# Patient Record
Sex: FEMALE | Race: WHITE | Employment: OTHER | URBAN - METROPOLITAN AREA
[De-identification: names, ages, dates, MRNs, and addresses within clinical notes are randomized per-mention and may not be internally consistent; named-entity substitution may affect disease eponyms.]

---

## 2017-05-31 ENCOUNTER — OFFICE VISIT (OUTPATIENT)
Dept: CARDIOLOGY CLINIC | Age: 82
End: 2017-05-31

## 2017-05-31 VITALS
SYSTOLIC BLOOD PRESSURE: 143 MMHG | HEIGHT: 63 IN | DIASTOLIC BLOOD PRESSURE: 48 MMHG | WEIGHT: 145 LBS | HEART RATE: 67 BPM | BODY MASS INDEX: 25.69 KG/M2

## 2017-05-31 DIAGNOSIS — E78.5 HYPERLIPIDEMIA, UNSPECIFIED HYPERLIPIDEMIA TYPE: ICD-10-CM

## 2017-05-31 DIAGNOSIS — J44.9 CHRONIC OBSTRUCTIVE PULMONARY DISEASE, UNSPECIFIED COPD TYPE (HCC): ICD-10-CM

## 2017-05-31 DIAGNOSIS — I10 ESSENTIAL HYPERTENSION WITH GOAL BLOOD PRESSURE LESS THAN 140/90: ICD-10-CM

## 2017-05-31 DIAGNOSIS — R06.09 DOE (DYSPNEA ON EXERTION): ICD-10-CM

## 2017-05-31 DIAGNOSIS — I27.20 PULMONARY HTN (HCC): ICD-10-CM

## 2017-05-31 DIAGNOSIS — I50.32 CHRONIC DIASTOLIC CONGESTIVE HEART FAILURE (HCC): Primary | ICD-10-CM

## 2017-05-31 NOTE — PROGRESS NOTES
HISTORY OF PRESENT ILLNESS  Blanche Gaffney is a 80 y.o. female. HPI Comments: Patient with chf,copd,htn,pulm htn  Here for follow up    Shortness of Breath   The history is provided by the patient. This is a recurrent problem. The problem occurs frequently. The problem has not changed since onset. Pertinent negatives include no fever, no headaches, no cough, no sputum production, no hemoptysis, no wheezing, no PND, no orthopnea, no chest pain, no vomiting, no abdominal pain, no rash, no leg swelling and no claudication. Precipitated by: activity. Review of Systems   Constitutional: Negative for chills and fever. HENT: Negative for nosebleeds. Eyes: Negative for blurred vision and double vision. Respiratory: Positive for shortness of breath. Negative for cough, hemoptysis, sputum production and wheezing. Cardiovascular: Negative for chest pain, palpitations, orthopnea, claudication, leg swelling and PND. Gastrointestinal: Negative for abdominal pain, heartburn, nausea and vomiting. Musculoskeletal: Negative for myalgias. Skin: Negative for rash. Neurological: Negative for dizziness, weakness and headaches. Endo/Heme/Allergies: Does not bruise/bleed easily.      Family History   Problem Relation Age of Onset    Heart Disease Mother     Hypertension Mother     Heart Attack Father     Hypertension Father        Past Medical History:   Diagnosis Date    Chronic lung disease     Chronic obstructive pulmonary disease ([address]     Heart failure ([address]     Hypertension        Past Surgical History:   Procedure Laterality Date    HX ORTHOPAEDIC      spinal sx 5/6    HX OTHER SURGICAL      Carotid artery    VASCULAR SURGERY PROCEDURE UNLIST      L CEA 10/2014       Social History   Substance Use Topics    Smoking status: Former Smoker     Packs/day: 1.50     Years: 30.00     Types: Cigarettes     Quit date: 9/12/1987    Smokeless tobacco: Never Used    Alcohol use No       No Known Allergies    Outpatient Prescriptions Marked as Taking for the 5/31/17 encounter (Office Visit) with Caroline Fuentes MD   Medication Sig Dispense Refill    Oxygen       valsartan (DIOVAN) 160 mg tablet Take 1 Tab by mouth daily. 90 Tab 3    spironolactone (ALDACTONE) 25 mg tablet Take  by mouth daily.  fluticasone-salmeterol (ADVAIR) 250-50 mcg/dose diskus inhaler Take 1 Puff by inhalation every twelve (12) hours.  albuterol (PROVENTIL HFA, VENTOLIN HFA, PROAIR HFA) 90 mcg/actuation inhaler Take  by inhalation.  albuterol (PROVENTIL VENTOLIN) 2.5 mg /3 mL (0.083 %) nebulizer solution 3 mL by Nebulization route every four (4) hours as needed for Wheezing or Shortness of Breath. 48 Each 0    furosemide (LASIX) 20 mg tablet 20 mg po daily (Patient taking differently: Take 20 mg by mouth daily.) 30 Tab 0    acetaminophen (TYLENOL EXTRA STRENGTH) 500 mg tablet Take 500 mg by mouth every six (6) hours as needed for Pain.  metoprolol (LOPRESSOR) 25 mg tablet Take 25 mg by mouth two (2) times a day.  NIFEdipine ER (ADALAT CC) 60 mg ER tablet Take 60 mg by mouth daily.  simvastatin (ZOCOR) 20 mg tablet Take  by mouth nightly.  alprazolam (XANAX) 0.5 mg tablet Take  by mouth.  tiotropium (SPIRIVA WITH HANDIHALER) 18 mcg inhalation capsule Take 1 capsule by inhalation daily.  aspirin delayed-release 81 mg tablet Take  by mouth daily. Visit Vitals    /48    Pulse 67    Ht 5' 3\" (1.6 m)    Wt 65.8 kg (145 lb)    BMI 25.69 kg/m2         Physical Exam   Constitutional: She is oriented to person, place, and time. She appears well-developed and well-nourished. HENT:   Head: Normocephalic and atraumatic. Eyes: Conjunctivae are normal.   Neck: Neck supple. No JVD present. No tracheal deviation present. No thyromegaly present. Cardiovascular: Normal rate and regular rhythm. PMI is not displaced. Exam reveals no gallop, no S3 and no decreased pulses.     Murmur heard.   Holosystolic murmur is present  at the lower left sternal border  Pulmonary/Chest: No respiratory distress. She has no wheezes. She has no rales. She exhibits no tenderness. Abdominal: Soft. There is no tenderness. Musculoskeletal: She exhibits no edema. Neurological: She is alert and oriented to person, place, and time. Skin: Skin is warm. Psychiatric: She has a normal mood and affect. Ms. Luis Sosa has a reminder for a \"due or due soon\" health maintenance. I have asked that she contact her primary care provider for follow-up on this health maintenance. I have personally reviewed patient's records available from hospital and other providers and incorporated findings in patient care. 6/2016-[facility]    SUMMARY:6/2016-echo  Procedure information: Image quality was adequate. Left ventricle: Systolic function was normal. Ejection fraction was  estimated to be 60 %. No obvious wall motion abnormalities identified in  the views obtained. Wall thickness was mildly increased. Features were  consistent with a pseudonormal left ventricular filling pattern, with  concomitant abnormal relaxation and increased filling pressure (grade 2  diastolic dysfunction). Right ventricle: Systolic pressure was markedly increased. Estimated peak  pressure was 74 mmHg. Left atrium: The atrium was mildly to moderately dilated. Inferior vena cava, hepatic veins: The inferior vena cava was mildly  dilated. Assessment         ICD-10-CM ICD-9-CM    1. Chronic diastolic congestive heart failure (HCC) I50.32 428.32      428.0     stable     2. Essential hypertension with goal blood pressure less than 140/90 I10 401.9     mildly elevated  has not taken meds   3. Hyperlipidemia, unspecified hyperlipidemia type E78.5 272.4     stable   4. Chronic obstructive pulmonary disease, unspecified COPD type ([facility] J44.9 496     stable  on home o2   5. BURGESS (dyspnea on exertion) R06.09 786.09     multifactorial   6.  Pulmonary HTN ([facility] I27.2 416.8     group 3         There are no discontinued medications. No orders of the defined types were placed in this encounter. Follow-up Disposition:  Return in about 6 months (around 11/30/2017).

## 2017-05-31 NOTE — MR AVS SNAPSHOT
Visit Information Date & Time Provider Department Dept. Phone Encounter #  
 5/31/2017 11:00 AM Qing Champagne MD [facility] [address] [place] [address] [id number] Follow-up Instructions Return in about 6 months (around 11/30/2017). Your Appointments 9/6/2017  1:45 PM  
PROCEDURE with [facility] IMAGING 1 [facility] ([address] [place] [address]) Appt Note: cv 1yr knaak; r/s to new office 2300 [facility] 955 200 [facility]  
[phone] 2300 [facility] [place] 200 [facility] 9/19/2017  1:15 PM  
Follow Up with [facility] ([address] [place] [address]) Appt Note: 1 year fu after study at [facility] on 9/6/17; rescheduled from [place] schedule 2300 [facility] 370 200 [facility]  
[phone] 2300 [facility] [place] 200 [facility] Upcoming Health Maintenance Date Due DTaP/Tdap/Td series (1 - Tdap) 10/16/1958 ZOSTER VACCINE AGE 60> 10/16/1997 GLAUCOMA SCREENING Q2Y 10/16/2002 OSTEOPOROSIS SCREENING (DEXA) 10/16/2002 MEDICARE YEARLY EXAM 10/16/2002 INFLUENZA AGE 9 TO ADULT 8/1/2017 Pneumococcal 65+ Low/Medium Risk (2 of 2 - PPSV23) 10/1/2020 Allergies as of 5/31/2017  Review Complete On: 5/31/2017 By: Qing Champagne MD  
 No Known Allergies Current Immunizations  Never Reviewed Name Date Influenza Vaccine 10/1/2015 Pneumococcal Vaccine (Unspecified Type) 10/1/2015 Not reviewed this visit You Were Diagnosed With   
  
 Codes Comments Chronic diastolic congestive heart failure (HCC)    -  Primary ICD-10-CM: I50.32 
ICD-9-CM: 428.32, 428.0 stable Essential hypertension with goal blood pressure less than 140/90     ICD-10-CM: I10 
ICD-9-CM: 401.9 mildly elevated 
has not taken meds Hyperlipidemia, unspecified hyperlipidemia type     ICD-10-CM: E78.5 ICD-9-CM: 272.4 stable Chronic obstructive pulmonary disease, unspecified COPD type ([facility]     ICD-10-CM: J44.9 ICD-9-CM: 496 stable 
on home o2 BURGESS (dyspnea on exertion)     ICD-10-CM: R06.09 
ICD-9-CM: 786.09 multifactorial  
 Pulmonary HTN ([facility]     ICD-10-CM: I27.2 ICD-9-CM: 416.8 group 3 Vitals BP Pulse Height(growth percentile) Weight(growth percentile) BMI OB Status 143/48 67 5' 3\" (1.6 m) 145 lb (65.8 kg) 25.69 kg/m2 Postmenopausal  
 Smoking Status Former Smoker Vitals History BMI and BSA Data Body Mass Index Body Surface Area  
 25.69 kg/m 2 1.71 m 2 Preferred Pharmacy Pharmacy Name Phone [address] [facility] [phone] Your Updated Medication List  
  
   
This list is accurate as of: 5/31/17 11:21 AM.  Always use your most recent med list.  
  
  
  
  
 * albuterol 90 mcg/actuation inhaler Commonly known as:  PROVENTIL HFA, VENTOLIN HFA, PROAIR HFA Take  by inhalation. * albuterol 2.5 mg /3 mL (0.083 %) nebulizer solution Commonly known as:  PROVENTIL VENTOLIN  
3 mL by Nebulization route every four (4) hours as needed for Wheezing or Shortness of Breath. ALDACTONE 25 mg tablet Generic drug:  spironolactone Take  by mouth daily. aspirin delayed-release 81 mg tablet Take  by mouth daily. fluticasone-salmeterol 250-50 mcg/dose diskus inhaler Commonly known as:  ADVAIR Take 1 Puff by inhalation every twelve (12) hours. furosemide 20 mg tablet Commonly known as:  LASIX  
20 mg po daily  
  
 metoprolol tartrate 25 mg tablet Commonly known as:  LOPRESSOR Take 25 mg by mouth two (2) times a day. NIFEdipine ER 60 mg ER tablet Commonly known as:  ADALAT CC Take 60 mg by mouth daily. Oxygen SPIRIVA WITH HANDIHALER 18 mcg inhalation capsule Generic drug:  tiotropium Take 1 capsule by inhalation daily. TYLENOL EXTRA STRENGTH 500 mg tablet Generic drug:  acetaminophen Take 500 mg by mouth every six (6) hours as needed for Pain.  
  
 valsartan 160 mg tablet Commonly known as:  DIOVAN Take 1 Tab by mouth daily. XANAX 0.5 mg tablet Generic drug:  ALPRAZolam  
Take  by mouth. ZOCOR 20 mg tablet Generic drug:  simvastatin Take  by mouth nightly. * Notice: This list has 2 medication(s) that are the same as other medications prescribed for you. Read the directions carefully, and ask your doctor or other care provider to review them with you. Follow-up Instructions Return in about 6 months (around 11/30/2017). Please provide this summary of care documentation to your next provider. Your primary care clinician is listed as [address] [place] [address]. If you have any questions after today's visit, please call [phone].

## 2017-05-31 NOTE — LETTER
[address] 1935 5/31/2017 Dear Ernesto Mcclendon MD 
 
I had the pleasure of evaluating  Ms. Brewer in office today. Below are the relevant portions of my assessment and plan of care. ICD-10-CM ICD-9-CM 1. Chronic diastolic congestive heart failure (HCC) I50.32 428.32   
  428.0   
 stable 2. Essential hypertension with goal blood pressure less than 140/90 I10 401.9   
 mildly elevated 
has not taken meds 3. Hyperlipidemia, unspecified hyperlipidemia type E78.5 272.4   
 stable 4. Chronic obstructive pulmonary disease, unspecified COPD type ([facility] J44.9 496   
 stable 
on home o2  
5. BURGESS (dyspnea on exertion) R06.09 786.09   
 multifactorial  
6. Pulmonary HTN (HCC) I27.2 416.8   
 group 3 Current Outpatient Prescriptions Medication Sig Dispense Refill  Oxygen  valsartan (DIOVAN) 160 mg tablet Take 1 Tab by mouth daily. 90 Tab 3  
 spironolactone (ALDACTONE) 25 mg tablet Take  by mouth daily.  fluticasone-salmeterol (ADVAIR) 250-50 mcg/dose diskus inhaler Take 1 Puff by inhalation every twelve (12) hours.  albuterol (PROVENTIL HFA, VENTOLIN HFA, PROAIR HFA) 90 mcg/actuation inhaler Take  by inhalation.  albuterol (PROVENTIL VENTOLIN) 2.5 mg /3 mL (0.083 %) nebulizer solution 3 mL by Nebulization route every four (4) hours as needed for Wheezing or Shortness of Breath. 48 Each 0  
 furosemide (LASIX) 20 mg tablet 20 mg po daily (Patient taking differently: Take 20 mg by mouth daily.) 30 Tab 0  
 acetaminophen (TYLENOL EXTRA STRENGTH) 500 mg tablet Take 500 mg by mouth every six (6) hours as needed for Pain.  metoprolol (LOPRESSOR) 25 mg tablet Take 25 mg by mouth two (2) times a day.  NIFEdipine ER (ADALAT CC) 60 mg ER tablet Take 60 mg by mouth daily.  simvastatin (ZOCOR) 20 mg tablet Take  by mouth nightly.  alprazolam (XANAX) 0.5 mg tablet Take  by mouth.  tiotropium (SPIRIVA WITH HANDIHALER) 18 mcg inhalation capsule Take 1 capsule by inhalation daily.  aspirin delayed-release 81 mg tablet Take  by mouth daily. No orders of the defined types were placed in this encounter. If you have questions, please do not hesitate to call me. I look forward to following Ms. Brewer along with you. Sincerely, Nini Juares MD

## 2017-06-14 ENCOUNTER — HOSPITAL ENCOUNTER (OUTPATIENT)
Dept: NON INVASIVE DIAGNOSTICS | Age: 82
Discharge: HOME | End: 2017-06-14
Attending: INTERNAL MEDICINE
Payer: MEDICARE

## 2017-06-14 DIAGNOSIS — I27.20 PULMONARY HYPERTENSION (HCC): ICD-10-CM

## 2017-06-14 PROCEDURE — 93306 TTE W/DOPPLER COMPLETE: CPT

## 2017-06-14 NOTE — PROGRESS NOTES
Completed echocardiogram. Report to follow. I removed the band off and placed in shred box.      Cristin Minor, [place], RDCS

## 2017-09-06 ENCOUNTER — OFFICE VISIT (OUTPATIENT)
Dept: VASCULAR SURGERY | Age: 82
End: 2017-09-06

## 2017-09-06 DIAGNOSIS — I65.22 STENOSIS OF LEFT CAROTID ARTERY: ICD-10-CM

## 2017-09-06 DIAGNOSIS — Z98.890 H/O CAROTID ENDARTERECTOMY: ICD-10-CM

## 2017-09-06 NOTE — PROCEDURES
[facility]   *** FINAL REPORT ***    Name: Nicholas Lira  MRN: [id number]       Outpatient  : 16 Oct 1935  HIS Order #: [id number]  [address] [facility] Visit #: [id number]  Date: 06 Sep 2017    TYPE OF TEST: Cerebrovascular Duplex    REASON FOR TEST  Carotid disease    Right Carotid:-             Proximal               Mid                 Distal  cm/s  Systolic  Diastolic  Systolic  Diastolic  Systolic  Diastolic  CCA:     53.0      14.0                            79.0      19.0  Bulb:  ECA:    229.0       4.0  ICA:    137.0      20.0      102.0      23.0       96.0      23.0  ICA/CCA:  1.7       1.1    ICA Stenosis: 50-69%    Right Vertebral:-  Finding: Antegrade  Sys:      110.0  Jennifer:       20.0    Right Subclavian:    Left Carotid:-            Proximal                Mid                 Distal  cm/s  Systolic  Diastolic  Systolic  Diastolic  Systolic  Diastolic  CCA:     93.2      17.0                            91.0      19.0  Bulb:    73.0       9.0  ECA:    151.0       3.0  ICA:     53.0      13.0       81.0      21.0       71.0      18.0  ICA/CCA:  0.9       1.2    ICA Stenosis: <50%    Left Vertebral:-  Finding: Antegrade  Sys:       54.0  Jennifer:       11.0    Left Subclavian:    INTERPRETATION/FINDINGS  Duplex images were obtained using 2-D gray scale, color flow and  spectral doppler analysis. 1. 50-69% stenosis of the right internal carotid artery. 2. Patent left carotid endarterectomy with <50% stenosis in the left  internal carotid artery. 3. No significant stenosis in the external carotid arteries  bilaterally. 4. Antegrade flow in both vertebral arteries. Plaque Morphology:  1. Heterogeneous plaque in the bulb and right ICA. 2. Homogeneous plaque in the bulb and left ICA. Compared to prior exam, there is progression of disease on the right  from mild to moderate range. ADDITIONAL COMMENTS    I have personally reviewed the data relevant to the interpretation of  this  study.     TECHNOLOGIST: [facility]. Harsha Sotelo RDMS  Signed: 09/06/2017 04:01 PM    PHYSICIAN: Pushpa Guo D.O.   Signed: 09/08/2017 09:24 AM

## 2017-09-19 ENCOUNTER — OFFICE VISIT (OUTPATIENT)
Dept: VASCULAR SURGERY | Age: 82
End: 2017-09-19

## 2017-09-19 VITALS
RESPIRATION RATE: 22 BRPM | HEIGHT: 63 IN | BODY MASS INDEX: 25.69 KG/M2 | DIASTOLIC BLOOD PRESSURE: 60 MMHG | WEIGHT: 145 LBS | SYSTOLIC BLOOD PRESSURE: 142 MMHG | HEART RATE: 80 BPM

## 2017-09-19 DIAGNOSIS — Z98.890 H/O CAROTID ENDARTERECTOMY: ICD-10-CM

## 2017-09-19 DIAGNOSIS — I65.23 BILATERAL CAROTID ARTERY STENOSIS: Primary | ICD-10-CM

## 2017-09-19 DIAGNOSIS — R60.0 BILATERAL LEG EDEMA: ICD-10-CM

## 2017-09-19 NOTE — MR AVS SNAPSHOT
Visit Information Date & Time Provider Department Dept. Phone Encounter #  
 9/19/2017  1:15 PM Perez Turcios, [address] [facility] [phone] [id number] Follow-up Instructions Return in about 1 year (around 9/19/2018). Your Appointments 12/1/2017 11:00 AM  
Office Visit with Lisa March MD  
[facility]) Appt Note: [address] [facility] [address] [place] [address]  
[address] [facility], [address] [place] [address] 9/19/2018 11:00 AM  
PROCEDURE with [facility] IMAGING 1 [facility] Appt Note: cv knaak 1 year [address]00 [facility] [address] [facility]  
[phone] [address]00 [facility] 315 [facility]  
  
    
 10/4/2018  1:00 PM  
Follow Up with [facility] ([facility]) Appt Note: 1 YEAR FOLLOW UP AFTER CAROTID  
 [address], [place] [address] 200 [facility]  
[phone] 2300 [facility] [address] [facility] Upcoming Health Maintenance Date Due DTaP/Tdap/Td series (1 - Tdap) 10/16/1958 ZOSTER VACCINE AGE 60> 8/16/1997 GLAUCOMA SCREENING Q2Y 10/16/2002 OSTEOPOROSIS SCREENING (DEXA) 10/16/2002 MEDICARE YEARLY EXAM 10/16/2002 INFLUENZA AGE 9 TO ADULT 8/1/2017 Pneumococcal 65+ Low/Medium Risk (2 of 2 - PPSV23) 10/1/2020 Allergies as of 9/19/2017  Review Complete On: 9/19/2017 By: Manju Sears LPN No Known Allergies Current Immunizations  Never Reviewed Name Date Influenza Vaccine 10/1/2015 Pneumococcal Vaccine (Unspecified Type) 10/1/2015 Not reviewed this visit You Were Diagnosed With   
  
 Codes Comments Bilateral carotid artery stenosis    -  Primary ICD-10-CM: C52.68 ICD-9-CM: 433.10, 433.30 H/O carotid endarterectomy     ICD-10-CM: Z98.890 ICD-9-CM: V45.89 Vitals BP Pulse Resp Height(growth percentile) Weight(growth percentile) BMI  
 142/60 (BP 1 Location: Left arm, BP Patient Position: Sitting) 80 22 5' 3\" (1.6 m) 145 lb (65.8 kg) 25.69 kg/m2 OB Status Smoking Status Postmenopausal Former Smoker Vitals History BMI and BSA Data Body Mass Index Body Surface Area  
 25.69 kg/m 2 1.71 m 2 Preferred Pharmacy Pharmacy Name Phone [address] [facility] [facility] [phone] Your Updated Medication List  
  
   
This list is accurate as of: 9/19/17  1:57 PM.  Always use your most recent med list.  
  
  
  
  
 * albuterol 90 mcg/actuation inhaler Commonly known as:  PROVENTIL HFA, VENTOLIN HFA, PROAIR HFA Take  by inhalation. * albuterol 2.5 mg /3 mL (0.083 %) nebulizer solution Commonly known as:  PROVENTIL VENTOLIN  
3 mL by Nebulization route every four (4) hours as needed for Wheezing or Shortness of Breath. ALDACTONE 25 mg tablet Generic drug:  spironolactone Take  by mouth daily. aspirin delayed-release 81 mg tablet Take  by mouth daily. fluticasone-salmeterol 250-50 mcg/dose diskus inhaler Commonly known as:  ADVAIR Take 1 Puff by inhalation every twelve (12) hours. furosemide 20 mg tablet Commonly known as:  LASIX  
20 mg po daily  
  
 metoprolol tartrate 25 mg tablet Commonly known as:  LOPRESSOR Take 25 mg by mouth two (2) times a day. NIFEdipine ER 60 mg ER tablet Commonly known as:  ADALAT CC Take 60 mg by mouth daily. Oxygen SPIRIVA WITH HANDIHALER 18 mcg inhalation capsule Generic drug:  tiotropium Take 1 capsule by inhalation daily. TYLENOL EXTRA STRENGTH 500 mg tablet Generic drug:  acetaminophen Take 500 mg by mouth every six (6) hours as needed for Pain.  
  
 valsartan 160 mg tablet Commonly known as:  DIOVAN  
 Take 1 Tab by mouth daily. XANAX 0.5 mg tablet Generic drug:  ALPRAZolam  
Take  by mouth. ZOCOR 20 mg tablet Generic drug:  simvastatin Take  by mouth nightly. * Notice: This list has 2 medication(s) that are the same as other medications prescribed for you. Read the directions carefully, and ask your doctor or other care provider to review them with you. Follow-up Instructions Return in about 1 year (around 9/19/2018). To-Do List   
 09/19/2018 Imaging:  DUPLEX CAROTID BILATERAL AMB [facility]! Dear Argenis Melgar: Thank you for requesting a [employer] account. Our records indicate that you have previously registered for a Pong[employer] account but its currently inactive. Please call our Pong[employer] support line at [phone]. Additional Information If you have questions, please visit the Frequently Asked Questions section of the Pong[employer] website at [email or web address] Remember, Pong[employer] is NOT to be used for urgent needs. For medical emergencies, dial 911. Now available from your iPhone and Android! Please provide this summary of care documentation to your next provider. Your primary care clinician is listed as Darnell Ruiz. If you have any questions after today's visit, please call [phone].

## 2017-09-19 NOTE — PROGRESS NOTES
[address]    Chief Complaint   Patient presents with    Carotid Artery Stenosis       History and Physical    Ms Maxwell Gonzalez is here approaching 3 years s/p L CEA and this is a surveillance visit  When she was here last year she had recently become dependent on O2 supplementation. She has done well with adjusting to full time oxygen. She still stays active and busy  She had been having issues with lower extremity edema and did get compression stockings, which she wears regularly, and she says this does help    Past Medical History:   Diagnosis Date    Chronic lung disease     [facility]     Heart failure ([facility]     Hypertension      Patient Active Problem List   Diagnosis Code    Occlusion and stenosis of carotid artery I65.29    Carotid stenosis I65.29    BURGESS (dyspnea on exertion) R06.09    Hyperlipidemia E78.5    Essential hypertension with goal blood pressure less than 140/90 I10    Chronic diastolic congestive heart failure (HCC) I50.32    Chronic obstructive pulmonary disease (HCC) J44.9    Pulmonary HTN (HCC) I27.2     Past Surgical History:   Procedure Laterality Date    HX ORTHOPAEDIC      spinal sx 5/6    HX OTHER SURGICAL      Carotid artery    VASCULAR SURGERY PROCEDURE UNLIST      L CEA 10/2014     Current Outpatient Prescriptions   Medication Sig Dispense Refill    Oxygen       valsartan (DIOVAN) 160 mg tablet Take 1 Tab by mouth daily. 90 Tab 3    spironolactone (ALDACTONE) 25 mg tablet Take  by mouth daily.  fluticasone-salmeterol (ADVAIR) 250-50 mcg/dose diskus inhaler Take 1 Puff by inhalation every twelve (12) hours.  albuterol (PROVENTIL HFA, VENTOLIN HFA, PROAIR HFA) 90 mcg/actuation inhaler Take  by inhalation.  albuterol (PROVENTIL VENTOLIN) 2.5 mg /3 mL (0.083 %) nebulizer solution 3 mL by Nebulization route every four (4) hours as needed for Wheezing or Shortness of Breath.  48 Each 0    furosemide (LASIX) 20 mg tablet 20 mg po daily (Patient taking differently: Take 20 mg by mouth daily.) 30 Tab 0    acetaminophen (TYLENOL EXTRA STRENGTH) 500 mg tablet Take 500 mg by mouth every six (6) hours as needed for Pain.  metoprolol (LOPRESSOR) 25 mg tablet Take 25 mg by mouth two (2) times a day.  NIFEdipine ER (ADALAT CC) 60 mg ER tablet Take 60 mg by mouth daily.  simvastatin (ZOCOR) 20 mg tablet Take  by mouth nightly.  alprazolam (XANAX) 0.5 mg tablet Take  by mouth.  tiotropium (SPIRIVA WITH HANDIHALER) 18 mcg inhalation capsule Take 1 capsule by inhalation daily.  aspirin delayed-release 81 mg tablet Take  by mouth daily. No Known Allergies    Review of Systems    Review of Systems - History obtained from the patient  General ROS: negative  Psychological ROS: negative  Ophthalmic ROS: positive for - uses glasses  Respiratory ROS: positive for - shortness of breath  Cardiovascular ROS: negative  Gastrointestinal ROS: negative  Musculoskeletal ROS: negative  Neurological ROS: no TIA or stroke symptoms  Dermatological ROS: negative  Vascular ROS: mild leg edema bilaterally    Physical   Visit Vitals    /60 (BP 1 Location: Left arm, BP Patient Position: Sitting)    Pulse 80    Resp 22    Ht 5' 3\" (1.6 m)    Wt 145 lb (65.8 kg)    BMI 25.69 kg/m2     General:  Alert, cooperative, no distress. On Oxygen via nasal cannula   Head:  Normocephalic, without obvious abnormality, atraumatic. Eyes:     Conjunctivae/corneas clear. Pupils equal, round, reactive to light. Extraocular movements intact. Neck:         L CEA scar   Lungs:   Clear to auscultation bilaterally.    Heart:  Regular rate and rhythm, S1, S2 normal   Extremities: Extremities normal, atraumatic, but about 1+ edema bilaterally   Pulses: Difficult to palpate due to edema but no signs of ischemia   Skin: Skin color, texture, turgor normal. No rashes or lesions         Vascular studies:  1. 50-69% stenosis of the right internal carotid artery. 2. Patent left carotid endarterectomy with <50% stenosis in the left  internal carotid artery. 3. No significant stenosis in the external carotid arteries  bilaterally. 4. Antegrade flow in both vertebral arteries. Plaque Morphology:  1. Heterogeneous plaque in the bulb and right ICA. 2. Homogeneous plaque in the bulb and left ICA. Compared to prior exam, there is progression of disease on the right  from mild to moderate range. Impression/Plan:     ICD-10-CM ICD-9-CM    1. Bilateral carotid artery stenosis I65.23 433.10 DUPLEX CAROTID BILATERAL AMB     433.30    2. H/O carotid endarterectomy Z98.890 V45.89 DUPLEX CAROTID BILATERAL AMB     Orders Placed This Encounter    DUPLEX CAROTID BILATERAL AMB     Reassured of her results and even though right side stated as an increase from mild to moderate, velocities indicate only a mild increase and she is in the low end of moderate range. So still ok to do yearly visit     Follow-up Disposition:  Return in about 1 year (around 9/19/2018). JOELLE Wellington    Portions of this note have been entered using voice recognition software.

## 2017-12-06 ENCOUNTER — OFFICE VISIT (OUTPATIENT)
Dept: CARDIOLOGY CLINIC | Age: 82
End: 2017-12-06

## 2017-12-06 VITALS
HEART RATE: 63 BPM | BODY MASS INDEX: 24.63 KG/M2 | WEIGHT: 139 LBS | HEIGHT: 63 IN | SYSTOLIC BLOOD PRESSURE: 121 MMHG | DIASTOLIC BLOOD PRESSURE: 49 MMHG

## 2017-12-06 DIAGNOSIS — I50.32 CHRONIC DIASTOLIC CONGESTIVE HEART FAILURE (HCC): ICD-10-CM

## 2017-12-06 DIAGNOSIS — I27.20 PULMONARY HTN (HCC): Primary | ICD-10-CM

## 2017-12-06 DIAGNOSIS — E78.5 HYPERLIPIDEMIA, UNSPECIFIED HYPERLIPIDEMIA TYPE: ICD-10-CM

## 2017-12-06 DIAGNOSIS — I10 ESSENTIAL HYPERTENSION WITH GOAL BLOOD PRESSURE LESS THAN 140/90: ICD-10-CM

## 2017-12-06 DIAGNOSIS — J44.9 CHRONIC OBSTRUCTIVE PULMONARY DISEASE, UNSPECIFIED COPD TYPE (HCC): ICD-10-CM

## 2017-12-06 NOTE — PROGRESS NOTES
Patient didn't bring medications, verbally reviewed    1. Have you been to the ER, urgent care clinic since your last visit? Hospitalized since your last visit? No    2. Have you seen or consulted any other health care providers outside of the [address] [place] [address] since your last visit? Include any pap smears or colon screening.  Yes Where: Pulmonary Routine PCP Routine

## 2017-12-06 NOTE — LETTER
[address] 1935 
 
12/6/2017 Dear Josie Ghosh MD 
 
I had the pleasure of evaluating  Ms. Brewer in office today. Below are the relevant portions of my assessment and plan of care. ICD-10-CM ICD-9-CM 1. Pulmonary HTN I27.20 416.8 CARDIAC CATHETERIZATION  
   CBC WITH AUTOMATED DIFF  
   METABOLIC PANEL, BASIC  
 moderate ?? group 3 
evaluate for group 1,4 
willing to proceed for rt heart cath 2. Chronic diastolic congestive heart failure (HCC) I50.32 428.32   
  428.0   
 stable 3. Chronic obstructive pulmonary disease, unspecified COPD type ([facility] J44.9 496   
 on home o2  
4. Essential hypertension with goal blood pressure less than 140/90 I10 401.9   
 controlled 5. Hyperlipidemia, unspecified hyperlipidemia type E78.5 272.4   
 on statin Current Outpatient Prescriptions Medication Sig Dispense Refill  Oxygen  valsartan (DIOVAN) 160 mg tablet Take 1 Tab by mouth daily. 90 Tab 3  
 spironolactone (ALDACTONE) 25 mg tablet Take  by mouth daily as needed.  fluticasone-salmeterol (ADVAIR) 250-50 mcg/dose diskus inhaler Take 1 Puff by inhalation every twelve (12) hours.  albuterol (PROVENTIL HFA, VENTOLIN HFA, PROAIR HFA) 90 mcg/actuation inhaler Take  by inhalation.  albuterol (PROVENTIL VENTOLIN) 2.5 mg /3 mL (0.083 %) nebulizer solution 3 mL by Nebulization route every four (4) hours as needed for Wheezing or Shortness of Breath. 48 Each 0  
 furosemide (LASIX) 20 mg tablet 20 mg po daily (Patient taking differently: Take 40 mg by mouth daily.) 30 Tab 0  
 acetaminophen (TYLENOL EXTRA STRENGTH) 500 mg tablet Take 500 mg by mouth every six (6) hours as needed for Pain.  metoprolol (LOPRESSOR) 25 mg tablet Take 25 mg by mouth two (2) times a day.  NIFEdipine ER (ADALAT CC) 60 mg ER tablet Take 60 mg by mouth daily.  simvastatin (ZOCOR) 20 mg tablet Take  by mouth nightly.  alprazolam (XANAX) 0.5 mg tablet Take  by mouth.  tiotropium (SPIRIVA WITH HANDIHALER) 18 mcg inhalation capsule Take 1 capsule by inhalation daily.  aspirin delayed-release 81 mg tablet Take  by mouth daily. Orders Placed This Encounter  CBC WITH AUTOMATED DIFF Standing Status:   Future Standing Expiration Date:   1/5/2018  METABOLIC PANEL, BASIC Standing Status:   Future Standing Expiration Date:   1/5/2018  CARDIAC CATHETERIZATION Standing Status:   Future Standing Expiration Date:   6/8/2018 Order Specific Question:   Reason for Exam: Answer:   see diagnosis If you have questions, please do not hesitate to call me. I look forward to following Ms. Brewer along with you. Sincerely, Gabriela Mclain MD

## 2017-12-06 NOTE — PROGRESS NOTES
HISTORY OF PRESENT ILLNESS  Blanche Garcia is a [de-identified] y.o. female. HPI Comments: Patient with chf,copd,htn,pulm htn  Here for follow up    CHF   The history is provided by the patient. This is a chronic problem. The problem occurs constantly. The problem has not changed since onset. Associated symptoms include shortness of breath. Pertinent negatives include no chest pain, no abdominal pain and no headaches. The symptoms are aggravated by exertion. Nothing relieves the symptoms. Cholesterol Problem   Associated symptoms include shortness of breath. Pertinent negatives include no chest pain, no abdominal pain and no headaches. Shortness of Breath   The history is provided by the patient. This is a recurrent problem. The problem occurs frequently. The problem has not changed since onset. Pertinent negatives include no fever, no headaches, no cough, no sputum production, no hemoptysis, no wheezing, no PND, no orthopnea, no chest pain, no vomiting, no abdominal pain, no rash, no leg swelling and no claudication. Precipitated by: activity. Review of Systems   Constitutional: Negative for chills and fever. HENT: Negative for nosebleeds. Eyes: Negative for blurred vision and double vision. Respiratory: Positive for shortness of breath. Negative for cough, hemoptysis, sputum production and wheezing. Cardiovascular: Negative for chest pain, palpitations, orthopnea, claudication, leg swelling and PND. Gastrointestinal: Negative for abdominal pain, heartburn, nausea and vomiting. Musculoskeletal: Negative for myalgias. Skin: Negative for rash. Neurological: Negative for dizziness, weakness and headaches. Endo/Heme/Allergies: Does not bruise/bleed easily.      Family History   Problem Relation Age of Onset    Heart Disease Mother     Hypertension Mother     Heart Attack Father     Hypertension Father        Past Medical History:   Diagnosis Date    Chronic lung disease     [facility] obstructive pulmonary disease ([facility]     Heart failure ([facility]     Hypertension        Past Surgical History:   Procedure Laterality Date    HX ORTHOPAEDIC      spinal sx 5/6    HX OTHER SURGICAL      Carotid artery    VASCULAR SURGERY PROCEDURE UNLIST      L CEA 10/2014       Social History   Substance Use Topics    Smoking status: Former Smoker     Packs/day: 1.50     Years: 30.00     Types: Cigarettes     Quit date: 9/12/1987    Smokeless tobacco: Never Used    Alcohol use No       No Known Allergies    Outpatient Prescriptions Marked as Taking for the 12/6/17 encounter (Office Visit) with Joan Silvestre MD   Medication Sig Dispense Refill    Oxygen       valsartan (DIOVAN) 160 mg tablet Take 1 Tab by mouth daily. 90 Tab 3    spironolactone (ALDACTONE) 25 mg tablet Take  by mouth daily as needed.  fluticasone-salmeterol (ADVAIR) 250-50 mcg/dose diskus inhaler Take 1 Puff by inhalation every twelve (12) hours.  albuterol (PROVENTIL HFA, VENTOLIN HFA, PROAIR HFA) 90 mcg/actuation inhaler Take  by inhalation.  albuterol (PROVENTIL VENTOLIN) 2.5 mg /3 mL (0.083 %) nebulizer solution 3 mL by Nebulization route every four (4) hours as needed for Wheezing or Shortness of Breath. 48 Each 0    furosemide (LASIX) 20 mg tablet 20 mg po daily (Patient taking differently: Take 40 mg by mouth daily.) 30 Tab 0    acetaminophen (TYLENOL EXTRA STRENGTH) 500 mg tablet Take 500 mg by mouth every six (6) hours as needed for Pain.  metoprolol (LOPRESSOR) 25 mg tablet Take 25 mg by mouth two (2) times a day.  NIFEdipine ER (ADALAT CC) 60 mg ER tablet Take 60 mg by mouth daily.  simvastatin (ZOCOR) 20 mg tablet Take  by mouth nightly.  alprazolam (XANAX) 0.5 mg tablet Take  by mouth.  tiotropium (SPIRIVA WITH HANDIHALER) 18 mcg inhalation capsule Take 1 capsule by inhalation daily.  aspirin delayed-release 81 mg tablet Take  by mouth daily.          Visit Vitals    /49    Pulse 63    Ht 5' 3\" (1.6 m)    Wt 63 kg (139 lb)    BMI 24.62 kg/m2         Physical Exam   Constitutional: She is oriented to person, place, and time. She appears well-developed and well-nourished. HENT:   Head: Normocephalic and atraumatic. Eyes: Conjunctivae are normal.   Neck: Neck supple. No JVD present. No tracheal deviation present. No thyromegaly present. Cardiovascular: Normal rate and regular rhythm. PMI is not displaced. Exam reveals no gallop, no S3 and no decreased pulses. Murmur heard. Holosystolic murmur is present  at the lower left sternal border  Pulmonary/Chest: No respiratory distress. She has no wheezes. She has no rales. She exhibits no tenderness. Abdominal: Soft. There is no tenderness. Musculoskeletal: She exhibits no edema. Neurological: She is alert and oriented to person, place, and time. Skin: Skin is warm. Psychiatric: She has a normal mood and affect. Ms. Sugey Storm has a reminder for a \"due or due soon\" health maintenance. I have asked that she contact her primary care provider for follow-up on this health maintenance. I have personally reviewed patient's records available from hospital and other providers and incorporated findings in patient care. 6/2016-[facility]    SUMMARY:6/2016-echo  Procedure information: Image quality was adequate. Left ventricle: Systolic function was normal. Ejection fraction was  estimated to be 60 %. No obvious wall motion abnormalities identified in  the views obtained. Wall thickness was mildly increased. Features were  consistent with a pseudonormal left ventricular filling pattern, with  concomitant abnormal relaxation and increased filling pressure (grade 2  diastolic dysfunction). Right ventricle: Systolic pressure was markedly increased. Estimated peak  pressure was 74 mmHg. Left atrium: The atrium was mildly to moderately dilated. Inferior vena cava, hepatic veins:  The inferior vena cava was mildly  Dilated. SUMMARY:6/2017-echo  Left ventricle: Systolic function was normal by visual assessment. Ejection fraction was estimated to be 60 %. There was possible hypokinesis  of the basal-mid anteroseptal, basal-mid inferoseptal, apical inferior,  and apical septal wall(s). Wall thickness was mildly increased. Doppler  parameters were consistent with abnormal left ventricular relaxation  (grade 1 diastolic dysfunction). Right ventricle: The size was at the upper limits of normal. Systolic  pressure was moderately increased. Estimated peak pressure was 60 mmHg. Right atrium: The atrium was mildly dilated. Assessment         ICD-10-CM ICD-9-CM    1. Pulmonary HTN I27.20 416.8 CARDIAC CATHETERIZATION      CBC WITH AUTOMATED DIFF      METABOLIC PANEL, BASIC    moderate  ?? group 3  evaluate for group 1,4  willing to proceed for rt heart cath   2. Chronic diastolic congestive heart failure (HCC) I50.32 428.32      428.0     stable   3. Chronic obstructive pulmonary disease, unspecified COPD type ([facility] J44.9 496     on home o2   4. Essential hypertension with goal blood pressure less than 140/90 I10 401.9     controlled   5. Hyperlipidemia, unspecified hyperlipidemia type E78.5 272.4     on statin     12/2017  Discussed option of right heart cath-risk benefit discussed-patient willing to proceed  Followed by Dr Chanelle Knight  There are no discontinued medications. Orders Placed This Encounter    CBC WITH AUTOMATED DIFF     Standing Status:   Future     Standing Expiration Date:   2/1/9144    METABOLIC PANEL, BASIC     Standing Status:   Future     Standing Expiration Date:   1/5/2018    CARDIAC CATHETERIZATION     Standing Status:   Future     Standing Expiration Date:   6/8/2018     Order Specific Question:   Reason for Exam:     Answer:   see diagnosis       Follow-up Disposition:  Return in about 6 weeks (around 1/17/2018).

## 2017-12-06 NOTE — MR AVS SNAPSHOT
Visit Information Date & Time Provider Department Dept. Phone Encounter #  
 12/6/2017 12:15 PM Dedrick Serrano MD [facility] [id number] Follow-up Instructions Return in about 6 weeks (around 1/17/2018). Your Appointments 1/25/2018 10:45 AM  
ESTABLISHED PATIENT with Dedrick Serrano MD  
[facility]) Appt Note: post cath [address] [facility], [address] [facility] [address]  
[address], [address] [place] [address] 9/19/2018 11:00 AM  
PROCEDURE with [facility] 1 [facility] ([facility] Appt Note: cv knaak 1 year [address] [facility] [address] [facility] [address]  
[phone] [address] [facility] [address] [facility]  
  
    
 10/4/2018  1:00 PM  
[facility]) Appt Note: 1 YEAR FOLLOW UP AFTER CAROTID  
 [address], [place] [address] [address] [facility] [address]  
[phone] 12[address] [facility] [address] [facility] [address] Upcoming Health Maintenance Date Due DTaP/Tdap/Td series (1 - Tdap) 10/16/1956 ZOSTER VACCINE AGE 60> 8/16/1997 GLAUCOMA SCREENING Q2Y 10/16/2002 OSTEOPOROSIS SCREENING (DEXA) 10/16/2002 MEDICARE YEARLY EXAM 10/16/2002 Influenza Age 5 to Adult 8/1/2017 Pneumococcal 65+ Low/Medium Risk (2 of 2 - PPSV23) 10/1/2020 Allergies as of 12/6/2017  Review Complete On: 12/6/2017 By: Dedrick Serrano MD  
 No Known Allergies Current Immunizations  Never Reviewed Name Date Influenza Vaccine 10/1/2015 Pneumococcal Vaccine (Unspecified Type) 10/1/2015 Not reviewed this visit You Were Diagnosed With   
  
 Codes Comments Pulmonary HTN    -  Primary ICD-10-CM: I27.20 ICD-9-CM: 416.8 moderate ?? group 3 
evaluate for group 1,4 
 willing to proceed for rt heart cath Chronic diastolic congestive heart failure (HCC)     ICD-10-CM: I50.32 
ICD-9-CM: 428.32, 428.0 stable Chronic obstructive pulmonary disease, unspecified COPD type ([facility]     ICD-10-CM: J44.9 ICD-9-CM: 496 on home o2 Essential hypertension with goal blood pressure less than 140/90     ICD-10-CM: I10 
ICD-9-CM: 401.9 controlled Hyperlipidemia, unspecified hyperlipidemia type     ICD-10-CM: E78.5 ICD-9-CM: 272.4 on statin Vitals BP Pulse Height(growth percentile) Weight(growth percentile) BMI OB Status 121/49 63 5' 3\" (1.6 m) 139 lb (63 kg) 24.62 kg/m2 Postmenopausal  
 Smoking Status Former Smoker Vitals History BMI and BSA Data Body Mass Index Body Surface Area  
 24.62 kg/m 2 1.67 m 2 Preferred Pharmacy Pharmacy Name Phone [address] [facility] [phone] Your Updated Medication List  
  
   
This list is accurate as of: 12/6/17 12:21 PM.  Always use your most recent med list.  
  
  
  
  
 * albuterol 90 mcg/actuation inhaler Commonly known as:  PROVENTIL HFA, VENTOLIN HFA, PROAIR HFA Take  by inhalation. * albuterol 2.5 mg /3 mL (0.083 %) nebulizer solution Commonly known as:  PROVENTIL VENTOLIN  
3 mL by Nebulization route every four (4) hours as needed for Wheezing or Shortness of Breath. ALDACTONE 25 mg tablet Generic drug:  spironolactone Take  by mouth daily as needed. aspirin delayed-release 81 mg tablet Take  by mouth daily. fluticasone-salmeterol 250-50 mcg/dose diskus inhaler Commonly known as:  ADVAIR Take 1 Puff by inhalation every twelve (12) hours. furosemide 20 mg tablet Commonly known as:  LASIX  
20 mg po daily  
  
 metoprolol tartrate 25 mg tablet Commonly known as:  LOPRESSOR Take 25 mg by mouth two (2) times a day. NIFEdipine ER 60 mg ER tablet Commonly known as:  ADALAT CC  
 Take 60 mg by mouth daily. Oxygen SPIRIVA WITH HANDIHALER 18 mcg inhalation capsule Generic drug:  tiotropium Take 1 capsule by inhalation daily. TYLENOL EXTRA STRENGTH 500 mg tablet Generic drug:  acetaminophen Take 500 mg by mouth every six (6) hours as needed for Pain.  
  
 valsartan 160 mg tablet Commonly known as:  DIOVAN Take 1 Tab by mouth daily. XANAX 0.5 mg tablet Generic drug:  ALPRAZolam  
Take  by mouth. ZOCOR 20 mg tablet Generic drug:  simvastatin Take  by mouth nightly. * Notice: This list has 2 medication(s) that are the same as other medications prescribed for you. Read the directions carefully, and ask your doctor or other care provider to review them with you. Follow-up Instructions Return in about 6 weeks (around 1/17/2018). To-Do List   
 12/13/2017 Lab:  CBC WITH AUTOMATED DIFF   
  
 12/13/2017 Lab:  METABOLIC PANEL, BASIC   
  
 12/14/2017 Cardiac Services:  CARDIAC CATHETERIZATION Introducing [facility] SERVICES! Dear Shy Navarro: Thank you for requesting a [employer] account. Our records indicate that you have previously registered for a [employer] account but its currently inactive. Please call our [employer] support line at [phone]. Additional Information If you have questions, please visit the Frequently Asked Questions section of the [employer] website at [email or web address] [email or web address]/. Remember, [employer] is NOT to be used for urgent needs. For medical emergencies, dial 911. Now available from your iPhone and Android! Please provide this summary of care documentation to your next provider. Your primary care clinician is listed as Rupesh Vivas. If you have any questions after today's visit, please call [phone].

## 2017-12-13 ENCOUNTER — OFFICE VISIT (OUTPATIENT)
Dept: ORTHOPEDIC SURGERY | Facility: CLINIC | Age: 82
End: 2017-12-13

## 2017-12-13 VITALS
WEIGHT: 141.8 LBS | OXYGEN SATURATION: 90 % | TEMPERATURE: 95.1 F | BODY MASS INDEX: 25.12 KG/M2 | DIASTOLIC BLOOD PRESSURE: 42 MMHG | SYSTOLIC BLOOD PRESSURE: 126 MMHG | HEART RATE: 63 BPM | RESPIRATION RATE: 18 BRPM | HEIGHT: 63 IN

## 2017-12-13 DIAGNOSIS — M25.552 BILATERAL HIP PAIN: ICD-10-CM

## 2017-12-13 DIAGNOSIS — M25.551 BILATERAL HIP PAIN: ICD-10-CM

## 2017-12-13 DIAGNOSIS — M70.61 TROCHANTERIC BURSITIS OF RIGHT HIP: Primary | ICD-10-CM

## 2017-12-13 RX ORDER — TRIAMCINOLONE ACETONIDE 40 MG/ML
40 INJECTION, SUSPENSION INTRA-ARTICULAR; INTRAMUSCULAR ONCE
Qty: 1 ML | Refills: 0
Start: 2017-12-13 | End: 2017-12-13

## 2017-12-13 NOTE — PATIENT INSTRUCTIONS
Learning About a Hip Bursa Injection  What is a hip bursa injection? A bursa is a small sac of fluid that cushions an area between tendons and bones. The bursa on the outer side of the hip bone is called the trochanteric (say \"AHIM-kjh-IGYV-ik\") bursa. Sometimes it can become swollen and painful. This condition is called bursitis. An injection into the bursa is a shot of medicine to reduce pain and swelling. The medicines may include pain relievers and steroid medicines. A steroid shot can sometimes help with short-term pain relief when other treatments haven't worked. How is a hip bursa injection done? First the area over the bursa will be cleaned. Your doctor may use a tiny needle to numb the skin over the area where you will get the injection. If a tiny needle is used to numb the area, your doctor will use another needle to inject the medicine. Your doctor may use a pain reliever, a steroid, or both. You may feel some pressure or discomfort. The procedure takes 10 to 30 minutes. But the shot itself usually takes only a few minutes. Your doctor may put ice on the area before you go home. You will probably go home shortly after your shot. What can you expect after the injection? You may have numbness over your hip for a few hours. If your shot included both a pain reliever and a steroid, the pain will probably go away right away. But it might come back after a few hours. This might happen if the pain reliever wears off and the steroid has not started to work yet. The steroid medicine generally takes a few days to work. If the pain comes back, you can put ice or a cold pack on your hip for 10 to 20 minutes at a time. Put a thin cloth between the ice and your skin. Follow your doctor's instructions carefully. Avoid strenuous activities on the day you get the shot, especially those that put stress on your hip. Steroids don't always work.  But when they do, the pain relief can last for several days to a few months or longer. Follow-up care is a key part of your treatment and safety. Be sure to make and go to all appointments, and call your doctor if you are having problems. It's also a good idea to know your test results and keep a list of the medicines you take. Where can you learn more? Go to http://eduardo-evelia.info/. Enter K593 in the search box to learn more about \"Learning About a Hip Bursa Injection. \"  Current as of: March 21, 2017  Content Version: 11.4  © 4251-8451 Healthwise, [employer]. Care instructions adapted under license by [employer] (which disclaims liability or warranty for this information). If you have questions about a medical condition or this instruction, always ask your healthcare professional. [address] any warranty or liability for your use of this information.

## 2017-12-13 NOTE — MR AVS SNAPSHOT
Visit Information Date & Time Provider Department Dept. Phone Encounter #  
 12/13/2017  1:20 PM Mary Barlow PA-C [facility] [phone] [id number] Your Appointments 1/25/2018 10:45 AM  
ESTABLISHED PATIENT with Pascual Armas MD  
[facility]) Appt Note: post cath [address] [facility], [address] [place] [id number]  
[address], [address] 9/19/2018 11:00 AM  
PROCEDURE with BSVVS IMAGING 1 [facility] ([address]) Appt Note: cv knaak 1 year 1212 [facility] [address] 200 [facility] [address]  
[phone] 1212 [facility] [address] 47 [facility]  
  
    
 10/4/2018  1:00 PM  
[facility] ([address] [facility]) Appt Note: 1 YEAR FOLLOW UP AFTER CAROTID  
 [address] [place], [place] [address] 200 [facility] [address]  
[phone] 1212 [facility] 200 [facility] [address] Upcoming Health Maintenance Date Due DTaP/Tdap/Td series (1 - Tdap) 10/16/1958 ZOSTER VACCINE AGE 60> 8/16/1997 GLAUCOMA SCREENING Q2Y 10/16/2002 OSTEOPOROSIS SCREENING (DEXA) 10/16/2002 MEDICARE YEARLY EXAM 10/16/2002 Influenza Age 5 to Adult 8/1/2017 Pneumococcal 65+ Low/Medium Risk (2 of 2 - PPSV23) 10/1/2020 Allergies as of 12/13/2017  Review Complete On: 12/13/2017 By: Dc San No Known Allergies Current Immunizations  Never Reviewed Name Date Influenza Vaccine 10/1/2015 Pneumococcal Vaccine (Unspecified Type) 10/1/2015 Not reviewed this visit You Were Diagnosed With   
  
 Codes Comments Trochanteric bursitis of right hip    -  Primary ICD-10-CM: M70.61 ICD-9-CM: 726.5 Bilateral hip pain     ICD-10-CM: M25.551, M25.552 ICD-9-CM: 719.45 Vitals BP Pulse Temp Resp Height(growth percentile) Weight(growth percentile) 126/42 63 95.1 °F (35.1 °C) (Oral) 18 5' 3\" (1.6 m) 141 lb 12.8 oz (64.3 kg) SpO2 BMI OB Status Smoking Status 90% 25.12 kg/m2 Postmenopausal Former Smoker BMI and BSA Data Body Mass Index Body Surface Area  
 25.12 kg/m 2 1.69 m 2 Preferred Pharmacy Pharmacy Name Phone 2813 [facility] [phone] Your Updated Medication List  
  
   
This list is accurate as of: 12/13/17  1:51 PM.  Always use your most recent med list.  
  
  
  
  
 * albuterol 90 mcg/actuation inhaler Commonly known as:  PROVENTIL HFA, VENTOLIN HFA, PROAIR HFA Take  by inhalation. * albuterol 2.5 mg /3 mL (0.083 %) nebulizer solution Commonly known as:  PROVENTIL VENTOLIN  
3 mL by Nebulization route every four (4) hours as needed for Wheezing or Shortness of Breath. ALDACTONE 25 mg tablet Generic drug:  spironolactone Take  by mouth daily as needed. aspirin delayed-release 81 mg tablet Take  by mouth daily. fluticasone-salmeterol 250-50 mcg/dose diskus inhaler Commonly known as:  ADVAIR Take 1 Puff by inhalation every twelve (12) hours. furosemide 20 mg tablet Commonly known as:  LASIX  
20 mg po daily  
  
 metoprolol tartrate 25 mg tablet Commonly known as:  LOPRESSOR Take 25 mg by mouth two (2) times a day. NIFEdipine ER 60 mg ER tablet Commonly known as:  ADALAT CC Take 60 mg by mouth daily. Oxygen SPIRIVA WITH HANDIHALER 18 mcg inhalation capsule Generic drug:  tiotropium Take 1 capsule by inhalation daily. triamcinolone acetonide 40 mg/mL injection Commonly known as:  KENALOG  
1 mL by IntraBURSal route once for 1 dose. TYLENOL EXTRA STRENGTH 500 mg tablet Generic drug:  acetaminophen Take 500 mg by mouth every six (6) hours as needed for Pain.  
  
 valsartan 160 mg tablet Commonly known as:  DIOVAN Take 1 Tab by mouth daily. XANAX 0.5 mg tablet Generic drug:  ALPRAZolam  
Take  by mouth. ZOCOR 20 mg tablet Generic drug:  simvastatin Take  by mouth nightly. * Notice: This list has 2 medication(s) that are the same as other medications prescribed for you. Read the directions carefully, and ask your doctor or other care provider to review them with you. We Performed the Following AMB POC XRAY, HIPS, BILAT, MIN 5 VIEWS [19672 CPT(R)] DRAIN/INJECT LARGE JOINT/BURSA [id number] CPT(R)] TRIAMCINOLONE ACETONIDE INJ [ [facility]] To-Do List   
 01/08/2018 9:15 AM  
  Appointment with [facility] CAD NO ANESTHESIA; [facility] 1 CATH LAB; [facility] CATH HOLDING at [address] [facility] ([phone]) Patient Instructions Learning About a Hip Bursa Injection What is a hip bursa injection? A bursa is a small sac of fluid that cushions an area between tendons and bones. The bursa on the outer side of the hip bone is called the trochanteric (say \"IKFW-mbv-NIBJ-ik\") bursa. Sometimes it can become swollen and painful. This condition is called bursitis. An injection into the bursa is a shot of medicine to reduce pain and swelling. The medicines may include pain relievers and steroid medicines. A steroid shot can sometimes help with short-term pain relief when other treatments haven't worked. How is a hip bursa injection done? First the area over the bursa will be cleaned. Your doctor may use a tiny needle to numb the skin over the area where you will get the injection. If a tiny needle is used to numb the area, your doctor will use another needle to inject the medicine. Your doctor may use a pain reliever, a steroid, or both. You may feel some pressure or discomfort. The procedure takes 10 to 30 minutes. But the shot itself usually takes only a few minutes. Your doctor may put ice on the area before you go home.  You will probably go home shortly after your shot. What can you expect after the injection? You may have numbness over your hip for a few hours. If your shot included both a pain reliever and a steroid, the pain will probably go away right away. But it might come back after a few hours. This might happen if the pain reliever wears off and the steroid has not started to work yet. The steroid medicine generally takes a few days to work. If the pain comes back, you can put ice or a cold pack on your hip for 10 to 20 minutes at a time. Put a thin cloth between the ice and your skin. Follow your doctor's instructions carefully. Avoid strenuous activities on the day you get the shot, especially those that put stress on your hip. Steroids don't always work. But when they do, the pain relief can last for several days to a few months or longer. Follow-up care is a key part of your treatment and safety. Be sure to make and go to all appointments, and call your doctor if you are having problems. It's also a good idea to know your test results and keep a list of the medicines you take. Where can you learn more? Go to http://eduardo-evelia.info/. Enter L465 in the search box to learn more about \"Learning About a Hip Bursa Injection. \" Current as of: March 21, 2017 Content Version: 11.4 © 6261-4002 Healthwise, Incorporated. Care instructions adapted under license by [employer] (which disclaims liability or warranty for this information). If you have questions about a medical condition or this instruction, always ask your healthcare professional. [place] [address] any warranty or liability for your use of this information. Introducing [facility] & [facility] SERVICES! Dear Tali Bhardwaj: Thank you for requesting a [employer] account. Our records indicate that you have previously registered for a [employer] account but its currently inactive. Please call our [employer] support line at [phone]. Additional Information If you have questions, please visit the Frequently Asked Questions section of the [employer] website at [email or web address] Remember, PressPad is NOT to be used for urgent needs. For medical emergencies, dial 911. Now available from your iPhone and Android! Please provide this summary of care documentation to your next provider. Your primary care clinician is listed as [address] [place] [address]. If you have any questions after today's visit, please call [phone].

## 2017-12-13 NOTE — PROGRESS NOTES
HISTORY OF PRESENT ILLNESS:  Iman Russ presents to the office. She is known to our practice back as far as 2015 when she was seen under the care of Dr. Delaney Uribe for bilateral hip pain. She has had trochanteric bursitis to the bilateral hips that was treated at that time. She did well with injections. Since then, she has really been symptom-free. She is a healthy, 70-year-old female. She is not a diabetic. Of recent, she has, with increased activity, had difficulty standing for an extended period and walking short distances. She does use a single-post cane for ambulation assistance. She is oxygen-dependent secondary to COPD and uses a generator for cannula-supplied at 2 liters per minute near 24/7. Regarding her hips, her right is more symptomatic than her left. She has pain associated with lying directly on her right side. She has an occasional numbness and tingling feeling down the right leg traveling down to the top of the right foot. She has had surgical fusion many years ago at [facility]. She is uncertain of the surgeons name who did the surgery but believes he got in trouble with narcotics and was fired from the staff [facility]. REVIEW OF SYSTEMS:  The patient denies any chest pain. She has exertional dyspnea, chronic in nature related to her COPD, which is oxygen-dependent as described previously. She has no rash, no itching, no nausea or vomiting. The pain to her right hip is really a 5-6/10 with rest, and with activity, including lying on the right hip, the pain increases to upwards of a 9-10/10, dull, achy with alternating sharp, stabbing feeling. She denies any bowel or bladder incontinence. The numbness and tingling is intermittent, and she notes it is primarily present at bedtime.       PHYSICAL EXAM:  She is an elderly-appearing, [de-identified]year-old,  female, atraumatic, normocephalic, alert and oriented times three, sitting on the table comfortably. She is on oxygen via nasal cannula. Examination to the right hip, with the patient in [facility] and Beto Mcduffie LPN, present, reveals pronounced tenderness at the greater trochanteric region with pain radiating distal from the point of maximal tenderness associated with the IT band 10 cm. With the patients knee at 90° on the right, her internal and external rotation is 12° and 15° respectively without pain. Hip flexors are weak today at 4+/5 on the right. Femoral nerve activity is full in the right lower extremity. Distal sensation is intact fully. Capillary refill is brisk and less than 2 seconds with no evidence of DVT or calf tenderness. RADIOGRAPHS:  X-rays, today, AP of the pelvis and right hip, reveals moderate osteoarthritic changes seen with no fracture deformity, lesions, or masses noted. IMPRESSION:      1. Chronic bursitis of the right hip with acute worsening. Of recent, patient requiring a cortisone injection for symptom mitigation. 2. Bilateral hip osteoarthritis. PROCEDURE:  Using sterile technique, after informed verbal and written consent were obtained and time out performed, 1 cc of Kenalog at 40 mg per ml mixed with 8 mL of Marcaine 0.25% was injected to the point of maximal tenderness. There were no complications. The patient tolerated the procedure well. PLAN:   I am currently recommending a low-dose cortisone injection to the right hip today. Regarding her left hip, she is painful over the trochanteric region, and I did offer her a shot within the week understanding that I did not want to give her too much cortisone at one time. She will follow with our office next week at her earliest opportunity. All her questions were answered to her satisfaction. A copy of her x-rays was reviewed and provided.

## 2017-12-20 ENCOUNTER — OFFICE VISIT (OUTPATIENT)
Dept: ORTHOPEDIC SURGERY | Facility: CLINIC | Age: 82
End: 2017-12-20

## 2017-12-20 VITALS
HEART RATE: 61 BPM | BODY MASS INDEX: 25.05 KG/M2 | HEIGHT: 63 IN | TEMPERATURE: 97.1 F | WEIGHT: 141.4 LBS | SYSTOLIC BLOOD PRESSURE: 149 MMHG | RESPIRATION RATE: 18 BRPM | OXYGEN SATURATION: 94 % | DIASTOLIC BLOOD PRESSURE: 65 MMHG

## 2017-12-20 DIAGNOSIS — M70.62 TROCHANTERIC BURSITIS OF LEFT HIP: Primary | ICD-10-CM

## 2017-12-20 DIAGNOSIS — M70.62 TROCHANTERIC BURSITIS OF BOTH HIPS: ICD-10-CM

## 2017-12-20 DIAGNOSIS — M70.61 TROCHANTERIC BURSITIS OF BOTH HIPS: ICD-10-CM

## 2017-12-20 RX ORDER — TRIAMCINOLONE ACETONIDE 40 MG/ML
40 INJECTION, SUSPENSION INTRA-ARTICULAR; INTRAMUSCULAR ONCE
Qty: 1 ML | Refills: 0
Start: 2017-12-20 | End: 2017-12-20

## 2017-12-20 RX ORDER — DICLOFENAC SODIUM 10 MG/G
GEL TOPICAL 4 TIMES DAILY
Qty: 100 G | Refills: 2 | Status: SHIPPED | OUTPATIENT
Start: 2017-12-20 | End: 2022-08-01

## 2017-12-20 NOTE — MR AVS SNAPSHOT
Visit Information Date & Time Provider Department Dept. Phone Encounter #  
 12/20/2017  1:35 PM Rodolfo Juares, [address] [facility] [place] [phone] Your Appointments 1/25/2018 10:45 AM  
ESTABLISHED PATIENT with Allyssa Manzanares MD  
[facility]) Appt Note: post cath [address], [address] [facility] [address]  
[address], 9352 [facility] 83 [place] 9/19/2018 11:00 AM  
PROCEDURE with BSVVS IMAGING 1 [facility] ([address]) Appt Note: cv knaak 1 year 2300 [facility] [address] 200 [facility] [address]  
[phone] 2300 [facility] [address] [place] [address]  
  
    
 10/4/2018  1:00 PM  
Follow Up with Nevin Lennox, PA [facility] ([address]) Appt Note: 1 YEAR FOLLOW UP AFTER CAROTID  
 [address], [place] [address] 200 [facility] [address]  
[phone] 2300 [facility] 200 [facility] [address] Upcoming Health Maintenance Date Due DTaP/Tdap/Td series (1 - Tdap) 10/16/1958 ZOSTER VACCINE AGE 60> 8/16/1997 GLAUCOMA SCREENING Q2Y 10/16/2002 OSTEOPOROSIS SCREENING (DEXA) 10/16/2002 MEDICARE YEARLY EXAM 10/16/2002 Influenza Age 5 to Adult 8/1/2017 Pneumococcal 65+ Low/Medium Risk (2 of 2 - PPSV23) 10/1/2020 Allergies as of 12/20/2017  Review Complete On: 12/20/2017 By: Rodolfo Juares PA-C No Known Allergies Current Immunizations  Never Reviewed Name Date Influenza Vaccine 10/1/2015 Pneumococcal Vaccine (Unspecified Type) 10/1/2015 Not reviewed this visit You Were Diagnosed With   
  
 Codes Comments Trochanteric bursitis of left hip    -  Primary ICD-10-CM: M70.62 ICD-9-CM: 726.5 Trochanteric bursitis of both hips     ICD-10-CM: M70.61, M70.62 ICD-9-CM: 726.5 Vitals BP Pulse Temp Resp Height(growth percentile) Weight(growth percentile) 149/65 61 97.1 °F (36.2 °C) (Oral) 18 5' 3\" (1.6 m) 141 lb 6.4 oz (64.1 kg) SpO2 BMI OB Status Smoking Status 94% 25.05 kg/m2 Postmenopausal Former Smoker BMI and BSA Data Body Mass Index Body Surface Area 25.05 kg/m 2 1.69 m 2 Preferred Pharmacy Pharmacy Name Phone 2813 [facility] [phone] Your Updated Medication List  
  
   
This list is accurate as of: 12/20/17  1:52 PM.  Always use your most recent med list.  
  
  
  
  
 * albuterol 90 mcg/actuation inhaler Commonly known as:  PROVENTIL HFA, VENTOLIN HFA, PROAIR HFA Take  by inhalation. * albuterol 2.5 mg /3 mL (0.083 %) nebulizer solution Commonly known as:  PROVENTIL VENTOLIN  
3 mL by Nebulization route every four (4) hours as needed for Wheezing or Shortness of Breath. ALDACTONE 25 mg tablet Generic drug:  spironolactone Take  by mouth daily as needed. aspirin delayed-release 81 mg tablet Take  by mouth daily. diclofenac 1 % Gel Commonly known as:  VOLTAREN Apply  to affected area four (4) times daily. fluticasone-salmeterol 250-50 mcg/dose diskus inhaler Commonly known as:  ADVAIR Take 1 Puff by inhalation every twelve (12) hours. furosemide 20 mg tablet Commonly known as:  LASIX  
20 mg po daily  
  
 metoprolol tartrate 25 mg tablet Commonly known as:  LOPRESSOR Take 25 mg by mouth two (2) times a day. NIFEdipine ER 60 mg ER tablet Commonly known as:  ADALAT CC Take 60 mg by mouth daily. Oxygen SPIRIVA WITH HANDIHALER 18 mcg inhalation capsule Generic drug:  tiotropium Take 1 capsule by inhalation daily. triamcinolone acetonide 40 mg/mL injection Commonly known as:  KENALOG  
1 mL by IntraMUSCular route once for 1 dose. TYLENOL EXTRA STRENGTH 500 mg tablet Generic drug:  acetaminophen Take 500 mg by mouth every six (6) hours as needed for Pain.  
  
 valsartan 160 mg tablet Commonly known as:  DIOVAN Take 1 Tab by mouth daily. XANAX 0.5 mg tablet Generic drug:  ALPRAZolam  
Take  by mouth. ZOCOR 20 mg tablet Generic drug:  simvastatin Take  by mouth nightly. * Notice: This list has 2 medication(s) that are the same as other medications prescribed for you. Read the directions carefully, and ask your doctor or other care provider to review them with you. Prescriptions Printed Refills  
 diclofenac (VOLTAREN) 1 % gel 2 Sig: Apply  to affected area four (4) times daily. Class: Print Route: Topical  
  
We Performed the Following DRAIN/INJECT LARGE JOINT/BURSA [id number] CPT(R)] REFERRAL TO PHYSICAL THERAPY [AIQ36 Custom] Comments: PT to evaluate treat bilateral hip 2-3  Sessions and then HEP. Iontophoresis, KIM stretching, ultrasound, and gentle strengthening. TRIAMCINOLONE ACETONIDE INJ [ [facility]] To-Do List   
 01/08/2018 9:15 AM  
  Appointment with [facility] CAD NO ANESTHESIA; [facility] 1 CATH LAB; [facility] CATH HOLDING at [address] [place] [address] ([phone]) Referral Information Referral ID Referred By Referred To  
  
 [id number] Rise Sox Not Available Visits Status Start Date End Date 1 New Request 12/20/17 12/20/18 If your referral has a status of pending review or denied, additional information will be sent to support the outcome of this decision. Introducing [facility] & [facility] SERVICES! Dear Camilo Beaulieu: Thank you for requesting a [employer]al[employer] account. Our records indicate that you have previously registered for a [employer] account but its currently inactive. Please call our [employer] support line at [phone]. Additional Information If you have questions, please visit the Frequently Asked Questions section of the [employer] website at [email or web address] Remember, MyChart is NOT to be used for urgent needs. For medical emergencies, dial 911. Now available from your iPhone and Android! Please provide this summary of care documentation to your next provider. Your primary care clinician is listed as [address] [place] [address]. If you have any questions after today's visit, please call [phone].

## 2017-12-20 NOTE — PROGRESS NOTES
HISTORY OF PRESENT ILLNESS:  Benjamin Del Real returns to the office today following for left hip pain. She was seen last week for right hip pain and received a cortisone injection for the treatment of suspected trochanteric bursitis. Today, she reports pain to the left hip similar to the right. The pain is prominent in the upper-outer portion of the hip and radiates from the point of maximal tenderness, by report, down her outer leg to just above her left knee. She denies any trauma. She has not participated in any organized physical therapies of recent. Cortisone provided for the right hip trochanteric bursitis significantly improved her discomfort. REVIEW OF SYSTEMS:  No chest pain. She does have exertional dyspnea, chronic in nature. She is an oxygen-dependent patient. She uses oxygen via a portable generator at two liters per minute. She has no fevers, chills, or night sweats. No rash and no itching. No nausea and no vomiting. The pain to the right hip at rest is 3-4/10 and with activity, to include walking and extended standing, the pain increases to upwards of an 8-9/10. She cannot lie on her left hip secondary to her discomfort. PHYSICAL EXAM:  She is an elderly-appearing, [de-identified]year-old,  female, atraumatic, normocephalic, alert and oriented times three, sitting on the table comfortably. She is in shorts. I am joined by Reyna Myrick LPN, as my chaperone. The patient is sitting with the left knee flexed at 90°. Internal and external rotation passively of the left hip is 10° and 12°. There is reproduced pain over the lateral, proximal hip on internal rotation. The left hip flexors are noted at -4/5 against resistance. She has full left knee extension, and her flexion is noted at 1-5°. There is crepitation through ranging with the patella tracking midline. Distal sensation is intact fully to the left lower extremity.   Capillary refill is brisk and less than 2 seconds to the left lower extremity. With the patient lying right recumbent, the left hip reveals the point of maximal tenderness associated with the greater trochanteric region, which radiates 10 cm distal from the point of maximal tenderness associated with the IT band. There is no evidence of skin breakdown or infection. IMPRESSION:      1. Left hip trochanteric bursitis. 2. Left hand pain. 3. Decreased range of motion, left hip, secondary to above. PROCEDURE:  Using sterile technique, after informed verbal and written consent were obtained and time out performed, 1 cc of Kenalog at 40 mg per ml mixed with 8 mL of Marcaine 0.25% was injected to the left hip at the point of maximal tenderness. There were no complications. The patient tolerated the procedure well. PLAN:   I am currently recommending a cortisone injection to her left hip today. We are going to plan on seeing her back on a prn basis. I am also recommending physical therapy for gentle range of motion and stretching per trochanteric bursitis-associated protocol. All her questions were answered to her satisfaction.

## 2018-01-02 ENCOUNTER — HOSPITAL ENCOUNTER (OUTPATIENT)
Dept: PREADMISSION TESTING | Age: 83
Discharge: HOME | End: 2018-01-02
Payer: MEDICARE

## 2018-01-02 DIAGNOSIS — I27.20 PULMONARY HTN (HCC): ICD-10-CM

## 2018-01-02 LAB
ANION GAP SERPL CALC-SCNC: 10 MMOL/L (ref 3–18)
BASOPHILS # BLD: 0 K/UL (ref 0–0.06)
BASOPHILS NFR BLD: 0 % (ref 0–3)
BUN SERPL-MCNC: 29 MG/DL (ref 7–18)
BUN/CREAT SERPL: 32 (ref 12–20)
CALCIUM SERPL-MCNC: 8.5 MG/DL (ref 8.5–10.1)
CHLORIDE SERPL-SCNC: 103 MMOL/L (ref 100–108)
CO2 SERPL-SCNC: 28 MMOL/L (ref 21–32)
CREAT SERPL-MCNC: 0.92 MG/DL (ref 0.6–1.3)
DIFFERENTIAL METHOD BLD: ABNORMAL
EOSINOPHIL # BLD: 0 K/UL (ref 0–0.4)
EOSINOPHIL NFR BLD: 0 % (ref 0–5)
ERYTHROCYTE [DISTWIDTH] IN BLOOD BY AUTOMATED COUNT: 15.4 % (ref 11.6–14.5)
GLUCOSE SERPL-MCNC: 87 MG/DL (ref 74–99)
HCT VFR BLD AUTO: 46.3 % (ref 35–45)
HGB BLD-MCNC: 15.1 G/DL (ref 12–16)
LYMPHOCYTES # BLD: 2.8 K/UL (ref 0.8–3.5)
LYMPHOCYTES NFR BLD: 12 % (ref 20–51)
MCH RBC QN AUTO: 30.2 PG (ref 24–34)
MCHC RBC AUTO-ENTMCNC: 32.6 G/DL (ref 31–37)
MCV RBC AUTO: 92.6 FL (ref 74–97)
MONOCYTES # BLD: 2.3 K/UL (ref 0–1)
MONOCYTES NFR BLD: 10 % (ref 2–9)
NEUTS SEG # BLD: 18.3 K/UL (ref 1.8–8)
NEUTS SEG NFR BLD: 78 % (ref 42–75)
PLATELET # BLD AUTO: 186 K/UL (ref 135–420)
PLATELET COMMENTS,PCOM: ABNORMAL
PMV BLD AUTO: 10.9 FL (ref 9.2–11.8)
POTASSIUM SERPL-SCNC: 4.6 MMOL/L (ref 3.5–5.5)
RBC # BLD AUTO: 5 M/UL (ref 4.2–5.3)
RBC MORPH BLD: ABNORMAL
SODIUM SERPL-SCNC: 141 MMOL/L (ref 136–145)
WBC # BLD AUTO: 23.4 K/UL (ref 4.6–13.2)

## 2018-01-02 PROCEDURE — 36415 COLL VENOUS BLD VENIPUNCTURE: CPT | Performed by: INTERNAL MEDICINE

## 2018-01-02 PROCEDURE — 85025 COMPLETE CBC W/AUTO DIFF WBC: CPT | Performed by: INTERNAL MEDICINE

## 2018-01-02 PROCEDURE — 80048 BASIC METABOLIC PNL TOTAL CA: CPT | Performed by: INTERNAL MEDICINE

## 2018-01-05 ENCOUNTER — TELEPHONE (OUTPATIENT)
Dept: CARDIOLOGY CLINIC | Age: 83
End: 2018-01-05

## 2018-01-05 NOTE — TELEPHONE ENCOUNTER
----- Message from Marquita Medrano MD sent at 1/3/2018  7:53 AM EST -----  High wbc count  Check for sign of infection  Refer to pcp

## 2018-01-05 NOTE — TELEPHONE ENCOUNTER
Patient called to discuss orders from Dr. Lan Day. She had already been prescribed levaquin and completed the prescription and has been given a second dose of same drug. She will complete this as prescribed.

## 2018-01-17 ENCOUNTER — HOSPITAL ENCOUNTER (OUTPATIENT)
Dept: PHYSICAL THERAPY | Age: 83
Discharge: HOME | End: 2018-01-17
Payer: MEDICARE

## 2018-01-17 PROCEDURE — G8979 MOBILITY GOAL STATUS: HCPCS | Performed by: PHYSICAL THERAPIST

## 2018-01-17 PROCEDURE — 97161 PT EVAL LOW COMPLEX 20 MIN: CPT | Performed by: PHYSICAL THERAPIST

## 2018-01-17 PROCEDURE — G8978 MOBILITY CURRENT STATUS: HCPCS | Performed by: PHYSICAL THERAPIST

## 2018-01-17 PROCEDURE — 97110 THERAPEUTIC EXERCISES: CPT | Performed by: PHYSICAL THERAPIST

## 2018-01-17 NOTE — PROGRESS NOTES
PT DAILY TREATMENT NOTE - [facility]     Patient Name: Kasey Card  Date:2018  : 1935  [x]  Patient  Verified  Payor: Oriana Blend / Plan: [place] MED[place] PART A & B / Product Type: Medicare /    In time:232  Out time:254  Total Treatment Time (min): 22  Total Timed Codes (min): 22  1:1 Treatment Time ( [address] only): 22   Visit #: 1 of 3    Treatment Area: Trochanteric bursitis, right hip [M70.61]  Trochanteric bursitis, left hip [M70.62]    SUBJECTIVE  Pain Level (0-10 scale): 0  Any medication changes, allergies to medications, adverse drug reactions, diagnosis change, or new procedure performed?: [x] No    [] Yes (see summary sheet for update)  Subjective functional status/changes:   [] No changes reported  Please refer to evaluation. OBJECTIVE    14 min [x]Eval                  []Re-Eval       8 min Therapeutic Exercise:  [] See flow sheet :   Rationale: increase ROM, increase strength, improve coordination, improve balance and increase proprioception to improve the patients ability to ambulate with reduced pain and fall risk. With   [] TE   [] TA   [] neuro   [] other: Patient Education: [x] Review HEP    [] Progressed/Changed HEP based on:   [] positioning   [] body mechanics   [] transfers   [] heat/ice application    [] other:      Other Objective/Functional Measures: Please refer to evaluation. Pain Level (0-10 scale) post treatment: 0    ASSESSMENT/Changes in Function: Pt was very anxious about the snowfall happening during her evaluation and did not wish to stay past basic objective measures and issue of HEP. Please refer to evaluation.     Patient will continue to benefit from skilled PT services to modify and progress therapeutic interventions, address functional mobility deficits, address ROM deficits, address strength deficits, analyze and address soft tissue restrictions, analyze and cue movement patterns, analyze and modify body mechanics/ergonomics, assess and modify postural abnormalities and address imbalance/dizziness to attain remaining goals. [x]  See Plan of Care  []  See progress note/recertification  []  See Discharge Summary         Progress towards goals / Updated goals:  Short Term Goals: To be accomplished in 1 weeks:                         1.  Pt to report [place] with HEP. Eval status: PT issued HEP today, but did not have time to physically perform it with pt  Long Term Goals: To be accomplished in 3 treatments:                         1.  PT to update HEP prior to discharge to provide pt with strengthening/stretching program to continue to reduce symptoms at home. Eval status: PT issued one page of HEP at evaluation; HEP will need to be updated                         2.  Pt to demonstrate 5/5 strength with all B LE MMT to indicate improved strength and stability and reduced fall risk. Eval status:  Pt demonstrates 4/5 to 4+/5 strength with B LE MMT                         3.  Pt to demonstrate negative tests for 90/90 HS and B Renee's, indicating resolved symptoms and improved activity tolerance. Eval status: Pt with positive B 90/90 HS testing (painful bilaterally, reduced ROM), painful R Renee's                         4.  Pt to be able to navigate up/down 12 steps with no c/o pain, indicating improved B LE strength and stability. Eval status: Pt c/o pain with navigating stairs                         5.  Pt to report 54 on FOTO, indicating improved B LE strength/stability and reduced fall risk.                                                 Eval status: Pt reports 43 on FOTO    PLAN  [x]  Upgrade activities as tolerated     [x]  Continue plan of care  []  Update interventions per flow sheet       []  Discharge due to:_  []  Other:_      Rola Chow, PT 1/17/2018  3:34 PM    Future Appointments  Date Time Provider Jaya Constance   1/23/2018 2:00 PM [facility]   1/25/2018 10:45 AM [facility]   1/25/2018 1:30 PM [facility]   1/30/2018 2:00 PM [facility]is [facility]   9/19/2018 11:00 AM [facility] [address]   10/4/2018 1:00 PM Rita Sheehan, [address]

## 2018-01-17 NOTE — PROGRESS NOTES
[facility] [address], [address] [place] [address], [address] [place], [place] [address]y [address]  [phone] fax      Plan of Care/ Statement of Necessity for Physical Therapy Services    Patient name: Logan Cadet Start of Care: 2018   Referral source: Sadie Mora : 1935    Medical Diagnosis: Trochanteric bursitis, right hip [M70.61]  Trochanteric bursitis, left hip [M70.62]   Onset Date:chronic    Treatment Diagnosis: B hip pain   Prior Hospitalization: see medical history Provider#: [id number]   Medications: Verified on Patient summary List    Comorbidities: COPD, high BP   Prior Level of Function: Pt reports that she was previously Independent with all ADLS/IADLS. She does not currently use any AD to ambulate. The Plan of Care and following information is based on the information from the initial evaluation. Assessment/ key information: Pt is a [de-identified] y/o female who presents today with c/o chronic B hip pain. She notes that most of her pain occurs at night, and reports no current pain during the time of her evaluation. Pt is very preoccupied with the snow outside, and wishes to only do objective measurements and then end the session. She states that she almost cancelled this appointment secondary to fear of the snow. Pt presents with reduced hip AROM in all planes, especially ER/IR. She also presents with reduced strength in B LE (grossly 4-4+/5 with MMT). She is tender to palpation through the B greater trochanters, and also notes a history of LBP. Pt has a positive 90/90 HS test bilaterally, and also notes pain and limited ROM with R Renee's test.  She does not have pain with L Renee's testing today, and exhibits normal ROM with this today.   Evaluation Complexity History MEDIUM  Complexity : 1-2 comorbidities / personal factors will impact the outcome/ POC ; Examination MEDIUM Complexity : 3 Standardized tests and measures addressing body structure, function, activity limitation and / or participation in recreation  ;Presentation LOW Complexity : Stable, uncomplicated  ;Clinical Decision Making MEDIUM Complexity : FOTO score of 26-74  Overall Complexity Rating: LOW   Problem List: pain affecting function, decrease ROM, decrease strength, edema affecting function, impaired gait/ balance, decrease ADL/ functional abilitiies, decrease activity tolerance, decrease flexibility/ joint mobility and decrease transfer abilities   Treatment Plan may include any combination of the following: Therapeutic exercise, Therapeutic activities, Neuromuscular re-education, Physical agent/modality, Gait/balance training, Manual therapy, Patient education, Self Care training, Functional mobility training and Stair training  Patient / Family readiness to learn indicated by: asking questions, trying to perform skills and interest  Persons(s) to be included in education: patient (P)  Barriers to Learning/Limitations: None  Patient Goal (s): feel better  Patient Self Reported Health Status: fair  Rehabilitation Potential: good    Short Term Goals: To be accomplished in 1 weeks:   1. Pt to report [place] with HEP. Eval status: PT issued HEP today, but did not have time to physically perform it with pt  Long Term Goals: To be accomplished in 3 treatments:   1. PT to update HEP prior to discharge to provide pt with strengthening/stretching program to continue to reduce symptoms at home. Eval status: PT issued one page of HEP at evaluation; HEP will need to be updated   2. Pt to demonstrate 5/5 strength with all B LE MMT to indicate improved strength and stability and reduced fall risk. Eval status:  Pt demonstrates 4/5 to 4+/5 strength with B LE MMT   3. Pt to demonstrate negative tests for 90/90 HS and B Renee's, indicating resolved symptoms and improved activity tolerance.     Eval status: Pt with positive B 90/90 HS testing (painful bilaterally, reduced ROM), painful R Renee's   4. Pt to be able to navigate up/down 12 steps with no c/o pain, indicating improved B LE strength and stability. Eval status: Pt c/o pain with navigating stairs   5. Pt to report 54 on FOTO, indicating improved B LE strength/stability and reduced fall risk. Eval status: Pt reports 43 on FOTO  Frequency / Duration: Patient to be seen 2-3 treatments and then given an updated HEP. Patient/ Caregiver education and instruction: Diagnosis, prognosis, self care, activity modification and exercises   [x]  Plan of care has been reviewed with PTA      G-Codes (GP)  Mobility   Current  CK= 40-59%   Goal  CK= 40-59%    The severity rating is based on clinical judgment and the FOTO score. Certification Period: 1/17/2018-2/15/2018      Guillaume Katz, PT 1/17/2018 3:49 PM  ________________________________________________________________________    I certify that the above Therapy Services are being furnished while the patient is under my care. I agree with the treatment plan and certify that this therapy is necessary.     [de-identified] Signature:____________________  Date:____________Time: _________    Please sign and return to [facility] [address] [place] [address], [address] [place] [address], [address] [place], [place] [address]y [address]  [phone] fax

## 2018-01-23 ENCOUNTER — HOSPITAL ENCOUNTER (OUTPATIENT)
Dept: PHYSICAL THERAPY | Age: 83
Discharge: HOME | End: 2018-01-23
Payer: MEDICARE

## 2018-01-23 PROCEDURE — 97110 THERAPEUTIC EXERCISES: CPT

## 2018-01-23 NOTE — PROGRESS NOTES
PT DAILY TREATMENT NOTE - [facility] 3-16    Patient Name: Tk Oviedo  Date:2018  : 1935  [x]  Patient  Verified  Payor: Nacho Alegre / Plan: [place] MEDICARE PART A & B / Product Type: Medicare /    In time:2:07  Out time:2:42  Total Treatment Time (min): 35  Total Timed Codes (min): 35  1:1 Treatment Time ( [address] only): 24   Visit #: 2 of 3    Treatment Area: Trochanteric bursitis, right hip [M70.61]  Trochanteric bursitis, left hip [M70.62]    SUBJECTIVE  Pain Level (0-10 scale): 0  Any medication changes, allergies to medications, adverse drug reactions, diagnosis change, or new procedure performed?: [x] No    [] Yes (see summary sheet for update)  Subjective functional status/changes:   [] No changes reported  Feeling a little bad today    OBJECTIVE  30 min Therapeutic Exercise:  [x] See flow sheet :   Rationale: increase ROM, increase strength and improve coordination to improve the patients ability to tolerate increased activity levels  5 min Manual Therapy:  STM/DTM (R) Lateral thigh   Rationale: decrease pain, increase ROM, increase tissue extensibility and decrease trigger points to tolerate increased activity levels      With   [x] TE   [] TA   [] neuro   [] other: Patient Education: [x] Review HEP    [] Progressed/Changed HEP based on:   [] positioning   [] body mechanics   [] transfers   [] heat/ice application    [] other:      Other Objective/Functional Measures:   - Good tolerance with first full treatment  - TTP (R) lateral thigh  - Fatigue with exercises due to SOB  - pt on O2 at 2L       Pain Level (0-10 scale) post treatment: 0    ASSESSMENT/Changes in Function:      Patient will continue to benefit from skilled PT services to modify and progress therapeutic interventions, address functional mobility deficits, address ROM deficits, address strength deficits, analyze and address soft tissue restrictions, analyze and cue movement patterns and analyze and modify body mechanics/ergonomics to attain remaining goals. []  See Plan of Care  []  See progress note/recertification  []  See Discharge Summary         Progress towards goals / Updated goals:  Short Term Goals: To be accomplished in 1 weeks:                         1.  Pt to report [place] with HEP. Eval status: PT issued HEP today, but did not have time to physically perform it with pt Pt instructed on HEP. Continue to address at next visit 1/2318    Long Term Goals: To be accomplished in 3 treatments:                         1.  PT to update HEP prior to discharge to provide pt with strengthening/stretching program to continue to reduce symptoms at home. Eval status: PT issued one page of HEP at evaluation; HEP will need to be updated                         2.  Pt to demonstrate 5/5 strength with all B LE MMT to indicate improved strength and stability and reduced fall risk. Eval status:  Pt demonstrates 4/5 to 4+/5 strength with B LE MMT                         3.  Pt to demonstrate negative tests for 90/90 HS and B Renee's, indicating resolved symptoms and improved activity tolerance. Eval status: Pt with positive B 90/90 HS testing (painful bilaterally, reduced ROM), painful R Renee's                         4.  Pt to be able to navigate up/down 12 steps with no c/o pain, indicating improved B LE strength and stability. Eval status: Pt c/o pain with navigating stairs                         5.  Pt to report 54 on FOTO, indicating improved B LE strength/stability and reduced fall risk.                                                 Eval status: Pt reports 43 on FOTO    PLAN  [x]  Upgrade activities as tolerated     [x]  Continue plan of care  []  Update interventions per flow sheet       []  Discharge due to:_  []  Other:_      Liam Foley, PT 1/23/2018  2:41 PM    Future Appointments  Date Time Provider Jaya Nolasco   1/25/2018 10:45 AM Brittney Ellison MD [place] [id number]   1/25/2018 1:30 PM Anai Esposito, PT [facility]   1/30/2018 2:00 PM Liam Foley, PT [facility]   9/19/2018 11:00 AM [facility] [address]   10/4/2018 1:00 PM JOELLE Paulino 51

## 2018-01-25 ENCOUNTER — OFFICE VISIT (OUTPATIENT)
Dept: CARDIOLOGY CLINIC | Age: 83
End: 2018-01-25

## 2018-01-25 ENCOUNTER — APPOINTMENT (OUTPATIENT)
Dept: PHYSICAL THERAPY | Age: 83
End: 2018-01-25
Payer: MEDICARE

## 2018-01-25 VITALS
WEIGHT: 136 LBS | BODY MASS INDEX: 24.1 KG/M2 | HEIGHT: 63 IN | SYSTOLIC BLOOD PRESSURE: 120 MMHG | HEART RATE: 79 BPM | DIASTOLIC BLOOD PRESSURE: 49 MMHG

## 2018-01-25 DIAGNOSIS — I50.32 CHRONIC DIASTOLIC CONGESTIVE HEART FAILURE (HCC): Primary | ICD-10-CM

## 2018-01-25 DIAGNOSIS — D72.829 LEUKOCYTOSIS, UNSPECIFIED TYPE: ICD-10-CM

## 2018-01-25 DIAGNOSIS — J44.9 CHRONIC OBSTRUCTIVE PULMONARY DISEASE, UNSPECIFIED COPD TYPE (HCC): ICD-10-CM

## 2018-01-25 DIAGNOSIS — I27.20 PULMONARY HTN (HCC): ICD-10-CM

## 2018-01-25 DIAGNOSIS — I10 ESSENTIAL HYPERTENSION WITH GOAL BLOOD PRESSURE LESS THAN 140/90: ICD-10-CM

## 2018-01-25 NOTE — PROGRESS NOTES
Patient didn't bring medications, verbally reviewed    1. Have you been to the ER, urgent care clinic since your last visit? Hospitalized since your last visit? Yes Where: [facility] Cath/ canceled    2. Have you seen or consulted any other health care providers outside of the [facility] since your last visit? Include any pap smears or colon screening.  Yes Where: PCP Routine

## 2018-01-25 NOTE — MR AVS SNAPSHOT
[address], [facility] [address] [place] [id number] 
[phone] Patient: Chiqui Finley MRN: [id number] :1935 Visit Information Date & Time Provider Department Dept. Phone Encounter #  
 2018 10:45 AM John Collado MD [facility] [address] [place] [address] [id number] Follow-up Instructions Return in about 3 months (around 2018). Your Appointments 2018 11:00 AM  
PROCEDURE with [facility] 1 [facility] Appt Note: cv knaak 1 year 12[address] [facility] 853 [address] [facility] [address]  
[phone] 12[address] [facility] 315 [facility]  
  
    
 10/4/2018  1:00 PM  
[facility] Appt Note: 1 YEAR FOLLOW UP AFTER CAROTID  
 [address] [place], [place] [address] [address] [facility] [address]  
[phone] 1212 [facility] 6000 [facility] 98, [place] [address] [facility] [address] Upcoming Health Maintenance Date Due DTaP/Tdap/Td series (1 - Tdap) 10/16/1958 ZOSTER VACCINE AGE 60> 1997 GLAUCOMA SCREENING Q2Y 10/16/2002 OSTEOPOROSIS SCREENING (DEXA) 10/16/2002 MEDICARE YEARLY EXAM 10/16/2002 Influenza Age 5 to Adult 2017 Pneumococcal 65+ Low/Medium Risk (2 of 2 - PPSV23) 10/1/2020 Allergies as of 2018  Review Complete On: 2018 By: John Collado MD  
 No Known Allergies Current Immunizations  Never Reviewed Name Date Influenza Vaccine 10/1/2015 Pneumococcal Vaccine (Unspecified Type) 10/1/2015 Not reviewed this visit You Were Diagnosed With   
  
 Codes Comments Chronic diastolic congestive heart failure (HCC)    -  Primary ICD-10-CM: I50.32 
ICD-9-CM: 428.32, 428.0 stable Chronic obstructive pulmonary disease, unspecified COPD type ([facility]     ICD-10-CM: J44.9 ICD-9-CM: 496 on o2 
  
 Pulmonary HTN     ICD-10-CM: I27.20 ICD-9-CM: 416.8 has postponed cath 
will set up later Essential hypertension with goal blood pressure less than 140/90     ICD-10-CM: I10 
ICD-9-CM: 401.9 controlled Leukocytosis, unspecified type     ICD-10-CM: D72.829 ICD-9-CM: 288.60 Vitals BP Pulse Height(growth percentile) Weight(growth percentile) BMI OB Status 120/49 79 5' 3\" (1.6 m) 136 lb (61.7 kg) 24.09 kg/m2 Postmenopausal  
 Smoking Status Former Smoker Vitals History BMI and BSA Data Body Mass Index Body Surface Area 24.09 kg/m 2 1.66 m 2 Preferred Pharmacy Pharmacy Name Phone [address] [facility] [phone] Your Updated Medication List  
  
   
This list is accurate as of: 1/25/18 11:02 AM.  Always use your most recent med list.  
  
  
  
  
 * albuterol 90 mcg/actuation inhaler Commonly known as:  PROVENTIL HFA, VENTOLIN HFA, PROAIR HFA Take  by inhalation. * albuterol 2.5 mg /3 mL (0.083 %) nebulizer solution Commonly known as:  PROVENTIL VENTOLIN  
3 mL by Nebulization route every four (4) hours as needed for Wheezing or Shortness of Breath. ALDACTONE 25 mg tablet Generic drug:  spironolactone Take  by mouth daily as needed. aspirin delayed-release 81 mg tablet Take  by mouth daily. diclofenac 1 % Gel Commonly known as:  VOLTAREN Apply  to affected area four (4) times daily. fluticasone-salmeterol 250-50 mcg/dose diskus inhaler Commonly known as:  ADVAIR Take 1 Puff by inhalation every twelve (12) hours. furosemide 20 mg tablet Commonly known as:  LASIX  
20 mg po daily  
  
 metoprolol tartrate 25 mg tablet Commonly known as:  LOPRESSOR Take 25 mg by mouth two (2) times a day. NIFEdipine ER 60 mg ER tablet Commonly known as:  ADALAT CC Take 60 mg by mouth daily. Oxygen SPIRIVA WITH HANDIHALER 18 mcg inhalation capsule Generic drug:  tiotropium Take 1 capsule by inhalation daily. TYLENOL EXTRA STRENGTH 500 mg tablet Generic drug:  acetaminophen Take 500 mg by mouth every six (6) hours as needed for Pain.  
  
 valsartan 160 mg tablet Commonly known as:  DIOVAN Take 1 Tab by mouth daily. XANAX 0.5 mg tablet Generic drug:  ALPRAZolam  
Take  by mouth. ZOCOR 20 mg tablet Generic drug:  simvastatin Take  by mouth nightly. * Notice: This list has 2 medication(s) that are the same as other medications prescribed for you. Read the directions carefully, and ask your doctor or other care provider to review them with you. Follow-up Instructions Return in about 3 months (around 4/25/2018). To-Do List   
 01/30/2018 2:00 PM  
  Appointment with Staci Pham PT at [facility] ([phone])  
  
 02/01/2018 Lab:  CBC WITH AUTOMATED DIFF   
  
 02/01/2018 11:30 AM  
  Appointment with Staci Pham PT at [facility] ([phone]) [place] [facility] SERVICES! Dear Davonte Hlul: Thank you for requesting a [employer] account. Our records indicate that you have previously registered for a [employer] account but its currently inactive. Please call our [employer] support line at [phone]. Additional Information If you have questions, please visit the Frequently Asked Questions section of the [employer] website at [email or web address] Remember, [employer] is NOT to be used for urgent needs. For medical emergencies, dial 911. Now available from your iPhone and Android! Please provide this summary of care documentation to your next provider. Your primary care clinician is listed as [address]. If you have any questions after today's visit, please call [phone].

## 2018-01-25 NOTE — LETTER
[address] 1935 
 
1/25/2018 Dear Nanette Pallas, MD 
 
I had the pleasure of evaluating  Ms. Brewer in office today. Below are the relevant portions of my assessment and plan of care. ICD-10-CM ICD-9-CM 1. Chronic diastolic congestive heart failure (HCC) I50.32 428.32   
  428.0   
 stable 2. Chronic obstructive pulmonary disease, unspecified COPD [facility] ([facility] J44.9 496   
 on o2 3. Pulmonary HTN I27.20 416.8   
 has postponed cath 
will set up later 4. Essential hypertension with goal blood pressure less than 140/90 I10 401.9   
 controlled 5. Leukocytosis, unspecified type D72.829 288.60 CBC WITH AUTOMATED DIFF Current Outpatient Prescriptions Medication Sig Dispense Refill  diclofenac (VOLTAREN) 1 % gel Apply  to affected area four (4) times daily. 100 g 2  
 Oxygen  valsartan (DIOVAN) 160 mg tablet Take 1 Tab by mouth daily. 90 Tab 3  
 spironolactone (ALDACTONE) 25 mg tablet Take  by mouth daily as needed.  fluticasone-salmeterol (ADVAIR) 250-50 mcg/dose diskus inhaler Take 1 Puff by inhalation every twelve (12) hours.  albuterol (PROVENTIL HFA, VENTOLIN HFA, PROAIR HFA) 90 mcg/actuation inhaler Take  by inhalation.  albuterol (PROVENTIL VENTOLIN) 2.5 mg /3 mL (0.083 %) nebulizer solution 3 mL by Nebulization route every four (4) hours as needed for Wheezing or Shortness of Breath. 48 Each 0  
 furosemide (LASIX) 20 mg tablet 20 mg po daily (Patient taking differently: Take 40 mg by mouth daily.) 30 Tab 0  
 acetaminophen (TYLENOL EXTRA STRENGTH) 500 mg tablet Take 500 mg by mouth every six (6) hours as needed for Pain.  metoprolol (LOPRESSOR) 25 mg tablet Take 25 mg by mouth two (2) times a day.  NIFEdipine ER (ADALAT CC) 60 mg ER tablet Take 60 mg by mouth daily.  simvastatin (ZOCOR) 20 mg tablet Take  by mouth nightly.  alprazolam (XANAX) 0.5 mg tablet Take  by mouth.  tiotropium (SPIRIVA WITH HANDIHALER) 18 mcg inhalation capsule Take 1 capsule by inhalation daily.  aspirin delayed-release 81 mg tablet Take  by mouth daily. Orders Placed This Encounter  CBC WITH AUTOMATED DIFF Standing Status:   Future Standing Expiration Date:   2/24/2018 If you have questions, please do not hesitate to call me. I look forward to following Ms. Brewer along with you. Sincerely, Monika Miguel MD

## 2018-01-25 NOTE — PROGRESS NOTES
HISTORY OF PRESENT ILLNESS  Blanche Price is a [de-identified] y.o. female. HPI Comments: Patient with chf,copd,htn,pulm htn  Here for follow up    CHF   The history is provided by the patient. This is a chronic problem. The problem occurs constantly. The problem has not changed since onset. Associated symptoms include shortness of breath. Pertinent negatives include no chest pain, no abdominal pain and no headaches. The symptoms are aggravated by exertion. Nothing relieves the symptoms. Shortness of Breath   The history is provided by the patient. This is a recurrent problem. The problem occurs frequently. The problem has not changed since onset. Associated symptoms include leg swelling. Pertinent negatives include no fever, no headaches, no cough, no sputum production, no hemoptysis, no wheezing, no PND, no orthopnea, no chest pain, no vomiting, no abdominal pain, no rash and no claudication. Precipitated by: activity. Review of Systems   Constitutional: Negative for chills and fever. HENT: Negative for nosebleeds. Eyes: Negative for blurred vision and double vision. Respiratory: Positive for shortness of breath. Negative for cough, hemoptysis, sputum production and wheezing. Cardiovascular: Positive for leg swelling. Negative for chest pain, palpitations, orthopnea, claudication and PND. Gastrointestinal: Negative for abdominal pain, heartburn, nausea and vomiting. Musculoskeletal: Negative for myalgias. Skin: Negative for rash. Neurological: Negative for dizziness, weakness and headaches. Endo/Heme/Allergies: Does not bruise/bleed easily.      Family History   Problem Relation Age of Onset    Heart Disease Mother     Hypertension Mother     Heart Attack Father     Hypertension Father        Past Medical History:   Diagnosis Date    Chronic lung disease     Chronic obstructive pulmonary disease ([address]     Heart failure ([address]     Hypertension        Past Surgical History:   Procedure Laterality Date    HX ORTHOPAEDIC      spinal sx 5/6    HX OTHER SURGICAL      Carotid artery    VASCULAR SURGERY PROCEDURE UNLIST      L CEA 10/2014       Social History   Substance Use Topics    Smoking status: Former Smoker     Packs/day: 1.50     Years: 30.00     Types: Cigarettes     Quit date: 9/12/1987    Smokeless tobacco: Never Used    Alcohol use No       No Known Allergies    Outpatient Prescriptions Marked as Taking for the 1/25/18 encounter (Office Visit) with Yonny Juarez MD   Medication Sig Dispense Refill    diclofenac (VOLTAREN) 1 % gel Apply  to affected area four (4) times daily. 100 g 2    Oxygen       valsartan (DIOVAN) 160 mg tablet Take 1 Tab by mouth daily. 90 Tab 3    spironolactone (ALDACTONE) 25 mg tablet Take  by mouth daily as needed.  fluticasone-salmeterol (ADVAIR) 250-50 mcg/dose diskus inhaler Take 1 Puff by inhalation every twelve (12) hours.  albuterol (PROVENTIL HFA, VENTOLIN HFA, PROAIR HFA) 90 mcg/actuation inhaler Take  by inhalation.  albuterol (PROVENTIL VENTOLIN) 2.5 mg /3 mL (0.083 %) nebulizer solution 3 mL by Nebulization route every four (4) hours as needed for Wheezing or Shortness of Breath. 48 Each 0    furosemide (LASIX) 20 mg tablet 20 mg po daily (Patient taking differently: Take 40 mg by mouth daily.) 30 Tab 0    acetaminophen (TYLENOL EXTRA STRENGTH) 500 mg tablet Take 500 mg by mouth every six (6) hours as needed for Pain.  metoprolol (LOPRESSOR) 25 mg tablet Take 25 mg by mouth two (2) times a day.  NIFEdipine ER (ADALAT CC) 60 mg ER tablet Take 60 mg by mouth daily.  simvastatin (ZOCOR) 20 mg tablet Take  by mouth nightly.  alprazolam (XANAX) 0.5 mg tablet Take  by mouth.  tiotropium (SPIRIVA WITH HANDIHALER) 18 mcg inhalation capsule Take 1 capsule by inhalation daily.  aspirin delayed-release 81 mg tablet Take  by mouth daily.          Visit Vitals    /49    Pulse 79    Ht 5' 3\" (1.6 m)    Wt 61.7 kg (136 lb)    BMI 24.09 kg/m2         Physical Exam   Constitutional: She is oriented to person, place, and time. She appears well-developed and well-nourished. HENT:   Head: Normocephalic and atraumatic. Eyes: Conjunctivae are normal.   Neck: Neck supple. No JVD present. No tracheal deviation present. No thyromegaly present. Cardiovascular: Normal rate and regular rhythm. PMI is not displaced. Exam reveals no gallop, no S3 and no decreased pulses. Murmur heard. Holosystolic murmur is present  at the lower left sternal border  Pulmonary/Chest: No respiratory distress. She has no wheezes. She has no rales. She exhibits no tenderness. Abdominal: Soft. There is no tenderness. Musculoskeletal: She exhibits no edema. Neurological: She is alert and oriented to person, place, and time. Skin: Skin is warm. Psychiatric: She has a normal mood and affect. Ms. Ju Crespo has a reminder for a \"due or due soon\" health maintenance. I have asked that she contact her primary care provider for follow-up on this health maintenance. I have personally reviewed patient's records available from hospital and other providers and incorporated findings in patient care. 6/2016-[facility]    SUMMARY:6/2016-echo  Procedure information: Image quality was adequate. Left ventricle: Systolic function was normal. Ejection fraction was  estimated to be 60 %. No obvious wall motion abnormalities identified in  the views obtained. Wall thickness was mildly increased. Features were  consistent with a pseudonormal left ventricular filling pattern, with  concomitant abnormal relaxation and increased filling pressure (grade 2  diastolic dysfunction). Right ventricle: Systolic pressure was markedly increased. Estimated peak  pressure was 74 mmHg. Left atrium: The atrium was mildly to moderately dilated. Inferior vena cava, hepatic veins: The inferior vena cava was mildly  Dilated.     SUMMARY:6/2017-echo  Left ventricle: Systolic function was normal by visual assessment. Ejection fraction was estimated to be 60 %. There was possible hypokinesis  of the basal-mid anteroseptal, basal-mid inferoseptal, apical inferior,  and apical septal wall(s). Wall thickness was mildly increased. Doppler  parameters were consistent with abnormal left ventricular relaxation  (grade 1 diastolic dysfunction). Right ventricle: The size was at the upper limits of normal. Systolic  pressure was moderately increased. Estimated peak pressure was 60 mmHg. Right atrium: The atrium was mildly dilated. I Have personally reviewed recent relevant labs available and discussed with patient  1/2018    Assessment         ICD-10-CM ICD-9-CM    1. Chronic diastolic congestive heart failure (HCC) I50.32 428.32      428.0     stable   2. Chronic obstructive pulmonary disease, unspecified COPD type ([facility] J44.9 496     on o2     3. Pulmonary HTN I27.20 416.8     has postponed cath  will set up later   4. Essential hypertension with goal blood pressure less than 140/90 I10 401.9     controlled   5. Leukocytosis, unspecified type D72.829 288.60 CBC WITH AUTOMATED DIFF     12/2017  Discussed option of right heart cath-risk benefit discussed-patient willing to proceed  Followed by Dr Rosalinda Ortez    1/2018  Patient postponed rt heart cath-she will get it done in sping  moderate  ?? group 3  evaluate for group 1     There are no discontinued medications. Orders Placed This Encounter    CBC WITH AUTOMATED DIFF     Standing Status:   Future     Standing Expiration Date:   2/24/2018       Follow-up Disposition:  Return in about 3 months (around 4/25/2018).

## 2018-01-29 ENCOUNTER — HOSPITAL ENCOUNTER (OUTPATIENT)
Dept: LAB | Age: 83
Discharge: HOME | End: 2018-01-29
Payer: MEDICARE

## 2018-01-29 DIAGNOSIS — D72.829 LEUKOCYTOSIS, UNSPECIFIED TYPE: ICD-10-CM

## 2018-01-29 LAB
BASOPHILS # BLD: 0 K/UL (ref 0–0.06)
BASOPHILS NFR BLD: 0 % (ref 0–3)
DIFFERENTIAL METHOD BLD: ABNORMAL
EOSINOPHIL # BLD: 0.3 K/UL (ref 0–0.4)
EOSINOPHIL NFR BLD: 2 % (ref 0–5)
ERYTHROCYTE [DISTWIDTH] IN BLOOD BY AUTOMATED COUNT: 14.7 % (ref 11.6–14.5)
HCT VFR BLD AUTO: 43.1 % (ref 35–45)
HGB BLD-MCNC: 14.3 G/DL (ref 12–16)
LYMPHOCYTES # BLD: 1.3 K/UL (ref 0.8–3.5)
LYMPHOCYTES NFR BLD: 10 % (ref 20–51)
MCH RBC QN AUTO: 30.4 PG (ref 24–34)
MCHC RBC AUTO-ENTMCNC: 33.2 G/DL (ref 31–37)
MCV RBC AUTO: 91.5 FL (ref 74–97)
MONOCYTES # BLD: 1.4 K/UL (ref 0–1)
MONOCYTES NFR BLD: 11 % (ref 2–9)
NEUTS BAND NFR BLD MANUAL: 3 % (ref 0–5)
NEUTS SEG # BLD: 9.5 K/UL (ref 1.8–8)
NEUTS SEG NFR BLD: 72 % (ref 42–75)
OTHER CELLS NFR BLD MANUAL: 2 %
PLATELET # BLD AUTO: 352 K/UL (ref 135–420)
PLATELET COMMENTS,PCOM: ABNORMAL
PMV BLD AUTO: 10.3 FL (ref 9.2–11.8)
RBC # BLD AUTO: 4.71 M/UL (ref 4.2–5.3)
RBC MORPH BLD: ABNORMAL
WBC # BLD AUTO: 12.7 K/UL (ref 4.6–13.2)

## 2018-01-29 PROCEDURE — 85025 COMPLETE CBC W/AUTO DIFF WBC: CPT | Performed by: INTERNAL MEDICINE

## 2018-01-29 PROCEDURE — 36415 COLL VENOUS BLD VENIPUNCTURE: CPT | Performed by: INTERNAL MEDICINE

## 2018-01-30 ENCOUNTER — HOSPITAL ENCOUNTER (OUTPATIENT)
Dept: PHYSICAL THERAPY | Age: 83
Discharge: HOME | End: 2018-01-30
Payer: MEDICARE

## 2018-01-30 PROCEDURE — G8979 MOBILITY GOAL STATUS: HCPCS

## 2018-01-30 PROCEDURE — 97110 THERAPEUTIC EXERCISES: CPT

## 2018-01-30 PROCEDURE — G8980 MOBILITY D/C STATUS: HCPCS

## 2018-01-30 NOTE — PROGRESS NOTES
Called and left message on patient voice mail concerning labs, Per Dr. Gabby Lezama no significant abnormality.  If any questions to call office

## 2018-01-30 NOTE — PROGRESS NOTES
PT DISCHARGE DAILY NOTE AND [id number]    Date:2018  Patient name: Ajya Amin Start of Care: 2018   Referral source: Vishnu Null : 1935                          Medical Diagnosis: Trochanteric bursitis, right hip [M70.61]  Trochanteric bursitis, left hip [M70.62] Onset Date:chronic                          Treatment Diagnosis: B hip pain   Prior Hospitalization: see medical history Provider#: [id number]   Medications: Verified on Patient summary List    Comorbidities: COPD, high BP   Prior Level of Function: Pt reports that she was previously Independent with all ADLS/IADLS. She does not currently use any AD to ambulate.      Visits from Start of Care: 3    Missed Visits: 0    Reporting Period : 18 to 18    [x]  Patient  Verified  [place]or: [place] MEDICARE / Plan: [place] MEDICARE PART A & B / Product Type: Medicare /    In time:2:00  Out time:3:00  Total Treatment Time (min): 44  Total Timed Codes (min): 44  1:1 Treatment Time ([address] only): 24   Visit #: 3 of 3    SUBJECTIVE  Pain Level (0-10 scale): 0  Any medication changes, allergies to medications, adverse drug reactions, diagnosis change, or new procedure performed?: [x] No    [] Yes (see summary sheet for update)  Subjective functional status/changes:   [] No changes reported  I'm fine just a little pain at both hips.   I only wanted an exercise program that I could do at home    OBJECTIVE      44 min Therapeutic Exercise:  [] See flow sheet : Assess for DC   Rationale: increase ROM, increase strength and improve coordination to improve the patients ability to tolerate increased activity levels          With   [x] TE   [] TA   [] neuro   [] other: Patient Education: [x] Review HEP    [] Progressed/Changed HEP based on:   [] positioning   [] body mechanics   [] transfers   [] heat/ice application    [] other:      Other Objective/Functional Measures:   - HS 90/90 (R) 158 (L) 152  - Negative Renee's Test  - Pt declined to walk stairs today due to fear of increasing pain  - Pt tolerated all exercises w/o O2     Pain Level (0-10 scale) post treatment: 0    Summary of Care:  Progress towards goals / Updated goals:  Short Term Goals: To be accomplished in 1 weeks:                         5.  Pt to report [place] with HEP.                                               Eval status: PT issued HEP today, but did not have time to physically perform it with pt Pt instructed on HEP. Continue to address at next visit 1/2318  Reports compliance w/HEP when she feels good. 1/30/18     Long Term Goals: To be accomplished in 3 treatments:                         2.  PT to update HEP prior to discharge to provide pt with strengthening/stretching program to continue to reduce symptoms at home.                                                Eval status: PT issued one page of HEP at evaluation; HEP will need to be updated      Current Status Met Pt advises that the exercises that she has is enough 1/30/18                         2.  Pt to demonstrate 5/5 strength with all B LE MMT to indicate improved strength and stability and reduced fall risk.                                                Eval status:  Pt demonstrates 4/5 to 4+/5 strength with B LE MMT      Current Not Met, no change from initial assessment 1/30/18                         3.  Pt to demonstrate negative tests for 90/90 HS and B Renee's, indicating resolved symptoms and improved activity tolerance.                                                Eval status: Pt with positive B 90/90 HS testing (painful bilaterally, reduced ROM), painful R Renee's      Current  Met Progressing at HS 90/90 (R) 158 (L) 152, Neg (B) Obers 1/30/18                         4.  Pt to be able to navigate up/down 12 steps with no c/o pain, indicating improved B LE strength and stability.                                                 Eval status: Pt c/o pain with navigating stairs      Current  Not met Pt declined to attempt 1/30/18                         5.  Pt to report 54 on FOTO, indicating improved B LE strength/stability and reduced fall risk.                                                Eval status: Pt reports 43 on FOTO      Current Not met no change from initial assessment at 43 1/30/18            ASSESSMENT/Changes in Function: Patient has shown good tolerance and understanding of HEP so that she can continue to progress with her home program. Pain as of last visit was 0/10. Patient reports reproduce the exercises at home and is satisfied with the current exercises. FOTO score is 43, no change from initial assessment 2 weeks ago. All STG/LTGs achieved as identified above. Fall Risk Assessment: Patient demonstrates a no Fall Risk     G-Codes (GP)  Mobility    Goal  CK= 40-59%  D/C  CK= 40-59%    The severity rating is based on clinical judgment and the FOTO score.       Thank you for this referral!     PLAN  [x]Discontinue therapy: [x]Patient has reached or is progressing toward set goals      []Patient is non-compliant or has abdicated      []Due to lack of appreciable progress towards set goals    Ralph Diaz, PT 1/30/2018  2:40 PM

## 2018-05-03 ENCOUNTER — HOSPITAL ENCOUNTER (OUTPATIENT)
Dept: GENERAL RADIOLOGY | Age: 83
Discharge: HOME | End: 2018-05-03
Payer: MEDICARE

## 2018-05-03 DIAGNOSIS — J44.9 OBSTRUCTIVE CHRONIC BRONCHITIS WITHOUT EXACERBATION (HCC): ICD-10-CM

## 2018-05-03 PROCEDURE — 71046 X-RAY EXAM CHEST 2 VIEWS: CPT

## 2018-05-15 ENCOUNTER — HOSPITAL ENCOUNTER (OUTPATIENT)
Dept: VASCULAR SURGERY | Age: 83
Discharge: HOME | End: 2018-05-15
Attending: INTERNAL MEDICINE
Payer: MEDICARE

## 2018-05-15 DIAGNOSIS — M79.89 SWELLING OF LIMB: ICD-10-CM

## 2018-05-15 PROCEDURE — 93971 EXTREMITY STUDY: CPT

## 2018-05-15 NOTE — ROUTINE PROCESS
Left lower extremity venous duplex completed. Wristband removed and shredded prior to the patient being discharged. Report to follow.

## 2018-05-15 NOTE — PROCEDURES
[facility]  *** [facility] ***    Name: Shi Neely  MRN: [id number]    Outpatient  : 16 Oct 1935  HIS Order #: [id number]  [address] [facility] Visit #: [id number]  Date: 15 May 2018    TYPE OF TEST: Peripheral Venous Testing    REASON FOR TEST  Limb swelling    Left Leg:-  Deep venous thrombosis:           No  Superficial venous thrombosis:    No  Deep venous insufficiency:        Not examined  Superficial venous insufficiency: Not examined      INTERPRETATION/FINDINGS  Duplex images were obtained using 2-D gray scale, color flow, and  spectral Doppler analysis. Left leg :  1. Deep veins visualized include the common femoral, femoral,  popliteal, posterior tibial and peroneal veins. 2. No evidence of deep venous thrombosis detected in the veins  visualized. 3. No evidence of deep vein thrombosis in the contralateral common  femoral vein. 4. Superficial veins visualized include the great saphenous vein. 5. No evidence of superficial thrombosis detected. 6. Normal multiphasic flow in the posterior tibial artery. ADDITIONAL COMMENTS    I have personally reviewed the data relevant to the interpretation of  this  study. TECHNOLOGIST: ASHLEY Barrios, MICHAELS  Signed: 05/15/2018 01:24 PM    PHYSICIAN: Denae Feliz.  Chrissie Cranker, MD  Signed: 2018 04:15 PM

## 2018-05-23 ENCOUNTER — OFFICE VISIT (OUTPATIENT)
Dept: CARDIOLOGY CLINIC | Age: 83
End: 2018-05-23

## 2018-05-23 VITALS
HEIGHT: 63 IN | BODY MASS INDEX: 24.63 KG/M2 | WEIGHT: 139 LBS | DIASTOLIC BLOOD PRESSURE: 52 MMHG | SYSTOLIC BLOOD PRESSURE: 122 MMHG | HEART RATE: 73 BPM

## 2018-05-23 DIAGNOSIS — I27.20 PULMONARY HTN (HCC): ICD-10-CM

## 2018-05-23 DIAGNOSIS — E78.5 HYPERLIPIDEMIA, UNSPECIFIED HYPERLIPIDEMIA TYPE: ICD-10-CM

## 2018-05-23 DIAGNOSIS — I10 ESSENTIAL HYPERTENSION WITH GOAL BLOOD PRESSURE LESS THAN 140/90: ICD-10-CM

## 2018-05-23 DIAGNOSIS — I50.32 CHRONIC DIASTOLIC CONGESTIVE HEART FAILURE (HCC): Primary | ICD-10-CM

## 2018-05-23 DIAGNOSIS — J44.9 CHRONIC OBSTRUCTIVE PULMONARY DISEASE, UNSPECIFIED COPD TYPE (HCC): ICD-10-CM

## 2018-05-23 NOTE — LETTER
[address] 1935 5/23/2018 Dear JOELLE Chan I had the pleasure of evaluating  Ms. Brewer in office today. Below are the relevant portions of my assessment and plan of care. ICD-10-CM ICD-9-CM 1. Chronic diastolic congestive heart failure (HCC) I50.32 428.32   
  428.0   
 stable 
sob multifactorial  
2. Essential hypertension with goal blood pressure less than 140/90 I10 401.9   
 controlled 3. Hyperlipidemia, unspecified hyperlipidemia type E78.5 272.4   
 stable 4. Pulmonary HTN (HCC) I27.20 416.8   
 functional class3 
  
5. Chronic obstructive pulmonary disease, unspecified COPD type ([facility] J44.9 496   
 on home o2 Current Outpatient Prescriptions Medication Sig Dispense Refill  diclofenac (VOLTAREN) 1 % gel Apply  to affected area four (4) times daily. 100 g 2  
 Oxygen  valsartan (DIOVAN) 160 mg tablet Take 1 Tab by mouth daily. 90 Tab 3  
 fluticasone-salmeterol (ADVAIR) 250-50 mcg/dose diskus inhaler Take 1 Puff by inhalation every twelve (12) hours.  albuterol (PROVENTIL HFA, VENTOLIN HFA, PROAIR HFA) 90 mcg/actuation inhaler Take  by inhalation.  albuterol (PROVENTIL VENTOLIN) 2.5 mg /3 mL (0.083 %) nebulizer solution 3 mL by Nebulization route every four (4) hours as needed for Wheezing or Shortness of Breath. 48 Each 0  
 furosemide (LASIX) 20 mg tablet 20 mg po daily (Patient taking differently: Take 40 mg by mouth daily.) 30 Tab 0  
 acetaminophen (TYLENOL EXTRA STRENGTH) 500 mg tablet Take 500 mg by mouth every six (6) hours as needed for Pain.  metoprolol (LOPRESSOR) 25 mg tablet Take 25 mg by mouth two (2) times a day.  NIFEdipine ER (ADALAT CC) 60 mg ER tablet Take 60 mg by mouth daily.  simvastatin (ZOCOR) 20 mg tablet Take  by mouth nightly.  alprazolam (XANAX) 0.5 mg tablet Take  by mouth.  tiotropium (SPIRIVA WITH HANDIHALER) 18 mcg inhalation capsule Take 1 capsule by inhalation daily.  aspirin delayed-release 81 mg tablet Take  by mouth daily. No orders of the defined types were placed in this encounter. If you have questions, please do not hesitate to call me. I look forward to following Ms. Brewer along with you. Sincerely, Norman Koo MD

## 2018-05-23 NOTE — MR AVS SNAPSHOT
[address] [facility] [address] 
 
 
 [address] [facility], [address] [facility] [address] 
[phone] Patient: Boaz Medina MRN: [id number] :1935 Visit Information Date & Time Provider Department Dept. Phone Encounter #  
 2018 10:30 AM Nicko Salmeron MD [facility] [address] [place] [address] [id number] Follow-up Instructions Return in about 6 months (around 2018). Your Appointments 2018 11:00 AM  
PROCEDURE with [facility] 1 [facility] ([facility]) Appt Note: cv [place] 1 year [address] [facility] [address] [facility] [address]  
[phone] [address] [facility] 47 [facility]  
  
    
 10/4/2018  1:00 PM  
Follow Up with JOELLE [facility] ([facility]) Appt Note: 1 YEAR FOLLOW UP AFTER CAROTID  
 [address] [place], [place] [address] [facility] [address]  
[phone] [address] [facility] 47 [facility]  
  
    
 2018 10:15 AM  
Office Visit with Nicko Salmeron MD  
[facility]) Appt Note: 6 months [address] [facility], [address] [facility] [address]  
[address], [address] Upcoming Health Maintenance Date Due DTaP/Tdap/Td series (1 - Tdap) 10/16/1956 ZOSTER VACCINE AGE 60> 1997 GLAUCOMA SCREENING Q2Y 10/16/2002 Bone Densitometry (Dexa) Screening 10/16/2002 MEDICARE YEARLY EXAM 3/14/2018 Influenza Age 5 to Adult 2018 Pneumococcal 65+ High/Highest Risk (2 of 2 - PPSV23) 10/1/2020 Allergies as of 2018  Review Complete On: 2018 By: Clara Gale No Known Allergies Current Immunizations  Never Reviewed Name Date Influenza Vaccine 10/1/2015 Pneumococcal Vaccine (Unspecified Type) 10/1/2015 Not reviewed this visit You Were Diagnosed With   
  
 Codes Comments Chronic diastolic congestive heart failure (HCC)    -  Primary ICD-10-CM: I50.32 
ICD-9-CM: 428.32, 428.0 stable 
sob multifactorial  
 Essential hypertension with goal blood pressure less than 140/90     ICD-10-CM: I10 
ICD-9-CM: 401.9 controlled Hyperlipidemia, unspecified hyperlipidemia type     ICD-10-CM: E78.5 ICD-9-CM: 272.4 stable Pulmonary HTN ([facility]     ICD-10-CM: I27.20 ICD-9-CM: 416.8 functional class3 Chronic obstructive pulmonary disease, unspecified COPD type ([facility]     ICD-10-CM: J44.9 ICD-9-CM: 496 on home o2 Vitals BP Pulse Height(growth percentile) Weight(growth percentile) BMI OB Status 122/52 73 5' 3\" (1.6 m) 139 lb (63 kg) 24.62 kg/m2 Postmenopausal  
 Smoking Status Former Smoker Vitals History BMI and BSA Data Body Mass Index Body Surface Area  
 24.62 kg/m 2 1.67 m 2 Preferred Pharmacy Pharmacy Name Phone [address] [facility] [phone] Your Updated Medication List  
  
   
This list is accurate as of 5/23/18 10:36 AM.  Always use your most recent med list.  
  
  
  
  
 * albuterol 90 mcg/actuation inhaler Commonly known as:  PROVENTIL HFA, VENTOLIN HFA, PROAIR HFA Take  by inhalation. * albuterol 2.5 mg /3 mL (0.083 %) nebulizer solution Commonly known as:  PROVENTIL VENTOLIN  
3 mL by Nebulization route every four (4) hours as needed for Wheezing or Shortness of Breath. aspirin delayed-release 81 mg tablet Take  by mouth daily. diclofenac 1 % Gel Commonly known as:  VOLTAREN Apply  to affected area four (4) times daily. fluticasone-salmeterol 250-50 mcg/dose diskus inhaler Commonly known as:  ADVAIR Take 1 Puff by inhalation every twelve (12) hours. furosemide 20 mg tablet Commonly known as:  LASIX  
20 mg po daily  
  
 metoprolol tartrate 25 mg tablet Commonly known as:  LOPRESSOR Take 25 mg by mouth two (2) times a day. NIFEdipine ER 60 mg ER tablet Commonly known as:  ADALAT CC Take 60 mg by mouth daily. Oxygen SPIRIVA WITH HANDIHALER 18 mcg inhalation capsule Generic drug:  tiotropium Take 1 capsule by inhalation daily. TYLENOL EXTRA STRENGTH 500 mg tablet Generic drug:  acetaminophen Take 500 mg by mouth every six (6) hours as needed for Pain.  
  
 valsartan 160 mg tablet Commonly known as:  DIOVAN Take 1 Tab by mouth daily. XANAX 0.5 mg tablet Generic drug:  ALPRAZolam  
Take  by mouth. ZOCOR 20 mg tablet Generic drug:  simvastatin Take  by mouth nightly. * Notice: This list has 2 medication(s) that are the same as other medications prescribed for you. Read the directions carefully, and ask your doctor or other care provider to review them with you. Follow-up Instructions Return in about 6 months (around 11/23/2018). Please provide this summary of care documentation to your next provider. Your primary care clinician is listed as [address] [place] [address]. If you have any questions after today's visit, please call [phone].

## 2018-05-23 NOTE — PROGRESS NOTES
HISTORY OF PRESENT ILLNESS  Blanche Angeles is a [de-identified] y.o. female. HPI Comments: Patient with chf,copd,htn,pulm htn  Here for follow up    CHF   The history is provided by the patient. This is a chronic problem. The problem occurs constantly. The problem has not changed since onset. Associated symptoms include shortness of breath. Pertinent negatives include no chest pain, no abdominal pain and no headaches. The symptoms are aggravated by exertion. Nothing relieves the symptoms. Shortness of Breath   The history is provided by the patient. This is a recurrent problem. The problem occurs frequently. The problem has not changed since onset. Associated symptoms include leg swelling. Pertinent negatives include no fever, no headaches, no cough, no sputum production, no hemoptysis, no wheezing, no PND, no orthopnea, no chest pain, no vomiting, no abdominal pain, no rash and no claudication. Precipitated by: activity. Review of Systems   Constitutional: Negative for chills and fever. HENT: Negative for nosebleeds. Eyes: Negative for blurred vision and double vision. Respiratory: Positive for shortness of breath. Negative for cough, hemoptysis, sputum production and wheezing. Cardiovascular: Positive for leg swelling. Negative for chest pain, palpitations, orthopnea, claudication and PND. Gastrointestinal: Negative for abdominal pain, heartburn, nausea and vomiting. Musculoskeletal: Negative for myalgias. Skin: Negative for rash. Neurological: Negative for dizziness, weakness and headaches. Endo/Heme/Allergies: Does not bruise/bleed easily.      Family History   Problem Relation Age of Onset    Heart Disease Mother     Hypertension Mother     Heart Attack Father     Hypertension Father        Past Medical History:   Diagnosis Date    Chronic lung disease     Chronic obstructive pulmonary disease ([address]     Heart failure ([address]     Hypertension        Past Surgical History:   Procedure Laterality Date    HX ORTHOPAEDIC      spinal sx 5/6    HX OTHER SURGICAL      Carotid artery    VASCULAR SURGERY PROCEDURE UNLIST      L CEA 10/2014       Social History   Substance Use Topics    Smoking status: Former Smoker     Packs/day: 1.50     Years: 30.00     Types: Cigarettes     Quit date: 9/12/1987    Smokeless tobacco: Never Used    Alcohol use No       No Known Allergies    Outpatient Prescriptions Marked as Taking for the 5/23/18 encounter (Office Visit) with Mariaelena Daugherty MD   Medication Sig Dispense Refill    diclofenac (VOLTAREN) 1 % gel Apply  to affected area four (4) times daily. 100 g 2    Oxygen       valsartan (DIOVAN) 160 mg tablet Take 1 Tab by mouth daily. 90 Tab 3    fluticasone-salmeterol (ADVAIR) 250-50 mcg/dose diskus inhaler Take 1 Puff by inhalation every twelve (12) hours.  albuterol (PROVENTIL HFA, VENTOLIN HFA, PROAIR HFA) 90 mcg/actuation inhaler Take  by inhalation.  albuterol (PROVENTIL VENTOLIN) 2.5 mg /3 mL (0.083 %) nebulizer solution 3 mL by Nebulization route every four (4) hours as needed for Wheezing or Shortness of Breath. 48 Each 0    furosemide (LASIX) 20 mg tablet 20 mg po daily (Patient taking differently: Take 40 mg by mouth daily.) 30 Tab 0    acetaminophen (TYLENOL EXTRA STRENGTH) 500 mg tablet Take 500 mg by mouth every six (6) hours as needed for Pain.  metoprolol (LOPRESSOR) 25 mg tablet Take 25 mg by mouth two (2) times a day.  NIFEdipine ER (ADALAT CC) 60 mg ER tablet Take 60 mg by mouth daily.  simvastatin (ZOCOR) 20 mg tablet Take  by mouth nightly.  alprazolam (XANAX) 0.5 mg tablet Take  by mouth.  tiotropium (SPIRIVA WITH HANDIHALER) 18 mcg inhalation capsule Take 1 capsule by inhalation daily.  aspirin delayed-release 81 mg tablet Take  by mouth daily.          Visit Vitals    /52    Pulse 73    Ht 5' 3\" (1.6 m)    Wt 63 kg (139 lb)    BMI 24.62 kg/m2         Physical Exam   Constitutional: She is oriented to person, place, and time. She appears well-developed and well-nourished. HENT:   Head: Normocephalic and atraumatic. Eyes: Conjunctivae are normal.   Neck: Neck supple. No JVD present. No tracheal deviation present. No thyromegaly present. Cardiovascular: Normal rate and regular rhythm. PMI is not displaced. Exam reveals no gallop, no S3 and no decreased pulses. Murmur heard. Holosystolic murmur is present  at the lower left sternal border  Pulmonary/Chest: No respiratory distress. She has no wheezes. She has no rales. She exhibits no tenderness. Abdominal: Soft. There is no tenderness. Musculoskeletal: She exhibits no edema. Neurological: She is alert and oriented to person, place, and time. Skin: Skin is warm. Psychiatric: She has a normal mood and affect. Ms. Vanessa Schwarz has a reminder for a \"due or due soon\" health maintenance. I have asked that she contact her primary care provider for follow-up on this health maintenance. I have personally reviewed patient's records available from hospital and other providers and incorporated findings in patient care. 6/2016-[facility]    SUMMARY:6/2016-echo  Procedure information: Image quality was adequate. Left ventricle: Systolic function was normal. Ejection fraction was  estimated to be 60 %. No obvious wall motion abnormalities identified in  the views obtained. Wall thickness was mildly increased. Features were  consistent with a pseudonormal left ventricular filling pattern, with  concomitant abnormal relaxation and increased filling pressure (grade 2  diastolic dysfunction). Right ventricle: Systolic pressure was markedly increased. Estimated peak  pressure was 74 mmHg. Left atrium: The atrium was mildly to moderately dilated. Inferior vena cava, hepatic veins: The inferior vena cava was mildly  Dilated. SUMMARY:6/2017-echo  Left ventricle: Systolic function was normal by visual assessment.   Ejection fraction was estimated to be 60 %. There was possible hypokinesis  of the basal-mid anteroseptal, basal-mid inferoseptal, apical inferior,  and apical septal wall(s). Wall thickness was mildly increased. Doppler  parameters were consistent with abnormal left ventricular relaxation  (grade 1 diastolic dysfunction). Right ventricle: The size was at the upper limits of normal. Systolic  pressure was moderately increased. Estimated peak pressure was 60 mmHg. Right atrium: The atrium was mildly dilated. I Have personally reviewed recent relevant labs available and discussed with patient  1/2018 5/2018  Left Leg:-  Deep venous thrombosis:           No  Superficial venous thrombosis:    No  Deep venous insufficiency:        Not examined  Superficial venous insufficiency: Not examined     Assessment         ICD-10-CM ICD-9-CM    1. Chronic diastolic congestive heart failure (HCC) I50.32 428.32      428.0     stable  sob multifactorial   2. Essential hypertension with goal blood pressure less than 140/90 I10 401.9     controlled   3. Hyperlipidemia, unspecified hyperlipidemia type E78.5 272.4     stable   4. Pulmonary HTN (HCC) I27.20 416.8     functional class3     5. Chronic obstructive pulmonary disease, unspecified COPD type ([facility] J44.9 496     on home o2     12/2017  Discussed option of right heart cath-risk benefit discussed-patient willing to proceed  Followed by Dr Juanita Davis    1/2018  Patient postponed rt heart cath-she will get it done in sping  moderate  ?? group 3  evaluate for group 1   5/2018  Does not want rt heart cath  Medications Discontinued During This Encounter   Medication Reason    spironolactone (ALDACTONE) 25 mg tablet Not A Current Medication       No orders of the defined types were placed in this encounter. Follow-up Disposition:  Return in about 6 months (around 11/23/2018).

## 2018-05-23 NOTE — PROGRESS NOTES
Patient brought medications list    1. Have you been to the ER, urgent care clinic since your last visit? Hospitalized since your last visit? No    2. Have you seen or consulted any other health care providers outside of the [address] [place] [address] since your last visit? Include any pap smears or colon screening.  Yes Where: PCP Routine/ Pulmonary Routine

## 2018-06-12 ENCOUNTER — HOSPITAL ENCOUNTER (OUTPATIENT)
Dept: GENERAL RADIOLOGY | Age: 83
Discharge: HOME | End: 2018-06-12
Payer: MEDICARE

## 2018-06-12 DIAGNOSIS — J40 BRONCHITIS: ICD-10-CM

## 2018-06-12 PROCEDURE — 71046 X-RAY EXAM CHEST 2 VIEWS: CPT

## 2018-10-04 ENCOUNTER — OFFICE VISIT (OUTPATIENT)
Dept: VASCULAR SURGERY | Age: 83
End: 2018-10-04

## 2018-10-04 VITALS
RESPIRATION RATE: 16 BRPM | HEART RATE: 68 BPM | SYSTOLIC BLOOD PRESSURE: 140 MMHG | HEIGHT: 63 IN | DIASTOLIC BLOOD PRESSURE: 60 MMHG | BODY MASS INDEX: 24.63 KG/M2 | WEIGHT: 139 LBS

## 2018-10-04 DIAGNOSIS — I65.23 CAROTID STENOSIS, ASYMPTOMATIC, BILATERAL: Primary | ICD-10-CM

## 2018-10-04 DIAGNOSIS — Z98.890 S/P CAROTID ENDARTERECTOMY: ICD-10-CM

## 2018-10-04 NOTE — PROGRESS NOTES
[address]    Chief Complaint   Patient presents with    Carotid Artery Stenosis       History and Physical    Ms Khloe Claire is here approaching 4 years s/p L CEA and this is a surveillance visit  A few years ago she became dependent on O2 supplementation. She has done well with adjusting to full time oxygen. She still stays active and busy  She had been having issues with lower extremity edema and did get compression stockings, which she wears regularly, and she says this does help       Past Medical History:   Diagnosis Date    Chronic lung disease     Chronic obstructive pulmonary disease (HCC)     Heart failure ([facility]     Hypertension      Patient Active Problem List   Diagnosis Code    Occlusion and stenosis of carotid artery I65.29    Carotid stenosis I65.29    BURGESS (dyspnea on exertion) R06.09    Hyperlipidemia E78.5    Essential hypertension with goal blood pressure less than 140/90 I10    Chronic diastolic congestive heart failure (HCC) I50.32    Chronic obstructive pulmonary disease (HCC) J44.9    Pulmonary HTN (HCC) I27.20     Past Surgical History:   Procedure Laterality Date    HX ORTHOPAEDIC      spinal sx 5/6    HX OTHER SURGICAL      Carotid artery    VASCULAR SURGERY PROCEDURE UNLIST      L CEA 10/2014     Current Outpatient Prescriptions   Medication Sig Dispense Refill    OTHER Indications: Trilogy inhaler      diclofenac (VOLTAREN) 1 % gel Apply  to affected area four (4) times daily. 100 g 2    Oxygen       valsartan (DIOVAN) 160 mg tablet Take 1 Tab by mouth daily. 90 Tab 3    albuterol (PROVENTIL HFA, VENTOLIN HFA, PROAIR HFA) 90 mcg/actuation inhaler Take  by inhalation.  albuterol (PROVENTIL VENTOLIN) 2.5 mg /3 mL (0.083 %) nebulizer solution 3 mL by Nebulization route every four (4) hours as needed for Wheezing or Shortness of Breath.  48 Each 0    furosemide (LASIX) 20 mg tablet 20 mg po daily (Patient taking differently: Take 40 mg by mouth daily.) 30 Tab 0  acetaminophen (TYLENOL EXTRA STRENGTH) 500 mg tablet Take 500 mg by mouth every six (6) hours as needed for Pain.  metoprolol (LOPRESSOR) 25 mg tablet Take 25 mg by mouth two (2) times a day.  NIFEdipine ER (ADALAT CC) 60 mg ER tablet Take 60 mg by mouth daily.  simvastatin (ZOCOR) 20 mg tablet Take  by mouth nightly.  alprazolam (XANAX) 0.5 mg tablet Take  by mouth.  aspirin delayed-release 81 mg tablet Take  by mouth daily.  fluticasone-salmeterol (ADVAIR) 250-50 mcg/dose diskus inhaler Take 1 Puff by inhalation every twelve (12) hours.  tiotropium (SPIRIVA WITH HANDIHALER) 18 mcg inhalation capsule Take 1 capsule by inhalation daily. No Known Allergies    Review of Systems    Review of Systems - History obtained from the patient  General ROS: negative  Psychological ROS: negative  Ophthalmic ROS: positive for - uses glasses  Respiratory ROS: positive for - shortness of breath  Cardiovascular ROS: negative  Gastrointestinal ROS: negative  Musculoskeletal ROS: negative  Neurological ROS: no TIA or stroke symptoms  Dermatological ROS: negative  Vascular ROS: mild leg edema bilaterally    Physical   Visit Vitals    /60 (BP 1 Location: Left arm, BP Patient Position: Sitting)    Pulse 68    Resp 16    Ht 5' 3\" (1.6 m)    Wt 139 lb (63 kg)    BMI 24.62 kg/m2     General:  Alert, cooperative, no distress. On Oxygen via nasal cannula   Head:  Normocephalic, without obvious abnormality, atraumatic. Eyes:     Conjunctivae/corneas clear. Pupils equal, round, reactive to light. Extraocular movements intact. Neck:         L CEA scar   Lungs:   Clear to auscultation bilaterally.    Heart:  Regular rate and rhythm, S1, S2 normal   Extremities: Extremities normal, atraumatic, but about 1+ edema bilaterally   Pulses: Difficult to palpate due to edema but no signs of ischemia   Skin: Skin color, texture, turgor normal. No rashes or lesions         Vascular studies:  Moderate calcific diffuse plaque right ICA with 50-69% stenosis. Right vertebral is antegrade. Left side status post carotid endarterectomy,  mild homogenous plaque noted with less than 50% stenosis. Vertebral is antegrade left side. Blood pressure symmetrical    Impression/Plan:     ICD-10-CM ICD-9-CM    1. Carotid stenosis, asymptomatic, bilateral I65.23 433.10 OTHER     433.30 DUPLEX CAROTID BILATERAL   2. S/P carotid endarterectomy Z98.890 V45.89 OTHER      DUPLEX CAROTID BILATERAL     Orders Placed This Encounter    OTHER     Reassured of her results and still ok to do yearly visit     Follow-up Disposition:  Return in about 1 year (around 10/4/2019). JOELLE Orlando    Portions of this note have been entered using voice recognition software.

## 2018-10-04 NOTE — MR AVS SNAPSHOT
[address] [facility] [address] 
 
 
 [address] [place], [place] [address] [address] [facility] [address] 
[phone] Patient: Ricarda Grant MRN: [id number] :1935 Visit Information Date & Time Provider Department Dept. Phone Encounter #  
 10/4/2018  1:00 PM Arabella Flores, [facility] [address] [facility] [phone] [id number] Follow-up Instructions Return in about 1 year (around 10/4/2019). Your Appointments 2018 10:15 AM  
Office Visit with Rehana Lee MD  
[facility]) Appt Note: 6 months [address] [facility], [address] [place] [address], [address] [place] [address]  
  
    
 10/7/2019 11:00 AM  
PROCEDURE with [facility] IMAGING 1 [facility] ([facility]) Appt Note: cv knaak 1 year [address] [facility] [address] [facility] [address]  
[phone] [address] [facility], [address] [facility]  
  
    
 10/22/2019  1:30 PM  
Follow Up with JOELLE Willis [facility] ([facility]) Appt Note: 1 year follow up after carotid [address] [facility] [address] [facility] [address]  
[phone] [address] [facility], [place] [address] [facility] [address] Upcoming Health Maintenance Date Due DTaP/Tdap/Td series (1 - Tdap) 10/16/1958 Shingrix Vaccine Age 50> (1 of 2) 10/16/1987 GLAUCOMA SCREENING Q2Y 10/16/2002 Bone Densitometry (Dexa) Screening 10/16/2002 MEDICARE YEARLY EXAM 3/14/2018 Influenza Age 5 to Adult 2018 Pneumococcal 65+ High/Highest Risk (2 of 2 - PPSV23) 10/1/2020 Allergies as of 10/4/2018  Review Complete On: 10/4/2018 By: Campos Donaldson No Known Allergies Current Immunizations  Never Reviewed Name Date Influenza Vaccine 10/1/2015 Pneumococcal Vaccine (Unspecified Type) 10/1/2015 Not reviewed this visit You Were Diagnosed With   
  
 Codes Comments Carotid stenosis, asymptomatic, bilateral    -  Primary ICD-10-CM: P16.84 ICD-9-CM: 433.10, 433.30 S/P carotid endarterectomy     ICD-10-CM: O78.462 ICD-9-CM: V45.89 Vitals BP Pulse Resp Height(growth percentile) Weight(growth percentile) BMI  
 140/60 (BP 1 Location: Left arm, BP Patient Position: Sitting) 68 16 5' 3\" (1.6 m) 139 lb (63 kg) 24.62 kg/m2 OB Status Smoking Status Postmenopausal Former Smoker Vitals History BMI and BSA Data Body Mass Index Body Surface Area  
 24.62 kg/m 2 1.67 m 2 Preferred Pharmacy Pharmacy Name Phone [address] [facility] [phone] Your Updated Medication List  
  
   
This list is accurate as of 10/4/18  1:18 PM.  Always use your most recent med list.  
  
  
  
  
 * albuterol 90 mcg/actuation inhaler Commonly known as:  PROVENTIL HFA, VENTOLIN HFA, PROAIR HFA Take  by inhalation. * albuterol 2.5 mg /3 mL (0.083 %) nebulizer solution Commonly known as:  PROVENTIL VENTOLIN  
3 mL by Nebulization route every four (4) hours as needed for Wheezing or Shortness of Breath. aspirin delayed-release 81 mg tablet Take  by mouth daily. diclofenac 1 % Gel Commonly known as:  VOLTAREN Apply  to affected area four (4) times daily. fluticasone-salmeterol 250-50 mcg/dose diskus inhaler Commonly known as:  ADVAIR Take 1 Puff by inhalation every twelve (12) hours. furosemide 20 mg tablet Commonly known as:  LASIX  
20 mg po daily  
  
 metoprolol tartrate 25 mg tablet Commonly known as:  LOPRESSOR Take 25 mg by mouth two (2) times a day. NIFEdipine ER 60 mg ER tablet Commonly known as:  ADALAT CC Take 60 mg by mouth daily. OTHER Indications: Trilogy inhaler Oxygen SPIRIVA WITH HANDIHALER 18 mcg inhalation capsule Generic drug:  tiotropium Take 1 capsule by inhalation daily. TYLENOL EXTRA STRENGTH 500 mg tablet Generic drug:  acetaminophen Take 500 mg by mouth every six (6) hours as needed for Pain.  
  
 valsartan 160 mg tablet Commonly known as:  DIOVAN Take 1 Tab by mouth daily. XANAX 0.5 mg tablet Generic drug:  ALPRAZolam  
Take  by mouth. ZOCOR 20 mg tablet Generic drug:  simvastatin Take  by mouth nightly. * Notice: This list has 2 medication(s) that are the same as other medications prescribed for you. Read the directions carefully, and ask your doctor or other care provider to review them with you. Follow-up Instructions Return in about 1 year (around 10/4/2019). To-Do List   
 10/04/2019 Vascular/US:  DUPLEX CAROTID BILATERAL Please provide this summary of care documentation to your next provider. Your primary care clinician is listed as [address]. If you have any questions after today's visit, please call [phone].

## 2018-10-04 NOTE — PROGRESS NOTES
1. Have you been to an emergency room or urgent care clinic since your last visit? no    Hospitalized since your last visit? If yes, where, when, and reason for visit? no  2. Have you seen or consulted any other health care providers outside of the [facility] since your last visit including any procedures, health maintenance items.  If yes, where, when and reason for visit? no

## 2018-11-07 ENCOUNTER — HOSPITAL ENCOUNTER (OUTPATIENT)
Dept: GENERAL RADIOLOGY | Age: 83
Discharge: HOME | End: 2018-11-07
Payer: MEDICARE

## 2018-11-07 DIAGNOSIS — J44.9 COPD (CHRONIC OBSTRUCTIVE PULMONARY DISEASE) (HCC): ICD-10-CM

## 2018-11-07 PROCEDURE — 71046 X-RAY EXAM CHEST 2 VIEWS: CPT

## 2018-11-21 ENCOUNTER — OFFICE VISIT (OUTPATIENT)
Dept: CARDIOLOGY CLINIC | Age: 83
End: 2018-11-21

## 2018-11-21 VITALS
SYSTOLIC BLOOD PRESSURE: 171 MMHG | HEART RATE: 72 BPM | WEIGHT: 141 LBS | DIASTOLIC BLOOD PRESSURE: 69 MMHG | BODY MASS INDEX: 24.98 KG/M2 | HEIGHT: 63 IN

## 2018-11-21 DIAGNOSIS — I50.32 CHRONIC DIASTOLIC CONGESTIVE HEART FAILURE (HCC): Primary | ICD-10-CM

## 2018-11-21 DIAGNOSIS — J44.9 CHRONIC OBSTRUCTIVE PULMONARY DISEASE, UNSPECIFIED COPD TYPE (HCC): ICD-10-CM

## 2018-11-21 DIAGNOSIS — I27.20 PULMONARY HTN (HCC): ICD-10-CM

## 2018-11-21 DIAGNOSIS — I10 ESSENTIAL HYPERTENSION WITH GOAL BLOOD PRESSURE LESS THAN 140/90: ICD-10-CM

## 2018-11-21 DIAGNOSIS — E78.5 HYPERLIPIDEMIA, UNSPECIFIED HYPERLIPIDEMIA TYPE: ICD-10-CM

## 2018-11-21 RX ORDER — LOSARTAN POTASSIUM 50 MG/1
TABLET ORAL DAILY
COMMUNITY

## 2018-11-21 NOTE — LETTER
[address] 1935 
 
11/21/2018 Dear MD Seth Mccoy PA I had the pleasure of evaluating  Ms. Brewer in office today. Below are the relevant portions of my assessment and plan of care. ICD-10-CM ICD-9-CM 1. Chronic diastolic congestive heart failure (HCC) I50.32 428.32   
  428.0 Stable. Clinically shortness of breath multifactorial  
2. Essential hypertension with goal blood pressure less than 140/90 I10 401.9   
 elevated here 
usually normal 
occasional low bp  
3. Hyperlipidemia, unspecified hyperlipidemia type E78.5 272.4 Stable on treatment lab with PCP 4. Pulmonary HTN (HCC) I27.20 416.8 Likely group 3. Patient did not want to proceed with right heart catheterization continue monitoring 5. Chronic obstructive pulmonary disease, unspecified COPD type ([facility] J44.9 496 Stable Current Outpatient Medications Medication Sig Dispense Refill  losartan (COZAAR) 50 mg tablet Take  by mouth daily.  fluticasone-umeclidin-vilanter (TRELEGY ELLIPTA) 100-62.5-25 mcg dsdv Take  by inhalation.  diclofenac (VOLTAREN) 1 % gel Apply  to affected area four (4) times daily. 100 g 2  
 Oxygen  albuterol (PROVENTIL HFA, VENTOLIN HFA, PROAIR HFA) 90 mcg/actuation inhaler Take  by inhalation.  albuterol (PROVENTIL VENTOLIN) 2.5 mg /3 mL (0.083 %) nebulizer solution 3 mL by Nebulization route every four (4) hours as needed for Wheezing or Shortness of Breath. 48 Each 0  
 furosemide (LASIX) 20 mg tablet 20 mg po daily (Patient taking differently: Take 40 mg by mouth daily.) 30 Tab 0  
 acetaminophen (TYLENOL EXTRA STRENGTH) 500 mg tablet Take 500 mg by mouth every six (6) hours as needed for Pain.  metoprolol (LOPRESSOR) 25 mg tablet Take 25 mg by mouth two (2) times a day.  NIFEdipine ER (ADALAT CC) 60 mg ER tablet Take 60 mg by mouth daily.  simvastatin (ZOCOR) 20 mg tablet Take  by mouth nightly.  alprazolam (XANAX) 0.5 mg tablet Take  by mouth.  aspirin delayed-release 81 mg tablet Take  by mouth daily. Orders Placed This Encounter  losartan (COZAAR) 50 mg tablet Sig: Take  by mouth daily.  fluticasone-umeclidin-vilanter (TRELEGY ELLIPTA) 100-62.5-25 mcg dsdv Sig: Take  by inhalation. If you have questions, please do not hesitate to call me. I look forward to following Ms. Brewer along with you. Sincerely, Anjana Shepherd MD

## 2018-11-21 NOTE — PROGRESS NOTES
Patient brought medications list    1. Have you been to the ER, urgent care clinic since your last visit? Hospitalized since your last visit? No    2. Have you seen or consulted any other health care providers outside of the [address] [place] [address] since your last visit? Include any pap smears or colon screening.  Yes Where: Pulmonary Reason for visit: Routine

## 2018-11-21 NOTE — PROGRESS NOTES
HISTORY OF PRESENT ILLNESS  Blanche Henry is a 82 y.o. female. Patient with chf,copd,htn,pulm htn  Here for follow up      CHF   The history is provided by the patient. This is a chronic problem. The problem occurs constantly. The problem has not changed since onset. Associated symptoms include shortness of breath. Pertinent negatives include no chest pain, no abdominal pain and no headaches. The symptoms are aggravated by exertion. Nothing relieves the symptoms. Shortness of Breath   The history is provided by the patient. This is a recurrent problem. The problem occurs frequently. The problem has not changed since onset. Associated symptoms include leg swelling. Pertinent negatives include no fever, no headaches, no cough, no sputum production, no hemoptysis, no wheezing, no PND, no orthopnea, no chest pain, no vomiting, no abdominal pain, no rash and no claudication. Precipitated by: activity. Review of Systems   Constitutional: Negative for chills and fever. HENT: Negative for nosebleeds. Eyes: Negative for blurred vision and double vision. Respiratory: Positive for shortness of breath. Negative for cough, hemoptysis, sputum production and wheezing. Cardiovascular: Positive for leg swelling. Negative for chest pain, palpitations, orthopnea, claudication and PND. Gastrointestinal: Negative for abdominal pain, heartburn, nausea and vomiting. Musculoskeletal: Negative for myalgias. Skin: Negative for rash. Neurological: Negative for dizziness, weakness and headaches. Endo/Heme/Allergies: Does not bruise/bleed easily.      Family History   Problem Relation Age of Onset    Heart Disease Mother     Hypertension Mother     Heart Attack Father     Hypertension Father        Past Medical History:   Diagnosis Date    Chronic lung disease     Chronic obstructive pulmonary disease ([address]     Heart failure ([address]     Hypertension        Past Surgical History:   Procedure Laterality Date  HX ORTHOPAEDIC      spinal sx 5/6    HX OTHER SURGICAL      Carotid artery    VASCULAR SURGERY PROCEDURE UNLIST      L CEA 10/2014       Social History     Tobacco Use    Smoking status: Former Smoker     Packs/day: 1.50     Years: 30.00     Pack years: 45.00     Types: Cigarettes     Last attempt to quit: 1987     Years since quittin.2    Smokeless tobacco: Never Used   Substance Use Topics    Alcohol use: No     Alcohol/week: 0.0 oz       No Known Allergies        Visit Vitals  /69   Pulse 72   Ht 5' 3\" (1.6 m)   Wt 64 kg (141 lb)   BMI 24.98 kg/m²         Physical Exam   Constitutional: She is oriented to person, place, and time. She appears well-developed and well-nourished. HENT:   Head: Normocephalic and atraumatic. Eyes: Conjunctivae are normal.   Neck: Neck supple. No JVD present. No tracheal deviation present. No thyromegaly present. Cardiovascular: Normal rate and regular rhythm. PMI is not displaced. Exam reveals no gallop, no S3 and no decreased pulses. Murmur heard. Holosystolic murmur is present at the lower left sternal border. Pulmonary/Chest: No respiratory distress. She has no wheezes. She has no rales. She exhibits no tenderness. Abdominal: Soft. There is no tenderness. Musculoskeletal: She exhibits no edema. Neurological: She is alert and oriented to person, place, and time. Skin: Skin is warm. Psychiatric: She has a normal mood and affect. Ms. Mandi Higginbotham has a reminder for a \"due or due soon\" health maintenance. I have asked that she contact her primary care provider for follow-up on this health maintenance. I have personally reviewed patient's records available from hospital and other providers and incorporated findings in patient care. 2016-[facility]    SUMMARY:2016-echo  Procedure information: Image quality was adequate. Left ventricle: Systolic function was normal. Ejection fraction was  estimated to be 60 %.  No obvious wall motion abnormalities identified in  the views obtained. Wall thickness was mildly increased. Features were  consistent with a pseudonormal left ventricular filling pattern, with  concomitant abnormal relaxation and increased filling pressure (grade 2  diastolic dysfunction). Right ventricle: Systolic pressure was markedly increased. Estimated peak  pressure was 74 mmHg. Left atrium: The atrium was mildly to moderately dilated. Inferior vena cava, hepatic veins: The inferior vena cava was mildly  Dilated. SUMMARY:6/2017-echo  Left ventricle: Systolic function was normal by visual assessment. Ejection fraction was estimated to be 60 %. There was possible hypokinesis  of the basal-mid anteroseptal, basal-mid inferoseptal, apical inferior,  and apical septal wall(s). Wall thickness was mildly increased. Doppler  parameters were consistent with abnormal left ventricular relaxation  (grade 1 diastolic dysfunction). Right ventricle: The size was at the upper limits of normal. Systolic  pressure was moderately increased. Estimated peak pressure was 60 mmHg. Right atrium: The atrium was mildly dilated. I Have personally reviewed recent relevant labs available and discussed with patient  1/2018 5/2018  Left Leg:-  Deep venous thrombosis:           No  Superficial venous thrombosis:    No  Deep venous insufficiency:        Not examined  Superficial venous insufficiency: Not examined     Assessment         ICD-10-CM ICD-9-CM    1. Chronic diastolic congestive heart failure (HCC) I50.32 428.32      428.0     Stable. Clinically shortness of breath multifactorial   2. Essential hypertension with goal blood pressure less than 140/90 I10 401.9     elevated here  usually normal  occasional low bp   3. Hyperlipidemia, unspecified hyperlipidemia type E78.5 272.4     Stable on treatment lab with PCP   4. Pulmonary HTN (HCC) I27.20 416.8     Likely group 3.   Patient did not want to proceed with right heart catheterization continue monitoring   5. Chronic obstructive pulmonary disease, unspecified COPD type ([address] J44.9 496     Stable     12/2017  Discussed option of right heart cath-risk benefit discussed-patient willing to proceed  Followed by Dr Nash Castellanos    1/2018  Patient postponed rt heart cath-she will get it done in spin  moderate  ?? group 3  evaluate for group 1   5/2018  Does not want rt heart cath  11/2018  Cardiac status stable. Shortness of breath class III which is multifactorial.  Blood pressure elevated today but usually normal at home. Patient will continue to monitor. Salt restriction    Medications Discontinued During This Encounter   Medication Reason    OTHER Error    valsartan (DIOVAN) 160 mg tablet Not A Current Medication    fluticasone-salmeterol (ADVAIR) 250-50 mcg/dose diskus inhaler Not A Current Medication    tiotropium (SPIRIVA WITH HANDIHALER) 18 mcg inhalation capsule Not A Current Medication       No orders of the defined types were placed in this encounter. Follow-up Disposition:  Return in about 6 months (around 5/21/2019).

## 2019-03-08 ENCOUNTER — HOSPITAL ENCOUNTER (OUTPATIENT)
Dept: PULMONOLOGY | Age: 84
Discharge: HOME | End: 2019-03-08
Payer: MEDICARE

## 2019-03-08 PROCEDURE — 94618 PULMONARY STRESS TESTING: CPT

## 2019-03-08 NOTE — PROGRESS NOTES
Nora Rodriguez [facility] 82y.o. year old female with Chronic obstructive pulmonary disease, unspecified [J44.9]        Social  History & Family Component    Living Situation:      LIVES:  Alone      Does patient have family/friends able to provide support  [x]  Yes  []  No     What type of home does patient live in? [x] one level   []   one level with basement      []   2 story    # of stairs to enter home:   1  # of stairs in home:   2  []  other:    Does patient have a yard? []  No   [x] Yes    If yes, can patient care for yard? []  Yes [x]  No   If no, does patient require assistance to care for yard? [x]  Yes   []  No   Pays for yard assistance. Transportation Resources:  Can patient drive themselves? [x]  Yes   []   No   If no, who is principle ? Does patient need referral for community resources (i.e., [employer], etc.)      [x]  No  []  Yes, which option?  Services/[place]/Visual Impaired: []  Yes   [x]   No   If yes, describe:    Occupation: [employer] at [employer]    Education/Grade Level: [employer]    Occupational exposures: Not that she is aware of. USP/disability date: 1998    Psychosocial Assessment Tools    COPD Assessment Test (CAT) Score: 12  Date of Test: 3/8/2019  Impact Level: medium    CAT measures COPD health status and quantifies the impact of COPD on patients life  and how this changes over time  Score and Impact Level: >30 = very high; > 20 = high; 10 - 20 = medium; <10 = low    PHQ-9 Score: 1  Date of Test: 3/8/2019    Scale includes 9 depression questions including 1 suicidal ideation and a 10th question asking about work, home, and socialization  Each item on the questionnaire is scored from 0-3 and this means that a person can score between 0 and 27 for depression. Results5-9 minimal symptoms, 10-14 minor depression, 15-19 major depression, moderately severe, >20 major depression, severe.      Assessment:    Limited participation in family and community activities [] No [x] Yes      Limited coping strategies [x] No [] Yes      Inappropriate over dependence on family and friends [x] No [] Yes      Stressors [x] No [] Yes  If yes, please describe:    Able to relax or perform leisure activities [] No [x] Yes   Describe leisure activities:   [employer], cards, watching tv, anna, sewing    Able to participate in former interests/hobbies: [] No [x] Yes   Describe hobbies: bingo, plays poker with friends    Able to do yard/household activities: [] No [x] Yes   Describe activities:   What is most difficult household chore?: vacuum,  What is easiest household chore?: cooking, dishes    Current mood assessed: [] No [x] Yes   Description of present mood: []  Worried  []   Sad []    []  Depressed []   Impatient  []   Frustrated  []   Anxious  []   Contented  []   Cheerful  [x]   Happy []   Other:    Needs referral to referring MD for follow up: [x] No [] Yes       Aspects of family & home situation that may impact progress in Pulmonary Rehabilitation:      Barrier(s) to learning:  None     [x]  Family & home situation will enhance Pulmonary Rehabilitation, patient is a good candidate     []  Family & home situation will hinder Pulmonary Rehabilitation but patient will be accepted into the program.     ______________________________________________________________________          PLAN:    See ITP for Plan and Interventions during the comprehensive pulmonary rehabilitation program

## 2019-03-08 NOTE — PROGRESS NOTES
Pulmonary ITP      [facility]   Referral Date 02/27/19   Significant Cardiovascular History --  [HTN]   Stages of change  Action   Oxygen Use Yes   Oxygen Type  Nasal Canula   Patients Liter Flow  2   Oxygen Education  Oxygen Safety / [place] Patience, Traveling with Oxygen, Types of Oxygen, Proper care of Oxygen Equipment, Emergency Oxygen useage   Oxygen Target Goals  Understand use of Oxygen, Discontinue Oxygen useage, Decrease Liters required, Use Oxygen as needed, Keep SA02 greater than 90%   Patient Stated Goals  None at this time   ITP Visit Type Initial Assessment   1st Date of Exercise 03/08/19   ITP Next Review Date 04/06/19   Visit #/Total Visits 1/18   Pulmonary ITP Exercise, Psychosocial, Tobacco, Nutrition, Education   Total Score 1   Stages of Change Action   Test Six minute walk test   Distance Walked in  ft   Distance Walked (ft) 400 ft   Peak HR 77   Peak /65   Peak RPE 15   Peak Mets 0   O2 Saturation 78  [Pt recovered to 96% with 3 minutes of rest and pursed lip br]   Stops 0   SPO2 Range 78-96   Mode Treadmill, Bike, Stepper, Ergometer   Frequency per week 2   Duration per session 60   Intensity  METS       1.0   Progression 0-3.0   O2 Sat (%) No data   O2 Flow Rate (L/min) 3 l/min  [Pt prescribed 2L increased by RT d/t pt previous SPO2 level.]   Symptoms with Exercise Leg pain, Other (comment)  [Desat with exertion. Pt. educated on pursed lip breathing.]   Target Heart Rate 97   Resistance Training Yes   Assisted Devices None   Resting /68   Peak /65   Is BP WDL?  Yes   Type none   Education Self pulse, Exercise safety, Signs/Symptoms to report, RPE scale, Equipment orientation, Warm up/Cool down, Home exercise plan, Energy conservation, O2 therapy, RPE/RPD scale, Purse lip/Abdominal breathing   Target Goal(s) Individual exercise RX, BP < 140/90 or < 130/80, if DM or CKD, Aerobic activity 30 + minutes/day  5 days/week, SA02>89%, Home activity Patient Stated Exercise Goals None at this time   Stages of Change Action   Interventions Currently under treatment for depression   Currently Taking Psychotropic Meds Yes   Medication Changes No   Education Advanced directives, Benefits of CPR completion, Coping techniques, Environmental triggers, Impact self care behaviors on health, Relaxation techniques, Signs/Symptoms of depression, Stress management, Tobacco dangers, Traveling with lung disease   Target Goal(s) Assess presence or absence of depression using a valid screening tool, Demonstrates appropriate interaction with others, Engages in self-care behaviors, Maximizes coping skills, Positive support group   Uses Stress Mgmt Techniques No   Stages of Change Maintenance   Tobacco Use Yes   Quit Greater than 6 month   Date Started 01/08/56   Date Quit 11/08/88   # Cigarette Smoked/Day 2/day   Smokeless Tobacco Use No   Smoking Cessation Referral No   Tobacco Adjunct No   Resource Information Provided No   Stages of Change Preparation   Diabetes No   Weight  62.2 kg (137 lb 3.2 oz)   Height  5' 3\" (1.6 m)   BMI 24.35   Waist Circumference  38   Nutrition Assessment Tool RYP   Rate Your Plate Total Score 45   Dietitian Consult No   Nurse/Patient Discussion Yes   Nutrition Class No   Diabetes Education Referral No   Lipid Clinic Referral No   Weight Management Referral No   Education Healthy eating, Nutrition and lung disease   Target Goals Waist size less than 40 inches males and less than 35 inches for females   Patient Stated Nutrition Goals none at this time   Learning Barrier Ready to learn   Knowledge Test Score 70   Education Schedule Given No   Education Med Compliance, Signs/Symptoms of Angina , Pulmonary Medications, Breaking the Dyspnea Cycle, Pulmonary A&P, lung/gas exchange, Heart/Lung association, Activity of Daily Living, Nebulizer Use, Preventing Infection, Bronchial Hygiene/controlled cough   Target Goals Correct demonstration of breathing techniques, Correct demonstration of MDI use and care, Correct demonstration/verbalization of 02 therapy   Patient Stated Education Goals Pt stated is interested in learning new breathing techniques

## 2019-03-08 NOTE — PROGRESS NOTES
COPD Assessment Tool (CAT)     0-5           I never cough/I cough all the time       Score:2      I have no phlegm in my chest/ My chest is completely full of phlegm  Score:2      My chest does not feel tight at all/ My chest feels very tight   Score:1      When I walk up a hill or stairs I am bot breathless/   When I walk up a hill or stairs I am very breathless    Score:3      I am not limited doing any activities at home/   I am very limited doing activities at home     Score:1      I am confident leaving my home despite my lung condition/  I am not at all confident leaving my home because of my lung condition Score:0      I sleep soundly/ I don't sleep because of my lung condition   Score:0      I have lots of energy/ I have no energy at all     Score:3                   Total: 12

## 2019-03-12 ENCOUNTER — HOSPITAL ENCOUNTER (OUTPATIENT)
Dept: PULMONOLOGY | Age: 84
Discharge: HOME | End: 2019-03-12
Payer: MEDICARE

## 2019-03-12 VITALS — WEIGHT: 141 LBS | BODY MASS INDEX: 24.98 KG/M2

## 2019-03-12 PROCEDURE — G0237 THERAPEUTIC PROCD STRG ENDUR: HCPCS

## 2019-03-12 NOTE — PROGRESS NOTES
[address]     Visit # 2/18         Date: 3/12/19         Time: 1330         Modality    SpO2 HR RPE RPD   []   Treadmill     Speed: Incline: # Mins:       [x]  Arm Ergometer      [place]:  15  # Mins:  92 10 00 75 2   [x]  NuStep     Workload  2  # Mins:  20 89 82 11 0   []  Recumbent Bike     Level: RPM: # Mins:       []  Airdyne Bike    RPM: # Mins:       []  Rower       # Mins:       [x]  Free Weights     LBS:  1 Reps/Sets  5/5 #Mins:  05 29 96 12 0   One-one-One Activity  Breathing Re-training   Start:  0910 Stop:  1400 #Mins:  [address] Amandeep Hernandez, RT Mayra Petersen, RT Mayra Petersen, RT   Mayra Petersen, RT Mayra Petersen, RT

## 2019-03-14 ENCOUNTER — APPOINTMENT (OUTPATIENT)
Dept: PULMONOLOGY | Age: 84
End: 2019-03-14
Payer: MEDICARE

## 2019-03-21 ENCOUNTER — HOSPITAL ENCOUNTER (OUTPATIENT)
Dept: PULMONOLOGY | Age: 84
Discharge: HOME | End: 2019-03-21
Payer: MEDICARE

## 2019-03-21 VITALS — WEIGHT: 141 LBS | BODY MASS INDEX: 24.98 KG/M2

## 2019-03-21 PROCEDURE — G0237 THERAPEUTIC PROCD STRG ENDUR: HCPCS

## 2019-03-21 NOTE — PROGRESS NOTES
[address] Visit # 3/18 Date: 3/21/19 Time: 1330 Modality    SpO2 HR RPE RPD []   Treadmill Speed: Incline: # Mins:      
[x]  Arm Ergometer [place]: 
10  # Mins: 
56 00 98 22 2 [x]  NuStep Workload 3  # Mins: 
20 96 68 11 2 []  Recumbent Bike Level: RPM: # Mins:      
[]  Airdyne Bike RPM: # Mins:      
[x]  Rest/Monitoring # Mins: 
10 ----- ----- ----- -----  
[]  Free Weights LBS: Reps/Sets #Mins:      
One-one-One Activity Breathing Re-Training Start: 
1330 Stop: 
1400 #Mins: 
30  Hurshel Nim, RT Hurshel Nim, RT Hurshel Nim, RT   Hurshel Nim, RT Hurshel Nim, RT

## 2019-03-21 NOTE — PROGRESS NOTES
Patient name: Dario Nick : 1935 Visits from Start of Care: 3    Missed Visits: 1 Reporting Period: 3/8/19 to 3/21/19 Subjective Reports: Patient reports that she is feeling better today. Goals Comments 1. Vacuum w/o SOB [] met [x] not met 
[] progressing 2. Work in the flower bed w/o SOB [] met [x] not met 
[] progressing 3. [] met                 
[] not met 
[] progressing 4. [] met                 
[] not met 
[] progressing Key functional changes: N/A Problems/ barriers to goal attainment: Lung Disease Assessment / Recommendations:Continue therapy Treatment Plan: Therapeutic exercise and Patient education Updated Goals to be accomplished in 18 treatments: 
 
Frequency / Duration: Patient to be seen 2 times per week for 9 weeks: RT Neo 3/21/2019 3:46 PM

## 2019-03-26 ENCOUNTER — HOSPITAL ENCOUNTER (OUTPATIENT)
Dept: PULMONOLOGY | Age: 84
Discharge: HOME | End: 2019-03-26
Payer: MEDICARE

## 2019-03-26 PROCEDURE — G0237 THERAPEUTIC PROCD STRG ENDUR: HCPCS

## 2019-03-26 PROCEDURE — G0239 OTH RESP PROC, GROUP: HCPCS

## 2019-03-26 NOTE — PROGRESS NOTES
[address] Visit # 4/18 Date: 3/26/19 Time: 1300 Modality    SpO2 HR RPE RPD [x]   Treadmill Speed: 
0.7 Incline: 
0 # Mins: 
3 77 117 15 4 [x]  Arm Ergometer [place]: 
10  # Mins: 
28 08 94 59 1 [x]  NuStep Workload 3  # Mins: 
20 87 67 11 1 []  Recumbent Bike Level: RPM: # Mins:      
[]  Airdyne Bike RPM: # Mins:      
[x]  Rest/PLB/Monitor # Mins: 
10 ----- ----- ----- -----  
[]  Free Weights LBS: Reps/Sets #Mins:      
One-one-One Activity Breathing Re-training Start: 
1330 Stop: 
1400 #Mins: 
30  Tennie Stall, RT Tennie Stall, RT Tennie Stall, RT   Tennie Stall, RT Tennie Stall, RT

## 2019-03-28 ENCOUNTER — HOSPITAL ENCOUNTER (OUTPATIENT)
Dept: PULMONOLOGY | Age: 84
Discharge: HOME | End: 2019-03-28
Payer: MEDICARE

## 2019-03-28 VITALS — WEIGHT: 140 LBS | BODY MASS INDEX: 24.8 KG/M2

## 2019-03-28 PROCEDURE — G0237 THERAPEUTIC PROCD STRG ENDUR: HCPCS

## 2019-03-28 NOTE — PROGRESS NOTES
[address] Visit # 5/18 Date: 3/28/19 Time: 1325 Modality    SpO2 HR RPE RPD []   Treadmill Speed: Incline: # Mins:      
[x]  Arm Ergometer [place]: 
10  # Mins: 
25 17 45 75 2 [x]  NuStep Workload 3  # Mins: 
20 92 74 13 2 []  Recumbent Bike Level: RPM: # Mins:      
[]  Airdyne Bike RPM: # Mins:      
[x]  Rest/Monitoring/PLB # Mins: 
15 ----- ----- ----- -----  
[]  Free Weights LBS: Reps/Sets #Mins:      
One-one-One Activity Patient was only one treated by RT during this time. Start: 
1325 Stop: 
9748 #Mins: 
54  Arlyce Marus, RT Arlyce Marus, RT Arlyce Marus, RT   Arlyce Marus, RT Arlyce Marus, RT

## 2019-04-02 ENCOUNTER — HOSPITAL ENCOUNTER (OUTPATIENT)
Dept: PULMONOLOGY | Age: 84
Discharge: HOME | End: 2019-04-02
Payer: MEDICARE

## 2019-04-02 VITALS — WEIGHT: 138 LBS | BODY MASS INDEX: 24.45 KG/M2

## 2019-04-02 PROCEDURE — G0237 THERAPEUTIC PROCD STRG ENDUR: HCPCS

## 2019-04-02 NOTE — PROGRESS NOTES
[address] Visit # 6/18 Date: 4/2/19 Time: 1330 Modality    SpO2 HR RPE RPD [x]   Treadmill Speed: 
0.4 Incline: 
0 # Mins: 
11 97 67 12 0 [x]  Arm Ergometer [place]: 
25  # Mins: 
27 24/22 30 21 8 [x]  NuStep Workload 3  # Mins: 
21 94 63 12 0 []  Recumbent Bike Level: RPM: # Mins:      
[]  Airdyne Bike RPM: # Mins:      
[]  Rower # Mins:      
[]  Free Weights LBS: Reps/Sets #Mins:      
One-one-One Activity Pt was only pt tx in clinic at this time. Start: 
1330 Stop: 
5248 #Mins: 
48  Jerelene Shillings, RT Jerelene Shillings, RT Jerelene Shillings, RT   Jerelene Shillings, RT Jerelene Shillings, RT

## 2019-04-03 NOTE — PROGRESS NOTES
Pulmonary ITP  
 
 RESP 1:1 from 4/2/2019 in [place] [address] Treatment Diagnosis Referral Date 02/27/19 Significant Cardiovascular History --  [HTN] Oxygen Saturation / Titration Stages of change  Maintenance OXYGEN INTERVENTION Oxygen Use Yes Oxygen Type  Continuous Patients Liter Flow  3 Oxygen Education  Oxygen Safety / [place] Judith with Oxygen, Types of Oxygen, Proper care of Oxygen Equipment, Emergency Oxygen useage Oxygen Target Goals  Understand use of Oxygen, Discontinue Oxygen useage, Decrease Liters required, Use Oxygen as needed, Keep SA02 greater than 90% Patient Stated Goals  none Individual Treatment Plan ITP Visit Type Initial Assessment 1st Date of Exercise 03/08/19 ITP Next Review Date 05/03/19 Visit #/Total Visits 6/18 Pulmonary ITP Exercise, Psychosocial, Tobacco, Nutrition, Education Exercise Stages of Change Action Test Six minute walk test  
Distance Walked in  ft Peak HR 77 Peak /65 Peak RPE 15 Peak Mets 0 Stops 0  
SPO2 Range 78-96 Exercise Prescription Mode Treadmill, Bike, Stepper, Ergometer Frequency per week 2 Duration per session 60 Intensity  METS       1.0 Progression 0-3.0 Symptoms with Exercise Leg pain, Other (comment)  [Desat with exertion. Pt. educated on pursed lip breathing.] Target Heart Rate 97 Resistance Training Yes Assisted Devices None Exercise Blood Pressures Resting /52 Peak /58 Is BP WDL? Yes Exercise Intervention Type none Exercise Education Education Self pulse, Exercise safety, Signs/Symptoms to report, Blood pressure medications, RPE scale, Equipment orientation, Warm up/Cool down, Understand blood pressure, Home exercise plan, Energy conservation, O2 therapy, RPE/RPD scale, Purse lip/Abdominal breathing Exercise Target Goal  
Target Goal(s) Individual exercise RX, BP < 140/90 or < 130/80, if DM or CKD, Aerobic activity 30 + minutes/day  5 days/week, SA02>89%, Home activity Patient Stated Exercise Goals None at this time Psychosocial  
Stages of Change Maintenance Psychosocial Intervention Interventions No intervention indicated Medication Changes No  
Psychosocial Education Education Advanced directives, Benefits of CPR completion, Coping techniques, Environmental triggers, Impact self care behaviors on health, Relaxation techniques, Signs/Symptoms of depression, Stress management, Tobacco dangers, Traveling with lung disease Psychosocial Target Goals Target Goal(s) Assess presence or absence of depression using a valid screening tool, Demonstrates appropriate interaction with others, Engages in self-care behaviors, Maximizes coping skills, Positive support group Uses Stress Mgmt Techniques No  
Tobacco  
Stages of Change Maintenance Tobacco Use Yes Quit Greater than 6 month Date Started 01/08/56 Date Quit 11/08/88 # Cigarette Smoked/Day 2/day Smokeless Tobacco Use No  
Tobacco Cessation Intervention Smoking Cessation Referral No  
Tobacco Adjunct No  
Tobacco Education Target Goal  
Nutrition Stages of Change Preparation Diabetes No  
Lipids Weight Management Weight  62.2 kg (137 lb 3.2 oz) Height  5' 3\" (1.6 m) Waist Circumference  38 Nutrition Assessment Tool RYP Nutrition Intervention Dietitian Consult No  
Nurse/Patient Discussion Yes Nutrition Class No  
Diabetes Education Referral No  
Lipid Clinic Referral No  
Weight Management Referral No  
Nutrition Education Education Healthy eating, Nutrition and lung disease Nutrition Target Goals Target Goals Waist size less than 40 inches males and less than 35 inches for females Patient Stated Nutrition Goals none at this time Education Learning Barrier Ready to learn Knowledge Test Score 70 Education Intervention Education Schedule Given No  
Patient Education Education Pulmonary Medications, Breaking the Dyspnea Cycle, Pulmonary A&P, lung/gas exchange, Heart/Lung association, Activity of Daily Living, Preventing Infection, Bronchial Hygiene/controlled cough Education Target Goals Target Goals Correct demonstration of breathing techniques, Correct demonstration of MDI use and care, Correct demonstration/verbalization of 02 therapy Patient Stated Education Goals Pt stated is interested in learning new breathing techniques Physician Response

## 2019-04-04 ENCOUNTER — HOSPITAL ENCOUNTER (OUTPATIENT)
Dept: PULMONOLOGY | Age: 84
Discharge: HOME | End: 2019-04-04
Payer: MEDICARE

## 2019-04-04 VITALS — WEIGHT: 140.2 LBS | BODY MASS INDEX: 24.84 KG/M2

## 2019-04-04 PROCEDURE — G0239 OTH RESP PROC, GROUP: HCPCS

## 2019-04-04 PROCEDURE — G0237 THERAPEUTIC PROCD STRG ENDUR: HCPCS

## 2019-04-04 NOTE — PROGRESS NOTES
[address] Visit # 7/18 Date: 4/4/19 Time: 1330 Modality    SpO2 HR RPE RPD []   Treadmill Speed: Incline: # Mins:      
[x]  Arm Ergometer Mccauley:10  # Mins: 
20 86 70 13 2 [x]  NuStep Workload 4  # Mins: 
20 94 70 12 2 []  Recumbent Bike Level: RPM: # Mins:      
[]  Airdyne Bike RPM: # Mins:      
[]  Rower # Mins:      
[x]  Free Weights LBS: 
1 Reps/Sets 10/4 #Mins: 
10 95 72 12 2 One-one-One Activity Patient was only one treated by Rt during this time. Start: 
1400 Stop: 
1430 #Mins: 
30  Osmar Helm, RT Osmar Helm, RT Osmar Helm, RT   Osmar Helm, RT Osmar Helm, RT

## 2019-04-04 NOTE — PROGRESS NOTES
Patient name: Moncho Reyes : 1935 Visits from Start of Care: 7    Missed Visits: 1 Reporting Period: 3/8/19 to 19 Subjective Reports: Patient reports that she has been fighting a cold. Goals Comments 1. Vacuum w/o SOB [] met [x] not met 
[] progressing 2. Make the bed with less SOB [] met [x] not met 
[] progressing 3. Feel stronger and be able to walk further by  for [facility] trip. [] met                 
[] not met [x] progressing 4. [] met                 
[] not met 
[] progressing Key functional changes: patient reports no functional changes. Patient advised to start incorporating home exercise in addition to Pulmonary Rehab. Problems/ barriers to goal attainment: Hip pain Assessment / Recommendations:Continue therapy Treatment Plan: Therapeutic exercise and Patient education Updated Goals to be accomplished in 18 treatments: 
 
Frequency / Duration: Patient to be seen 2 times per week for 9 weeks: RT Micki 2019 3:15 PM

## 2019-04-09 ENCOUNTER — HOSPITAL ENCOUNTER (OUTPATIENT)
Dept: PULMONOLOGY | Age: 84
Discharge: HOME | End: 2019-04-09
Payer: MEDICARE

## 2019-04-09 VITALS — WEIGHT: 140 LBS | BODY MASS INDEX: 24.8 KG/M2

## 2019-04-09 PROCEDURE — G0237 THERAPEUTIC PROCD STRG ENDUR: HCPCS

## 2019-04-09 NOTE — PROGRESS NOTES
[address] Visit # Date:         
Time:         
Modality    SpO2 HR RPE RPD []   Treadmill Speed: Incline: # Mins:      
[x]  Arm Ergometer Mccauley: 
10  # Mins: 
07 90 69 59 2 [x]  NuStep Workload 5  # Mins: 
25 90 62 13 2 []  Recumbent Bike Level: RPM: # Mins:      
[]  Airdyne Bike RPM: # Mins:      
[x]  Rest/Monitor/PLB # Mins: 
10      
[]  Free Weights LBS: Reps/Sets #Mins:      
One-one-One Activity Focus on PLB technique Start: 
1330 Stop: 
1425 #Mins: 
55  Rossie Cheadle, RT Rossie Cheadle, RT Rossie Cheadle, RT   Rossie Cheadle, RT Rossie Cheadle, RT

## 2019-04-11 ENCOUNTER — APPOINTMENT (OUTPATIENT)
Dept: PULMONOLOGY | Age: 84
End: 2019-04-11
Payer: MEDICARE

## 2019-04-16 ENCOUNTER — HOSPITAL ENCOUNTER (OUTPATIENT)
Dept: PULMONOLOGY | Age: 84
Discharge: HOME | End: 2019-04-16
Payer: MEDICARE

## 2019-04-16 VITALS — BODY MASS INDEX: 24.84 KG/M2 | WEIGHT: 140.2 LBS

## 2019-04-16 PROCEDURE — G0237 THERAPEUTIC PROCD STRG ENDUR: HCPCS

## 2019-04-16 PROCEDURE — G0239 OTH RESP PROC, GROUP: HCPCS

## 2019-04-23 ENCOUNTER — HOSPITAL ENCOUNTER (OUTPATIENT)
Dept: PULMONOLOGY | Age: 84
Discharge: HOME | End: 2019-04-23
Payer: MEDICARE

## 2019-04-23 VITALS — WEIGHT: 142 LBS | BODY MASS INDEX: 25.15 KG/M2

## 2019-04-23 PROCEDURE — G0237 THERAPEUTIC PROCD STRG ENDUR: HCPCS

## 2019-04-23 NOTE — PROGRESS NOTES
[address] Visit # 10/18 Date: 4/23/19 Time: 1330 Modality    SpO2 HR RPE RPD []   Treadmill Speed: Incline: # Mins:      
[x]  Arm Ergometer Mccauley: 
15  # Mins: 
45 98 50 87 6 [x]  NuStep Workload 5  # Mins: 
20 90 89 13 2 []  Recumbent Bike Level: RPM: # Mins:      
[]  Airdyne Bike RPM: # Mins:      
[x]  Rest/Monitor # Mins: 
15 ----- ----- ----- -----  
[]  Free Weights LBS: Reps/Sets #Mins:      
One-one-One Activity  Start: Stop: #Mins:      
         
         
         
         
 
 
 
 Amado Beasley, RT Amado Beasley, RT Amado Beasley, RT   Amado Beasley, RT Amado Beasley, [place]

## 2019-04-24 NOTE — PROGRESS NOTES
Pulmonary ITP  
 
 RESP 1:1 from 4/23/2019 in [place] [address] Treatment Diagnosis Referral Date 02/27/19 Significant Cardiovascular History --  [HTN] Oxygen Saturation / Titration Stages of change  Maintenance OXYGEN INTERVENTION Oxygen Use Yes Oxygen Type  Nasal Canula Patients Liter Flow  3 Oxygen Education  Oxygen Safety / [place] with Oxygen, Types of Oxygen, Proper care of Oxygen Equipment, Emergency Oxygen useage Oxygen Target Goals  Understand use of Oxygen, Keep SA02 greater than 90% Individual Treatment Plan ITP Visit Type Re-Assessment 1st Date of Exercise 03/08/19 ITP Next Review Date 05/24/19 Visit #/Total Visits 10/18 Pulmonary ITP Exercise, Psychosocial, Tobacco, Nutrition, Education Exercise Stages of Change Action Test Six minute walk test  
Distance Walked in  ft Peak HR 77 Peak /65 Peak RPE 15 Peak Mets 0 Stops 0  
SPO2 Range 78-96 Exercise Prescription Mode Stepper, Ergometer, Other (comment)  [free weights, sit-to-stands, timed ambulation] Frequency per week 2 Duration per session 60 Intensity  METS       1.5 Progression to 3.0 Symptoms with Exercise Leg pain, Other (comment)  [Desat with exertion. Pt. educated on pursed lip breathing.] Target Heart Rate 97 Resistance Training Yes Assisted Devices None Exercise Blood Pressures Resting /60 Peak /62 Is BP WDL? Yes Exercise Intervention Type none Comments patient encouraged to incorporate home exercise Exercise Education Education Self pulse, Exercise safety, Signs/Symptoms to report, RPE scale, Equipment orientation, Warm up/Cool down, Home exercise plan, Energy conservation, O2 therapy, RPE/RPD scale, Purse lip/Abdominal breathing Exercise Target Goal  
Target Goal(s) Individual exercise RX, BP < 140/90 or < 130/80, if DM or CKD, Aerobic activity 30 + minutes/day  5 days/week, SA02>89%, Home activity Psychosocial  
 Stages of Change Maintenance Psychosocial Intervention Interventions No intervention indicated Medication Changes No  
Psychosocial Education Education Advanced directives, Benefits of CPR completion, Coping techniques, Environmental triggers, Impact self care behaviors on health, Relaxation techniques, Signs/Symptoms of depression, Stress management, Tobacco dangers, Traveling with lung disease Psychosocial Target Goals Target Goal(s) Assess presence or absence of depression using a valid screening tool, Demonstrates appropriate interaction with others, Engages in self-care behaviors, Maximizes coping skills, Positive support group Uses Stress Mgmt Techniques No  
Tobacco  
Stages of Change Maintenance Tobacco Use Yes Quit Greater than 6 month Date Started 01/08/56 Date Quit 11/08/88 # Cigarette Smoked/Day 2/day Smokeless Tobacco Use No  
Tobacco Cessation Intervention Tobacco Adjunct No  
Tobacco Education Target Goal  
Nutrition Stages of Change Maintenance Diabetes No  
Lipids Weight Management Weight  64.4 kg (142 lb) Height  5' 3\" (1.6 m) BMI 25.21 Waist Circumference  38 Nutrition Assessment Tool RYP Nutrition Intervention Dietitian Consult No  
Nurse/Patient Discussion Yes Nutrition Class No  
Diabetes Education Referral No  
Lipid Clinic Referral No  
Weight Management Referral No  
Nutrition Education Education Healthy eating, Nutrition and lung disease Nutrition Target Goals Target Goals Waist size less than 40 inches males and less than 35 inches for females Patient Stated Nutrition Goals none at this time Education Learning Barrier Ready to learn Knowledge Test Score 70 Education Intervention Education Schedule Given No  
Patient Education Education Med Compliance, Pulmonary Medications, Breaking the Dyspnea Cycle, Pulmonary A&P, lung/gas exchange, Heart/Lung association, Activity of Daily Living, Nebulizer Use, Preventing Infection, Bronchial Hygiene/controlled cough Education Target Goals Target Goals Correct demonstration of breathing techniques, Correct demonstration of MDI use and care, Correct demonstration/verbalization of 02 therapy Patient Stated Education Goals Pt stated is interested in learning new breathing techniques Physician Response

## 2019-04-26 ENCOUNTER — APPOINTMENT (OUTPATIENT)
Dept: PULMONOLOGY | Age: 84
End: 2019-04-26
Payer: MEDICARE

## 2019-04-30 ENCOUNTER — HOSPITAL ENCOUNTER (OUTPATIENT)
Dept: PULMONOLOGY | Age: 84
Discharge: HOME | End: 2019-04-30
Payer: MEDICARE

## 2019-04-30 VITALS — WEIGHT: 142 LBS | BODY MASS INDEX: 25.15 KG/M2

## 2019-04-30 PROCEDURE — G0237 THERAPEUTIC PROCD STRG ENDUR: HCPCS

## 2019-04-30 NOTE — PROGRESS NOTES
[address] Visit # 11/18 Date: 4/30/19 Time: 1330 Modality    SpO2 HR RPE RPD [x]   Treadmill Speed: 
0.7 Incline: 
0 # Mins: 
8 79 74 13 0 [x]  Arm Ergometer Mccauley: 
15  # Mins: 
44 92 68 12 0 [x]  NuStep Workload 3  # Mins: 
20 84 71 13 2 []  Recumbent Bike Level: RPM: # Mins:      
[]  Airdyne Bike RPM: # Mins:      
[]  Rower # Mins:      
[]  Free Weights LBS: Reps/Sets #Mins:      
One-one-One Activity Pt was only pt seen at this time. Start: 
1330 Stop: 
8163 #Mins: 
37  Arvid Bubba, RT Arvid [facility], RT Arvid Bubba, RT   Arvid [facility], RT Arvid [facility], RT

## 2019-05-02 ENCOUNTER — APPOINTMENT (OUTPATIENT)
Dept: PULMONOLOGY | Age: 84
End: 2019-05-02
Payer: MEDICARE

## 2019-05-07 ENCOUNTER — APPOINTMENT (OUTPATIENT)
Dept: PULMONOLOGY | Age: 84
End: 2019-05-07
Payer: MEDICARE

## 2019-05-09 ENCOUNTER — HOSPITAL ENCOUNTER (OUTPATIENT)
Dept: PULMONOLOGY | Age: 84
Discharge: HOME | End: 2019-05-09
Payer: MEDICARE

## 2019-05-09 VITALS — BODY MASS INDEX: 24.27 KG/M2 | WEIGHT: 137 LBS

## 2019-05-09 PROCEDURE — G0237 THERAPEUTIC PROCD STRG ENDUR: HCPCS

## 2019-05-09 NOTE — PROGRESS NOTES
[address] Visit # 12/18 Date: 5/9/19 Time: 1320 Modality    SpO2 HR RPE RPD []   Treadmill Speed: Incline: # Mins:      
[x]  Arm Ergometer Mccauley: 
5  # Mins: 
20 94 91 13 2 [x]  NuStep Workload 3  # Mins: 
23 92 81 12 2 []  Recumbent Bike Level: RPM: # Mins:      
[]  Airdyne Bike RPM: # Mins:      
[x]  Rest/Monitor # Mins: 
10 ----- ----- ----- -----  
[]  Free Weights LBS: Reps/Sets #Mins:      
One-one-One Activity  Start: Stop: #Mins:      
         
         
         
         
 
 
 
 Heloise Millet, RT Heloise Millet, RT Heloise Millet, RT   Heloise Millet, RT Heloise Millet, [place]

## 2019-05-14 ENCOUNTER — HOSPITAL ENCOUNTER (OUTPATIENT)
Dept: PULMONOLOGY | Age: 84
Discharge: HOME | End: 2019-05-14
Payer: MEDICARE

## 2019-05-14 VITALS — WEIGHT: 135 LBS | BODY MASS INDEX: 23.91 KG/M2

## 2019-05-14 PROCEDURE — G0237 THERAPEUTIC PROCD STRG ENDUR: HCPCS

## 2019-05-14 NOTE — PROGRESS NOTES
[address] Visit # 13/18 Date: 5/14/19 Time: 1330 Modality    SpO2 HR RPE RPD []   Treadmill Speed: Incline: # Mins:      
[x]  Arm Ergometer Mccauley: 
10  # Mins: 
08 77 92 96 5 [x]  NuStep Workload 4  # Mins: 
15 91 94 12 2 []  Recumbent Bike Level: RPM: # Mins:      
[]  Airdyne Bike RPM: # Mins:      
[x]  Rest/Monitor/PLB # Mins: 
10 ----- ---- ---- ----  
[]  Free Weights LBS: Reps/Sets #Mins:      
One-one-One Activity  Start: Stop: #Mins:      
         
         
         
         
 
 
 
 Dinah Olson, RT Dinah Olson, RT Dinah Olson, RT   Dinah Olson, RT Dinah Olson, [place]

## 2019-05-16 ENCOUNTER — HOSPITAL ENCOUNTER (OUTPATIENT)
Dept: PULMONOLOGY | Age: 84
Discharge: HOME | End: 2019-05-16
Payer: MEDICARE

## 2019-05-16 VITALS — BODY MASS INDEX: 24.27 KG/M2 | WEIGHT: 137 LBS

## 2019-05-16 PROCEDURE — G0237 THERAPEUTIC PROCD STRG ENDUR: HCPCS

## 2019-05-16 NOTE — PROGRESS NOTES
[address] Visit # 14/        
Date: 5/16/19 Time: 1330 Modality    SpO2 HR RPE RPD []   Treadmill Speed: Incline: # Mins:      
[x]  Arm Ergometer Mccauley: 
10  # Mins: 
57 65 86 27 2 [x]  NuStep Workload 5  # Mins: 
25 93 71 14 3 []  Recumbent Bike Level: RPM: # Mins:      
[]  Airdyne Bike RPM: # Mins:      
[x]  Rest/Monitor # Mins: 
10 ----- ----- ----- -----  
[]  Free Weights LBS: Reps/Sets #Mins:      
Patient was only one treated by RT during this time. Start: 
1330 Stop: 
4813 #Mins: 
54  Vernelle Juniper, RT Vernelle Juniper, RT Vernelle Juniper, RT   Vernelle Juniper, RT Vernelle Juniper, RT

## 2019-05-20 NOTE — PROGRESS NOTES
Pulmonary ITP  
 
 RESP 1:1 from 5/16/2019 in [facility] 82 Treatment Diagnosis Referral Date 02/27/19 Significant Cardiovascular History --  [HTN] Oxygen Saturation / Titration Stages of change  Maintenance OXYGEN INTERVENTION Oxygen Use Yes Oxygen Type  Nasal Canula Patients Liter Flow  3 Nurse / Patient Discussion  --  [learn to recognize s/s of fluid overload] Oxygen Education  Oxygen Safety / [place] with Oxygen, Types of Oxygen, Proper care of Oxygen Equipment, Emergency Oxygen useage Oxygen Target Goals  Understand use of Oxygen, Decrease Liters required, Keep SA02 greater than 90% Individual Treatment Plan ITP Visit Type Re-Assessment 1st Date of Exercise 03/08/19 ITP Next Review Date 06/19/19 Visit #/Total Visits 14/18 Pulmonary ITP Exercise, Psychosocial, Tobacco, Nutrition, Education Exercise Stages of Change Action Test Six minute walk test  
Distance Walked in  ft Peak HR 77 Peak /65 Peak RPE 15 Peak Mets 0 Stops 0  
SPO2 Range 78-96 Exercise Prescription Mode Stepper, Ergometer, Other (comment)  [free weights, sit-to-stands, timed ambulation] Frequency per week 2 Duration per session 60 Intensity  METS       2.0 Progression to 3.5 O2 Flow Rate (L/min) 3 l/min Symptoms with Exercise Leg pain, Other (comment)  [Desat with exertion. Pt. educated on pursed lip breathing.] Target Heart Rate 97 Resistance Training Yes Assisted Devices None Exercise Blood Pressures Resting /60 Peak /58 Is BP WDL? Yes Exercise Intervention Type none Comments patient encouraged to incorporate home exercise Exercise Education Education Self pulse, Exercise safety, Signs/Symptoms to report, RPE scale, Equipment orientation, Warm up/Cool down, Home exercise plan, Energy conservation, O2 therapy, RPE/RPD scale, Purse lip/Abdominal breathing Exercise Target Goal  
 Target Goal(s) Individual exercise RX, BP < 140/90 or < 130/80, if DM or CKD, Aerobic activity 30 + minutes/day  5 days/week, SA02>89%, Home activity Patient Stated Exercise Goals None at this time Psychosocial  
Stages of Change Maintenance Psychosocial Intervention Interventions No intervention indicated Medication Changes No  
Psychosocial Education Education Advanced directives, Benefits of CPR completion, Coping techniques, Environmental triggers, Impact self care behaviors on health, Relaxation techniques, Signs/Symptoms of depression, Stress management, Tobacco dangers, Traveling with lung disease Psychosocial Target Goals Target Goal(s) Assess presence or absence of depression using a valid screening tool, Demonstrates appropriate interaction with others, Engages in self-care behaviors, Maximizes coping skills, Positive support group Uses Stress Mgmt Techniques No  
Tobacco  
Stages of Change Maintenance Tobacco Use Yes Quit Greater than 6 month Date Started 01/08/56 Date Quit 11/08/88 # Cigarette Smoked/Day 2/day Smokeless Tobacco Use No  
Tobacco Cessation Intervention Tobacco Adjunct No  
Tobacco Education Target Goal  
Nutrition Stages of Change Maintenance Diabetes No  
Lipids Weight Management Weight  62.1 kg (137 lb) Height  5' 3\" (1.6 m) BMI 24.32 Waist Circumference  38 Nutrition Assessment Tool RYP Nutrition Intervention Dietitian Consult No  
Nurse/Patient Discussion Yes Nutrition Class No  
Diabetes Education Referral No  
Lipid Clinic Referral No  
Weight Management Referral No  
Nutrition Education Education Healthy eating, Nutrition and lung disease Nutrition Target Goals Target Goals Waist size less than 40 inches males and less than 35 inches for females Patient Stated Nutrition Goals none at this time Education Learning Barrier Ready to learn Knowledge Test Score 70 Education Intervention Education Schedule Given No  
 Patient Education Education Med Compliance, Sexuality, Pulmonary Medications, Breaking the Dyspnea Cycle, Pulmonary A&P, lung/gas exchange, Heart/Lung association, Activity of Daily Living, Nebulizer Use, Preventing Infection, Bronchial Hygiene/controlled cough Education Target Goals Target Goals Correct demonstration of breathing techniques, Correct demonstration of MDI use and care, Correct demonstration/verbalization of 02 therapy Patient Stated Education Goals Pt stated is interested in learning new breathing techniques Physician Response

## 2019-05-21 ENCOUNTER — HOSPITAL ENCOUNTER (OUTPATIENT)
Dept: PULMONOLOGY | Age: 84
Discharge: HOME | End: 2019-05-21
Payer: MEDICARE

## 2019-05-21 ENCOUNTER — HOSPITAL ENCOUNTER (OUTPATIENT)
Dept: GENERAL RADIOLOGY | Age: 84
Discharge: HOME | End: 2019-05-21
Payer: MEDICARE

## 2019-05-21 VITALS — BODY MASS INDEX: 24.27 KG/M2 | WEIGHT: 137 LBS

## 2019-05-21 DIAGNOSIS — J44.9 COPD (CHRONIC OBSTRUCTIVE PULMONARY DISEASE) (HCC): ICD-10-CM

## 2019-05-21 PROCEDURE — 71046 X-RAY EXAM CHEST 2 VIEWS: CPT

## 2019-05-21 PROCEDURE — G0237 THERAPEUTIC PROCD STRG ENDUR: HCPCS

## 2019-05-21 NOTE — PROGRESS NOTES
[address]     Visit # 15/         Date: 5/21/19         Time: 1330         Modality    SpO2 HR RPE RPD   []   Treadmill     Speed: Incline: # Mins:       [x]  Arm Ergometer      Mccauley:  10  # Mins:  20 94 79 13 1   [x]  NuStep     Workload  5  # Mins:  20 91 78 13 1   []  Recumbent Bike     Level: RPM: # Mins:       []  Airdyne Bike    RPM: # Mins:       [x]  Rest/Monitor       # Mins:  10 93 78 6 0   []  Free Weights     LBS: Reps/Sets #Mins:       One-one-One Activity  Patient was only one treated by RT during this time.     Start:  1330 Stop:  0191 #Mins:  [address] Valentino Massa, [address], [address], [address], [address], RT

## 2019-05-22 ENCOUNTER — OFFICE VISIT (OUTPATIENT)
Dept: CARDIOLOGY CLINIC | Age: 84
End: 2019-05-22

## 2019-05-22 VITALS
WEIGHT: 137 LBS | HEART RATE: 66 BPM | DIASTOLIC BLOOD PRESSURE: 54 MMHG | BODY MASS INDEX: 24.27 KG/M2 | HEIGHT: 63 IN | SYSTOLIC BLOOD PRESSURE: 125 MMHG

## 2019-05-22 DIAGNOSIS — J44.9 CHRONIC OBSTRUCTIVE PULMONARY DISEASE, UNSPECIFIED COPD TYPE (HCC): ICD-10-CM

## 2019-05-22 DIAGNOSIS — I27.20 PULMONARY HTN (HCC): ICD-10-CM

## 2019-05-22 DIAGNOSIS — E78.5 HYPERLIPIDEMIA, UNSPECIFIED HYPERLIPIDEMIA TYPE: ICD-10-CM

## 2019-05-22 DIAGNOSIS — I50.32 CHRONIC DIASTOLIC CONGESTIVE HEART FAILURE (HCC): Primary | ICD-10-CM

## 2019-05-22 DIAGNOSIS — I10 ESSENTIAL HYPERTENSION WITH GOAL BLOOD PRESSURE LESS THAN 140/90: ICD-10-CM

## 2019-05-22 NOTE — PROGRESS NOTES
1. Have you been to the ER, urgent care clinic since your last visit? Hospitalized since your last visit?     no    2. Have you seen or consulted any other health care providers outside of the [address] [place] [address] since your last visit? Include any pap smears or colon screening.       No

## 2019-05-22 NOTE — PROGRESS NOTES
HISTORY OF PRESENT ILLNESS  Blanche Guzman is a 82 y.o. female. Patient with chf,copd,htn,pulm htn  Here for follow up    CHF   The history is provided by the patient. This is a chronic problem. The problem occurs constantly. The problem has not changed since onset. Associated symptoms include shortness of breath. Pertinent negatives include no chest pain, no abdominal pain and no headaches. The symptoms are aggravated by exertion. Nothing relieves the symptoms. Shortness of Breath   The history is provided by the patient. This is a recurrent problem. The problem occurs frequently. The problem has not changed since onset. Associated symptoms include leg swelling. Pertinent negatives include no fever, no headaches, no cough, no sputum production, no hemoptysis, no wheezing, no PND, no orthopnea, no chest pain, no vomiting, no abdominal pain, no rash and no claudication. Precipitated by: activity. Review of Systems   Constitutional: Negative for chills and fever. HENT: Negative for nosebleeds. Eyes: Negative for blurred vision and double vision. Respiratory: Positive for shortness of breath. Negative for cough, hemoptysis, sputum production and wheezing. Cardiovascular: Positive for leg swelling. Negative for chest pain, palpitations, orthopnea, claudication and PND. Gastrointestinal: Negative for abdominal pain, heartburn, nausea and vomiting. Musculoskeletal: Negative for myalgias. Skin: Negative for rash. Neurological: Negative for dizziness, weakness and headaches. Endo/Heme/Allergies: Does not bruise/bleed easily.      Family History   Problem Relation Age of Onset    Heart Disease Mother     Hypertension Mother     Heart Attack Father     Hypertension Father        Past Medical History:   Diagnosis Date    Chronic lung disease     Chronic obstructive pulmonary disease ([address]     Heart failure ([address]     Hypertension        Past Surgical History:   Procedure Laterality Date    HX ORTHOPAEDIC      spinal sx 5/6    HX OTHER SURGICAL      Carotid artery    VASCULAR SURGERY PROCEDURE UNLIST      L CEA 10/2014       Social History     Tobacco Use    Smoking status: Former Smoker     Packs/day: 1.50     Years: 30.00     Pack years: 45.00     Types: Cigarettes     Last attempt to quit: 1987     Years since quittin.7    Smokeless tobacco: Never Used   Substance Use Topics    Alcohol use: No     Alcohol/week: 0.0 oz       No Known Allergies        Visit Vitals  /54   Pulse 66   Ht 5' 3\" (1.6 m)   Wt 62.1 kg (137 lb)   BMI 24.27 kg/m²         Physical Exam   Constitutional: She is oriented to person, place, and time. She appears well-developed and well-nourished. HENT:   Head: Normocephalic and atraumatic. Eyes: Conjunctivae are normal.   Neck: Neck supple. No JVD present. No tracheal deviation present. No thyromegaly present. Cardiovascular: Normal rate and regular rhythm. PMI is not displaced. Exam reveals no gallop, no S3 and no decreased pulses. Murmur heard. Holosystolic murmur is present at the lower left sternal border. Pulmonary/Chest: No respiratory distress. She has no wheezes. She has no rales. She exhibits no tenderness. Abdominal: Soft. There is no tenderness. Musculoskeletal: She exhibits no edema. Neurological: She is alert and oriented to person, place, and time. Skin: Skin is warm. Psychiatric: She has a normal mood and affect. Ms. Olesya Silva has a reminder for a \"due or due soon\" health maintenance. I have asked that she contact her primary care provider for follow-up on this health maintenance. I have personally reviewed patient's records available from hospital and other providers and incorporated findings in patient care. 2016-[facility]    SUMMARY:2016-echo  Procedure information: Image quality was adequate. Left ventricle: Systolic function was normal. Ejection fraction was  estimated to be 60 %.  No obvious wall motion abnormalities identified in  the views obtained. Wall thickness was mildly increased. Features were  consistent with a pseudonormal left ventricular filling pattern, with  concomitant abnormal relaxation and increased filling pressure (grade 2  diastolic dysfunction). Right ventricle: Systolic pressure was markedly increased. Estimated peak  pressure was 74 mmHg. Left atrium: The atrium was mildly to moderately dilated. Inferior vena cava, hepatic veins: The inferior vena cava was mildly  Dilated. SUMMARY:6/2017-echo  Left ventricle: Systolic function was normal by visual assessment. Ejection fraction was estimated to be 60 %. There was possible hypokinesis  of the basal-mid anteroseptal, basal-mid inferoseptal, apical inferior,  and apical septal wall(s). Wall thickness was mildly increased. Doppler  parameters were consistent with abnormal left ventricular relaxation  (grade 1 diastolic dysfunction). Right ventricle: The size was at the upper limits of normal. Systolic  pressure was moderately increased. Estimated peak pressure was 60 mmHg. Right atrium: The atrium was mildly dilated. I Have personally reviewed recent relevant labs available and discussed with patient  1/2018 5/2018  Left Leg:-  Deep venous thrombosis:           No  Superficial venous thrombosis:    No  Deep venous insufficiency:        Not examined  Superficial venous insufficiency: Not examined     Assessment         ICD-10-CM ICD-9-CM    1. Chronic diastolic congestive heart failure (HCC) I50.32 428.32      428.0     Stable. Short of breath multifactorial continue treatment compensated. Patient had increased fluid overload which is improved after addition of extra Lasix. 2. Essential hypertension with goal blood pressure less than 140/90 I10 401.9     Stable continue current therapy   3. Hyperlipidemia, unspecified hyperlipidemia type E78.5 272.4     On treatment lab with PCP   4.  Pulmonary HTN (HCC) I27.20 416.8 Stable multifactorial mostly group 3 and 2   5. Chronic obstructive pulmonary disease, unspecified COPD type ([facility] J44.9 496     Continue treatment follow-up with PCP     12/2017  Discussed option of right heart cath-risk benefit discussed-patient willing to proceed  Followed by Dr Alexis Fountain    1/2018  Patient postponed rt heart cath-she will get it done in sping  moderate  ?? group 3  evaluate for group 1   5/2018  Does not want rt heart cath  11/2018  Cardiac status stable. Shortness of breath class III which is multifactorial.  Blood pressure elevated today but usually normal at home. Patient will continue to monitor. Salt restriction    There are no discontinued medications. No orders of the defined types were placed in this encounter. Follow-up and Dispositions    · Return in about 6 months (around 11/22/2019).

## 2019-05-23 ENCOUNTER — APPOINTMENT (OUTPATIENT)
Dept: PULMONOLOGY | Age: 84
End: 2019-05-23
Payer: MEDICARE

## 2019-05-28 ENCOUNTER — APPOINTMENT (OUTPATIENT)
Dept: PULMONOLOGY | Age: 84
End: 2019-05-28
Payer: MEDICARE

## 2019-05-30 ENCOUNTER — APPOINTMENT (OUTPATIENT)
Dept: PULMONOLOGY | Age: 84
End: 2019-05-30
Payer: MEDICARE

## 2019-06-04 ENCOUNTER — APPOINTMENT (OUTPATIENT)
Dept: PULMONOLOGY | Age: 84
End: 2019-06-04
Payer: MEDICARE

## 2019-06-11 ENCOUNTER — APPOINTMENT (OUTPATIENT)
Dept: PULMONOLOGY | Age: 84
End: 2019-06-11
Payer: MEDICARE

## 2019-06-13 ENCOUNTER — HOSPITAL ENCOUNTER (OUTPATIENT)
Dept: PULMONOLOGY | Age: 84
Discharge: HOME | End: 2019-06-13
Payer: MEDICARE

## 2019-06-13 PROCEDURE — G0237 THERAPEUTIC PROCD STRG ENDUR: HCPCS

## 2019-06-13 NOTE — PROGRESS NOTES
Pulmonary ITP      RESP 1:1 from 6/13/2019 in [facility] PULMONARY REHAB   Treatment Diagnosis   Referral Date 02/27/19   Significant Cardiovascular History --  [HTN]   Co-morbidities Pulmonary disease   Oxygen Saturation / Titration    Stages of change  Maintenance   OXYGEN INTERVENTION   Oxygen Use Yes   Oxygen Type  Nasal Canula   Patients Liter Flow  3   Nurse / Patient Discussion     Oxygen Education  Oxygen Safety / Nathalia Fischer with Oxygen, Types of Oxygen, Proper care of Oxygen Equipment, Emergency Oxygen useage   Oxygen Target Goals  Understand use of Oxygen, Use Oxygen as needed, Keep SA02 greater than 90%   Individual Treatment Plan   ITP Visit Type Discharge, completed program   1st Date of Exercise 03/08/19   ITP Next Review Date --  [discharged]   Visit #/Total Visits 15/18   Pulmonary ITP Exercise, Psychosocial, Tobacco, Nutrition, Education   Exercise    Stages of Change Action   Test Six minute walk test   Distance Walked in  ft   Distance Walked (ft) 500 ft   Peak HR 79   Peak /62   Peak RPE 13   Peak Mets 0   O2 Saturation 81   Stops 1   SPO2 Range 81-98   Exercise Prescription   Mode Stepper, Ergometer, Other (comment)  [free weights, sit-to-stands, timed ambulation]   Frequency per week 2   Duration per session 60   Intensity  METS       2.0   Progression to 3.5   O2 Flow Rate (L/min)    Symptoms with Exercise Leg pain, Other (comment)  [Desat with exertion. Pt. educated on pursed lip breathing.]   Target Heart Rate 97   Resistance Training Yes   Assisted Devices None   Exercise Blood Pressures   Resting /60   Peak /58   Is BP WDL?  Yes   Exercise Intervention   Type none   Comments patient encouraged to incorporate home exercise   Exercise Education   Education Self pulse, Exercise safety, Signs/Symptoms to report, RPE scale, Equipment orientation, Warm up/Cool down, Home exercise plan, Energy conservation, O2 therapy, RPE/RPD scale, Purse lip/Abdominal breathing   Exercise Target Goal   Target Goal(s) Individual exercise RX, BP < 140/90 or < 130/80, if DM or CKD, Aerobic activity 30 + minutes/day  5 days/week, SA02>89%, Home activity   Patient Stated Exercise Goals None at this time   Psychosocial   Stages of Change Maintenance   Psychosocial Intervention   Interventions No intervention indicated   Currently Taking Psychotropic Meds Yes   Medication Changes No   Psychosocial Education   Education Advanced directives, Benefits of CPR completion, Coping techniques, Environmental triggers, Impact self care behaviors on health, Relaxation techniques, Signs/Symptoms of depression, Stress management, Tobacco dangers, Traveling with lung disease   Psychosocial Target Goals   Target Goal(s) Assess presence or absence of depression using a valid screening tool, Demonstrates appropriate interaction with others, Engages in self-care behaviors, Maximizes coping skills, Positive support group   Uses Stress Mgmt Techniques No   Tobacco   Stages of Change Maintenance   Tobacco Use Yes   Quit Greater than 6 month   Date Started 01/08/56   Date Quit 11/08/88   # Cigarette Smoked/Day 2/day   Smokeless Tobacco Use No   Tobacco Cessation Intervention   Tobacco Adjunct No   Tobacco Education   Target Goal   Nutrition   Stages of Change Maintenance   Diabetes No   Lipids   Weight Management   Weight  61.7 kg (136 lb)   Height  5' 3\" (1.6 m)   BMI 24.14   Waist Circumference  36\"   Nutrition Assessment Tool RYP   Rate Your Plate Total Score 45   Nutrition Intervention   Dietitian Consult No   Nurse/Patient Discussion Yes   Nutrition Class No   Diabetes Education Referral No   Lipid Clinic Referral No   Weight Management Referral No   Nutrition Education   Education Healthy eating, Nutrition and lung disease   Nutrition Target Goals   Target Goals Waist size less than 40 inches males and less than 35 inches for females   Patient Stated Nutrition Goals none at this time   Education   Learning Barrier Ready to learn   Knowledge Test Score 70   Education Intervention   Education Schedule Given No   Patient Education    Education Med Compliance, Pulmonary Medications, Breaking the Dyspnea Cycle, Pulmonary A&P, lung/gas exchange, Heart/Lung association, Activity of Daily Living, Nebulizer Use, Preventing Infection, Bronchial Hygiene/controlled cough   Education Target Goals   Target Goals Correct demonstration of breathing techniques, Correct demonstration of MDI use and care, Correct demonstration/verbalization of 02 therapy   Patient Stated Education Goals Pt stated is interested in learning new breathing techniques   Physician Response

## 2019-06-13 NOTE — PROGRESS NOTES
Patient name: Best Gallegos : 1935     Visits from Start of Care: 15    Missed Visits: 8    Reporting Period: 3/8/19 to 19    Goals Comments   1. Vacuum without SOB   [] met                  [x] not met  [] progressing  Inconsistent attendance coupled with no exercise at home    2. Make the bed with less SOB   [] met                  [x] not met  [] progressing  Inconsistent attendance coupled with no exercise at home    3. Feel stronger and be able to walk further for [address] trip in  [] met                  [x] not met  [] progressing  Inconsistent attendance coupled with no exercise at home    4.     [] met                  [] not met  [] progressing      Key functional changes: none      Problems/ barriers to goal attainment: poor attendance/effort     Assessment / Recommendations:Discharge    Treatment Plan: Therapeutic exercise and Patient education    Updated Goals to be accomplished in 18 treatments:      Frequency / Duration: Patient to be seen 2 times per week for 9 weeks:    RT [place] 2019 2:14 PM

## 2019-06-13 NOTE — PROGRESS NOTES
[address]     Visit # 15/18         Date: 6/13/19         Time in: 1330         Time out: 1430         Modality    SpO2 HR RPE RPD   [x]   Six Minute Walk Test and Discharge paperwork      # Mins:  62 34 61 72 3   []  Arm Ergometer      [place]:  # Mins:       []  NuStep     Workload  # Mins:       []  Recumbent Bike     Level: RPM: # Mins:       []  Airdyne Bike    RPM: # Mins:       []  Rower       # Mins:       []  Free Weights     LBS: Reps/Sets #Mins:       One-one-One Activity     Start: Stop: #Mins:                                                      Donnalee Montenegro, RT Donnalee Montenegro, RT Donnalee Montenegro, RT   Donnalee Montenegro, RT Donnalee Montenegro, RT

## 2019-06-13 NOTE — PROGRESS NOTES
Discharge Psychosocial Note  Visits completed: 13 of 25      Mrs. Brewer has completed 15 of 18 sessions of Pulmonary Rehab. She did not have good attendance and put forth only a fair amount of effort. She did engage in home exercise as recommended. She has completed her six minute walk test, walking 100 ft further today than on the day of her initial evaluation. She does require supplemental oxygen ranging from 2-4 LPM with ambulation, to maintain adequate saturations. She continues to be tobacco free and does not drink alcohol. Mrs. Addie Song has a good support system in her family. She tells me that her granddaughter is coming to stay a few weeks with her to help her around the house. She is planning a trip to [place] with her family this month. Mrs. Addie Song has been encouraged to adopt a home exercise program to maintain lung health. She does not desire to join our maintenance program at this time.        Mik Workman, RRT  Certificate in Pulmonary Rehabilitation, [facility]

## 2019-06-18 ENCOUNTER — APPOINTMENT (OUTPATIENT)
Dept: PULMONOLOGY | Age: 84
End: 2019-06-18
Payer: MEDICARE

## 2019-06-20 ENCOUNTER — APPOINTMENT (OUTPATIENT)
Dept: PULMONOLOGY | Age: 84
End: 2019-06-20
Payer: MEDICARE

## 2019-07-11 ENCOUNTER — OFFICE VISIT (OUTPATIENT)
Dept: ORTHOPEDIC SURGERY | Facility: CLINIC | Age: 84
End: 2019-07-11

## 2019-07-11 VITALS
HEART RATE: 73 BPM | DIASTOLIC BLOOD PRESSURE: 38 MMHG | HEIGHT: 63 IN | TEMPERATURE: 96.5 F | RESPIRATION RATE: 20 BRPM | SYSTOLIC BLOOD PRESSURE: 114 MMHG | OXYGEN SATURATION: 90 % | WEIGHT: 138.2 LBS | BODY MASS INDEX: 24.49 KG/M2

## 2019-07-11 DIAGNOSIS — M25.551 BILATERAL HIP PAIN: ICD-10-CM

## 2019-07-11 DIAGNOSIS — M25.551 CHRONIC PAIN OF RIGHT HIP: ICD-10-CM

## 2019-07-11 DIAGNOSIS — M70.61 TROCHANTERIC BURSITIS OF RIGHT HIP: Primary | ICD-10-CM

## 2019-07-11 DIAGNOSIS — G89.29 CHRONIC PAIN OF RIGHT HIP: ICD-10-CM

## 2019-07-11 DIAGNOSIS — M16.11 PRIMARY OSTEOARTHRITIS OF RIGHT HIP: ICD-10-CM

## 2019-07-11 DIAGNOSIS — M25.552 BILATERAL HIP PAIN: ICD-10-CM

## 2019-07-11 RX ORDER — BETAMETHASONE SODIUM PHOSPHATE AND BETAMETHASONE ACETATE 3; 3 MG/ML; MG/ML
6 INJECTION, SUSPENSION INTRA-ARTICULAR; INTRALESIONAL; INTRAMUSCULAR; SOFT TISSUE ONCE
Qty: 0.5 ML | Refills: 0
Start: 2019-07-11 | End: 2019-07-11

## 2019-07-11 NOTE — PROGRESS NOTES
Patient: Ivy Lezama                MRN: [id number]       SSN: [id number]  YOB: 1935        AGE: 82 y.o. SEX: female    PCP: Romana Coles MD  07/11/19    Chief Complaint   Patient presents with    Hip Pain     Abdirashid     HISTORY:  Ivy Lezama is a 82 y.o. female who is seen for bilateral hip pain. She has been experiencing hip pain for the past several years. She notes pain with standing and walking. She experiences lateral hip pain, and has pain radiating to her knee. She notes a burning pain in her thigh. She experiences startup pain after sitting. She recently returned form [facility] visiting her daughter and she was unable to do much due to here severe hip pain. She has previously seen JOELLE Cartagena for hip pain. 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 She enjoys playing [employer] and [employer]. She lives alone in [place]. She is very self sufficient in all activities and is still able to drive herself. Her exercise is limited due to her oxygen dependent COPD. Ms. Herbie Baldwin weighs 138 lbs and is 5'3\" tall.        No results found for: HBA1C, HGBE8, ARW1JITI, DNK6VCDE, RQD9IZAQ  Weight Metrics 7/11/2019 5/22/2019 5/21/2019 5/16/2019 5/14/2019 5/9/2019 4/30/2019   Weight 138 lb 3.2 oz 137 lb 137 lb 137 lb 135 lb 137 lb 142 lb   BMI 24.48 kg/m2 24.27 kg/m2 24.27 kg/m2 24.27 kg/m2 23.91 kg/m2 24.27 kg/m2 25.15 kg/m2       Patient Active Problem List   Diagnosis Code    Occlusion and stenosis of carotid artery I65.29    Carotid stenosis I65.29    BURGESS (dyspnea on exertion) R06.09    Hyperlipidemia E78.5    Essential hypertension with goal blood pressure less than 140/90 I10    Chronic diastolic congestive heart failure (HCC) I50.32    Chronic obstructive pulmonary disease (HCC) J44.9    Pulmonary HTN (HCC) I27.20     REVIEW OF SYSTEMS: All Below are Negative except: See HPI   Constitutional: negative for fever, chills, and weight loss. Cardiovascular: negative for chest pain, claudication, leg swelling, SOB, BURGESS   Gastrointestinal: Negative for pain, N/V/C/D, Blood in stool or urine, dysuria, hematuria, incontinence, pelvic pain. Musculoskeletal: See HPI   Neurological: Negative for dizziness and weakness. Negative for headaches, Visual changes, confusion, seizures   Phychiatric/Behavioral: Negative for depression, memory loss, substance abuse. Extremities: Negative for hair changes, rash, or skin lesion changes. Hematologic: Negative for bleeding problems, bruising, pallor or swollen lymph nodes   Peripheral Vascular: No calf pain, no circulation deficits.     Social History     Socioeconomic History    Marital status:      Spouse name: Not on file    Number of children: Not on file    Years of education: Not on file    Highest education level: Not on file   Occupational History    Not on file   Social Needs    Financial resource strain: Not on file    Food insecurity:     Worry: Not on file     Inability: Not on file    Transportation needs:     Medical: Not on file     Non-medical: Not on file   Tobacco Use    Smoking status: Former Smoker     Packs/day: 1.50     Years: 30.00     Pack years: 45.00     Types: Cigarettes     Last attempt to quit: 1987     Years since quittin.8    Smokeless tobacco: Never Used   Substance and Sexual Activity    Alcohol use: No     Alcohol/week: 0.0 oz    Drug use: No    Sexual activity: Never   Lifestyle    Physical activity:     Days per week: Not on file     Minutes per session: Not on file    Stress: Not on file   Relationships    Social connections:     Talks on phone: Not on file     Gets together: Not on file     Attends Rastafarian service: Not on file     Active member of club or organization: Not on file     Attends meetings of clubs or organizations: Not on file     Relationship status: Not on file    Intimate partner violence:     Fear of current or ex partner: Not on file     Emotionally abused: Not on file     Physically abused: Not on file     Forced sexual activity: Not on file   Other Topics Concern    Not on file   Social History Narrative    Not on file      No Known Allergies   Current Outpatient Medications   Medication Sig    betamethasone (CELESTONE SOLUSPAN) 6 mg/mL injection 1 mL by Intra artICUlar route once for 1 dose.  losartan (COZAAR) 50 mg tablet Take  by mouth daily.  fluticasone-umeclidin-vilanter (TRELEGY ELLIPTA) 100-62.5-25 mcg dsdv Take  by inhalation.  Oxygen     albuterol (PROVENTIL HFA, VENTOLIN HFA, PROAIR HFA) 90 mcg/actuation inhaler Take  by inhalation.  albuterol (PROVENTIL VENTOLIN) 2.5 mg /3 mL (0.083 %) nebulizer solution 3 mL by Nebulization route every four (4) hours as needed for Wheezing or Shortness of Breath.  furosemide (LASIX) 20 mg tablet 20 mg po daily (Patient taking differently: Take 40 mg by mouth daily.)    acetaminophen (TYLENOL EXTRA STRENGTH) 500 mg tablet Take 500 mg by mouth every six (6) hours as needed for Pain.  metoprolol (LOPRESSOR) 25 mg tablet Take 25 mg by mouth two (2) times a day.  NIFEdipine ER (ADALAT CC) 60 mg ER tablet Take 60 mg by mouth daily.  simvastatin (ZOCOR) 20 mg tablet Take  by mouth nightly.  alprazolam (XANAX) 0.5 mg tablet Take  by mouth.  aspirin delayed-release 81 mg tablet Take  by mouth daily.  diclofenac (VOLTAREN) 1 % gel Apply  to affected area four (4) times daily. No current facility-administered medications for this visit.        PHYSICAL EXAMINATION:  Visit Vitals  BP (!) 114/38   Pulse 73   Temp 96.5 °F (35.8 °C) (Oral)   Resp 20   Ht 5' 3\" (1.6 m)   Wt 138 lb 3.2 oz (62.7 kg)   SpO2 90% Comment: via nasal cannula @ 4L of 02   BMI 24.48 kg/m²      ORTHO EXAMINATION:  Examination Right hip Left hip   Skin Intact Intact   External Rotation ROM 20 20   Internal Rotation ROM 10 10   Trochanteric tenderness - -   Hip flexion contracture - -   Antalgic gait - -   Trendelenberg sign - -   Lumbar tenderness - -   Straight leg raise - -   Calf tenderness - -   Neurovascular Intact Intact        TIME OUT:  Chart reviewed for the following:   Tabitha Holland MD, have reviewed the History, Physical and updated the Allergic reactions for [address] [place] [address] performed immediately prior to start of procedure:  Tabitha Holland MD, have performed the following reviews on [facility] [place] prior to the start of the procedure:          * Patient was identified by name and date of birth   * Agreement on procedure being performed was verified  * Risks and Benefits explained to the patient  * Procedure site verified and marked as necessary  * Patient was positioned for comfort  * Consent was obtained     Time: 1:45 PM     Date of procedure: 7/11/2019  Procedure performed by:  Nina Alvarado MD  Ms. Mueller Crew tolerated the procedure well with no complications. RADIOGRAPHS:  XR BILAT HIPS 7/11/19 EMERALD  IMPRESSION:  AP pelvis and two views - No fractures, moderate joint space narrowing, no osteophytes present. Tonnis grade 2, bone thinning     IMPRESSION:      ICD-10-CM ICD-9-CM    1. Trochanteric bursitis of right hip M70.61 726.5 betamethasone (CELESTONE SOLUSPAN) 6 mg/mL injection      BETAMETHASONE ACETATE & SODIUM PHOSPHATE INJECTION 3 MG EA.      DRAIN/INJECT LARGE JOINT/BURSA   2. Bilateral hip pain M25.551 719.45 AMB POC X-RAY HIPS, BILAT, 2+ VIEWS    M25.552     3.  Chronic pain of right hip M25.551 719.45 betamethasone (CELESTONE SOLUSPAN) 6 mg/mL injection    G89.29 338.29 BETAMETHASONE ACETATE & SODIUM PHOSPHATE INJECTION 3 MG EA. DRAIN/INJECT LARGE JOINT/BURSA   4. Primary osteoarthritis of right hip M16.11 715.15      PLAN:  After discussing treatment options, patient's right hip was injected with 4 cc Marcaine and 1/2 cc Celestone. There is no need for surgery at this time. She will follow up as needed.       Scribed by Anitha Saldivar ([address]) as dictated by Silvina Abreu MD

## 2019-10-21 NOTE — PROGRESS NOTES
[address]    Chief Complaint   Patient presents with    Carotid Artery Stenosis       History and Physical    Ms Chantal Thomason is here approaching 5 years s/p L CEA and this is a surveillance visit  A few years ago she became dependent on O2 supplementation. She has done well with adjusting to full time oxygen. She still stays active and busy  She had been having issues with lower extremity edema and did get compression stockings, which she wears regularly, and she says this does help    Past Medical History:   Diagnosis Date    Chronic lung disease     Chronic obstructive pulmonary disease ([address]     Heart failure ([address]     Hypertension      Patient Active Problem List   Diagnosis Code    Occlusion and stenosis of carotid artery I65.29    Carotid stenosis I65.29    BURGESS (dyspnea on exertion) R06.09    Hyperlipidemia E78.5    Essential hypertension with goal blood pressure less than 140/90 I10    Chronic diastolic congestive heart failure (HCC) I50.32    Chronic obstructive pulmonary disease (HCC) J44.9    Pulmonary HTN ([address] I27.20     Past Surgical History:   Procedure Laterality Date    HX ORTHOPAEDIC      spinal sx 5/6    HX OTHER SURGICAL      Carotid artery    VASCULAR SURGERY PROCEDURE UNLIST      L CEA 10/2014     Current Outpatient Medications   Medication Sig Dispense Refill    losartan (COZAAR) 50 mg tablet Take  by mouth daily.  fluticasone-umeclidin-vilanter (TRELEGY ELLIPTA) 100-62.5-25 mcg dsdv Take  by inhalation.  diclofenac (VOLTAREN) 1 % gel Apply  to affected area four (4) times daily. 100 g 2    Oxygen       albuterol (PROVENTIL HFA, VENTOLIN HFA, PROAIR HFA) 90 mcg/actuation inhaler Take  by inhalation.  albuterol (PROVENTIL VENTOLIN) 2.5 mg /3 mL (0.083 %) nebulizer solution 3 mL by Nebulization route every four (4) hours as needed for Wheezing or Shortness of Breath.  48 Each 0    furosemide (LASIX) 20 mg tablet 20 mg po daily (Patient taking differently: Take 40 mg by mouth daily.) 30 Tab 0    acetaminophen (TYLENOL EXTRA STRENGTH) 500 mg tablet Take 500 mg by mouth every six (6) hours as needed for Pain.  metoprolol (LOPRESSOR) 25 mg tablet Take 25 mg by mouth two (2) times a day.  NIFEdipine ER (ADALAT CC) 60 mg ER tablet Take 60 mg by mouth daily.  simvastatin (ZOCOR) 20 mg tablet Take  by mouth nightly.  alprazolam (XANAX) 0.5 mg tablet Take  by mouth.  aspirin delayed-release 81 mg tablet Take  by mouth daily. No Known Allergies    Physical   Visit Vitals  /80 (BP 1 Location: Left arm, BP Patient Position: Sitting)   Resp 17   Ht 5' 3\" (1.6 m)   Wt 138 lb (62.6 kg)   BMI 24.45 kg/m²     General:  Alert, cooperative, no distress. On Oxygen via nasal cannula   Head:  Normocephalic, without obvious abnormality, atraumatic. Eyes:     Conjunctivae/corneas clear. Pupils equal, round, reactive to light. Extraocular movements intact. Neck:         L CEA scar   Lungs:   Clear to auscultation bilaterally. Heart:  Regular rate and rhythm, S1, S2 normal   Extremities: Extremities normal, atraumatic, but about 1+ edema bilaterally   Pulses: Difficult to palpate due to edema but no signs of ischemia   Skin: Skin color, texture, turgor normal. No rashes or lesions     Vascular studies:  Moderate stenosis noted within the right internal carotid artery  Mild stenosis noted within the left internal carotid artery  No evidence of hemodynamically significant arterial disease is identified within bilateral vertebral arteries  10 mmHg difference between blood pressures left less than right    The exam was compared to the study performed on 9/21/2018. No significant change. YOUSUF velocities are lower end of moderate range. Impression/Plan:     ICD-10-CM ICD-9-CM    1. Carotid stenosis, asymptomatic, bilateral I65.23 433.10 DUPLEX CAROTID BILATERAL     433.30    2.  S/P carotid endarterectomy Z98.890 V45.89 DUPLEX CAROTID BILATERAL     No orders of the defined types were placed in this encounter. Follow-up and Dispositions    · Return in about 1 year (around 10/22/2020). I reassured her of the stability of her results. Now been 5 years out I would typically transition to every other year, but because she does have moderate stenosis, even though low range on the right, I suggested that we still do yearly surveillance which she is agreeable to    JOELLE Vale    Portions of this note have been entered using voice recognition software.

## 2019-10-22 ENCOUNTER — OFFICE VISIT (OUTPATIENT)
Dept: VASCULAR SURGERY | Age: 84
End: 2019-10-22

## 2019-10-22 VITALS
WEIGHT: 138 LBS | HEIGHT: 63 IN | RESPIRATION RATE: 17 BRPM | BODY MASS INDEX: 24.45 KG/M2 | DIASTOLIC BLOOD PRESSURE: 80 MMHG | SYSTOLIC BLOOD PRESSURE: 124 MMHG

## 2019-10-22 DIAGNOSIS — Z98.890 S/P CAROTID ENDARTERECTOMY: ICD-10-CM

## 2019-10-22 DIAGNOSIS — I65.23 CAROTID STENOSIS, ASYMPTOMATIC, BILATERAL: Primary | ICD-10-CM

## 2019-10-22 NOTE — PROGRESS NOTES
1. Have you been to an emergency room or urgent care clinic since your last visit? No    Hospitalized since your last visit? If yes, where, when, and reason for visit? NO  2. Have you seen or consulted any other health care providers outside of the [facility] since your last visit including any procedures, health maintenance items. If yes, where, when and reason for visit?  NO

## 2019-11-14 ENCOUNTER — OFFICE VISIT (OUTPATIENT)
Dept: CARDIOLOGY CLINIC | Age: 84
End: 2019-11-14

## 2019-11-14 ENCOUNTER — TELEPHONE (OUTPATIENT)
Dept: CARDIOLOGY CLINIC | Age: 84
End: 2019-11-14

## 2019-11-14 VITALS
HEART RATE: 61 BPM | SYSTOLIC BLOOD PRESSURE: 129 MMHG | HEIGHT: 63 IN | DIASTOLIC BLOOD PRESSURE: 50 MMHG | WEIGHT: 135 LBS | BODY MASS INDEX: 23.92 KG/M2

## 2019-11-14 DIAGNOSIS — E78.5 HYPERLIPIDEMIA, UNSPECIFIED HYPERLIPIDEMIA TYPE: ICD-10-CM

## 2019-11-14 DIAGNOSIS — I50.32 CHRONIC DIASTOLIC CONGESTIVE HEART FAILURE (HCC): Primary | ICD-10-CM

## 2019-11-14 DIAGNOSIS — J44.9 CHRONIC OBSTRUCTIVE PULMONARY DISEASE, UNSPECIFIED COPD TYPE (HCC): ICD-10-CM

## 2019-11-14 DIAGNOSIS — I27.20 PULMONARY HTN (HCC): ICD-10-CM

## 2019-11-14 DIAGNOSIS — I10 ESSENTIAL HYPERTENSION WITH GOAL BLOOD PRESSURE LESS THAN 140/90: ICD-10-CM

## 2019-11-14 NOTE — PROGRESS NOTES
HISTORY OF PRESENT ILLNESS  Blanche Keating is a 82 y.o. female. Patient with chf,copd,htn,pulm htn  Here for follow up    CHF   The history is provided by the patient. This is a chronic problem. The problem occurs constantly. The problem has not changed since onset. Associated symptoms include shortness of breath. Pertinent negatives include no chest pain, no abdominal pain and no headaches. The symptoms are aggravated by exertion. Nothing relieves the symptoms. Shortness of Breath   The history is provided by the patient. This is a recurrent problem. The problem occurs frequently. The problem has not changed since onset. Associated symptoms include leg swelling. Pertinent negatives include no fever, no headaches, no cough, no sputum production, no hemoptysis, no wheezing, no PND, no orthopnea, no chest pain, no vomiting, no abdominal pain, no rash and no claudication. Precipitated by: activity. Review of Systems   Constitutional: Negative for chills and fever. HENT: Negative for nosebleeds. Eyes: Negative for blurred vision and double vision. Respiratory: Positive for shortness of breath. Negative for cough, hemoptysis, sputum production and wheezing. Cardiovascular: Positive for leg swelling. Negative for chest pain, palpitations, orthopnea, claudication and PND. Gastrointestinal: Negative for abdominal pain, heartburn, nausea and vomiting. Musculoskeletal: Negative for myalgias. Skin: Negative for rash. Neurological: Negative for dizziness, weakness and headaches. Endo/Heme/Allergies: Does not bruise/bleed easily.      Family History   Problem Relation Age of Onset    Heart Disease Mother     Hypertension Mother     Heart Attack Father     Hypertension Father        Past Medical History:   Diagnosis Date    Chronic lung disease     Chronic obstructive pulmonary disease ([address]     Heart failure ([address]     Hypertension        Past Surgical History:   Procedure Laterality Date    HX ORTHOPAEDIC      spinal sx 5/6    HX OTHER SURGICAL      Carotid artery    VASCULAR SURGERY PROCEDURE UNLIST      L CEA 10/2014       Social History     Tobacco Use    Smoking status: Former Smoker     Packs/day: 1.50     Years: 30.00     Pack years: 45.00     Types: Cigarettes     Last attempt to quit: 1987     Years since quittin.1    Smokeless tobacco: Never Used   Substance Use Topics    Alcohol use: No     Alcohol/week: 0.0 standard drinks       No Known Allergies        Visit Vitals  /50   Pulse 61   Ht 5' 3\" (1.6 m)   Wt 61.2 kg (135 lb)   BMI 23.91 kg/m²         Physical Exam   Constitutional: She is oriented to person, place, and time. She appears well-developed and well-nourished. HENT:   Head: Normocephalic and atraumatic. Eyes: Conjunctivae are normal.   Neck: Neck supple. No JVD present. No tracheal deviation present. No thyromegaly present. Cardiovascular: Normal rate and regular rhythm. PMI is not displaced. Exam reveals no gallop, no S3 and no decreased pulses. Murmur heard. Holosystolic murmur is present at the lower left sternal border. Pulmonary/Chest: No respiratory distress. She has no wheezes. She has no rales. She exhibits no tenderness. Abdominal: Soft. There is no tenderness. Musculoskeletal: She exhibits no edema. Neurological: She is alert and oriented to person, place, and time. Skin: Skin is warm. Psychiatric: She has a normal mood and affect. Ms. Aretha Lucio has a reminder for a \"due or due soon\" health maintenance. I have asked that she contact her primary care provider for follow-up on this health maintenance. I have personally reviewed patient's records available from hospital and other providers and incorporated findings in patient care. 2016-[facility]    SUMMARY:2016-echo  Procedure information: Image quality was adequate. Left ventricle: Systolic function was normal. Ejection fraction was  estimated to be 60 %.  No obvious wall motion abnormalities identified in  the views obtained. Wall thickness was mildly increased. Features were  consistent with a pseudonormal left ventricular filling pattern, with  concomitant abnormal relaxation and increased filling pressure (grade 2  diastolic dysfunction). Right ventricle: Systolic pressure was markedly increased. Estimated peak  pressure was 74 mmHg. Left atrium: The atrium was mildly to moderately dilated. Inferior vena cava, hepatic veins: The inferior vena cava was mildly  Dilated. SUMMARY:6/2017-echo  Left ventricle: Systolic function was normal by visual assessment. Ejection fraction was estimated to be 60 %. There was possible hypokinesis  of the basal-mid anteroseptal, basal-mid inferoseptal, apical inferior,  and apical septal wall(s). Wall thickness was mildly increased. Doppler  parameters were consistent with abnormal left ventricular relaxation  (grade 1 diastolic dysfunction). Right ventricle: The size was at the upper limits of normal. Systolic  pressure was moderately increased. Estimated peak pressure was 60 mmHg. Right atrium: The atrium was mildly dilated. I Have personally reviewed recent relevant labs available and discussed with patient  1/2018 5/2018  Left Leg:-  Deep venous thrombosis:           No  Superficial venous thrombosis:    No  Deep venous insufficiency:        Not examined  Superficial venous insufficiency: Not examined     Assessment         ICD-10-CM ICD-9-CM    1. Chronic diastolic congestive heart failure (HCC) I50.32 428.32      428.0     Compensated stable. Short of breath multifactorial continue treatment   2. Essential hypertension with goal blood pressure less than 140/90 I10 401.9     Blood pressure controlled monitor   3. Hyperlipidemia, unspecified hyperlipidemia type E78.5 272.4     Continue treatment follow-up lab with PCP   4. Pulmonary HTN (HCC) I27.20 416.8     Stable group 3 likely from COPD continue oxygen   5.  Chronic obstructive pulmonary disease, unspecified COPD type ([facility] J44.9 496     Stable continue home oxygen monitor     12/2017  Discussed option of right heart cath-risk benefit discussed-patient willing to proceed  Followed by Dr Dorothy Murphy    1/2018  Patient postponed rt heart cath-she will get it done in sping  moderate  ?? group 3  evaluate for group 1   5/2018  Does not want rt heart cath  11/2018  Cardiac status stable. Shortness of breath class III which is multifactorial.  Blood pressure elevated today but usually normal at home. Patient will continue to monitor. Salt restriction    There are no discontinued medications. No orders of the defined types were placed in this encounter. Follow-up and Dispositions    · Return in about 6 months (around 5/14/2020).

## 2019-11-14 NOTE — PROGRESS NOTES
Patient didn't bring medications, verbally reviewed    1. Have you been to the ER, urgent care clinic since your last visit? Hospitalized since your last visit? No    2. Have you seen or consulted any other health care providers outside of the [address] [place] [address] since your last visit? Include any pap smears or colon screening.  Yes PCP Routine

## 2020-05-15 ENCOUNTER — HOSPITAL ENCOUNTER (OUTPATIENT)
Dept: GENERAL RADIOLOGY | Age: 85
Discharge: HOME | End: 2020-05-15
Payer: MEDICARE

## 2020-05-15 DIAGNOSIS — J44.9 COPD (CHRONIC OBSTRUCTIVE PULMONARY DISEASE) (HCC): ICD-10-CM

## 2020-05-15 PROCEDURE — 71046 X-RAY EXAM CHEST 2 VIEWS: CPT

## 2020-06-21 ENCOUNTER — APPOINTMENT (OUTPATIENT)
Dept: GENERAL RADIOLOGY | Age: 85
End: 2020-06-21
Attending: EMERGENCY MEDICINE
Payer: MEDICARE

## 2020-06-21 ENCOUNTER — HOSPITAL ENCOUNTER (EMERGENCY)
Age: 85
Discharge: HOME | End: 2020-06-21
Attending: EMERGENCY MEDICINE
Payer: MEDICARE

## 2020-06-21 VITALS
HEIGHT: 63 IN | SYSTOLIC BLOOD PRESSURE: 135 MMHG | WEIGHT: 127 LBS | BODY MASS INDEX: 22.5 KG/M2 | TEMPERATURE: 98.5 F | HEART RATE: 89 BPM | OXYGEN SATURATION: 91 % | RESPIRATION RATE: 20 BRPM | DIASTOLIC BLOOD PRESSURE: 91 MMHG

## 2020-06-21 DIAGNOSIS — J18.9 COMMUNITY ACQUIRED PNEUMONIA OF RIGHT UPPER LOBE OF LUNG: Primary | ICD-10-CM

## 2020-06-21 PROCEDURE — 71046 X-RAY EXAM CHEST 2 VIEWS: CPT

## 2020-06-21 PROCEDURE — 99283 EMERGENCY DEPT VISIT LOW MDM: CPT

## 2020-06-21 PROCEDURE — 87635 SARS-COV-2 COVID-19 AMP PRB: CPT

## 2020-06-21 PROCEDURE — 74011250637 HC RX REV CODE- 250/637: Performed by: EMERGENCY MEDICINE

## 2020-06-21 RX ORDER — PREDNISONE 20 MG/1
20 TABLET ORAL DAILY
Qty: 5 TAB | Refills: 0 | Status: SHIPPED | OUTPATIENT
Start: 2020-06-21 | End: 2020-07-01

## 2020-06-21 RX ORDER — LEVOFLOXACIN 750 MG/1
750 TABLET ORAL
Status: COMPLETED | OUTPATIENT
Start: 2020-06-21 | End: 2020-06-21

## 2020-06-21 RX ORDER — LEVOFLOXACIN 750 MG/1
750 TABLET ORAL DAILY
Qty: 5 TAB | Refills: 0 | Status: SHIPPED | OUTPATIENT
Start: 2020-06-21 | End: 2020-11-17

## 2020-06-21 RX ADMIN — LEVOFLOXACIN 750 MG: 750 TABLET, FILM COATED ORAL at 12:48

## 2020-06-21 NOTE — ED TRIAGE NOTES
Pt states she woke up Friday with sore throat and \"didn't feel well\". States gets \"this stuff\" 4 times a year and Dr. Gage Shepherd usually gives her antibiotics and prednisone. Her new doctor gave her amoxicillin and no Tylenol. States she still feels bad.

## 2020-06-21 NOTE — DISCHARGE INSTRUCTIONS
1.  Chest x-ray preliminary reading positive for pneumonia involving the right upper lobe of the lung. This is a new finding from your prior chest x-ray in May. 2.  Start Levaquin 750 mg daily for 6 doses total.  3.  Add prednisone 20 mg/day for 5 days. 4.  Cannot rule out the possibility that current findings are related to COVID-19. Culture was sent and you will be advised if positive. 5.  Continue social distancing, use of facemask, and frequent handwashing. 6.  Isolation precautions as per the CDC outlined below. 7.  Follow-up with your primary care physician for recheck in 3 to 5 days if not improving and return if acutely worse. HOME ISOLATION PRECAUTIONS DURING COVID-19 OUTBREAK (per CDC guidelines)    1) Stay home except to get medical care:  People who are mildly ill with COVID-19 are able to isolate at home during their illness. You should restrict activities outside your home, except for getting medical care. Do not go to work, school, or public areas. Avoid using public transportation, ride-sharing, or taxis. 2) Separate yourself from other people and animals in your home  People: As much as possible, you should stay in a specific room and away from other people in your home. Also, you should use a separate bathroom, if available. 3) Animals: You should restrict contact with pets and other animals while you are sick with COVID-19, just like you would around other people. Although there have not been reports of pets or other animals becoming sick with COVID-19, it is still recommended that people sick with COVID-19 limit contact with animals until more information is known about the virus. When possible, have another member of your household care for your animals while you are sick. If you are sick with COVID-19, avoid contact with your pet, including petting, snuggling, being kissed or licked, and sharing food.  If you must care for your pet or be around animals while you are sick, wash your hands before and after you interact with pets and wear a facemask. See COVID-19 and Animals for more information. 4) Call ahead before visiting your doctor  If you have a medical appointment, call the healthcare provider and tell them that you have or may have COVID-19. This will help the healthcare providers office take steps to keep other people from getting infected or exposed. 5) Wear a facemask  You should wear a facemask when you are around other people (e.g., sharing a room or vehicle) or pets and before you enter a healthcare providers office. If you are not able to wear a facemask (for example, because it causes trouble breathing), then people who live with you should not stay in the same room with you, or they should wear a facemask if they enter your room. 6) Cover your coughs and sneezes  Cover your mouth and nose with a tissue when you cough or sneeze. Throw used tissues in a lined trash can. Immediately wash your hands with soap and water for at least 20 seconds or, if soap and water are not available, clean your hands with an alcohol-based hand  that contains at least 60% alcohol. 7) Clean your hands often  Wash your hands often with soap and water for at least 20 seconds, especially after blowing your nose, coughing, or sneezing; going to the bathroom; and before eating or preparing food. If soap and water are not readily available, use an alcohol-based hand  with at least 60% alcohol, covering all surfaces of your hands and rubbing them together until they feel dry. 8) Soap and water are the best option if hands are visibly dirty. Avoid touching your eyes, nose, and mouth with unwashed hands. 9) Avoid sharing personal household items  You should not share dishes, drinking glasses, cups, eating utensils, towels, or bedding with other people or pets in your home. After using these items, they should be washed thoroughly with soap and water.     10) Clean all high-touch surfaces everyday  High touch surfaces include counters, tabletops, doorknobs, bathroom fixtures, toilets, phones, keyboards, tablets, and bedside tables. Also, clean any surfaces that may have blood, stool, or body fluids on them. Use a household cleaning spray or wipe, according to the label instructions. Labels contain instructions for safe and effective use of the cleaning product including precautions you should take when applying the product, such as wearing gloves and making sure you have good ventilation during use of the product. 11) Monitor your symptoms  Seek prompt medical attention if your illness is worsening (e.g., difficulty breathing). Before seeking care, call your healthcare provider and tell them that you have, or are being evaluated for, COVID-19. Put on a facemask before you enter the facility. These steps will help the healthcare providers office to keep other people in the office or waiting room from getting infected or exposed. Ask your healthcare provider to call the local or state health department. Persons who are placed under active monitoring or facilitated self-monitoring should follow instructions provided by their local health department or occupational health professionals, as appropriate. When working with your local health department check their available hours. 12) If you have a medical emergency and need to call 911, notify the dispatch personnel that you have, or are being evaluated for COVID-19. If possible, put on a facemask before emergency medical services arrive. 13) Discontinuing home isolation  Patients with confirmed COVID-19 should remain under home isolation precautions until the risk of secondary transmission to others is thought to be low. The decision to discontinue home isolation precautions should be made on a case-by-case basis, in consultation with healthcare providers and state and local health departments.           Patient Education Pneumonia: Care Instructions  Your Care Instructions     Pneumonia is an infection of the lungs. Most cases are caused by infections from bacteria or viruses. Pneumonia may be mild or very severe. If it is caused by bacteria, you will be treated with antibiotics. It may take a few weeks to a few months to recover fully from pneumonia, depending on how sick you were and whether your overall health is good. Follow-up care is a key part of your treatment and safety. Be sure to make and go to all appointments, and call your doctor if you are having problems. It's also a good idea to know your test results and keep a list of the medicines you take. How can you care for yourself at home? · Take your antibiotics exactly as directed. Do not stop taking the medicine just because you are feeling better. You need to take the full course of antibiotics. · Take your medicines exactly as prescribed. Call your doctor if you think you are having a problem with your medicine. · Get plenty of rest and sleep. You may feel weak and tired for a while, but your energy level will improve with time. · To prevent dehydration, drink plenty of fluids, enough so that your urine is light yellow or clear like water. Choose water and other caffeine-free clear liquids until you feel better. If you have kidney, heart, or liver disease and have to limit fluids, talk with your doctor before you increase the amount of fluids you drink. · Take care of your cough so you can rest. A cough that brings up mucus from your lungs is common with pneumonia. It is one way your body gets rid of the infection. But if coughing keeps you from resting or causes severe fatigue and chest-wall pain, talk to your doctor. He or she may suggest that you take a medicine to reduce the cough. · Use a vaporizer or humidifier to add moisture to your bedroom. Follow the directions for cleaning the machine. · Do not smoke or allow others to smoke around you. Smoke will make your cough last longer. If you need help quitting, talk to your doctor about stop-smoking programs and medicines. These can increase your chances of quitting for good. · Take an over-the-counter pain medicine, such as acetaminophen (Tylenol), ibuprofen (Advil, Motrin), or naproxen (Aleve). Read and follow all instructions on the label. · Do not take two or more pain medicines at the same time unless the doctor told you to. Many pain medicines have acetaminophen, which is Tylenol. Too much acetaminophen (Tylenol) can be harmful. · If you were given a spirometer to measure how well your lungs are working, use it as instructed. This can help your doctor tell how your recovery is going. · To prevent pneumonia in the future, talk to your doctor about getting a flu vaccine (once a year) and a pneumococcal vaccine (one time only for most people). When should you call for help? [id number] anytime you think you may need emergency care. For example, call if:  · You have severe trouble breathing. Call your doctor now or seek immediate medical care if:  · You cough up dark brown or bloody mucus (sputum). · You have new or worse trouble breathing. · You are dizzy or lightheaded, or you feel like you may faint. Watch closely for changes in your health, and be sure to contact your doctor if:  · You have a new or higher fever. · You are coughing more deeply or more often. · You are not getting better after 2 days (48 hours). · You do not get better as expected. Where can you learn more? Go to http://eduardo-evelia.info/  Enter D336 in the search box to learn more about \"Pneumonia: Care Instructions. \"  Current as of: February 24, 2020               Content Version: 12.5  © 4555-8537 [employer]. Care instructions adapted under license by [employer] (which disclaims liability or warranty for this information).  If you have questions about a medical condition or this instruction, always ask your healthcare professional. [place] [address] any warranty or liability for your use of this information.

## 2020-06-21 NOTE — ED PROVIDER NOTES
26-year-old female history of COPD on home oxygen presents for evaluation of sore throat, congestion, and generalized malaise. Denies worsening cough. PCPs office phoned in a prescription for amoxicillin which she has been on for last 2 days. No known exposure to Yed[place]. Not short of breath at rest.  No documented fever but does note some generalized myalgias.            Past Medical History:   Diagnosis Date    Chronic lung disease     Chronic obstructive pulmonary disease ([facility]     Heart failure ([facility]     Hypertension        Past Surgical History:   Procedure Laterality Date    HX ORTHOPAEDIC      spinal sx     HX OTHER SURGICAL      Carotid artery    VASCULAR SURGERY PROCEDURE UNLIST      L CEA 10/2014         Family History:   Problem Relation Age of Onset    Heart Disease Mother     Hypertension Mother     Heart Attack Father     Hypertension Father        Social History     Socioeconomic History    Marital status:      Spouse name: Not on file    Number of children: Not on file    Years of education: Not on file    Highest education level: Not on file   Occupational History    Not on file   Social Needs    Financial resource strain: Not on file    Food insecurity     Worry: Not on file     Inability: Not on file    Transportation needs     Medical: Not on file     Non-medical: Not on file   Tobacco Use    Smoking status: Former Smoker     Packs/day: 1.50     Years: 30.00     Pack years: 45.00     Types: Cigarettes     Last attempt to quit: 1987     Years since quittin.7    Smokeless tobacco: Never Used   Substance and Sexual Activity    Alcohol use: No     Alcohol/week: 0.0 standard drinks    Drug use: No    Sexual activity: Never   Lifestyle    Physical activity     Days per week: Not on file     Minutes per session: Not on file    Stress: Not on file   Relationships    Social connections     Talks on phone: Not on file     Gets together: Not on file Attends Druze service: Not on file     Active member of club or organization: Not on file     Attends meetings of clubs or organizations: Not on file     Relationship status: Not on file    Intimate partner violence     Fear of current or ex partner: Not on file     Emotionally abused: Not on file     Physically abused: Not on file     Forced sexual activity: Not on file   Other Topics Concern    Not on file   Social History Narrative    Not on file         ALLERGIES: Patient has no known allergies. Review of Systems   Constitutional: Negative for fever. HENT: Positive for sore throat. Respiratory: Positive for shortness of breath (chronic--unchanged). Musculoskeletal: Positive for myalgias. Neurological: Negative for headaches. All other systems reviewed and are negative. Vitals:    06/21/20 1045   BP: (!) 135/91   Pulse: 89   Resp: 20   Temp: 98.5 °F (36.9 °C)   SpO2: 91%   Weight: 57.6 kg (127 lb)   Height: 5' 3\" (1.6 m)            Physical Exam  Vitals signs and nursing note reviewed. Constitutional:       General: She is not in acute distress. Appearance: She is well-developed. She is not diaphoretic. HENT:      Head: Normocephalic and atraumatic. Nose: Nose normal. No rhinorrhea. Mouth/Throat:      Mouth: Mucous membranes are moist.   Eyes:      General: No scleral icterus. Right eye: No discharge. Left eye: No discharge. Neck:      Musculoskeletal: Neck supple. Vascular: No JVD. Cardiovascular:      Rate and Rhythm: Normal rate and regular rhythm. Heart sounds: Normal heart sounds. No murmur. No friction rub. No gallop. Pulmonary:      Effort: Pulmonary effort is normal. No respiratory distress. Breath sounds: No wheezing or rales. Comments: Scattered crackles bilaterally. Abdominal:      Palpations: Abdomen is soft. Tenderness: There is no abdominal tenderness. There is no guarding or rebound.    Musculoskeletal: General: No tenderness. Skin:     General: Skin is warm and dry. Findings: No rash. Neurological:      Mental Status: She is alert and oriented to person, place, and time. Motor: No abnormal muscle tone. Coordination: Coordination normal.     Chest x-ray per my interpretation remarkable for right upper lobe infiltrate. MDM  Number of Diagnoses or Management Options  Community acquired pneumonia of right upper lobe of lung:   Diagnosis management comments: Impression: URI symptoms patient with underlying history of COPD. Consider routine pathogens, viral, COVID19. Chest x-ray demonstrates new infiltrate in the right upper lobe distribution. Nontoxic clinical appearance. Not dyspneic at rest despite low oxygen saturations. Patient has underlying COPD and is not actively wheezing at this time. Patient appears stable for home treatment but will upgrade her antibiotic coverage to Levaquin 750 mg.  First dose administered in the ED. Short course of steroid prescribed. She states this is helped her in the past with similar type of infection. Procedures      Diagnosis:   1. Community acquired pneumonia of right upper lobe of lung      1. Chest x-ray preliminary reading positive for pneumonia involving the right upper lobe of the lung. This is a new finding from your prior chest x-ray in May. 2.  Start Levaquin 750 mg daily for 6 doses total.  3.  Add prednisone 20 mg/day for 5 days. 4.  Cannot rule out the possibility that current findings are related to [address]. Culture was sent and you will be advised if positive. 5.  Continue social distancing, use of facemask, and frequent handwashing. 6.  Isolation precautions as per the CDC outlined below. 7.  Follow-up with your primary care physician for recheck in 3 to 5 days if not improving and return if acutely worse.     Disposition: home    Follow-up Information     Follow up With Specialties Details Why Contact Info Christine Cadet, DEREK Nurse Practitioner In 3 days for recheck of ongoing symptoms Padmini [address]  [phone] [address] [facility] EMERGENCY DEPT Emergency Medicine  As needed, If symptoms worsen [address] [facility]  [phone]          Patient's Medications   Start Taking    LEVOFLOXACIN (LEVAQUIN) 750 MG TABLET    Take 1 Tab by mouth daily. PREDNISONE (DELTASONE) 20 MG TABLET    Take 20 mg by mouth daily for 10 days. With Breakfast   Continue Taking    ACETAMINOPHEN (TYLENOL EXTRA STRENGTH) 500 MG TABLET    Take 500 mg by mouth every six (6) hours as needed for Pain. ALBUTEROL (PROVENTIL HFA, VENTOLIN HFA, PROAIR HFA) 90 MCG/ACTUATION INHALER    Take  by inhalation. ALBUTEROL (PROVENTIL VENTOLIN) 2.5 MG /3 ML (0.083 %) NEBULIZER SOLUTION    3 mL by Nebulization route every four (4) hours as needed for Wheezing or Shortness of Breath. ALPRAZOLAM (XANAX) 0.5 MG TABLET    Take  by mouth. ASPIRIN DELAYED-RELEASE 81 MG TABLET    Take  by mouth daily. DICLOFENAC (VOLTAREN) 1 % GEL    Apply  to affected area four (4) times daily. FLUTICASONE-UMECLIDIN-VILANTER (TRELEGY ELLIPTA) 100-62.5-25 MCG DSDV    Take 1 Puff by inhalation daily. FUROSEMIDE (LASIX) 20 MG TABLET    20 mg po daily    LOSARTAN (COZAAR) 50 MG TABLET    Take  by mouth daily. METOPROLOL (LOPRESSOR) 25 MG TABLET    Take 25 mg by mouth two (2) times a day. NIFEDIPINE ER (ADALAT CC) 60 MG ER TABLET    Take 60 mg by mouth daily. OXYGEN        SIMVASTATIN (ZOCOR) 20 MG TABLET    Take  by mouth nightly.    These Medications have changed    No medications on file   Stop Taking    No medications on file

## 2020-06-21 NOTE — ED NOTES
Florencia Dove is a 82 y.o. female that was discharged in stable condition. The patients diagnosis, condition and treatment were explained to  patient and aftercare instructions were given. The patient verbalized understanding. Patient armband removed and shredded.

## 2020-06-22 ENCOUNTER — PATIENT OUTREACH (OUTPATIENT)
Dept: FAMILY MEDICINE CLINIC | Facility: CLINIC | Age: 85
End: 2020-06-22

## 2020-06-22 NOTE — PROGRESS NOTES
Patient contacted regarding [id number] suspect. Discussed COVID-19 related testing which was pending at this time. Test results were pending. Patient informed of results, if available? pending     CareCoordinator contacted the patient by telephone to perform post discharge assessment. Verified name and  with patient as identifiers. Provided introduction to self, and explanation of the are coordinator role, and reason for call due to risk factors for infection and/or exposure to COVID-19. Symptoms reviewed with patient who verbalized the following symptoms: fatigue and cough. Due to no new or worsening symptoms encounter was not routed to provider for escalation. Discussed follow-up appointments. If no appointment was previously scheduled, appointment scheduling offered: [facility] follow up appointment(s):   Future Appointments   Date Time Provider Jaya Nolasco   2020 11:30 AM Israel FridayMD SUKHI SCHED   10/22/2020 11:00 AM [facility] 1 2VV TYLER SCHED   2020  1:30 PM JOELLE Abreu 2VV TYLER SCHED     Non-BS follow up appointment(s): pt will f/u with pcp as needed      Patient has following risk factors of: pneumonia and htn. Care Coordinator reviewed discharge instructions, medical action plan and red flags such as increased shortness of breath, increasing fever and signs of decompensation with patient who verbalized understanding. Discussed exposure protocols and quarantine with CDC Guidelines What to do if you are sick with coronavirus disease .  Patient was given an opportunity for questions and concerns. The patient agrees to contact the [facility] exposure line [phone], local [facility] department [facility]  ([phone]) and PCP office for questions related to their healthcare. Are Coordinator provided contact information for future needs.     Reviewed and educated patient on any new and changed medications related to discharge diagnosis. Patient/family/caregiver given information for [employer] and agrees to enroll no  Patient's preferred e-mail:  n/a  Patient's preferred phone number: n/a  Based on Loop alert triggers, patient will be contacted by nurse care manager for worsening symptoms. Plan for follow-up call in 1-2 days based on severity of symptoms and risk factors.

## 2020-06-25 ENCOUNTER — PATIENT OUTREACH (OUTPATIENT)
Dept: FAMILY MEDICINE CLINIC | Facility: CLINIC | Age: 85
End: 2020-06-25

## 2020-06-25 NOTE — PROGRESS NOTES
Date/Time:  6/25/2020 1:30 PM  Attempted to reach patient by telephone. Left HIPPA compliant message requesting a return call. Will attempt to reach patient again.

## 2020-06-26 ENCOUNTER — PATIENT OUTREACH (OUTPATIENT)
Dept: FAMILY MEDICINE CLINIC | Facility: CLINIC | Age: 85
End: 2020-06-26

## 2020-06-26 LAB — SARS-COV-2, COV2NT: NOT DETECTED

## 2020-06-26 NOTE — PROGRESS NOTES
Patient contacted regarding COVID-19 risk and screening. Discussed COVID-19 related testing which was pending at this time. Test results were pending. Patient informed of results, if available? pending     Care Transition Nurse/ Ambulatory Care Manager contacted the patient by telephone to perform follow-up assessment. Verified name and  with patient as identifiers. Patient has following risk factors of: heart failure, COPD and htn. Symptoms reviewed with patient who verbalized the following symptoms: no worsening symptoms. Due to no new or worsening symptoms encounter was not routed to provider for escalation. Education provided regarding infection prevention, and signs and symptoms of COVID-19 and when to seek medical attention with patient who verbalized understanding. Discussed exposure protocols and quarantine from [address] you at higher risk for severe illness  and given an opportunity for questions and concerns. The patient agrees to contact the COVID-19 hotline [phone] or PCP office for questions related to their healthcare. CTN/ACM provided contact information for future reference. From CDC: Are you at higher risk for severe illness?  Wash your hands often.  Avoid close contact (6 feet, which is about two arm lengths) with people who are sick.  Put distance between yourself and other people if COVID-19 is spreading in your community.  Clean and disinfect frequently touched surfaces.  Avoid all cruise travel and non-essential air travel.  Call your healthcare professional if you have concerns about COVID-19 and your underlying condition or if you are sick. For more information on steps you can take to protect yourself, see CDC's How to [place] for follow-up call in 7-14 days based on severity of symptoms and risk factors.

## 2020-07-06 ENCOUNTER — HOSPITAL ENCOUNTER (OUTPATIENT)
Dept: LAB | Age: 85
Discharge: HOME | End: 2020-07-06
Payer: MEDICARE

## 2020-07-06 DIAGNOSIS — E55.9 VITAMIN D DEFICIENCY: ICD-10-CM

## 2020-07-06 DIAGNOSIS — I10 ESSENTIAL HYPERTENSION, MALIGNANT: ICD-10-CM

## 2020-07-06 DIAGNOSIS — E78.5 HYPERLIPIDEMIA: ICD-10-CM

## 2020-07-06 LAB
25(OH)D3 SERPL-MCNC: 24.6 NG/ML (ref 30–100)
ALBUMIN SERPL-MCNC: 3.8 G/DL (ref 3.4–5)
ALBUMIN/GLOB SERPL: 1.2 {RATIO} (ref 0.8–1.7)
ALP SERPL-CCNC: 54 U/L (ref 45–117)
ALT SERPL-CCNC: 19 U/L (ref 13–56)
ANION GAP SERPL CALC-SCNC: 8 MMOL/L (ref 3–18)
AST SERPL-CCNC: 17 U/L (ref 10–38)
BASOPHILS # BLD: 0.1 K/UL (ref 0–0.06)
BASOPHILS NFR BLD: 1 % (ref 0–3)
BILIRUB SERPL-MCNC: 0.5 MG/DL (ref 0.2–1)
BUN SERPL-MCNC: 19 MG/DL (ref 7–18)
BUN/CREAT SERPL: 21 (ref 12–20)
CALCIUM SERPL-MCNC: 9 MG/DL (ref 8.5–10.1)
CHLORIDE SERPL-SCNC: 110 MMOL/L (ref 100–111)
CHOLEST SERPL-MCNC: 153 MG/DL
CO2 SERPL-SCNC: 26 MMOL/L (ref 21–32)
CREAT SERPL-MCNC: 0.9 MG/DL (ref 0.6–1.3)
DIFFERENTIAL METHOD BLD: ABNORMAL
EOSINOPHIL # BLD: 0 K/UL (ref 0–0.4)
EOSINOPHIL NFR BLD: 0 % (ref 0–5)
ERYTHROCYTE [DISTWIDTH] IN BLOOD BY AUTOMATED COUNT: 15 % (ref 11.6–14.5)
GLOBULIN SER CALC-MCNC: 3.1 G/DL (ref 2–4)
GLUCOSE SERPL-MCNC: 101 MG/DL (ref 74–99)
HCT VFR BLD AUTO: 45.9 % (ref 35–45)
HDLC SERPL-MCNC: 44 MG/DL (ref 40–60)
HDLC SERPL: 3.5 {RATIO} (ref 0–5)
HGB BLD-MCNC: 14.8 G/DL (ref 12–16)
LDLC SERPL CALC-MCNC: 77.6 MG/DL (ref 0–100)
LIPID PROFILE,FLP: ABNORMAL
LYMPHOCYTES # BLD: 3 K/UL (ref 0.8–3.5)
LYMPHOCYTES NFR BLD: 24 % (ref 20–51)
MCH RBC QN AUTO: 28.6 PG (ref 24–34)
MCHC RBC AUTO-ENTMCNC: 32.2 G/DL (ref 31–37)
MCV RBC AUTO: 88.6 FL (ref 74–97)
MONOCYTES # BLD: 1.6 K/UL (ref 0–1)
MONOCYTES NFR BLD: 13 % (ref 2–9)
NEUTS SEG # BLD: 7.7 K/UL (ref 1.8–8)
NEUTS SEG NFR BLD: 62 % (ref 42–75)
PLATELET # BLD AUTO: 281 K/UL (ref 135–420)
PLATELET COMMENTS,PCOM: ABNORMAL
PMV BLD AUTO: 9.8 FL (ref 9.2–11.8)
POTASSIUM SERPL-SCNC: 4.6 MMOL/L (ref 3.5–5.5)
PROT SERPL-MCNC: 6.9 G/DL (ref 6.4–8.2)
RBC # BLD AUTO: 5.18 M/UL (ref 4.2–5.3)
RBC MORPH BLD: ABNORMAL
SODIUM SERPL-SCNC: 144 MMOL/L (ref 136–145)
TRIGL SERPL-MCNC: 157 MG/DL (ref ?–150)
TSH SERPL DL<=0.05 MIU/L-ACNC: 1.3 UIU/ML (ref 0.36–3.74)
VLDLC SERPL CALC-MCNC: 31.4 MG/DL
WBC # BLD AUTO: 12.4 K/UL (ref 4.6–13.2)

## 2020-07-06 PROCEDURE — 82306 VITAMIN D 25 HYDROXY: CPT

## 2020-07-06 PROCEDURE — 80061 LIPID PANEL: CPT

## 2020-07-06 PROCEDURE — 36415 COLL VENOUS BLD VENIPUNCTURE: CPT

## 2020-07-06 PROCEDURE — 80053 COMPREHEN METABOLIC PANEL: CPT

## 2020-07-06 PROCEDURE — 84443 ASSAY THYROID STIM HORMONE: CPT

## 2020-07-06 PROCEDURE — 85025 COMPLETE CBC W/AUTO DIFF WBC: CPT

## 2020-07-07 ENCOUNTER — PATIENT OUTREACH (OUTPATIENT)
Dept: CASE MANAGEMENT | Age: 85
End: 2020-07-07

## 2020-07-07 NOTE — PROGRESS NOTES
Date/Time:  7/7/2020 11:45 AM  Attempted to reach patient by telephone. Left HIPPA compliant message requesting a return call. This episode is resolved.

## 2020-07-23 ENCOUNTER — OFFICE VISIT (OUTPATIENT)
Dept: CARDIOLOGY CLINIC | Age: 85
End: 2020-07-23

## 2020-07-23 VITALS
SYSTOLIC BLOOD PRESSURE: 102 MMHG | BODY MASS INDEX: 22.64 KG/M2 | DIASTOLIC BLOOD PRESSURE: 52 MMHG | HEIGHT: 63 IN | HEART RATE: 71 BPM | WEIGHT: 127.8 LBS | TEMPERATURE: 98.1 F

## 2020-07-23 DIAGNOSIS — E78.5 HYPERLIPIDEMIA, UNSPECIFIED HYPERLIPIDEMIA TYPE: ICD-10-CM

## 2020-07-23 DIAGNOSIS — I50.32 CHRONIC DIASTOLIC CONGESTIVE HEART FAILURE (HCC): Primary | ICD-10-CM

## 2020-07-23 DIAGNOSIS — I27.20 PULMONARY HTN (HCC): ICD-10-CM

## 2020-07-23 DIAGNOSIS — I10 ESSENTIAL HYPERTENSION WITH GOAL BLOOD PRESSURE LESS THAN 140/90: ICD-10-CM

## 2020-07-23 DIAGNOSIS — J44.9 CHRONIC OBSTRUCTIVE PULMONARY DISEASE, UNSPECIFIED COPD TYPE (HCC): ICD-10-CM

## 2020-07-23 NOTE — PROGRESS NOTES
HISTORY OF PRESENT ILLNESS  Blanche Tanner is a 82 y.o. female. Patient with chf,copd,htn,pulm htn  Here for follow up    CHF   The history is provided by the patient. This is a chronic problem. The problem occurs constantly. The problem has not changed since onset. Associated symptoms include shortness of breath. Pertinent negatives include no chest pain, no abdominal pain and no headaches. The symptoms are aggravated by exertion. Nothing relieves the symptoms. Shortness of Breath   The history is provided by the patient. This is a recurrent problem. The problem occurs frequently. The problem has not changed since onset. Associated symptoms include leg swelling. Pertinent negatives include no fever, no headaches, no cough, no sputum production, no hemoptysis, no wheezing, no PND, no orthopnea, no chest pain, no vomiting, no abdominal pain, no rash and no claudication. Precipitated by: activity. Hypertension   Associated symptoms include shortness of breath. Pertinent negatives include no chest pain, no abdominal pain and no headaches. Review of Systems   Constitutional: Negative for chills and fever. HENT: Negative for nosebleeds. Eyes: Negative for blurred vision and double vision. Respiratory: Positive for shortness of breath. Negative for cough, hemoptysis, sputum production and wheezing. Cardiovascular: Positive for leg swelling. Negative for chest pain, palpitations, orthopnea, claudication and PND. Gastrointestinal: Negative for abdominal pain, heartburn, nausea and vomiting. Musculoskeletal: Negative for myalgias. Skin: Negative for rash. Neurological: Negative for dizziness, weakness and headaches. Endo/Heme/Allergies: Does not bruise/bleed easily.      Family History   Problem Relation Age of Onset    Heart Disease Mother     Hypertension Mother     Heart Attack Father     Hypertension Father        Past Medical History:   Diagnosis Date    Chronic lung disease     Chronic obstructive pulmonary disease (HCC)     Heart failure (HCC)     Hypertension        Past Surgical History:   Procedure Laterality Date    HX ORTHOPAEDIC      spinal sx 5/6    HX OTHER SURGICAL      Carotid artery    VASCULAR SURGERY PROCEDURE UNLIST      L CEA 10/2014       Social History     Tobacco Use    Smoking status: Former Smoker     Packs/day: 1.50     Years: 30.00     Pack years: 45.00     Types: Cigarettes     Last attempt to quit: 1987     Years since quittin.8    Smokeless tobacco: Never Used   Substance Use Topics    Alcohol use: No     Alcohol/week: 0.0 standard drinks       No Known Allergies        Visit Vitals  /52 (BP 1 Location: Right arm, BP Patient Position: Sitting)   Pulse 71   Temp 98.1 °F (36.7 °C) (Temporal)   Ht 5' 3\" (1.6 m)   Wt 58 kg (127 lb 12.8 oz)   BMI 22.64 kg/m²         Physical Exam   Constitutional: She is oriented to person, place, and time. She appears well-developed and well-nourished. HENT:   Head: Normocephalic and atraumatic. Eyes: Conjunctivae are normal.   Neck: Neck supple. No JVD present. No tracheal deviation present. No thyromegaly present. Cardiovascular: Normal rate and regular rhythm. PMI is not displaced. Exam reveals no gallop, no S3 and no decreased pulses. Murmur heard. Holosystolic murmur is present at the lower left sternal border. Pulmonary/Chest: No respiratory distress. She has no wheezes. She has no rales. She exhibits no tenderness. Abdominal: Soft. There is no abdominal tenderness. Musculoskeletal:         General: No edema. Neurological: She is alert and oriented to person, place, and time. Skin: Skin is warm. Psychiatric: She has a normal mood and affect. Ms. Mahad Mosley has a reminder for a \"due or due soon\" health maintenance. I have asked that she contact her primary care provider for follow-up on this health maintenance.   I have personally reviewed patient's records available from hospital and other providers and incorporated findings in patient care. 6/2016-[facility]    SUMMARY:6/2016-echo  Procedure information: Image quality was adequate. Left ventricle: Systolic function was normal. Ejection fraction was  estimated to be 60 %. No obvious wall motion abnormalities identified in  the views obtained. Wall thickness was mildly increased. Features were  consistent with a pseudonormal left ventricular filling pattern, with  concomitant abnormal relaxation and increased filling pressure (grade 2  diastolic dysfunction). Right ventricle: Systolic pressure was markedly increased. Estimated peak  pressure was 74 mmHg. Left atrium: The atrium was mildly to moderately dilated. Inferior vena cava, hepatic veins: The inferior vena cava was mildly  Dilated. SUMMARY:6/2017-echo  Left ventricle: Systolic function was normal by visual assessment. Ejection fraction was estimated to be 60 %. There was possible hypokinesis  of the basal-mid anteroseptal, basal-mid inferoseptal, apical inferior,  and apical septal wall(s). Wall thickness was mildly increased. Doppler  parameters were consistent with abnormal left ventricular relaxation  (grade 1 diastolic dysfunction). Right ventricle: The size was at the upper limits of normal. Systolic  pressure was moderately increased. Estimated peak pressure was 60 mmHg. Right atrium: The atrium was mildly dilated. I Have personally reviewed recent relevant labs available and discussed with patient  1/2018 5/2018  Left Leg:-  Deep venous thrombosis:           No  Superficial venous thrombosis:    No  Deep venous insufficiency:        Not examined  Superficial venous insufficiency: Not examined     Assessment         ICD-10-CM ICD-9-CM    1. Chronic diastolic congestive heart failure (HCC)  I50.32 428.32 ECHO ADULT COMPLETE     428.0     Stable continue current medical management   2.  Essential hypertension with goal blood pressure less than 140/90  I10 401.9 Stable continue treatment   3. Hyperlipidemia, unspecified hyperlipidemia type  E78.5 272.4     Continue treatment lab with PCP   4. Pulmonary HTN (HCC)  I27.20 416.8 ECHO ADULT COMPLETE    Multifactorial monitor   5. Chronic obstructive pulmonary disease, unspecified COPD type ([facility]  J44.9 496     Stable continue treatment monitor     12/2017  Discussed option of right heart cath-risk benefit discussed-patient willing to proceed  Followed by Dr Mick Rincon    1/2018  Patient postponed rt heart cath-she will get it done in sping  moderate  ?? group 3  evaluate for group 1   5/2018  Does not want rt heart cath  11/2018  Cardiac status stable. Shortness of breath class III which is multifactorial.  Blood pressure elevated today but usually normal at home. Patient will continue to monitor. Salt restriction  7/2020  Cardiac status stable. Recent recovering from URI type symptom. COVID-19 test negative. Continue treatment. Follow-up echo for pulmonary hypertension. Remains on home oxygen. Had some leg cramps and simvastatin has been switched to atorvastatin by PCP. Recent labs unremarkable. LDL controlled. Advised to use CoQ10  There are no discontinued medications. Orders Placed This Encounter    ECHO ADULT COMPLETE     Standing Status:   Future     Standing Expiration Date:   7/23/2021     Order Specific Question:   Contrast Enhancement (Bubble Study, Definity, Optison) may be used if criteria listed in established evidence-based protocol has been identified. Answer:   Yes       Follow-up and Dispositions    · Return in about 3 months (around 10/23/2020).

## 2020-07-23 NOTE — PROGRESS NOTES
1. Have you been to the ER, urgent care clinic since your last visit? Hospitalized since your last visit? Yes When: 6- Where: [facility] Reason for visit: pneumonia     2. Have you seen or consulted any other health care providers outside of the [address] [place] [address] since your last visit? Include any pap smears or colon screening.       Yes When: x 1 wk ago with PCP

## 2020-09-04 ENCOUNTER — HOSPITAL ENCOUNTER (OUTPATIENT)
Dept: GENERAL RADIOLOGY | Age: 85
Discharge: HOME | End: 2020-09-04
Payer: MEDICARE

## 2020-09-04 DIAGNOSIS — J18.9 PNEUMONIA: ICD-10-CM

## 2020-09-04 PROCEDURE — 71046 X-RAY EXAM CHEST 2 VIEWS: CPT

## 2020-09-06 NOTE — PROGRESS NOTES
COPD Assessment Tool (CAT)     0-5           I never cough/I cough all the time       Score:2      I have no phlegm in my chest/ My chest is completely full of phlegm  Score:1      My chest does not feel tight at all/ My chest feels very tight   Score:0      When I walk up a hill or stairs I am bot breathless/   When I walk up a hill or stairs I am very breathless    Score:5      I am not limited doing any activities at home/   I am very limited doing activities at home     Score:3      I am confident leaving my home despite my lung condition/  I am not at all confident leaving my home because of my lung condition Score:0      I sleep soundly/ I don't sleep because of my lung condition   Score:0      I have lots of energy/ I have no energy at all     Score:0                   Total:11 Opt out

## 2020-09-17 ENCOUNTER — HOSPITAL ENCOUNTER (OUTPATIENT)
Dept: MAMMOGRAPHY | Age: 85
Discharge: HOME | End: 2020-09-17
Attending: NURSE PRACTITIONER
Payer: MEDICARE

## 2020-09-17 DIAGNOSIS — Z12.31 VISIT FOR SCREENING MAMMOGRAM: ICD-10-CM

## 2020-09-17 PROCEDURE — 77067 SCR MAMMO BI INCL CAD: CPT

## 2020-10-06 ENCOUNTER — HOSPITAL ENCOUNTER (OUTPATIENT)
Dept: CT IMAGING | Age: 85
Discharge: HOME | End: 2020-10-06
Attending: INTERNAL MEDICINE
Payer: MEDICARE

## 2020-10-06 DIAGNOSIS — J44.9 COPD (CHRONIC OBSTRUCTIVE PULMONARY DISEASE) (HCC): ICD-10-CM

## 2020-10-06 PROCEDURE — 71250 CT THORAX DX C-: CPT

## 2020-10-15 ENCOUNTER — OFFICE VISIT (OUTPATIENT)
Dept: CARDIOLOGY CLINIC | Age: 85
End: 2020-10-15
Payer: MEDICARE

## 2020-10-15 VITALS
DIASTOLIC BLOOD PRESSURE: 60 MMHG | BODY MASS INDEX: 22.5 KG/M2 | HEIGHT: 63 IN | SYSTOLIC BLOOD PRESSURE: 146 MMHG | HEART RATE: 59 BPM | WEIGHT: 127 LBS | OXYGEN SATURATION: 91 % | TEMPERATURE: 97.7 F

## 2020-10-15 DIAGNOSIS — I50.32 CHRONIC DIASTOLIC CONGESTIVE HEART FAILURE (HCC): Primary | ICD-10-CM

## 2020-10-15 DIAGNOSIS — I25.119 ATHEROSCLEROSIS OF NATIVE CORONARY ARTERY OF NATIVE HEART WITH ANGINA PECTORIS (HCC): ICD-10-CM

## 2020-10-15 DIAGNOSIS — E78.5 HYPERLIPIDEMIA, UNSPECIFIED HYPERLIPIDEMIA TYPE: ICD-10-CM

## 2020-10-15 DIAGNOSIS — I27.20 PULMONARY HTN (HCC): ICD-10-CM

## 2020-10-15 DIAGNOSIS — I10 ESSENTIAL HYPERTENSION WITH GOAL BLOOD PRESSURE LESS THAN 140/90: ICD-10-CM

## 2020-10-15 DIAGNOSIS — J44.9 CHRONIC OBSTRUCTIVE PULMONARY DISEASE, UNSPECIFIED COPD TYPE (HCC): ICD-10-CM

## 2020-10-15 PROCEDURE — G8753 SYS BP > OR = 140: HCPCS | Performed by: INTERNAL MEDICINE

## 2020-10-15 PROCEDURE — G8427 DOCREV CUR MEDS BY ELIG CLIN: HCPCS | Performed by: INTERNAL MEDICINE

## 2020-10-15 PROCEDURE — 99214 OFFICE O/P EST MOD 30 MIN: CPT | Performed by: INTERNAL MEDICINE

## 2020-10-15 PROCEDURE — G8536 NO DOC ELDER MAL SCRN: HCPCS | Performed by: INTERNAL MEDICINE

## 2020-10-15 PROCEDURE — 1101F PT FALLS ASSESS-DOCD LE1/YR: CPT | Performed by: INTERNAL MEDICINE

## 2020-10-15 PROCEDURE — G8432 DEP SCR NOT DOC, RNG: HCPCS | Performed by: INTERNAL MEDICINE

## 2020-10-15 PROCEDURE — G8420 CALC BMI NORM PARAMETERS: HCPCS | Performed by: INTERNAL MEDICINE

## 2020-10-15 PROCEDURE — 1090F PRES/ABSN URINE INCON ASSESS: CPT | Performed by: INTERNAL MEDICINE

## 2020-10-15 PROCEDURE — G8400 PT W/DXA NO RESULTS DOC: HCPCS | Performed by: INTERNAL MEDICINE

## 2020-10-15 PROCEDURE — G8754 DIAS BP LESS 90: HCPCS | Performed by: INTERNAL MEDICINE

## 2020-10-15 RX ORDER — BISMUTH SUBSALICYLATE 262 MG
1 TABLET,CHEWABLE ORAL DAILY
COMMUNITY
End: 2021-10-24

## 2020-10-15 NOTE — PROGRESS NOTES
1. Have you been to the ER, urgent care clinic since your last visit? Hospitalized since your last visit? No    2. Have you seen or consulted any other health care providers outside of the [address] [place] [address] since your last visit? Include any pap smears or colon screening.  Yes Where: Pulmonary

## 2020-10-15 NOTE — PROGRESS NOTES
HISTORY OF PRESENT ILLNESS  Blanche Akins is a 82 y.o. female. Patient with chf,copd,htn,pulm htn  Here for follow up    Hypertension   Associated symptoms include shortness of breath. Pertinent negatives include no chest pain, no abdominal pain and no headaches. CHF   The history is provided by the patient. This is a chronic problem. The problem occurs constantly. The problem has not changed since onset. Associated symptoms include shortness of breath. Pertinent negatives include no chest pain, no abdominal pain and no headaches. The symptoms are aggravated by exertion. Nothing relieves the symptoms. Shortness of Breath   The history is provided by the patient. This is a recurrent problem. The problem occurs frequently. The problem has not changed since onset. Associated symptoms include leg swelling. Pertinent negatives include no fever, no headaches, no cough, no sputum production, no hemoptysis, no wheezing, no PND, no orthopnea, no chest pain, no vomiting, no abdominal pain, no rash and no claudication. Precipitated by: activity. Review of Systems   Constitutional: Negative for chills and fever. HENT: Negative for nosebleeds. Eyes: Negative for blurred vision and double vision. Respiratory: Positive for shortness of breath. Negative for cough, hemoptysis, sputum production and wheezing. Cardiovascular: Positive for leg swelling. Negative for chest pain, palpitations, orthopnea, claudication and PND. Gastrointestinal: Negative for abdominal pain, heartburn, nausea and vomiting. Musculoskeletal: Negative for myalgias. Skin: Negative for rash. Neurological: Negative for dizziness, weakness and headaches. Endo/Heme/Allergies: Does not bruise/bleed easily.      Family History   Problem Relation Age of Onset    Heart Disease Mother     Hypertension Mother     Heart Attack Father     Hypertension Father        Past Medical History:   Diagnosis Date    Chronic lung disease     Chronic obstructive pulmonary disease (HCC)     Heart failure (HCC)     Hypertension        Past Surgical History:   Procedure Laterality Date    HX ORTHOPAEDIC      spinal sx 5/6    HX OTHER SURGICAL      Carotid artery    VASCULAR SURGERY PROCEDURE UNLIST      L CEA 10/2014       Social History     Tobacco Use    Smoking status: Former Smoker     Packs/day: 1.50     Years: 30.00     Pack years: 45.00     Types: Cigarettes     Last attempt to quit: 1987     Years since quittin.1    Smokeless tobacco: Never Used   Substance Use Topics    Alcohol use: No     Alcohol/week: 0.0 standard drinks       No Known Allergies        Visit Vitals  BP (!) 146/60 (BP 1 Location: Left arm, BP Patient Position: Sitting)   Pulse (!) 59   Temp 97.7 °F (36.5 °C) (Temporal)   Ht 5' 3\" (1.6 m)   Wt 57.6 kg (127 lb)   SpO2 91% Comment: 3 Liters of oxygen   BMI 22.50 kg/m²         Physical Exam   Constitutional: She is oriented to person, place, and time. She appears well-developed and well-nourished. HENT:   Head: Normocephalic and atraumatic. Eyes: Conjunctivae are normal.   Neck: Neck supple. No JVD present. No tracheal deviation present. No thyromegaly present. Cardiovascular: Normal rate and regular rhythm. PMI is not displaced. Exam reveals no gallop, no S3 and no decreased pulses. Murmur heard. Holosystolic murmur is present at the lower left sternal border. Pulmonary/Chest: No respiratory distress. She has no wheezes. She has no rales. She exhibits no tenderness. Abdominal: Soft. There is no abdominal tenderness. Musculoskeletal:         General: No edema. Neurological: She is alert and oriented to person, place, and time. Skin: Skin is warm. Psychiatric: She has a normal mood and affect. Ms. Liberty Zambrano has a reminder for a \"due or due soon\" health maintenance. I have asked that she contact her primary care provider for follow-up on this health maintenance.   I have personally reviewed patient's records available from hospital and other providers and incorporated findings in patient care. 6/2016-[facility]    SUMMARY:6/2016-echo  Procedure information: Image quality was adequate. Left ventricle: Systolic function was normal. Ejection fraction was  estimated to be 60 %. No obvious wall motion abnormalities identified in  the views obtained. Wall thickness was mildly increased. Features were  consistent with a pseudonormal left ventricular filling pattern, with  concomitant abnormal relaxation and increased filling pressure (grade 2  diastolic dysfunction). Right ventricle: Systolic pressure was markedly increased. Estimated peak  pressure was 74 mmHg. Left atrium: The atrium was mildly to moderately dilated. Inferior vena cava, hepatic veins: The inferior vena cava was mildly  Dilated. SUMMARY:6/2017-echo  Left ventricle: Systolic function was normal by visual assessment. Ejection fraction was estimated to be 60 %. There was possible hypokinesis  of the basal-mid anteroseptal, basal-mid inferoseptal, apical inferior,  and apical septal wall(s). Wall thickness was mildly increased. Doppler  parameters were consistent with abnormal left ventricular relaxation  (grade 1 diastolic dysfunction). Right ventricle: The size was at the upper limits of normal. Systolic  pressure was moderately increased. Estimated peak pressure was 60 mmHg. Right atrium: The atrium was mildly dilated. I Have personally reviewed recent relevant labs available and discussed with patient  1/2018 5/2018  Left Leg:-  Deep venous thrombosis:           No  Superficial venous thrombosis:    No  Deep venous insufficiency:        Not examined  Superficial venous insufficiency: Not examined   Interpretation Summary 9/2020    · LV: Estimated LVEF is 65 - 70%. Normal cavity size, wall thickness and systolic function (ejection fraction normal).  Wall motion: normal. Mild (grade 1) left ventricular diastolic dysfunction. · LA: Left Atrium volume index is 30.81 mL/m2. · MV: Mild mitral valve regurgitation is present. · TV: Mild tricuspid valve regurgitation is present. · PV: Mild pulmonic valve regurgitation is present. · PA: Moderate pulmonary hypertension. Pulmonary arterial systolic pressure is 66 mmHg. IMPRESSION 10/2020-CT chest  Impression:     Advanced emphysema. No acute pulmonary process. Pleural parenchymal scarring in  the lung apices but no suspicious pulmonary nodule.     Coronary artery disease and atherosclerosis. Assessment         ICD-10-CM ICD-9-CM    1. Chronic diastolic congestive heart failure (HCC)  I50.32 428.32      428.0     Stable continue treatment shortness of breath multifactorial   2. Pulmonary HTN (HCC)  I27.20 416.8     Moderate. Likely group 3 continue monitoring home oxygen   3. Essential hypertension with goal blood pressure less than 140/90  I10 401.9     Stable monitor   4. Hyperlipidemia, unspecified hyperlipidemia type  E78.5 272.4     Continue treatment monitor   5. Chronic obstructive pulmonary disease, unspecified COPD type ([address]  J44.9 496     Emphysema on CT scan continue monitoring   6. Atherosclerosis of native coronary artery of native heart with angina pectoris ([address]  I25.119 414.01      413.9     Reported on CAT scan. Patient wants continue medical management at present continue therapy     12/2017  Discussed option of right heart cath-risk benefit discussed-patient willing to proceed  Followed by Dr Enriqueta Santos    1/2018  Patient postponed rt heart cath-she will get it done in sping  moderate  ?? group 3  evaluate for group 1   5/2018  Does not want rt heart cath  11/2018  Cardiac status stable. Shortness of breath class III which is multifactorial.  Blood pressure elevated today but usually normal at home. Patient will continue to monitor. Salt restriction  7/2020  Cardiac status stable. Recent recovering from URI type symptom. COVID-19 test negative. Continue treatment. Follow-up echo for pulmonary hypertension. Remains on home oxygen. Had some leg cramps and simvastatin has been switched to atorvastatin by PCP. Recent labs unremarkable. LDL controlled. Advised to use CoQ10  And 2020  Cardiac status stable. Short of breath multifactorial.  CT scan reviewed that shows coronary atherosclerosis. Continue medical management. There are no discontinued medications. No orders of the defined types were placed in this encounter. Follow-up and Dispositions    · Return in about 6 months (around 4/15/2021).

## 2020-11-17 ENCOUNTER — OFFICE VISIT (OUTPATIENT)
Dept: VASCULAR SURGERY | Age: 85
End: 2020-11-17
Payer: MEDICARE

## 2020-11-17 VITALS
WEIGHT: 127 LBS | HEIGHT: 63 IN | BODY MASS INDEX: 22.5 KG/M2 | SYSTOLIC BLOOD PRESSURE: 158 MMHG | HEART RATE: 60 BPM | RESPIRATION RATE: 22 BRPM | DIASTOLIC BLOOD PRESSURE: 70 MMHG

## 2020-11-17 DIAGNOSIS — Z98.890 S/P CAROTID ENDARTERECTOMY: ICD-10-CM

## 2020-11-17 DIAGNOSIS — I65.23 CAROTID STENOSIS, ASYMPTOMATIC, BILATERAL: Primary | ICD-10-CM

## 2020-11-17 PROCEDURE — 1101F PT FALLS ASSESS-DOCD LE1/YR: CPT | Performed by: PHYSICIAN ASSISTANT

## 2020-11-17 PROCEDURE — 99213 OFFICE O/P EST LOW 20 MIN: CPT | Performed by: PHYSICIAN ASSISTANT

## 2020-11-17 PROCEDURE — G8432 DEP SCR NOT DOC, RNG: HCPCS | Performed by: PHYSICIAN ASSISTANT

## 2020-11-17 PROCEDURE — G8753 SYS BP > OR = 140: HCPCS | Performed by: PHYSICIAN ASSISTANT

## 2020-11-17 PROCEDURE — G8754 DIAS BP LESS 90: HCPCS | Performed by: PHYSICIAN ASSISTANT

## 2020-11-17 PROCEDURE — G8536 NO DOC ELDER MAL SCRN: HCPCS | Performed by: PHYSICIAN ASSISTANT

## 2020-11-17 PROCEDURE — 1090F PRES/ABSN URINE INCON ASSESS: CPT | Performed by: PHYSICIAN ASSISTANT

## 2020-11-17 PROCEDURE — G8420 CALC BMI NORM PARAMETERS: HCPCS | Performed by: PHYSICIAN ASSISTANT

## 2020-11-17 PROCEDURE — G8428 CUR MEDS NOT DOCUMENT: HCPCS | Performed by: PHYSICIAN ASSISTANT

## 2020-11-17 PROCEDURE — G8400 PT W/DXA NO RESULTS DOC: HCPCS | Performed by: PHYSICIAN ASSISTANT

## 2020-11-17 NOTE — PROGRESS NOTES
1. Have you been to an emergency room or urgent care clinic since your last visit? yes  Hospitalized since your last visit? If yes, where, when, and reason for visit?   no  2. Have you seen or consulted any other health care providers outside of the [facility] since your last visit including any procedures, health maintenance items. If yes, where, when and reason for visit?    yes

## 2020-11-17 NOTE — PROGRESS NOTES
[address]    Chief Complaint   Patient presents with    Carotid Artery Stenosis       History and Physical    Ms Frances Murphy is here just over 5 years s/p L CEA and this is a surveillance visit  A few years ago she became dependent on O2 supplementation. She has done well with adjusting to full time oxygen. She had still been active and busy until the pandemic limited what she could do! She had been having issues with lower extremity edema and did get compression stockings, which she wears regularly, and she says this does help       Past Medical History:   Diagnosis Date    Chronic lung disease     [facility] obstructive [facility] [address]     Heart failure [address] [address]     Hypertension      Patient Active Problem List   Diagnosis Code    Occlusion and stenosis of carotid artery I65.29    Carotid stenosis I65.29    BURGESS (dyspnea on exertion) R06.00    Hyperlipidemia E78.5    Essential hypertension with goal blood pressure less than 140/90 I10    Chronic diastolic congestive heart failure (HCC) I50.32    Chronic obstructive pulmonary disease (HCC) J44.9    Pulmonary HTN (HCC) I27.20     Past Surgical History:   Procedure Laterality Date    HX ORTHOPAEDIC      spinal sx 5/6    HX OTHER SURGICAL      Carotid artery    VASCULAR SURGERY PROCEDURE UNLIST      L CEA 10/2014     Current Outpatient Medications   Medication Sig Dispense Refill    multivitamin (ONE A DAY) tablet Take 1 Tab by mouth daily.  COQ10, UBIQUINOL, PO Take  by mouth.  losartan (COZAAR) 50 mg tablet Take  by mouth daily. 75 mg in a.m      fluticasone-umeclidin-vilanter (TRELEGY ELLIPTA) 100-62.5-25 mcg dsdv Take 1 Puff by inhalation daily.  diclofenac (VOLTAREN) 1 % gel Apply  to affected area four (4) times daily. 100 g 2    Oxygen       albuterol (PROVENTIL HFA, VENTOLIN HFA, PROAIR HFA) 90 mcg/actuation inhaler Take  by inhalation.       albuterol (PROVENTIL VENTOLIN) 2.5 mg /3 mL (0.083 %) nebulizer solution 3 mL by Nebulization route every four (4) hours as needed for Wheezing or Shortness of Breath. 48 Each 0    furosemide (LASIX) 20 mg tablet 20 mg po daily (Patient taking differently: Take 40 mg by mouth daily.) 30 Tab 0    acetaminophen (TYLENOL EXTRA STRENGTH) 500 mg tablet Take 500 mg by mouth every six (6) hours as needed for Pain.  metoprolol (LOPRESSOR) 25 mg tablet Take 25 mg by mouth two (2) times a day.  NIFEdipine ER (ADALAT CC) 60 mg ER tablet Take 60 mg by mouth daily.  simvastatin (ZOCOR) 20 mg tablet Take  by mouth nightly.  alprazolam (XANAX) 0.5 mg tablet Take  by mouth.  aspirin delayed-release 81 mg tablet Take  by mouth daily. No Known Allergies      Physical   Visit Vitals  BP (!) 158/70 (BP 1 Location: Left arm, BP Patient Position: Sitting)   Pulse 60   Resp 22   Ht 5' 3\" (1.6 m)   Wt 127 lb (57.6 kg)   BMI 22.50 kg/m²     General:  Alert, cooperative, no distress. On Oxygen via nasal cannula   Head:  Normocephalic, without obvious abnormality, atraumatic. Eyes:     Conjunctivae/corneas clear. Pupils equal, round, reactive to light. Extraocular movements intact. Neck:         L CEA scar   Lungs:   No current increased respiratory effort, even in spite of removing her oxygen so she could wear her mask   Extremities: Extremities normal, atraumatic, but about 1+ edema bilaterally   Pulses: Difficult to palpate due to edema but no signs of ischemia   Skin: Skin color, texture, turgor normal. No rashes or lesions       Vascular studies:  Mild heterogeneous plaque right internal carotid artery with less than 30% stenosis. Mild heterogeneous plaque seen left internal carotid artery with less than 50% stenosis. Bilateral vertebrals are antegrade. Bilateral subclavian's appear normal    Impression/Plan:     ICD-10-CM ICD-9-CM    1. Carotid stenosis, asymptomatic, bilateral  I65.23 433.10 DUPLEX CAROTID BILATERAL     433.30    2.  S/P carotid endarterectomy  Z98.890 V45.89 DUPLEX CAROTID BILATERAL     No orders of the defined types were placed in this encounter. Continue yearly surveillance based on stable results    Follow-up and Dispositions    · Return in about 1 year (around 11/17/2021). JOELLE Reid    Portions of this note have been entered using voice recognition software.

## 2020-12-10 ENCOUNTER — APPOINTMENT (OUTPATIENT)
Dept: CT IMAGING | Age: 85
DRG: 177 | End: 2020-12-10
Attending: EMERGENCY MEDICINE
Payer: MEDICARE

## 2020-12-10 ENCOUNTER — HOSPITAL ENCOUNTER (INPATIENT)
Age: 85
LOS: 9 days | Discharge: HOME HEALTH | DRG: 177 | End: 2020-12-19
Attending: EMERGENCY MEDICINE | Admitting: INTERNAL MEDICINE
Payer: MEDICARE

## 2020-12-10 ENCOUNTER — APPOINTMENT (OUTPATIENT)
Dept: GENERAL RADIOLOGY | Age: 85
DRG: 177 | End: 2020-12-10
Attending: EMERGENCY MEDICINE
Payer: MEDICARE

## 2020-12-10 DIAGNOSIS — Z20.822 SUSPECTED COVID-19 VIRUS INFECTION: ICD-10-CM

## 2020-12-10 DIAGNOSIS — I10 ESSENTIAL HYPERTENSION WITH GOAL BLOOD PRESSURE LESS THAN 140/90: ICD-10-CM

## 2020-12-10 DIAGNOSIS — J96.01 ACUTE RESPIRATORY FAILURE WITH HYPOXIA (HCC): ICD-10-CM

## 2020-12-10 DIAGNOSIS — I27.20 PULMONARY HTN (HCC): ICD-10-CM

## 2020-12-10 DIAGNOSIS — E78.5 HYPERLIPIDEMIA, UNSPECIFIED HYPERLIPIDEMIA TYPE: ICD-10-CM

## 2020-12-10 DIAGNOSIS — U07.1 COVID-19: ICD-10-CM

## 2020-12-10 DIAGNOSIS — I50.32 CHRONIC DIASTOLIC CONGESTIVE HEART FAILURE (HCC): ICD-10-CM

## 2020-12-10 DIAGNOSIS — J96.00 ACUTE RESPIRATORY FAILURE DUE TO COVID-19 (HCC): ICD-10-CM

## 2020-12-10 DIAGNOSIS — U07.1 ACUTE RESPIRATORY FAILURE DUE TO COVID-19 (HCC): ICD-10-CM

## 2020-12-10 DIAGNOSIS — J44.9 CHRONIC OBSTRUCTIVE PULMONARY DISEASE, UNSPECIFIED COPD TYPE (HCC): ICD-10-CM

## 2020-12-10 DIAGNOSIS — J18.9 COMMUNITY ACQUIRED PNEUMONIA OF RIGHT LOWER LOBE OF LUNG: Primary | ICD-10-CM

## 2020-12-10 LAB
ALBUMIN SERPL-MCNC: 3.5 G/DL (ref 3.4–5)
ALBUMIN/GLOB SERPL: 0.9 {RATIO} (ref 0.8–1.7)
ALP SERPL-CCNC: 52 U/L (ref 45–117)
ALT SERPL-CCNC: 27 U/L (ref 13–56)
ANION GAP SERPL CALC-SCNC: 5 MMOL/L (ref 3–18)
APTT PPP: 34 SEC (ref 23–36.4)
ARTERIAL PATENCY WRIST A: YES
AST SERPL-CCNC: 35 U/L (ref 10–38)
ATRIAL RATE: 77 BPM
BASE EXCESS BLD CALC-SCNC: 0 MMOL/L
BASOPHILS # BLD: 0 K/UL (ref 0–0.1)
BASOPHILS NFR BLD: 0 % (ref 0–2)
BDY SITE: ABNORMAL
BILIRUB SERPL-MCNC: 0.5 MG/DL (ref 0.2–1)
BNP SERPL-MCNC: 1632 PG/ML (ref 0–1800)
BODY TEMPERATURE: 98.3
BUN SERPL-MCNC: 16 MG/DL (ref 7–18)
BUN/CREAT SERPL: 15 (ref 12–20)
CALCIUM SERPL-MCNC: 8.3 MG/DL (ref 8.5–10.1)
CALCULATED P AXIS, ECG09: 42 DEGREES
CALCULATED R AXIS, ECG10: 19 DEGREES
CALCULATED T AXIS, ECG11: 79 DEGREES
CHLORIDE SERPL-SCNC: 105 MMOL/L (ref 100–111)
CO2 SERPL-SCNC: 26 MMOL/L (ref 21–32)
COVID-19 RAPID TEST, COVR: DETECTED
CREAT SERPL-MCNC: 1.06 MG/DL (ref 0.6–1.3)
CRP SERPL-MCNC: 2.5 MG/DL (ref 0–0.3)
DIAGNOSIS, 93000: NORMAL
DIFFERENTIAL METHOD BLD: ABNORMAL
EOSINOPHIL # BLD: 0 K/UL (ref 0–0.4)
EOSINOPHIL NFR BLD: 0 % (ref 0–5)
ERYTHROCYTE [DISTWIDTH] IN BLOOD BY AUTOMATED COUNT: 15.3 % (ref 11.6–14.5)
FERRITIN SERPL-MCNC: 143 NG/ML (ref 8–388)
FIBRINOGEN PPP-MCNC: 445 MG/DL (ref 210–451)
FLUAV AG NPH QL IA: NEGATIVE
FLUBV AG NOSE QL IA: NEGATIVE
GAS FLOW.O2 O2 DELIVERY SYS: ABNORMAL L/MIN
GAS FLOW.O2 SETTING OXYMISER: 11 L/M
GLOBULIN SER CALC-MCNC: 3.7 G/DL (ref 2–4)
GLUCOSE SERPL-MCNC: 100 MG/DL (ref 74–99)
HCO3 BLD-SCNC: 24.4 MMOL/L (ref 22–26)
HCT VFR BLD AUTO: 45.3 % (ref 35–45)
HEALTH STATUS, XMCV2T: ABNORMAL
HGB BLD-MCNC: 14.9 G/DL (ref 12–16)
INR PPP: 1 (ref 0.8–1.2)
LACTATE BLD-SCNC: 0.8 MMOL/L (ref 0.4–2)
LDH SERPL L TO P-CCNC: 353 U/L (ref 81–234)
LYMPHOCYTES # BLD: 0.8 K/UL (ref 0.9–3.6)
LYMPHOCYTES NFR BLD: 13 % (ref 21–52)
MCH RBC QN AUTO: 29.7 PG (ref 24–34)
MCHC RBC AUTO-ENTMCNC: 32.9 G/DL (ref 31–37)
MCV RBC AUTO: 90.2 FL (ref 74–97)
MONOCYTES # BLD: 1.4 K/UL (ref 0.05–1.2)
MONOCYTES NFR BLD: 23 % (ref 3–10)
NEUTS SEG # BLD: 3.9 K/UL (ref 1.8–8)
NEUTS SEG NFR BLD: 64 % (ref 40–73)
P-R INTERVAL, ECG05: 144 MS
PCO2 BLD: 37.1 MMHG (ref 35–45)
PH BLD: 7.43 [PH] (ref 7.35–7.45)
PLATELET # BLD AUTO: 141 K/UL (ref 135–420)
PMV BLD AUTO: 10.5 FL (ref 9.2–11.8)
PO2 BLD: 66 MMHG (ref 80–100)
POTASSIUM SERPL-SCNC: 4.3 MMOL/L (ref 3.5–5.5)
PROCALCITONIN SERPL-MCNC: <0.05 NG/ML
PROT SERPL-MCNC: 7.2 G/DL (ref 6.4–8.2)
PROTHROMBIN TIME: 12.6 SEC (ref 11.5–15.2)
Q-T INTERVAL, ECG07: 406 MS
QRS DURATION, ECG06: 78 MS
QTC CALCULATION (BEZET), ECG08: 459 MS
RBC # BLD AUTO: 5.02 M/UL (ref 4.2–5.3)
SAO2 % BLD: 94 % (ref 92–97)
SERVICE CMNT-IMP: ABNORMAL
SODIUM SERPL-SCNC: 136 MMOL/L (ref 136–145)
SOURCE, COVRS: ABNORMAL
SPECIMEN TYPE, XMCV1T: ABNORMAL
SPECIMEN TYPE: ABNORMAL
TOTAL RESP. RATE, ITRR: 18
TROPONIN I SERPL-MCNC: <0.02 NG/ML (ref 0–0.04)
VENTRICULAR RATE, ECG03: 77 BPM
WBC # BLD AUTO: 6 K/UL (ref 4.6–13.2)

## 2020-12-10 PROCEDURE — 99285 EMERGENCY DEPT VISIT HI MDM: CPT

## 2020-12-10 PROCEDURE — 86140 C-REACTIVE PROTEIN: CPT

## 2020-12-10 PROCEDURE — 85610 PROTHROMBIN TIME: CPT

## 2020-12-10 PROCEDURE — 65660000000 HC RM CCU STEPDOWN

## 2020-12-10 PROCEDURE — 96374 THER/PROPH/DIAG INJ IV PUSH: CPT

## 2020-12-10 PROCEDURE — 83605 ASSAY OF LACTIC ACID: CPT

## 2020-12-10 PROCEDURE — 85730 THROMBOPLASTIN TIME PARTIAL: CPT

## 2020-12-10 PROCEDURE — 82803 BLOOD GASES ANY COMBINATION: CPT

## 2020-12-10 PROCEDURE — 82728 ASSAY OF FERRITIN: CPT

## 2020-12-10 PROCEDURE — 94640 AIRWAY INHALATION TREATMENT: CPT

## 2020-12-10 PROCEDURE — 93005 ELECTROCARDIOGRAM TRACING: CPT

## 2020-12-10 PROCEDURE — 83880 ASSAY OF NATRIURETIC PEPTIDE: CPT

## 2020-12-10 PROCEDURE — 85025 COMPLETE CBC W/AUTO DIFF WBC: CPT

## 2020-12-10 PROCEDURE — 74011000636 HC RX REV CODE- 636: Performed by: EMERGENCY MEDICINE

## 2020-12-10 PROCEDURE — 74011250636 HC RX REV CODE- 250/636: Performed by: EMERGENCY MEDICINE

## 2020-12-10 PROCEDURE — 74011000258 HC RX REV CODE- 258: Performed by: EMERGENCY MEDICINE

## 2020-12-10 PROCEDURE — 84145 PROCALCITONIN (PCT): CPT

## 2020-12-10 PROCEDURE — 74011250637 HC RX REV CODE- 250/637: Performed by: EMERGENCY MEDICINE

## 2020-12-10 PROCEDURE — 85384 FIBRINOGEN ACTIVITY: CPT

## 2020-12-10 PROCEDURE — 71275 CT ANGIOGRAPHY CHEST: CPT

## 2020-12-10 PROCEDURE — 83615 LACTATE (LD) (LDH) ENZYME: CPT

## 2020-12-10 PROCEDURE — 87635 SARS-COV-2 COVID-19 AMP PRB: CPT

## 2020-12-10 PROCEDURE — 71045 X-RAY EXAM CHEST 1 VIEW: CPT

## 2020-12-10 PROCEDURE — 87804 INFLUENZA ASSAY W/OPTIC: CPT

## 2020-12-10 PROCEDURE — 96375 TX/PRO/DX INJ NEW DRUG ADDON: CPT

## 2020-12-10 PROCEDURE — 74011000250 HC RX REV CODE- 250: Performed by: EMERGENCY MEDICINE

## 2020-12-10 PROCEDURE — 84484 ASSAY OF TROPONIN QUANT: CPT

## 2020-12-10 PROCEDURE — 80053 COMPREHEN METABOLIC PANEL: CPT

## 2020-12-10 PROCEDURE — 87040 BLOOD CULTURE FOR BACTERIA: CPT

## 2020-12-10 PROCEDURE — 36600 WITHDRAWAL OF ARTERIAL BLOOD: CPT

## 2020-12-10 RX ORDER — DEXAMETHASONE SODIUM PHOSPHATE 4 MG/ML
10 INJECTION, SOLUTION INTRA-ARTICULAR; INTRALESIONAL; INTRAMUSCULAR; INTRAVENOUS; SOFT TISSUE
Status: COMPLETED | OUTPATIENT
Start: 2020-12-10 | End: 2020-12-10

## 2020-12-10 RX ORDER — SODIUM CHLORIDE 0.9 % (FLUSH) 0.9 %
5-10 SYRINGE (ML) INJECTION AS NEEDED
Status: DISCONTINUED | OUTPATIENT
Start: 2020-12-10 | End: 2020-12-19 | Stop reason: HOSPADM

## 2020-12-10 RX ORDER — DEXAMETHASONE SODIUM PHOSPHATE 4 MG/ML
6 INJECTION, SOLUTION INTRA-ARTICULAR; INTRALESIONAL; INTRAMUSCULAR; INTRAVENOUS; SOFT TISSUE EVERY 6 HOURS
Status: COMPLETED | OUTPATIENT
Start: 2020-12-10 | End: 2020-12-13

## 2020-12-10 RX ORDER — LOSARTAN POTASSIUM 50 MG/1
50 TABLET ORAL ONCE
Status: COMPLETED | OUTPATIENT
Start: 2020-12-10 | End: 2020-12-10

## 2020-12-10 RX ORDER — ALBUTEROL SULFATE 0.83 MG/ML
5 SOLUTION RESPIRATORY (INHALATION)
Status: COMPLETED | OUTPATIENT
Start: 2020-12-10 | End: 2020-12-10

## 2020-12-10 RX ORDER — IPRATROPIUM BROMIDE AND ALBUTEROL SULFATE 2.5; .5 MG/3ML; MG/3ML
3 SOLUTION RESPIRATORY (INHALATION) ONCE
Status: COMPLETED | OUTPATIENT
Start: 2020-12-10 | End: 2020-12-10

## 2020-12-10 RX ADMIN — IOPAMIDOL 65 ML: 612 INJECTION, SOLUTION INTRAVENOUS at 14:27

## 2020-12-10 RX ADMIN — DEXAMETHASONE SODIUM PHOSPHATE 10 MG: 4 INJECTION, SOLUTION INTRA-ARTICULAR; INTRALESIONAL; INTRAMUSCULAR; INTRAVENOUS; SOFT TISSUE at 12:59

## 2020-12-10 RX ADMIN — ALBUTEROL SULFATE 5 MG: 2.5 SOLUTION RESPIRATORY (INHALATION) at 13:21

## 2020-12-10 RX ADMIN — LOSARTAN POTASSIUM 50 MG: 50 TABLET, FILM COATED ORAL at 23:30

## 2020-12-10 RX ADMIN — DEXAMETHASONE SODIUM PHOSPHATE 6 MG: 4 INJECTION, SOLUTION INTRAMUSCULAR; INTRAVENOUS at 23:30

## 2020-12-10 RX ADMIN — PIPERACILLIN AND TAZOBACTAM 2.25 G: 2; .25 INJECTION, POWDER, LYOPHILIZED, FOR SOLUTION INTRAVENOUS at 23:30

## 2020-12-10 RX ADMIN — CEFTRIAXONE SODIUM 2 G: 2 INJECTION, POWDER, FOR SOLUTION INTRAMUSCULAR; INTRAVENOUS at 12:59

## 2020-12-10 RX ADMIN — IPRATROPIUM BROMIDE AND ALBUTEROL SULFATE 3 ML: .5; 3 SOLUTION RESPIRATORY (INHALATION) at 13:00

## 2020-12-10 RX ADMIN — AZITHROMYCIN 500 MG: 500 INJECTION, POWDER, LYOPHILIZED, FOR SOLUTION INTRAVENOUS at 13:00

## 2020-12-10 NOTE — ED PROVIDER NOTES
EMERGENCY DEPARTMENT HISTORY AND PHYSICAL EXAM    12:08 PM  Date: 12/10/2020  Patient Name: Noman Donohue    History of Presenting Illness     Chief Complaint   Patient presents with    Chills    Generalized Body Aches        History Provided By: Patient    HPI: Noman Donohue is a 82 y.o. female with history of COPD, heart failure and hypertension. Patient is presenting with chills, generalized body aches and shortness of breath for 4 days. She went to the [facility] yesterday but she waited for too long and her hip started to hurt so she went home. Also complaining of nonproductive cough. Denies chest pain or GI symptoms. Denies history of sick contacts. No urinary symptoms. Patient is normally on 3 L of nasal cannula at home and today she increased it to as high as she can get due to her shortness of breath. Location:  Severity:  Timing/course:    Onset/Duration:     PCP: Lester Moreira NP    Past History     Past Medical History:  Past Medical History:   Diagnosis Date    Chronic lung disease     Chronic obstructive [facility] disease [facility] [address]     Heart failure ([facility] [address]     Hypertension        Past Surgical History:  Past Surgical History:   Procedure Laterality Date    HX ORTHOPAEDIC      spinal sx     HX OTHER SURGICAL      Carotid artery    VASCULAR SURGERY PROCEDURE UNLIST      L CEA 10/2014       Family History:  Family History   Problem Relation Age of Onset    Heart Disease Mother     Hypertension Mother     Heart Attack Father     Hypertension Father        Social History:  Social History     Tobacco Use    Smoking status: Former Smoker     Packs/day: 1.50     Years: 30.00     Pack years: 45.00     Types: Cigarettes     Last attempt to quit: 1987     Years since quittin.2    Smokeless tobacco: Never Used   Substance Use Topics    Alcohol use: No     Alcohol/week: 0.0 standard drinks    Drug use: No       Allergies:  No Known Allergies    Review of Systems Review of Systems   Constitutional: Positive for chills and fatigue. Respiratory: Positive for cough, shortness of breath and wheezing. Musculoskeletal: Positive for myalgias. All other systems reviewed and are negative. Physical Exam     Patient Vitals for the past 12 hrs:   Temp Pulse Resp BP SpO2   12/10/20 1153 98.3 °F (36.8 °C) 86 23 (!) 126/111 (!) 83 %       Physical Exam  Vitals signs and nursing note reviewed. Constitutional:       Appearance: Normal appearance. HENT:      Head: Normocephalic and atraumatic. Eyes:      Extraocular Movements: Extraocular movements intact. Neck:      Musculoskeletal: Normal range of motion and neck supple. Cardiovascular:      Rate and Rhythm: Normal rate. Pulmonary:      Effort: Pulmonary effort is normal. Tachypnea present. Breath sounds: Wheezing present. Musculoskeletal: Normal range of motion. Skin:     General: Skin is warm and dry. Neurological:      General: No focal deficit present. Mental Status: She is alert and oriented to person, place, and time. Psychiatric:         Mood and Affect: Mood normal.         Behavior: Behavior normal.         Diagnostic Study Results     Labs -  No results found for this or any previous visit (from the past 12 hour(s)). Radiologic Studies -   No results found. Medical Decision Making     ED Course: Progress Notes, Reevaluation, and Consults:    12:08 PM Initial assessment performed. The patients presenting problems have been discussed, and they/their family are in agreement with the care plan formulated and outlined with them. I have encouraged them to ask questions as they arise throughout their visit. Provider Notes (Medical Decision Making): 82-year-old female with history of COPD on 3 L of nasal cannula. Patient is presenting with shortness of breath and chills and cough. Symptoms concerning for pneumonia versus COVID-19 versus heart failure.   Patient was having faint expiratory wheezing on exam and was satting in the 70s on 6 L of nasal cannula. I started her on nonrebreather and she picked up to 98%. Her O2 sats turned down from 12 L to 10 L. Sepsis bundle was initiated for possible community-acquired pneumonia, COVID-19 testing, steroids and nebs for COPD exacerbation. Also given how hypoxic she is compared to her exam will get a CTA to rule out PE especially if the chest x-ray does not reveal significant pneumonia. We will send for rapid Covid test given her age and comorbidities and if it is positive might require other treatment modalities. Patient will be admitted to telemetry. She is currently on liters of oxygen. Case was discussed with Dr. Robert Rainey and the patient has been accepted. Procedures:     Critical Care Time: Upon my evaluation, this patient had a high probability of imminent or life-threatening deterioration due to hypoxia, which required my direct attention, intervention, and personal management. I have personally provided 35 minutes of critical care time exclusive of time spent on separately billable procedures. Time includes review of laboratory data, radiology results, discussion with consultants, and monitoring for potential decompensation. Interventions were performed as documented above. Joce Hammer MD  6:27 PM        Vital Signs-Reviewed the patient's vital signs. Reviewed pt's pulse ox reading. EKG: Interpreted by the EP. Time Interpreted:    Rate:    Rhythm:    Interpretation:   Comparison:     Records Reviewed: Nursing Notes and Old Medical Records (Time of Review: 12:08 PM)  -I am the first provider for this patient.  -I reviewed the vital signs, available nursing notes, past medical history, past surgical history, family history and social history.     Current Facility-Administered Medications   Medication Dose Route Frequency Provider Last Rate Last Dose    sodium chloride (NS) flush 5-10 mL  5-10 mL IntraVENous PRN Joce Hammer MD        cefTRIAXone (ROCEPHIN) 2 g in sterile water (preservative free) 20 mL IV syringe  2 g IntraVENous Q24H Joce Hammer MD        azithromycin (ZITHROMAX) 500 mg in 0.9% sodium chloride 250 mL (VIAL-MATE)  500 mg IntraVENous Q24H Joce Hammer MD        dexamethasone (DECADRON) 4 mg/mL injection 10 mg  10 mg IntraVENous NOW Joce Hammer MD        albuterol-ipratropium (DUO-NEB) 2.5 MG-0.5 MG/3 ML  3 mL Nebulization ONCE Joce Hammer MD         Current Outpatient Medications   Medication Sig Dispense Refill    multivitamin (ONE A DAY) tablet Take 1 Tab by mouth daily.  COQ10, UBIQUINOL, PO Take  by mouth.  losartan (COZAAR) 50 mg tablet Take  by mouth daily. 75 mg in a.m      fluticasone-umeclidin-vilanter (TRELEGY ELLIPTA) 100-62.5-25 mcg dsdv Take 1 Puff by inhalation daily.  diclofenac (VOLTAREN) 1 % gel Apply  to affected area four (4) times daily. 100 g 2    Oxygen       albuterol (PROVENTIL HFA, VENTOLIN HFA, PROAIR HFA) 90 mcg/actuation inhaler Take  by inhalation.  albuterol (PROVENTIL VENTOLIN) 2.5 mg /3 mL (0.083 %) nebulizer solution 3 mL by Nebulization route every four (4) hours as needed for Wheezing or Shortness of Breath. 48 Each 0    furosemide (LASIX) 20 mg tablet 20 mg po daily (Patient taking differently: Take 40 mg by mouth daily.) 30 Tab 0    acetaminophen (TYLENOL EXTRA STRENGTH) 500 mg tablet Take 500 mg by mouth every six (6) hours as needed for Pain.  metoprolol (LOPRESSOR) 25 mg tablet Take 25 mg by mouth two (2) times a day.  NIFEdipine ER (ADALAT CC) 60 mg ER tablet Take 60 mg by mouth daily.  simvastatin (ZOCOR) 20 mg tablet Take  by mouth nightly.  alprazolam (XANAX) 0.5 mg tablet Take  by mouth.  aspirin delayed-release 81 mg tablet Take  by mouth daily. Clinical Impression     Clinical Impression: No diagnosis found.     Disposition: admit        This note was dictated utilizing voice recognition software which may lead to typographical errors. I apologize in advance if the situation occurs. If questions arise please do not hesitate to contact me or call our department.     Joce Treviño MD  12:08 PM

## 2020-12-10 NOTE — ED TRIAGE NOTES
Pt reports chills since Sunday and body aches since last night. States body aches developed after waiting yesterday for \"so long for [facility] to fill my medicine\". Pt presents ambulatory, with portable oxygen. Pt states \"I just need prednisone and antibiotics\". Pt in NAD at this time.

## 2020-12-11 LAB
25(OH)D3 SERPL-MCNC: 31.8 NG/ML (ref 30–100)
ABO + RH BLD: NORMAL
ALBUMIN SERPL-MCNC: 3.7 G/DL (ref 3.4–5)
ALBUMIN/GLOB SERPL: 0.9 {RATIO} (ref 0.8–1.7)
ALP SERPL-CCNC: 52 U/L (ref 45–117)
ALT SERPL-CCNC: 23 U/L (ref 13–56)
ANION GAP SERPL CALC-SCNC: 5 MMOL/L (ref 3–18)
AST SERPL-CCNC: 26 U/L (ref 10–38)
BASOPHILS # BLD: 0 K/UL (ref 0–0.1)
BASOPHILS NFR BLD: 0 % (ref 0–2)
BILIRUB SERPL-MCNC: 0.5 MG/DL (ref 0.2–1)
BLOOD GROUP ANTIBODIES SERPL: NORMAL
BUN SERPL-MCNC: 19 MG/DL (ref 7–18)
BUN/CREAT SERPL: 19 (ref 12–20)
CALCIUM SERPL-MCNC: 8.9 MG/DL (ref 8.5–10.1)
CHLORIDE SERPL-SCNC: 105 MMOL/L (ref 100–111)
CO2 SERPL-SCNC: 29 MMOL/L (ref 21–32)
CREAT SERPL-MCNC: 0.99 MG/DL (ref 0.6–1.3)
CRP SERPL-MCNC: 3.9 MG/DL (ref 0–0.3)
D DIMER PPP FEU-MCNC: 0.92 UG/ML(FEU)
DIFFERENTIAL METHOD BLD: ABNORMAL
EOSINOPHIL # BLD: 0 K/UL (ref 0–0.4)
EOSINOPHIL NFR BLD: 0 % (ref 0–5)
ERYTHROCYTE [DISTWIDTH] IN BLOOD BY AUTOMATED COUNT: 14.9 % (ref 11.6–14.5)
FERRITIN SERPL-MCNC: 242 NG/ML (ref 8–388)
FIBRINOGEN PPP-MCNC: 538 MG/DL (ref 210–451)
GLOBULIN SER CALC-MCNC: 4 G/DL (ref 2–4)
GLUCOSE BLD STRIP.AUTO-MCNC: 120 MG/DL (ref 70–110)
GLUCOSE BLD STRIP.AUTO-MCNC: 131 MG/DL (ref 70–110)
GLUCOSE BLD STRIP.AUTO-MCNC: 164 MG/DL (ref 70–110)
GLUCOSE BLD STRIP.AUTO-MCNC: 179 MG/DL (ref 70–110)
GLUCOSE SERPL-MCNC: 124 MG/DL (ref 74–99)
HCT VFR BLD AUTO: 47.5 % (ref 35–45)
HGB BLD-MCNC: 15.7 G/DL (ref 12–16)
LACTATE SERPL-SCNC: 1.4 MMOL/L (ref 0.4–2)
LDH SERPL L TO P-CCNC: 288 U/L (ref 81–234)
LYMPHOCYTES # BLD: 0.7 K/UL (ref 0.9–3.6)
LYMPHOCYTES NFR BLD: 25 % (ref 21–52)
MCH RBC QN AUTO: 29.8 PG (ref 24–34)
MCHC RBC AUTO-ENTMCNC: 33.1 G/DL (ref 31–37)
MCV RBC AUTO: 90.3 FL (ref 74–97)
MONOCYTES # BLD: 0.8 K/UL (ref 0.05–1.2)
MONOCYTES NFR BLD: 29 % (ref 3–10)
NEUTS SEG # BLD: 1.3 K/UL (ref 1.8–8)
NEUTS SEG NFR BLD: 46 % (ref 40–73)
PLATELET # BLD AUTO: 140 K/UL (ref 135–420)
PMV BLD AUTO: 11 FL (ref 9.2–11.8)
POTASSIUM SERPL-SCNC: 4.1 MMOL/L (ref 3.5–5.5)
PROCALCITONIN SERPL-MCNC: <0.05 NG/ML
PROT SERPL-MCNC: 7.7 G/DL (ref 6.4–8.2)
RBC # BLD AUTO: 5.26 M/UL (ref 4.2–5.3)
SARS-COV-2, COV2NT: DETECTED
SODIUM SERPL-SCNC: 139 MMOL/L (ref 136–145)
SPECIMEN EXP DATE BLD: NORMAL
WBC # BLD AUTO: 2.8 K/UL (ref 4.6–13.2)

## 2020-12-11 PROCEDURE — 36415 COLL VENOUS BLD VENIPUNCTURE: CPT

## 2020-12-11 PROCEDURE — 82728 ASSAY OF FERRITIN: CPT

## 2020-12-11 PROCEDURE — 85379 FIBRIN DEGRADATION QUANT: CPT

## 2020-12-11 PROCEDURE — 83615 LACTATE (LD) (LDH) ENZYME: CPT

## 2020-12-11 PROCEDURE — 82962 GLUCOSE BLOOD TEST: CPT

## 2020-12-11 PROCEDURE — 86140 C-REACTIVE PROTEIN: CPT

## 2020-12-11 PROCEDURE — 99222 1ST HOSP IP/OBS MODERATE 55: CPT | Performed by: INTERNAL MEDICINE

## 2020-12-11 PROCEDURE — 86900 BLOOD TYPING SEROLOGIC ABO: CPT

## 2020-12-11 PROCEDURE — 74011636637 HC RX REV CODE- 636/637: Performed by: NURSE PRACTITIONER

## 2020-12-11 PROCEDURE — 77030027138 HC INCENT SPIROMETER -A

## 2020-12-11 PROCEDURE — 80053 COMPREHEN METABOLIC PANEL: CPT

## 2020-12-11 PROCEDURE — 84145 PROCALCITONIN (PCT): CPT

## 2020-12-11 PROCEDURE — 82306 VITAMIN D 25 HYDROXY: CPT

## 2020-12-11 PROCEDURE — 74011000250 HC RX REV CODE- 250: Performed by: INTERNAL MEDICINE

## 2020-12-11 PROCEDURE — 94762 N-INVAS EAR/PLS OXIMTRY CONT: CPT

## 2020-12-11 PROCEDURE — XW033E5 INTRODUCTION OF REMDESIVIR ANTI-INFECTIVE INTO PERIPHERAL VEIN, PERCUTANEOUS APPROACH, NEW TECHNOLOGY GROUP 5: ICD-10-PCS | Performed by: INTERNAL MEDICINE

## 2020-12-11 PROCEDURE — 85730 THROMBOPLASTIN TIME PARTIAL: CPT

## 2020-12-11 PROCEDURE — 2709999900 HC NON-CHARGEABLE SUPPLY

## 2020-12-11 PROCEDURE — 74011000258 HC RX REV CODE- 258: Performed by: INTERNAL MEDICINE

## 2020-12-11 PROCEDURE — 74011250636 HC RX REV CODE- 250/636: Performed by: INTERNAL MEDICINE

## 2020-12-11 PROCEDURE — 85025 COMPLETE CBC W/AUTO DIFF WBC: CPT

## 2020-12-11 PROCEDURE — 77010033711 HC HIGH FLOW OXYGEN

## 2020-12-11 PROCEDURE — 74011250637 HC RX REV CODE- 250/637: Performed by: NURSE PRACTITIONER

## 2020-12-11 PROCEDURE — 97530 THERAPEUTIC ACTIVITIES: CPT

## 2020-12-11 PROCEDURE — XW13325 TRANSFUSION OF CONVALESCENT PLASMA (NONAUTOLOGOUS) INTO PERIPHERAL VEIN, PERCUTANEOUS APPROACH, NEW TECHNOLOGY GROUP 5: ICD-10-PCS | Performed by: INTERNAL MEDICINE

## 2020-12-11 PROCEDURE — 85610 PROTHROMBIN TIME: CPT

## 2020-12-11 PROCEDURE — 36430 TRANSFUSION BLD/BLD COMPNT: CPT

## 2020-12-11 PROCEDURE — 97162 PT EVAL MOD COMPLEX 30 MIN: CPT

## 2020-12-11 PROCEDURE — 74011250636 HC RX REV CODE- 250/636: Performed by: NURSE PRACTITIONER

## 2020-12-11 PROCEDURE — 65660000000 HC RM CCU STEPDOWN

## 2020-12-11 PROCEDURE — 74011250636 HC RX REV CODE- 250/636: Performed by: EMERGENCY MEDICINE

## 2020-12-11 PROCEDURE — 99223 1ST HOSP IP/OBS HIGH 75: CPT | Performed by: HOSPITALIST

## 2020-12-11 PROCEDURE — 85384 FIBRINOGEN ACTIVITY: CPT

## 2020-12-11 PROCEDURE — 83605 ASSAY OF LACTIC ACID: CPT

## 2020-12-11 RX ORDER — ACETAMINOPHEN 325 MG/1
650 TABLET ORAL
Status: DISCONTINUED | OUTPATIENT
Start: 2020-12-11 | End: 2020-12-19 | Stop reason: HOSPADM

## 2020-12-11 RX ORDER — PROMETHAZINE HYDROCHLORIDE 12.5 MG/1
12.5 TABLET ORAL
Status: DISCONTINUED | OUTPATIENT
Start: 2020-12-11 | End: 2020-12-19 | Stop reason: HOSPADM

## 2020-12-11 RX ORDER — SODIUM CHLORIDE 0.9 % (FLUSH) 0.9 %
5-40 SYRINGE (ML) INJECTION AS NEEDED
Status: DISCONTINUED | OUTPATIENT
Start: 2020-12-11 | End: 2020-12-19 | Stop reason: HOSPADM

## 2020-12-11 RX ORDER — MELATONIN
6000 DAILY
Status: DISCONTINUED | OUTPATIENT
Start: 2020-12-11 | End: 2020-12-19 | Stop reason: HOSPADM

## 2020-12-11 RX ORDER — THERA TABS 400 MCG
1 TAB ORAL DAILY
Status: DISCONTINUED | OUTPATIENT
Start: 2020-12-11 | End: 2020-12-19 | Stop reason: HOSPADM

## 2020-12-11 RX ORDER — CHLORHEXIDINE GLUCONATE 1.2 MG/ML
15 RINSE ORAL EVERY 12 HOURS
Status: DISCONTINUED | OUTPATIENT
Start: 2020-12-11 | End: 2020-12-19 | Stop reason: HOSPADM

## 2020-12-11 RX ORDER — POLYETHYLENE GLYCOL 3350 17 G/17G
17 POWDER, FOR SOLUTION ORAL DAILY PRN
Status: DISCONTINUED | OUTPATIENT
Start: 2020-12-11 | End: 2020-12-19 | Stop reason: HOSPADM

## 2020-12-11 RX ORDER — DEXTROSE 50 % IN WATER (D50W) INTRAVENOUS SYRINGE
25-50 AS NEEDED
Status: DISCONTINUED | OUTPATIENT
Start: 2020-12-11 | End: 2020-12-19 | Stop reason: HOSPADM

## 2020-12-11 RX ORDER — ZINC SULFATE 50(220)MG
1 CAPSULE ORAL DAILY
Status: DISCONTINUED | OUTPATIENT
Start: 2020-12-11 | End: 2020-12-19 | Stop reason: HOSPADM

## 2020-12-11 RX ORDER — LOSARTAN POTASSIUM 50 MG/1
50 TABLET ORAL DAILY
Status: DISCONTINUED | OUTPATIENT
Start: 2020-12-11 | End: 2020-12-17

## 2020-12-11 RX ORDER — ASPIRIN 81 MG/1
81 TABLET ORAL DAILY
Status: DISCONTINUED | OUTPATIENT
Start: 2020-12-11 | End: 2020-12-19 | Stop reason: HOSPADM

## 2020-12-11 RX ORDER — INSULIN LISPRO 100 [IU]/ML
INJECTION, SOLUTION INTRAVENOUS; SUBCUTANEOUS
Status: DISCONTINUED | OUTPATIENT
Start: 2020-12-11 | End: 2020-12-19 | Stop reason: HOSPADM

## 2020-12-11 RX ORDER — METOPROLOL TARTRATE 25 MG/1
25 TABLET, FILM COATED ORAL 2 TIMES DAILY
Status: DISCONTINUED | OUTPATIENT
Start: 2020-12-11 | End: 2020-12-19 | Stop reason: HOSPADM

## 2020-12-11 RX ORDER — NIFEDIPINE 60 MG/1
60 TABLET, EXTENDED RELEASE ORAL DAILY
Status: DISCONTINUED | OUTPATIENT
Start: 2020-12-11 | End: 2020-12-17

## 2020-12-11 RX ORDER — SODIUM CHLORIDE 0.9 % (FLUSH) 0.9 %
5-40 SYRINGE (ML) INJECTION EVERY 8 HOURS
Status: DISCONTINUED | OUTPATIENT
Start: 2020-12-11 | End: 2020-12-19 | Stop reason: HOSPADM

## 2020-12-11 RX ORDER — ACETAMINOPHEN 650 MG/1
650 SUPPOSITORY RECTAL
Status: DISCONTINUED | OUTPATIENT
Start: 2020-12-11 | End: 2020-12-19 | Stop reason: HOSPADM

## 2020-12-11 RX ORDER — ALBUTEROL SULFATE 90 UG/1
2 AEROSOL, METERED RESPIRATORY (INHALATION)
Status: DISCONTINUED | OUTPATIENT
Start: 2020-12-11 | End: 2020-12-19 | Stop reason: HOSPADM

## 2020-12-11 RX ORDER — ASCORBIC ACID 250 MG
500 TABLET ORAL 2 TIMES DAILY
Status: DISCONTINUED | OUTPATIENT
Start: 2020-12-11 | End: 2020-12-19 | Stop reason: HOSPADM

## 2020-12-11 RX ORDER — MAGNESIUM SULFATE 100 %
4 CRYSTALS MISCELLANEOUS AS NEEDED
Status: DISCONTINUED | OUTPATIENT
Start: 2020-12-11 | End: 2020-12-19 | Stop reason: HOSPADM

## 2020-12-11 RX ORDER — ATORVASTATIN CALCIUM 20 MG/1
20 TABLET, FILM COATED ORAL DAILY
Status: DISCONTINUED | OUTPATIENT
Start: 2020-12-11 | End: 2020-12-19 | Stop reason: HOSPADM

## 2020-12-11 RX ORDER — GUAIFENESIN/DEXTROMETHORPHAN 100-10MG/5
5 SYRUP ORAL
Status: DISCONTINUED | OUTPATIENT
Start: 2020-12-11 | End: 2020-12-19 | Stop reason: HOSPADM

## 2020-12-11 RX ORDER — ATORVASTATIN CALCIUM 20 MG/1
20 TABLET, FILM COATED ORAL DAILY
COMMUNITY

## 2020-12-11 RX ORDER — FAMOTIDINE 20 MG/1
20 TABLET, FILM COATED ORAL DAILY
Status: DISCONTINUED | OUTPATIENT
Start: 2020-12-11 | End: 2020-12-19 | Stop reason: HOSPADM

## 2020-12-11 RX ORDER — SODIUM CHLORIDE 9 MG/ML
250 INJECTION, SOLUTION INTRAVENOUS AS NEEDED
Status: DISCONTINUED | OUTPATIENT
Start: 2020-12-11 | End: 2020-12-19 | Stop reason: HOSPADM

## 2020-12-11 RX ORDER — ONDANSETRON 2 MG/ML
4 INJECTION INTRAMUSCULAR; INTRAVENOUS
Status: DISCONTINUED | OUTPATIENT
Start: 2020-12-11 | End: 2020-12-19 | Stop reason: HOSPADM

## 2020-12-11 RX ORDER — ALPRAZOLAM 0.5 MG/1
0.5 TABLET ORAL
Status: DISCONTINUED | OUTPATIENT
Start: 2020-12-11 | End: 2020-12-19 | Stop reason: HOSPADM

## 2020-12-11 RX ORDER — BUDESONIDE AND FORMOTEROL FUMARATE DIHYDRATE 80; 4.5 UG/1; UG/1
2 AEROSOL RESPIRATORY (INHALATION)
Status: DISCONTINUED | OUTPATIENT
Start: 2020-12-11 | End: 2020-12-19 | Stop reason: HOSPADM

## 2020-12-11 RX ORDER — ENOXAPARIN SODIUM 100 MG/ML
40 INJECTION SUBCUTANEOUS DAILY
Status: DISCONTINUED | OUTPATIENT
Start: 2020-12-11 | End: 2020-12-19 | Stop reason: HOSPADM

## 2020-12-11 RX ADMIN — ACETAMINOPHEN 650 MG: 325 TABLET ORAL at 18:50

## 2020-12-11 RX ADMIN — NIFEDIPINE 60 MG: 60 TABLET, FILM COATED, EXTENDED RELEASE ORAL at 11:16

## 2020-12-11 RX ADMIN — METOPROLOL TARTRATE 25 MG: 25 TABLET, FILM COATED ORAL at 21:50

## 2020-12-11 RX ADMIN — Medication 6 TABLET: at 09:13

## 2020-12-11 RX ADMIN — Medication 500 MG: at 21:50

## 2020-12-11 RX ADMIN — DEXAMETHASONE SODIUM PHOSPHATE 6 MG: 4 INJECTION, SOLUTION INTRAMUSCULAR; INTRAVENOUS at 17:00

## 2020-12-11 RX ADMIN — BUDESONIDE AND FORMOTEROL FUMARATE DIHYDRATE 2 PUFF: 80; 4.5 AEROSOL RESPIRATORY (INHALATION) at 21:53

## 2020-12-11 RX ADMIN — FAMOTIDINE 20 MG: 20 TABLET, FILM COATED ORAL at 09:04

## 2020-12-11 RX ADMIN — LOSARTAN POTASSIUM 50 MG: 50 TABLET, FILM COATED ORAL at 09:07

## 2020-12-11 RX ADMIN — ZINC SULFATE 220 MG (50 MG) CAPSULE 1 CAPSULE: CAPSULE at 09:04

## 2020-12-11 RX ADMIN — Medication 10 ML: at 09:06

## 2020-12-11 RX ADMIN — REMDESIVIR 200 MG: 100 INJECTION, POWDER, LYOPHILIZED, FOR SOLUTION INTRAVENOUS at 11:22

## 2020-12-11 RX ADMIN — THERA TABS 1 TABLET: TAB at 09:04

## 2020-12-11 RX ADMIN — PIPERACILLIN AND TAZOBACTAM 2.25 G: 2; .25 INJECTION, POWDER, FOR SOLUTION INTRAVENOUS at 11:16

## 2020-12-11 RX ADMIN — TIOTROPIUM BROMIDE INHALATION SPRAY 2 PUFF: 3.12 SPRAY, METERED RESPIRATORY (INHALATION) at 09:05

## 2020-12-11 RX ADMIN — ALPRAZOLAM 0.5 MG: 0.5 TABLET ORAL at 21:50

## 2020-12-11 RX ADMIN — Medication 500 MG: at 09:04

## 2020-12-11 RX ADMIN — METOPROLOL TARTRATE 25 MG: 25 TABLET, FILM COATED ORAL at 09:04

## 2020-12-11 RX ADMIN — BUDESONIDE AND FORMOTEROL FUMARATE DIHYDRATE 2 PUFF: 80; 4.5 AEROSOL RESPIRATORY (INHALATION) at 09:06

## 2020-12-11 RX ADMIN — ENOXAPARIN SODIUM 40 MG: 40 INJECTION SUBCUTANEOUS at 09:03

## 2020-12-11 RX ADMIN — DEXAMETHASONE SODIUM PHOSPHATE 6 MG: 4 INJECTION, SOLUTION INTRAMUSCULAR; INTRAVENOUS at 11:16

## 2020-12-11 RX ADMIN — INSULIN LISPRO 2 UNITS: 100 INJECTION, SOLUTION INTRAVENOUS; SUBCUTANEOUS at 21:47

## 2020-12-11 RX ADMIN — INSULIN LISPRO 2 UNITS: 100 INJECTION, SOLUTION INTRAVENOUS; SUBCUTANEOUS at 16:56

## 2020-12-11 RX ADMIN — AZITHROMYCIN 500 MG: 500 INJECTION, POWDER, LYOPHILIZED, FOR SOLUTION INTRAVENOUS at 11:21

## 2020-12-11 RX ADMIN — DEXAMETHASONE SODIUM PHOSPHATE 6 MG: 4 INJECTION, SOLUTION INTRAMUSCULAR; INTRAVENOUS at 09:04

## 2020-12-11 RX ADMIN — Medication 10 ML: at 14:00

## 2020-12-11 RX ADMIN — CHLORHEXIDINE GLUCONATE 0.12% ORAL RINSE 15 ML: 1.2 LIQUID ORAL at 21:46

## 2020-12-11 RX ADMIN — PIPERACILLIN AND TAZOBACTAM 2.25 G: 2; .25 INJECTION, POWDER, FOR SOLUTION INTRAVENOUS at 16:21

## 2020-12-11 RX ADMIN — PIPERACILLIN AND TAZOBACTAM 2.25 G: 2; .25 INJECTION, POWDER, FOR SOLUTION INTRAVENOUS at 21:42

## 2020-12-11 RX ADMIN — Medication 10 ML: at 21:51

## 2020-12-11 RX ADMIN — DEXAMETHASONE SODIUM PHOSPHATE 6 MG: 4 INJECTION, SOLUTION INTRAMUSCULAR; INTRAVENOUS at 01:30

## 2020-12-11 RX ADMIN — Medication 81 MG: at 09:04

## 2020-12-11 RX ADMIN — ATORVASTATIN CALCIUM 20 MG: 20 TABLET, FILM COATED ORAL at 09:04

## 2020-12-11 NOTE — ED NOTES
8:22 PM received patient in sign out. Per d/w prior provider, patient full code, wants all treatments available. covid test positive. To continue close monitoring. 8:53 PM d/w dr Ciro Oliveira, id, request antibiotic change to zosyn,and add decadron q 6 hours, add am  Labs.

## 2020-12-11 NOTE — PROGRESS NOTES
Problem: Mobility Impaired (Adult and Pediatric)  Goal: *Acute Goals and Plan of Care (Insert Text)  Description: Physical Therapy Goals  Initiated 12/11/2020 and to be accomplished within 7 day(s)  1. Patient will move from supine to sit and sit to supine , scoot up and down, and roll side to side in bed with modified independence. 2.  Patient will transfer from bed to chair and chair to bed with modified independence using the least restrictive device. 3.  Patient will perform sit to stand with modified independence. 4.  Patient will ambulate with modified independence for 100 feet with the least restrictive device. 5.  Patient will ascend/descend 3 stairs with 0 handrail(s) with supervision/set-up. (may have to simulate with box step due to droplet plus precautions)    PLOF: Pt reports living in 1 story house alone with family around but unable to give her 24 hour care if needed. Pt reporting she does not want to go to Rehab. Pt reports no AD and independent in all mobility, still driving. Outcome: Progressing Towards Goal    PHYSICAL THERAPY EVALUATION    Patient: Jennifer Ibarra (30 y.o. female)  Date: 12/11/2020  Primary Diagnosis: Suspected COVID-19 virus infection [Z20.828]  CAP (community acquired pneumonia) [J18.9]        Precautions:   Fall, Aspiration(droplet plus, covid +)    PLOF: see above     ASSESSMENT :  Based on the objective data described below, the patient presents with decreased endurance, fatigue, decreased balance reactions, gait deviations and decreased independence in functional mobility. Pt cleared to participate in PT evaluation by RN, pt received semi-reclined in bed and agreeable to PT session. Pt with 6L via nasal cannula, SPO2 at 97-99% in bed. Completing supine to sit with SBA, sitting on EOB with independence. Pt keeping SPO2 above 90% in sitting, demonstrating good LE strength during gross LE MMT. Pt then requesting to toilet with BSC.  Sit to stand with CGA and SPT with CGA. Toileting with supervision, performing pericare independently. Pt returning to bed with CGA. SPO2 dropping to 86% but educated on deep breathing techniques and pt able to return to 92% within 1 minute. Pt stating several times that she does not want to go to rehab, pt educated on PT and improving mobility while admitted. Pt returned to supine with SBA. Pt left with all needs met and call bell in reach. Patient will benefit from skilled intervention to address the above impairments. Patient's rehabilitation potential is considered to be Fair  Factors which may influence rehabilitation potential include:   []         None noted  []         Mental ability/status  []         Medical condition  [x]         Home/family situation and support systems  []         Safety awareness  []         Pain tolerance/management  []         Other:      PLAN :  Recommendations and Planned Interventions:   [x]           Bed Mobility Training             []    Neuromuscular Re-Education  [x]           Transfer Training                   []    Orthotic/Prosthetic Training  [x]           Gait Training                          []    Modalities  [x]           Therapeutic Exercises           []    Edema Management/Control  [x]           Therapeutic Activities            [x]    Family Training/Education  [x]           Patient Education  []           Other (comment):    Frequency/Duration: Patient will be followed by physical therapy 1-2 times per day/4-7 days per week to address goals. Discharge Recommendations: Home Health with increased support from family vs Rehab pending progress  Further Equipment Recommendations for Discharge: possible RW vs cane for stability if going home     SUBJECTIVE:   Patient stated I think I am doing fine, I just have a hard time breathing sometimes .     OBJECTIVE DATA SUMMARY:     Past Medical History:   Diagnosis Date    Chronic lung disease     Chronic obstructive pulmonary disease ([facility] [address] Heart failure ([facility] [address]     Hypertension      Past Surgical History:   Procedure Laterality Date    HX ORTHOPAEDIC      spinal sx 5/6    HX OTHER SURGICAL      Carotid artery    VASCULAR SURGERY PROCEDURE UNLIST      L CEA 10/2014     Barriers to Learning/Limitations: None  Compensate with: N/A  Home Situation:  Home Situation  Home Environment: Private residence  # Steps to Enter: 4  Rails to Enter: No  One/Two Story Residence: One story  Living Alone: Yes  Support Systems: Family member(s), Child(erick), Friends \ neighbors  Patient Expects to be Discharged to[de-identified] Private residence  Current DME Used/Available at Home: Blood pressure cuff, Nebulizer, Oxygen, portable  Critical Behavior:  Neurologic State: Alert  Orientation Level: Oriented X4  Cognition: Appropriate decision making; Follows commands  Safety/Judgement: Awareness of environment; Fall prevention  Psychosocial  Patient Behaviors: Calm; Cooperative  Purposeful Interaction: Yes  Pt Identified Daily Priority: Clinical issues (comment)  Caritas Process: Nurture loving kindness;Establish trust;Teaching/learning; Attend basic human needs  Caring Interventions: Reassure; Therapeutic modalities  Reassure: Therapeutic listening; Informing; Acceptance;Caring rounds  Therapeutic Modalities: Intentional therapeutic touch    Strength:    Strength: Generally decreased, functional    Tone & Sensation:   Tone: Normal    Sensation: Impaired(reports intermittent numbness on RLE from hip)    Range Of Motion:  AROM: Within functional limits    Posture:  Posture (WDL): Exceptions to WDL  Posture Assessment: Trunk flexion; Forward head  Functional Mobility:  Bed Mobility:     Supine to Sit: Stand-by assistance  Sit to Supine: Stand-by assistance  Scooting: Stand-by assistance  Transfers:  Sit to Stand: Contact guard assistance  Stand to Sit: Contact guard assistance  Stand Pivot Transfers: Contact guard assistance       Balance:   Sitting: Intact  Standing: Impaired; Without support  Standing - Static: Good;Fair  Standing - Dynamic : Fair(+)      Pain:  Pain level pre-treatment: 0/10   Pain level post-treatment: 0/10     Activity Tolerance:   Fair activity tolerance, increased fatigue and drop in SPO2 with transfer  Please refer to the flowsheet for vital signs taken during this treatment. After treatment:   []         Patient left in no apparent distress sitting up in chair  [x]         Patient left in no apparent distress in bed  [x]         Call bell left within reach  [x]         Nursing notified  []         Caregiver present  []         Bed alarm activated  []         SCDs applied    COMMUNICATION/EDUCATION:   [x]         Role of Physical Therapy in the acute care setting. [x]         Fall prevention education was provided and the patient/caregiver indicated understanding. [x]         Patient/family have participated as able in goal setting and plan of care. [x]         Patient/family agree to work toward stated goals and plan of care. []         Patient understands intent and goals of therapy, but is neutral about his/her participation. []         Patient is unable to participate in goal setting/plan of care: ongoing with therapy staff.  []         Other:     Thank you for this referral.  Neo Dyson, PT   Time Calculation: 26 mins      Eval Complexity: History: MEDIUM  Complexity : 1-2 comorbidities / personal factors will impact the outcome/ POC Exam:MEDIUM Complexity : 3 Standardized tests and measures addressing body structure, function, activity limitation and / or participation in recreation  Presentation: MEDIUM Complexity : Evolving with changing characteristics  Clinical Decision Making:Medium Complexity mod  Overall Complexity:MEDIUM

## 2020-12-11 NOTE — ROUTINE PROCESS
4191- Admitted from [facility] ER via stretcher. Oriented to room. Telemetry applied. Vital signs completed. Call bell in reach. Side rails up x 3. Patient on 100 % nonrebreather mask. Respiratory therapy paged. 7035- respiratory therapy in to set up hi flow O2.  0600- Dr. Letty Johnson called and informed that patient was admitted to unit.

## 2020-12-11 NOTE — PROGRESS NOTES
Titrate FIO2 50%.  RR 20     12/11/20 0821   Oxygen Therapy   O2 Sat (%) 98 %   Pulse via Oximetry 69 beats per minute   O2 Device Hi flow nasal cannula  (Vapotherm)   O2 Flow Rate (L/min) 25 l/min   O2 Temperature 91.4 °F (33 °C)   FIO2 (%) 50 %

## 2020-12-11 NOTE — PROGRESS NOTES
Consult received. Chart reviewed. Discuss with Dr. Justin Fontanez, Dr. Cherelle Yun. Recommend to start IV steroids- Decadron 6 mg IV every six hours. Discontinue ceftriaxone. Start pip/tazo. Continue azithromycin. Monitor CRP, Procalcitonin, the timer to determine further plan of care. Full consult to follow.  Thanks

## 2020-12-11 NOTE — PROGRESS NOTES
Reason for Admission:  Suspected COVID-19 virus infection [Z20.828]  CAP (community acquired pneumonia) [J18.9]                 RUR Score:    16           Plan for utilizing home health: To be determined                      Likelihood of Readmission:   LOW                         Transition of Care Plan:              Initial assessment completed with patient. Cognitive status of patient: oriented to time, place, person and situation. Spoke with pt on the phone. She is on droplet isolation    Face sheet information confirmed:  yes. The patient designates her son Ozzy Brannon [phone] and her sister Duoglas Perez [phone]  to participate in her discharge plan and to receive any needed information. This patient lives in a duplex home alone but family and friends check up on her. Patient is able to navigate steps as needed. Prior to hospitalization, patient was considered to be independent with ADLs/IADLS : yes . Patient has a current ACP document on file: yes  The son or sister will be available to transport patient home upon discharge. The patient already has none reported, and 2-3L oxygen medical equipment available in the home. Patient is not currently active with home health. Patient has not stayed in a skilled nursing facility or rehab. Was  stay within last 60 days : no. This patient is on dialysis :no    List of available Home Health agencies were provided and reviewed with the patient prior to discharge. Freedom of choice signed: yes, for [facility] if needed. Currently, the discharge plan is to be determined. Pt has ptot consult. The patient states that she can obtain her medications from the pharmacy, and take her medications as directed. Patient's current insurance is [employer], [place]       Care Management Interventions  PCP Verified by CM: Yes  Palliative Care Criteria Met (RRAT>21 & CHF Dx)?: No  Mode of Transport at Discharge:  Other (see comment)(family)  Transition of Care Consult (CM Consult): Discharge Planning  Physical Therapy Consult: Yes  Occupational Therapy Consult: Yes  Speech Therapy Consult: No  Current Support Network: Lives Alone, Family Lives Nearby  Confirm Follow Up Transport: Family  Discharge Location  Discharge Placement: Unable to determine at this time        ELÍAS Steve RN  Care Management  Pager: [phone]

## 2020-12-11 NOTE — CONSULTS
[facility]  Pulmonary, Critical Care, and Sleep Medicine    Name: Ariella Heck MRN: [id number]   : 1935 Hospital: [address] [place] [address] Dr   Date: 2020        Pulmonary Medicine:  Initial Patient Consult      IMPRESSION:   · Respiratory Failure: Acute on Chronic- Hypoxic  · COVID-19 Pneumonia  · Emphysema/COPD- Pulmonary - [facility]: Dr. Wandy Perez  · Pulmonary Hypertension  · HFpEF- Chronic  · Hypertension      RECOMMENDATIONS:   · Keep HOB elevated  · SpO2 goal: 88-94%- Currently on Vapotherm  · Continue Symbicort MDI 2 Puffs BID- Rinse mouth  · Continue Spiriva Respirmat: 2 puffs Q day  · Albuterol PRN  · Monitor COVID inflammatory markers  · Patient consented for convalescent plasma- I discussed pros and cons. Patient gave consent. Form completed  · Antiinfective therapy per ID- started on Remdesivir  · BP management, glycemic control, prophylaxis and electrolyte management per primary team  · Stress on oral care- on systemic steroids and ICS  · Judicious use of benzodiazepines  · Follow up on pending cultures  · Further recommendations pending clinical care. Case discussed with ID staff, Dr. Kellen Camarena. Bette Adrian  ·      Subjective/History: This patient has been seen and evaluated at the request of Dr. Oscar Saldana for COPD and COVID. Patient is a 82 y.o. female with a history of COPD on LTOT and Trelegy Ellipta. OP pulmonologist- Dr. Wanyd Perez at [facility].    The patient reports she presented due to worsening fatigue, myalgias and feeling ill. She still has sense of taste and smell. Some phlegm production. No hemoptysis. No chest pain. No nausea or emesis.   No diarrhea  Currently tolerating PO    Medical work up notable for + COVID-19    Patient currently on MDIs as inpatient and she feels comfortably self- administering    Patient Vitals for the past 24 hrs:   Temp Pulse Resp BP SpO2   20 1505 -- -- -- -- 96 %   20 1030 -- -- -- -- 98 %   20 0821 -- -- -- -- 98 %   20 8247 -- -- -- -- 98 %   12/11/20 0500 98.6 °F (37 °C) 73 19 (!) 159/61 97 %   12/11/20 0348 -- 70 14 -- 92 %   12/11/20 0300 -- 75 18 (!) 151/54 (!) 46 %   12/11/20 0200 -- 83 18 (!) 153/55 (!) 64 %   12/10/20 2330 -- 72 20 (!) 147/47 99 %   12/10/20 2300 -- 68 20 (!) 157/60 97 %   12/10/20 2200 -- 74 18 (!) 164/56 98 %   12/10/20 2100 -- 87 16 (!) 157/88 95 %   12/10/20 2024 97.9 °F (36.6 °C) 86 24 (!) 182/64 96 %                Past Medical History:   Diagnosis Date    Chronic lung disease     Chronic obstructive pulmonary disease ([address]     Heart failure ([address]     Hypertension       Past Surgical History:   Procedure Laterality Date    HX ORTHOPAEDIC      spinal sx 5/6    HX OTHER SURGICAL      Carotid artery    VASCULAR SURGERY PROCEDURE UNLIST      L CEA 10/2014      Prior to Admission medications    Medication Sig Start Date End Date Taking? Authorizing Provider   atorvastatin (LIPITOR) 20 mg tablet Take 20 mg by mouth daily. Yes Provider, Historical   multivitamin (ONE A DAY) tablet Take 1 Tab by mouth daily. Yes Provider, Historical   COQ10, UBIQUINOL, PO Take  by mouth. Yes Provider, Historical   losartan (COZAAR) 50 mg tablet Take  by mouth daily. 75 mg in a.m   Yes Provider, Historical   fluticasone-umeclidin-vilanter (TRELEGY ELLIPTA) 100-62.5-25 mcg dsdv Take 1 Puff by inhalation daily. Yes Provider, Historical   diclofenac (VOLTAREN) 1 % gel Apply  to affected area four (4) times daily. 12/20/17  Yes Isamar Tee PA-C   Oxygen    Yes Provider, Historical   albuterol (PROVENTIL HFA, VENTOLIN HFA, PROAIR HFA) 90 mcg/actuation inhaler Take  by inhalation. Yes Provider, Historical   albuterol (PROVENTIL VENTOLIN) 2.5 mg /3 mL (0.083 %) nebulizer solution 3 mL by Nebulization route every four (4) hours as needed for Wheezing or Shortness of Breath. 6/4/16  Yes Killian Avalos MD   furosemide (LASIX) 20 mg tablet 20 mg po daily  Patient taking differently: Take 40 mg by mouth daily. 6/5/16  Yes Nia Hernandez MD   acetaminophen (TYLENOL EXTRA STRENGTH) 500 mg tablet Take 500 mg by mouth every six (6) hours as needed for Pain. Yes Provider, Historical   metoprolol (LOPRESSOR) 25 mg tablet Take 25 mg by mouth two (2) times a day. Yes Provider, Historical   NIFEdipine ER (ADALAT CC) 60 mg ER tablet Take 60 mg by mouth daily. Yes Provider, Historical   aspirin delayed-release 81 mg tablet Take  by mouth daily. Yes Provider, Historical   alprazolam (XANAX) 0.5 mg tablet Take  by mouth nightly.     Provider, Historical     Current Facility-Administered Medications   Medication Dose Route Frequency    sodium chloride (NS) flush 5-40 mL  5-40 mL IntraVENous Q8H    enoxaparin (LOVENOX) injection 40 mg  40 mg SubCUTAneous DAILY    famotidine (PEPCID) tablet 20 mg  20 mg Oral DAILY    insulin lispro (HUMALOG) injection   SubCUTAneous AC&HS    cholecalciferol (VITAMIN D3) (1000 Units /25 mcg) tablet 6 Tab  6,000 Units Oral DAILY    ascorbic acid (vitamin C) (VITAMIN C) tablet 500 mg  500 mg Oral BID    zinc sulfate (ZINCATE) 220 (50) mg capsule 1 Cap  1 Cap Oral DAILY    ALPRAZolam (XANAX) tablet 0.5 mg  0.5 mg Oral QHS    aspirin delayed-release tablet 81 mg  81 mg Oral DAILY    atorvastatin (LIPITOR) tablet 20 mg  20 mg Oral DAILY    losartan (COZAAR) tablet 50 mg  50 mg Oral DAILY    metoprolol tartrate (LOPRESSOR) tablet 25 mg  25 mg Oral BID    therapeutic multivitamin (THERAGRAN) tablet 1 Tab  1 Tab Oral DAILY    NIFEdipine ER (PROCARDIA XL) tablet 60 mg  60 mg Oral DAILY    budesonide-formoterol (SYMBICORT) 80-4.5 mcg inhaler  2 Puff Inhalation BID RT    And    tiotropium bromide (SPIRIVA RESPIMAT) 2.5 mcg /actuation  2 Puff Inhalation DAILY    piperacillin-tazobactam (ZOSYN) 2.25 g in 0.9% sodium chloride (MBP/ADV) 50 mL MBP  2.25 g IntraVENous Q6H    remdesivir 200 mg in 0.9% sodium chloride 250 mL IVPB  200 mg IntraVENous ONCE    Followed by   Lopez Diaz ON 12/12/2020] remdesivir 100 mg in 0.9% sodium chloride 250 mL IVPB  100 mg IntraVENous Q24H    azithromycin (ZITHROMAX) 500 mg in 0.9% sodium chloride 250 mL (VIAL-MATE)  500 mg IntraVENous Q24H    dexamethasone (DECADRON) 4 mg/mL injection 6 mg  6 mg IntraVENous Q6H     No Known Allergies   Social History     Tobacco Use    Smoking status: Former Smoker     Packs/day: 1.50     Years: 30.00     Pack years: 45.00     Types: Cigarettes     Last attempt to quit: 1987     Years since quittin.2    Smokeless tobacco: Never Used   Substance Use Topics    Alcohol use: No     Alcohol/week: 0.0 standard drinks      Family History   Problem Relation Age of Onset    Heart Disease Mother     Hypertension Mother     Heart Attack Father     Hypertension Father         Review of Systems:  Pertinent items are noted in HPI. Objective:   Vital Signs:    Visit Vitals  BP (!) 159/61 (BP 1 Location: Right arm, BP Patient Position: At rest)   Pulse 73   Temp 98.6 °F (37 °C)   Resp 19   Ht 5' 3\" (1.6 m)   Wt 55.6 kg (122 lb 8 oz)   SpO2 98%   Breastfeeding No   BMI 21.70 kg/m²       O2 Device: Hi flow nasal cannula(Vapotherm)   O2 Flow Rate (L/min): 25 l/min   Temp (24hrs), Av.3 °F (36.8 °C), Min:97.9 °F (36.6 °C), Max:98.6 °F (37 °C)       Intake/Output:   Last shift:      No intake/output data recorded. Last 3 shifts:  1901 -  0700  In: 300 [I.V.:300]  Out: -     Intake/Output Summary (Last 24 hours) at 2020 0920  Last data filed at 2020 0500  Gross per 24 hour   Intake 300 ml   Output --   Net 300 ml         Ventilator Settings:  Mode Rate Tidal Volume Pressure FiO2 PEEP            50 %       Peak airway pressure:           Physical Exam:    General:  Alert, cooperative, mils distress, appears stated age. On Vapotherm- I turned down to 40%   Head:  Normocephalic, without obvious abnormality, atraumatic. Eyes:  Conjunctivae/corneas clear. PERRL, EOMs intact. Nose: Nares normal. Septum midline. Mucosa normal. No drainage or sinus tenderness. Throat: Lips, mucosa, and tongue normal. No exudate   Neck: Supple, symmetrical, trachea midline, no adenopathy, thyroid: no enlargment/tenderness/nodules,  no JVD. Back:   Symmetric   Lungs:   Symmetric, non-labored breathing pattern, No audible wheezing or stridor. Decreased diaphragm excursion by palpation, No SCM use   Chest wall:  No tenderness. Increased A-P diameter, No crepitous   Heart:  Regular rate and rhythm by palpation   Abdomen:   Soft, non-tender. No masses,  No organomegaly. Extremities: Extremities normal, atraumatic, no cyanosis or edema. Pulses: 2+ and symmetric all extremities. Skin: Skin color, texture, turgor normal. No rashes or lesions   Lymph nodes:      Cervical, supraclavicular nodes are unremarkable   Neurologic: Grossly nonfocal       Data:     Recent Results (from the past 24 hour(s))   POC LACTIC ACID    Collection Time: 12/10/20 12:07 PM   Result Value Ref Range    Lactic Acid (POC) 0.80 0.40 - 2.00 mmol/L   CULTURE, BLOOD    Collection Time: 12/10/20 12:18 PM    Specimen: Blood   Result Value Ref Range    Special Requests: NO SPECIAL REQUESTS  LEFT  Antecubital        Culture result: NO GROWTH AFTER 18 HOURS     CULTURE, BLOOD    Collection Time: 12/10/20 12:18 PM    Specimen: Blood   Result Value Ref Range    Special Requests: NO SPECIAL REQUESTS  LEFT  HAND        Culture result: NO GROWTH AFTER 18 HOURS     CBC WITH AUTOMATED DIFF    Collection Time: 12/10/20 12:18 PM   Result Value Ref Range    WBC 6.0 4.6 - 13.2 K/uL    RBC 5.02 4.20 - 5.30 M/uL    HGB 14.9 12.0 - 16.0 g/dL    HCT 45.3 (H) 35.0 - 45.0 %    MCV 90.2 74.0 - 97.0 FL    MCH 29.7 24.0 - 34.0 PG    MCHC 32.9 31.0 - 37.0 g/dL    RDW 15.3 (H) 11.6 - 14.5 %    PLATELET 961 062 - 543 K/uL    MPV 10.5 9.2 - 11.8 FL    NEUTROPHILS 64 40 - 73 %    LYMPHOCYTES 13 (L) 21 - 52 %    MONOCYTES 23 (H) 3 - 10 %    EOSINOPHILS 0 0 - 5 %    BASOPHILS 0 0 - 2 %    ABS. NEUTROPHILS 3.9 1.8 - 8.0 K/UL    ABS. LYMPHOCYTES 0.8 (L) 0.9 - 3.6 K/UL    ABS. MONOCYTES 1.4 (H) 0.05 - 1.2 K/UL    ABS. EOSINOPHILS 0.0 0.0 - 0.4 K/UL    ABS. BASOPHILS 0.0 0.0 - 0.1 K/UL    DF AUTOMATED     INFLUENZA A & B AG (RAPID TEST)    Collection Time: 12/10/20 12:18 PM   Result Value Ref Range    Influenza A Antigen Negative NEG      Influenza B Antigen Negative NEG     PROTHROMBIN TIME + INR    Collection Time: 12/10/20 12:41 PM   Result Value Ref Range    Prothrombin time 12.6 11.5 - 15.2 sec    INR 1.0 0.8 - 1.2     PTT    Collection Time: 12/10/20 12:41 PM   Result Value Ref Range    aPTT 34.0 23.0 - 36.4 SEC   C REACTIVE PROTEIN, QT    Collection Time: 12/10/20 12:41 PM   Result Value Ref Range    C-Reactive protein 2.5 (H) 0 - 0.3 mg/dL   FERRITIN    Collection Time: 12/10/20 12:41 PM   Result Value Ref Range    Ferritin 143 8 - 388 NG/ML   FIBRINOGEN    Collection Time: 12/10/20 12:41 PM   Result Value Ref Range    Fibrinogen 445 210 - 451 mg/dL   LD    Collection Time: 12/10/20 12:41 PM   Result Value Ref Range     (H) 81 - 307 U/L   METABOLIC PANEL, COMPREHENSIVE    Collection Time: 12/10/20 12:41 PM   Result Value Ref Range    Sodium 136 136 - 145 mmol/L    Potassium 4.3 3.5 - 5.5 mmol/L    Chloride 105 100 - 111 mmol/L    CO2 26 21 - 32 mmol/L    Anion gap 5 3.0 - 18 mmol/L    Glucose 100 (H) 74 - 99 mg/dL    BUN 16 7.0 - 18 MG/DL    Creatinine 1.06 0.6 - 1.3 MG/DL    BUN/Creatinine ratio 15 12 - 20      GFR est AA >60 >60 ml/min/1.73m2    GFR est non-AA 50 (L) >60 ml/min/1.73m2    Calcium 8.3 (L) 8.5 - 10.1 MG/DL    Bilirubin, total 0.5 0.2 - 1.0 MG/DL    ALT (SGPT) 27 13 - 56 U/L    AST (SGOT) 35 10 - 38 U/L    Alk.  phosphatase 52 45 - 117 U/L    Protein, total 7.2 6.4 - 8.2 g/dL    Albumin 3.5 3.4 - 5.0 g/dL    Globulin 3.7 2.0 - 4.0 g/dL    A-G Ratio 0.9 0.8 - 1.7     NT-PRO BNP    Collection Time: 12/10/20 12:41 PM   Result Value Ref Range    NT pro-BNP 1,632 0 - 1,800 PG/ML PROCALCITONIN    Collection Time: 12/10/20 12:41 PM   Result Value Ref Range    Procalcitonin <0.05 ng/mL   TROPONIN I    Collection Time: 12/10/20 12:41 PM   Result Value Ref Range    Troponin-I, QT <0.02 0.0 - 0.045 NG/ML   EKG, 12 LEAD, INITIAL    Collection Time: 12/10/20  1:23 PM   Result Value Ref Range    Ventricular Rate 77 BPM    Atrial Rate 77 BPM    P-R Interval 144 ms    QRS Duration 78 ms    Q-T Interval 406 ms    QTC Calculation (Bezet) 459 ms    Calculated P Axis 42 degrees    Calculated R Axis 19 degrees    Calculated T Axis 79 degrees    Diagnosis       Normal sinus rhythm  Septal infarct (cited on or before 25-SEP-2014)  Abnormal ECG  When compared with ECG of 02-JUN-2016 01:41,  No significant change was found  Confirmed by Yong Phalen ([id number]) on 12/10/2020 3:25:45 PM     POC G3    Collection Time: 12/10/20  1:35 PM   Result Value Ref Range    Device: Non rebreather      Flow rate (POC) 11 L/M    pH (POC) 7.43 7.35 - 7.45      pCO2 (POC) 37.1 35.0 - 45.0 MMHG    pO2 (POC) 66 (L) 80 - 100 MMHG    HCO3 (POC) 24.4 22 - 26 MMOL/L    sO2 (POC) 94 92 - 97 %    Base excess (POC) 0 mmol/L    Allens test (POC) YES      Total resp.  rate 18      Site RIGHT RADIAL      Patient temp. 98.3      Specimen type (POC) ARTERIAL      Performed by Paula Martinez    SARS-COV-2    Collection Time: 12/10/20  5:14 PM   Result Value Ref Range    Specimen source Nasopharyngeal      COVID-19 rapid test Detected (AA) NOTD      Specimen type NP Swab      Health status Nasopharyngeal     GLUCOSE, POC    Collection Time: 12/11/20  8:11 AM   Result Value Ref Range    Glucose (POC) 120 (H) 70 - 110 mg/dL           Lab Results   Component Value Date/Time    TSH 1.30 07/06/2020 10:40 AM    T4, Free 1.3 06/02/2016 11:25 AM           Recent Labs     12/10/20  1335   HCO3I 24.4   PCO2I 37.1   PHI 7.43   PO2I 66*     Telemetry:normal sinus rhythm     Lab Results   Component Value Date/Time    NT pro-BNP 1,632 12/10/2020 12:41 PM    NT pro- 06/01/2016 08:34 PM     09/25/20   ECHO ADULT COMPLETE 09/25/2020 9/25/2020    Narrative · LV: Estimated LVEF is 65 - 70%. Normal cavity size, wall thickness and   systolic function (ejection fraction normal). Wall motion: normal. Mild   (grade 1) left ventricular diastolic dysfunction. · LA: Left Atrium volume index is 30.81 mL/m2. · MV: Mild mitral valve regurgitation is present. · TV: Mild tricuspid valve regurgitation is present. · PV: Mild pulmonic valve regurgitation is present. · PA: Moderate pulmonary hypertension. Pulmonary arterial systolic   pressure is 66 mmHg. Signed by: Wesly Morfin MD       Imaging:  I have personally reviewed the patients radiographs and have reviewed the reports:  CT Results  (Last 48 hours)               12/10/20 1425  CTA CHEST W OR W WO CONT Final result    Impression:  IMPRESSION:       1. No evidence of pulmonary embolism. 2. Limited by motion and hypoinflation. Emphysema with mild infiltrate/pneumonia   suspected in the right lower lobe. 3. New mild hilar and mediastinal lymphadenopathy. 4. New 7 mm left upper lobe lung nodule. Follow-up recommended in 3 months   posttreatment to assess for its persistence, given the limitations of this exam.   5. Biatrial cardiac enlargement. 6. Stable left thyroid nodule. 7. Small hiatal hernia. Narrative:  CTA CHEST PULMONARY EMBOLISM PROTOCOL         INDICATION: Chills. Body aches. Symptoms since last night. Covid-19 concern. TECHNIQUE: Thin collimation axial images obtained through the level of the   pulmonary arteries with additional imaging through the chest following the   uneventful administration of 65 cc Isovue-300 intravenous contrast.  Images   reconstructed into MIP coronal and sagittal projections for complete evaluation   of the tortuous and overlapping pulmonary vascular structures and to reduce   patient radiation dose.   All CT scans are performed using dose optimization techniques as appropriate to the performed exam including the following:   Automated exposure control, adjustment of mA and/or kV according to patient   size, and use of iterative reconstructive technique. COMPARISON: Chest CT 10/6/2020. FINDINGS:       No filling defects are appreciated within the main, left, right, lobar or   visualized segmental pulmonary arteries to suggest embolism. Motion artifact   limits peripheral, subsegmental branches. The thoracic aorta is not aneurysmal.    No evidence for dissection. Arterial wall calcifications. Biatrial cardiac   enlargement. No pericardial effusion. No pleural effusion. Precarinal 14 mm granulomatous   calcification containing lymph node is slightly larger. New additional   lymphadenopathy along bilateral mediastinum and subcarinal mediastinum. New 13   mm subcarinal lymph node. Left mediastinal nodes measure 11 mm. Bilateral hilar   lymphadenopathy. Left thyroid nodule is similar to prior measuring 3.1 x 1.8 cm. Emphysema. Hypoinflation and respiratory motion lung limitations. New 7 mm   nodule (image 18/series 3). Stable chronic scar at anterior right upper lobe and   mild at the right middle lobe. New mild sites of peripheral consolidation in the   right lateral lower lobe. Mild groundglass density versus atelectasis in the   left lower lung laterally. Small hiatal hernia. Subcentimeter right renal cyst  No acute bone finding. CXR Results  (Last 48 hours)               12/10/20 1232  XR CHEST PORT Final result    Impression:  IMPRESSION:       Enlarged cardiac silhouette with diffuse increased interstitial opacities   superimposed on COPD which may represent superimposed mild pulmonary   interstitial edema versus acute interstitial infectious/inflammatory process. Narrative:  EXAM:  XR CHEST PORT       INDICATION:   meets SIRS criteria       COMPARISON: 10/6/2020. FINDINGS:   Hyperinflation. Mild enlargement of the cardiac silhouette. Aortic   atherosclerosis. Diffuse increased interstitial opacities, progressed. Underlying emphysematous changes. No pneumothorax, pleural effusion or confluent   consolidation. Stable osseous structures. Complex decision making was made in the evaluation and management plans during this consultation. More than 50% of time was spent in counseling and coordination of care including review of data and discussion with other team members.          Alivia Mckay DO, [facility]    [facility]  Pulmonary, Critical Care, and Sleep Medicine

## 2020-12-11 NOTE — ROUTINE PROCESS
I was able to speak with son Haja Chou to update on plan of care and hospitalization. All questions answered.         Violeta Bass, NP-C  [facility]  pager [phone]

## 2020-12-11 NOTE — PROGRESS NOTES
Cardiopulmonary Navigator Recommendations    Pt room not entered due to Droplet+ precautions in place. Pt may be candidate for outpatient evaluation. Please consider referring patient for evaluation at OP Pulmonary Rehabilitation after negative COVID-19 result. The following recommendations for consideration are based on chart review. Rehabilitation:  Pulmonary Rehabilitation  Or  Acute Inpatient rehabilitation followed by Outpatient Pulmonary Rehabilitation    Criteria:  Pulmonary:    COPD, Emphysema, Atelectasis, Chronic Respiratory Failure, CHF, Pulmonary Hypertension, HTN, CAD    Home/Stationary Oxygen:  Criteria:  SpO2 89% w/qualifying secondary diagnosis. SpO2 88% or less at rest on room air or PaO2 55 or less    Home Portable Oxygen:   Criteria: SpO2 89% w/qualifying secondary diagnosis.  SpO2 88% or less at rest on room air or PaO2 55 or less        High Frequency Chest Wall Oscillation Vest:  Criteria: Atelectasis/ Emphysema/ Frequent Respiratory Infections    Available Devices:  Arvirago Vest/Visivest  SmartVest Connect  RespirTech InCourage System    Wireless Vests:  [employer] [place] Wireless Vest        [employer] Afflovest Wireless    Contraindications: Pacemakers, Neurostimulator Circulatory Support Devices, Implantable Cardioverter Defibrillators, Infusion Pumps, Insulin Pumps        Mucous Clearance Devices:  Criteria:    Available Devices:    (Manual Devices)         Lynn Solano      Will continue to follow pt through out admission

## 2020-12-11 NOTE — ED NOTES
Cont to sleep, resp remain easy and non labored.  Awaiting room assignment from [facility]

## 2020-12-11 NOTE — PROGRESS NOTES
Titrate to [facility], RR 17     12/11/20 1505   Oxygen Therapy   O2 Sat (%) 96 %   Pulse via Oximetry 66 beats per minute   O2 Device Nasal cannula   O2 Flow Rate (L/min) 6 l/min

## 2020-12-11 NOTE — PROGRESS NOTES
Problem: Falls - Risk of  Goal: *Absence of Falls  Description: Document Negrito Apo Fall Risk and appropriate interventions in the flowsheet.   Outcome: Progressing Towards Goal  Note: Fall Risk Interventions:            Medication Interventions: Patient to call before getting OOB, Evaluate medications/consider consulting pharmacy, Teach patient to arise slowly    Elimination Interventions: Call light in reach, Patient to call for help with toileting needs              Problem: Patient Education: Go to Patient Education Activity  Goal: Patient/Family Education  Outcome: Progressing Towards Goal

## 2020-12-11 NOTE — ED NOTES
TRANSFER - OUT REPORT:    Verbal report given to Jennie Hall RN on [address]  being transferred to [address] [place] [address] for routine progression of care. Pt cannot go to floor yet due to need to move other patients for neg air flow room. Ms. Kailey Champagne will call when bed issue resolved. Report consisted of patients Situation, Background, Assessment and   Recommendations(SBAR). Information from the following report(s) ED Summary was reviewed with the receiving nurse. Lines:       Opportunity for questions and clarification was provided.       Patient transported with:   Monitor

## 2020-12-11 NOTE — PROGRESS NOTES
Titrate FIO2 40%, RR 17     12/11/20 1030   Oxygen Therapy   O2 Sat (%) 98 %   Pulse via Oximetry 69 beats per minute   O2 Device Hi flow nasal cannula  (Vapotherm)   O2 Flow Rate (L/min) 25 l/min   O2 Temperature 91.4 °F (33 °C)   FIO2 (%) 40 %

## 2020-12-11 NOTE — CONSULTS
Infectious Disease Consultation Note        Reason: confirmed covid-19 pneumonia, acute hypoxic respiratory failure    Current abx Prior abx   Azithromycin since 12/10  Ceftriaxone since 12/10      Lines:       Assessment :     82 y.o. female with a history of COPD presented to ED on 12/10/2020 with increasing shortness of breath, fatigue. Labs on 12/11-ferritin 242, CRP 3.9, procalcitonin less than 0.05, D-dimer 0.92    Positive COVID-19 test 12/10/2020    Clinical presentation consistent with acute on chronic hypoxic respiratory failure secondary to confirmed COVID-19 pneumonia    Increasing CRP, worsening hypoxia suggestive of progressive severe COVID-19 pneumonia. Hence will escalate therapy. Recommendations:    1. Continue ceftriaxone, azithromycin for now  2. Start remdesivir  3. Transfuse convalescent plasma  4. Recommend pulmonary consult-discussed with Dr. Elsa Phelps  5. Taper steroids per pulmonary  6. Continue anticoagulation per primary team  7. Titrate oxygen as tolerated        Thank you for consultation request. Above plan was discussed in details with patient, RN, dr. Gavino Conti and dr Elsa Phelps. Please call me if any further questions or concerns. Will continue to participate in the care of this patient. HPI:     82 y.o. female with a history of COPD presented to ED on 12/10/2020 with increasing shortness of breath, fatigue. c/o worsening SOB, body aches and dull headache since Sunday 12/6. Her son took her [employer] shopping but stated she wasn't in the store more than 20 mins due to very long lines. So she then decided to go to the commissary to go grocery shopping. Pt states it was very cold in there and she couldn't get warm, later that day she felt ill and hasn't improved since. She went to the [facility] on 12/9 but went home after her hip started hurting due to prolonged wait time. She has increased her oxygen \"as high as possible\" due to SOB.   The patient reports she presented due to worsening fatigue, myalgias and feeling ill to [facility] ED onset 12/10/2020. Chest x-ray revealed pulmonary infiltrate. CTA chest 12/10 no evidence of pulmonary embolism. Emphysema with mild infiltrate/pneumonia in the right lower lobe. Labs notable for procalcitonin less than 0.05, CRP 2.5. I was consulted for further recommendations. I recommended to initiate intravenous steroids and monitor clinically. Overnight patient had worsening hypoxia and switched to high flow. I am seeing the patient for further recommendations. Patient states that she is feeling better today compared to yesterday. She denies worsening chest pain, shortness of breath, abdominal pain, diarrhea, dysuria. Past Medical History:   Diagnosis Date    Chronic lung disease     Chronic obstructive pulmonary disease ([address]     [facility] failure ([facility] [address]     Hypertension        Past Surgical History:   Procedure Laterality Date    HX ORTHOPAEDIC      spinal sx 5/6    HX OTHER SURGICAL      Carotid artery    VASCULAR SURGERY PROCEDURE UNLIST      L CEA 10/2014       home Medication List    Details   atorvastatin (LIPITOR) 20 mg tablet Take 20 mg by mouth daily. multivitamin (ONE A DAY) tablet Take 1 Tab by mouth daily. COQ10, UBIQUINOL, PO Take  by mouth.      losartan (COZAAR) 50 mg tablet Take  by mouth daily. 75 mg in a.m      fluticasone-umeclidin-vilanter (TRELEGY ELLIPTA) 100-62.5-25 mcg dsdv Take 1 Puff by inhalation daily. diclofenac (VOLTAREN) 1 % gel Apply  to affected area four (4) times daily. Qty: 100 g, Refills: 2      Oxygen       albuterol (PROVENTIL HFA, VENTOLIN HFA, PROAIR HFA) 90 mcg/actuation inhaler Take  by inhalation. albuterol (PROVENTIL VENTOLIN) 2.5 mg /3 mL (0.083 %) nebulizer solution 3 mL by Nebulization route every four (4) hours as needed for Wheezing or Shortness of Breath.   Qty: 48 Each, Refills: 0      furosemide (LASIX) 20 mg tablet 20 mg po daily  Qty: 30 Tab, Refills: 0      acetaminophen (TYLENOL EXTRA STRENGTH) 500 mg tablet Take 500 mg by mouth every six (6) hours as needed for Pain.      metoprolol (LOPRESSOR) 25 mg tablet Take 25 mg by mouth two (2) times a day. NIFEdipine ER (ADALAT CC) 60 mg ER tablet Take 60 mg by mouth daily. Associated Diagnoses: Occlusion and stenosis of carotid artery, left; Pre-op testing      aspirin delayed-release 81 mg tablet Take  by mouth daily. Associated Diagnoses: Occlusion and stenosis of carotid artery, left; Pre-op testing      alprazolam (XANAX) 0.5 mg tablet Take  by mouth nightly.     Associated Diagnoses: Occlusion and stenosis of carotid artery, left; Pre-op testing             Current Facility-Administered Medications   Medication Dose Route Frequency    sodium chloride (NS) flush 5-40 mL  5-40 mL IntraVENous Q8H    sodium chloride (NS) flush 5-40 mL  5-40 mL IntraVENous PRN    acetaminophen (TYLENOL) tablet 650 mg  650 mg Oral Q6H PRN    Or    acetaminophen (TYLENOL) suppository 650 mg  650 mg Rectal Q6H PRN    polyethylene glycol (MIRALAX) packet 17 g  17 g Oral DAILY PRN    promethazine (PHENERGAN) tablet 12.5 mg  12.5 mg Oral Q6H PRN    Or    ondansetron (ZOFRAN) injection 4 mg  4 mg IntraVENous Q6H PRN    enoxaparin (LOVENOX) injection 40 mg  40 mg SubCUTAneous DAILY    famotidine (PEPCID) tablet 20 mg  20 mg Oral DAILY    insulin lispro (HUMALOG) injection   SubCUTAneous AC&HS    glucose chewable tablet 16 g  4 Tab Oral PRN    glucagon (GLUCAGEN) injection 1 mg  1 mg IntraMUSCular PRN    dextrose (D50W) injection syrg 12.5-25 g  25-50 mL IntraVENous PRN    guaiFENesin-dextromethorphan (ROBITUSSIN DM) 100-10 mg/5 mL syrup 5 mL  5 mL Oral Q4H PRN    cholecalciferol (VITAMIN D3) (1000 Units /25 mcg) tablet 6 Tab  6,000 Units Oral DAILY    ascorbic acid (vitamin C) (VITAMIN C) tablet 500 mg  500 mg Oral BID    zinc sulfate (ZINCATE) 220 (50) mg capsule 1 Cap  1 Cap Oral DAILY    albuterol (PROVENTIL HFA, VENTOLIN HFA, PROAIR HFA) inhaler 2 Puff  2 Puff Inhalation Q4H PRN    ALPRAZolam (XANAX) tablet 0.5 mg  0.5 mg Oral QHS    aspirin delayed-release tablet 81 mg  81 mg Oral DAILY    atorvastatin (LIPITOR) tablet 20 mg  20 mg Oral DAILY    losartan (COZAAR) tablet 50 mg  50 mg Oral DAILY    metoprolol tartrate (LOPRESSOR) tablet 25 mg  25 mg Oral BID    therapeutic multivitamin (THERAGRAN) tablet 1 Tab  1 Tab Oral DAILY    NIFEdipine ER (PROCARDIA XL) tablet 60 mg  60 mg Oral DAILY    budesonide-formoterol (SYMBICORT) 80-4.5 mcg inhaler  2 Puff Inhalation BID RT    And    tiotropium bromide (SPIRIVA RESPIMAT) 2.5 mcg /actuation  2 Puff Inhalation DAILY    piperacillin-tazobactam (ZOSYN) 3.375 g in 0.9% sodium chloride (MBP/ADV) 100 mL MBP  3.375 g IntraVENous Q6H    sodium chloride (NS) flush 5-10 mL  5-10 mL IntraVENous PRN    azithromycin (ZITHROMAX) 500 mg in 0.9% sodium chloride 250 mL (VIAL-MATE)  500 mg IntraVENous Q24H    dexamethasone (DECADRON) 4 mg/mL injection 6 mg  6 mg IntraVENous Q6H       Allergies: Patient has no known allergies.     Family History   Problem Relation Age of Onset    Heart Disease Mother     Hypertension Mother     Heart Attack Father     Hypertension Father      Social History     Socioeconomic History    Marital status:      Spouse name: Not on file    Number of children: Not on file    Years of education: Not on file    Highest education level: Not on file   Occupational History    Not on file   Social Needs    Financial resource strain: Not on file    Food insecurity     Worry: Not on file     Inability: Not on file    Transportation needs     Medical: Not on file     Non-medical: Not on file   Tobacco Use    Smoking status: Former Smoker     Packs/day: 1.50     Years: 30.00     Pack years: 45.00     Types: Cigarettes     Last attempt to quit: 1987     Years since quittin.2    Smokeless tobacco: Never Used   Substance and Sexual Activity    Alcohol use: No     Alcohol/week: 0.0 standard drinks    Drug use: No    Sexual activity: Never   Lifestyle    Physical activity     Days per week: Not on file     Minutes per session: Not on file    Stress: Not on file   Relationships    Social connections     Talks on phone: Not on file     Gets together: Not on file     Attends Yarsanism service: Not on file     Active member of club or organization: Not on file     Attends meetings of clubs or organizations: Not on file     Relationship status: Not on file    Intimate partner violence     Fear of current or ex partner: Not on file     Emotionally abused: Not on file     Physically abused: Not on file     Forced sexual activity: Not on file   Other Topics Concern    Not on file   Social History Narrative    Not on file     Social History     Tobacco Use   Smoking Status Former Smoker    Packs/day: 1.50    Years: 30.00    Pack years: 45.00    Types: Cigarettes    Last attempt to quit: 1987    Years since quittin.2   Smokeless Tobacco Never Used        Temp (24hrs), Av.3 °F (36.8 °C), Min:97.9 °F (36.6 °C), Max:98.6 °F (37 °C)    Visit Vitals  BP (!) 159/61 (BP 1 Location: Right arm, BP Patient Position: At rest)   Pulse 73   Temp 98.6 °F (37 °C)   Resp 19   Ht 5' 3\" (1.6 m)   Wt 55.6 kg (122 lb 8 oz)   SpO2 98%   Breastfeeding No   BMI 21.70 kg/m²       ROS: 12 point ROS obtained in details. Pertinent positives as mentioned in HPI,   otherwise negative    Physical Exam:    General:  Alert, cooperative,  in no apparent distress. On high flow oxygen   Head:  Normocephalic, without obvious abnormality, atraumatic. Eyes:  Conjunctivae/corneas clear. PERRL, EOMs intact. Nose: Nares normal. Septum midline. Mucosa normal. No drainage or sinus tenderness. Throat: Lips, mucosa, and tongue normal. No exudate   Neck: Supple, symmetrical, trachea midline, no adenopathy, thyroid: no enlargment/tenderness/nodules,  no JVD. Back:   Symmetric   Lungs:   Symmetric, non-labored breathing pattern, No audible wheezing or stridor. Decreased diaphragm excursion by palpation, No SCM use   Chest wall:  No tenderness. Heart:  Regular rate and rhythm by palpation   Abdomen:   Soft, non-tender. No masses,  No organomegaly. Extremities: Extremities normal, atraumatic, no cyanosis or edema. Pulses: 2+ and symmetric all extremities.    Skin: Skin color, texture, turgor normal. No rashes or lesions   Lymph nodes:       Cervical, supraclavicular nodes are unremarkable   Neurologic: Grossly nonfocal         Labs: Results:   Chemistry Recent Labs     12/10/20  1241   *      K 4.3      CO2 26   BUN 16   CREA 1.06   CA 8.3*   AGAP 5   BUCR 15   AP 52   TP 7.2   ALB 3.5   GLOB 3.7   AGRAT 0.9      CBC w/Diff Recent Labs     12/10/20  1218   WBC 6.0   RBC 5.02   HGB 14.9   HCT 45.3*      GRANS 64   LYMPH 13*   EOS 0      Microbiology Recent Labs     12/10/20  1218   CULT NO GROWTH AFTER 18 HOURS  NO GROWTH AFTER 18 HOURS          RADIOLOGY:    All available imaging studies/reports in [facility] for this admission were reviewed    Dr. Cyndei Sampson, Infectious Disease Specialist  [phone]  December 11, 2020  9:03 AM

## 2020-12-11 NOTE — PROGRESS NOTES
12/11/20 0528   Oxygen Therapy   O2 Sat (%) 98 %   Pulse via Oximetry 68 beats per minute   O2 Device Hi flow nasal cannula  (Vapotherm)   O2 Flow Rate (L/min) 25 l/min   O2 Temperature 91.4 °F (33 °C)   FIO2 (%) 60 %     Pt came on 100 % Non-rebreather and I looked at the ABG(Low PaO2) from previous facility. Based off of that. .. I put pt on 25L /60% Vapotherm and assessed how she responded. She maintained good SpO2 and WOB. No respiratory distress noted, patient is comfortable. Pt instructed to call if help is needed, call bell within reach.   Patient confirms understanding

## 2020-12-11 NOTE — PROGRESS NOTES
Titrate to 5L NC, RR 14     12/11/20 3949   Oxygen Therapy   O2 Sat (%) 95 %   Pulse via Oximetry 70 beats per minute   O2 Device Nasal cannula   O2 Flow Rate (L/min) 5 l/min

## 2020-12-11 NOTE — H&P
History & Physical    Patient: Myra Clifford MRN: [id number]  CSN: [id number]    YOB: 1935  Age: 82 y.o. Sex: female      DOA: 12/10/2020    Chief Complaint:   Chief Complaint   Patient presents with    Chills    Generalized Body Aches          HPI:     Myra Clifford is a 82 y.o.  female with hx of COPD, chronic respiratory failure on 2-3L NC, emphysema, moderate pulmonary hypertension, CAD, HTN, chronic diastolic CHF, HLD who presented to [facility] ED yesterday c/o worsening SOB, body aches and dull headache since Sunday 12/6. Her son took her [employer] shopping but stated she wasn't in the store more than 20 mins due to very long lines. So she then decided to go to the commissary to go grocery shopping. Pt states it was very cold in there and she couldn't get warm, later that day she felt ill and hasn't improved since. She went to the [facility] on 12/9 but went home after her hip started hurting due to prolonged wait time. She has increased her oxygen \"as high as possible\" due to SOB. Pt denies fever, cough, chest pain, palpitations, abdominal pain, n/v, change in bowel habits. No known exposure to sick contacts. Prior 30 year hx of tobacco abuse. She is followed by Dr. Torres Campbell cardiology and Dr. Adriana Llanes pulmonary. Echo in September 2020 showed EF 65-70% with Grade 1 diastolic dysfunction, mild mitral, tricuspid and pulmonic valve regurgitation and moderate pulmonary hypertension. ED Course  Oxygen sats 84% on 4L NC, tachypnea, normal lactic acid, WBC 6, Hgb 14.9, Hct 45.3, platelets 799, normal CMP, , CRP 2.5, ferritin 143, pro-BNP normal at 1362, troponin <0.02. Influenza A/B negative. Blood cultures obtained x 2. CXR enlarged cardiac silhouette w/ diffuse increased interstitial opacities superimposed on COPD which may represent superimposed mild pulmonary interstitial edema vs infectious/inflammatory process. Pt placed on NRBM 11L.   ABG obtianed with pH of 7.43, pC02 of 37.1, p02 of 66 and HCO3 of 24.4, Novel Coronavirus obtained. CTa negative for PE but did show emphysema w/ mild infiltrate/pneumonia in RLL, new mild hilar and mediastinal lymphadenopathy, new 7 mm left upper lobe lung nodule f/u in 3 months, biatrial cardiac enlargement, stable left thyroid nodule and small hiatal hernia. Rapid Covid test obtained and POSITIVE. Droplet plus isolation. Pt was accepted by hospitalist group and transferred from [facility] to [facility] stepdown unit this morning. She is currently on HFNC 25L and 60%. Pt resting in bed in NAD. She is a/ox 4. States SOB has improved significantly. She is able to confirm her home medication list.      Past Medical History:   Diagnosis Date    Chronic lung disease     Chronic obstructive pulmonary disease ([address]     Heart failure [address] [address]     Hypertension        Past Surgical History:   Procedure Laterality Date    HX ORTHOPAEDIC      spinal sx     HX OTHER SURGICAL      Carotid artery    VASCULAR SURGERY PROCEDURE UNLIST      L CEA 10/2014       Family History   Problem Relation Age of Onset    Heart Disease Mother     Hypertension Mother     Heart Attack Father     Hypertension Father        Social History     Socioeconomic History    Marital status:      Spouse name: Not on file    Number of children: Not on file    Years of education: Not on file    Highest education level: Not on file   Tobacco Use    Smoking status: Former Smoker     Packs/day: 1.50     Years: 30.00     Pack years: 45.00     Types: Cigarettes     Last attempt to quit: 1987     Years since quittin.2    Smokeless tobacco: Never Used   Substance and Sexual Activity    Alcohol use: No     Alcohol/week: 0.0 standard drinks    Drug use: No    Sexual activity: Never       Prior to Admission medications    Medication Sig Start Date End Date Taking? Authorizing Provider   multivitamin (ONE A DAY) tablet Take 1 Tab by mouth daily. Provider, Historical   COQ10, UBIQUINOL, PO Take  by mouth. Provider, Historical   losartan (COZAAR) 50 mg tablet Take  by mouth daily. 75 mg in a.m    Provider, Historical   fluticasone-umeclidin-vilanter (TRELEGY ELLIPTA) 100-62.5-25 mcg dsdv Take 1 Puff by inhalation daily. Provider, Historical   diclofenac (VOLTAREN) 1 % gel Apply  to affected area four (4) times daily. 12/20/17   Ivan Cartagena PA-C   Oxygen     Provider, Historical   albuterol (PROVENTIL HFA, VENTOLIN HFA, PROAIR HFA) 90 mcg/actuation inhaler Take  by inhalation. Provider, Historical   albuterol (PROVENTIL VENTOLIN) 2.5 mg /3 mL (0.083 %) nebulizer solution 3 mL by Nebulization route every four (4) hours as needed for Wheezing or Shortness of Breath. 6/4/16   Shiloh Hoskins MD   furosemide (LASIX) 20 mg tablet 20 mg po daily  Patient taking differently: Take 40 mg by mouth daily. 6/5/16   Shiloh Hoskins MD   acetaminophen (TYLENOL EXTRA STRENGTH) 500 mg tablet Take 500 mg by mouth every six (6) hours as needed for Pain. Provider, Historical   metoprolol (LOPRESSOR) 25 mg tablet Take 25 mg by mouth two (2) times a day. Provider, Historical   NIFEdipine ER (ADALAT CC) 60 mg ER tablet Take 60 mg by mouth daily. Provider, Historical   simvastatin (ZOCOR) 20 mg tablet Take  by mouth nightly. Provider, Historical   alprazolam (XANAX) 0.5 mg tablet Take  by mouth. Provider, Historical   aspirin delayed-release 81 mg tablet Take  by mouth daily. Provider, Historical       No Known Allergies      Review of Systems  GENERAL: +body aches, malaise. No fever, chills, weight changes  HEENT: No change in vision, no earache, tinnitus, sore throat or sinus congestion. NECK: No pain or stiffness. PULMONARY: +SOB. No cough or wheeze. Cardiovascular: no pnd or orthopnea, no CP  GASTROINTESTINAL: No abdominal pain, nausea, vomiting or diarrhea, melena or bright red blood per rectum.    GENITOURINARY: No urinary frequency, urgency, hesitancy or dysuria. MUSCULOSKELETAL: +chronic hip pain. No joint or muscle pain, no back pain, no recent trauma. DERMATOLOGIC: No rash, no itching, no lesions. ENDOCRINE: No polyuria, polydipsia, no heat or cold intolerance. No recent change in weight. HEMATOLOGICAL: No anemia or easy bruising or bleeding. NEUROLOGIC: No headache, seizures, numbness, tingling or weakness. Physical Exam:     Physical Exam:  Visit Vitals  BP (!) 159/61 (BP 1 Location: Right arm, BP Patient Position: At rest)   Pulse 73   Temp 98.6 °F (37 °C)   Resp 19   Ht 5' 3\" (1.6 m)   Wt 55.6 kg (122 lb 8 oz)   SpO2 98%   BMI 21.70 kg/m²    O2 Flow Rate (L/min): 25 l/min O2 Device: Hi flow nasal cannula(Vapotherm)    Temp (24hrs), Av.3 °F (36.8 °C), Min:97.9 °F (36.6 °C), Max:98.6 °F (37 °C)    12/10 1901 -  0700  In: 50 [I.V.:50]  Out: -    No intake/output data recorded. General:  Alert, cooperative, no distress, appears stated age. Head: Normocephalic, without obvious abnormality, atraumatic. Eyes:  Conjunctivae/corneas clear. PERRL, EOMs intact. Nose: Nares normal. No drainage or sinus tenderness. Neck: Supple, symmetrical, trachea midline, no adenopathy, thyroid: no enlargement, no carotid bruit and no JVD. Lungs:   Diminished bilaterally otherwise clear. Heart:  Regular rate and rhythm, S1, S2 normal.     Abdomen: Soft, non-tender. Bowel sounds normal.    Extremities: Extremities normal, atraumatic, no cyanosis or edema. Pulses: 2+ and symmetric all extremities. Skin:  No rashes or lesions   Neurologic: AAOx3, No focal motor or sensory deficit. Labs Reviewed: All lab results for the last 24 hours reviewed.   Recent Results (from the past 24 hour(s))   POC LACTIC ACID    Collection Time: 12/10/20 12:07 PM   Result Value Ref Range    Lactic Acid (POC) 0.80 0.40 - 2.00 mmol/L   CBC WITH AUTOMATED DIFF    Collection Time: 12/10/20 12:18 PM   Result Value Ref Range WBC 6.0 4.6 - 13.2 K/uL    RBC 5.02 4.20 - 5.30 M/uL    HGB 14.9 12.0 - 16.0 g/dL    HCT 45.3 (H) 35.0 - 45.0 %    MCV 90.2 74.0 - 97.0 FL    MCH 29.7 24.0 - 34.0 PG    MCHC 32.9 31.0 - 37.0 g/dL    RDW 15.3 (H) 11.6 - 14.5 %    PLATELET 390 802 - 473 K/uL    MPV 10.5 9.2 - 11.8 FL    NEUTROPHILS 64 40 - 73 %    LYMPHOCYTES 13 (L) 21 - 52 %    MONOCYTES 23 (H) 3 - 10 %    EOSINOPHILS 0 0 - 5 %    BASOPHILS 0 0 - 2 %    ABS. NEUTROPHILS 3.9 1.8 - 8.0 K/UL    ABS. LYMPHOCYTES 0.8 (L) 0.9 - 3.6 K/UL    ABS. MONOCYTES 1.4 (H) 0.05 - 1.2 K/UL    ABS. EOSINOPHILS 0.0 0.0 - 0.4 K/UL    ABS.  BASOPHILS 0.0 0.0 - 0.1 K/UL    DF AUTOMATED     INFLUENZA A & B AG (RAPID TEST)    Collection Time: 12/10/20 12:18 PM   Result Value Ref Range    Influenza A Antigen Negative NEG      Influenza B Antigen Negative NEG     PROTHROMBIN TIME + INR    Collection Time: 12/10/20 12:41 PM   Result Value Ref Range    Prothrombin time 12.6 11.5 - 15.2 sec    INR 1.0 0.8 - 1.2     PTT    Collection Time: 12/10/20 12:41 PM   Result Value Ref Range    aPTT 34.0 23.0 - 36.4 SEC   C REACTIVE PROTEIN, QT    Collection Time: 12/10/20 12:41 PM   Result Value Ref Range    C-Reactive protein 2.5 (H) 0 - 0.3 mg/dL   FERRITIN    Collection Time: 12/10/20 12:41 PM   Result Value Ref Range    Ferritin 143 8 - 388 NG/ML   FIBRINOGEN    Collection Time: 12/10/20 12:41 PM   Result Value Ref Range    Fibrinogen 445 210 - 451 mg/dL   LD    Collection Time: 12/10/20 12:41 PM   Result Value Ref Range     (H) 81 - 058 U/L   METABOLIC PANEL, COMPREHENSIVE    Collection Time: 12/10/20 12:41 PM   Result Value Ref Range    Sodium 136 136 - 145 mmol/L    Potassium 4.3 3.5 - 5.5 mmol/L    Chloride 105 100 - 111 mmol/L    CO2 26 21 - 32 mmol/L    Anion gap 5 3.0 - 18 mmol/L    Glucose 100 (H) 74 - 99 mg/dL    BUN 16 7.0 - 18 MG/DL    Creatinine 1.06 0.6 - 1.3 MG/DL    BUN/Creatinine ratio 15 12 - 20      GFR est AA >60 >60 ml/min/1.73m2    GFR est non-AA 50 (L) >60 ml/min/1.73m2    Calcium 8.3 (L) 8.5 - 10.1 MG/DL    Bilirubin, total 0.5 0.2 - 1.0 MG/DL    ALT (SGPT) 27 13 - 56 U/L    AST (SGOT) 35 10 - 38 U/L    Alk. phosphatase 52 45 - 117 U/L    Protein, total 7.2 6.4 - 8.2 g/dL    Albumin 3.5 3.4 - 5.0 g/dL    Globulin 3.7 2.0 - 4.0 g/dL    A-G Ratio 0.9 0.8 - 1.7     NT-PRO BNP    Collection Time: 12/10/20 12:41 PM   Result Value Ref Range    NT pro-BNP 1,632 0 - 1,800 PG/ML   PROCALCITONIN    Collection Time: 12/10/20 12:41 PM   Result Value Ref Range    Procalcitonin <0.05 ng/mL   TROPONIN I    Collection Time: 12/10/20 12:41 PM   Result Value Ref Range    Troponin-I, QT <0.02 0.0 - 0.045 NG/ML   EKG, 12 LEAD, INITIAL    Collection Time: 12/10/20  1:23 PM   Result Value Ref Range    Ventricular Rate 77 BPM    Atrial Rate 77 BPM    P-R Interval 144 ms    QRS Duration 78 ms    Q-T Interval 406 ms    QTC Calculation (Bezet) 459 ms    Calculated P Axis 42 degrees    Calculated R Axis 19 degrees    Calculated T Axis 79 degrees    Diagnosis       Normal sinus rhythm  Septal infarct (cited on or before 25-SEP-2014)  Abnormal ECG  When compared with ECG of 02-JUN-2016 01:41,  No significant change was found  Confirmed by Niels Cook ([id number]) on 12/10/2020 3:25:45 PM     POC G3    Collection Time: 12/10/20  1:35 PM   Result Value Ref Range    Device: Non rebreather      Flow rate (POC) 11 L/M    pH (POC) 7.43 7.35 - 7.45      pCO2 (POC) 37.1 35.0 - 45.0 MMHG    pO2 (POC) 66 (L) 80 - 100 MMHG    HCO3 (POC) 24.4 22 - 26 MMOL/L    sO2 (POC) 94 92 - 97 %    Base excess (POC) 0 mmol/L    Allens test (POC) YES      Total resp.  rate 18      Site RIGHT RADIAL      Patient temp. 98.3      Specimen type (POC) ARTERIAL      Performed by Cindy Silverio    SARS-COV-2    Collection Time: 12/10/20  5:14 PM   Result Value Ref Range    Specimen source Nasopharyngeal      COVID-19 rapid test Detected (AA) NOTD      Specimen type NP Swab      Health status Nasopharyngeal       XR Results (most recent):  Results from [place] encounter on 12/10/20   XR CHEST PORT    Narrative EXAM:  XR CHEST PORT    INDICATION:   meets SIRS criteria    COMPARISON: 10/6/2020. FINDINGS:  Hyperinflation. Mild enlargement of the cardiac silhouette. Aortic  atherosclerosis. Diffuse increased interstitial opacities, progressed. Underlying emphysematous changes. No pneumothorax, pleural effusion or confluent  consolidation. Stable osseous structures. Impression IMPRESSION:    Enlarged cardiac silhouette with diffuse increased interstitial opacities  superimposed on COPD which may represent superimposed mild pulmonary  interstitial edema versus acute interstitial infectious/inflammatory process. CT Results (most recent):  Results from [place] encounter on 12/10/20   CTA CHEST W OR W WO CONT    Narrative CTA CHEST PULMONARY EMBOLISM PROTOCOL      INDICATION: Chills. Body aches. Symptoms since last night. Covid-19 concern. TECHNIQUE: Thin collimation axial images obtained through the level of the  pulmonary arteries with additional imaging through the chest following the  uneventful administration of 65 cc Isovue-300 intravenous contrast.  Images  reconstructed into MIP coronal and sagittal projections for complete evaluation  of the tortuous and overlapping pulmonary vascular structures and to reduce  patient radiation dose. All CT scans are performed using dose optimization  techniques as appropriate to the performed exam including the following:  Automated exposure control, adjustment of mA and/or kV according to patient  size, and use of iterative reconstructive technique. COMPARISON: Chest CT 10/6/2020. FINDINGS:    No filling defects are appreciated within the main, left, right, lobar or  visualized segmental pulmonary arteries to suggest embolism. Motion artifact  limits peripheral, subsegmental branches. The thoracic aorta is not aneurysmal.   No evidence for dissection.  Arterial wall calcifications. Biatrial cardiac  enlargement. No pericardial effusion. No pleural effusion. Precarinal 14 mm granulomatous  calcification containing lymph node is slightly larger. New additional  lymphadenopathy along bilateral mediastinum and subcarinal mediastinum. New 13  mm subcarinal lymph node. Left mediastinal nodes measure 11 mm. Bilateral hilar  lymphadenopathy. Left thyroid nodule is similar to prior measuring 3.1 x 1.8 cm. Emphysema. Hypoinflation and respiratory motion lung limitations. New 7 mm  nodule (image 18/series 3). Stable chronic scar at anterior right upper lobe and  mild at the right middle lobe. New mild sites of peripheral consolidation in the  right lateral lower lobe. Mild groundglass density versus atelectasis in the  left lower lung laterally. Small hiatal hernia. Subcentimeter right renal cyst  No acute bone finding. Impression IMPRESSION:    1. No evidence of pulmonary embolism. 2. Limited by motion and hypoinflation. Emphysema with mild infiltrate/pneumonia  suspected in the right lower lobe. 3. New mild hilar and mediastinal lymphadenopathy. 4. New 7 mm left upper lobe lung nodule. Follow-up recommended in 3 months  posttreatment to assess for its persistence, given the limitations of this exam.  5. Biatrial cardiac enlargement. 6. Stable left thyroid nodule. 7. Small hiatal hernia. Procedures/imaging: see electronic medical records for all procedures/Xrays and details which were not copied into this note but were reviewed prior to creation of Plan      Assessment/Plan     1. Covid-19  2. Pneumonia RLL  3. Acute on chronic hypoxic respiratory failure  4. Chronic diastolic heart failure  5. Moderate pulmonary hypertension  6. COPD/Emphysema  7. CAD  8. Hypertension  9. Hyperlipidemia  10. Advanced age  6. New 7 mm pulmonary nodule HIRAL- recommend f/u in 3 months  12.  Hx anxiety, on Xanax 0.5 mg at hs        Admit to stepdown, droplet plus isolation  Heated HFNC  Consult pulmonary  Consult ID  Zithromax + Ceftriaxone  Decadron 6 mg IV BID  - monitor accuchecks ac/hs w/ correctional [employer]  Vit C, Zinc, Vitamin D  Albuterol prn, pt is on Trelegy Ellipta at home, pharmacy consulted for substitute  Type and screen in preparation for convalescent plasma- defer to ID  Monitor daily inflammatory markers, CBC, CMP  Strep pneumo urine antigen and Legionella ur antigen pending  Sputum culture  Incentive spirometer, cough and deep breathe  Resume home meds, hold parameters for Metoprolol  PT, OT eval and treat  Fall, aspiration precautions  Monitor vital signs, weight per unit protocol  Pt will need to f/u with PCP and/or primary pulmonologist Dr. Whiting Brood to f/u on left upper lobe nodule- repeat CT in 3 months (3/2021)        Diet: Cardiac  FULL code    She has a son who lives here and a daughter in [place] but she has asked that I contact her sister Gunjan Loya [phone]      DVT/GI Prophylaxis: Lovenox and H2B/PPI      Discussed with patient at bedside about hospital admission and plan care. She understands and agrees. All questions answered. Disclaimer: Sections of this note are dictated using utilizing voice recognition software. Minor typographical errors may be present. If questions arise, please do not hesitate to contact me or call our department.         SHEREE Valverde  [facility]  pager [phone]

## 2020-12-12 LAB
ALBUMIN SERPL-MCNC: 3.3 G/DL (ref 3.4–5)
ALBUMIN/GLOB SERPL: 0.9 {RATIO} (ref 0.8–1.7)
ALP SERPL-CCNC: 50 U/L (ref 45–117)
ALT SERPL-CCNC: 24 U/L (ref 13–56)
ANION GAP SERPL CALC-SCNC: 7 MMOL/L (ref 3–18)
APTT PPP: 33.9 SEC (ref 23–36.4)
AST SERPL-CCNC: 24 U/L (ref 10–38)
BASOPHILS # BLD: 0 K/UL (ref 0–0.1)
BASOPHILS NFR BLD: 0 % (ref 0–2)
BILIRUB SERPL-MCNC: 0.4 MG/DL (ref 0.2–1)
BLD PROD TYP BPU: NORMAL
BPU ID: NORMAL
BUN SERPL-MCNC: 28 MG/DL (ref 7–18)
BUN/CREAT SERPL: 25 (ref 12–20)
CALCIUM SERPL-MCNC: 8.5 MG/DL (ref 8.5–10.1)
CALLED TO:,BCALL1: NORMAL
CHLORIDE SERPL-SCNC: 108 MMOL/L (ref 100–111)
CO2 SERPL-SCNC: 26 MMOL/L (ref 21–32)
CREAT SERPL-MCNC: 1.1 MG/DL (ref 0.6–1.3)
CRP SERPL-MCNC: 1.6 MG/DL (ref 0–0.3)
D DIMER PPP FEU-MCNC: 0.52 UG/ML(FEU)
DIFFERENTIAL METHOD BLD: ABNORMAL
EOSINOPHIL # BLD: 0 K/UL (ref 0–0.4)
EOSINOPHIL NFR BLD: 0 % (ref 0–5)
ERYTHROCYTE [DISTWIDTH] IN BLOOD BY AUTOMATED COUNT: 14.8 % (ref 11.6–14.5)
FERRITIN SERPL-MCNC: 212 NG/ML (ref 8–388)
FIBRINOGEN PPP-MCNC: 437 MG/DL (ref 210–451)
GLOBULIN SER CALC-MCNC: 3.5 G/DL (ref 2–4)
GLUCOSE BLD STRIP.AUTO-MCNC: 142 MG/DL (ref 70–110)
GLUCOSE BLD STRIP.AUTO-MCNC: 145 MG/DL (ref 70–110)
GLUCOSE BLD STRIP.AUTO-MCNC: 164 MG/DL (ref 70–110)
GLUCOSE BLD STRIP.AUTO-MCNC: 180 MG/DL (ref 70–110)
GLUCOSE SERPL-MCNC: 147 MG/DL (ref 74–99)
HCT VFR BLD AUTO: 43.6 % (ref 35–45)
HGB BLD-MCNC: 14.3 G/DL (ref 12–16)
INR PPP: 0.9 (ref 0.8–1.2)
LDH SERPL L TO P-CCNC: 239 U/L (ref 81–234)
LYMPHOCYTES # BLD: 1.1 K/UL (ref 0.9–3.6)
LYMPHOCYTES NFR BLD: 26 % (ref 21–52)
MCH RBC QN AUTO: 29.5 PG (ref 24–34)
MCHC RBC AUTO-ENTMCNC: 32.8 G/DL (ref 31–37)
MCV RBC AUTO: 90.1 FL (ref 74–97)
MONOCYTES # BLD: 0.5 K/UL (ref 0.05–1.2)
MONOCYTES NFR BLD: 12 % (ref 3–10)
NEUTS SEG # BLD: 2.6 K/UL (ref 1.8–8)
NEUTS SEG NFR BLD: 62 % (ref 40–73)
PLATELET # BLD AUTO: 137 K/UL (ref 135–420)
PMV BLD AUTO: 10.7 FL (ref 9.2–11.8)
POTASSIUM SERPL-SCNC: 3.9 MMOL/L (ref 3.5–5.5)
PROCALCITONIN SERPL-MCNC: <0.05 NG/ML
PROT SERPL-MCNC: 6.8 G/DL (ref 6.4–8.2)
PROTHROMBIN TIME: 12.5 SEC (ref 11.5–15.2)
RBC # BLD AUTO: 4.84 M/UL (ref 4.2–5.3)
SODIUM SERPL-SCNC: 141 MMOL/L (ref 136–145)
STATUS OF UNIT,%ST: NORMAL
UNIT DIVISION, %UDIV: 0
WBC # BLD AUTO: 4.1 K/UL (ref 4.6–13.2)

## 2020-12-12 PROCEDURE — 82962 GLUCOSE BLOOD TEST: CPT

## 2020-12-12 PROCEDURE — 86140 C-REACTIVE PROTEIN: CPT

## 2020-12-12 PROCEDURE — 80053 COMPREHEN METABOLIC PANEL: CPT

## 2020-12-12 PROCEDURE — 74011250637 HC RX REV CODE- 250/637: Performed by: NURSE PRACTITIONER

## 2020-12-12 PROCEDURE — 85610 PROTHROMBIN TIME: CPT

## 2020-12-12 PROCEDURE — 83615 LACTATE (LD) (LDH) ENZYME: CPT

## 2020-12-12 PROCEDURE — 74011000250 HC RX REV CODE- 250: Performed by: INTERNAL MEDICINE

## 2020-12-12 PROCEDURE — 36415 COLL VENOUS BLD VENIPUNCTURE: CPT

## 2020-12-12 PROCEDURE — 85730 THROMBOPLASTIN TIME PARTIAL: CPT

## 2020-12-12 PROCEDURE — 74011250636 HC RX REV CODE- 250/636: Performed by: NURSE PRACTITIONER

## 2020-12-12 PROCEDURE — 99232 SBSQ HOSP IP/OBS MODERATE 35: CPT | Performed by: HOSPITALIST

## 2020-12-12 PROCEDURE — 74011250636 HC RX REV CODE- 250/636: Performed by: EMERGENCY MEDICINE

## 2020-12-12 PROCEDURE — 77010033711 HC HIGH FLOW OXYGEN

## 2020-12-12 PROCEDURE — 74011250636 HC RX REV CODE- 250/636: Performed by: INTERNAL MEDICINE

## 2020-12-12 PROCEDURE — 97535 SELF CARE MNGMENT TRAINING: CPT

## 2020-12-12 PROCEDURE — 97530 THERAPEUTIC ACTIVITIES: CPT

## 2020-12-12 PROCEDURE — 82728 ASSAY OF FERRITIN: CPT

## 2020-12-12 PROCEDURE — 74011000258 HC RX REV CODE- 258: Performed by: INTERNAL MEDICINE

## 2020-12-12 PROCEDURE — 65660000000 HC RM CCU STEPDOWN

## 2020-12-12 PROCEDURE — 85379 FIBRIN DEGRADATION QUANT: CPT

## 2020-12-12 PROCEDURE — 94762 N-INVAS EAR/PLS OXIMTRY CONT: CPT

## 2020-12-12 PROCEDURE — 2709999900 HC NON-CHARGEABLE SUPPLY

## 2020-12-12 PROCEDURE — 85384 FIBRINOGEN ACTIVITY: CPT

## 2020-12-12 PROCEDURE — 84145 PROCALCITONIN (PCT): CPT

## 2020-12-12 PROCEDURE — 97165 OT EVAL LOW COMPLEX 30 MIN: CPT

## 2020-12-12 PROCEDURE — 85025 COMPLETE CBC W/AUTO DIFF WBC: CPT

## 2020-12-12 PROCEDURE — 74011636637 HC RX REV CODE- 636/637: Performed by: NURSE PRACTITIONER

## 2020-12-12 RX ADMIN — PIPERACILLIN AND TAZOBACTAM 2.25 G: 2; .25 INJECTION, POWDER, FOR SOLUTION INTRAVENOUS at 09:35

## 2020-12-12 RX ADMIN — CHLORHEXIDINE GLUCONATE 0.12% ORAL RINSE 15 ML: 1.2 LIQUID ORAL at 22:25

## 2020-12-12 RX ADMIN — Medication 10 ML: at 05:38

## 2020-12-12 RX ADMIN — METOPROLOL TARTRATE 25 MG: 25 TABLET, FILM COATED ORAL at 09:37

## 2020-12-12 RX ADMIN — DEXAMETHASONE SODIUM PHOSPHATE 6 MG: 4 INJECTION, SOLUTION INTRAMUSCULAR; INTRAVENOUS at 12:30

## 2020-12-12 RX ADMIN — Medication 500 MG: at 22:25

## 2020-12-12 RX ADMIN — INSULIN LISPRO 2 UNITS: 100 INJECTION, SOLUTION INTRAVENOUS; SUBCUTANEOUS at 22:24

## 2020-12-12 RX ADMIN — DEXAMETHASONE SODIUM PHOSPHATE 6 MG: 4 INJECTION, SOLUTION INTRAMUSCULAR; INTRAVENOUS at 17:32

## 2020-12-12 RX ADMIN — THERA TABS 1 TABLET: TAB at 09:35

## 2020-12-12 RX ADMIN — DEXAMETHASONE SODIUM PHOSPHATE 6 MG: 4 INJECTION, SOLUTION INTRAMUSCULAR; INTRAVENOUS at 05:36

## 2020-12-12 RX ADMIN — BUDESONIDE AND FORMOTEROL FUMARATE DIHYDRATE 2 PUFF: 80; 4.5 AEROSOL RESPIRATORY (INHALATION) at 19:53

## 2020-12-12 RX ADMIN — ZINC SULFATE 220 MG (50 MG) CAPSULE 1 CAPSULE: CAPSULE at 09:35

## 2020-12-12 RX ADMIN — Medication 10 ML: at 22:31

## 2020-12-12 RX ADMIN — FAMOTIDINE 20 MG: 20 TABLET, FILM COATED ORAL at 09:36

## 2020-12-12 RX ADMIN — PIPERACILLIN AND TAZOBACTAM 2.25 G: 2; .25 INJECTION, POWDER, FOR SOLUTION INTRAVENOUS at 03:29

## 2020-12-12 RX ADMIN — CHLORHEXIDINE GLUCONATE 0.12% ORAL RINSE 15 ML: 1.2 LIQUID ORAL at 09:34

## 2020-12-12 RX ADMIN — PIPERACILLIN AND TAZOBACTAM 2.25 G: 2; .25 INJECTION, POWDER, FOR SOLUTION INTRAVENOUS at 22:25

## 2020-12-12 RX ADMIN — REMDESIVIR 100 MG: 100 INJECTION, POWDER, LYOPHILIZED, FOR SOLUTION INTRAVENOUS at 22:34

## 2020-12-12 RX ADMIN — LOSARTAN POTASSIUM 50 MG: 50 TABLET, FILM COATED ORAL at 09:36

## 2020-12-12 RX ADMIN — BUDESONIDE AND FORMOTEROL FUMARATE DIHYDRATE 2 PUFF: 80; 4.5 AEROSOL RESPIRATORY (INHALATION) at 08:00

## 2020-12-12 RX ADMIN — ATORVASTATIN CALCIUM 20 MG: 20 TABLET, FILM COATED ORAL at 09:35

## 2020-12-12 RX ADMIN — ENOXAPARIN SODIUM 40 MG: 40 INJECTION SUBCUTANEOUS at 09:34

## 2020-12-12 RX ADMIN — PIPERACILLIN AND TAZOBACTAM 2.25 G: 2; .25 INJECTION, POWDER, FOR SOLUTION INTRAVENOUS at 17:32

## 2020-12-12 RX ADMIN — TIOTROPIUM BROMIDE INHALATION SPRAY 2 PUFF: 3.12 SPRAY, METERED RESPIRATORY (INHALATION) at 08:00

## 2020-12-12 RX ADMIN — Medication 1 CAPSULE: at 17:32

## 2020-12-12 RX ADMIN — Medication 10 ML: at 17:34

## 2020-12-12 RX ADMIN — DEXAMETHASONE SODIUM PHOSPHATE 6 MG: 4 INJECTION, SOLUTION INTRAMUSCULAR; INTRAVENOUS at 00:14

## 2020-12-12 RX ADMIN — NIFEDIPINE 60 MG: 60 TABLET, FILM COATED, EXTENDED RELEASE ORAL at 09:35

## 2020-12-12 RX ADMIN — AZITHROMYCIN 500 MG: 500 INJECTION, POWDER, LYOPHILIZED, FOR SOLUTION INTRAVENOUS at 09:34

## 2020-12-12 RX ADMIN — INSULIN LISPRO 2 UNITS: 100 INJECTION, SOLUTION INTRAVENOUS; SUBCUTANEOUS at 12:00

## 2020-12-12 RX ADMIN — Medication 500 MG: at 09:35

## 2020-12-12 RX ADMIN — Medication 6 TABLET: at 09:35

## 2020-12-12 RX ADMIN — DEXAMETHASONE SODIUM PHOSPHATE 6 MG: 4 INJECTION, SOLUTION INTRAMUSCULAR; INTRAVENOUS at 23:45

## 2020-12-12 RX ADMIN — ALPRAZOLAM 0.5 MG: 0.5 TABLET ORAL at 22:26

## 2020-12-12 RX ADMIN — Medication 81 MG: at 09:37

## 2020-12-12 NOTE — PROGRESS NOTES
Problem: Self Care Deficits Care Plan (Adult)  Goal: *Acute Goals and Plan of Care (Insert Text)  Description: Occupational Therapy Goals  Initiated 12/12/2020 within 7 day(s). 1.  Patient will perform lower body dressing with supervision/set-up for seated and standing aspects. 2.  Patient will perform toilet transfers with supervision/set-up. 3.  Patient will perform all aspects of toileting with supervision/set-up. 4.  Patient will participate in upper extremity therapeutic exercise/activities with supervision/set-up for 5 minutes. 5.  Patient will utilize energy conservation techniques during functional activities with occasional verbal cues. Prior Level of Function: Pt reports being previously independent in I/ADLs and functional mobility w/o AD. Pt has supportive son who lives nearby (3 miles), sister, and mult neighbors who check on her. Outcome: Progressing Towards Goal  OCCUPATIONAL THERAPY EVALUATION    Patient: Yomi Stephens (78 y.o. female)  Date: 12/12/2020  Primary Diagnosis: Suspected COVID-19 virus infection [Z20.828]  CAP (community acquired pneumonia) [J18.9]        Precautions:   Fall    ASSESSMENT :  Pt cleared to participate in OT evaluation by RN. Upon entering room, pt received seated EOB, alert, and agreeable to OT eval/treatment. Based on the objective data described below, the patient presents with generalized weakness, fatigue, decreased endurance and activity tolerance, limiting her independence and participation in ADLs. Pt is on HFNC with O2 sats ranging from 100% at rest and 75% with functional mobility throughout the session. Pt becomes anxious with all functional mobility and ADLs requiring exertion, benefiting from frequent VCs for PLB to ensure adequate oxygenation and prevent SOB. Pt was educated on mult EC techniques and how they relate to ADLs and functional mobility.  Issued and reviewed EC techniques handout for the pt to increase carryover of education in home environment. Pt donned socks with Supervision and min VCs for PLB, following which requested to use BSC, requiring CGA and Max VCs for safety to maneuver to [facility], due to pt's safety awareness decreases as pt becomes anxious. Following toileting tasks pt requested to return to bed due to feeling fatigue from activities. Pt was positioned for comfort. O2 sats at the end of the session 100%. Pt lives alone, however, reports she will be able to have her sister stay with her if needed for few days/weeks to assist if needed when pt is discharged. Patient will benefit from skilled intervention to address the above impairments. Patient's rehabilitation potential is considered to be Good  Factors which may influence rehabilitation potential include:   []             None noted  [x]             Mental ability/status  [x]             Medical condition  [x]             Home/family situation and support systems  [x]             Safety awareness  []             Pain tolerance/management  []             Other:      PLAN :  Recommendations and Planned Interventions:   [x]               Self Care Training                  [x]      Therapeutic Activities  [x]               Functional Mobility Training   []      Cognitive Retraining  [x]               Therapeutic Exercises           [x]      Endurance Activities  [x]               Balance Training                    [x]      Neuromuscular Re-Education  []               Visual/Perceptual Training     [x]      Home Safety Training  [x]               Patient Education                   [x]      Family Training/Education  []               Other (comment):    Frequency/Duration: Patient will be followed by occupational therapy 1-2 times per day/4-7 days per week to address goals.   Discharge Recommendations: Home Health with Supervision  Further Equipment Recommendations for Discharge: bedside commode, grab bars     SUBJECTIVE:   Patient stated I don't have any of the \"old folks equipment\" at home. Just shower chair, but I don't always use it .     OBJECTIVE DATA SUMMARY:     Past Medical History:   Diagnosis Date    Chronic lung disease     [facility] [address]     [facility] failure [facility] [address]     Hypertension      Past Surgical History:   Procedure Laterality Date    HX ORTHOPAEDIC      spinal sx 5/6    HX OTHER SURGICAL      Carotid artery    VASCULAR SURGERY PROCEDURE UNLIST      L CEA 10/2014     Barriers to Learning/Limitations: None  Compensate with: visual, verbal, tactile, kinesthetic cues/model    Home Situation:   Home Situation  Home Environment: Private residence  # Steps to Enter: 4  Rails to [employer]: No  One/Two Story Residence: One story  Living Alone: Yes  Support Systems: Family member(s), Child(erick), Friends \ neighbors  Patient Expects to be Discharged to[de-identified] Private residence  Current DME Used/Available at Home: Shower chair  Tub or Shower Type: Shower  [x]  Right hand dominant   []  Left hand dominant    Cognitive/Behavioral Status:  Neurologic State: Alert  Orientation Level: Oriented X4  Cognition: Follows commands  Safety/Judgement: Awareness of environment    Skin: visible skin appears intact  Edema: none noted    Vision/Perceptual:       Acuity: Impaired far vision;Able to read clock/calendar on wall without difficulty    Corrective Lenses: Glasses    Coordination: BUE  Coordination: Within functional limits  Fine Motor Skills-Upper: Left Intact; Right Intact    Gross Motor Skills-Upper: Left Intact; Right Intact    Balance:  Sitting: Intact  Standing: Impaired; Without support  Standing - Static: Fair  Standing - Dynamic : Fair    Strength: BUE  Strength: Generally decreased, functional   Tone & Sensation: BUE  Tone: Normal  Sensation: Intact   Range of Motion: BUE  AROM: Generally decreased, functional   Functional Mobility and Transfers for ADLs:  Bed Mobility:      Sit to Supine: Stand-by assistance  Scooting: Stand-by assistance  Transfers:  Sit to Stand: Stand-by assistance;Contact guard assistance  Stand to Sit: Contact guard assistance      Toilet Transfer : Contact guard assistance    ADL Assessment:   Feeding: Setup  Oral Facial Hygiene/Grooming: Setup  Bathing: Stand-by assistance  Upper Body Dressing: Setup  Lower Body Dressing: Contact guard assistance  Toileting: Stand by assistance   ADL Intervention:   Grooming  Grooming Assistance: Set-up  Position Performed: Seated edge of bed  Washing Face: Set-up  Brushing/Combing Hair: Set-up  Upper Body [address]: Supervision    Lower Body Dressing Assistance  Underpants: Contact guard assistance  Socks: Supervision  Leg Crossed Method Used: Yes    Toileting  Bowel Hygiene: Stand-by assistance    Cognitive Retraining  Safety/Judgement: Awareness of environment  Pain:  Pain level pre-treatment: 0/10   Pain level post-treatment: 0/10     Activity Tolerance:   Fair  Please refer to the flowsheet for vital signs taken during this treatment. After treatment:   [] Patient left in no apparent distress sitting up in chair  [x] Patient left in no apparent distress in bed  [x] Call bell left within reach  [x] Nursing notified  [] Caregiver present  [] Bed alarm activated    COMMUNICATION/EDUCATION:   [x] Role of Occupational Therapy in the acute care setting  [x] Home safety education was provided and the patient/caregiver indicated understanding. [x] Patient/family have participated as able in goal setting and plan of care. [x] Patient/family agree to work toward stated goals and plan of care. [] Patient understands intent and goals of therapy, but is neutral about his/her participation. [] Patient is unable to participate in goal setting and plan of care. Thank you for this referral.  Campos Monson OTR/L  Time Calculation: 40 mins    Eval Complexity: History: LOW Complexity : Brief history review ;    Examination: LOW Complexity : 1-3 performance deficits relating to physical, cognitive , or psychosocial skils that result in activity limitations and / or participation restrictions ;    Decision Making:LOW Complexity : No comorbidities that affect functional and no verbal or physical assistance needed to complete eval tasks

## 2020-12-12 NOTE — CONSULTS
[facility]  Pulmonary, Critical Care, and Sleep Medicine    Name: Mirtha Barrientos MRN: [id number]   : 1935 Hospital: [facility]   Date: 2020        Pulmonary Medicine: Follow-up      IMPRESSION:   · Respiratory Failure: Acute on Chronic- Hypoxic  · COVID-19 Pneumonia  · Emphysema/COPD- Pulmonary - [facility]: Dr. Sammy Og  · Pulmonary Hypertension  · HFpEF- Chronic  · Hypertension      RECOMMENDATIONS:   · Keep HOB elevated  · SpO2 goal: 88-94%- Continue Hiflow  · Continue Symbicort MDI 2 Puffs BID- Rinse mouth  · Continue Spiriva Respirmat: 2 puffs Q day  · Albuterol PRN  · Monitor COVID inflammatory markers  · Patient consented for convalescent plasma  · COVID therapy per ID- started on Remdesivir and Decadron  · BP management, glycemic control, prophylaxis and electrolyte management per primary team  · Stress on oral care- on systemic steroids and ICS  · Judicious use of benzodiazepines  · Will continue to follow     Subjective/History: This patient has been seen and evaluated at the request of Dr. Xochilt Riley for COPD and COVID. Patient is a 82 y.o. female with a history of COPD on LTOT and Trelegy Ellipta. OP pulmonologist- Dr. Sammy Og at [facility].    The patient reports she presented due to worsening fatigue, myalgias and feeling ill. She still has sense of taste and smell. Some phlegm production. No hemoptysis. No chest pain. No nausea or emesis. No diarrhea  Currently tolerating PO    Medical work up notable for + COVID-19    Patient currently on MDIs as inpatient and she feels comfortably self- administering    :    Exam deferred.   Noted hiflow NC at 30 L/min at fiO2 50% with adequate oxygenation  Patient appears to be comfortable  No dyspnea    Patient Vitals for the past 24 hrs:   Temp Pulse Resp BP SpO2   20 0936 -- -- -- (!) 116/45 --   20 0812 97.6 °F (36.4 °C) 78 18 107/85 90 %   20 0800 -- 76 -- -- --   20 0716 -- -- -- -- 95 % 12/12/20 0349 97.3 °F (36.3 °C) -- 15 -- 94 %   12/12/20 0020 -- -- -- 96/62 --   12/11/20 2332 97 °F (36.1 °C) (!) 50 14 95/62 100 %   12/11/20 2000 -- -- -- 102/77 --   12/11/20 1947 97.2 °F (36.2 °C) 66 17 (!) 123/50 90 %   12/11/20 1808 98.6 °F (37 °C) 65 20 (!) 128/47 92 %   12/11/20 1629 -- -- -- -- 95 %   12/11/20 1610 98.1 °F (36.7 °C) -- -- -- --   12/11/20 1505 -- -- -- -- 96 %   12/11/20 1500 98.1 °F (36.7 °C) 92 17 (!) 159/59 --   12/11/20 1300 98.7 °F (37.1 °C) 88 17 (!) 144/94 --                Past Medical History:   Diagnosis Date    Chronic lung disease     Chronic obstructive pulmonary disease (HCC)     Heart failure ([facility] [address]     Hypertension       Past Surgical History:   Procedure Laterality Date    HX ORTHOPAEDIC      spinal sx 5/6    HX OTHER SURGICAL      Carotid artery    VASCULAR SURGERY PROCEDURE UNLIST      L CEA 10/2014      Prior to Admission medications    Medication Sig Start Date End Date Taking? Authorizing Provider   atorvastatin (LIPITOR) 20 mg tablet Take 20 mg by mouth daily. Yes Provider, Historical   multivitamin (ONE A DAY) tablet Take 1 Tab by mouth daily. Yes Provider, Historical   COQ10, UBIQUINOL, PO Take  by mouth. Yes Provider, Historical   losartan (COZAAR) 50 mg tablet Take  by mouth daily. 75 mg in a.m   Yes Provider, Historical   fluticasone-umeclidin-vilanter (TRELEGY ELLIPTA) 100-62.5-25 mcg dsdv Take 1 Puff by inhalation daily. Yes Provider, Historical   diclofenac (VOLTAREN) 1 % gel Apply  to affected area four (4) times daily. 12/20/17  Yes Sunny Garcia PA-C   Oxygen    Yes Provider, Historical   albuterol (PROVENTIL HFA, VENTOLIN HFA, PROAIR HFA) 90 mcg/actuation inhaler Take  by inhalation. Yes Provider, Historical   albuterol (PROVENTIL VENTOLIN) 2.5 mg /3 mL (0.083 %) nebulizer solution 3 mL by Nebulization route every four (4) hours as needed for Wheezing or Shortness of Breath.  6/4/16  Yes Nia Hernandez MD   furosemide (LASIX) 20 mg tablet 20 mg po daily  Patient taking differently: Take 40 mg by mouth daily. 6/5/16  Yes Marina Morfin MD   acetaminophen (TYLENOL EXTRA STRENGTH) 500 mg tablet Take 500 mg by mouth every six (6) hours as needed for Pain. Yes Provider, Historical   metoprolol (LOPRESSOR) 25 mg tablet Take 25 mg by mouth two (2) times a day. Yes Provider, Historical   NIFEdipine ER (ADALAT CC) 60 mg ER tablet Take 60 mg by mouth daily. Yes Provider, Historical   aspirin delayed-release 81 mg tablet Take  by mouth daily. Yes Provider, Historical   alprazolam (XANAX) 0.5 mg tablet Take  by mouth nightly.     Provider, Historical     Current Facility-Administered Medications   Medication Dose Route Frequency    sodium chloride (NS) flush 5-40 mL  5-40 mL IntraVENous Q8H    enoxaparin (LOVENOX) injection 40 mg  40 mg SubCUTAneous DAILY    famotidine (PEPCID) tablet 20 mg  20 mg Oral DAILY    insulin lispro (HUMALOG) injection   SubCUTAneous AC&HS    cholecalciferol (VITAMIN D3) (1000 Units /25 mcg) tablet 6 Tab  6,000 Units Oral DAILY    ascorbic acid (vitamin C) (VITAMIN C) tablet 500 mg  500 mg Oral BID    zinc sulfate (ZINCATE) 220 (50) mg capsule 1 Cap  1 Cap Oral DAILY    ALPRAZolam (XANAX) tablet 0.5 mg  0.5 mg Oral QHS    aspirin delayed-release tablet 81 mg  81 mg Oral DAILY    atorvastatin (LIPITOR) tablet 20 mg  20 mg Oral DAILY    losartan (COZAAR) tablet 50 mg  50 mg Oral DAILY    metoprolol tartrate (LOPRESSOR) tablet 25 mg  25 mg Oral BID    therapeutic multivitamin (THERAGRAN) tablet 1 Tab  1 Tab Oral DAILY    NIFEdipine ER (PROCARDIA XL) tablet 60 mg  60 mg Oral DAILY    budesonide-formoterol (SYMBICORT) 80-4.5 mcg inhaler  2 Puff Inhalation BID RT    And    tiotropium bromide (SPIRIVA RESPIMAT) 2.5 mcg /actuation  2 Puff Inhalation DAILY    piperacillin-tazobactam (ZOSYN) 2.25 g in 0.9% sodium chloride (MBP/ADV) 50 mL MBP  2.25 g IntraVENous Q6H    remdesivir 100 mg in 0.9% sodium chloride 250 mL IVPB  100 mg IntraVENous Q24H    chlorhexidine (PERIDEX) 0.12 % mouthwash 15 mL  15 mL Oral Q12H    azithromycin (ZITHROMAX) 500 mg in 0.9% sodium chloride 250 mL (VIAL-MATE)  500 mg IntraVENous Q24H    dexamethasone (DECADRON) 4 mg/mL injection 6 mg  6 mg IntraVENous Q6H     No Known Allergies   Social History     Tobacco Use    Smoking status: Former Smoker     Packs/day: 1.50     Years: 30.00     Pack years: 45.00     Types: Cigarettes     Last attempt to quit: 1987     Years since quittin.2    Smokeless tobacco: Never Used   Substance Use Topics    Alcohol use: No     Alcohol/week: 0.0 standard drinks      Family History   Problem Relation Age of Onset    Heart Disease Mother     Hypertension Mother     Heart Attack Father     Hypertension Father         Review of Systems:  Pertinent items are noted in HPI. Objective:   Vital Signs:    Visit Vitals  BP (!) 116/45   Pulse 78   Temp 97.6 °F (36.4 °C)   Resp 18   Ht 5' 3\" (1.6 m)   Wt 55.6 kg (122 lb 8 oz)   SpO2 90%   Breastfeeding No   BMI 21.70 kg/m²       O2 Device: Hi flow nasal cannula(Vapotherm)   O2 Flow Rate (L/min): 30 l/min   Temp (24hrs), Av.8 °F (36.6 °C), Min:97 °F (36.1 °C), Max:98.7 °F (37.1 °C)       Intake/Output:   Last shift:      No intake/output data recorded. Last 3 shifts: 12/10 1901 -  0700  In: 215 [I.V.:50]  Out: 4 [Urine:2]    Intake/Output Summary (Last 24 hours) at 2020 1053  Last data filed at 2020 1947  Gross per 24 hour   Intake 165 ml   Output 4 ml   Net 161 ml         Ventilator Settings:  Mode Rate Tidal Volume Pressure FiO2 PEEP            50 %       Peak airway pressure:           Physical Exam:    General:  Alert, cooperative, mils distress, appears stated age. On Vapotherm- I turned down to 40%   Head:  Normocephalic, without obvious abnormality, atraumatic. Eyes:  Conjunctivae/corneas clear. PERRL, EOMs intact. Nose: Nares normal. Septum midline.  Mucosa normal. No drainage or sinus tenderness. Throat: Lips, mucosa, and tongue normal. No exudate   Neck: Supple, symmetrical, trachea midline, no adenopathy, thyroid: no enlargment/tenderness/nodules,  no JVD. Back:   Symmetric   Lungs:   Symmetric, non-labored breathing pattern, No audible wheezing or stridor. Decreased diaphragm excursion by palpation, No SCM use   Chest wall:  No tenderness. Increased A-P diameter, No crepitous   Heart:  Regular rate and rhythm by palpation   Abdomen:   Soft, non-tender. No masses,  No organomegaly. Extremities: Extremities normal, atraumatic, no cyanosis or edema. Pulses: 2+ and symmetric all extremities.    Skin: Skin color, texture, turgor normal. No rashes or lesions   Lymph nodes:      Cervical, supraclavicular nodes are unremarkable   Neurologic: Grossly nonfocal       Data:     Recent Results (from the past 24 hour(s))   CULTURE, MRSA    Collection Time: 12/11/20 11:30 AM    Specimen: Nares; Nasal   Result Value Ref Range    Special Requests: NO SPECIAL REQUESTS      Culture result: MRSA NOT PRESENT AT 16 HOURS     GLUCOSE, POC    Collection Time: 12/11/20 12:13 PM   Result Value Ref Range    Glucose (POC) 131 (H) 70 - 110 mg/dL   GLUCOSE, POC    Collection Time: 12/11/20  4:44 PM   Result Value Ref Range    Glucose (POC) 164 (H) 70 - 110 mg/dL   GLUCOSE, POC    Collection Time: 12/11/20  8:49 PM   Result Value Ref Range    Glucose (POC) 179 (H) 70 - 110 mg/dL   PROTHROMBIN TIME + INR    Collection Time: 12/12/20  3:34 AM   Result Value Ref Range    Prothrombin time 12.5 11.5 - 15.2 sec    INR 0.9 0.8 - 1.2     PTT    Collection Time: 12/12/20  3:34 AM   Result Value Ref Range    aPTT 33.9 23.0 - 36.4 SEC   FIBRINOGEN    Collection Time: 12/12/20  3:34 AM   Result Value Ref Range    Fibrinogen 437 210 - 451 mg/dL   CBC WITH AUTOMATED DIFF    Collection Time: 12/12/20  3:34 AM   Result Value Ref Range    WBC 4.1 (L) 4.6 - 13.2 K/uL    RBC 4.84 4.20 - 5.30 M/uL    HGB 14.3 12.0 - 16.0 g/dL    HCT 43.6 35.0 - 45.0 %    MCV 90.1 74.0 - 97.0 FL    MCH 29.5 24.0 - 34.0 PG    MCHC 32.8 31.0 - 37.0 g/dL    RDW 14.8 (H) 11.6 - 14.5 %    PLATELET 764 657 - 233 K/uL    MPV 10.7 9.2 - 11.8 FL    NEUTROPHILS 62 40 - 73 %    LYMPHOCYTES 26 21 - 52 %    MONOCYTES 12 (H) 3 - 10 %    EOSINOPHILS 0 0 - 5 %    BASOPHILS 0 0 - 2 %    ABS. NEUTROPHILS 2.6 1.8 - 8.0 K/UL    ABS. LYMPHOCYTES 1.1 0.9 - 3.6 K/UL    ABS. MONOCYTES 0.5 0.05 - 1.2 K/UL    ABS. EOSINOPHILS 0.0 0.0 - 0.4 K/UL    ABS. BASOPHILS 0.0 0.0 - 0.1 K/UL    DF AUTOMATED     METABOLIC PANEL, COMPREHENSIVE    Collection Time: 12/12/20  3:34 AM   Result Value Ref Range    Sodium 141 136 - 145 mmol/L    Potassium 3.9 3.5 - 5.5 mmol/L    Chloride 108 100 - 111 mmol/L    CO2 26 21 - 32 mmol/L    Anion gap 7 3.0 - 18 mmol/L    Glucose 147 (H) 74 - 99 mg/dL    BUN 28 (H) 7.0 - 18 MG/DL    Creatinine 1.10 0.6 - 1.3 MG/DL    BUN/Creatinine ratio 25 (H) 12 - 20      GFR est AA 57 (L) >60 ml/min/1.73m2    GFR est non-AA 47 (L) >60 ml/min/1.73m2    Calcium 8.5 8.5 - 10.1 MG/DL    Bilirubin, total 0.4 0.2 - 1.0 MG/DL    ALT (SGPT) 24 13 - 56 U/L    AST (SGOT) 24 10 - 38 U/L    Alk.  phosphatase 50 45 - 117 U/L    Protein, total 6.8 6.4 - 8.2 g/dL    Albumin 3.3 (L) 3.4 - 5.0 g/dL    Globulin 3.5 2.0 - 4.0 g/dL    A-G Ratio 0.9 0.8 - 1.7     LD    Collection Time: 12/12/20  3:34 AM   Result Value Ref Range     (H) 81 - 234 U/L   FERRITIN    Collection Time: 12/12/20  3:34 AM   Result Value Ref Range    Ferritin 212 8 - 388 NG/ML   C REACTIVE PROTEIN, QT    Collection Time: 12/12/20  3:34 AM   Result Value Ref Range    C-Reactive protein 1.6 (H) 0 - 0.3 mg/dL   D DIMER    Collection Time: 12/12/20  3:34 AM   Result Value Ref Range    D DIMER 0.52 (H) <0.46 ug/ml(FEU)   PROCALCITONIN    Collection Time: 12/12/20  3:34 AM   Result Value Ref Range    Procalcitonin <0.05 ng/mL   GLUCOSE, POC    Collection Time: 12/12/20  8:15 AM   Result Value Ref Range    Glucose (POC) 145 (H) 70 - 110 mg/dL           Lab Results   Component Value Date/Time    TSH 1.30 07/06/2020 10:40 AM    T4, Free 1.3 06/02/2016 11:25 AM           Recent Labs     12/10/20  1335   HCO3I 24.4   PCO2I 37.1   PHI 7.43   PO2I 66*     Telemetry:normal sinus rhythm     Lab Results   Component Value Date/Time    NT pro-BNP 1,632 12/10/2020 12:41 PM    NT pro- 06/01/2016 08:34 PM     09/25/20   ECHO ADULT COMPLETE 09/25/2020 9/25/2020    Narrative · LV: Estimated LVEF is 65 - 70%. Normal cavity size, wall thickness and   systolic function (ejection fraction normal). Wall motion: normal. Mild   (grade 1) left ventricular diastolic dysfunction. · LA: Left Atrium volume index is 30.81 mL/m2. · MV: Mild mitral valve regurgitation is present. · TV: Mild tricuspid valve regurgitation is present. · PV: Mild pulmonic valve regurgitation is present. · PA: Moderate pulmonary hypertension. Pulmonary arterial systolic   pressure is 66 mmHg. Signed by: Zehra Contreras MD       Imaging:  I have personally reviewed the patients radiographs and have reviewed the reports:  CT Results  (Last 48 hours)               12/10/20 1425  CTA CHEST W OR W WO CONT Final result    Impression:  IMPRESSION:       1. No evidence of pulmonary embolism. 2. Limited by motion and hypoinflation. Emphysema with mild infiltrate/pneumonia   suspected in the right lower lobe. 3. New mild hilar and mediastinal lymphadenopathy. 4. New 7 mm left upper lobe lung nodule. Follow-up recommended in 3 months   posttreatment to assess for its persistence, given the limitations of this exam.   5. Biatrial cardiac enlargement. 6. Stable left thyroid nodule. 7. Small hiatal hernia. Narrative:  CTA CHEST PULMONARY EMBOLISM PROTOCOL         INDICATION: Chills. Body aches. Symptoms since last night. Covid-19 concern.        TECHNIQUE: Thin collimation axial images obtained through the level of the   pulmonary arteries with additional imaging through the chest following the   uneventful administration of 65 cc Isovue-300 intravenous contrast.  Images   reconstructed into MIP coronal and sagittal projections for complete evaluation   of the tortuous and overlapping pulmonary vascular structures and to reduce   patient radiation dose. All CT scans are performed using dose optimization   techniques as appropriate to the performed exam including the following:   Automated exposure control, adjustment of mA and/or kV according to patient   size, and use of iterative reconstructive technique. COMPARISON: Chest CT 10/6/2020. FINDINGS:       No filling defects are appreciated within the main, left, right, lobar or   visualized segmental pulmonary arteries to suggest embolism. Motion artifact   limits peripheral, subsegmental branches. The thoracic aorta is not aneurysmal.    No evidence for dissection. Arterial wall calcifications. Biatrial cardiac   enlargement. No pericardial effusion. No pleural effusion. Precarinal 14 mm granulomatous   calcification containing lymph node is slightly larger. New additional   lymphadenopathy along bilateral mediastinum and subcarinal mediastinum. New 13   mm subcarinal lymph node. Left mediastinal nodes measure 11 mm. Bilateral hilar   lymphadenopathy. Left thyroid nodule is similar to prior measuring 3.1 x 1.8 cm. Emphysema. Hypoinflation and respiratory motion lung limitations. New 7 mm   nodule (image 18/series 3). Stable chronic scar at anterior right upper lobe and   mild at the right middle lobe. New mild sites of peripheral consolidation in the   right lateral lower lobe. Mild groundglass density versus atelectasis in the   left lower lung laterally. Small hiatal hernia. Subcentimeter right renal cyst  No acute bone finding.                    CXR Results  (Last 48 hours)               12/10/20 1232  XR CHEST PORT Final result    Impression: IMPRESSION:       Enlarged cardiac silhouette with diffuse increased interstitial opacities   superimposed on COPD which may represent superimposed mild pulmonary   interstitial edema versus acute interstitial infectious/inflammatory process. Narrative:  EXAM:  XR CHEST PORT       INDICATION:   meets SIRS criteria       COMPARISON: 10/6/2020. FINDINGS:   Hyperinflation. Mild enlargement of the cardiac silhouette. Aortic   atherosclerosis. Diffuse increased interstitial opacities, progressed. Underlying emphysematous changes. No pneumothorax, pleural effusion or confluent   consolidation. Stable osseous structures. Complex decision making was made in the evaluation and management plans during this consultation. More than 50% of time was spent in counseling and coordination of care including review of data and discussion with other team members. Virginia Sears.  [facility]  Pulmonary, Critical Care, and Sleep Medicine

## 2020-12-12 NOTE — PROGRESS NOTES
Problem: Falls - Risk of  Goal: *Absence of Falls  Description: Document Natasha Peacock Fall Risk and appropriate interventions in the flowsheet.   Outcome: Progressing Towards Goal  Note: Fall Risk Interventions:            Medication Interventions: Patient to call before getting OOB    Elimination Interventions: Call light in reach, Patient to call for help with toileting needs              Problem: Patient Education: Go to Patient Education Activity  Goal: Patient/Family Education  Outcome: Progressing Towards Goal     Problem: Patient Education: Go to Patient Education Activity  Goal: Patient/Family Education  Outcome: Progressing Towards Goal

## 2020-12-12 NOTE — PROGRESS NOTES
Titrate to 5L NC, RR 14     12/12/20 1421   Oxygen Therapy   O2 Sat (%) 100 %   Pulse via Oximetry 61 beats per minute   O2 Device Nasal cannula   O2 Flow Rate (L/min) 5 l/min

## 2020-12-12 NOTE — ROUTINE PROCESS
Bedside shift change report given to Regina Leone RN (oncoming nurse) by Benito Mcclellan RN (offgoing nurse). Report included the following information SBAR, Kardex, Intake/Output and MAR.

## 2020-12-12 NOTE — PROGRESS NOTES
RR 13, patient sleeping     12/12/20 0716   Oxygen Therapy   O2 Sat (%) 95 %   Pulse via Oximetry 52 beats per minute   O2 Device Hi flow nasal cannula  (Vapotherm)   O2 Flow Rate (L/min) 30 l/min   O2 Temperature 91.4 °F (33 °C)   FIO2 (%) 50 %

## 2020-12-12 NOTE — PROGRESS NOTES
[facility]  Progress Note    Patient: Gabbi Dior Age: 82 y.o. : 1935 MR#: [id number] SSN: [id number]  Date: 2020  Subjective:     C/o loose stools x 3 today, no CP, SOB much improved     Assessment/Plan:   1. Covid-19 - supportive care / cont tx with steroids, covid supplements and follow covid labs  2. Pneumonia RLL - cont rocephin / azithromycin / add probiotic  3. Acute on chronic hypoxic respiratory failure  4. Chronic diastolic heart failure - no evidence of acute exacerbation   5. Moderate pulmonary hypertension  6. COPD/Emphysema - cont inhalers / pulm following  7. CAD - cont home meds / no acute concerns  8. Hypertension - cont home meds w/ hold parameters                           9. Hyperlipidemia - cont statin  10. Advanced age  6. New 7 mm pulmonary nodule HIRAL- recommend f/u in 3 months  12.  Hx anxiety - cont xanax / caution with over use of benzos     Additional Notes:      Case discussed with:  [x]Patient  [x]Family  [x]Nursing  []Case Management  DVT Prophylaxis:  []Lovenox  []Hep SQ  []SCDs  []Coumadin   []On Heparin gtt    Objective:   VS:   Visit Vitals  BP (!) 116/45   Pulse (!) 59   Temp 97.6 °F (36.4 °C)   Resp 18   Ht 5' 3\" (1.6 m)   Wt 55.6 kg (122 lb 8 oz)   SpO2 90%   Breastfeeding No   BMI 21.70 kg/m²      Tmax/24hrs: Temp (24hrs), Av.8 °F (36.6 °C), Min:97 °F (36.1 °C), Max:98.7 °F (37.1 °C)      Intake/Output Summary (Last 24 hours) at 2020 1238  Last data filed at 2020 1947  Gross per 24 hour   Intake 165 ml   Output 2 ml   Net 163 ml     General:  Alert, NAD, sitting on side of bed  Cardiovascular:  RRR  Pulmonary:  LSC throughout; on high flow  GI:  +BS in all four quadrants, soft, non-tender  Extremities:  No edema; 2+ dorsalis pedis pulses bilaterally  Neuro: alert and oriented x 4    Family contact: Discussed with sister Konrad Gar at   [phone]      Labs:    Recent Results (from the past 25 hour(s))   GLUCOSE, POC    Collection Time: 12/11/20  4:44 PM   Result Value Ref Range    Glucose (POC) 164 (H) 70 - 110 mg/dL   GLUCOSE, POC    Collection Time: 12/11/20  8:49 PM   Result Value Ref Range    Glucose (POC) 179 (H) 70 - 110 mg/dL   PROTHROMBIN TIME + INR    Collection Time: 12/12/20  3:34 AM   Result Value Ref Range    Prothrombin time 12.5 11.5 - 15.2 sec    INR 0.9 0.8 - 1.2     PTT    Collection Time: 12/12/20  3:34 AM   Result Value Ref Range    aPTT 33.9 23.0 - 36.4 SEC   FIBRINOGEN    Collection Time: 12/12/20  3:34 AM   Result Value Ref Range    Fibrinogen 437 210 - 451 mg/dL   CBC WITH AUTOMATED DIFF    Collection Time: 12/12/20  3:34 AM   Result Value Ref Range    WBC 4.1 (L) 4.6 - 13.2 K/uL    RBC 4.84 4.20 - 5.30 M/uL    HGB 14.3 12.0 - 16.0 g/dL    HCT 43.6 35.0 - 45.0 %    MCV 90.1 74.0 - 97.0 FL    MCH 29.5 24.0 - 34.0 PG    MCHC 32.8 31.0 - 37.0 g/dL    RDW 14.8 (H) 11.6 - 14.5 %    PLATELET 638 433 - 346 K/uL    MPV 10.7 9.2 - 11.8 FL    NEUTROPHILS 62 40 - 73 %    LYMPHOCYTES 26 21 - 52 %    MONOCYTES 12 (H) 3 - 10 %    EOSINOPHILS 0 0 - 5 %    BASOPHILS 0 0 - 2 %    ABS. NEUTROPHILS 2.6 1.8 - 8.0 K/UL    ABS. LYMPHOCYTES 1.1 0.9 - 3.6 K/UL    ABS. MONOCYTES 0.5 0.05 - 1.2 K/UL    ABS. EOSINOPHILS 0.0 0.0 - 0.4 K/UL    ABS. BASOPHILS 0.0 0.0 - 0.1 K/UL    DF AUTOMATED     METABOLIC PANEL, COMPREHENSIVE    Collection Time: 12/12/20  3:34 AM   Result Value Ref Range    Sodium 141 136 - 145 mmol/L    Potassium 3.9 3.5 - 5.5 mmol/L    Chloride 108 100 - 111 mmol/L    CO2 26 21 - 32 mmol/L    Anion gap 7 3.0 - 18 mmol/L    Glucose 147 (H) 74 - 99 mg/dL    BUN 28 (H) 7.0 - 18 MG/DL    Creatinine 1.10 0.6 - 1.3 MG/DL    BUN/Creatinine ratio 25 (H) 12 - 20      GFR est AA 57 (L) >60 ml/min/1.73m2    GFR est non-AA 47 (L) >60 ml/min/1.73m2    Calcium 8.5 8.5 - 10.1 MG/DL    Bilirubin, total 0.4 0.2 - 1.0 MG/DL    ALT (SGPT) 24 13 - 56 U/L    AST (SGOT) 24 10 - 38 U/L    Alk.  phosphatase 50 45 - 117 U/L    Protein, total 6.8 6.4 - 8.2 g/dL    Albumin 3.3 (L) 3.4 - 5.0 g/dL    Globulin 3.5 2.0 - 4.0 g/dL    A-G Ratio 0.9 0.8 - 1.7     LD    Collection Time: 12/12/20  3:34 AM   Result Value Ref Range     (H) 81 - 234 U/L   FERRITIN    Collection Time: 12/12/20  3:34 AM   Result Value Ref Range    Ferritin 212 8 - 388 NG/ML   C REACTIVE PROTEIN, QT    Collection Time: 12/12/20  3:34 AM   Result Value Ref Range    C-Reactive protein 1.6 (H) 0 - 0.3 mg/dL   D DIMER    Collection Time: 12/12/20  3:34 AM   Result Value Ref Range    D DIMER 0.52 (H) <0.46 ug/ml(FEU)   PROCALCITONIN    Collection Time: 12/12/20  3:34 AM   Result Value Ref Range    Procalcitonin <0.05 ng/mL   GLUCOSE, POC    Collection Time: 12/12/20  8:15 AM   Result Value Ref Range    Glucose (POC) 145 (H) 70 - 110 mg/dL   GLUCOSE, POC    Collection Time: 12/12/20 12:37 PM   Result Value Ref Range    Glucose (POC) 164 (H) 70 - 110 mg/dL       Signed By: Apolonia Laureano NP     December 12, 2020

## 2020-12-13 LAB
ALBUMIN SERPL-MCNC: 2.9 G/DL (ref 3.4–5)
ALBUMIN/GLOB SERPL: 1 {RATIO} (ref 0.8–1.7)
ALP SERPL-CCNC: 42 U/L (ref 45–117)
ALT SERPL-CCNC: 21 U/L (ref 13–56)
ANION GAP SERPL CALC-SCNC: 7 MMOL/L (ref 3–18)
APPEARANCE UR: CLEAR
APTT PPP: 35.7 SEC (ref 23–36.4)
AST SERPL-CCNC: 21 U/L (ref 10–38)
BACTERIA SPEC CULT: NORMAL
BACTERIA SPEC CULT: NORMAL
BACTERIA URNS QL MICRO: NEGATIVE /HPF
BASOPHILS # BLD: 0 K/UL (ref 0–0.1)
BASOPHILS NFR BLD: 0 % (ref 0–2)
BILIRUB SERPL-MCNC: 0.3 MG/DL (ref 0.2–1)
BILIRUB UR QL: NEGATIVE
BUN SERPL-MCNC: 34 MG/DL (ref 7–18)
BUN/CREAT SERPL: 32 (ref 12–20)
CALCIUM SERPL-MCNC: 7.7 MG/DL (ref 8.5–10.1)
CHLORIDE SERPL-SCNC: 107 MMOL/L (ref 100–111)
CO2 SERPL-SCNC: 24 MMOL/L (ref 21–32)
COLOR UR: YELLOW
CREAT SERPL-MCNC: 1.07 MG/DL (ref 0.6–1.3)
CRP SERPL-MCNC: 0.7 MG/DL (ref 0–0.3)
D DIMER PPP FEU-MCNC: 0.46 UG/ML(FEU)
DIFFERENTIAL METHOD BLD: ABNORMAL
EOSINOPHIL # BLD: 0 K/UL (ref 0–0.4)
EOSINOPHIL NFR BLD: 0 % (ref 0–5)
EPITH CASTS URNS QL MICRO: NORMAL /LPF (ref 0–5)
ERYTHROCYTE [DISTWIDTH] IN BLOOD BY AUTOMATED COUNT: 14.9 % (ref 11.6–14.5)
FERRITIN SERPL-MCNC: 190 NG/ML (ref 8–388)
FIBRINOGEN PPP-MCNC: 362 MG/DL (ref 210–451)
GLOBULIN SER CALC-MCNC: 3 G/DL (ref 2–4)
GLUCOSE BLD STRIP.AUTO-MCNC: 127 MG/DL (ref 70–110)
GLUCOSE BLD STRIP.AUTO-MCNC: 157 MG/DL (ref 70–110)
GLUCOSE BLD STRIP.AUTO-MCNC: 161 MG/DL (ref 70–110)
GLUCOSE BLD STRIP.AUTO-MCNC: 208 MG/DL (ref 70–110)
GLUCOSE SERPL-MCNC: 147 MG/DL (ref 74–99)
GLUCOSE UR STRIP.AUTO-MCNC: NEGATIVE MG/DL
HCT VFR BLD AUTO: 40.2 % (ref 35–45)
HGB BLD-MCNC: 13 G/DL (ref 12–16)
HGB UR QL STRIP: NEGATIVE
INR PPP: 1 (ref 0.8–1.2)
KETONES UR QL STRIP.AUTO: NEGATIVE MG/DL
LDH SERPL L TO P-CCNC: 237 U/L (ref 81–234)
LEUKOCYTE ESTERASE UR QL STRIP.AUTO: ABNORMAL
LYMPHOCYTES # BLD: 1 K/UL (ref 0.9–3.6)
LYMPHOCYTES NFR BLD: 16 % (ref 21–52)
MCH RBC QN AUTO: 29.2 PG (ref 24–34)
MCHC RBC AUTO-ENTMCNC: 32.3 G/DL (ref 31–37)
MCV RBC AUTO: 90.3 FL (ref 74–97)
MONOCYTES # BLD: 0.4 K/UL (ref 0.05–1.2)
MONOCYTES NFR BLD: 6 % (ref 3–10)
NEUTS SEG # BLD: 4.7 K/UL (ref 1.8–8)
NEUTS SEG NFR BLD: 78 % (ref 40–73)
NITRITE UR QL STRIP.AUTO: NEGATIVE
PH UR STRIP: 5.5 [PH] (ref 5–8)
PLATELET # BLD AUTO: 149 K/UL (ref 135–420)
PMV BLD AUTO: 11 FL (ref 9.2–11.8)
POTASSIUM SERPL-SCNC: 3.6 MMOL/L (ref 3.5–5.5)
PROCALCITONIN SERPL-MCNC: <0.05 NG/ML
PROT SERPL-MCNC: 5.9 G/DL (ref 6.4–8.2)
PROT UR STRIP-MCNC: ABNORMAL MG/DL
PROTHROMBIN TIME: 13.2 SEC (ref 11.5–15.2)
RBC # BLD AUTO: 4.45 M/UL (ref 4.2–5.3)
RBC #/AREA URNS HPF: NORMAL /HPF (ref 0–5)
SERVICE CMNT-IMP: NORMAL
SODIUM SERPL-SCNC: 138 MMOL/L (ref 136–145)
SP GR UR REFRACTOMETRY: 1.02 (ref 1–1.03)
UROBILINOGEN UR QL STRIP.AUTO: 0.2 EU/DL (ref 0.2–1)
WBC # BLD AUTO: 6 K/UL (ref 4.6–13.2)
WBC URNS QL MICRO: NORMAL /HPF (ref 0–4)

## 2020-12-13 PROCEDURE — 74011250636 HC RX REV CODE- 250/636: Performed by: INTERNAL MEDICINE

## 2020-12-13 PROCEDURE — 36415 COLL VENOUS BLD VENIPUNCTURE: CPT

## 2020-12-13 PROCEDURE — 74011000250 HC RX REV CODE- 250: Performed by: INTERNAL MEDICINE

## 2020-12-13 PROCEDURE — 84145 PROCALCITONIN (PCT): CPT

## 2020-12-13 PROCEDURE — 86140 C-REACTIVE PROTEIN: CPT

## 2020-12-13 PROCEDURE — 77010033678 HC OXYGEN DAILY

## 2020-12-13 PROCEDURE — 74011636637 HC RX REV CODE- 636/637: Performed by: NURSE PRACTITIONER

## 2020-12-13 PROCEDURE — 82962 GLUCOSE BLOOD TEST: CPT

## 2020-12-13 PROCEDURE — 74011250637 HC RX REV CODE- 250/637: Performed by: NURSE PRACTITIONER

## 2020-12-13 PROCEDURE — 74011250636 HC RX REV CODE- 250/636: Performed by: NURSE PRACTITIONER

## 2020-12-13 PROCEDURE — 94762 N-INVAS EAR/PLS OXIMTRY CONT: CPT

## 2020-12-13 PROCEDURE — 65660000000 HC RM CCU STEPDOWN

## 2020-12-13 PROCEDURE — 85384 FIBRINOGEN ACTIVITY: CPT

## 2020-12-13 PROCEDURE — 85730 THROMBOPLASTIN TIME PARTIAL: CPT

## 2020-12-13 PROCEDURE — 81001 URINALYSIS AUTO W/SCOPE: CPT

## 2020-12-13 PROCEDURE — 83615 LACTATE (LD) (LDH) ENZYME: CPT

## 2020-12-13 PROCEDURE — 74011250636 HC RX REV CODE- 250/636: Performed by: EMERGENCY MEDICINE

## 2020-12-13 PROCEDURE — 80053 COMPREHEN METABOLIC PANEL: CPT

## 2020-12-13 PROCEDURE — 82728 ASSAY OF FERRITIN: CPT

## 2020-12-13 PROCEDURE — 85610 PROTHROMBIN TIME: CPT

## 2020-12-13 PROCEDURE — 85379 FIBRIN DEGRADATION QUANT: CPT

## 2020-12-13 PROCEDURE — 99232 SBSQ HOSP IP/OBS MODERATE 35: CPT | Performed by: HOSPITALIST

## 2020-12-13 PROCEDURE — 74011000258 HC RX REV CODE- 258: Performed by: INTERNAL MEDICINE

## 2020-12-13 PROCEDURE — 85025 COMPLETE CBC W/AUTO DIFF WBC: CPT

## 2020-12-13 RX ADMIN — FAMOTIDINE 20 MG: 20 TABLET, FILM COATED ORAL at 09:40

## 2020-12-13 RX ADMIN — PIPERACILLIN AND TAZOBACTAM 2.25 G: 2; .25 INJECTION, POWDER, FOR SOLUTION INTRAVENOUS at 16:57

## 2020-12-13 RX ADMIN — Medication 10 ML: at 05:54

## 2020-12-13 RX ADMIN — REMDESIVIR 100 MG: 100 INJECTION, POWDER, LYOPHILIZED, FOR SOLUTION INTRAVENOUS at 22:21

## 2020-12-13 RX ADMIN — Medication 500 MG: at 20:33

## 2020-12-13 RX ADMIN — METOPROLOL TARTRATE 25 MG: 25 TABLET, FILM COATED ORAL at 09:40

## 2020-12-13 RX ADMIN — BUDESONIDE AND FORMOTEROL FUMARATE DIHYDRATE 2 PUFF: 80; 4.5 AEROSOL RESPIRATORY (INHALATION) at 08:00

## 2020-12-13 RX ADMIN — ENOXAPARIN SODIUM 40 MG: 40 INJECTION SUBCUTANEOUS at 09:41

## 2020-12-13 RX ADMIN — THERA TABS 1 TABLET: TAB at 09:40

## 2020-12-13 RX ADMIN — INSULIN LISPRO 2 UNITS: 100 INJECTION, SOLUTION INTRAVENOUS; SUBCUTANEOUS at 12:14

## 2020-12-13 RX ADMIN — AZITHROMYCIN 500 MG: 500 INJECTION, POWDER, LYOPHILIZED, FOR SOLUTION INTRAVENOUS at 09:39

## 2020-12-13 RX ADMIN — DEXAMETHASONE SODIUM PHOSPHATE 6 MG: 4 INJECTION, SOLUTION INTRAMUSCULAR; INTRAVENOUS at 05:54

## 2020-12-13 RX ADMIN — PIPERACILLIN AND TAZOBACTAM 2.25 G: 2; .25 INJECTION, POWDER, FOR SOLUTION INTRAVENOUS at 03:27

## 2020-12-13 RX ADMIN — Medication 1 CAPSULE: at 09:41

## 2020-12-13 RX ADMIN — Medication 6 TABLET: at 09:40

## 2020-12-13 RX ADMIN — PIPERACILLIN AND TAZOBACTAM 2.25 G: 2; .25 INJECTION, POWDER, FOR SOLUTION INTRAVENOUS at 20:34

## 2020-12-13 RX ADMIN — Medication 500 MG: at 09:40

## 2020-12-13 RX ADMIN — Medication 10 ML: at 22:00

## 2020-12-13 RX ADMIN — LOSARTAN POTASSIUM 50 MG: 50 TABLET, FILM COATED ORAL at 09:40

## 2020-12-13 RX ADMIN — INSULIN LISPRO 2 UNITS: 100 INJECTION, SOLUTION INTRAVENOUS; SUBCUTANEOUS at 09:41

## 2020-12-13 RX ADMIN — METOPROLOL TARTRATE 25 MG: 25 TABLET, FILM COATED ORAL at 20:33

## 2020-12-13 RX ADMIN — INSULIN LISPRO 4 UNITS: 100 INJECTION, SOLUTION INTRAVENOUS; SUBCUTANEOUS at 22:06

## 2020-12-13 RX ADMIN — Medication 81 MG: at 09:40

## 2020-12-13 RX ADMIN — NIFEDIPINE 60 MG: 60 TABLET, FILM COATED, EXTENDED RELEASE ORAL at 09:40

## 2020-12-13 RX ADMIN — CHLORHEXIDINE GLUCONATE 0.12% ORAL RINSE 15 ML: 1.2 LIQUID ORAL at 20:33

## 2020-12-13 RX ADMIN — ZINC SULFATE 220 MG (50 MG) CAPSULE 1 CAPSULE: CAPSULE at 09:41

## 2020-12-13 RX ADMIN — GUAIFENESIN AND DEXTROMETHORPHAN 5 ML: 100; 10 SYRUP ORAL at 20:44

## 2020-12-13 RX ADMIN — DEXAMETHASONE SODIUM PHOSPHATE 6 MG: 4 INJECTION, SOLUTION INTRAMUSCULAR; INTRAVENOUS at 12:13

## 2020-12-13 RX ADMIN — PIPERACILLIN AND TAZOBACTAM 2.25 G: 2; .25 INJECTION, POWDER, FOR SOLUTION INTRAVENOUS at 09:39

## 2020-12-13 RX ADMIN — ALPRAZOLAM 0.5 MG: 0.5 TABLET ORAL at 20:33

## 2020-12-13 RX ADMIN — ATORVASTATIN CALCIUM 20 MG: 20 TABLET, FILM COATED ORAL at 09:40

## 2020-12-13 RX ADMIN — Medication 10 ML: at 14:00

## 2020-12-13 RX ADMIN — CHLORHEXIDINE GLUCONATE 0.12% ORAL RINSE 15 ML: 1.2 LIQUID ORAL at 09:41

## 2020-12-13 RX ADMIN — TIOTROPIUM BROMIDE INHALATION SPRAY 2 PUFF: 3.12 SPRAY, METERED RESPIRATORY (INHALATION) at 08:00

## 2020-12-13 NOTE — CONSULTS
[facility]  Pulmonary, Critical Care, and Sleep Medicine    Name: Michelle Aguilar MRN: [id number]   : 1935 Hospital: [address] [place] [address]   Date: 2020        Pulmonary Medicine: Follow-up      IMPRESSION:   · Respiratory Failure: Acute on Chronic- Hypoxic  · COVID-19 Pneumonia  · Emphysema/COPD- Pulmonary - [facility]: Dr. Yary James  · Pulmonary Hypertension  · HFpEF- Chronic  · Hypertension      RECOMMENDATIONS:   · Keep HOB elevated  · SpO2 goal: 88-94%- wean O2 as tolerated  · Continue Symbicort MDI 2 Puffs BID- Rinse mouth  · Continue Spiriva Respirmat: 2 puffs Q day  · Albuterol PRN  · Monitor COVID inflammatory markers  · Patient consented for convalescent plasma  · COVID therapy per ID- started on Remdesivir and Decadron  · BP management, glycemic control, prophylaxis and electrolyte management per primary team  · Stress on oral care- on systemic steroids and ICS  · Avoid benzodiazepines if possible  · Will continue to follow     Subjective/History: This patient has been seen and evaluated at the request of Dr. Tati Mendoza for COPD and COVID. Patient is a 82 y.o. female with a history of COPD on LTOT and Trelegy Ellipta. OP pulmonologist- Dr. Yary James at [facility].    The patient reports she presented due to worsening fatigue, myalgias and feeling ill. She still has sense of taste and smell. Some phlegm production. No hemoptysis. No chest pain. No nausea or emesis.   No diarrhea  Currently tolerating PO    Medical work up notable for + COVID-19    Patient currently on MDIs as inpatient and she feels comfortably self- administering    :    Dyspnea much improved  Now off Hi-flow; maintaining sats on NC at 5 L/min  Patient appears to be comfortable  No dyspnea    Patient Vitals for the past 24 hrs:   Temp Pulse Resp BP SpO2   20 0814 97.4 °F (36.3 °C) 77 15 118/74 92 %   20 0334 98 °F (36.7 °C) 63 16 (!) 105/48 96 %   20 2349 98 °F (36.7 °C) 69 18 (!) 106/52 94 %   12/12/20 1957 98.2 °F (36.8 °C) 83 18 110/68 98 %   12/12/20 1619 97.6 °F (36.4 °C) 60 14 (!) 106/39 100 %   12/12/20 1421 -- -- -- -- 100 %   12/12/20 1200 97.2 °F (36.2 °C) (!) 59 15 (!) 108/55 100 %   12/12/20 0936 -- -- -- (!) 116/45 --                Past Medical History:   Diagnosis Date    Chronic lung disease     Chronic obstructive pulmonary disease (HCC)     Heart failure (HCC)     Hypertension       Past Surgical History:   Procedure Laterality Date    HX ORTHOPAEDIC      spinal sx 5/6    HX OTHER SURGICAL      Carotid artery    VASCULAR SURGERY PROCEDURE UNLIST      L CEA 10/2014      Prior to Admission medications    Medication Sig Start Date End Date Taking? Authorizing Provider   atorvastatin (LIPITOR) 20 mg tablet Take 20 mg by mouth daily. Yes Provider, Historical   multivitamin (ONE A DAY) tablet Take 1 Tab by mouth daily. Yes Provider, Historical   COQ10, UBIQUINOL, PO Take  by mouth. Yes Provider, Historical   losartan (COZAAR) 50 mg tablet Take  by mouth daily. 75 mg in a.m   Yes Provider, Historical   fluticasone-umeclidin-vilanter (TRELEGY ELLIPTA) 100-62.5-25 mcg dsdv Take 1 Puff by inhalation daily. Yes Provider, Historical   diclofenac (VOLTAREN) 1 % gel Apply  to affected area four (4) times daily. 12/20/17  Yes Estephania Azar PA-C   Oxygen    Yes Provider, Historical   albuterol (PROVENTIL HFA, VENTOLIN HFA, PROAIR HFA) 90 mcg/actuation inhaler Take  by inhalation. Yes Provider, Historical   albuterol (PROVENTIL VENTOLIN) 2.5 mg /3 mL (0.083 %) nebulizer solution 3 mL by Nebulization route every four (4) hours as needed for Wheezing or Shortness of Breath. 6/4/16  Yes Misael Walter MD   furosemide (LASIX) 20 mg tablet 20 mg po daily  Patient taking differently: Take 40 mg by mouth daily. 6/5/16  Yes Misael Walter MD   acetaminophen (TYLENOL EXTRA STRENGTH) 500 mg tablet Take 500 mg by mouth every six (6) hours as needed for Pain.    Yes Provider, Historical   metoprolol (LOPRESSOR) 25 mg tablet Take 25 mg by mouth two (2) times a day. Yes Provider, Historical   NIFEdipine ER (ADALAT CC) 60 mg ER tablet Take 60 mg by mouth daily. Yes Provider, Historical   aspirin delayed-release 81 mg tablet Take  by mouth daily. Yes Provider, Historical   alprazolam (XANAX) 0.5 mg tablet Take  by mouth nightly.     Provider, Historical     Current Facility-Administered Medications   Medication Dose Route Frequency    lactobacillus sp. 50 billion cpu (BIO-K PLUS) capsule 1 Cap  1 Cap Oral DAILY    sodium chloride (NS) flush 5-40 mL  5-40 mL IntraVENous Q8H    enoxaparin (LOVENOX) injection 40 mg  40 mg SubCUTAneous DAILY    famotidine (PEPCID) tablet 20 mg  20 mg Oral DAILY    insulin lispro (HUMALOG) injection   SubCUTAneous AC&HS    cholecalciferol (VITAMIN D3) (1000 Units /25 mcg) tablet 6 Tab  6,000 Units Oral DAILY    ascorbic acid (vitamin C) (VITAMIN C) tablet 500 mg  500 mg Oral BID    zinc sulfate (ZINCATE) 220 (50) mg capsule 1 Cap  1 Cap Oral DAILY    ALPRAZolam (XANAX) tablet 0.5 mg  0.5 mg Oral QHS    aspirin delayed-release tablet 81 mg  81 mg Oral DAILY    atorvastatin (LIPITOR) tablet 20 mg  20 mg Oral DAILY    losartan (COZAAR) tablet 50 mg  50 mg Oral DAILY    metoprolol tartrate (LOPRESSOR) tablet 25 mg  25 mg Oral BID    therapeutic multivitamin (THERAGRAN) tablet 1 Tab  1 Tab Oral DAILY    NIFEdipine ER (PROCARDIA XL) tablet 60 mg  60 mg Oral DAILY    budesonide-formoterol (SYMBICORT) 80-4.5 mcg inhaler  2 Puff Inhalation BID RT    And    tiotropium bromide (SPIRIVA RESPIMAT) 2.5 mcg /actuation  2 Puff Inhalation DAILY    piperacillin-tazobactam (ZOSYN) 2.25 g in 0.9% sodium chloride (MBP/ADV) 50 mL MBP  2.25 g IntraVENous Q6H    remdesivir 100 mg in 0.9% sodium chloride 250 mL IVPB  100 mg IntraVENous Q24H    chlorhexidine (PERIDEX) 0.12 % mouthwash 15 mL  15 mL Oral Q12H    azithromycin (ZITHROMAX) 500 mg in 0.9% sodium chloride 250 mL (VIAL-MATE)  500 mg IntraVENous Q24H    dexamethasone (DECADRON) 4 mg/mL injection 6 mg  6 mg IntraVENous Q6H     No Known Allergies   Social History     Tobacco Use    Smoking status: Former Smoker     Packs/day: 1.50     Years: 30.00     Pack years: 45.00     Types: Cigarettes     Last attempt to quit: 1987     Years since quittin.2    Smokeless tobacco: Never Used   Substance Use Topics    Alcohol use: No     Alcohol/week: 0.0 standard drinks      Family History   Problem Relation Age of Onset    Heart Disease Mother     Hypertension Mother     Heart Attack Father     Hypertension Father         Review of Systems:  Pertinent items are noted in HPI. Objective:   Vital Signs:    Visit Vitals  /74 (BP 1 Location: Right arm, BP Patient Position: Sitting)   Pulse 77   Temp 97.4 °F (36.3 °C)   Resp 15   Ht 5' 3\" (1.6 m)   Wt 55.3 kg (122 lb)   SpO2 92%   Breastfeeding No   BMI 21.61 kg/m²       O2 Device: Nasal cannula   O2 Flow Rate (L/min): 5 l/min   Temp (24hrs), Av.7 °F (36.5 °C), Min:97.2 °F (36.2 °C), Max:98.2 °F (36.8 °C)       Intake/Output:   Last shift:      No intake/output data recorded. Last 3 shifts:  1901 -  0700  In: 165   Out: -   No intake or output data in the 24 hours ending 20 0904      Ventilator Settings:  Mode Rate Tidal Volume Pressure FiO2 PEEP            50 %       Peak airway pressure:           Physical Exam:    General:  Alert, cooperative, mils distress, appears stated age. On Vapotherm- I turned down to 40%   Head:  Normocephalic, without obvious abnormality, atraumatic. Eyes:  Conjunctivae/corneas clear. PERRL, EOMs intact. Nose: Nares normal. Septum midline. Mucosa normal. No drainage or sinus tenderness. Throat: Lips, mucosa, and tongue normal. No exudate   Neck: Supple, symmetrical, trachea midline, no adenopathy, thyroid: no enlargment/tenderness/nodules,  no JVD.    Back:   Symmetric   Lungs:   Symmetric, non-labored breathing pattern, No audible wheezing or stridor. Decreased diaphragm excursion by palpation, No SCM use   Chest wall:  No tenderness. Increased A-P diameter, No crepitous   Heart:  Regular rate and rhythm by palpation   Abdomen:   Soft, non-tender. No masses,  No organomegaly. Extremities: Extremities normal, atraumatic, no cyanosis or edema. Pulses: 2+ and symmetric all extremities. Skin: Skin color, texture, turgor normal. No rashes or lesions   Lymph nodes:      Cervical, supraclavicular nodes are unremarkable   Neurologic: Grossly nonfocal       Data:     Recent Results (from the past 24 hour(s))   GLUCOSE, POC    Collection Time: 12/12/20 12:37 PM   Result Value Ref Range    Glucose (POC) 164 (H) 70 - 110 mg/dL   GLUCOSE, POC    Collection Time: 12/12/20  4:22 PM   Result Value Ref Range    Glucose (POC) 142 (H) 70 - 110 mg/dL   GLUCOSE, POC    Collection Time: 12/12/20 10:20 PM   Result Value Ref Range    Glucose (POC) 180 (H) 70 - 110 mg/dL   PROTHROMBIN TIME + INR    Collection Time: 12/13/20 12:21 AM   Result Value Ref Range    Prothrombin time 13.2 11.5 - 15.2 sec    INR 1.0 0.8 - 1.2     PTT    Collection Time: 12/13/20 12:21 AM   Result Value Ref Range    aPTT 35.7 23.0 - 36.4 SEC   FIBRINOGEN    Collection Time: 12/13/20 12:21 AM   Result Value Ref Range    Fibrinogen 362 210 - 451 mg/dL   CBC WITH AUTOMATED DIFF    Collection Time: 12/13/20 12:21 AM   Result Value Ref Range    WBC 6.0 4.6 - 13.2 K/uL    RBC 4.45 4.20 - 5.30 M/uL    HGB 13.0 12.0 - 16.0 g/dL    HCT 40.2 35.0 - 45.0 %    MCV 90.3 74.0 - 97.0 FL    MCH 29.2 24.0 - 34.0 PG    MCHC 32.3 31.0 - 37.0 g/dL    RDW 14.9 (H) 11.6 - 14.5 %    PLATELET 158 376 - 396 K/uL    MPV 11.0 9.2 - 11.8 FL    NEUTROPHILS 78 (H) 40 - 73 %    LYMPHOCYTES 16 (L) 21 - 52 %    MONOCYTES 6 3 - 10 %    EOSINOPHILS 0 0 - 5 %    BASOPHILS 0 0 - 2 %    ABS. NEUTROPHILS 4.7 1.8 - 8.0 K/UL    ABS. LYMPHOCYTES 1.0 0.9 - 3.6 K/UL    ABS.  MONOCYTES 0.4 0.05 - 1.2 K/UL    ABS. EOSINOPHILS 0.0 0.0 - 0.4 K/UL    ABS. BASOPHILS 0.0 0.0 - 0.1 K/UL    DF AUTOMATED     METABOLIC PANEL, COMPREHENSIVE    Collection Time: 12/13/20 12:21 AM   Result Value Ref Range    Sodium 138 136 - 145 mmol/L    Potassium 3.6 3.5 - 5.5 mmol/L    Chloride 107 100 - 111 mmol/L    CO2 24 21 - 32 mmol/L    Anion gap 7 3.0 - 18 mmol/L    Glucose 147 (H) 74 - 99 mg/dL    BUN 34 (H) 7.0 - 18 MG/DL    Creatinine 1.07 0.6 - 1.3 MG/DL    BUN/Creatinine ratio 32 (H) 12 - 20      GFR est AA 59 (L) >60 ml/min/1.73m2    GFR est non-AA 49 (L) >60 ml/min/1.73m2    Calcium 7.7 (L) 8.5 - 10.1 MG/DL    Bilirubin, total 0.3 0.2 - 1.0 MG/DL    ALT (SGPT) 21 13 - 56 U/L    AST (SGOT) 21 10 - 38 U/L    Alk.  phosphatase 42 (L) 45 - 117 U/L    Protein, total 5.9 (L) 6.4 - 8.2 g/dL    Albumin 2.9 (L) 3.4 - 5.0 g/dL    Globulin 3.0 2.0 - 4.0 g/dL    A-G Ratio 1.0 0.8 - 1.7     LD    Collection Time: 12/13/20 12:21 AM   Result Value Ref Range     (H) 81 - 234 U/L   FERRITIN    Collection Time: 12/13/20 12:21 AM   Result Value Ref Range    Ferritin 190 8 - 388 NG/ML   C REACTIVE PROTEIN, QT    Collection Time: 12/13/20 12:21 AM   Result Value Ref Range    C-Reactive protein 0.7 (H) 0 - 0.3 mg/dL   D DIMER    Collection Time: 12/13/20 12:21 AM   Result Value Ref Range    D DIMER 0.46 (H) <0.46 ug/ml(FEU)   PROCALCITONIN    Collection Time: 12/13/20 12:21 AM   Result Value Ref Range    Procalcitonin <0.05 ng/mL   URINALYSIS W/ RFLX MICROSCOPIC    Collection Time: 12/13/20  6:10 AM   Result Value Ref Range    Color YELLOW      Appearance CLEAR      Specific gravity 1.021 1.005 - 1.030      pH (UA) 5.5 5.0 - 8.0      Protein TRACE (A) NEG mg/dL    Glucose Negative NEG mg/dL    Ketone Negative NEG mg/dL    Bilirubin Negative NEG      Blood Negative NEG      Urobilinogen 0.2 0.2 - 1.0 EU/dL    Nitrites Negative NEG      Leukocyte Esterase MODERATE (A) NEG     URINE MICROSCOPIC ONLY    Collection Time: 12/13/20 6:10 AM   Result Value Ref Range    WBC 1 to 5 0 - 4 /hpf    RBC 0 to 1 0 - 5 /hpf    Epithelial cells FEW 0 - 5 /lpf    Bacteria Negative NEG /hpf   GLUCOSE, POC    Collection Time: 12/13/20  8:17 AM   Result Value Ref Range    Glucose (POC) 157 (H) 70 - 110 mg/dL           Lab Results   Component Value Date/Time    TSH 1.30 07/06/2020 10:40 AM    T4, Free 1.3 06/02/2016 11:25 AM           Recent Labs     12/10/20  1335   HCO3I 24.4   PCO2I 37.1   PHI 7.43   PO2I 66*     Telemetry:normal sinus rhythm     Lab Results   Component Value Date/Time    NT pro-BNP 1,632 12/10/2020 12:41 PM    NT pro- 06/01/2016 08:34 PM     09/25/20   ECHO ADULT COMPLETE 09/25/2020 9/25/2020    Narrative · LV: Estimated LVEF is 65 - 70%. Normal cavity size, wall thickness and   systolic function (ejection fraction normal). Wall motion: normal. Mild   (grade 1) left ventricular diastolic dysfunction. · LA: Left Atrium volume index is 30.81 mL/m2. · MV: Mild mitral valve regurgitation is present. · TV: Mild tricuspid valve regurgitation is present. · PV: Mild pulmonic valve regurgitation is present. · PA: Moderate pulmonary hypertension. Pulmonary arterial systolic   pressure is 66 mmHg. Signed by: Vesta Chambers MD       Imaging:  I have personally reviewed the patients radiographs and have reviewed the reports:  CT Results  (Last 48 hours)    None        CXR Results  (Last 48 hours)    None                     Complex decision making was made in the evaluation and management plans during this consultation. More than 50% of time was spent in counseling and coordination of care including review of data and discussion with other team members. Swetha Ortega.  [facility]  Pulmonary, Critical Care, and Sleep Medicine

## 2020-12-13 NOTE — PROGRESS NOTES
[facility]ist [facility]  Progress Note    Patient: Yomi Stephens Age: 82 y.o. : 1935 MR#: [id number] SSN: [id number]  Date: 2020  Subjective:     Stools improved x 1 today, no SOB, CP, fever / chills ; also c/o dry mouth - cepacol ordered    Assessment/Plan:   1. Covid-19 - supportive care / cont tx with steroids, covid supplements / Guerry Reel / follow covid labs   2. Pneumonia RLL - cont rocephin / azithromycin / probiotic  3. Acute on chronic hypoxic respiratory failure  4. Chronic diastolic heart failure - no evidence of acute exacerbation   5. Moderate pulmonary hypertension  6. COPD/Emphysema - cont inhalers / pulm following  7. CAD - cont home meds / no acute concerns  8. Hypertension - cont home meds w/ hold parameters                           9. Hyperlipidemia - cont statin  10. Advanced age  6. New 7 mm pulmonary nodule HIRAL- recommend f/u in 3 months  12. Hx anxiety - cont xanax / caution with over use of benzos   13. Dispo - home with home health and family support when cleared by ID / ?  Post remdesivir    Additional Notes:      Case discussed with:  [x]Patient  [x]Family  [x]Nursing  []Case Management  DVT Prophylaxis:  [x]Lovenox  []Hep SQ  []SCDs  []Coumadin   []On Heparin gtt    Objective:   VS:   Visit Vitals  /83 (BP 1 Location: Right arm, BP Patient Position: Sitting)   Pulse 68   Temp 97.6 °F (36.4 °C)   Resp 20   Ht 5' 3\" (1.6 m)   Wt 58.1 kg (128 lb)   SpO2 96%   Breastfeeding No   BMI 22.67 kg/m²      Tmax/24hrs: Temp (24hrs), Av.8 °F (36.6 °C), Min:97.4 °F (36.3 °C), Max:98.2 °F (36.8 °C)    No intake or output data in the 24 hours ending 20 1341  General:  Alert, NAD  Cardiovascular:  RRR  Pulmonary:  LSC throughout; on 4-5 liters  GI:  +BS in all four quadrants, soft, non-tender  Extremities:  No edema; 2+ dorsalis pedis pulses bilaterally  Neuro: alert and oriented x 4    Family contact: Discussed with sister Melody Cerda at   [phone]      Labs:    Recent Results (from the past 24 hour(s))   GLUCOSE, POC    Collection Time: 12/12/20  4:22 PM   Result Value Ref Range    Glucose (POC) 142 (H) 70 - 110 mg/dL   GLUCOSE, POC    Collection Time: 12/12/20 10:20 PM   Result Value Ref Range    Glucose (POC) 180 (H) 70 - 110 mg/dL   PROTHROMBIN TIME + INR    Collection Time: 12/13/20 12:21 AM   Result Value Ref Range    Prothrombin time 13.2 11.5 - 15.2 sec    INR 1.0 0.8 - 1.2     PTT    Collection Time: 12/13/20 12:21 AM   Result Value Ref Range    aPTT 35.7 23.0 - 36.4 SEC   FIBRINOGEN    Collection Time: 12/13/20 12:21 AM   Result Value Ref Range    Fibrinogen 362 210 - 451 mg/dL   CBC WITH AUTOMATED DIFF    Collection Time: 12/13/20 12:21 AM   Result Value Ref Range    WBC 6.0 4.6 - 13.2 K/uL    RBC 4.45 4.20 - 5.30 M/uL    HGB 13.0 12.0 - 16.0 g/dL    HCT 40.2 35.0 - 45.0 %    MCV 90.3 74.0 - 97.0 FL    MCH 29.2 24.0 - 34.0 PG    MCHC 32.3 31.0 - 37.0 g/dL    RDW 14.9 (H) 11.6 - 14.5 %    PLATELET 232 368 - 753 K/uL    MPV 11.0 9.2 - 11.8 FL    NEUTROPHILS 78 (H) 40 - 73 %    LYMPHOCYTES 16 (L) 21 - 52 %    MONOCYTES 6 3 - 10 %    EOSINOPHILS 0 0 - 5 %    BASOPHILS 0 0 - 2 %    ABS. NEUTROPHILS 4.7 1.8 - 8.0 K/UL    ABS. LYMPHOCYTES 1.0 0.9 - 3.6 K/UL    ABS. MONOCYTES 0.4 0.05 - 1.2 K/UL    ABS. EOSINOPHILS 0.0 0.0 - 0.4 K/UL    ABS.  BASOPHILS 0.0 0.0 - 0.1 K/UL    DF AUTOMATED     METABOLIC PANEL, COMPREHENSIVE    Collection Time: 12/13/20 12:21 AM   Result Value Ref Range    Sodium 138 136 - 145 mmol/L    Potassium 3.6 3.5 - 5.5 mmol/L    Chloride 107 100 - 111 mmol/L    CO2 24 21 - 32 mmol/L    Anion gap 7 3.0 - 18 mmol/L    Glucose 147 (H) 74 - 99 mg/dL    BUN 34 (H) 7.0 - 18 MG/DL    Creatinine 1.07 0.6 - 1.3 MG/DL    BUN/Creatinine ratio 32 (H) 12 - 20      GFR est AA 59 (L) >60 ml/min/1.73m2    GFR est non-AA 49 (L) >60 ml/min/1.73m2    Calcium 7.7 (L) 8.5 - 10.1 MG/DL    Bilirubin, total 0.3 0.2 - 1.0 MG/DL    ALT (SGPT) 21 13 - 56 U/L    AST (SGOT) 21 10 - 38 U/L    Alk.  phosphatase 42 (L) 45 - 117 U/L    Protein, total 5.9 (L) 6.4 - 8.2 g/dL    Albumin 2.9 (L) 3.4 - 5.0 g/dL    Globulin 3.0 2.0 - 4.0 g/dL    A-G Ratio 1.0 0.8 - 1.7     LD    Collection Time: 12/13/20 12:21 AM   Result Value Ref Range     (H) 81 - 234 U/L   FERRITIN    Collection Time: 12/13/20 12:21 AM   Result Value Ref Range    Ferritin 190 8 - 388 NG/ML   C REACTIVE PROTEIN, QT    Collection Time: 12/13/20 12:21 AM   Result Value Ref Range    C-Reactive protein 0.7 (H) 0 - 0.3 mg/dL   D DIMER    Collection Time: 12/13/20 12:21 AM   Result Value Ref Range    D DIMER 0.46 (H) <0.46 ug/ml(FEU)   PROCALCITONIN    Collection Time: 12/13/20 12:21 AM   Result Value Ref Range    Procalcitonin <0.05 ng/mL   URINALYSIS W/ RFLX MICROSCOPIC    Collection Time: 12/13/20  6:10 AM   Result Value Ref Range    Color YELLOW      Appearance CLEAR      Specific gravity 1.021 1.005 - 1.030      pH (UA) 5.5 5.0 - 8.0      Protein TRACE (A) NEG mg/dL    Glucose Negative NEG mg/dL    Ketone Negative NEG mg/dL    Bilirubin Negative NEG      Blood Negative NEG      Urobilinogen 0.2 0.2 - 1.0 EU/dL    Nitrites Negative NEG      Leukocyte Esterase MODERATE (A) NEG     URINE MICROSCOPIC ONLY    Collection Time: 12/13/20  6:10 AM   Result Value Ref Range    WBC 1 to 5 0 - 4 /hpf    RBC 0 to 1 0 - 5 /hpf    Epithelial cells FEW 0 - 5 /lpf    Bacteria Negative NEG /hpf   GLUCOSE, POC    Collection Time: 12/13/20  8:17 AM   Result Value Ref Range    Glucose (POC) 157 (H) 70 - 110 mg/dL   GLUCOSE, POC    Collection Time: 12/13/20 12:11 PM   Result Value Ref Range    Glucose (POC) 161 (H) 70 - 110 mg/dL     Signed By: Haylee Perez NP     December 13, 2020

## 2020-12-13 NOTE — ROUTINE PROCESS
Bedside shift change report given to Billy Benavides RN (oncoming nurse) by Cordell Henderson RN (offgoing nurse). Report included the following information SBAR, Kardex, Intake/Output and MAR.

## 2020-12-13 NOTE — ROUTINE PROCESS
Verbal shift change report given to CARLEEN Corral (oncoming nurse) by Roque Mota RN (offgoing nurse). Report included the following information SBAR, Intake/Output, MAR, Recent Results, Cardiac Rhythm (SR) and Alarm Parameters .

## 2020-12-13 NOTE — PROGRESS NOTES
Problem: Falls - Risk of  Goal: *Absence of Falls  Description: Document Silverio Ahumada Fall Risk and appropriate interventions in the flowsheet.   Outcome: Progressing Towards Goal  Note: Fall Risk Interventions:            Medication Interventions: Patient to call before getting OOB    Elimination Interventions: Call light in reach, Patient to call for help with toileting needs              Problem: Patient Education: Go to Patient Education Activity  Goal: Patient/Family Education  Outcome: Progressing Towards Goal     Problem: Patient Education: Go to Patient Education Activity  Goal: Patient/Family Education  Outcome: Progressing Towards Goal     Problem: Patient Education: Go to Patient Education Activity  Goal: Patient/Family Education  Outcome: Progressing Towards Goal

## 2020-12-14 ENCOUNTER — HOME HEALTH ADMISSION (OUTPATIENT)
Dept: HOME HEALTH SERVICES | Facility: HOME HEALTH | Age: 85
End: 2020-12-14
Payer: MEDICARE

## 2020-12-14 LAB
ALBUMIN SERPL-MCNC: 3 G/DL (ref 3.4–5)
ALBUMIN/GLOB SERPL: 1 {RATIO} (ref 0.8–1.7)
ALP SERPL-CCNC: 45 U/L (ref 45–117)
ALT SERPL-CCNC: 26 U/L (ref 13–56)
ANION GAP SERPL CALC-SCNC: 9 MMOL/L (ref 3–18)
APTT PPP: 33.7 SEC (ref 23–36.4)
AST SERPL-CCNC: 20 U/L (ref 10–38)
BASOPHILS # BLD: 0 K/UL (ref 0–0.06)
BASOPHILS NFR BLD: 0 % (ref 0–3)
BILIRUB SERPL-MCNC: 0.5 MG/DL (ref 0.2–1)
BUN SERPL-MCNC: 25 MG/DL (ref 7–18)
BUN/CREAT SERPL: 27 (ref 12–20)
CALCIUM SERPL-MCNC: 7.8 MG/DL (ref 8.5–10.1)
CHLORIDE SERPL-SCNC: 111 MMOL/L (ref 100–111)
CO2 SERPL-SCNC: 23 MMOL/L (ref 21–32)
CREAT SERPL-MCNC: 0.92 MG/DL (ref 0.6–1.3)
CRP SERPL-MCNC: 0.4 MG/DL (ref 0–0.3)
D DIMER PPP FEU-MCNC: 0.52 UG/ML(FEU)
DIFFERENTIAL METHOD BLD: ABNORMAL
EOSINOPHIL # BLD: 0 K/UL (ref 0–0.4)
EOSINOPHIL NFR BLD: 0 % (ref 0–5)
ERYTHROCYTE [DISTWIDTH] IN BLOOD BY AUTOMATED COUNT: 14.5 % (ref 11.6–14.5)
FERRITIN SERPL-MCNC: 177 NG/ML (ref 8–388)
FIBRINOGEN PPP-MCNC: 362 MG/DL (ref 210–451)
GLOBULIN SER CALC-MCNC: 3.1 G/DL (ref 2–4)
GLUCOSE BLD STRIP.AUTO-MCNC: 134 MG/DL (ref 70–110)
GLUCOSE BLD STRIP.AUTO-MCNC: 152 MG/DL (ref 70–110)
GLUCOSE BLD STRIP.AUTO-MCNC: 79 MG/DL (ref 70–110)
GLUCOSE BLD STRIP.AUTO-MCNC: 84 MG/DL (ref 70–110)
GLUCOSE BLD STRIP.AUTO-MCNC: 86 MG/DL (ref 70–110)
GLUCOSE BLD STRIP.AUTO-MCNC: 93 MG/DL (ref 70–110)
GLUCOSE SERPL-MCNC: 96 MG/DL (ref 74–99)
HCT VFR BLD AUTO: 42.1 % (ref 35–45)
HGB BLD-MCNC: 13.9 G/DL (ref 12–16)
INR PPP: 1 (ref 0.8–1.2)
LDH SERPL L TO P-CCNC: 267 U/L (ref 81–234)
LYMPHOCYTES # BLD: 0.6 K/UL (ref 0.8–3.5)
LYMPHOCYTES NFR BLD: 7 % (ref 20–51)
MCH RBC QN AUTO: 29.2 PG (ref 24–34)
MCHC RBC AUTO-ENTMCNC: 33 G/DL (ref 31–37)
MCV RBC AUTO: 88.4 FL (ref 74–97)
MONOCYTES # BLD: 1.7 K/UL (ref 0–1)
MONOCYTES NFR BLD: 19 % (ref 2–9)
NEUTS SEG # BLD: 6.6 K/UL (ref 1.8–8)
NEUTS SEG NFR BLD: 73 % (ref 42–75)
OTHER CELLS NFR BLD MANUAL: 1 %
PLATELET # BLD AUTO: 167 K/UL (ref 135–420)
PLATELET COMMENTS,PCOM: ABNORMAL
PMV BLD AUTO: 10.1 FL (ref 9.2–11.8)
POTASSIUM SERPL-SCNC: 3.6 MMOL/L (ref 3.5–5.5)
PROCALCITONIN SERPL-MCNC: <0.05 NG/ML
PROT SERPL-MCNC: 6.1 G/DL (ref 6.4–8.2)
PROTHROMBIN TIME: 13.1 SEC (ref 11.5–15.2)
RBC # BLD AUTO: 4.76 M/UL (ref 4.2–5.3)
RBC MORPH BLD: ABNORMAL
RBC MORPH BLD: ABNORMAL
SODIUM SERPL-SCNC: 143 MMOL/L (ref 136–145)
WBC # BLD AUTO: 9 K/UL (ref 4.6–13.2)

## 2020-12-14 PROCEDURE — 94640 AIRWAY INHALATION TREATMENT: CPT

## 2020-12-14 PROCEDURE — 74011250637 HC RX REV CODE- 250/637: Performed by: NURSE PRACTITIONER

## 2020-12-14 PROCEDURE — 85384 FIBRINOGEN ACTIVITY: CPT

## 2020-12-14 PROCEDURE — 74011250636 HC RX REV CODE- 250/636: Performed by: INTERNAL MEDICINE

## 2020-12-14 PROCEDURE — 83615 LACTATE (LD) (LDH) ENZYME: CPT

## 2020-12-14 PROCEDURE — 74011000250 HC RX REV CODE- 250: Performed by: INTERNAL MEDICINE

## 2020-12-14 PROCEDURE — 80053 COMPREHEN METABOLIC PANEL: CPT

## 2020-12-14 PROCEDURE — 85025 COMPLETE CBC W/AUTO DIFF WBC: CPT

## 2020-12-14 PROCEDURE — 84145 PROCALCITONIN (PCT): CPT

## 2020-12-14 PROCEDURE — 36415 COLL VENOUS BLD VENIPUNCTURE: CPT

## 2020-12-14 PROCEDURE — 99233 SBSQ HOSP IP/OBS HIGH 50: CPT | Performed by: INTERNAL MEDICINE

## 2020-12-14 PROCEDURE — 86140 C-REACTIVE PROTEIN: CPT

## 2020-12-14 PROCEDURE — 65660000000 HC RM CCU STEPDOWN

## 2020-12-14 PROCEDURE — 82962 GLUCOSE BLOOD TEST: CPT

## 2020-12-14 PROCEDURE — 74011000258 HC RX REV CODE- 258: Performed by: INTERNAL MEDICINE

## 2020-12-14 PROCEDURE — 74011250637 HC RX REV CODE- 250/637: Performed by: INTERNAL MEDICINE

## 2020-12-14 PROCEDURE — 2709999900 HC NON-CHARGEABLE SUPPLY

## 2020-12-14 PROCEDURE — 74011250636 HC RX REV CODE- 250/636: Performed by: EMERGENCY MEDICINE

## 2020-12-14 PROCEDURE — 85730 THROMBOPLASTIN TIME PARTIAL: CPT

## 2020-12-14 PROCEDURE — 99232 SBSQ HOSP IP/OBS MODERATE 35: CPT | Performed by: HOSPITALIST

## 2020-12-14 PROCEDURE — 97530 THERAPEUTIC ACTIVITIES: CPT

## 2020-12-14 PROCEDURE — 74011636637 HC RX REV CODE- 636/637: Performed by: NURSE PRACTITIONER

## 2020-12-14 PROCEDURE — 74011250636 HC RX REV CODE- 250/636: Performed by: NURSE PRACTITIONER

## 2020-12-14 PROCEDURE — 85379 FIBRIN DEGRADATION QUANT: CPT

## 2020-12-14 PROCEDURE — 85610 PROTHROMBIN TIME: CPT

## 2020-12-14 PROCEDURE — 82728 ASSAY OF FERRITIN: CPT

## 2020-12-14 RX ORDER — IPRATROPIUM BROMIDE AND ALBUTEROL SULFATE 2.5; .5 MG/3ML; MG/3ML
3 SOLUTION RESPIRATORY (INHALATION)
Status: DISCONTINUED | OUTPATIENT
Start: 2020-12-14 | End: 2020-12-15 | Stop reason: CLARIF

## 2020-12-14 RX ORDER — AMOXICILLIN AND CLAVULANATE POTASSIUM 875; 125 MG/1; MG/1
1 TABLET, FILM COATED ORAL EVERY 12 HOURS
Status: DISCONTINUED | OUTPATIENT
Start: 2020-12-14 | End: 2020-12-17

## 2020-12-14 RX ORDER — GUAIFENESIN 600 MG/1
600 TABLET, EXTENDED RELEASE ORAL EVERY 12 HOURS
Status: DISCONTINUED | OUTPATIENT
Start: 2020-12-14 | End: 2020-12-19 | Stop reason: HOSPADM

## 2020-12-14 RX ADMIN — METOPROLOL TARTRATE 25 MG: 25 TABLET, FILM COATED ORAL at 21:40

## 2020-12-14 RX ADMIN — IPRATROPIUM BROMIDE AND ALBUTEROL SULFATE 3 ML: .5; 3 SOLUTION RESPIRATORY (INHALATION) at 15:40

## 2020-12-14 RX ADMIN — NIFEDIPINE 60 MG: 60 TABLET, FILM COATED, EXTENDED RELEASE ORAL at 08:21

## 2020-12-14 RX ADMIN — AZITHROMYCIN 500 MG: 500 INJECTION, POWDER, LYOPHILIZED, FOR SOLUTION INTRAVENOUS at 08:20

## 2020-12-14 RX ADMIN — BUDESONIDE AND FORMOTEROL FUMARATE DIHYDRATE 2 PUFF: 80; 4.5 AEROSOL RESPIRATORY (INHALATION) at 20:40

## 2020-12-14 RX ADMIN — ENOXAPARIN SODIUM 40 MG: 40 INJECTION SUBCUTANEOUS at 08:20

## 2020-12-14 RX ADMIN — GUAIFENESIN AND DEXTROMETHORPHAN 5 ML: 100; 10 SYRUP ORAL at 05:21

## 2020-12-14 RX ADMIN — Medication 500 MG: at 08:21

## 2020-12-14 RX ADMIN — Medication 10 ML: at 21:40

## 2020-12-14 RX ADMIN — CHLORHEXIDINE GLUCONATE 0.12% ORAL RINSE 15 ML: 1.2 LIQUID ORAL at 21:40

## 2020-12-14 RX ADMIN — AMOXICILLIN AND CLAVULANATE POTASSIUM 1 TABLET: 875; 125 TABLET, FILM COATED ORAL at 21:40

## 2020-12-14 RX ADMIN — Medication 1 CAPSULE: at 08:20

## 2020-12-14 RX ADMIN — LOSARTAN POTASSIUM 50 MG: 50 TABLET, FILM COATED ORAL at 08:21

## 2020-12-14 RX ADMIN — REMDESIVIR 100 MG: 100 INJECTION, POWDER, LYOPHILIZED, FOR SOLUTION INTRAVENOUS at 21:40

## 2020-12-14 RX ADMIN — INSULIN LISPRO 2 UNITS: 100 INJECTION, SOLUTION INTRAVENOUS; SUBCUTANEOUS at 22:11

## 2020-12-14 RX ADMIN — ALPRAZOLAM 0.5 MG: 0.5 TABLET ORAL at 21:40

## 2020-12-14 RX ADMIN — Medication 81 MG: at 08:21

## 2020-12-14 RX ADMIN — GUAIFENESIN 600 MG: 600 TABLET, EXTENDED RELEASE ORAL at 21:40

## 2020-12-14 RX ADMIN — THERA TABS 1 TABLET: TAB at 08:21

## 2020-12-14 RX ADMIN — IPRATROPIUM BROMIDE AND ALBUTEROL SULFATE 3 ML: .5; 3 SOLUTION RESPIRATORY (INHALATION) at 20:31

## 2020-12-14 RX ADMIN — ZINC SULFATE 220 MG (50 MG) CAPSULE 1 CAPSULE: CAPSULE at 08:21

## 2020-12-14 RX ADMIN — PIPERACILLIN AND TAZOBACTAM 2.25 G: 2; .25 INJECTION, POWDER, FOR SOLUTION INTRAVENOUS at 08:20

## 2020-12-14 RX ADMIN — PIPERACILLIN AND TAZOBACTAM 2.25 G: 2; .25 INJECTION, POWDER, FOR SOLUTION INTRAVENOUS at 02:55

## 2020-12-14 RX ADMIN — TIOTROPIUM BROMIDE INHALATION SPRAY 2 PUFF: 3.12 SPRAY, METERED RESPIRATORY (INHALATION) at 08:00

## 2020-12-14 RX ADMIN — FAMOTIDINE 20 MG: 20 TABLET, FILM COATED ORAL at 08:23

## 2020-12-14 RX ADMIN — Medication 10 ML: at 13:20

## 2020-12-14 RX ADMIN — ATORVASTATIN CALCIUM 20 MG: 20 TABLET, FILM COATED ORAL at 08:21

## 2020-12-14 RX ADMIN — GUAIFENESIN AND DEXTROMETHORPHAN 5 ML: 100; 10 SYRUP ORAL at 01:00

## 2020-12-14 RX ADMIN — METOPROLOL TARTRATE 25 MG: 25 TABLET, FILM COATED ORAL at 08:21

## 2020-12-14 RX ADMIN — Medication 6 TABLET: at 08:21

## 2020-12-14 RX ADMIN — CHLORHEXIDINE GLUCONATE 0.12% ORAL RINSE 15 ML: 1.2 LIQUID ORAL at 08:23

## 2020-12-14 RX ADMIN — BUDESONIDE AND FORMOTEROL FUMARATE DIHYDRATE 2 PUFF: 80; 4.5 AEROSOL RESPIRATORY (INHALATION) at 08:00

## 2020-12-14 RX ADMIN — GUAIFENESIN 600 MG: 600 TABLET, EXTENDED RELEASE ORAL at 12:15

## 2020-12-14 RX ADMIN — Medication 10 ML: at 06:00

## 2020-12-14 RX ADMIN — AMOXICILLIN AND CLAVULANATE POTASSIUM 1 TABLET: 875; 125 TABLET, FILM COATED ORAL at 12:15

## 2020-12-14 RX ADMIN — Medication 500 MG: at 21:40

## 2020-12-14 RX ADMIN — GUAIFENESIN AND DEXTROMETHORPHAN 5 ML: 100; 10 SYRUP ORAL at 17:52

## 2020-12-14 RX ADMIN — ACETAMINOPHEN 650 MG: 325 TABLET ORAL at 22:09

## 2020-12-14 RX ADMIN — GUAIFENESIN AND DEXTROMETHORPHAN 5 ML: 100; 10 SYRUP ORAL at 21:40

## 2020-12-14 NOTE — DIABETES MGMT
Glycemic Control Plan of Care    No history of diabetes mellitus. Patient is 82year old admitted on 12/10/2020 with report of worsening shortness of breath. Noted the following assessment:  COVID-19   Pneumonia RLL  Acute on chronic respiratory failure  COPD/Emphysema    Glycemic recommendation(s): sliding scale lispro as ordered. Glycemic assessment:    Last dose of steroids 12/13/2020  POC BG 12/13: 186  POC BG 12/14 at time of review; 93, 86, 79, 134  Lab FBG 12/14: 96        Current A1C: None. Requested on 12/14 per insulin protocol and pending result. Current hospital diabetes medications:  Correctional lispro insulin ACHS. Normal sensitivity dose    Total daily dose insulin requirement previous day: 12/13:  Lispro: 8 units    Home diabetes medications: none.  No history of diabetes mellitus    Diet: cardiac regular    Goals:  Blood glucose will be within target range of  mg/dL by 12/17/2020    Education:  ___  Refer to Diabetes Education Record             _X__  Education not indicated at this time    Parisa Rubio RN [facility]  Pager: [phone]

## 2020-12-14 NOTE — PROGRESS NOTES
Problem: Falls - Risk of  Goal: *Absence of Falls  Description: Document Arnaud Villatoro Fall Risk and appropriate interventions in the flowsheet.   Outcome: Progressing Towards Goal  Note: Fall Risk Interventions:            Medication Interventions: Patient to call before getting OOB, Teach patient to arise slowly    Elimination Interventions: Call light in reach, Toilet paper/wipes in reach, Toileting schedule/hourly rounds, Patient to call for help with toileting needs              Problem: Patient Education: Go to Patient Education Activity  Goal: Patient/Family Education  Outcome: Progressing Towards Goal     Problem: Patient Education: Go to Patient Education Activity  Goal: Patient/Family Education  Outcome: Progressing Towards Goal     Problem: Patient Education: Go to Patient Education Activity  Goal: Patient/Family Education  Outcome: Progressing Towards Goal

## 2020-12-14 NOTE — PROGRESS NOTES
1915: Bedside and Verbal shift change report given to Honey Milligan RN (oncoming nurse) by Mary Bateman RN (offgoing nurse). Report included the following information SBAR, Kardex, Intake/Output, MAR and Cardiac Rhythm NSR.

## 2020-12-14 NOTE — CONSULTS
Infectious Disease Consultation Note        Reason: confirmed covid-19 pneumonia, acute hypoxic respiratory failure    Current abx Prior abx   Azithromycin since 12/10    Pip/tazo since 12/11 Ceftriaxone 12/10     Lines:       Assessment :     82 y.o. female with a history of COPD presented to ED on 12/10/2020 with increasing shortness of breath, fatigue. Labs on 12/11-ferritin 242, CRP 3.9, procalcitonin less than 0.05, D-dimer 0.92    Positive COVID-19 test 12/10/2020    Clinical presentation consistent with acute on chronic hypoxic respiratory failure secondary to confirmed COVID-19 pneumonia    Labs on 12/14-ferritin 177, CRP 8.4, procalcitonin less than 0.05, D-dimer 0.52  Status post remdesivir since 12/11/2020  Status post convalescent plasma 12/11/2020    Requires 6 L oxygen via nasal cannula. Oxygen saturation drops to 85% with minimal activity on 6 L. Recommendations:    1. D/c  piperacillin/tazobactam, start augmentin till 12/16. discontinue azithromycin   2. Continue  remdesivir (d 4/5)  3. Taper steroids per pulmonary, management of COPD per pulmonary  4. Continue anticoagulation per primary team  5. Titrate oxygen as tolerated        Above plan was discussed in details with patient, dr. Hillary Small, dr. Gideon Lai. Please call me if any further questions or concerns. Will continue to participate in the care of this patient. HPI:      Patient states that she is feeling congested. Feels short of breath with minimal exertion. She denies worsening chest pain,  abdominal pain, diarrhea, dysuria. home Medication List    Details   atorvastatin (LIPITOR) 20 mg tablet Take 20 mg by mouth daily. multivitamin (ONE A DAY) tablet Take 1 Tab by mouth daily. COQ10, UBIQUINOL, PO Take  by mouth.      losartan (COZAAR) 50 mg tablet Take  by mouth daily. 75 mg in a.m      fluticasone-umeclidin-vilanter (TRELEGY ELLIPTA) 100-62.5-25 mcg dsdv Take 1 Puff by inhalation daily.       diclofenac (VOLTAREN) 1 % gel Apply  to affected area four (4) times daily. Qty: 100 g, Refills: 2      Oxygen       albuterol (PROVENTIL HFA, VENTOLIN HFA, PROAIR HFA) 90 mcg/actuation inhaler Take  by inhalation. albuterol (PROVENTIL VENTOLIN) 2.5 mg /3 mL (0.083 %) nebulizer solution 3 mL by Nebulization route every four (4) hours as needed for Wheezing or Shortness of Breath. Qty: 48 Each, Refills: 0      furosemide (LASIX) 20 mg tablet 20 mg po daily  Qty: 30 Tab, Refills: 0      acetaminophen (TYLENOL EXTRA STRENGTH) 500 mg tablet Take 500 mg by mouth every six (6) hours as needed for Pain.      metoprolol (LOPRESSOR) 25 mg tablet Take 25 mg by mouth two (2) times a day. NIFEdipine ER (ADALAT CC) 60 mg ER tablet Take 60 mg by mouth daily. Associated Diagnoses: Occlusion and stenosis of carotid artery, left; Pre-op testing      aspirin delayed-release 81 mg tablet Take  by mouth daily. Associated Diagnoses: Occlusion and stenosis of carotid artery, left; Pre-op testing      alprazolam (XANAX) 0.5 mg tablet Take  by mouth nightly.     Associated Diagnoses: Occlusion and stenosis of carotid artery, left; Pre-op testing             Current Facility-Administered Medications   Medication Dose Route Frequency    benzocaine-menthoL (CEPACOL) lozenge 1 Lozenge  1 Lozenge Mucous Membrane PRN    lactobacillus sp. 50 billion cpu (BIO-K PLUS) capsule 1 Cap  1 Cap Oral DAILY    sodium chloride (NS) flush 5-40 mL  5-40 mL IntraVENous Q8H    sodium chloride (NS) flush 5-40 mL  5-40 mL IntraVENous PRN    acetaminophen (TYLENOL) tablet 650 mg  650 mg Oral Q6H PRN    Or    acetaminophen (TYLENOL) suppository 650 mg  650 mg Rectal Q6H PRN    polyethylene glycol (MIRALAX) packet 17 g  17 g Oral DAILY PRN    promethazine (PHENERGAN) tablet 12.5 mg  12.5 mg Oral Q6H PRN    Or    ondansetron (ZOFRAN) injection 4 mg  4 mg IntraVENous Q6H PRN    enoxaparin (LOVENOX) injection 40 mg  40 mg SubCUTAneous DAILY    famotidine (PEPCID) tablet 20 mg  20 mg Oral DAILY    insulin lispro (HUMALOG) injection   SubCUTAneous AC&HS    glucose chewable tablet 16 g  4 Tab Oral PRN    glucagon (GLUCAGEN) injection 1 mg  1 mg IntraMUSCular PRN    dextrose (D50W) injection syrg 12.5-25 g  25-50 mL IntraVENous PRN    guaiFENesin-dextromethorphan (ROBITUSSIN DM) 100-10 mg/5 mL syrup 5 mL  5 mL Oral Q4H PRN    cholecalciferol (VITAMIN D3) (1000 Units /25 mcg) tablet 6 Tab  6,000 Units Oral DAILY    ascorbic acid (vitamin C) (VITAMIN C) tablet 500 mg  500 mg Oral BID    zinc sulfate (ZINCATE) 220 (50) mg capsule 1 Cap  1 Cap Oral DAILY    albuterol (PROVENTIL HFA, VENTOLIN HFA, PROAIR HFA) inhaler 2 Puff  2 Puff Inhalation Q4H PRN    ALPRAZolam (XANAX) tablet 0.5 mg  0.5 mg Oral QHS    aspirin delayed-release tablet 81 mg  81 mg Oral DAILY    atorvastatin (LIPITOR) tablet 20 mg  20 mg Oral DAILY    losartan (COZAAR) tablet 50 mg  50 mg Oral DAILY    metoprolol tartrate (LOPRESSOR) tablet 25 mg  25 mg Oral BID    therapeutic multivitamin (THERAGRAN) tablet 1 Tab  1 Tab Oral DAILY    NIFEdipine ER (PROCARDIA XL) tablet 60 mg  60 mg Oral DAILY    budesonide-formoterol (SYMBICORT) 80-4.5 mcg inhaler  2 Puff Inhalation BID RT    And    tiotropium bromide (SPIRIVA RESPIMAT) 2.5 mcg /actuation  2 Puff Inhalation DAILY    piperacillin-tazobactam (ZOSYN) 2.25 g in 0.9% sodium chloride (MBP/ADV) 50 mL MBP  2.25 g IntraVENous Q6H    remdesivir 100 mg in 0.9% sodium chloride 250 mL IVPB  100 mg IntraVENous Q24H    0.9% sodium chloride infusion 250 mL  250 mL IntraVENous PRN    chlorhexidine (PERIDEX) 0.12 % mouthwash 15 mL  15 mL Oral Q12H    sodium chloride (NS) flush 5-10 mL  5-10 mL IntraVENous PRN    azithromycin (ZITHROMAX) 500 mg in 0.9% sodium chloride 250 mL (VIAL-MATE)  500 mg IntraVENous Q24H       Allergies: Patient has no known allergies.     Temp (24hrs), Av.7 °F (36.5 °C), Min:97 °F (36.1 °C), Max:98.2 °F (36.8 °C)    Visit Vitals  BP (!) 154/61 (BP 1 Location: Right arm, BP Patient Position: At rest)   Pulse 78   Temp 97.2 °F (36.2 °C)   Resp 19   Ht 5' 3\" (1.6 m)   Wt 58.1 kg (128 lb)   SpO2 92%   Breastfeeding No   BMI 22.67 kg/m²       ROS: 12 point ROS obtained in details. Pertinent positives as mentioned in HPI,   otherwise negative    Physical Exam:    General:  Alert, cooperative,  in no apparent distress. On NC. Gets hypoxic on 6L when tried to sit in bed   Head:  Normocephalic, without obvious abnormality, atraumatic. Eyes:  Conjunctivae/corneas clear. PERRL, EOMs intact. Nose: Nares normal. Septum midline. Mucosa normal. No drainage or sinus tenderness. Throat: Lips, mucosa, and tongue normal. No exudate   Neck: Supple, symmetrical, trachea midline, no adenopathy, thyroid: no enlargment/tenderness/nodules,  no JVD. Back:   Symmetric   Lungs:   Symmetric, non-labored breathing pattern, No audible wheezing or stridor. Decreased diaphragm excursion by palpation, No SCM use   Chest wall:  No tenderness. Heart:  Regular rate and rhythm by palpation   Abdomen:   Soft, non-tender. No masses,  No organomegaly. Extremities: Extremities normal, atraumatic, no cyanosis or edema. Pulses: 2+ and symmetric all extremities.    Skin: Skin color, texture, turgor normal. No rashes or lesions   Lymph nodes:       Cervical, supraclavicular nodes are unremarkable   Neurologic: Grossly nonfocal         Labs: Results:   Chemistry Recent Labs     12/14/20 0153 12/13/20  0021 12/12/20  0334   GLU 96 147* 147*    138 141   K 3.6 3.6 3.9    107 108   CO2 23 24 26   BUN 25* 34* 28*   CREA 0.92 1.07 1.10   CA 7.8* 7.7* 8.5   AGAP 9 7 7   BUCR 27* 32* 25*   AP 45 42* 50   TP 6.1* 5.9* 6.8   ALB 3.0* 2.9* 3.3*   GLOB 3.1 3.0 3.5   AGRAT 1.0 1.0 0.9      CBC w/Diff Recent Labs     12/14/20 0153 12/13/20  0021 12/12/20  0334   WBC 9.0 6.0 4.1*   RBC 4.76 4.45 4.84   HGB 13.9 13.0 14.3   HCT 42.1 40.2 43.6    149 137   GRANS 73 78* 62   LYMPH 7* 16* 26   EOS 0 0 0      Microbiology Recent Labs     12/11/20  1130   CULT MRSA NOT PRESENT      Screening of patient nares for MRSA is for surveillance purposes and, if positive, to facilitate isolation considerations in high risk settings. It is not intended for automatic decolonization interventions per se as regimens are not sufficiently effective to warrant routine use. RADIOLOGY:    All available imaging studies/reports in [facility] for this admission were reviewed    Total time spent >35 minutes. High complexity decision making was performed during the evaluation of this patient at high risk for decompensation with multiple organ involvement     Above mentioned total time spent on reviewing the case/medical record/data/notes/EMR/patient examination/documentation/coordinating care with nurse/consultants, exclusive of procedures with complex decision making performed and > 50% time spent in face to face evaluation.     Dr. Lynette Montes De Oca, Infectious Disease Specialist  [phone]  December 14, 2020  9:03 AM

## 2020-12-14 NOTE — ROUTINE PROCESS
Verbal shift change report given to Sheryl Maurer RN (oncoming nurse) by Guido Omalley RN (offgoing nurse). Report included the following information SBAR, Kardex, Intake/Output, Recent Results, Cardiac Rhythm (NSR) and Alarm Parameters .

## 2020-12-14 NOTE — PROGRESS NOTES
Bedside shift change report given to Odalys (oncoming nurse) by Raghav Dickerson (offgoing nurse). Report included the following information SBAR, Kardex, Intake/Output and MAR.

## 2020-12-14 NOTE — PROGRESS NOTES
Problem: Mobility Impaired (Adult and Pediatric)  Goal: *Acute Goals and Plan of Care (Insert Text)  Description: Physical Therapy Goals  Initiated 12/11/2020 and to be accomplished within 7 day(s)  1. Patient will move from supine to sit and sit to supine , scoot up and down, and roll side to side in bed with modified independence. 2.  Patient will transfer from bed to chair and chair to bed with modified independence using the least restrictive device. 3.  Patient will perform sit to stand with modified independence. 4.  Patient will ambulate with modified independence for 100 feet with the least restrictive device. 5.  Patient will ascend/descend 3 stairs with 0 handrail(s) with supervision/set-up. (may have to simulate with box step due to droplet plus precautions)    PLOF: Pt reports living in 1 story house alone with family around but unable to give her 24 hour care if needed. Pt reporting she does not want to go to Rehab. Pt reports no AD and independent in all mobility, still driving. Outcome: Progressing Towards Goal    PHYSICAL THERAPY TREATMENT    Patient: Niya Isabel (10 y.o. female)  Date: 12/14/2020  Diagnosis: Suspected COVID-19 virus infection [Z20.828]  CAP (community acquired pneumonia) [J18.9]   <principal problem not specified>       Precautions: Fall, Other (comment)(droplet plus)      ASSESSMENT:  Upon entering room, nursing assisting patient to transfer to [facility]. Pt is on 6 L NC and SPO2 83-88% throughout functional mobility. Frequent verbal cues throughout session provided for pursed lip breathing to improve oxygenation with activity. She performs toileting with supervision and pericare in standing with stand by/ contact guard assistance. Pt transfers back to bed taking 2-3 steps with contact guard assistance for stability. Assisted patient to veronika disposable underwear and gown after ~4 minute seated rest break.  Patient recovers to highest 87-88% at EOB with pursed lip breathing. She returns to supine and SPO2 improves to 90-93% within 5 minutes. Patient left resting in bed with call bell within reach and needs addressed. Nurse made aware of patients performance. Progression toward goals:   []      Improving appropriately and progressing toward goals  [x]      Improving slowly and progressing toward goals  []      Not making progress toward goals and plan of care will be adjusted     PLAN:  Patient continues to benefit from skilled intervention to address the above impairments. Continue treatment per established plan of care. Discharge Recommendations:  home health with increased family support  Further Equipment Recommendations for Discharge:  bedside commode, shower chair, and rolling walker     SUBJECTIVE:   Patient stated  I am doing a little worse today.     OBJECTIVE DATA SUMMARY:   Critical Behavior:  Neurologic State: Alert  Orientation Level: Oriented X4  Cognition: Follows commands  Safety/Judgement: Awareness of environment  Functional Mobility Training:      Transfers:  Sit to Stand: Stand-by assistance;Contact guard assistance  Stand to Sit: Contact guard assistance  Bed to Chair: Contact guard assistance          Balance:  Sitting: Intact  Standing: Impaired; Without support  Standing - Static: Fair((+))  Standing - Dynamic : Fair         Ambulation/Gait Training:  Distance (ft): 3 Feet (ft)  Ambulation - Level of Assistance: Contact guard assistance  Gait Abnormalities: Decreased step clearance          Therapeutic Exercises:   Educated to perform HEP: ankle pumps, heel slides, SLR 10x/hour    Pain:  Pain level pre-treatment: 0/10  Pain level post-treatment: 0/10   Pain Intervention(s): Medication (see MAR); Rest, Ice, Repositioning   Response to intervention: Nurse notified, See doc flow    Activity Tolerance:   fair  Please refer to the flowsheet for vital signs taken during this treatment.   After treatment:   [] Patient left in no apparent distress sitting up in chair  [x] Patient left in no apparent distress in bed  [x] Call bell left within reach  [x] Nursing notified  [] Caregiver present  [] Bed alarm activated  [] SCDs applied      COMMUNICATION/EDUCATION:   []         Role of Physical Therapy in the acute care setting. [x]         Fall prevention education was provided and the patient/caregiver indicated understanding. [x]         Patient/family have participated as able in working toward goals and plan of care. []         Patient/family agree to work toward stated goals and plan of care. []         Patient understands intent and goals of therapy, but is neutral about his/her participation.   []         Patient is unable to participate in stated goals/plan of care: ongoing with therapy staff.  []         Other:        Misty James, PT   Time Calculation: 38 mins

## 2020-12-14 NOTE — PROGRESS NOTES
Progress Note    Patient: Mery Durbin MRN: [id number]  CSN: [id number]    YOB: 1935  Age: 82 y.o. Sex: female    DOA: 12/10/2020 LOS:  LOS: 4 days             Subjective:     Pt stated she's not well today, nurse had to increase her oxygen. She thinks its because she didn't get a good nights rest last night, had regular coffee instead of decaf. Also c/o cough and trouble expectorating, requesting Mucinex. Chief Complaint:   Chief Complaint   Patient presents with    Chills    Generalized Body Aches         Assessment/Plan     1. Covid-19   - ID and pulmonary following, appreciate assistance  - Remdesivir last dose on 12/15  - Convalescent plasma 12/11  - Symbicort, spiriva Respimat  - Vit C, D, Zinc  - monitor daily inflammatory markers  - droplet plus isolation    2. Pneumonia RLL  - Augmentin BID (12/14)  - pulmonary following  - add Mucinex BID, incentive spirometer, cough and deep breathe  - -blood cultures NGTD x 2, MRSA swab NEG    3. Acute on chronic hypoxic respiratory failure  - wears 2-3L NC at home  - required vapotherm HFNC, able to be weaned back to [facility]  - however today required increase in NC to 6L    4. Chronic diastolic heart failure  - no evidence of acute exacerbation  - continue to monitor    5. Moderate pulmonary hypertension  - f/u pulmonary outpatient    6. COPD / Emphysema  - pulmonary following  - continue Spiriva Respimat, Symbicort    7. CAD  - continue home meds    8. Hypertension  - stable, continue current regimen    9. Hyperlipidemia  - continue statin    10. New 7 mm pulmonary nodule HIRAL  - f/u with pulmonary Dr. April Grimes outpatient for repeat CT chest in 3 months (March 2021)    11. Hx anxiety  - continue Xanax, caution w/ overuse in elderly    12. Advanced age    15.  Disposition  - plan for d/c tomorrow w/ home health pending pt's clinical progress      Diet: Cardiac  FULL code  Contact: Sister Raquel Bower [phone] /[phone] - no answer either number                 Patrick Calhoun [phone] no answer, VM left      Case discussed with:  [x]Patient  [x]Family  [x]Nursing  [x]Case Management  DVT Prophylaxis:  [x]Lovenox  []Hep SQ  []SCDs  []Coumadin   []On Heparin gtt      Duane Gall, NP-C  [facility]  pager [phone]      Objective:     Physical Exam:  Visit Vitals  BP (!) 154/61 (BP 1 Location: Right arm, BP Patient Position: At rest)   Pulse 78   Temp 97.2 °F (36.2 °C)   Resp 19   Ht 5' 3\" (1.6 m)   Wt 58.1 kg (128 lb)   SpO2 92%   Breastfeeding No   BMI 22.67 kg/m²        General:         Alert, cooperative, no acute distress    HEENT: NC, Atraumatic. PERRLA, anicteric sclerae. Lungs: Conversational dypsnea. CTA Bilaterally. No Wheezing/Rhonchi/Rales. Heart:  Regular  rhythm,  No murmur, No Rubs, No Gallops  Abdomen: Soft, Non distended, Non tender. +Bowel sounds, no HSM  Extremities: No c/c/e  Psych:   Good insight. Not anxious or agitated. Neurologic:   Alert and oriented X 3. No acute neurological deficits. AGUILAR. Intake and Output:  Current Shift:  12/14 0701 - 12/14 1900  In: 300 [I.V.:300]  Out: -   Last three shifts:  12/12 1901 - 12/14 0700  In: 1200 [I.V.:1200]  Out: -     Labs: Results:       Chemistry Recent Labs     12/14/20  0153 12/13/20  0021 12/12/20  0334   GLU 96 147* 147*    138 141   K 3.6 3.6 3.9    107 108   CO2 23 24 26   BUN 25* 34* 28*   CREA 0.92 1.07 1.10   CA 7.8* 7.7* 8.5   AGAP 9 7 7   BUCR 27* 32* 25*   AP 45 42* 50   TP 6.1* 5.9* 6.8   ALB 3.0* 2.9* 3.3*   GLOB 3.1 3.0 3.5   AGRAT 1.0 1.0 0.9      CBC w/Diff Recent Labs     12/14/20  0153 12/13/20  0021 12/12/20  0334   WBC 9.0 6.0 4.1*   RBC 4.76 4.45 4.84   HGB 13.9 13.0 14.3   HCT 42.1 40.2 43.6    149 137   GRANS 73 78* 62   LYMPH 7* 16* 26   EOS 0 0 0      Cardiac Enzymes No results for input(s): CPK, CKND1, WALESKA in the last 72 hours.     No lab exists for component: CKRMB, TROIP   Coagulation Recent Labs 12/14/20 0153 12/13/20  0021   PTP 13.1 13.2   INR 1.0 1.0   APTT 33.7 35.7       Lipid Panel Lab Results   Component Value Date/Time    Cholesterol, total 153 07/06/2020 10:40 AM    HDL Cholesterol 44 07/06/2020 10:40 AM    LDL, calculated 77.6 07/06/2020 10:40 AM    VLDL, calculated 31.4 07/06/2020 10:40 AM    Triglyceride 157 (H) 07/06/2020 10:40 AM    CHOL/HDL Ratio 3.5 07/06/2020 10:40 AM      BNP No results for input(s): BNPP in the last 72 hours.    Liver Enzymes Recent Labs     12/14/20 0153   TP 6.1*   ALB 3.0*   AP 45      Thyroid Studies Lab Results   Component Value Date/Time    TSH 1.30 07/06/2020 10:40 AM          Procedures/imaging: see electronic medical records for all procedures/Xrays and details which were not copied into this note but were reviewed prior to creation of Plan    Medications:   Current Facility-Administered Medications   Medication Dose Route Frequency    amoxicillin-clavulanate (AUGMENTIN) 875-125 mg per tablet 1 Tab  1 Tab Oral Q12H    guaiFENesin ER (MUCINEX) tablet 600 mg  600 mg Oral Q12H    benzocaine-menthoL (CEPACOL) lozenge 1 Lozenge  1 Lozenge Mucous Membrane PRN    lactobacillus sp. 50 billion cpu (BIO-K PLUS) capsule 1 Cap  1 Cap Oral DAILY    sodium chloride (NS) flush 5-40 mL  5-40 mL IntraVENous Q8H    sodium chloride (NS) flush 5-40 mL  5-40 mL IntraVENous PRN    acetaminophen (TYLENOL) tablet 650 mg  650 mg Oral Q6H PRN    Or    acetaminophen (TYLENOL) suppository 650 mg  650 mg Rectal Q6H PRN    polyethylene glycol (MIRALAX) packet 17 g  17 g Oral DAILY PRN    promethazine (PHENERGAN) tablet 12.5 mg  12.5 mg Oral Q6H PRN    Or    ondansetron (ZOFRAN) injection 4 mg  4 mg IntraVENous Q6H PRN    enoxaparin (LOVENOX) injection 40 mg  40 mg SubCUTAneous DAILY    famotidine (PEPCID) tablet 20 mg  20 mg Oral DAILY    insulin lispro (HUMALOG) injection   SubCUTAneous AC&HS    glucose chewable tablet 16 g  4 Tab Oral PRN    glucagon (GLUCAGEN) injection 1 mg  1 mg IntraMUSCular PRN    dextrose (D50W) injection syrg 12.5-25 g  25-50 mL IntraVENous PRN    guaiFENesin-dextromethorphan (ROBITUSSIN DM) 100-10 mg/5 mL syrup 5 mL  5 mL Oral Q4H PRN    cholecalciferol (VITAMIN D3) (1000 Units /25 mcg) tablet 6 Tab  6,000 Units Oral DAILY    ascorbic acid (vitamin C) (VITAMIN C) tablet 500 mg  500 mg Oral BID    zinc sulfate (ZINCATE) 220 (50) mg capsule 1 Cap  1 Cap Oral DAILY    albuterol (PROVENTIL HFA, VENTOLIN HFA, PROAIR HFA) inhaler 2 Puff  2 Puff Inhalation Q4H PRN    ALPRAZolam (XANAX) tablet 0.5 mg  0.5 mg Oral QHS    aspirin delayed-release tablet 81 mg  81 mg Oral DAILY    atorvastatin (LIPITOR) tablet 20 mg  20 mg Oral DAILY    losartan (COZAAR) tablet 50 mg  50 mg Oral DAILY    metoprolol tartrate (LOPRESSOR) tablet 25 mg  25 mg Oral BID    therapeutic multivitamin (THERAGRAN) tablet 1 Tab  1 Tab Oral DAILY    NIFEdipine ER (PROCARDIA XL) tablet 60 mg  60 mg Oral DAILY    budesonide-formoterol (SYMBICORT) 80-4.5 mcg inhaler  2 Puff Inhalation BID RT    And    tiotropium bromide (SPIRIVA RESPIMAT) 2.5 mcg /actuation  2 Puff Inhalation DAILY    remdesivir 100 mg in 0.9% sodium chloride 250 mL IVPB  100 mg IntraVENous Q24H    0.9% sodium chloride infusion 250 mL  250 mL IntraVENous PRN    chlorhexidine (PERIDEX) 0.12 % mouthwash 15 mL  15 mL Oral Q12H    sodium chloride (NS) flush 5-10 mL  5-10 mL IntraVENous PRN

## 2020-12-14 NOTE — PROGRESS NOTES
Chart reviewed. MD note says possible discharge tomorrow but per Qing Pinedo PT, pt is still so weak and SOB that she is not nearly ready for discharge tomorrow. Per patient, she does not want a rolling walker but would like a rollator. Spoke with Qing Pinedo PT about this; she will evaluate her for the rollator at an upcoming session. Plan remains for home with [facility] when ready. Will need DME (rolling walker/rollator, shower chair & BSC vs. 3-in-1 chair and grab bars delivered to home. Received call from son, Blas Austin. He does not want pt to go to a rehab; states he and his two siblings will be planning to take turns staying with her after discharge until she is able to stay on her own again. He is going to look into getting some of the DME on his own; will let us know what he needs us to order for her prior to discharge. He states she has a home concentrator already.   Dionicio Slaughter RN - Outcomes Manager  [phone]

## 2020-12-14 NOTE — HOME CARE
Received  referral for SN,PT,OT; verified demographics ,explained [facility] services and answered all questions; patient states she has [employer] oxygen and portable at [facility] and shower chair; pt states she lives alone but her son José Miguel Priest) and other family will be assisting her at home ; [facility] will follow. [address].

## 2020-12-14 NOTE — PROGRESS NOTES
Problem: Falls - Risk of  Goal: *Absence of Falls  Description: Document Ivin Salter Fall Risk and appropriate interventions in the flowsheet.   Outcome: Progressing Towards Goal  Note: Fall Risk Interventions:            Medication Interventions: Patient to call before getting OOB, Teach patient to arise slowly    Elimination Interventions: Call light in reach, Toilet paper/wipes in reach, Toileting schedule/hourly rounds, Patient to call for help with toileting needs              Problem: Patient Education: Go to Patient Education Activity  Goal: Patient/Family Education  Outcome: Progressing Towards Goal     Problem: Patient Education: Go to Patient Education Activity  Goal: Patient/Family Education  Outcome: Progressing Towards Goal     Problem: Patient Education: Go to Patient Education Activity  Goal: Patient/Family Education  Outcome: Progressing Towards Goal

## 2020-12-15 ENCOUNTER — APPOINTMENT (OUTPATIENT)
Dept: GENERAL RADIOLOGY | Age: 85
DRG: 177 | End: 2020-12-15
Attending: NURSE PRACTITIONER
Payer: MEDICARE

## 2020-12-15 LAB
ALBUMIN SERPL-MCNC: 2.7 G/DL (ref 3.4–5)
ALBUMIN/GLOB SERPL: 1 {RATIO} (ref 0.8–1.7)
ALP SERPL-CCNC: 39 U/L (ref 45–117)
ALT SERPL-CCNC: 25 U/L (ref 13–56)
ANION GAP SERPL CALC-SCNC: 7 MMOL/L (ref 3–18)
APTT PPP: 35.2 SEC (ref 23–36.4)
AST SERPL-CCNC: 17 U/L (ref 10–38)
BASOPHILS # BLD: 0 K/UL (ref 0–0.06)
BASOPHILS NFR BLD: 0 % (ref 0–3)
BILIRUB SERPL-MCNC: 0.3 MG/DL (ref 0.2–1)
BUN SERPL-MCNC: 15 MG/DL (ref 7–18)
BUN/CREAT SERPL: 18 (ref 12–20)
CALCIUM SERPL-MCNC: 7.4 MG/DL (ref 8.5–10.1)
CHLORIDE SERPL-SCNC: 113 MMOL/L (ref 100–111)
CO2 SERPL-SCNC: 27 MMOL/L (ref 21–32)
CREAT SERPL-MCNC: 0.82 MG/DL (ref 0.6–1.3)
CRP SERPL-MCNC: 1.2 MG/DL (ref 0–0.3)
D DIMER PPP FEU-MCNC: 0.51 UG/ML(FEU)
DIFFERENTIAL METHOD BLD: ABNORMAL
EOSINOPHIL # BLD: 0 K/UL (ref 0–0.4)
EOSINOPHIL NFR BLD: 0 % (ref 0–5)
ERYTHROCYTE [DISTWIDTH] IN BLOOD BY AUTOMATED COUNT: 14.3 % (ref 11.6–14.5)
EST. AVERAGE GLUCOSE BLD GHB EST-MCNC: 131 MG/DL
FERRITIN SERPL-MCNC: 165 NG/ML (ref 8–388)
FIBRINOGEN PPP-MCNC: 349 MG/DL (ref 210–451)
GLOBULIN SER CALC-MCNC: 2.7 G/DL (ref 2–4)
GLUCOSE BLD STRIP.AUTO-MCNC: 103 MG/DL (ref 70–110)
GLUCOSE BLD STRIP.AUTO-MCNC: 115 MG/DL (ref 70–110)
GLUCOSE BLD STRIP.AUTO-MCNC: 160 MG/DL (ref 70–110)
GLUCOSE BLD STRIP.AUTO-MCNC: 78 MG/DL (ref 70–110)
GLUCOSE SERPL-MCNC: 90 MG/DL (ref 74–99)
HBA1C MFR BLD: 6.2 % (ref 4.2–5.6)
HCT VFR BLD AUTO: 40 % (ref 35–45)
HGB BLD-MCNC: 13.1 G/DL (ref 12–16)
INR PPP: 1.1 (ref 0.8–1.2)
LDH SERPL L TO P-CCNC: 219 U/L (ref 81–234)
LYMPHOCYTES # BLD: 1.5 K/UL (ref 0.8–3.5)
LYMPHOCYTES NFR BLD: 16 % (ref 20–51)
MAGNESIUM SERPL-MCNC: 2.3 MG/DL (ref 1.6–2.6)
MCH RBC QN AUTO: 29.3 PG (ref 24–34)
MCHC RBC AUTO-ENTMCNC: 32.8 G/DL (ref 31–37)
MCV RBC AUTO: 89.5 FL (ref 74–97)
MONOCYTES # BLD: 1.2 K/UL (ref 0–1)
MONOCYTES NFR BLD: 13 % (ref 2–9)
NEUTS BAND NFR BLD MANUAL: 1 % (ref 0–5)
NEUTS SEG # BLD: 6.4 K/UL (ref 1.8–8)
NEUTS SEG NFR BLD: 66 % (ref 42–75)
OTHER CELLS NFR BLD MANUAL: 4 %
PLATELET # BLD AUTO: 169 K/UL (ref 135–420)
PLATELET COMMENTS,PCOM: ABNORMAL
PMV BLD AUTO: 10.2 FL (ref 9.2–11.8)
POTASSIUM SERPL-SCNC: 3.3 MMOL/L (ref 3.5–5.5)
PROCALCITONIN SERPL-MCNC: <0.05 NG/ML
PROT SERPL-MCNC: 5.4 G/DL (ref 6.4–8.2)
PROTHROMBIN TIME: 14.5 SEC (ref 11.5–15.2)
RBC # BLD AUTO: 4.47 M/UL (ref 4.2–5.3)
RBC MORPH BLD: ABNORMAL
SODIUM SERPL-SCNC: 147 MMOL/L (ref 136–145)
WBC # BLD AUTO: 9.6 K/UL (ref 4.6–13.2)

## 2020-12-15 PROCEDURE — 85379 FIBRIN DEGRADATION QUANT: CPT

## 2020-12-15 PROCEDURE — 74011000250 HC RX REV CODE- 250: Performed by: INTERNAL MEDICINE

## 2020-12-15 PROCEDURE — 99233 SBSQ HOSP IP/OBS HIGH 50: CPT | Performed by: INTERNAL MEDICINE

## 2020-12-15 PROCEDURE — 2709999900 HC NON-CHARGEABLE SUPPLY

## 2020-12-15 PROCEDURE — 80053 COMPREHEN METABOLIC PANEL: CPT

## 2020-12-15 PROCEDURE — 36415 COLL VENOUS BLD VENIPUNCTURE: CPT

## 2020-12-15 PROCEDURE — 86140 C-REACTIVE PROTEIN: CPT

## 2020-12-15 PROCEDURE — 97535 SELF CARE MNGMENT TRAINING: CPT

## 2020-12-15 PROCEDURE — 83615 LACTATE (LD) (LDH) ENZYME: CPT

## 2020-12-15 PROCEDURE — 85610 PROTHROMBIN TIME: CPT

## 2020-12-15 PROCEDURE — 71045 X-RAY EXAM CHEST 1 VIEW: CPT

## 2020-12-15 PROCEDURE — 65660000000 HC RM CCU STEPDOWN

## 2020-12-15 PROCEDURE — 74011000258 HC RX REV CODE- 258: Performed by: INTERNAL MEDICINE

## 2020-12-15 PROCEDURE — 74011250637 HC RX REV CODE- 250/637: Performed by: NURSE PRACTITIONER

## 2020-12-15 PROCEDURE — 82962 GLUCOSE BLOOD TEST: CPT

## 2020-12-15 PROCEDURE — 94640 AIRWAY INHALATION TREATMENT: CPT

## 2020-12-15 PROCEDURE — 82728 ASSAY OF FERRITIN: CPT

## 2020-12-15 PROCEDURE — 74011250636 HC RX REV CODE- 250/636: Performed by: NURSE PRACTITIONER

## 2020-12-15 PROCEDURE — 85025 COMPLETE CBC W/AUTO DIFF WBC: CPT

## 2020-12-15 PROCEDURE — 84145 PROCALCITONIN (PCT): CPT

## 2020-12-15 PROCEDURE — 83735 ASSAY OF MAGNESIUM: CPT

## 2020-12-15 PROCEDURE — 97530 THERAPEUTIC ACTIVITIES: CPT

## 2020-12-15 PROCEDURE — 74011250637 HC RX REV CODE- 250/637: Performed by: INTERNAL MEDICINE

## 2020-12-15 PROCEDURE — 74011636637 HC RX REV CODE- 636/637: Performed by: HOSPITALIST

## 2020-12-15 PROCEDURE — 85730 THROMBOPLASTIN TIME PARTIAL: CPT

## 2020-12-15 PROCEDURE — 83036 HEMOGLOBIN GLYCOSYLATED A1C: CPT

## 2020-12-15 PROCEDURE — 99233 SBSQ HOSP IP/OBS HIGH 50: CPT | Performed by: HOSPITALIST

## 2020-12-15 PROCEDURE — 85384 FIBRINOGEN ACTIVITY: CPT

## 2020-12-15 RX ORDER — DEXAMETHASONE SODIUM PHOSPHATE 4 MG/ML
6 INJECTION, SOLUTION INTRA-ARTICULAR; INTRALESIONAL; INTRAMUSCULAR; INTRAVENOUS; SOFT TISSUE EVERY 12 HOURS
Status: DISCONTINUED | OUTPATIENT
Start: 2020-12-15 | End: 2020-12-15

## 2020-12-15 RX ORDER — DEXAMETHASONE SODIUM PHOSPHATE 4 MG/ML
6 INJECTION, SOLUTION INTRA-ARTICULAR; INTRALESIONAL; INTRAMUSCULAR; INTRAVENOUS; SOFT TISSUE EVERY 12 HOURS
Status: DISCONTINUED | OUTPATIENT
Start: 2020-12-15 | End: 2020-12-18

## 2020-12-15 RX ADMIN — ACETAMINOPHEN 650 MG: 325 TABLET ORAL at 18:09

## 2020-12-15 RX ADMIN — BUDESONIDE AND FORMOTEROL FUMARATE DIHYDRATE 2 PUFF: 80; 4.5 AEROSOL RESPIRATORY (INHALATION) at 20:03

## 2020-12-15 RX ADMIN — ZINC SULFATE 220 MG (50 MG) CAPSULE 1 CAPSULE: CAPSULE at 08:10

## 2020-12-15 RX ADMIN — METOPROLOL TARTRATE 25 MG: 25 TABLET, FILM COATED ORAL at 08:10

## 2020-12-15 RX ADMIN — GUAIFENESIN 600 MG: 600 TABLET, EXTENDED RELEASE ORAL at 21:04

## 2020-12-15 RX ADMIN — AMOXICILLIN AND CLAVULANATE POTASSIUM 1 TABLET: 875; 125 TABLET, FILM COATED ORAL at 08:10

## 2020-12-15 RX ADMIN — POTASSIUM BICARBONATE 40 MEQ: 782 TABLET, EFFERVESCENT ORAL at 12:10

## 2020-12-15 RX ADMIN — IPRATROPIUM BROMIDE AND ALBUTEROL SULFATE 3 ML: .5; 3 SOLUTION RESPIRATORY (INHALATION) at 12:00

## 2020-12-15 RX ADMIN — Medication 10 ML: at 05:40

## 2020-12-15 RX ADMIN — DEXAMETHASONE SODIUM PHOSPHATE 6 MG: 4 INJECTION, SOLUTION INTRAMUSCULAR; INTRAVENOUS at 17:18

## 2020-12-15 RX ADMIN — REMDESIVIR 100 MG: 100 INJECTION, POWDER, LYOPHILIZED, FOR SOLUTION INTRAVENOUS at 21:04

## 2020-12-15 RX ADMIN — FAMOTIDINE 20 MG: 20 TABLET, FILM COATED ORAL at 08:09

## 2020-12-15 RX ADMIN — Medication 10 ML: at 22:00

## 2020-12-15 RX ADMIN — NIFEDIPINE 60 MG: 60 TABLET, FILM COATED, EXTENDED RELEASE ORAL at 08:09

## 2020-12-15 RX ADMIN — ENOXAPARIN SODIUM 40 MG: 40 INJECTION SUBCUTANEOUS at 08:10

## 2020-12-15 RX ADMIN — CHLORHEXIDINE GLUCONATE 0.12% ORAL RINSE 15 ML: 1.2 LIQUID ORAL at 08:09

## 2020-12-15 RX ADMIN — Medication 500 MG: at 08:09

## 2020-12-15 RX ADMIN — IPRATROPIUM BROMIDE AND ALBUTEROL 2 PUFF: 20; 100 SPRAY, METERED RESPIRATORY (INHALATION) at 20:03

## 2020-12-15 RX ADMIN — ACETAMINOPHEN 650 MG: 325 TABLET ORAL at 23:40

## 2020-12-15 RX ADMIN — IPRATROPIUM BROMIDE AND ALBUTEROL SULFATE 3 ML: .5; 3 SOLUTION RESPIRATORY (INHALATION) at 03:34

## 2020-12-15 RX ADMIN — CHLORHEXIDINE GLUCONATE 0.12% ORAL RINSE 15 ML: 1.2 LIQUID ORAL at 21:04

## 2020-12-15 RX ADMIN — THERA TABS 1 TABLET: TAB at 08:09

## 2020-12-15 RX ADMIN — Medication 500 MG: at 21:04

## 2020-12-15 RX ADMIN — Medication 1 CAPSULE: at 08:10

## 2020-12-15 RX ADMIN — ATORVASTATIN CALCIUM 20 MG: 20 TABLET, FILM COATED ORAL at 08:09

## 2020-12-15 RX ADMIN — LOSARTAN POTASSIUM 50 MG: 50 TABLET, FILM COATED ORAL at 08:10

## 2020-12-15 RX ADMIN — ALPRAZOLAM 0.5 MG: 0.5 TABLET ORAL at 21:04

## 2020-12-15 RX ADMIN — METOPROLOL TARTRATE 25 MG: 25 TABLET, FILM COATED ORAL at 21:04

## 2020-12-15 RX ADMIN — Medication 6 TABLET: at 08:09

## 2020-12-15 RX ADMIN — BUDESONIDE AND FORMOTEROL FUMARATE DIHYDRATE 2 PUFF: 80; 4.5 AEROSOL RESPIRATORY (INHALATION) at 08:00

## 2020-12-15 RX ADMIN — AMOXICILLIN AND CLAVULANATE POTASSIUM 1 TABLET: 875; 125 TABLET, FILM COATED ORAL at 21:04

## 2020-12-15 RX ADMIN — INSULIN LISPRO 3 UNITS: 100 INJECTION, SOLUTION INTRAVENOUS; SUBCUTANEOUS at 22:00

## 2020-12-15 RX ADMIN — IPRATROPIUM BROMIDE AND ALBUTEROL SULFATE 3 ML: .5; 3 SOLUTION RESPIRATORY (INHALATION) at 08:09

## 2020-12-15 RX ADMIN — GUAIFENESIN 600 MG: 600 TABLET, EXTENDED RELEASE ORAL at 08:09

## 2020-12-15 RX ADMIN — Medication 81 MG: at 08:09

## 2020-12-15 RX ADMIN — IPRATROPIUM BROMIDE AND ALBUTEROL 2 PUFF: 20; 100 SPRAY, METERED RESPIRATORY (INHALATION) at 18:09

## 2020-12-15 RX ADMIN — Medication 10 ML: at 14:00

## 2020-12-15 RX ADMIN — Medication 1 CAPSULE: at 21:04

## 2020-12-15 RX ADMIN — BENZOCAINE AND MENTHOL 1 LOZENGE: 15; 3.6 LOZENGE ORAL at 12:09

## 2020-12-15 RX ADMIN — POTASSIUM BICARBONATE 40 MEQ: 782 TABLET, EFFERVESCENT ORAL at 08:09

## 2020-12-15 NOTE — PROGRESS NOTES
[facility]  Pulmonary, Critical Care, and Sleep Medicine    Pulmonary Medicine Progress Note    Name: Joaquin Oh MRN: [id number]  : 1935 Hospital: [address] [place] [address]   Date: 12/15/2020       Subjective:  Pt is improving, but not at baseline. Remains on 6L, sats 90-92% while I was in the room. Still with occasional cough. Patient Active Problem List   Diagnosis Code    Occlusion and stenosis of carotid artery I65.29    Carotid stenosis I65.29    BURGESS (dyspnea on exertion) R06.00    Hyperlipidemia E78.5    Essential hypertension with goal blood pressure less than 140/90 I10    Chronic diastolic congestive heart failure (HCC) I50.32    Chronic obstructive pulmonary disease (HCC) J44.9    Pulmonary HTN (HCC) I27.20    CAP (community acquired pneumonia) J18.9    Suspected COVID-19 virus infection Z20.828     Assessment:  · Respiratory Failure: Acute on Chronic- Hypoxic- on 2-3L at home  · COVID-19 Pneumonia  · Emphysema/COPD- Pulmonary - [facility]: Dr. Santo Shine  · Pulmonary Hypertension  · HFpEF- Chronic  · Hypertension    Impression/Plan:  - Wean O2 as tolerated  - Will add nebulizers to be administered by the patient to herself  - Continue symbicort and spiriva  - Finishing up remdesivir, continue decadron  - PT/OT- would prefer walking trial to see how she tolerates prior to d/C    Possible D/C tomorrow    FiO2 to keep SpO2 >=92%, HOB >=30 degree, aspiration precautions, aggressive pulmonary toileting, incentive spirometry. Other issues management by primary team and respective consultants. Events and notes from last 24 hours reviewed. Discussed with patient and family, answered all questions to their satisfaction. Care plan discussed with nursing.      Labs and images personally seen and available reports reviewed  All current medicines are reviewed       Medications- Current:  Current Facility-Administered Medications   Medication Dose Route Frequency    amoxicillin-clavulanate (AUGMENTIN) 875-125 mg per tablet 1 Tab  1 Tab Oral Q12H    guaiFENesin ER (MUCINEX) tablet 600 mg  600 mg Oral Q12H    albuterol-ipratropium (DUO-NEB) 2.5 MG-0.5 MG/3 ML  3 mL Nebulization Q4H RT    benzocaine-menthoL (CEPACOL) lozenge 1 Lozenge  1 Lozenge Mucous Membrane PRN    lactobacillus sp. 50 billion cpu (BIO-K PLUS) capsule 1 Cap  1 Cap Oral DAILY    sodium chloride (NS) flush 5-40 mL  5-40 mL IntraVENous Q8H    sodium chloride (NS) flush 5-40 mL  5-40 mL IntraVENous PRN    acetaminophen (TYLENOL) tablet 650 mg  650 mg Oral Q6H PRN    Or    acetaminophen (TYLENOL) suppository 650 mg  650 mg Rectal Q6H PRN    polyethylene glycol (MIRALAX) packet 17 g  17 g Oral DAILY PRN    promethazine (PHENERGAN) tablet 12.5 mg  12.5 mg Oral Q6H PRN    Or    ondansetron (ZOFRAN) injection 4 mg  4 mg IntraVENous Q6H PRN    enoxaparin (LOVENOX) injection 40 mg  40 mg SubCUTAneous DAILY    famotidine (PEPCID) tablet 20 mg  20 mg Oral DAILY    insulin lispro (HUMALOG) injection   SubCUTAneous AC&HS    glucose chewable tablet 16 g  4 Tab Oral PRN    glucagon (GLUCAGEN) injection 1 mg  1 mg IntraMUSCular PRN    dextrose (D50W) injection syrg 12.5-25 g  25-50 mL IntraVENous PRN    guaiFENesin-dextromethorphan (ROBITUSSIN DM) 100-10 mg/5 mL syrup 5 mL  5 mL Oral Q4H PRN    cholecalciferol (VITAMIN D3) (1000 Units /25 mcg) tablet 6 Tab  6,000 Units Oral DAILY    ascorbic acid (vitamin C) (VITAMIN C) tablet 500 mg  500 mg Oral BID    zinc sulfate (ZINCATE) 220 (50) mg capsule 1 Cap  1 Cap Oral DAILY    albuterol (PROVENTIL HFA, VENTOLIN HFA, PROAIR HFA) inhaler 2 Puff  2 Puff Inhalation Q4H PRN    ALPRAZolam (XANAX) tablet 0.5 mg  0.5 mg Oral QHS    aspirin delayed-release tablet 81 mg  81 mg Oral DAILY    atorvastatin (LIPITOR) tablet 20 mg  20 mg Oral DAILY    losartan (COZAAR) tablet 50 mg  50 mg Oral DAILY    metoprolol tartrate (LOPRESSOR) tablet 25 mg  25 mg Oral BID    therapeutic multivitamin (THERAGRAN) tablet 1 Tab  1 Tab Oral DAILY    NIFEdipine ER (PROCARDIA XL) tablet 60 mg  60 mg Oral DAILY    budesonide-formoterol (SYMBICORT) 80-4.5 mcg inhaler  2 Puff Inhalation BID RT    remdesivir 100 mg in 0.9% sodium chloride 250 mL IVPB  100 mg IntraVENous Q24H    0.9% sodium chloride infusion 250 mL  250 mL IntraVENous PRN    chlorhexidine (PERIDEX) 0.12 % mouthwash 15 mL  15 mL Oral Q12H    sodium chloride (NS) flush 5-10 mL  5-10 mL IntraVENous PRN       Objective:  Vital Signs:    Visit Vitals  BP (!) 116/48   Pulse 70   Temp 97 °F (36.1 °C)   Resp 20   Ht 5' 3\" (1.6 m)   Wt 58.1 kg (128 lb)   SpO2 95%   Breastfeeding No   BMI 22.67 kg/m²      O2 Device: Humidifier, Nasal cannula  O2 Flow Rate (L/min): 6 l/min  Temp (24hrs), Av.3 °F (36.3 °C), Min:97 °F (36.1 °C), Max:97.9 °F (36.6 °C)      Intake/Output:   Last shift:      No intake/output data recorded. Last 3 shifts:  0701 -  1900  In: 0086 [P.O.:790; I.V.:1500]  Out: -     Intake/Output Summary (Last 24 hours) at 12/15/2020 0038  Last data filed at 2020 1758  Gross per 24 hour   Intake 1090 ml   Output --   Net 1090 ml       Physical Exam:     General/Neurology: Alert, Awake  Head:   Normocephalic, without obvious abnormality  Eye:   PERRL, EOM intact  Oral:  Mucus membranes moist  Lung:   B/l air entry fair. Descreased BS, no wheezing. Heart:   S1 S2 present  Abdomen:  Soft, non tender, BS+nt   Extremities:  No pedal edema.    Skin:   Dry, intact  Data:      Recent Results (from the past 24 hour(s))   PROTHROMBIN TIME + INR    Collection Time: 20  1:53 AM   Result Value Ref Range    Prothrombin time 13.1 11.5 - 15.2 sec    INR 1.0 0.8 - 1.2     PTT    Collection Time: 20  1:53 AM   Result Value Ref Range    aPTT 33.7 23.0 - 36.4 SEC   FIBRINOGEN    Collection Time: 20  1:53 AM   Result Value Ref Range    Fibrinogen 362 210 - 451 mg/dL   CBC WITH AUTOMATED DIFF    Collection Time: 12/14/20  1:53 AM   Result Value Ref Range    WBC 9.0 4.6 - 13.2 K/uL    RBC 4.76 4.20 - 5.30 M/uL    HGB 13.9 12.0 - 16.0 g/dL    HCT 42.1 35.0 - 45.0 %    MCV 88.4 74.0 - 97.0 FL    MCH 29.2 24.0 - 34.0 PG    MCHC 33.0 31.0 - 37.0 g/dL    RDW 14.5 11.6 - 14.5 %    PLATELET 829 073 - 728 K/uL    MPV 10.1 9.2 - 11.8 FL    NEUTROPHILS 73 42 - 75 %    LYMPHOCYTES 7 (L) 20 - 51 %    MONOCYTES 19 (H) 2 - 9 %    EOSINOPHILS 0 0 - 5 %    BASOPHILS 0 0 - 3 %    OTHER CELL 1 (H) 0      ABS. NEUTROPHILS 6.6 1.8 - 8.0 K/UL    ABS. LYMPHOCYTES 0.6 (L) 0.8 - 3.5 K/UL    ABS. MONOCYTES 1.7 (H) 0 - 1.0 K/UL    ABS. EOSINOPHILS 0.0 0.0 - 0.4 K/UL    ABS. BASOPHILS 0.0 0.0 - 0.06 K/UL    DF MANUAL      PLATELET COMMENTS ADEQUATE PLATELETS      RBC COMMENTS NORMOCYTIC, NORMOCHROMIC      RBC COMMENTS ATYPICAL LYMPHOCYTES PRESENT  null       METABOLIC PANEL, COMPREHENSIVE    Collection Time: 12/14/20  1:53 AM   Result Value Ref Range    Sodium 143 136 - 145 mmol/L    Potassium 3.6 3.5 - 5.5 mmol/L    Chloride 111 100 - 111 mmol/L    CO2 23 21 - 32 mmol/L    Anion gap 9 3.0 - 18 mmol/L    Glucose 96 74 - 99 mg/dL    BUN 25 (H) 7.0 - 18 MG/DL    Creatinine 0.92 0.6 - 1.3 MG/DL    BUN/Creatinine ratio 27 (H) 12 - 20      GFR est AA >60 >60 ml/min/1.73m2    GFR est non-AA 58 (L) >60 ml/min/1.73m2    Calcium 7.8 (L) 8.5 - 10.1 MG/DL    Bilirubin, total 0.5 0.2 - 1.0 MG/DL    ALT (SGPT) 26 13 - 56 U/L    AST (SGOT) 20 10 - 38 U/L    Alk.  phosphatase 45 45 - 117 U/L    Protein, total 6.1 (L) 6.4 - 8.2 g/dL    Albumin 3.0 (L) 3.4 - 5.0 g/dL    Globulin 3.1 2.0 - 4.0 g/dL    A-G Ratio 1.0 0.8 - 1.7     LD    Collection Time: 12/14/20  1:53 AM   Result Value Ref Range     (H) 81 - 234 U/L   FERRITIN    Collection Time: 12/14/20  1:53 AM   Result Value Ref Range    Ferritin 177 8 - 388 NG/ML   C REACTIVE PROTEIN, QT    Collection Time: 12/14/20  1:53 AM   Result Value Ref Range    C-Reactive protein 0.4 (H) 0 - 0.3 mg/dL   D DIMER Collection Time: 12/14/20  1:53 AM   Result Value Ref Range    D DIMER 0.52 (H) <0.46 ug/ml(FEU)   PROCALCITONIN    Collection Time: 12/14/20  1:53 AM   Result Value Ref Range    Procalcitonin <0.05 ng/mL   GLUCOSE, POC    Collection Time: 12/14/20  5:42 AM   Result Value Ref Range    Glucose (POC) 93 70 - 110 mg/dL   GLUCOSE, POC    Collection Time: 12/14/20  7:54 AM   Result Value Ref Range    Glucose (POC) 86 70 - 110 mg/dL   GLUCOSE, POC    Collection Time: 12/14/20 12:00 PM   Result Value Ref Range    Glucose (POC) 79 70 - 110 mg/dL   GLUCOSE, POC    Collection Time: 12/14/20  1:51 PM   Result Value Ref Range    Glucose (POC) 134 (H) 70 - 110 mg/dL   GLUCOSE, POC    Collection Time: 12/14/20  3:51 PM   Result Value Ref Range    Glucose (POC) 84 70 - 110 mg/dL   GLUCOSE, POC    Collection Time: 12/14/20  9:54 PM   Result Value Ref Range    Glucose (POC) 152 (H) 70 - 110 mg/dL         Chemistry   Recent Labs     12/14/20  0153 12/13/20  0021 12/12/20  0334   GLU 96 147* 147*    138 141   K 3.6 3.6 3.9    107 108   CO2 23 24 26   BUN 25* 34* 28*   CREA 0.92 1.07 1.10   CA 7.8* 7.7* 8.5   AGAP 9 7 7   BUCR 27* 32* 25*   AP 45 42* 50   TP 6.1* 5.9* 6.8   ALB 3.0* 2.9* 3.3*   GLOB 3.1 3.0 3.5   AGRAT 1.0 1.0 0.9       CBC w/Diff   Recent Labs     12/14/20  0153 12/13/20  0021 12/12/20  0334   WBC 9.0 6.0 4.1*   RBC 4.76 4.45 4.84   HGB 13.9 13.0 14.3   HCT 42.1 40.2 43.6    149 137   GRANS 73 78* 62   LYMPH 7* 16* 26   EOS 0 0 0       ABG No results for input(s): PHI, PHI, POC2, PCO2I, PO2, PO2I, HCO3, HCO3I, FIO2, FIO2I in the last 72 hours. Micro  No results for input(s): SDES, CULT in the last 72 hours. No results for input(s): CULT in the last 72 hours. CT (Most Recent)   Results from Hospital Encounter encounter on 12/10/20   CTA CHEST W OR W WO CONT    Narrative CTA CHEST PULMONARY EMBOLISM PROTOCOL      INDICATION: Chills. Body aches. Symptoms since last night.  Covid-19 concern. TECHNIQUE: Thin collimation axial images obtained through the level of the  pulmonary arteries with additional imaging through the chest following the  uneventful administration of 65 cc Isovue-300 intravenous contrast.  Images  reconstructed into MIP coronal and sagittal projections for complete evaluation  of the tortuous and overlapping pulmonary vascular structures and to reduce  patient radiation dose. All CT scans are performed using dose optimization  techniques as appropriate to the performed exam including the following:  Automated exposure control, adjustment of mA and/or kV according to patient  size, and use of iterative reconstructive technique. COMPARISON: Chest CT 10/6/2020. FINDINGS:    No filling defects are appreciated within the main, left, right, lobar or  visualized segmental pulmonary arteries to suggest embolism. Motion artifact  limits peripheral, subsegmental branches. The thoracic aorta is not aneurysmal.   No evidence for dissection. Arterial wall calcifications. Biatrial cardiac  enlargement. No pericardial effusion. No pleural effusion. Precarinal 14 mm granulomatous  calcification containing lymph node is slightly larger. New additional  lymphadenopathy along bilateral mediastinum and subcarinal mediastinum. New 13  mm subcarinal lymph node. Left mediastinal nodes measure 11 mm. Bilateral hilar  lymphadenopathy. Left thyroid nodule is similar to prior measuring 3.1 x 1.8 cm. Emphysema. Hypoinflation and respiratory motion lung limitations. New 7 mm  nodule (image 18/series 3). Stable chronic scar at anterior right upper lobe and  mild at the right middle lobe. New mild sites of peripheral consolidation in the  right lateral lower lobe. Mild groundglass density versus atelectasis in the  left lower lung laterally. Small hiatal hernia. Subcentimeter right renal cyst  No acute bone finding. Impression IMPRESSION:    1.  No evidence of pulmonary embolism. 2. Limited by motion and hypoinflation. Emphysema with mild infiltrate/pneumonia  suspected in the right lower lobe. 3. New mild hilar and mediastinal lymphadenopathy. 4. New 7 mm left upper lobe lung nodule. Follow-up recommended in 3 months  posttreatment to assess for its persistence, given the limitations of this exam.  5. Biatrial cardiac enlargement. 6. Stable left thyroid nodule. 7. Small hiatal hernia. XR (Most Recent). CXR reviewed by me and compared with previous CXR   Results from Hospital Encounter encounter on 12/10/20   XR CHEST PORT    Narrative EXAM:  XR CHEST PORT    INDICATION:   meets SIRS criteria    COMPARISON: 10/6/2020. FINDINGS:  Hyperinflation. Mild enlargement of the cardiac silhouette. Aortic  atherosclerosis. Diffuse increased interstitial opacities, progressed. Underlying emphysematous changes. No pneumothorax, pleural effusion or confluent  consolidation. Stable osseous structures. Impression IMPRESSION:    Enlarged cardiac silhouette with diffuse increased interstitial opacities  superimposed on COPD which may represent superimposed mild pulmonary  interstitial edema versus acute interstitial infectious/inflammatory process. See my orders for details     Total care time exclusive of procedures with complex decision making, coordination of care and counseling patient performed and > 50% time spent in face to face evaluation as mentioned above.     Qi Solano MD  Critical Care Medicine

## 2020-12-15 NOTE — PROGRESS NOTES
conducted an initial consultation and Spiritual Assessment for [facility] KAREN BRADSHAW, who is a 82 y.o.,female. Patient's Primary Language is: [place]. According to the patient's EMR Hindu Affiliation is: Uatsdin. The reason the Patient came to the hospital is:   Patient Active Problem List    Diagnosis Date Noted    CAP (community acquired pneumonia) 12/10/2020    Suspected COVID-19 virus infection 12/10/2020    Hyperlipidemia 07/01/2016    Essential hypertension with goal blood pressure less than 140/90 07/01/2016    Chronic diastolic congestive heart failure ([address] 07/01/2016    Chronic obstructive pulmonary disease ([facility] [address] 07/01/2016    Pulmonary HTN ([facility] [address] 07/01/2016    BURGESS (dyspnea on exertion) 06/02/2016    Carotid stenosis 10/30/2014    Occlusion and stenosis of carotid artery 10/14/2014        The  provided the following Interventions:  Initiated a relationship of care and support. Explored issues of sukumar, belief, spirituality and Scientologist/ritual needs while hospitalized. Listened empathically. Provided chaplaincy education. Provided information about Spiritual Care Services. Offered prayer and assurance of continued prayers on patient's behalf. Chart reviewed. The following outcomes where achieved:  Patient shared limited information about both their medical narrative and spiritual journey/beliefs. Patient processed feeling about current hospitalization. Patient expressed gratitude for 's visit. Assessment:  There are no spiritual or Scientologist issues which require intervention at this time. Plan:  Chaplains will continue to follow and will provide pastoral care on an as needed/requested basis.  recommends bedside caregivers page  on duty if patient shows signs of acute spiritual or emotional distress.     [address]   [phone]

## 2020-12-15 NOTE — PROGRESS NOTES
Problem: Self Care Deficits Care Plan (Adult)  Goal: *Acute Goals and Plan of Care (Insert Text)  Description: Occupational Therapy Goals  Initiated 12/12/2020 within 7 day(s). 1.  Patient will perform lower body dressing with supervision/set-up for seated and standing aspects. 2.  Patient will perform toilet transfers with supervision/set-up. 3.  Patient will perform all aspects of toileting with supervision/set-up. 4.  Patient will participate in upper extremity therapeutic exercise/activities with supervision/set-up for 5 minutes. 5.  Patient will utilize energy conservation techniques during functional activities with occasional verbal cues. Prior Level of Function: Pt reports being previously independent in I/ADLs and functional mobility w/o AD. Pt has supportive son who lives nearby (3 miles), sister, and mult neighbors who check on her. Outcome: Progressing Towards Goal   OCCUPATIONAL THERAPY TREATMENT    Patient: En Garcia (11 y.o. female)  Date: 12/15/2020  Diagnosis: Suspected COVID-19 virus infection [Z20.828]  CAP (community acquired pneumonia) [J18.9]   <principal problem not specified>       Precautions: Fall, Other (comment)(droplet plus)  PLOF: Pt reports being previously independent in I/ADLs and functional mobility w/o AD. Pt has supportive son who lives nearby (3 miles), sister, and mult neighbors who check on her. Chart, occupational therapy assessment, plan of care, and goals were reviewed. ASSESSMENT:  RN cleared pt for OT tx at this time. Pt presented sitting EOB upon therapist arrival on 6L O2NC and agreeable for participation. Pt's SPO2 ranged % on 6L throughout tx session. PERKINS provided education for energy conservation techniques and strategies to increase activity tolerance for self cares, pt returned understanding through verbalization.  Pt performed LB dressing doffing/donning socks w/ supervision requiring vc's to utilize leg cross technique for increased safety. Pt reports she has been getting up alone to use BSC. Pt instructed on safety awareness with importance to utilize call bell to request assist with functional transfers to prevent falls 2/2 mult line mgmt. CGA STS transfer without AD taking ~ 4 steps laterally to [facility]. Pt reports she wants to finish her breathing tx and continue to sit EOB, RN made aware of pt's wants and performance. Pt left with all needs within reach. Progression toward goals:  []          Improving appropriately and progressing toward goals  [x]          Improving slowly and progressing toward goals  []          Not making progress toward goals and plan of care will be adjusted     PLAN:  Patient continues to benefit from skilled intervention to address the above impairments. Continue treatment per established plan of care. Discharge Recommendations:  HH with family assist   Further Equipment Recommendations for Discharge:  shower chair, and RW     SUBJECTIVE:   Patient stated  I feel better than I did this morning. \"     OBJECTIVE DATA SUMMARY:   Cognitive/Behavioral Status:  Neurologic State: Alert  Orientation Level: Oriented X4  Cognition: Follows commands  Safety/Judgement: Awareness of environment    Functional Mobility and Transfers for ADLs:      Transfers:  Sit to Stand: Contact guard assistance  Stand to Sit: Contact guard assistance          Balance:  Sitting: Intact  Standing: Impaired; Without support  Standing - Static: Fair(+)  Standing - Dynamic : Fair    ADL Intervention:       Lower Body Dressing Assistance  Socks: Supervision  Leg Crossed Method Used: Yes  Position Performed: Seated edge of bed  Cues: Verbal cues provided    Pain:  Pain level pre-treatment: 0/10   Pain level post-treatment: 0/10  Pain Intervention(s): Medication (see MAR);  Rest, Repositioning  Response to intervention: Nurse notified, See doc flow    Activity Tolerance:    Fair  Please refer to the flowsheet for vital signs taken during this treatment. After treatment:   [x]  Patient left in no apparent distress sitting up on EOB  []  Patient left in no apparent distress in bed  [x]  Call bell left within reach  [x]  Nursing notified  []  Caregiver present  []  Bed alarm activated    COMMUNICATION/EDUCATION:   [x] Role of Occupational Therapy in the acute care setting  [x] Home safety education was provided and the patient/caregiver indicated understanding. [] Patient/family have participated as able in working towards goals and plan of care. [x] Patient/family agree to work toward stated goals and plan of care. [] Patient understands intent and goals of therapy, but is neutral about his/her participation. [] Patient is unable to participate in goal setting and plan of care.       Thank you for this referral.  MADDIE Aviles  Time Calculation: 23 mins

## 2020-12-15 NOTE — PROGRESS NOTES
Progress Note    Patient: Jonathan Hill MRN: [id number]  CSN: [id number]    YOB: 1935  Age: 82 y.o. Sex: female    DOA: 12/10/2020 LOS:  LOS: 5 days             Subjective:     Still with oxygen desaturation with activity/movement. She is still on 6L NC, which I turned down to 5L. Walk test done today with nurse and she is still requiring higher than home oxygen levels. She was on Decadron 6 mg IV q6h x 12 doses, last received on 12/13. Chief Complaint:   Chief Complaint   Patient presents with    Chills    Generalized Body Aches       Assessment/Plan     1. Covid-19   - ID and pulmonary following, appreciate assistance  - Remdesivir last dose on 12/15  - Convalescent plasma 12/11  - Symbicort, spiriva Respimat  - Vit C, D, Zinc  - monitor daily inflammatory markers  - droplet plus isolation  - reordered Decadron 6 mg IV BID    2. Pneumonia RLL  - Augmentin BID (12/14)  - pulmonary following  - add Mucinex BID, incentive spirometer, cough and deep breathe  - -blood cultures NGTD x 2, MRSA swab NEG    3. Acute on chronic hypoxic respiratory failure  - wears 2-3L NC at home  - required vapotherm HFNC, able to be weaned back to NC  - still requiring 5-6L NC  - repeat CXR today showed overall improved lung aeration w/ mild residual areas of increased interstitial lung markings- near baseline  - walk test today oxygen sats 78% on RA, increased to 95% on 5L NC. Oxygen sats 70% on RA w/ ambulation, increased to 81% with 5L NC during ambulation. Later improved to 95%  - reordered Decadron 6 mg IV BID    4. Chronic diastolic heart failure  - no evidence of acute exacerbation  - continue to monitor    5. Moderate pulmonary hypertension  - f/u pulmonary outpatient    6. COPD / Emphysema  - pulmonary following  - continue Spiriva Respimat, Symbicort    7. CAD  - continue home meds    8. Hypertension  - stable, continue current regimen    9. Hyperlipidemia  - continue statin    10.  New 7 mm pulmonary nodule HIRAL  - f/u with pulmonary Dr. Corby Diez outpatient for repeat CT chest in 3 months (March 2021)    11. Hx anxiety  - continue Xanax, caution w/ overuse in elderly    12. Advanced age    15. Disposition  - requiring increased dose of oxygen, reviewed walk test findings with nurse, CXR showed overall improved lung aeration w/ mild residual areas of increased interstitial lung markings- near baseline  - will re- evaluate for d/c again tomorrow      Diet: Cardiac  FULL code  Contact: Son Chad Dyson [phone]. I did speak with son and updated on plan of care. Informed we were planning for dc however given hypoxia and increased oxygen requirements, d/c planning on hold. Informed we will re-evaluate daily but only when pt is medically stable. All questions answered. Case discussed with:  [x]Patient  [x]Family  [x]Nursing  [x]Case Management  DVT Prophylaxis:  [x]Lovenox  []Hep SQ  []SCDs  []Coumadin   []On Heparin gtt      Ella England NP-C  [facility]  pager [phone]      Objective:     Physical Exam:  Visit Vitals  BP (!) 149/58 (BP 1 Location: Right arm, BP Patient Position: At rest)   Pulse 92   Temp 97.9 °F (36.6 °C)   Resp 23   Ht 5' 3\" (1.6 m)   Wt 58.1 kg (128 lb)   SpO2 96%   Breastfeeding No   BMI 22.67 kg/m²        General:         Alert, cooperative, no acute distress    HEENT: NC, Atraumatic. PERRLA, anicteric sclerae. Lungs: Conversational dypsnea. CTA Bilaterally- rales bases? . No Wheezing/Rhonchi  Heart:  Regular  rhythm,  No murmur, No Rubs, No Gallops  Abdomen: Soft, Non distended, Non tender. +Bowel sounds, no HSM  Extremities: No c/c/e  Psych:   Good insight. Not anxious or agitated. Neurologic:   Alert and oriented X 3. No acute neurological deficits. AGUILAR. Intake and Output:  Current Shift:  12/15 0701 - 12/15 1900  In: 320 [P.O.:320]  Out: -   Last three shifts:  12/13 1901 - 12/15 0700  In: 7733 [P.O.:790;  I.V.:300]  Out: -     Labs: Results: Chemistry Recent Labs     12/15/20  0210 12/14/20  0153 12/13/20  0021   GLU 90 96 147*   * 143 138   K 3.3* 3.6 3.6   * 111 107   CO2 27 23 24   BUN 15 25* 34*   CREA 0.82 0.92 1.07   CA 7.4* 7.8* 7.7*   AGAP 7 9 7   BUCR 18 27* 32*   AP 39* 45 42*   TP 5.4* 6.1* 5.9*   ALB 2.7* 3.0* 2.9*   GLOB 2.7 3.1 3.0   AGRAT 1.0 1.0 1.0      CBC w/Diff Recent Labs     12/15/20  0210 12/14/20  0153 12/13/20  0021   WBC 9.6 9.0 6.0   RBC 4.47 4.76 4.45   HGB 13.1 13.9 13.0   HCT 40.0 42.1 40.2    167 149   GRANS 66 73 78*   LYMPH 16* 7* 16*   EOS 0 0 0      Cardiac Enzymes No results for input(s): CPK, CKND1, WALESKA in the last 72 hours. No lab exists for component: CKRMB, TROIP   Coagulation Recent Labs     12/15/20  0210 12/14/20  0153   PTP 14.5 13.1   INR 1.1 1.0   APTT 35.2 33.7       Lipid Panel Lab Results   Component Value Date/Time    Cholesterol, total 153 07/06/2020 10:40 AM    HDL Cholesterol 44 07/06/2020 10:40 AM    LDL, calculated 77.6 07/06/2020 10:40 AM    VLDL, calculated 31.4 07/06/2020 10:40 AM    Triglyceride 157 (H) 07/06/2020 10:40 AM    CHOL/HDL Ratio 3.5 07/06/2020 10:40 AM      BNP No results for input(s): BNPP in the last 72 hours.    Liver Enzymes Recent Labs     12/15/20  0210   TP 5.4*   ALB 2.7*   AP 39*      Thyroid Studies Lab Results   Component Value Date/Time    TSH 1.30 07/06/2020 10:40 AM          Procedures/imaging: see electronic medical records for all procedures/Xrays and details which were not copied into this note but were reviewed prior to creation of Plan    Medications:   Current Facility-Administered Medications   Medication Dose Route Frequency    amoxicillin-clavulanate (AUGMENTIN) 875-125 mg per tablet 1 Tab  1 Tab Oral Q12H    guaiFENesin ER (MUCINEX) tablet 600 mg  600 mg Oral Q12H    albuterol-ipratropium (DUO-NEB) 2.5 MG-0.5 MG/3 ML  3 mL Nebulization Q4H RT    benzocaine-menthoL (CEPACOL) lozenge 1 Lozenge  1 Lozenge Mucous Membrane PRN    lactobacillus sp. 50 billion cpu (BIO-K PLUS) capsule 1 Cap  1 Cap Oral DAILY    sodium chloride (NS) flush 5-40 mL  5-40 mL IntraVENous Q8H    sodium chloride (NS) flush 5-40 mL  5-40 mL IntraVENous PRN    acetaminophen (TYLENOL) tablet 650 mg  650 mg Oral Q6H PRN    Or    acetaminophen (TYLENOL) suppository 650 mg  650 mg Rectal Q6H PRN    polyethylene glycol (MIRALAX) packet 17 g  17 g Oral DAILY PRN    promethazine (PHENERGAN) tablet 12.5 mg  12.5 mg Oral Q6H PRN    Or    ondansetron (ZOFRAN) injection 4 mg  4 mg IntraVENous Q6H PRN    enoxaparin (LOVENOX) injection 40 mg  40 mg SubCUTAneous DAILY    famotidine (PEPCID) tablet 20 mg  20 mg Oral DAILY    insulin lispro (HUMALOG) injection   SubCUTAneous AC&HS    glucose chewable tablet 16 g  4 Tab Oral PRN    glucagon (GLUCAGEN) injection 1 mg  1 mg IntraMUSCular PRN    dextrose (D50W) injection syrg 12.5-25 g  25-50 mL IntraVENous PRN    guaiFENesin-dextromethorphan (ROBITUSSIN DM) 100-10 mg/5 mL syrup 5 mL  5 mL Oral Q4H PRN    cholecalciferol (VITAMIN D3) (1000 Units /25 mcg) tablet 6 Tab  6,000 Units Oral DAILY    ascorbic acid (vitamin C) (VITAMIN C) tablet 500 mg  500 mg Oral BID    zinc sulfate (ZINCATE) 220 (50) mg capsule 1 Cap  1 Cap Oral DAILY    albuterol (PROVENTIL HFA, VENTOLIN HFA, PROAIR HFA) inhaler 2 Puff  2 Puff Inhalation Q4H PRN    ALPRAZolam (XANAX) tablet 0.5 mg  0.5 mg Oral QHS    aspirin delayed-release tablet 81 mg  81 mg Oral DAILY    atorvastatin (LIPITOR) tablet 20 mg  20 mg Oral DAILY    losartan (COZAAR) tablet 50 mg  50 mg Oral DAILY    metoprolol tartrate (LOPRESSOR) tablet 25 mg  25 mg Oral BID    therapeutic multivitamin (THERAGRAN) tablet 1 Tab  1 Tab Oral DAILY    NIFEdipine ER (PROCARDIA XL) tablet 60 mg  60 mg Oral DAILY    budesonide-formoterol (SYMBICORT) 80-4.5 mcg inhaler  2 Puff Inhalation BID RT    remdesivir 100 mg in 0.9% sodium chloride 250 mL IVPB  100 mg IntraVENous Q24H    0.9% sodium chloride infusion 250 mL  250 mL IntraVENous PRN    chlorhexidine (PERIDEX) 0.12 % mouthwash 15 mL  15 mL Oral Q12H    sodium chloride (NS) flush 5-10 mL  5-10 mL IntraVENous PRN

## 2020-12-15 NOTE — PROGRESS NOTES
Spoke with pt's son Mary Causey. He stated he bought a rollator for pt. Informed him that Medicare will not cover BSC, shower chair as ordered and her expressed understanding.       MONIQUE DiegoN RN  Care Management  Pager: [phone]

## 2020-12-15 NOTE — PROGRESS NOTES
Infectious Disease progress Note        Reason: confirmed covid-19 pneumonia, acute hypoxic respiratory failure    Current abx Prior abx     augmentin since 12/14 Ceftriaxone 12/10  Azithromycin since 12/10-12/14  Pip/tazo since 12/11-12/14     Lines:       Assessment :     82 y.o. female with a history of COPD presented to ED on 12/10/2020 with increasing shortness of breath, fatigue. Labs on 12/11-ferritin 242, CRP 3.9, procalcitonin less than 0.05, D-dimer 0.92    Positive COVID-19 test 12/10/2020    Clinical presentation consistent with acute on chronic hypoxic respiratory failure secondary to confirmed COVID-19 pneumonia    Labs on 12/14-ferritin 177, CRP 0.4, procalcitonin less than 0.05, D-dimer 0.52  Status post remdesivir since 12/11/2020  Status post convalescent plasma 12/11/2020    Oxygen saturation drops to 85% with minimal activity on 6 L. Improving inflammatory markers    Appears that patient did not receive steroids yesterday. This may have contributed to COPD exacerbation and lack of significant improvement. Recommendations:    1. continue augmentin till 12/16. 2.  Continue  remdesivir (d 4/5)  3. Recommend to initiate steroids for COPD/Covid  4. Continue anticoagulation per primary team  5. Titrate oxygen as tolerated        Above plan was discussed in details with patient, dr. Verenice Hill. Please call me if any further questions or concerns. Will continue to participate in the care of this patient. HPI:      Patient states that she Feels short of breath with minimal exertion. She denies worsening chest pain,  abdominal pain, diarrhea, dysuria. home Medication List    Details   atorvastatin (LIPITOR) 20 mg tablet Take 20 mg by mouth daily. multivitamin (ONE A DAY) tablet Take 1 Tab by mouth daily. COQ10, UBIQUINOL, PO Take  by mouth.      losartan (COZAAR) 50 mg tablet Take  by mouth daily.  75 mg in a.m      fluticasone-umeclidin-vilanter (TRELEGY ELLIPTA) 100-62.5-25 mcg dsdv Take 1 Puff by inhalation daily. diclofenac (VOLTAREN) 1 % gel Apply  to affected area four (4) times daily. Qty: 100 g, Refills: 2      Oxygen       albuterol (PROVENTIL HFA, VENTOLIN HFA, PROAIR HFA) 90 mcg/actuation inhaler Take  by inhalation. albuterol (PROVENTIL VENTOLIN) 2.5 mg /3 mL (0.083 %) nebulizer solution 3 mL by Nebulization route every four (4) hours as needed for Wheezing or Shortness of Breath. Qty: 48 Each, Refills: 0      furosemide (LASIX) 20 mg tablet 20 mg po daily  Qty: 30 Tab, Refills: 0      acetaminophen (TYLENOL EXTRA STRENGTH) 500 mg tablet Take 500 mg by mouth every six (6) hours as needed for Pain.      metoprolol (LOPRESSOR) 25 mg tablet Take 25 mg by mouth two (2) times a day. NIFEdipine ER (ADALAT CC) 60 mg ER tablet Take 60 mg by mouth daily. Associated Diagnoses: Occlusion and stenosis of carotid artery, left; Pre-op testing      aspirin delayed-release 81 mg tablet Take  by mouth daily. Associated Diagnoses: Occlusion and stenosis of carotid artery, left; Pre-op testing      alprazolam (XANAX) 0.5 mg tablet Take  by mouth nightly.     Associated Diagnoses: Occlusion and stenosis of carotid artery, left; Pre-op testing             Current Facility-Administered Medications   Medication Dose Route Frequency    potassium bicarb-citric acid (EFFER-K) tablet 40 mEq  40 mEq Oral Q4H    amoxicillin-clavulanate (AUGMENTIN) 875-125 mg per tablet 1 Tab  1 Tab Oral Q12H    guaiFENesin ER (MUCINEX) tablet 600 mg  600 mg Oral Q12H    albuterol-ipratropium (DUO-NEB) 2.5 MG-0.5 MG/3 ML  3 mL Nebulization Q4H RT    benzocaine-menthoL (CEPACOL) lozenge 1 Lozenge  1 Lozenge Mucous Membrane PRN    lactobacillus sp. 50 billion cpu (BIO-K PLUS) capsule 1 Cap  1 Cap Oral DAILY    sodium chloride (NS) flush 5-40 mL  5-40 mL IntraVENous Q8H    sodium chloride (NS) flush 5-40 mL  5-40 mL IntraVENous PRN    acetaminophen (TYLENOL) tablet 650 mg  650 mg Oral Q6H PRN    Or    acetaminophen (TYLENOL) suppository 650 mg  650 mg Rectal Q6H PRN    polyethylene glycol (MIRALAX) packet 17 g  17 g Oral DAILY PRN    promethazine (PHENERGAN) tablet 12.5 mg  12.5 mg Oral Q6H PRN    Or    ondansetron (ZOFRAN) injection 4 mg  4 mg IntraVENous Q6H PRN    enoxaparin (LOVENOX) injection 40 mg  40 mg SubCUTAneous DAILY    famotidine (PEPCID) tablet 20 mg  20 mg Oral DAILY    insulin lispro (HUMALOG) injection   SubCUTAneous AC&HS    glucose chewable tablet 16 g  4 Tab Oral PRN    glucagon (GLUCAGEN) injection 1 mg  1 mg IntraMUSCular PRN    dextrose (D50W) injection syrg 12.5-25 g  25-50 mL IntraVENous PRN    guaiFENesin-dextromethorphan (ROBITUSSIN DM) 100-10 mg/5 mL syrup 5 mL  5 mL Oral Q4H PRN    cholecalciferol (VITAMIN D3) (1000 Units /25 mcg) tablet 6 Tab  6,000 Units Oral DAILY    ascorbic acid (vitamin C) (VITAMIN C) tablet 500 mg  500 mg Oral BID    zinc sulfate (ZINCATE) 220 (50) mg capsule 1 Cap  1 Cap Oral DAILY    albuterol (PROVENTIL HFA, VENTOLIN HFA, PROAIR HFA) inhaler 2 Puff  2 Puff Inhalation Q4H PRN    ALPRAZolam (XANAX) tablet 0.5 mg  0.5 mg Oral QHS    aspirin delayed-release tablet 81 mg  81 mg Oral DAILY    atorvastatin (LIPITOR) tablet 20 mg  20 mg Oral DAILY    losartan (COZAAR) tablet 50 mg  50 mg Oral DAILY    metoprolol tartrate (LOPRESSOR) tablet 25 mg  25 mg Oral BID    therapeutic multivitamin (THERAGRAN) tablet 1 Tab  1 Tab Oral DAILY    NIFEdipine ER (PROCARDIA XL) tablet 60 mg  60 mg Oral DAILY    budesonide-formoterol (SYMBICORT) 80-4.5 mcg inhaler  2 Puff Inhalation BID RT    remdesivir 100 mg in 0.9% sodium chloride 250 mL IVPB  100 mg IntraVENous Q24H    0.9% sodium chloride infusion 250 mL  250 mL IntraVENous PRN    chlorhexidine (PERIDEX) 0.12 % mouthwash 15 mL  15 mL Oral Q12H    sodium chloride (NS) flush 5-10 mL  5-10 mL IntraVENous PRN       Allergies: Patient has no known allergies.     Temp (24hrs), Av.1 °F (36.2 °C), Min:96.6 °F (35.9 °C), Max:97.6 °F (36.4 °C)    Visit Vitals  BP (!) 149/60 (BP 1 Location: Right arm, BP Patient Position: At rest)   Pulse 85   Temp (!) 96.6 °F (35.9 °C)   Resp 18   Ht 5' 3\" (1.6 m)   Wt 58.1 kg (128 lb)   SpO2 94%   Breastfeeding No   BMI 22.67 kg/m²       ROS: 12 point ROS obtained in details. Pertinent positives as mentioned in HPI,   otherwise negative    Physical Exam:    General:  Alert, cooperative,  in no apparent distress. On NC. Gets hypoxic on 6L when tried to sit in bed   Head:  Normocephalic, without obvious abnormality, atraumatic. Eyes:  Conjunctivae/corneas clear. PERRL, EOMs intact. Nose: Nares normal. Septum midline. Mucosa normal. No drainage or sinus tenderness. Throat: Lips, mucosa, and tongue normal. No exudate   Neck: Supple, symmetrical, trachea midline, no adenopathy, thyroid: no enlargment/tenderness/nodules,  no JVD. Back:   Symmetric   Lungs:   Symmetric, non-labored breathing pattern, No audible wheezing or stridor. Decreased diaphragm excursion by palpation, No SCM use   Chest wall:  No tenderness. Heart:  Regular rate and rhythm by palpation   Abdomen:   Soft, non-tender. No masses,  No organomegaly. Extremities: Extremities normal, atraumatic, no cyanosis or edema. Pulses: 2+ and symmetric all extremities.    Skin: Skin color, texture, turgor normal. No rashes or lesions   Lymph nodes:       Cervical, supraclavicular nodes are unremarkable   Neurologic: Grossly nonfocal         Labs: Results:   Chemistry Recent Labs     12/15/20  0210 12/14/20  0153 12/13/20  0021   GLU 90 96 147*   * 143 138   K 3.3* 3.6 3.6   * 111 107   CO2 27 23 24   BUN 15 25* 34*   CREA 0.82 0.92 1.07   CA 7.4* 7.8* 7.7*   AGAP 7 9 7   BUCR 18 27* 32*   AP 39* 45 42*   TP 5.4* 6.1* 5.9*   ALB 2.7* 3.0* 2.9*   GLOB 2.7 3.1 3.0   AGRAT 1.0 1.0 1.0      CBC w/Diff Recent Labs     12/15/20  0210 12/14/20  0153 12/13/20  0021   WBC 9.6 9.0 6.0   RBC 4.47 4. 76 4.45   HGB 13.1 13.9 13.0   HCT 40.0 42.1 40.2    167 149   GRANS 66 73 78*   LYMPH 16* 7* 16*   EOS 0 0 0      Microbiology No results for input(s): CULT in the last 72 hours. RADIOLOGY:    All available imaging studies/reports in [facility] for this admission were reviewed    Total time spent >35 minutes. High complexity decision making was performed during the evaluation of this patient at high risk for decompensation with multiple organ involvement     Above mentioned total time spent on reviewing the case/medical record/data/notes/EMR/patient examination/documentation/coordinating care with nurse/consultants, exclusive of procedures with complex decision making performed and > 50% time spent in face to face evaluation.     Dr. Prema Bansal, Infectious Disease Specialist  [phone]  December 15, 2020  9:03 AM

## 2020-12-15 NOTE — PROGRESS NOTES
Problem: Falls - Risk of  Goal: *Absence of Falls  Description: Document Jackie Mela Fall Risk and appropriate interventions in the flowsheet.   Outcome: Progressing Towards Goal  Note: Fall Risk Interventions:            Medication Interventions: Evaluate medications/consider consulting pharmacy, Patient to call before getting OOB, Teach patient to arise slowly    Elimination Interventions: Call light in reach, Toileting schedule/hourly rounds, Patient to call for help with toileting needs              Problem: Patient Education: Go to Patient Education Activity  Goal: Patient/Family Education  Outcome: Progressing Towards Goal     Problem: Patient Education: Go to Patient Education Activity  Goal: Patient/Family Education  Outcome: Progressing Towards Goal     Problem: Patient Education: Go to Patient Education Activity  Goal: Patient/Family Education  Outcome: Progressing Towards Goal

## 2020-12-15 NOTE — PROGRESS NOTES
5623: Walk test completed on pt as ordered. Resting on room air, she O2 saturation was 78%. Recovery at 5 LPM was 95%. Ambulating on room air, O2 saturation was 70%, while ambulating at 5 LPM, saturation was 81%. Test result reported to ordering NP Karen Desai.    1910: Bedside and Verbal shift change report given to Radha Greer RN (oncoming nurse) by Zhanna Su RN (offgoing nurse). Report included the following information SBAR, Intake/Output, MAR, Recent Results and Cardiac Rhythm SR/TACH.

## 2020-12-15 NOTE — PROGRESS NOTES
RR 21     12/15/20 1010   Oxygen Therapy   O2 Sat (%) 94 %   Pulse via Oximetry 91 beats per minute   O2 Device Nasal cannula;Humidifier   O2 Flow Rate (L/min) 5 l/min

## 2020-12-16 LAB
ALBUMIN SERPL-MCNC: 2.7 G/DL (ref 3.4–5)
ALBUMIN/GLOB SERPL: 0.9 {RATIO} (ref 0.8–1.7)
ALP SERPL-CCNC: 43 U/L (ref 45–117)
ALT SERPL-CCNC: 22 U/L (ref 13–56)
ANION GAP SERPL CALC-SCNC: 5 MMOL/L (ref 3–18)
APTT PPP: 39.9 SEC (ref 23–36.4)
AST SERPL-CCNC: 13 U/L (ref 10–38)
BACTERIA SPEC CULT: NORMAL
BACTERIA SPEC CULT: NORMAL
BASOPHILS # BLD: 0 K/UL (ref 0–0.1)
BASOPHILS NFR BLD: 0 % (ref 0–2)
BILIRUB SERPL-MCNC: 0.5 MG/DL (ref 0.2–1)
BUN SERPL-MCNC: 9 MG/DL (ref 7–18)
BUN/CREAT SERPL: 14 (ref 12–20)
CALCIUM SERPL-MCNC: 7.6 MG/DL (ref 8.5–10.1)
CHLORIDE SERPL-SCNC: 110 MMOL/L (ref 100–111)
CO2 SERPL-SCNC: 28 MMOL/L (ref 21–32)
CREAT SERPL-MCNC: 0.64 MG/DL (ref 0.6–1.3)
CRP SERPL-MCNC: 4.5 MG/DL (ref 0–0.3)
D DIMER PPP FEU-MCNC: 0.56 UG/ML(FEU)
DIFFERENTIAL METHOD BLD: ABNORMAL
EOSINOPHIL # BLD: 0 K/UL (ref 0–0.4)
EOSINOPHIL NFR BLD: 0 % (ref 0–5)
ERYTHROCYTE [DISTWIDTH] IN BLOOD BY AUTOMATED COUNT: 14.5 % (ref 11.6–14.5)
FERRITIN SERPL-MCNC: 212 NG/ML (ref 8–388)
FIBRINOGEN PPP-MCNC: 427 MG/DL (ref 210–451)
GLOBULIN SER CALC-MCNC: 2.9 G/DL (ref 2–4)
GLUCOSE BLD STRIP.AUTO-MCNC: 101 MG/DL (ref 70–110)
GLUCOSE BLD STRIP.AUTO-MCNC: 106 MG/DL (ref 70–110)
GLUCOSE BLD STRIP.AUTO-MCNC: 129 MG/DL (ref 70–110)
GLUCOSE BLD STRIP.AUTO-MCNC: 162 MG/DL (ref 70–110)
GLUCOSE SERPL-MCNC: 151 MG/DL (ref 74–99)
HCT VFR BLD AUTO: 39.6 % (ref 35–45)
HGB BLD-MCNC: 12.8 G/DL (ref 12–16)
INR PPP: 1.1 (ref 0.8–1.2)
LDH SERPL L TO P-CCNC: 212 U/L (ref 81–234)
LYMPHOCYTES # BLD: 0.7 K/UL (ref 0.9–3.6)
LYMPHOCYTES NFR BLD: 11 % (ref 21–52)
MCH RBC QN AUTO: 28.2 PG (ref 24–34)
MCHC RBC AUTO-ENTMCNC: 32.3 G/DL (ref 31–37)
MCV RBC AUTO: 87.2 FL (ref 74–97)
MONOCYTES # BLD: 0.2 K/UL (ref 0.05–1.2)
MONOCYTES NFR BLD: 3 % (ref 3–10)
NEUTS SEG # BLD: 5.6 K/UL (ref 1.8–8)
NEUTS SEG NFR BLD: 86 % (ref 40–73)
PLATELET # BLD AUTO: 173 K/UL (ref 135–420)
PMV BLD AUTO: 10.4 FL (ref 9.2–11.8)
POTASSIUM SERPL-SCNC: 4.5 MMOL/L (ref 3.5–5.5)
PROCALCITONIN SERPL-MCNC: <0.05 NG/ML
PROT SERPL-MCNC: 5.6 G/DL (ref 6.4–8.2)
PROTHROMBIN TIME: 14.3 SEC (ref 11.5–15.2)
RBC # BLD AUTO: 4.54 M/UL (ref 4.2–5.3)
SERVICE CMNT-IMP: NORMAL
SERVICE CMNT-IMP: NORMAL
SODIUM SERPL-SCNC: 143 MMOL/L (ref 136–145)
WBC # BLD AUTO: 6.4 K/UL (ref 4.6–13.2)

## 2020-12-16 PROCEDURE — 82962 GLUCOSE BLOOD TEST: CPT

## 2020-12-16 PROCEDURE — 85384 FIBRINOGEN ACTIVITY: CPT

## 2020-12-16 PROCEDURE — 85379 FIBRIN DEGRADATION QUANT: CPT

## 2020-12-16 PROCEDURE — 74011636637 HC RX REV CODE- 636/637: Performed by: HOSPITALIST

## 2020-12-16 PROCEDURE — 65660000000 HC RM CCU STEPDOWN

## 2020-12-16 PROCEDURE — 94640 AIRWAY INHALATION TREATMENT: CPT

## 2020-12-16 PROCEDURE — 77010033678 HC OXYGEN DAILY

## 2020-12-16 PROCEDURE — 99232 SBSQ HOSP IP/OBS MODERATE 35: CPT | Performed by: HOSPITALIST

## 2020-12-16 PROCEDURE — 74011250637 HC RX REV CODE- 250/637: Performed by: NURSE PRACTITIONER

## 2020-12-16 PROCEDURE — 74011000250 HC RX REV CODE- 250: Performed by: INTERNAL MEDICINE

## 2020-12-16 PROCEDURE — 82728 ASSAY OF FERRITIN: CPT

## 2020-12-16 PROCEDURE — 85730 THROMBOPLASTIN TIME PARTIAL: CPT

## 2020-12-16 PROCEDURE — 74011250636 HC RX REV CODE- 250/636: Performed by: NURSE PRACTITIONER

## 2020-12-16 PROCEDURE — 84145 PROCALCITONIN (PCT): CPT

## 2020-12-16 PROCEDURE — 83615 LACTATE (LD) (LDH) ENZYME: CPT

## 2020-12-16 PROCEDURE — 74011250637 HC RX REV CODE- 250/637: Performed by: HOSPITALIST

## 2020-12-16 PROCEDURE — 80053 COMPREHEN METABOLIC PANEL: CPT

## 2020-12-16 PROCEDURE — 74011250637 HC RX REV CODE- 250/637: Performed by: INTERNAL MEDICINE

## 2020-12-16 PROCEDURE — 86140 C-REACTIVE PROTEIN: CPT

## 2020-12-16 PROCEDURE — 2709999900 HC NON-CHARGEABLE SUPPLY

## 2020-12-16 PROCEDURE — 85025 COMPLETE CBC W/AUTO DIFF WBC: CPT

## 2020-12-16 PROCEDURE — 97116 GAIT TRAINING THERAPY: CPT

## 2020-12-16 PROCEDURE — 85610 PROTHROMBIN TIME: CPT

## 2020-12-16 PROCEDURE — 99233 SBSQ HOSP IP/OBS HIGH 50: CPT | Performed by: INTERNAL MEDICINE

## 2020-12-16 PROCEDURE — 36415 COLL VENOUS BLD VENIPUNCTURE: CPT

## 2020-12-16 PROCEDURE — 97530 THERAPEUTIC ACTIVITIES: CPT

## 2020-12-16 RX ORDER — FUROSEMIDE 20 MG/1
20 TABLET ORAL
Status: DISCONTINUED | OUTPATIENT
Start: 2020-12-16 | End: 2020-12-16

## 2020-12-16 RX ORDER — FUROSEMIDE 20 MG/1
20 TABLET ORAL 2 TIMES DAILY
Status: DISCONTINUED | OUTPATIENT
Start: 2020-12-16 | End: 2020-12-17

## 2020-12-16 RX ADMIN — AMOXICILLIN AND CLAVULANATE POTASSIUM 1 TABLET: 875; 125 TABLET, FILM COATED ORAL at 10:21

## 2020-12-16 RX ADMIN — FAMOTIDINE 20 MG: 20 TABLET, FILM COATED ORAL at 10:21

## 2020-12-16 RX ADMIN — AMOXICILLIN AND CLAVULANATE POTASSIUM 1 TABLET: 875; 125 TABLET, FILM COATED ORAL at 21:34

## 2020-12-16 RX ADMIN — ACETAMINOPHEN 650 MG: 325 TABLET ORAL at 12:40

## 2020-12-16 RX ADMIN — THERA TABS 1 TABLET: TAB at 10:21

## 2020-12-16 RX ADMIN — LOSARTAN POTASSIUM 50 MG: 50 TABLET, FILM COATED ORAL at 10:22

## 2020-12-16 RX ADMIN — Medication 6 TABLET: at 10:21

## 2020-12-16 RX ADMIN — METOPROLOL TARTRATE 25 MG: 25 TABLET, FILM COATED ORAL at 10:22

## 2020-12-16 RX ADMIN — ENOXAPARIN SODIUM 40 MG: 40 INJECTION SUBCUTANEOUS at 10:22

## 2020-12-16 RX ADMIN — ZINC SULFATE 220 MG (50 MG) CAPSULE 1 CAPSULE: CAPSULE at 10:21

## 2020-12-16 RX ADMIN — DEXAMETHASONE SODIUM PHOSPHATE 6 MG: 4 INJECTION, SOLUTION INTRAMUSCULAR; INTRAVENOUS at 21:35

## 2020-12-16 RX ADMIN — DEXAMETHASONE SODIUM PHOSPHATE 6 MG: 4 INJECTION, SOLUTION INTRAMUSCULAR; INTRAVENOUS at 10:22

## 2020-12-16 RX ADMIN — INSULIN LISPRO 2 UNITS: 100 INJECTION, SOLUTION INTRAVENOUS; SUBCUTANEOUS at 16:54

## 2020-12-16 RX ADMIN — Medication 10 ML: at 14:00

## 2020-12-16 RX ADMIN — BUDESONIDE AND FORMOTEROL FUMARATE DIHYDRATE 2 PUFF: 80; 4.5 AEROSOL RESPIRATORY (INHALATION) at 08:00

## 2020-12-16 RX ADMIN — METOPROLOL TARTRATE 25 MG: 25 TABLET, FILM COATED ORAL at 21:34

## 2020-12-16 RX ADMIN — IPRATROPIUM BROMIDE AND ALBUTEROL 2 PUFF: 20; 100 SPRAY, METERED RESPIRATORY (INHALATION) at 16:00

## 2020-12-16 RX ADMIN — Medication 500 MG: at 10:21

## 2020-12-16 RX ADMIN — GUAIFENESIN 600 MG: 600 TABLET, EXTENDED RELEASE ORAL at 10:21

## 2020-12-16 RX ADMIN — ACETAMINOPHEN 650 MG: 325 TABLET ORAL at 18:13

## 2020-12-16 RX ADMIN — NIFEDIPINE 60 MG: 60 TABLET, FILM COATED, EXTENDED RELEASE ORAL at 10:21

## 2020-12-16 RX ADMIN — BUDESONIDE AND FORMOTEROL FUMARATE DIHYDRATE 2 PUFF: 80; 4.5 AEROSOL RESPIRATORY (INHALATION) at 21:29

## 2020-12-16 RX ADMIN — IPRATROPIUM BROMIDE AND ALBUTEROL 2 PUFF: 20; 100 SPRAY, METERED RESPIRATORY (INHALATION) at 21:29

## 2020-12-16 RX ADMIN — CHLORHEXIDINE GLUCONATE 0.12% ORAL RINSE 15 ML: 1.2 LIQUID ORAL at 21:34

## 2020-12-16 RX ADMIN — Medication 10 ML: at 22:00

## 2020-12-16 RX ADMIN — Medication 10 ML: at 08:30

## 2020-12-16 RX ADMIN — IPRATROPIUM BROMIDE AND ALBUTEROL 2 PUFF: 20; 100 SPRAY, METERED RESPIRATORY (INHALATION) at 08:00

## 2020-12-16 RX ADMIN — GUAIFENESIN 600 MG: 600 TABLET, EXTENDED RELEASE ORAL at 21:35

## 2020-12-16 RX ADMIN — Medication 81 MG: at 10:21

## 2020-12-16 RX ADMIN — CHLORHEXIDINE GLUCONATE 0.12% ORAL RINSE 15 ML: 1.2 LIQUID ORAL at 10:22

## 2020-12-16 RX ADMIN — FUROSEMIDE 20 MG: 20 TABLET ORAL at 17:59

## 2020-12-16 RX ADMIN — ALPRAZOLAM 0.5 MG: 0.5 TABLET ORAL at 22:21

## 2020-12-16 RX ADMIN — Medication 500 MG: at 21:34

## 2020-12-16 RX ADMIN — Medication 1 CAPSULE: at 22:20

## 2020-12-16 RX ADMIN — IPRATROPIUM BROMIDE AND ALBUTEROL 2 PUFF: 20; 100 SPRAY, METERED RESPIRATORY (INHALATION) at 12:00

## 2020-12-16 RX ADMIN — IPRATROPIUM BROMIDE AND ALBUTEROL 2 PUFF: 20; 100 SPRAY, METERED RESPIRATORY (INHALATION) at 00:37

## 2020-12-16 RX ADMIN — ATORVASTATIN CALCIUM 20 MG: 20 TABLET, FILM COATED ORAL at 10:21

## 2020-12-16 NOTE — ROUTINE PROCESS
1905: Verbal shift change report given to Jan Kwok RN  (oncoming nurse) by Juany Villar RN (offgoing nurse). Report included the following information SBAR, Kardex, ED Summary and Cardiac Rhythm NSR to Sinus Tachy     0715: Verbal shift change report given to Cristino Levin RN (oncoming nurse) by Pierre Reeves RN (offgoing nurse). Report included the following information SBAR, Kardex, ED Summary and Cardiac Rhythm NSR to Sinus Tachy.

## 2020-12-16 NOTE — PROGRESS NOTES
Problem: Mobility Impaired (Adult and Pediatric)  Goal: *Acute Goals and Plan of Care (Insert Text)  Description: Physical Therapy Goals  Initiated 12/11/2020 and to be accomplished within 7 day(s)  1. Patient will move from supine to sit and sit to supine , scoot up and down, and roll side to side in bed with modified independence. 2.  Patient will transfer from bed to chair and chair to bed with modified independence using the least restrictive device. 3.  Patient will perform sit to stand with modified independence. 4.  Patient will ambulate with modified independence for 100 feet with the least restrictive device. 5.  Patient will ascend/descend 3 stairs with 0 handrail(s) with supervision/set-up. (may have to simulate with box step due to droplet plus precautions)    PLOF: Pt reports living in 1 story house alone with family around but unable to give her 24 hour care if needed. Pt reporting she does not want to go to Rehab. Pt reports no AD and independent in all mobility, still driving. Outcome: Progressing Towards Goal       PHYSICAL THERAPY TREATMENT    Patient: Adrien Mcintosh (64 y.o. female)  Date: 12/16/2020  Diagnosis: Suspected COVID-19 virus infection [Z20.828]  CAP (community acquired pneumonia) [J18.9] <principal problem not specified>       Precautions: Fall, Other (comment)(droplet plus)      ASSESSMENT:  Patient received sitting up at EOB finishing lunch and agreeable to PT session. Pt on 5L NC and SPO2 at 95% at rest. She performs sit to stand with stand by assistance and ambulates 10 ft with CGA reaching for support of bed rails. Pt presents presents with mild unsteadiness and fear of falling. Pt returns to EOB and educated on gait with RW to improve safety with gait and for energy conservation. She ambulates 20 ft inside the room using RW with stand by assistance. SPO2 decreases to 87% and pt reports SOB. She improves to 94% with cues for PLB and 2-3 minute seated rest break. Pt ambulates another trial of 10 ft and c/o back pain requesting tylenol. Nurse entered room and made aware. Will continue to follow in the acute setting and recommend dc with home health and family support. Progression toward goals:   []      Improving appropriately and progressing toward goals  [x]      Improving slowly and progressing toward goals  []      Not making progress toward goals and plan of care will be adjusted     PLAN:  Patient continues to benefit from skilled intervention to address the above impairments. Continue treatment per established plan of care. Discharge Recommendations:  Home Health with family support  Further Equipment Recommendations for Discharge:  bedside commode, shower chair, and rolling walker     SUBJECTIVE:   Patient stated I want to keep walking.     OBJECTIVE DATA SUMMARY:   Critical Behavior:  Neurologic State: Alert  Orientation Level: Oriented X4  Cognition: Follows commands  Safety/Judgement: Fall prevention  Functional Mobility Training:    Transfers:  Sit to Stand: Stand-by assistance  Stand to Sit: Stand-by assistance         Balance:  Sitting: Intact  Standing: With support  Standing - Static: Good  Standing - Dynamic : Fair((+))     Ambulation/Gait Training:  Distance (ft): 10 Feet (ft)(+ 20ft + 10 ft)  Ambulation - Level of Assistance: Stand-by assistance  Gait Abnormalities: Decreased step clearance        Pain:  Pain level pre-treatment: 5/10 back pain  Pain level post-treatment: 5/10   Pain Intervention(s): Medication (see MAR); Rest, Ice, Repositioning   Response to intervention: Nurse notified, See doc flow    Activity Tolerance:     Please refer to the flowsheet for vital signs taken during this treatment.   After treatment:   [x] Patient left in no apparent distress sitting EOB  [] Patient left in no apparent distress in bed  [x] Call bell left within reach  [x] Nursing notified  [] Caregiver present  [] Bed alarm activated  [] SCDs applied      COMMUNICATION/EDUCATION:   []         Role of Physical Therapy in the acute care setting. [x]         Fall prevention education was provided and the patient/caregiver indicated understanding. [x]         Patient/family have participated as able in working toward goals and plan of care. []         Patient/family agree to work toward stated goals and plan of care. []         Patient understands intent and goals of therapy, but is neutral about his/her participation.   []         Patient is unable to participate in stated goals/plan of care: ongoing with therapy staff.  []         Other:        Eleazar Zambrano, PT   Time Calculation: 27 mins

## 2020-12-16 NOTE — PROGRESS NOTES
Problem: Falls - Risk of  Goal: *Absence of Falls  Description: Document Silverio Ahumada Fall Risk and appropriate interventions in the flowsheet. Outcome: Progressing Towards Goal  Note: Fall Risk Interventions:            Medication Interventions: Evaluate medications/consider consulting pharmacy, Patient to call before getting OOB, Teach patient to arise slowly    Elimination Interventions: Call light in reach, Patient to call for help with toileting needs, Toilet paper/wipes in reach, Toileting schedule/hourly rounds              Problem: Patient Education: Go to Patient Education Activity  Goal: Patient/Family Education  Outcome: Progressing Towards Goal     Problem: Patient Education: Go to Patient Education Activity  Goal: Patient/Family Education  Outcome: Progressing Towards Goal     Problem: Pain  Goal: *Control of Pain  Outcome: Progressing Towards Goal     Problem: Airway Clearance - Ineffective  Goal: Achieve or maintain patent airway  Outcome: Progressing Towards Goal     Problem: Gas Exchange - Impaired  Goal: Absence of hypoxia  Outcome: Progressing Towards Goal  Goal: Promote optimal lung function  Outcome: Progressing Towards Goal     Problem: Breathing Pattern - Ineffective  Goal: Ability to achieve and maintain a regular respiratory rate  Outcome: Progressing Towards Goal     Problem:  Body Temperature -  Risk of, Imbalanced  Goal: Ability to maintain a body temperature within defined limits  Outcome: Progressing Towards Goal  Goal: Will regain or maintain usual level of consciousness  Outcome: Progressing Towards Goal  Goal: Complications related to the disease process, condition or treatment will be avoided or minimized  Outcome: Progressing Towards Goal     Problem: Isolation Precautions - Risk of Spread of Infection  Goal: Prevent transmission of infectious organism to others  Outcome: Progressing Towards Goal     Problem: Nutrition Deficits  Goal: Optimize nutrtional status  Outcome: Progressing Towards Goal     Problem: Risk for Fluid Volume Deficit  Goal: Maintain normal heart rhythm  Outcome: Progressing Towards Goal  Goal: Maintain absence of muscle cramping  Outcome: Progressing Towards Goal  Goal: Maintain normal serum potassium, sodium, calcium, phosphorus, and pH  Outcome: Progressing Towards Goal     Problem: Loneliness or Risk for Loneliness  Goal: Demonstrate positive use of time alone when socialization is not possible  Outcome: Progressing Towards Goal     Problem: Fatigue  Goal: Verbalize increase energy and improved vitality  Outcome: Progressing Towards Goal

## 2020-12-16 NOTE — PROGRESS NOTES
[facility]  Pulmonary, Critical Care, and Sleep Medicine    Pulmonary Medicine Progress Note    Name: Gio Wright MRN: [id number]  : 1935 Hospital: [facility]  Date: 12/15/2020       Subjective:  Pt still requiring 5-6L. Desats noted on getting out of bed. Getting nebulizers. Minimal cough. Patient Active Problem List   Diagnosis Code    Occlusion and stenosis of carotid artery I65.29    Carotid stenosis I65.29    BURGESS (dyspnea on exertion) R06.00    Hyperlipidemia E78.5    Essential hypertension with goal blood pressure less than 140/90 I10    Chronic diastolic congestive heart failure (HCC) I50.32    Chronic obstructive pulmonary disease (HCC) J44.9    Pulmonary HTN (HCC) I27.20    CAP (community acquired pneumonia) J18.9    Suspected COVID-19 virus infection Z20.828     Assessment:  · Respiratory Failure: Acute on Chronic- Hypoxic- on 2-3L at home  · COVID-19 Pneumonia  · Emphysema/COPD- Pulmonary - [facility]: Dr. Armstrong Friend  · Pulmonary Hypertension  · HFpEF- Chronic  · Hypertension    Impression/Plan:  - Wean O2 as tolerated  - Will add nebulizers to be administered by the patient to herself  - Continue symbicort and spiriva  - s/p remdesivir, continue decadron  - PT/OT    Possible D/C tomorrow    FiO2 to keep SpO2 >=92%, HOB >=30 degree, aspiration precautions, aggressive pulmonary toileting, incentive spirometry. Other issues management by primary team and respective consultants. Events and notes from last 24 hours reviewed. Discussed with patient and family, answered all questions to their satisfaction. Care plan discussed with nursing.      Labs and images personally seen and available reports reviewed  All current medicines are reviewed       Medications- Current:  Current Facility-Administered Medications   Medication Dose Route Frequency    ipratropium-albuterol (COMBIVENT RESPIMAT) 20 mcg-100 mcg inhalation spray  2 Puff Inhalation Q4H RT    dexamethasone (DECADRON) 4 mg/mL injection 6 mg  6 mg IntraVENous Q12H    lactobacillus sp. 50 billion cpu (BIO-K PLUS) capsule 1 Cap  1 Cap Oral Q12H    amoxicillin-clavulanate (AUGMENTIN) 875-125 mg per tablet 1 Tab  1 Tab Oral Q12H    guaiFENesin ER (MUCINEX) tablet 600 mg  600 mg Oral Q12H    benzocaine-menthoL (CEPACOL) lozenge 1 Lozenge  1 Lozenge Mucous Membrane PRN    sodium chloride (NS) flush 5-40 mL  5-40 mL IntraVENous Q8H    sodium chloride (NS) flush 5-40 mL  5-40 mL IntraVENous PRN    acetaminophen (TYLENOL) tablet 650 mg  650 mg Oral Q6H PRN    Or    acetaminophen (TYLENOL) suppository 650 mg  650 mg Rectal Q6H PRN    polyethylene glycol (MIRALAX) packet 17 g  17 g Oral DAILY PRN    promethazine (PHENERGAN) tablet 12.5 mg  12.5 mg Oral Q6H PRN    Or    ondansetron (ZOFRAN) injection 4 mg  4 mg IntraVENous Q6H PRN    enoxaparin (LOVENOX) injection 40 mg  40 mg SubCUTAneous DAILY    famotidine (PEPCID) tablet 20 mg  20 mg Oral DAILY    insulin lispro (HUMALOG) injection   SubCUTAneous AC&HS    glucose chewable tablet 16 g  4 Tab Oral PRN    glucagon (GLUCAGEN) injection 1 mg  1 mg IntraMUSCular PRN    dextrose (D50W) injection syrg 12.5-25 g  25-50 mL IntraVENous PRN    guaiFENesin-dextromethorphan (ROBITUSSIN DM) 100-10 mg/5 mL syrup 5 mL  5 mL Oral Q4H PRN    cholecalciferol (VITAMIN D3) (1000 Units /25 mcg) tablet 6 Tab  6,000 Units Oral DAILY    ascorbic acid (vitamin C) (VITAMIN C) tablet 500 mg  500 mg Oral BID    zinc sulfate (ZINCATE) 220 (50) mg capsule 1 Cap  1 Cap Oral DAILY    albuterol (PROVENTIL HFA, VENTOLIN HFA, PROAIR HFA) inhaler 2 Puff  2 Puff Inhalation Q4H PRN    ALPRAZolam (XANAX) tablet 0.5 mg  0.5 mg Oral QHS    aspirin delayed-release tablet 81 mg  81 mg Oral DAILY    atorvastatin (LIPITOR) tablet 20 mg  20 mg Oral DAILY    losartan (COZAAR) tablet 50 mg  50 mg Oral DAILY    metoprolol tartrate (LOPRESSOR) tablet 25 mg  25 mg Oral BID    therapeutic multivitamin (THERAGRAN) tablet 1 Tab  1 Tab Oral DAILY    NIFEdipine ER (PROCARDIA XL) tablet 60 mg  60 mg Oral DAILY    budesonide-formoterol (SYMBICORT) 80-4.5 mcg inhaler  2 Puff Inhalation BID RT    0.9% sodium chloride infusion 250 mL  250 mL IntraVENous PRN    chlorhexidine (PERIDEX) 0.12 % mouthwash 15 mL  15 mL Oral Q12H    sodium chloride (NS) flush 5-10 mL  5-10 mL IntraVENous PRN       Objective:  Vital Signs:    Visit Vitals  BP (!) 141/46 (BP 1 Location: Right arm, BP Patient Position: Sitting)   Pulse 92   Temp 97.7 °F (36.5 °C)   Resp 17   Ht 5' 3\" (1.6 m)   Wt 58.1 kg (128 lb)   SpO2 97%   Breastfeeding No   BMI 22.67 kg/m²      O2 Device: Nasal cannula  O2 Flow Rate (L/min): 5 l/min  Temp (24hrs), Av.5 °F (36.4 °C), Min:96.6 °F (35.9 °C), Max:98.2 °F (36.8 °C)      Intake/Output:   Last shift:      No intake/output data recorded. Last 3 shifts:  0701 - 12/15 1900  In: 3434 [P.O.:1410; I.V.:300]  Out: -     Intake/Output Summary (Last 24 hours) at 12/15/2020 2219  Last data filed at 12/15/2020 1243  Gross per 24 hour   Intake 620 ml   Output --   Net 620 ml       Physical Exam:     General/Neurology: Alert, Awake  Head:   Normocephalic, without obvious abnormality  Eye:   PERRL, EOM intact  Oral:  Mucus membranes moist  Lung:   B/l air entry fair. Descreased BS, no wheezing. Heart:   S1 S2 present  Abdomen:  Soft, non tender, BS+nt   Extremities:  No pedal edema.    Skin:   Dry, intact  Data:      Recent Results (from the past 24 hour(s))   HEMOGLOBIN A1C WITH EAG    Collection Time: 12/15/20  2:10 AM   Result Value Ref Range    Hemoglobin A1c 6.2 (H) 4.2 - 5.6 %    Est. average glucose 131 mg/dL   PROTHROMBIN TIME + INR    Collection Time: 12/15/20  2:10 AM   Result Value Ref Range    Prothrombin time 14.5 11.5 - 15.2 sec    INR 1.1 0.8 - 1.2     PTT    Collection Time: 12/15/20  2:10 AM   Result Value Ref Range    aPTT 35.2 23.0 - 36.4 SEC   FIBRINOGEN    Collection Time: 12/15/20  2:10 AM   Result Value Ref Range    Fibrinogen 349 210 - 451 mg/dL   CBC WITH AUTOMATED DIFF    Collection Time: 12/15/20  2:10 AM   Result Value Ref Range    WBC 9.6 4.6 - 13.2 K/uL    RBC 4.47 4.20 - 5.30 M/uL    HGB 13.1 12.0 - 16.0 g/dL    HCT 40.0 35.0 - 45.0 %    MCV 89.5 74.0 - 97.0 FL    MCH 29.3 24.0 - 34.0 PG    MCHC 32.8 31.0 - 37.0 g/dL    RDW 14.3 11.6 - 14.5 %    PLATELET 784 130 - 389 K/uL    MPV 10.2 9.2 - 11.8 FL    NEUTROPHILS 66 42 - 75 %    BAND NEUTROPHILS 1 0 - 5 %    LYMPHOCYTES 16 (L) 20 - 51 %    MONOCYTES 13 (H) 2 - 9 %    EOSINOPHILS 0 0 - 5 %    BASOPHILS 0 0 - 3 %    OTHER CELL 4 (H) 0      ABS. NEUTROPHILS 6.4 1.8 - 8.0 K/UL    ABS. LYMPHOCYTES 1.5 0.8 - 3.5 K/UL    ABS. MONOCYTES 1.2 (H) 0 - 1.0 K/UL    ABS. EOSINOPHILS 0.0 0.0 - 0.4 K/UL    ABS. BASOPHILS 0.0 0.0 - 0.06 K/UL    DF MANUAL      PLATELET COMMENTS ADEQUATE PLATELETS      RBC COMMENTS NORMOCYTIC, NORMOCHROMIC     METABOLIC PANEL, COMPREHENSIVE    Collection Time: 12/15/20  2:10 AM   Result Value Ref Range    Sodium 147 (H) 136 - 145 mmol/L    Potassium 3.3 (L) 3.5 - 5.5 mmol/L    Chloride 113 (H) 100 - 111 mmol/L    CO2 27 21 - 32 mmol/L    Anion gap 7 3.0 - 18 mmol/L    Glucose 90 74 - 99 mg/dL    BUN 15 7.0 - 18 MG/DL    Creatinine 0.82 0.6 - 1.3 MG/DL    BUN/Creatinine ratio 18 12 - 20      GFR est AA >60 >60 ml/min/1.73m2    GFR est non-AA >60 >60 ml/min/1.73m2    Calcium 7.4 (L) 8.5 - 10.1 MG/DL    Bilirubin, total 0.3 0.2 - 1.0 MG/DL    ALT (SGPT) 25 13 - 56 U/L    AST (SGOT) 17 10 - 38 U/L    Alk.  phosphatase 39 (L) 45 - 117 U/L    Protein, total 5.4 (L) 6.4 - 8.2 g/dL    Albumin 2.7 (L) 3.4 - 5.0 g/dL    Globulin 2.7 2.0 - 4.0 g/dL    A-G Ratio 1.0 0.8 - 1.7     LD    Collection Time: 12/15/20  2:10 AM   Result Value Ref Range     81 - 234 U/L   FERRITIN    Collection Time: 12/15/20  2:10 AM   Result Value Ref Range    Ferritin 165 8 - 388 NG/ML   C REACTIVE PROTEIN, QT    Collection Time: 12/15/20  2:10 AM   Result Value Ref Range    C-Reactive protein 1.2 (H) 0 - 0.3 mg/dL   D DIMER    Collection Time: 12/15/20  2:10 AM   Result Value Ref Range    D DIMER 0.51 (H) <0.46 ug/ml(FEU)   PROCALCITONIN    Collection Time: 12/15/20  2:10 AM   Result Value Ref Range    Procalcitonin <0.05 ng/mL   MAGNESIUM    Collection Time: 12/15/20  2:10 AM   Result Value Ref Range    Magnesium 2.3 1.6 - 2.6 mg/dL   GLUCOSE, POC    Collection Time: 12/15/20  8:05 AM   Result Value Ref Range    Glucose (POC) 78 70 - 110 mg/dL   GLUCOSE, POC    Collection Time: 12/15/20 11:55 AM   Result Value Ref Range    Glucose (POC) 103 70 - 110 mg/dL   GLUCOSE, POC    Collection Time: 12/15/20  4:07 PM   Result Value Ref Range    Glucose (POC) 115 (H) 70 - 110 mg/dL   GLUCOSE, POC    Collection Time: 12/15/20  8:47 PM   Result Value Ref Range    Glucose (POC) 160 (H) 70 - 110 mg/dL         Chemistry   Recent Labs     12/15/20  0210 12/14/20  0153 12/13/20  0021   GLU 90 96 147*   * 143 138   K 3.3* 3.6 3.6   * 111 107   CO2 27 23 24   BUN 15 25* 34*   CREA 0.82 0.92 1.07   CA 7.4* 7.8* 7.7*   MG 2.3  --   --    AGAP 7 9 7   BUCR 18 27* 32*   AP 39* 45 42*   TP 5.4* 6.1* 5.9*   ALB 2.7* 3.0* 2.9*   GLOB 2.7 3.1 3.0   AGRAT 1.0 1.0 1.0       CBC w/Diff   Recent Labs     12/15/20  0210 12/14/20  0153 12/13/20  0021   WBC 9.6 9.0 6.0   RBC 4.47 4.76 4.45   HGB 13.1 13.9 13.0   HCT 40.0 42.1 40.2    167 149   GRANS 66 73 78*   LYMPH 16* 7* 16*   EOS 0 0 0       ABG No results for input(s): PHI, PHI, POC2, PCO2I, PO2, PO2I, HCO3, HCO3I, FIO2, FIO2I in the last 72 hours. Micro  No results for input(s): SDES, CULT in the last 72 hours. No results for input(s): CULT in the last 72 hours. CT (Most Recent)   Results from Hospital Encounter encounter on 12/10/20   CTA CHEST W OR W WO CONT    Narrative CTA CHEST PULMONARY EMBOLISM PROTOCOL      INDICATION: Chills. Body aches. Symptoms since last night.  Covid-19 concern. TECHNIQUE: Thin collimation axial images obtained through the level of the  pulmonary arteries with additional imaging through the chest following the  uneventful administration of 65 cc Isovue-300 intravenous contrast.  Images  reconstructed into MIP coronal and sagittal projections for complete evaluation  of the tortuous and overlapping pulmonary vascular structures and to reduce  patient radiation dose. All CT scans are performed using dose optimization  techniques as appropriate to the performed exam including the following:  Automated exposure control, adjustment of mA and/or kV according to patient  size, and use of iterative reconstructive technique. COMPARISON: Chest CT 10/6/2020. FINDINGS:    No filling defects are appreciated within the main, left, right, lobar or  visualized segmental pulmonary arteries to suggest embolism. Motion artifact  limits peripheral, subsegmental branches. The thoracic aorta is not aneurysmal.   No evidence for dissection. Arterial wall calcifications. Biatrial cardiac  enlargement. No pericardial effusion. No pleural effusion. Precarinal 14 mm granulomatous  calcification containing lymph node is slightly larger. New additional  lymphadenopathy along bilateral mediastinum and subcarinal mediastinum. New 13  mm subcarinal lymph node. Left mediastinal nodes measure 11 mm. Bilateral hilar  lymphadenopathy. Left thyroid nodule is similar to prior measuring 3.1 x 1.8 cm. Emphysema. Hypoinflation and respiratory motion lung limitations. New 7 mm  nodule (image 18/series 3). Stable chronic scar at anterior right upper lobe and  mild at the right middle lobe. New mild sites of peripheral consolidation in the  right lateral lower lobe. Mild groundglass density versus atelectasis in the  left lower lung laterally. Small hiatal hernia. Subcentimeter right renal cyst  No acute bone finding. Impression IMPRESSION:    1.  No evidence of pulmonary embolism. 2. Limited by motion and hypoinflation. Emphysema with mild infiltrate/pneumonia  suspected in the right lower lobe. 3. New mild hilar and mediastinal lymphadenopathy. 4. New 7 mm left upper lobe lung nodule. Follow-up recommended in 3 months  posttreatment to assess for its persistence, given the limitations of this exam.  5. Biatrial cardiac enlargement. 6. Stable left thyroid nodule. 7. Small hiatal hernia. XR (Most Recent). CXR reviewed by me and compared with previous CXR   Results from Hospital Encounter encounter on 12/10/20   XR CHEST PORT    Narrative INDICATION: eval for pulmonary edema, wet read please- results pending discharge    TECHNIQUE: Portable chest radiograph    COMPARISON: CT chest 10 December 2020, chest radiograph 10 December 2020, 15 May  2020. FINDINGS: The lungs are adequately inflated. No focal consolidation, effusion or  pneumothorax. Overall improved increased interstitial lung markings, with  residual increased interstitial lung markings -near baseline. Heart size remains  enlarged. No acute osseous abnormality. Impression IMPRESSION:     Overall improved lung aeration with mild residual areas of increased  interstitial lung markings -near baseline. See my orders for details     Total care time exclusive of procedures with complex decision making, coordination of care and counseling patient performed and > 50% time spent in face to face evaluation as mentioned above.     Ruperto Bar MD  Critical Care Medicine

## 2020-12-16 NOTE — PROGRESS NOTES
Progress Note    Patient: Judah Muñoz MRN: [id number]  CSN: [id number]    YOB: 1935  Age: 82 y.o. Sex: female    DOA: 12/10/2020 LOS:  LOS: 6 days             Subjective:     Pt sitting on side of bed, wants to be able to get up and move around. Still on 5L NC. Appears SOB with conversation but states she feels better than yesterday. No chest pain. Appetite good. Chief Complaint:   Chief Complaint   Patient presents with    Chills    Generalized Body Aches       Assessment/Plan     1. Covid-19   - ID and pulmonary following, appreciate assistance  - Remdesivir last dose on 12/15  - Convalescent plasma 12/11  - Symbicort, spiriva Respimat  - Vit C, D, Zinc  - monitor daily inflammatory markers  - droplet plus isolation  - Decadron 6 mg IV BID    2. Pneumonia RLL  - Augmentin BID (12/14)  - pulmonary following  - Mucinex BID, incentive spirometer, cough and deep breathe  - blood cultures NGTD x 2, MRSA swab NEG    3. Acute on chronic hypoxic respiratory failure  - wears 2-3L NC at home  - required vapotherm HFNC, able to be weaned back to NC  - still requiring 5L NC  - repeat CXR 12/15 showed overall improved lung aeration w/ mild residual areas of increased interstitial lung markings- near baseline  - walk test 12/15 oxygen sats 78% on RA, increased to 95% on 5L NC. Oxygen sats 70% on RA w/ ambulation, increased to 81% with 5L NC during ambulation. Later improved to 95%  - continue Decadron    4. Chronic diastolic heart failure  - no evidence of acute exacerbation  - continue to monitor    5. Moderate pulmonary hypertension  - f/u pulmonary outpatient    6. COPD / Emphysema  - pulmonary following  - continue Spiriva Respimat, Symbicort    7. CAD  - continue home meds    8. Hypertension  - stable, continue current regimen    9. Hyperlipidemia  - continue statin    10.  New 7 mm pulmonary nodule HIRAL  - f/u with pulmonary Dr. Amelia Jeter outpatient for repeat CT chest in 3 months (March 2021)    11. Hx anxiety  - continue Xanax, caution w/ overuse in elderly    12. Advanced age    15. Disposition  - requiring increased dose of oxygen, reviewed walk test findings with nurse, CXR showed overall improved lung aeration w/ mild residual areas of increased interstitial lung markings- near baseline  - plan will be home w/ home health when ready      Diet: Cardiac  FULL code  Contact: Jeremias Mora [phone]. Son updated on plan of care. Informed will probably need a couple of more days in hospital prior to d/c. Still on 5L NC and drops oxygen levels w/ ambulation/standing. He verbalized understanding. All questions answered. Case discussed with:  [x]Patient  [x]Family  [x]Nursing  [x]Case Management  DVT Prophylaxis:  [x]Lovenox  []Hep SQ  []SCDs  []Coumadin   []On Heparin gtt      Angela Louise NP-C  [facility]  pager [phone]      Objective:     Physical Exam:  Visit Vitals  BP (!) 131/51 (BP 1 Location: Right arm, BP Patient Position: At rest)   Pulse 87   Temp 98.6 °F (37 °C)   Resp 15   Ht 5' 3\" (1.6 m)   Wt 62.5 kg (137 lb 11.2 oz)   SpO2 97%   Breastfeeding No   BMI 24.39 kg/m²        General:         Alert, cooperative, no acute distress    HEENT: NC, Atraumatic. PERRLA, anicteric sclerae. Lungs: Mild conversational dyspnea. CTA Bilaterally. No Wheezing/Rhonchi  Heart:  Regular  rhythm,  No murmur, No Rubs, No Gallops  Abdomen: Soft, Non distended, Non tender. +Bowel sounds, no HSM  Extremities: No c/c/e  Psych:   Good insight. Not anxious or agitated. Neurologic:   Alert and oriented X 3. No acute neurological deficits. AGUILAR. Intake and Output:  Current Shift:  No intake/output data recorded.   Last three shifts:  12/14 1901 - 12/16 0700  In: 1580 [P.O.:1580]  Out: -     Labs: Results:       Chemistry Recent Labs     12/16/20  0123 12/15/20  0210 12/14/20  0153   * 90 96    147* 143   K 4.5 3.3* 3.6    113* 111   CO2 28 27 23 BUN 9 15 25*   CREA 0.64 0.82 0.92   CA 7.6* 7.4* 7.8*   AGAP 5 7 9   BUCR 14 18 27*   AP 43* 39* 45   TP 5.6* 5.4* 6.1*   ALB 2.7* 2.7* 3.0*   GLOB 2.9 2.7 3.1   AGRAT 0.9 1.0 1.0      CBC w/Diff Recent Labs     12/16/20  0123 12/15/20  0210 12/14/20  0153   WBC 6.4 9.6 9.0   RBC 4.54 4.47 4.76   HGB 12.8 13.1 13.9   HCT 39.6 40.0 42.1    169 167   GRANS 86* 66 73   LYMPH 11* 16* 7*   EOS 0 0 0      Cardiac Enzymes No results for input(s): CPK, CKND1, WALESKA in the last 72 hours. No lab exists for component: CKRMB, TROIP   Coagulation Recent Labs     12/16/20  0123 12/15/20  0210   PTP 14.3 14.5   INR 1.1 1.1   APTT 39.9* 35.2       Lipid Panel Lab Results   Component Value Date/Time    Cholesterol, total 153 07/06/2020 10:40 AM    HDL Cholesterol 44 07/06/2020 10:40 AM    LDL, calculated 77.6 07/06/2020 10:40 AM    VLDL, calculated 31.4 07/06/2020 10:40 AM    Triglyceride 157 (H) 07/06/2020 10:40 AM    CHOL/HDL Ratio 3.5 07/06/2020 10:40 AM      BNP No results for input(s): BNPP in the last 72 hours.    Liver Enzymes Recent Labs     12/16/20 0123   TP 5.6*   ALB 2.7*   AP 43*      Thyroid Studies Lab Results   Component Value Date/Time    TSH 1.30 07/06/2020 10:40 AM          Procedures/imaging: see electronic medical records for all procedures/Xrays and details which were not copied into this note but were reviewed prior to creation of Plan    Medications:   Current Facility-Administered Medications   Medication Dose Route Frequency    ipratropium-albuterol (COMBIVENT RESPIMAT) 20 mcg-100 mcg inhalation spray  2 Puff Inhalation Q4H RT    dexamethasone (DECADRON) 4 mg/mL injection 6 mg  6 mg IntraVENous Q12H    lactobacillus sp. 50 billion cpu (BIO-K PLUS) capsule 1 Cap  1 Cap Oral Q12H    amoxicillin-clavulanate (AUGMENTIN) 875-125 mg per tablet 1 Tab  1 Tab Oral Q12H    guaiFENesin ER (MUCINEX) tablet 600 mg  600 mg Oral Q12H    benzocaine-menthoL (CEPACOL) lozenge 1 Lozenge  1 Lozenge Mucous Membrane PRN    sodium chloride (NS) flush 5-40 mL  5-40 mL IntraVENous Q8H    sodium chloride (NS) flush 5-40 mL  5-40 mL IntraVENous PRN    acetaminophen (TYLENOL) tablet 650 mg  650 mg Oral Q6H PRN    Or    acetaminophen (TYLENOL) suppository 650 mg  650 mg Rectal Q6H PRN    polyethylene glycol (MIRALAX) packet 17 g  17 g Oral DAILY PRN    promethazine (PHENERGAN) tablet 12.5 mg  12.5 mg Oral Q6H PRN    Or    ondansetron (ZOFRAN) injection 4 mg  4 mg IntraVENous Q6H PRN    enoxaparin (LOVENOX) injection 40 mg  40 mg SubCUTAneous DAILY    famotidine (PEPCID) tablet 20 mg  20 mg Oral DAILY    insulin lispro (HUMALOG) injection   SubCUTAneous AC&HS    glucose chewable tablet 16 g  4 Tab Oral PRN    glucagon (GLUCAGEN) injection 1 mg  1 mg IntraMUSCular PRN    dextrose (D50W) injection syrg 12.5-25 g  25-50 mL IntraVENous PRN    guaiFENesin-dextromethorphan (ROBITUSSIN DM) 100-10 mg/5 mL syrup 5 mL  5 mL Oral Q4H PRN    cholecalciferol (VITAMIN D3) (1000 Units /25 mcg) tablet 6 Tab  6,000 Units Oral DAILY    ascorbic acid (vitamin C) (VITAMIN C) tablet 500 mg  500 mg Oral BID    zinc sulfate (ZINCATE) 220 (50) mg capsule 1 Cap  1 Cap Oral DAILY    albuterol (PROVENTIL HFA, VENTOLIN HFA, PROAIR HFA) inhaler 2 Puff  2 Puff Inhalation Q4H PRN    ALPRAZolam (XANAX) tablet 0.5 mg  0.5 mg Oral QHS    aspirin delayed-release tablet 81 mg  81 mg Oral DAILY    atorvastatin (LIPITOR) tablet 20 mg  20 mg Oral DAILY    losartan (COZAAR) tablet 50 mg  50 mg Oral DAILY    metoprolol tartrate (LOPRESSOR) tablet 25 mg  25 mg Oral BID    therapeutic multivitamin (THERAGRAN) tablet 1 Tab  1 Tab Oral DAILY    NIFEdipine ER (PROCARDIA XL) tablet 60 mg  60 mg Oral DAILY    budesonide-formoterol (SYMBICORT) 80-4.5 mcg inhaler  2 Puff Inhalation BID RT    0.9% sodium chloride infusion 250 mL  250 mL IntraVENous PRN    chlorhexidine (PERIDEX) 0.12 % mouthwash 15 mL  15 mL Oral Q12H    sodium chloride (NS) flush 5-10 mL  5-10 mL IntraVENous PRN

## 2020-12-16 NOTE — PROGRESS NOTES
[facility]  Pulmonary, Critical Care, and Sleep Medicine    Pulmonary Medicine Progress Note    Name: Mike Garza MRN: [id number]  : 1935 Hospital: [facility]  Date: 2020       Subjective:  Pt still on 6L. Having some chest pressure this AM. VSS. She states she still needs some more time before going home. Minimal cough. Patient Active Problem List   Diagnosis Code    Occlusion and stenosis of carotid artery I65.29    Carotid stenosis I65.29    BURGESS (dyspnea on exertion) R06.00    Hyperlipidemia E78.5    Essential hypertension with goal blood pressure less than 140/90 I10    Chronic diastolic congestive heart failure (HCC) I50.32    Chronic obstructive pulmonary disease (HCC) J44.9    Pulmonary HTN (HCC) I27.20    CAP (community acquired pneumonia) J18.9    Suspected COVID-19 virus infection Z20.828     Assessment:  · Respiratory Failure: Acute on Chronic- Hypoxic- on 2-3L at home  · COVID-19 Pneumonia  · Emphysema/COPD- Pulmonary - [facility]: Dr. Alix Hodgkin  · Pulmonary Hypertension  · HFpEF- Chronic  · Hypertension    Impression/Plan:  - Wean O2 as tolerated  - Continue nebulizers  - Continue symbicort and spiriva  - s/p remdesivir, continue decadron for now  - PT/OT    Possible D/C tomorrow    FiO2 to keep SpO2 >=92%, HOB >=30 degree, aspiration precautions, aggressive pulmonary toileting, incentive spirometry. Other issues management by primary team and respective consultants. Events and notes from last 24 hours reviewed. Discussed with patient and family, answered all questions to their satisfaction. Care plan discussed with nursing.      Labs and images personally seen and available reports reviewed  All current medicines are reviewed       Medications- Current:  Current Facility-Administered Medications   Medication Dose Route Frequency    furosemide (LASIX) tablet 20 mg  20 mg Oral BID    ipratropium-albuterol (COMBIVENT RESPIMAT) 20 mcg-100 mcg inhalation spray  2 Puff Inhalation Q4H RT    dexamethasone (DECADRON) 4 mg/mL injection 6 mg  6 mg IntraVENous Q12H    lactobacillus sp. 50 billion cpu (BIO-K PLUS) capsule 1 Cap  1 Cap Oral Q12H    amoxicillin-clavulanate (AUGMENTIN) 875-125 mg per tablet 1 Tab  1 Tab Oral Q12H    guaiFENesin ER (MUCINEX) tablet 600 mg  600 mg Oral Q12H    benzocaine-menthoL (CEPACOL) lozenge 1 Lozenge  1 Lozenge Mucous Membrane PRN    sodium chloride (NS) flush 5-40 mL  5-40 mL IntraVENous Q8H    sodium chloride (NS) flush 5-40 mL  5-40 mL IntraVENous PRN    acetaminophen (TYLENOL) tablet 650 mg  650 mg Oral Q6H PRN    Or    acetaminophen (TYLENOL) suppository 650 mg  650 mg Rectal Q6H PRN    polyethylene glycol (MIRALAX) packet 17 g  17 g Oral DAILY PRN    promethazine (PHENERGAN) tablet 12.5 mg  12.5 mg Oral Q6H PRN    Or    ondansetron (ZOFRAN) injection 4 mg  4 mg IntraVENous Q6H PRN    enoxaparin (LOVENOX) injection 40 mg  40 mg SubCUTAneous DAILY    famotidine (PEPCID) tablet 20 mg  20 mg Oral DAILY    insulin lispro (HUMALOG) injection   SubCUTAneous AC&HS    glucose chewable tablet 16 g  4 Tab Oral PRN    glucagon (GLUCAGEN) injection 1 mg  1 mg IntraMUSCular PRN    dextrose (D50W) injection syrg 12.5-25 g  25-50 mL IntraVENous PRN    guaiFENesin-dextromethorphan (ROBITUSSIN DM) 100-10 mg/5 mL syrup 5 mL  5 mL Oral Q4H PRN    cholecalciferol (VITAMIN D3) (1000 Units /25 mcg) tablet 6 Tab  6,000 Units Oral DAILY    ascorbic acid (vitamin C) (VITAMIN C) tablet 500 mg  500 mg Oral BID    zinc sulfate (ZINCATE) 220 (50) mg capsule 1 Cap  1 Cap Oral DAILY    albuterol (PROVENTIL HFA, VENTOLIN HFA, PROAIR HFA) inhaler 2 Puff  2 Puff Inhalation Q4H PRN    ALPRAZolam (XANAX) tablet 0.5 mg  0.5 mg Oral QHS    aspirin delayed-release tablet 81 mg  81 mg Oral DAILY    atorvastatin (LIPITOR) tablet 20 mg  20 mg Oral DAILY    losartan (COZAAR) tablet 50 mg  50 mg Oral DAILY    metoprolol tartrate (LOPRESSOR) tablet 25 mg  25 mg Oral BID    therapeutic multivitamin (THERAGRAN) tablet 1 Tab  1 Tab Oral DAILY    NIFEdipine ER (PROCARDIA XL) tablet 60 mg  60 mg Oral DAILY    budesonide-formoterol (SYMBICORT) 80-4.5 mcg inhaler  2 Puff Inhalation BID RT    0.9% sodium chloride infusion 250 mL  250 mL IntraVENous PRN    chlorhexidine (PERIDEX) 0.12 % mouthwash 15 mL  15 mL Oral Q12H    sodium chloride (NS) flush 5-10 mL  5-10 mL IntraVENous PRN       Objective:  Vital Signs:    Visit Vitals  BP (!) 158/62 (BP 1 Location: Right arm, BP Patient Position: At rest)   Pulse 88   Temp 98.4 °F (36.9 °C)   Resp 27   Ht 5' 3\" (1.6 m)   Wt 62.5 kg (137 lb 11.2 oz)   SpO2 94%   Breastfeeding No   BMI 24.39 kg/m²      O2 Device: Nasal cannula  O2 Flow Rate (L/min): 5 l/min  Temp (24hrs), Av.3 °F (36.8 °C), Min:97.7 °F (36.5 °C), Max:98.9 °F (37.2 °C)      Intake/Output:   Last shift:      No intake/output data recorded. Last 3 shifts:  1901 -  0700  In: 1580 [P.O.:1580]  Out: -     Intake/Output Summary (Last 24 hours) at 2020 1815  Last data filed at 2020 0000  Gross per 24 hour   Intake 960 ml   Output --   Net 960 ml       Physical Exam:     General/Neurology: Alert, Awake  Head:   Normocephalic, without obvious abnormality  Eye:   PERRL, EOM intact  Oral:  Mucus membranes moist  Lung:   B/l air entry fair. Descreased BS, no wheezing. Heart:   S1 S2 present  Abdomen:  Soft, non tender, BS+nt   Extremities:  No pedal edema.    Skin:   Dry, intact  Data:      Recent Results (from the past 24 hour(s))   GLUCOSE, POC    Collection Time: 12/15/20  8:47 PM   Result Value Ref Range    Glucose (POC) 160 (H) 70 - 110 mg/dL   PROTHROMBIN TIME + INR    Collection Time: 20  1:23 AM   Result Value Ref Range    Prothrombin time 14.3 11.5 - 15.2 sec    INR 1.1 0.8 - 1.2     PTT    Collection Time: 20  1:23 AM   Result Value Ref Range    aPTT 39.9 (H) 23.0 - 36.4 SEC   FIBRINOGEN Collection Time: 12/16/20  1:23 AM   Result Value Ref Range    Fibrinogen 427 210 - 451 mg/dL   CBC WITH AUTOMATED DIFF    Collection Time: 12/16/20  1:23 AM   Result Value Ref Range    WBC 6.4 4.6 - 13.2 K/uL    RBC 4.54 4.20 - 5.30 M/uL    HGB 12.8 12.0 - 16.0 g/dL    HCT 39.6 35.0 - 45.0 %    MCV 87.2 74.0 - 97.0 FL    MCH 28.2 24.0 - 34.0 PG    MCHC 32.3 31.0 - 37.0 g/dL    RDW 14.5 11.6 - 14.5 %    PLATELET 391 262 - 891 K/uL    MPV 10.4 9.2 - 11.8 FL    NEUTROPHILS 86 (H) 40 - 73 %    LYMPHOCYTES 11 (L) 21 - 52 %    MONOCYTES 3 3 - 10 %    EOSINOPHILS 0 0 - 5 %    BASOPHILS 0 0 - 2 %    ABS. NEUTROPHILS 5.6 1.8 - 8.0 K/UL    ABS. LYMPHOCYTES 0.7 (L) 0.9 - 3.6 K/UL    ABS. MONOCYTES 0.2 0.05 - 1.2 K/UL    ABS. EOSINOPHILS 0.0 0.0 - 0.4 K/UL    ABS. BASOPHILS 0.0 0.0 - 0.1 K/UL    DF AUTOMATED     METABOLIC PANEL, COMPREHENSIVE    Collection Time: 12/16/20  1:23 AM   Result Value Ref Range    Sodium 143 136 - 145 mmol/L    Potassium 4.5 3.5 - 5.5 mmol/L    Chloride 110 100 - 111 mmol/L    CO2 28 21 - 32 mmol/L    Anion gap 5 3.0 - 18 mmol/L    Glucose 151 (H) 74 - 99 mg/dL    BUN 9 7.0 - 18 MG/DL    Creatinine 0.64 0.6 - 1.3 MG/DL    BUN/Creatinine ratio 14 12 - 20      GFR est AA >60 >60 ml/min/1.73m2    GFR est non-AA >60 >60 ml/min/1.73m2    Calcium 7.6 (L) 8.5 - 10.1 MG/DL    Bilirubin, total 0.5 0.2 - 1.0 MG/DL    ALT (SGPT) 22 13 - 56 U/L    AST (SGOT) 13 10 - 38 U/L    Alk.  phosphatase 43 (L) 45 - 117 U/L    Protein, total 5.6 (L) 6.4 - 8.2 g/dL    Albumin 2.7 (L) 3.4 - 5.0 g/dL    Globulin 2.9 2.0 - 4.0 g/dL    A-G Ratio 0.9 0.8 - 1.7     LD    Collection Time: 12/16/20  1:23 AM   Result Value Ref Range     81 - 234 U/L   FERRITIN    Collection Time: 12/16/20  1:23 AM   Result Value Ref Range    Ferritin 212 8 - 388 NG/ML   C REACTIVE PROTEIN, QT    Collection Time: 12/16/20  1:23 AM   Result Value Ref Range    C-Reactive protein 4.5 (H) 0 - 0.3 mg/dL   D DIMER    Collection Time: 12/16/20  1:23 AM Result Value Ref Range    D DIMER 0.56 (H) <0.46 ug/ml(FEU)   PROCALCITONIN    Collection Time: 12/16/20  1:23 AM   Result Value Ref Range    Procalcitonin <0.05 ng/mL   GLUCOSE, POC    Collection Time: 12/16/20  8:22 AM   Result Value Ref Range    Glucose (POC) 106 70 - 110 mg/dL   GLUCOSE, POC    Collection Time: 12/16/20 12:12 PM   Result Value Ref Range    Glucose (POC) 101 70 - 110 mg/dL   GLUCOSE, POC    Collection Time: 12/16/20  4:15 PM   Result Value Ref Range    Glucose (POC) 162 (H) 70 - 110 mg/dL         Chemistry   Recent Labs     12/16/20  0123 12/15/20  0210 12/14/20  0153   * 90 96    147* 143   K 4.5 3.3* 3.6    113* 111   CO2 28 27 23   BUN 9 15 25*   CREA 0.64 0.82 0.92   CA 7.6* 7.4* 7.8*   MG  --  2.3  --    AGAP 5 7 9   BUCR 14 18 27*   AP 43* 39* 45   TP 5.6* 5.4* 6.1*   ALB 2.7* 2.7* 3.0*   GLOB 2.9 2.7 3.1   AGRAT 0.9 1.0 1.0       CBC w/Diff   Recent Labs     12/16/20  0123 12/15/20  0210 12/14/20  0153   WBC 6.4 9.6 9.0   RBC 4.54 4.47 4.76   HGB 12.8 13.1 13.9   HCT 39.6 40.0 42.1    169 167   GRANS 86* 66 73   LYMPH 11* 16* 7*   EOS 0 0 0       ABG No results for input(s): PHI, PHI, POC2, PCO2I, PO2, PO2I, HCO3, HCO3I, FIO2, FIO2I in the last 72 hours. Micro  No results for input(s): SDES, CULT in the last 72 hours. No results for input(s): CULT in the last 72 hours. CT (Most Recent)   Results from Hospital Encounter encounter on 12/10/20   CTA CHEST W OR W WO CONT    Narrative CTA CHEST PULMONARY EMBOLISM PROTOCOL      INDICATION: Chills. Body aches. Symptoms since last night. Covid-19 concern.     TECHNIQUE: Thin collimation axial images obtained through the level of the  pulmonary arteries with additional imaging through the chest following the  uneventful administration of 65 cc Isovue-300 intravenous contrast.  Images  reconstructed into MIP coronal and sagittal projections for complete evaluation  of the tortuous and overlapping pulmonary vascular structures and to reduce  patient radiation dose. All CT scans are performed using dose optimization  techniques as appropriate to the performed exam including the following:  Automated exposure control, adjustment of mA and/or kV according to patient  size, and use of iterative reconstructive technique. COMPARISON: Chest CT 10/6/2020. FINDINGS:    No filling defects are appreciated within the main, left, right, lobar or  visualized segmental pulmonary arteries to suggest embolism. Motion artifact  limits peripheral, subsegmental branches. The thoracic aorta is not aneurysmal.   No evidence for dissection. Arterial wall calcifications. Biatrial cardiac  enlargement. No pericardial effusion. No pleural effusion. Precarinal 14 mm granulomatous  calcification containing lymph node is slightly larger. New additional  lymphadenopathy along bilateral mediastinum and subcarinal mediastinum. New 13  mm subcarinal lymph node. Left mediastinal nodes measure 11 mm. Bilateral hilar  lymphadenopathy. Left thyroid nodule is similar to prior measuring 3.1 x 1.8 cm. Emphysema. Hypoinflation and respiratory motion lung limitations. New 7 mm  nodule (image 18/series 3). Stable chronic scar at anterior right upper lobe and  mild at the right middle lobe. New mild sites of peripheral consolidation in the  right lateral lower lobe. Mild groundglass density versus atelectasis in the  left lower lung laterally. Small hiatal hernia. Subcentimeter right renal cyst  No acute bone finding. Impression IMPRESSION:    1. No evidence of pulmonary embolism. 2. Limited by motion and hypoinflation. Emphysema with mild infiltrate/pneumonia  suspected in the right lower lobe. 3. New mild hilar and mediastinal lymphadenopathy. 4. New 7 mm left upper lobe lung nodule. Follow-up recommended in 3 months  posttreatment to assess for its persistence, given the limitations of this exam.  5. Biatrial cardiac enlargement.   6. Stable left thyroid nodule. 7. Small hiatal hernia. XR (Most Recent). CXR reviewed by me and compared with previous CXR   Results from Hospital Encounter encounter on 12/10/20   XR CHEST PORT    Narrative INDICATION: eval for pulmonary edema, wet read please- results pending discharge    TECHNIQUE: Portable chest radiograph    COMPARISON: CT chest 10 December 2020, chest radiograph 10 December 2020, 15 May  2020. FINDINGS: The lungs are adequately inflated. No focal consolidation, effusion or  pneumothorax. Overall improved increased interstitial lung markings, with  residual increased interstitial lung markings -near baseline. Heart size remains  enlarged. No acute osseous abnormality. Impression IMPRESSION:     Overall improved lung aeration with mild residual areas of increased  interstitial lung markings -near baseline. See my orders for details     Total care time exclusive of procedures with complex decision making, coordination of care and counseling patient performed and > 50% time spent in face to face evaluation as mentioned above.     Josh Colon MD  Critical Care Medicine

## 2020-12-16 NOTE — PROGRESS NOTES
Infectious Disease progress Note        Reason: confirmed covid-19 pneumonia, acute hypoxic respiratory failure    Current abx Prior abx     augmentin since 12/14 Ceftriaxone 12/10  Azithromycin since 12/10-12/14  Pip/tazo since 12/11-12/14     Lines:       Assessment :     82 y.o. female with a history of COPD presented to ED on 12/10/2020 with increasing shortness of breath, fatigue. Labs on 12/11-ferritin 242, CRP 3.9, procalcitonin less than 0.05, D-dimer 0.92    Positive COVID-19 test 12/10/2020    Clinical presentation consistent with acute on chronic hypoxic respiratory failure secondary to confirmed COVID-19 pneumonia    Labs on 12/14-ferritin 177, CRP 0.4, procalcitonin less than 0.05, D-dimer 0.52  Status post remdesivir since 12/11/2020-12/15  Status post convalescent plasma 12/11/2020    Improving inflammatory markers    Improved oxygenation status post steroids    Recommendations:    1. continue augmentin till 12/16. 2.  Continue steroids per pulmonary   3. Continue anticoagulation per primary team  5. Titrate oxygen as tolerated    We will sign off. Will follow as needed    Above plan was discussed in details with patient, dr. Patt Mcfadden. Please call me if any further questions or concerns. Will continue to participate in the care of this patient. HPI:      Patient states that she Feels better. She denies worsening chest pain,  abdominal pain, diarrhea, dysuria. home Medication List    Details   atorvastatin (LIPITOR) 20 mg tablet Take 20 mg by mouth daily. multivitamin (ONE A DAY) tablet Take 1 Tab by mouth daily. COQ10, UBIQUINOL, PO Take  by mouth.      losartan (COZAAR) 50 mg tablet Take  by mouth daily. 75 mg in a.m      fluticasone-umeclidin-vilanter (TRELEGY ELLIPTA) 100-62.5-25 mcg dsdv Take 1 Puff by inhalation daily. diclofenac (VOLTAREN) 1 % gel Apply  to affected area four (4) times daily.   Qty: 100 g, Refills: 2      Oxygen       albuterol (PROVENTIL HFA, VENTOLIN HFA, PROAIR HFA) 90 mcg/actuation inhaler Take  by inhalation. albuterol (PROVENTIL VENTOLIN) 2.5 mg /3 mL (0.083 %) nebulizer solution 3 mL by Nebulization route every four (4) hours as needed for Wheezing or Shortness of Breath. Qty: 48 Each, Refills: 0      furosemide (LASIX) 20 mg tablet 20 mg po daily  Qty: 30 Tab, Refills: 0      acetaminophen (TYLENOL EXTRA STRENGTH) 500 mg tablet Take 500 mg by mouth every six (6) hours as needed for Pain.      metoprolol (LOPRESSOR) 25 mg tablet Take 25 mg by mouth two (2) times a day. NIFEdipine ER (ADALAT CC) 60 mg ER tablet Take 60 mg by mouth daily. Associated Diagnoses: Occlusion and stenosis of carotid artery, left; Pre-op testing      aspirin delayed-release 81 mg tablet Take  by mouth daily. Associated Diagnoses: Occlusion and stenosis of carotid artery, left; Pre-op testing      alprazolam (XANAX) 0.5 mg tablet Take  by mouth nightly.     Associated Diagnoses: Occlusion and stenosis of carotid artery, left; Pre-op testing             Current Facility-Administered Medications   Medication Dose Route Frequency    ipratropium-albuterol (COMBIVENT RESPIMAT) 20 mcg-100 mcg inhalation spray  2 Puff Inhalation Q4H RT    dexamethasone (DECADRON) 4 mg/mL injection 6 mg  6 mg IntraVENous Q12H    lactobacillus sp. 50 billion cpu (BIO-K PLUS) capsule 1 Cap  1 Cap Oral Q12H    amoxicillin-clavulanate (AUGMENTIN) 875-125 mg per tablet 1 Tab  1 Tab Oral Q12H    guaiFENesin ER (MUCINEX) tablet 600 mg  600 mg Oral Q12H    benzocaine-menthoL (CEPACOL) lozenge 1 Lozenge  1 Lozenge Mucous Membrane PRN    sodium chloride (NS) flush 5-40 mL  5-40 mL IntraVENous Q8H    sodium chloride (NS) flush 5-40 mL  5-40 mL IntraVENous PRN    acetaminophen (TYLENOL) tablet 650 mg  650 mg Oral Q6H PRN    Or    acetaminophen (TYLENOL) suppository 650 mg  650 mg Rectal Q6H PRN    polyethylene glycol (MIRALAX) packet 17 g  17 g Oral DAILY PRN    promethazine (PHENERGAN) tablet 12.5 mg  12.5 mg Oral Q6H PRN    Or    ondansetron (ZOFRAN) injection 4 mg  4 mg IntraVENous Q6H PRN    enoxaparin (LOVENOX) injection 40 mg  40 mg SubCUTAneous DAILY    famotidine (PEPCID) tablet 20 mg  20 mg Oral DAILY    insulin lispro (HUMALOG) injection   SubCUTAneous AC&HS    glucose chewable tablet 16 g  4 Tab Oral PRN    glucagon (GLUCAGEN) injection 1 mg  1 mg IntraMUSCular PRN    dextrose (D50W) injection syrg 12.5-25 g  25-50 mL IntraVENous PRN    guaiFENesin-dextromethorphan (ROBITUSSIN DM) 100-10 mg/5 mL syrup 5 mL  5 mL Oral Q4H PRN    cholecalciferol (VITAMIN D3) (1000 Units /25 mcg) tablet 6 Tab  6,000 Units Oral DAILY    ascorbic acid (vitamin C) (VITAMIN C) tablet 500 mg  500 mg Oral BID    zinc sulfate (ZINCATE) 220 (50) mg capsule 1 Cap  1 Cap Oral DAILY    albuterol (PROVENTIL HFA, VENTOLIN HFA, PROAIR HFA) inhaler 2 Puff  2 Puff Inhalation Q4H PRN    ALPRAZolam (XANAX) tablet 0.5 mg  0.5 mg Oral QHS    aspirin delayed-release tablet 81 mg  81 mg Oral DAILY    atorvastatin (LIPITOR) tablet 20 mg  20 mg Oral DAILY    losartan (COZAAR) tablet 50 mg  50 mg Oral DAILY    metoprolol tartrate (LOPRESSOR) tablet 25 mg  25 mg Oral BID    therapeutic multivitamin (THERAGRAN) tablet 1 Tab  1 Tab Oral DAILY    NIFEdipine ER (PROCARDIA XL) tablet 60 mg  60 mg Oral DAILY    budesonide-formoterol (SYMBICORT) 80-4.5 mcg inhaler  2 Puff Inhalation BID RT    0.9% sodium chloride infusion 250 mL  250 mL IntraVENous PRN    chlorhexidine (PERIDEX) 0.12 % mouthwash 15 mL  15 mL Oral Q12H    sodium chloride (NS) flush 5-10 mL  5-10 mL IntraVENous PRN       Allergies: Patient has no known allergies.     Temp (24hrs), Av.1 °F (36.7 °C), Min:97.7 °F (36.5 °C), Max:98.6 °F (37 °C)    Visit Vitals  BP (!) 131/51 (BP 1 Location: Right arm, BP Patient Position: At rest)   Pulse 87   Temp 98.6 °F (37 °C)   Resp 15   Ht 5' 3\" (1.6 m)   Wt 62.5 kg (137 lb 11.2 oz)   SpO2 97% Breastfeeding No   BMI 24.39 kg/m²       ROS: 12 point ROS obtained in details. Pertinent positives as mentioned in HPI,   otherwise negative    Physical Exam:    General:  Alert, cooperative,  in no apparent distress. On NC. Appears more comfortable than prior exam    Head:  Normocephalic, without obvious abnormality, atraumatic. Eyes:  Conjunctivae/corneas clear. PERRL, EOMs intact. Nose: Nares normal. Septum midline. Mucosa normal. No drainage or sinus tenderness. Throat: Lips, mucosa, and tongue normal. No exudate   Neck: Supple, symmetrical, trachea midline, no adenopathy, thyroid: no enlargment/tenderness/nodules,  no JVD. Back:   Symmetric   Lungs:   Symmetric, non-labored breathing pattern, No audible wheezing or stridor. Decreased diaphragm excursion by palpation, No SCM use   Chest wall:  No tenderness. Heart:  Regular rate and rhythm by palpation   Abdomen:   Soft, non-tender. No masses,  No organomegaly. Extremities: Extremities normal, atraumatic, no cyanosis or edema. Pulses: 2+ and symmetric all extremities. Skin: Skin color, texture, turgor normal. No rashes or lesions   Lymph nodes:       Cervical, supraclavicular nodes are unremarkable   Neurologic: Grossly nonfocal         Labs: Results:   Chemistry Recent Labs     12/16/20  0123 12/15/20  0210 12/14/20  0153   * 90 96    147* 143   K 4.5 3.3* 3.6    113* 111   CO2 28 27 23   BUN 9 15 25*   CREA 0.64 0.82 0.92   CA 7.6* 7.4* 7.8*   AGAP 5 7 9   BUCR 14 18 27*   AP 43* 39* 45   TP 5.6* 5.4* 6.1*   ALB 2.7* 2.7* 3.0*   GLOB 2.9 2.7 3.1   AGRAT 0.9 1.0 1.0      CBC w/Diff Recent Labs     12/16/20  0123 12/15/20  0210 12/14/20  0153   WBC 6.4 9.6 9.0   RBC 4.54 4.47 4.76   HGB 12.8 13.1 13.9   HCT 39.6 40.0 42.1    169 167   GRANS 86* 66 73   LYMPH 11* 16* 7*   EOS 0 0 0      Microbiology No results for input(s): CULT in the last 72 hours.        RADIOLOGY:    All available imaging studies/reports in connect care for this admission were reviewed    Total time spent >35 minutes. High complexity decision making was performed during the evaluation of this patient at high risk for decompensation with multiple organ involvement     Above mentioned total time spent on reviewing the case/medical record/data/notes/EMR/patient examination/documentation/coordinating care with nurse/consultants, exclusive of procedures with complex decision making performed and > 50% time spent in face to face evaluation.     Dr. Ángel Green, Infectious Disease Specialist  [phone]  December 16, 2020  9:03 AM

## 2020-12-17 LAB
ALBUMIN SERPL-MCNC: 2.8 G/DL (ref 3.4–5)
ALBUMIN/GLOB SERPL: 0.8 {RATIO} (ref 0.8–1.7)
ALP SERPL-CCNC: 41 U/L (ref 45–117)
ALT SERPL-CCNC: 25 U/L (ref 13–56)
ANION GAP SERPL CALC-SCNC: 5 MMOL/L (ref 3–18)
APTT PPP: 34.6 SEC (ref 23–36.4)
AST SERPL-CCNC: 16 U/L (ref 10–38)
BASOPHILS # BLD: 0 K/UL (ref 0–0.1)
BASOPHILS NFR BLD: 0 % (ref 0–2)
BILIRUB SERPL-MCNC: 0.5 MG/DL (ref 0.2–1)
BUN SERPL-MCNC: 11 MG/DL (ref 7–18)
BUN/CREAT SERPL: 16 (ref 12–20)
CALCIUM SERPL-MCNC: 8.1 MG/DL (ref 8.5–10.1)
CHLORIDE SERPL-SCNC: 109 MMOL/L (ref 100–111)
CO2 SERPL-SCNC: 28 MMOL/L (ref 21–32)
CREAT SERPL-MCNC: 0.69 MG/DL (ref 0.6–1.3)
CRP SERPL-MCNC: 2.6 MG/DL (ref 0–0.3)
D DIMER PPP FEU-MCNC: 0.3 UG/ML(FEU)
DIFFERENTIAL METHOD BLD: ABNORMAL
EOSINOPHIL # BLD: 0 K/UL (ref 0–0.4)
EOSINOPHIL NFR BLD: 0 % (ref 0–5)
ERYTHROCYTE [DISTWIDTH] IN BLOOD BY AUTOMATED COUNT: 14.5 % (ref 11.6–14.5)
FERRITIN SERPL-MCNC: 216 NG/ML (ref 8–388)
FIBRINOGEN PPP-MCNC: 435 MG/DL (ref 210–451)
GLOBULIN SER CALC-MCNC: 3.3 G/DL (ref 2–4)
GLUCOSE BLD STRIP.AUTO-MCNC: 115 MG/DL (ref 70–110)
GLUCOSE BLD STRIP.AUTO-MCNC: 131 MG/DL (ref 70–110)
GLUCOSE BLD STRIP.AUTO-MCNC: 155 MG/DL (ref 70–110)
GLUCOSE BLD STRIP.AUTO-MCNC: 194 MG/DL (ref 70–110)
GLUCOSE SERPL-MCNC: 188 MG/DL (ref 74–99)
HCT VFR BLD AUTO: 39.4 % (ref 35–45)
HGB BLD-MCNC: 12.9 G/DL (ref 12–16)
INR PPP: 1.1 (ref 0.8–1.2)
LDH SERPL L TO P-CCNC: 223 U/L (ref 81–234)
LYMPHOCYTES # BLD: 0.5 K/UL (ref 0.9–3.6)
LYMPHOCYTES NFR BLD: 7 % (ref 21–52)
MCH RBC QN AUTO: 28.5 PG (ref 24–34)
MCHC RBC AUTO-ENTMCNC: 32.7 G/DL (ref 31–37)
MCV RBC AUTO: 87.2 FL (ref 74–97)
MONOCYTES # BLD: 0.3 K/UL (ref 0.05–1.2)
MONOCYTES NFR BLD: 5 % (ref 3–10)
NEUTS SEG # BLD: 5.6 K/UL (ref 1.8–8)
NEUTS SEG NFR BLD: 88 % (ref 40–73)
PLATELET # BLD AUTO: 199 K/UL (ref 135–420)
PMV BLD AUTO: 10.4 FL (ref 9.2–11.8)
POTASSIUM SERPL-SCNC: 4.5 MMOL/L (ref 3.5–5.5)
PROCALCITONIN SERPL-MCNC: <0.05 NG/ML
PROT SERPL-MCNC: 6.1 G/DL (ref 6.4–8.2)
PROTHROMBIN TIME: 14 SEC (ref 11.5–15.2)
RBC # BLD AUTO: 4.52 M/UL (ref 4.2–5.3)
SODIUM SERPL-SCNC: 142 MMOL/L (ref 136–145)
WBC # BLD AUTO: 6.4 K/UL (ref 4.6–13.2)

## 2020-12-17 PROCEDURE — 97535 SELF CARE MNGMENT TRAINING: CPT

## 2020-12-17 PROCEDURE — 85384 FIBRINOGEN ACTIVITY: CPT

## 2020-12-17 PROCEDURE — 36415 COLL VENOUS BLD VENIPUNCTURE: CPT

## 2020-12-17 PROCEDURE — 74011636637 HC RX REV CODE- 636/637: Performed by: HOSPITALIST

## 2020-12-17 PROCEDURE — 77010033678 HC OXYGEN DAILY

## 2020-12-17 PROCEDURE — 82962 GLUCOSE BLOOD TEST: CPT

## 2020-12-17 PROCEDURE — 2709999900 HC NON-CHARGEABLE SUPPLY

## 2020-12-17 PROCEDURE — 99233 SBSQ HOSP IP/OBS HIGH 50: CPT | Performed by: INTERNAL MEDICINE

## 2020-12-17 PROCEDURE — 84145 PROCALCITONIN (PCT): CPT

## 2020-12-17 PROCEDURE — 80053 COMPREHEN METABOLIC PANEL: CPT

## 2020-12-17 PROCEDURE — 74011250637 HC RX REV CODE- 250/637: Performed by: INTERNAL MEDICINE

## 2020-12-17 PROCEDURE — 74011250636 HC RX REV CODE- 250/636: Performed by: NURSE PRACTITIONER

## 2020-12-17 PROCEDURE — 97110 THERAPEUTIC EXERCISES: CPT

## 2020-12-17 PROCEDURE — 85379 FIBRIN DEGRADATION QUANT: CPT

## 2020-12-17 PROCEDURE — 74011000250 HC RX REV CODE- 250: Performed by: INTERNAL MEDICINE

## 2020-12-17 PROCEDURE — 65660000000 HC RM CCU STEPDOWN

## 2020-12-17 PROCEDURE — 85025 COMPLETE CBC W/AUTO DIFF WBC: CPT

## 2020-12-17 PROCEDURE — 86140 C-REACTIVE PROTEIN: CPT

## 2020-12-17 PROCEDURE — 94640 AIRWAY INHALATION TREATMENT: CPT

## 2020-12-17 PROCEDURE — 85730 THROMBOPLASTIN TIME PARTIAL: CPT

## 2020-12-17 PROCEDURE — 74011250637 HC RX REV CODE- 250/637: Performed by: NURSE PRACTITIONER

## 2020-12-17 PROCEDURE — 97530 THERAPEUTIC ACTIVITIES: CPT

## 2020-12-17 PROCEDURE — 97116 GAIT TRAINING THERAPY: CPT

## 2020-12-17 PROCEDURE — 85610 PROTHROMBIN TIME: CPT

## 2020-12-17 PROCEDURE — 82728 ASSAY OF FERRITIN: CPT

## 2020-12-17 PROCEDURE — 74011250637 HC RX REV CODE- 250/637: Performed by: HOSPITALIST

## 2020-12-17 PROCEDURE — 83615 LACTATE (LD) (LDH) ENZYME: CPT

## 2020-12-17 PROCEDURE — 99232 SBSQ HOSP IP/OBS MODERATE 35: CPT | Performed by: HOSPITALIST

## 2020-12-17 RX ORDER — HYDRALAZINE HYDROCHLORIDE 20 MG/ML
10 INJECTION INTRAMUSCULAR; INTRAVENOUS
Status: DISCONTINUED | OUTPATIENT
Start: 2020-12-17 | End: 2020-12-19 | Stop reason: HOSPADM

## 2020-12-17 RX ORDER — FUROSEMIDE 40 MG/1
40 TABLET ORAL DAILY
Status: DISCONTINUED | OUTPATIENT
Start: 2020-12-18 | End: 2020-12-19 | Stop reason: HOSPADM

## 2020-12-17 RX ORDER — NIFEDIPINE 10 MG/1
30 CAPSULE ORAL
Status: DISCONTINUED | OUTPATIENT
Start: 2020-12-17 | End: 2020-12-17

## 2020-12-17 RX ORDER — NIFEDIPINE 30 MG/1
30 TABLET, EXTENDED RELEASE ORAL ONCE
Status: COMPLETED | OUTPATIENT
Start: 2020-12-17 | End: 2020-12-17

## 2020-12-17 RX ORDER — IPRATROPIUM BROMIDE AND ALBUTEROL SULFATE 2.5; .5 MG/3ML; MG/3ML
3 SOLUTION RESPIRATORY (INHALATION)
Status: DISCONTINUED | OUTPATIENT
Start: 2020-12-17 | End: 2020-12-19

## 2020-12-17 RX ORDER — NIFEDIPINE 90 MG/1
90 TABLET, EXTENDED RELEASE ORAL DAILY
Status: DISCONTINUED | OUTPATIENT
Start: 2020-12-18 | End: 2020-12-19 | Stop reason: HOSPADM

## 2020-12-17 RX ORDER — HYDRALAZINE HYDROCHLORIDE 20 MG/ML
10 INJECTION INTRAMUSCULAR; INTRAVENOUS
Status: DISCONTINUED | OUTPATIENT
Start: 2020-12-17 | End: 2020-12-17

## 2020-12-17 RX ADMIN — AMOXICILLIN AND CLAVULANATE POTASSIUM 1 TABLET: 875; 125 TABLET, FILM COATED ORAL at 08:20

## 2020-12-17 RX ADMIN — BUDESONIDE AND FORMOTEROL FUMARATE DIHYDRATE 2 PUFF: 80; 4.5 AEROSOL RESPIRATORY (INHALATION) at 08:00

## 2020-12-17 RX ADMIN — GUAIFENESIN 600 MG: 600 TABLET, EXTENDED RELEASE ORAL at 08:19

## 2020-12-17 RX ADMIN — GUAIFENESIN 600 MG: 600 TABLET, EXTENDED RELEASE ORAL at 21:48

## 2020-12-17 RX ADMIN — IPRATROPIUM BROMIDE AND ALBUTEROL SULFATE 3 ML: .5; 3 SOLUTION RESPIRATORY (INHALATION) at 17:54

## 2020-12-17 RX ADMIN — Medication 500 MG: at 21:48

## 2020-12-17 RX ADMIN — METOPROLOL TARTRATE 25 MG: 25 TABLET, FILM COATED ORAL at 21:48

## 2020-12-17 RX ADMIN — DEXAMETHASONE SODIUM PHOSPHATE 6 MG: 4 INJECTION, SOLUTION INTRAMUSCULAR; INTRAVENOUS at 08:20

## 2020-12-17 RX ADMIN — Medication 10 ML: at 22:47

## 2020-12-17 RX ADMIN — FAMOTIDINE 20 MG: 20 TABLET, FILM COATED ORAL at 08:20

## 2020-12-17 RX ADMIN — Medication 1 CAPSULE: at 21:48

## 2020-12-17 RX ADMIN — ACETAMINOPHEN 650 MG: 325 TABLET ORAL at 12:16

## 2020-12-17 RX ADMIN — CHLORHEXIDINE GLUCONATE 0.12% ORAL RINSE 15 ML: 1.2 LIQUID ORAL at 08:21

## 2020-12-17 RX ADMIN — Medication 6 TABLET: at 08:20

## 2020-12-17 RX ADMIN — CHLORHEXIDINE GLUCONATE 0.12% ORAL RINSE 15 ML: 1.2 LIQUID ORAL at 21:48

## 2020-12-17 RX ADMIN — IPRATROPIUM BROMIDE AND ALBUTEROL 2 PUFF: 20; 100 SPRAY, METERED RESPIRATORY (INHALATION) at 04:00

## 2020-12-17 RX ADMIN — METOPROLOL TARTRATE 25 MG: 25 TABLET, FILM COATED ORAL at 08:20

## 2020-12-17 RX ADMIN — IPRATROPIUM BROMIDE AND ALBUTEROL SULFATE 3 ML: .5; 3 SOLUTION RESPIRATORY (INHALATION) at 12:55

## 2020-12-17 RX ADMIN — ALPRAZOLAM 0.5 MG: 0.5 TABLET ORAL at 21:48

## 2020-12-17 RX ADMIN — Medication 81 MG: at 08:19

## 2020-12-17 RX ADMIN — ATORVASTATIN CALCIUM 20 MG: 20 TABLET, FILM COATED ORAL at 08:19

## 2020-12-17 RX ADMIN — INSULIN LISPRO 3 UNITS: 100 INJECTION, SOLUTION INTRAVENOUS; SUBCUTANEOUS at 17:53

## 2020-12-17 RX ADMIN — IPRATROPIUM BROMIDE AND ALBUTEROL SULFATE 3 ML: .5; 3 SOLUTION RESPIRATORY (INHALATION) at 20:45

## 2020-12-17 RX ADMIN — BUDESONIDE AND FORMOTEROL FUMARATE DIHYDRATE 2 PUFF: 80; 4.5 AEROSOL RESPIRATORY (INHALATION) at 20:45

## 2020-12-17 RX ADMIN — Medication 10 ML: at 08:24

## 2020-12-17 RX ADMIN — THERA TABS 1 TABLET: TAB at 08:19

## 2020-12-17 RX ADMIN — Medication 1 CAPSULE: at 08:20

## 2020-12-17 RX ADMIN — LOSARTAN POTASSIUM 50 MG: 50 TABLET, FILM COATED ORAL at 08:20

## 2020-12-17 RX ADMIN — FUROSEMIDE 20 MG: 20 TABLET ORAL at 08:20

## 2020-12-17 RX ADMIN — DEXAMETHASONE SODIUM PHOSPHATE 6 MG: 4 INJECTION, SOLUTION INTRAMUSCULAR; INTRAVENOUS at 21:47

## 2020-12-17 RX ADMIN — Medication 10 ML: at 14:17

## 2020-12-17 RX ADMIN — NIFEDIPINE 60 MG: 60 TABLET, FILM COATED, EXTENDED RELEASE ORAL at 08:18

## 2020-12-17 RX ADMIN — INSULIN LISPRO 3 UNITS: 100 INJECTION, SOLUTION INTRAVENOUS; SUBCUTANEOUS at 22:18

## 2020-12-17 RX ADMIN — HYDRALAZINE HYDROCHLORIDE 10 MG: 20 INJECTION INTRAMUSCULAR; INTRAVENOUS at 12:55

## 2020-12-17 RX ADMIN — ENOXAPARIN SODIUM 40 MG: 40 INJECTION SUBCUTANEOUS at 08:18

## 2020-12-17 RX ADMIN — ACETAMINOPHEN 650 MG: 325 TABLET ORAL at 21:55

## 2020-12-17 RX ADMIN — NIFEDIPINE 30 MG: 30 TABLET, FILM COATED, EXTENDED RELEASE ORAL at 17:54

## 2020-12-17 RX ADMIN — Medication 500 MG: at 08:20

## 2020-12-17 RX ADMIN — ZINC SULFATE 220 MG (50 MG) CAPSULE 1 CAPSULE: CAPSULE at 08:20

## 2020-12-17 RX ADMIN — IPRATROPIUM BROMIDE AND ALBUTEROL 2 PUFF: 20; 100 SPRAY, METERED RESPIRATORY (INHALATION) at 08:00

## 2020-12-17 NOTE — PROGRESS NOTES
Problem: Mobility Impaired (Adult and Pediatric)  Goal: *Acute Goals and Plan of Care (Insert Text)  Description: Physical Therapy Goals  Initiated 12/11/2020 and to be accomplished within 7 day(s)  1. Patient will move from supine to sit and sit to supine , scoot up and down, and roll side to side in bed with modified independence. 2.  Patient will transfer from bed to chair and chair to bed with modified independence using the least restrictive device. 3.  Patient will perform sit to stand with modified independence. 4.  Patient will ambulate with modified independence for 100 feet with the least restrictive device. 5.  Patient will ascend/descend 3 stairs with 0 handrail(s) with supervision/set-up. (may have to simulate with box step due to droplet plus precautions)    PLOF: Pt reports living in 1 story house alone with family around but unable to give her 24 hour care if needed. Pt reporting she does not want to go to Rehab. Pt reports no AD and independent in all mobility, still driving. Outcome: Progressing Towards Goal     PHYSICAL THERAPY TREATMENT    Patient: Jonathan Hill (33 y.o. female)  Date: 12/17/2020  Diagnosis: Suspected COVID-19 virus infection [Z20.828]  CAP (community acquired pneumonia) [J18.9] <principal problem not specified>       Precautions: Fall, Other (comment)(droplet plus)    ASSESSMENT:  Rn cleared for PT to work with pt. Pt found sitting on EOB, on 45. L o2 NC, willing to work with PT. She states she has still been SOB but is very motivation to walk around the room. BP high when sitting EOB: 185/59- RN aware. Pt stood with RW and SBA, BP retaken: 189/60. Pt amb around the room 20 feet before needing a seated rest, SPO2 at 83% but quickly up to 92% with c/v for pursed lip breathing. Pt performed once more trial of amb around room before sitting for exercises per flow sheet.  Pt requested to sit on EOB at end of session with tray in front of her, on 4.5 L O2 NC, call bell nearby, no new questions or concerns. Progression toward goals:   []      Improving appropriately and progressing toward goals  [x]      Improving slowly and progressing toward goals  []      Not making progress toward goals and plan of care will be adjusted     PLAN:  Patient continues to benefit from skilled intervention to address the above impairments. Continue treatment per established plan of care. Discharge Recommendations:  Home Health with increased assist  Further Equipment Recommendations for Discharge:  rolling walker     SUBJECTIVE:   Patient stated its just my breathing that's bad.     OBJECTIVE DATA SUMMARY:   Critical Behavior:  Neurologic State: Alert  Orientation Level: Oriented X4  Cognition: Follows commands  Safety/Judgement: Fall prevention  Functional Mobility Training:    Scooting: Contact guard assistance         Transfers:  Sit to Stand: Stand-by assistance  Stand to Sit: Stand-by assistance    Balance:  Sitting: Intact  Standing: With support  Standing - Static: Good  Standing - Dynamic : Fair   Ambulation/Gait Training:  Distance (ft): 20 Feet (ft)(x2 trials)     Ambulation - Level of Assistance: Stand-by assistance        Gait Abnormalities: Decreased step clearance  Therapeutic Exercises:   Pt performed exercises per flow sheet sitting EOB      EXERCISE   Sets   Reps   Active Active Assist   Passive Self ROM   Comments   Ankle Pumps    [] [] [] []    Quad Sets/Glut Sets    [] [] [] [] Hold for 5 secs   Hamstring Sets   [] [] [] []    Short Arc Quads   [] [] [] []    Heel Slides   [] [] [] []    Straight Leg Raises   [] [] [] []    Hip Add   [] [] [] [] Hold for 5 secs, w/ pillow squeeze   Long Arc Quads 1 10 [x] [] [] []    Seated Marching   [] [] [] []    Standing Marching   [] [] [] []       [] [] [] []        Pain:  Pain level pre-treatment: 3/10  Pain level post-treatment: 3/10   Pain Intervention(s): Medication (see MAR);  Rest, Repositioning   Response to intervention: Nurse notified, See doc flow    Activity Tolerance:   Pt tolerated mobility fair, lowest SPO2 readin% during amb  Please refer to the flowsheet for vital signs taken during this treatment. After treatment:   [] Patient left in no apparent distress sitting up in chair  [x] Patient left in no apparent distress in bed  [x] Call bell left within reach  [x] Nursing notified  [] Caregiver present  [] Bed alarm activated  [] SCDs applied      COMMUNICATION/EDUCATION:   [x]         Role of Physical Therapy in the acute care setting. [x]         Fall prevention education was provided and the patient/caregiver indicated understanding. [x]         Patient/family have participated as able in working toward goals and plan of care. []         Patient/family agree to work toward stated goals and plan of care. []         Patient understands intent and goals of therapy, but is neutral about his/her participation.   []         Patient is unable to participate in stated goals/plan of care: ongoing with therapy staff.  []         Other:        Rosales Olvera   Time Calculation: 25 mins

## 2020-12-17 NOTE — PROGRESS NOTES
Problem: Self Care Deficits Care Plan (Adult)  Goal: *Acute Goals and Plan of Care (Insert Text)  Description: Occupational Therapy Goals  Initiated 12/12/2020 within 7 day(s). 1.  Patient will perform lower body dressing with supervision/set-up for seated and standing aspects. 2.  Patient will perform toilet transfers with supervision/set-up. 3.  Patient will perform all aspects of toileting with supervision/set-up. 4.  Patient will participate in upper extremity therapeutic exercise/activities with supervision/set-up for 5 minutes. 5.  Patient will utilize energy conservation techniques during functional activities with occasional verbal cues. Prior Level of Function: Pt reports being previously independent in I/ADLs and functional mobility w/o AD. Pt has supportive son who lives nearby (3 miles), sister, and mult neighbors who check on her. Outcome: Progressing Towards Goal   OCCUPATIONAL THERAPY TREATMENT    Patient: Chandni Ervin (29 y.o. female)  Date: 12/17/2020  Diagnosis: Suspected COVID-19 virus infection [Z20.828]  CAP (community acquired pneumonia) [J18.9] <principal problem not specified>       Precautions: Fall, Other (comment)(droplet plus)  PLOF: Pt reports being previously independent in I/ADLs and functional mobility w/o AD. Pt has supportive son who lives nearby (3 miles), sister, and mult neighbors who check on her. Chart, occupational therapy assessment, plan of care, and goals were reviewed. ASSESSMENT:  Pt cleared by RN for OT tx at this time. Pt presented sitting EOB upon therapist arrival on 5L O2NC and agreeable for participation. Pt's SPO2 ranged from 87% to 94% throughout tx session on 5L. Pt demonstrated LB dressing task Supervision requiring vc's for EC/WS technique. Pt performed STS transfer SBA and required CGA for functional mobility ~ 3 ft and demonstrated item retrieval task reaching across mid line for toiletry items.  Pt attempted to perform oral care task @ stand however pt became SOB with SPO2 desat to 87% requiring seated rest break with recovery to 90% ~ 1 min while utilizing pursed lip breathing technique. Pt continued oral care task sitting EOB with MOD I with increased time 2/2 SOB. Vitals assessed /57,  bpm, and SPO2 94% on 5L O2NC, NSG staff made aware. Pt left sitting EOB, 5L O2NC intact,and all needs left within reach. Progression toward goals:  [x]          Improving appropriately and progressing toward goals  []          Improving slowly and progressing toward goals  []          Not making progress toward goals and plan of care will be adjusted     PLAN:  Patient continues to benefit from skilled intervention to address the above impairments. Continue treatment per established plan of care. Discharge Recommendations:   Home Health with family support  Further Equipment Recommendations for Discharge:  RW, shower chair, BSC      SUBJECTIVE:   Patient stated  I feel better today. \"     OBJECTIVE DATA SUMMARY:   Cognitive/Behavioral Status:  Neurologic State: Alert  Orientation Level: Oriented X4  Cognition: Follows commands  Safety/Judgement: Fall prevention    Functional Mobility and Transfers for ADLs:      Transfers:  Sit to Stand: Stand-by assistance  Stand to Sit: Stand-by assistance       Balance:  Sitting: Intact  Standing: With support  Standing - Static: Good  Standing - Dynamic : Fair(+)    ADL Intervention:   LB dressing: Supervision donning socks while EOB requiring vc's for leg cross technique    Grooming  Grooming Assistance: Modified independent  Position Performed: Seated edge of bed  Brushing Teeth: Modified independent    Pain:  Pt reports no pain at this time    Activity Tolerance:    Fair due to SOB   Please refer to the flowsheet for vital signs taken during this treatment.   After treatment:   [x]  Patient left in no apparent distress sitting up EOB   []  Patient left in no apparent distress in bed  [x]  Call bell left within reach  [x]  Nursing notified  []  Caregiver present  []  Bed alarm activated    COMMUNICATION/EDUCATION:   [x] Role of Occupational Therapy in the acute care setting  [] Home safety education was provided and the patient/caregiver indicated understanding. [x] Patient/family have participated as able in working towards goals and plan of care. [x] Patient/family agree to work toward stated goals and plan of care. [] Patient understands intent and goals of therapy, but is neutral about his/her participation. [] Patient is unable to participate in goal setting and plan of care.       Thank you for this referral.  MADDIE Vaca  Time Calculation: 24 mins

## 2020-12-17 NOTE — PROGRESS NOTES
Patient's BP has been elevated in the 414Z/034E systolic. Dr. Abel Gonzalez paged and Elaina Prabhakar NP returned call. Made aware of BP and will add PRN hydralazine IV for BP systolic greater than 625. Will continue to monitor.

## 2020-12-17 NOTE — PROGRESS NOTES
[facility]  Pulmonary, Critical Care, and Sleep Medicine    Pulmonary Medicine Progress Note    Name: Hodan Salazar MRN: [id number]  : 1935 Hospital: [facility]  Date: 2020       Subjective:  Pt is doing well. Slowly improving. She has not been getting her nebulizer therapy and she feels because of the this is, she isn't improving much. Minimal cough, some chest pressure. Patient Active Problem List   Diagnosis Code    Occlusion and stenosis of carotid artery I65.29    Carotid stenosis I65.29    BURGESS (dyspnea on exertion) R06.00    Hyperlipidemia E78.5    Essential hypertension with goal blood pressure less than 140/90 I10    Chronic diastolic congestive heart failure (HCC) I50.32    Chronic obstructive pulmonary disease (HCC) J44.9    Pulmonary HTN (HCC) I27.20    CAP (community acquired pneumonia) J18.9    Suspected COVID-19 virus infection Z20.828     Assessment:  · Respiratory Failure: Acute on Chronic- Hypoxic- on 2-3L at home  · COVID-19 Pneumonia  · Emphysema/COPD- Pulmonary - [facility]: Dr. Alesia Lee  · Pulmonary Hypertension  · HFpEF- Chronic  · Hypertension    Impression/Plan:  - O2 weaned to 4L  - Will order nebulizers for self administration by the patient. These should not be cancelled by pharmacy or RT. This was discussed with both departments. - Continue symbicort and spiriva  - s/p remdesivir, continue decadron for now  - PT/OT    Will follow    FiO2 to keep SpO2 >=92%, HOB >=30 degree, aspiration precautions, aggressive pulmonary toileting, incentive spirometry. Other issues management by primary team and respective consultants. Events and notes from last 24 hours reviewed. Discussed with patient and family, answered all questions to their satisfaction. Care plan discussed with nursing.      Labs and images personally seen and available reports reviewed  All current medicines are reviewed       Medications- Current:  Current Facility-Administered Medications   Medication Dose Route Frequency    albuterol-ipratropium (DUO-NEB) 2.5 MG-0.5 MG/3 ML  3 mL Nebulization Q4H RT    [START ON 12/18/2020] NIFEdipine ER (PROCARDIA XL) tablet 90 mg  90 mg Oral DAILY    [START ON 12/18/2020] losartan (COZAAR) tablet 75 mg  75 mg Oral DAILY    hydrALAZINE (APRESOLINE) 20 mg/mL injection 10 mg  10 mg IntraVENous Q6H PRN    NIFEdipine ER (PROCARDIA XL) tablet 30 mg  30 mg Oral ONCE    [START ON 12/18/2020] furosemide (LASIX) tablet 40 mg  40 mg Oral DAILY    dexamethasone (DECADRON) 4 mg/mL injection 6 mg  6 mg IntraVENous Q12H    lactobacillus sp. 50 billion cpu (BIO-K PLUS) capsule 1 Cap  1 Cap Oral Q12H    guaiFENesin ER (MUCINEX) tablet 600 mg  600 mg Oral Q12H    benzocaine-menthoL (CEPACOL) lozenge 1 Lozenge  1 Lozenge Mucous Membrane PRN    sodium chloride (NS) flush 5-40 mL  5-40 mL IntraVENous Q8H    sodium chloride (NS) flush 5-40 mL  5-40 mL IntraVENous PRN    acetaminophen (TYLENOL) tablet 650 mg  650 mg Oral Q6H PRN    Or    acetaminophen (TYLENOL) suppository 650 mg  650 mg Rectal Q6H PRN    polyethylene glycol (MIRALAX) packet 17 g  17 g Oral DAILY PRN    promethazine (PHENERGAN) tablet 12.5 mg  12.5 mg Oral Q6H PRN    Or    ondansetron (ZOFRAN) injection 4 mg  4 mg IntraVENous Q6H PRN    enoxaparin (LOVENOX) injection 40 mg  40 mg SubCUTAneous DAILY    famotidine (PEPCID) tablet 20 mg  20 mg Oral DAILY    insulin lispro (HUMALOG) injection   SubCUTAneous AC&HS    glucose chewable tablet 16 g  4 Tab Oral PRN    glucagon (GLUCAGEN) injection 1 mg  1 mg IntraMUSCular PRN    dextrose (D50W) injection syrg 12.5-25 g  25-50 mL IntraVENous PRN    guaiFENesin-dextromethorphan (ROBITUSSIN DM) 100-10 mg/5 mL syrup 5 mL  5 mL Oral Q4H PRN    cholecalciferol (VITAMIN D3) (1000 Units /25 mcg) tablet 6 Tab  6,000 Units Oral DAILY    ascorbic acid (vitamin C) (VITAMIN C) tablet 500 mg  500 mg Oral BID    zinc sulfate (ZINCATE) 220 (50) mg capsule 1 Cap  1 Cap Oral DAILY    albuterol (PROVENTIL HFA, VENTOLIN HFA, PROAIR HFA) inhaler 2 Puff  2 Puff Inhalation Q4H PRN    ALPRAZolam (XANAX) tablet 0.5 mg  0.5 mg Oral QHS    aspirin delayed-release tablet 81 mg  81 mg Oral DAILY    atorvastatin (LIPITOR) tablet 20 mg  20 mg Oral DAILY    metoprolol tartrate (LOPRESSOR) tablet 25 mg  25 mg Oral BID    therapeutic multivitamin (THERAGRAN) tablet 1 Tab  1 Tab Oral DAILY    budesonide-formoterol (SYMBICORT) 80-4.5 mcg inhaler  2 Puff Inhalation BID RT    0.9% sodium chloride infusion 250 mL  250 mL IntraVENous PRN    chlorhexidine (PERIDEX) 0.12 % mouthwash 15 mL  15 mL Oral Q12H    sodium chloride (NS) flush 5-10 mL  5-10 mL IntraVENous PRN       Objective:  Vital Signs:    Visit Vitals  BP (!) 149/57 (BP 1 Location: Right arm, BP Patient Position: At rest)   Pulse 78   Temp 98.1 °F (36.7 °C)   Resp 14   Ht 5' 3\" (1.6 m)   Wt 59.9 kg (132 lb)   SpO2 97%   Breastfeeding No   BMI 23.38 kg/m²      O2 Device: Nasal cannula  O2 Flow Rate (L/min): 5 l/min  Temp (24hrs), Av.1 °F (36.7 °C), Min:97.3 °F (36.3 °C), Max:98.4 °F (36.9 °C)      Intake/Output:   Last shift:      701 - 1900  In: -   Out: 400 [Urine:400]  Last 3 shifts: 12/15 1901 -  07  In: 1440 [P.O.:1440]  Out: 300 [Urine:300]    Intake/Output Summary (Last 24 hours) at 2020 1615  Last data filed at 2020 0800  Gross per 24 hour   Intake 480 ml   Output 700 ml   Net -220 ml       Physical Exam:     General/Neurology: Alert, Awake  Head:   Normocephalic, without obvious abnormality  Eye:   PERRL, EOM intact  Oral:  Mucus membranes moist  Lung:   B/l air entry fair. Descreased BS, no wheezing. Heart:   S1 S2 present  Abdomen:  Soft, non tender, BS+nt   Extremities:  No pedal edema.    Skin:   Dry, intact  Data:      Recent Results (from the past 24 hour(s))   GLUCOSE, POC    Collection Time: 20  9:05 PM   Result Value Ref Range Glucose (POC) 129 (H) 70 - 110 mg/dL   PROTHROMBIN TIME + INR    Collection Time: 12/17/20  2:11 AM   Result Value Ref Range    Prothrombin time 14.0 11.5 - 15.2 sec    INR 1.1 0.8 - 1.2     PTT    Collection Time: 12/17/20  2:11 AM   Result Value Ref Range    aPTT 34.6 23.0 - 36.4 SEC   FIBRINOGEN    Collection Time: 12/17/20  2:11 AM   Result Value Ref Range    Fibrinogen 435 210 - 451 mg/dL   CBC WITH AUTOMATED DIFF    Collection Time: 12/17/20  2:11 AM   Result Value Ref Range    WBC 6.4 4.6 - 13.2 K/uL    RBC 4.52 4.20 - 5.30 M/uL    HGB 12.9 12.0 - 16.0 g/dL    HCT 39.4 35.0 - 45.0 %    MCV 87.2 74.0 - 97.0 FL    MCH 28.5 24.0 - 34.0 PG    MCHC 32.7 31.0 - 37.0 g/dL    RDW 14.5 11.6 - 14.5 %    PLATELET 052 882 - 821 K/uL    MPV 10.4 9.2 - 11.8 FL    NEUTROPHILS 88 (H) 40 - 73 %    LYMPHOCYTES 7 (L) 21 - 52 %    MONOCYTES 5 3 - 10 %    EOSINOPHILS 0 0 - 5 %    BASOPHILS 0 0 - 2 %    ABS. NEUTROPHILS 5.6 1.8 - 8.0 K/UL    ABS. LYMPHOCYTES 0.5 (L) 0.9 - 3.6 K/UL    ABS. MONOCYTES 0.3 0.05 - 1.2 K/UL    ABS. EOSINOPHILS 0.0 0.0 - 0.4 K/UL    ABS. BASOPHILS 0.0 0.0 - 0.1 K/UL    DF AUTOMATED     METABOLIC PANEL, COMPREHENSIVE    Collection Time: 12/17/20  2:11 AM   Result Value Ref Range    Sodium 142 136 - 145 mmol/L    Potassium 4.5 3.5 - 5.5 mmol/L    Chloride 109 100 - 111 mmol/L    CO2 28 21 - 32 mmol/L    Anion gap 5 3.0 - 18 mmol/L    Glucose 188 (H) 74 - 99 mg/dL    BUN 11 7.0 - 18 MG/DL    Creatinine 0.69 0.6 - 1.3 MG/DL    BUN/Creatinine ratio 16 12 - 20      GFR est AA >60 >60 ml/min/1.73m2    GFR est non-AA >60 >60 ml/min/1.73m2    Calcium 8.1 (L) 8.5 - 10.1 MG/DL    Bilirubin, total 0.5 0.2 - 1.0 MG/DL    ALT (SGPT) 25 13 - 56 U/L    AST (SGOT) 16 10 - 38 U/L    Alk.  phosphatase 41 (L) 45 - 117 U/L    Protein, total 6.1 (L) 6.4 - 8.2 g/dL    Albumin 2.8 (L) 3.4 - 5.0 g/dL    Globulin 3.3 2.0 - 4.0 g/dL    A-G Ratio 0.8 0.8 - 1.7     LD    Collection Time: 12/17/20  2:11 AM   Result Value Ref Range  81 - 234 U/L   FERRITIN    Collection Time: 12/17/20  2:11 AM   Result Value Ref Range    Ferritin 216 8 - 388 NG/ML   C REACTIVE PROTEIN, QT    Collection Time: 12/17/20  2:11 AM   Result Value Ref Range    C-Reactive protein 2.6 (H) 0 - 0.3 mg/dL   D DIMER    Collection Time: 12/17/20  2:11 AM   Result Value Ref Range    D DIMER 0.30 <0.46 ug/ml(FEU)   GLUCOSE, POC    Collection Time: 12/17/20  7:53 AM   Result Value Ref Range    Glucose (POC) 131 (H) 70 - 110 mg/dL   GLUCOSE, POC    Collection Time: 12/17/20 12:07 PM   Result Value Ref Range    Glucose (POC) 115 (H) 70 - 110 mg/dL   GLUCOSE, POC    Collection Time: 12/17/20  3:45 PM   Result Value Ref Range    Glucose (POC) 194 (H) 70 - 110 mg/dL         Chemistry   Recent Labs     12/17/20  0211 12/16/20  0123 12/15/20  0210   * 151* 90    143 147*   K 4.5 4.5 3.3*    110 113*   CO2 28 28 27   BUN 11 9 15   CREA 0.69 0.64 0.82   CA 8.1* 7.6* 7.4*   MG  --   --  2.3   AGAP 5 5 7   BUCR 16 14 18   AP 41* 43* 39*   TP 6.1* 5.6* 5.4*   ALB 2.8* 2.7* 2.7*   GLOB 3.3 2.9 2.7   AGRAT 0.8 0.9 1.0       CBC w/Diff   Recent Labs     12/17/20  0211 12/16/20  0123 12/15/20  0210   WBC 6.4 6.4 9.6   RBC 4.52 4.54 4.47   HGB 12.9 12.8 13.1   HCT 39.4 39.6 40.0    173 169   GRANS 88* 86* 66   LYMPH 7* 11* 16*   EOS 0 0 0       ABG No results for input(s): PHI, PHI, POC2, PCO2I, PO2, PO2I, HCO3, HCO3I, FIO2, FIO2I in the last 72 hours. Micro  No results for input(s): SDES, CULT in the last 72 hours. No results for input(s): CULT in the last 72 hours. CT (Most Recent)   Results from Hospital Encounter encounter on 12/10/20   CTA CHEST W OR W WO CONT    Narrative CTA CHEST PULMONARY EMBOLISM PROTOCOL      INDICATION: Chills. Body aches. Symptoms since last night. Covid-19 concern.     TECHNIQUE: Thin collimation axial images obtained through the level of the  pulmonary arteries with additional imaging through the chest following the  uneventful administration of 65 cc Isovue-300 intravenous contrast.  Images  reconstructed into MIP coronal and sagittal projections for complete evaluation  of the tortuous and overlapping pulmonary vascular structures and to reduce  patient radiation dose. All CT scans are performed using dose optimization  techniques as appropriate to the performed exam including the following:  Automated exposure control, adjustment of mA and/or kV according to patient  size, and use of iterative reconstructive technique. COMPARISON: Chest CT 10/6/2020. FINDINGS:    No filling defects are appreciated within the main, left, right, lobar or  visualized segmental pulmonary arteries to suggest embolism. Motion artifact  limits peripheral, subsegmental branches. The thoracic aorta is not aneurysmal.   No evidence for dissection. Arterial wall calcifications. Biatrial cardiac  enlargement. No pericardial effusion. No pleural effusion. Precarinal 14 mm granulomatous  calcification containing lymph node is slightly larger. New additional  lymphadenopathy along bilateral mediastinum and subcarinal mediastinum. New 13  mm subcarinal lymph node. Left mediastinal nodes measure 11 mm. Bilateral hilar  lymphadenopathy. Left thyroid nodule is similar to prior measuring 3.1 x 1.8 cm. Emphysema. Hypoinflation and respiratory motion lung limitations. New 7 mm  nodule (image 18/series 3). Stable chronic scar at anterior right upper lobe and  mild at the right middle lobe. New mild sites of peripheral consolidation in the  right lateral lower lobe. Mild groundglass density versus atelectasis in the  left lower lung laterally. Small hiatal hernia. Subcentimeter right renal cyst  No acute bone finding. Impression IMPRESSION:    1. No evidence of pulmonary embolism. 2. Limited by motion and hypoinflation. Emphysema with mild infiltrate/pneumonia  suspected in the right lower lobe.   3. New mild hilar and mediastinal lymphadenopathy. 4. New 7 mm left upper lobe lung nodule. Follow-up recommended in 3 months  posttreatment to assess for its persistence, given the limitations of this exam.  5. Biatrial cardiac enlargement. 6. Stable left thyroid nodule. 7. Small hiatal hernia. XR (Most Recent). CXR reviewed by me and compared with previous CXR   Results from Hospital Encounter encounter on 12/10/20   XR CHEST PORT    Narrative INDICATION: eval for pulmonary edema, wet read please- results pending discharge    TECHNIQUE: Portable chest radiograph    COMPARISON: CT chest 10 December 2020, chest radiograph 10 December 2020, 15 May  2020. FINDINGS: The lungs are adequately inflated. No focal consolidation, effusion or  pneumothorax. Overall improved increased interstitial lung markings, with  residual increased interstitial lung markings -near baseline. Heart size remains  enlarged. No acute osseous abnormality. Impression IMPRESSION:     Overall improved lung aeration with mild residual areas of increased  interstitial lung markings -near baseline. See my orders for details     Total care time exclusive of procedures with complex decision making, coordination of care and counseling patient performed and > 50% time spent in face to face evaluation as mentioned above.     Josh Colon MD  Critical Care Medicine

## 2020-12-17 NOTE — ROUTINE PROCESS
1905: Verbal shift change report given to Otoniel Le RN (oncoming nurse) by Yelitza Iyer RN (offgoing nurse). Report included the following information SBAR, Kardex, MAR and Cardiac Rhythm NSR to Sinus Tachy. 0010: Patient's BP elevated 172/69. Rechecked with opposite arm 180/62    0030: Patient's BP checked again 171/40. Will recheck BP every 30 minutes    0715: Verbal shift change report given to Tomy Griffith (oncoming nurse) by Marla Paz RN (offgoing nurse). Report included the following information SBAR, Kardex, Intake/Output, MAR and Cardiac Rhythm NSR to SR Tachy.

## 2020-12-17 NOTE — PROGRESS NOTES
Comprehensive Nutrition Assessment    Type and Reason for Visit: Initial, RD nutrition re-screen/LOS    Nutrition Recommendations/Plan:   - Add supplements: Ensure Enlive BID. Monitor po intake and diet tolerance. Nutrition Assessment:  Variable meal intake, consuming 50-75% of recent meals. Receiving ascorbic acid, cholecalciferol, theragran, zinc sulfate, steroid, Lasix and lactobacillus. Malnutrition Assessment:  Malnutrition Status: At risk for malnutrition (specify)(advanced age, COVID-23+ with SOB on admission)      Nutrition History and Allergies: Past medical history of CAD, HTN, CHF, HLD, COPD, emphysema, pulmonary hypertension who presented to [facility] ED c/o worsening fatigue, SOB, body aches and dull headache since 12/6. COVID-19 positive. Weight history per chart review: 135 lb (11/14/19), 127 lb (from 6/21/20 to 11/17/20) and 124 lb per patient report on admission. No nausea/vomiting/abdominal pain and no loss of sense of taste or smell on admission. NKFA.      Estimated Daily Nutrient Needs:  Energy (kcal): 9172-5790; Weight Used for Energy Requirements: Current  Protein (g): 48-72; Weight Used for Protein Requirements: Current(0.8-1.2)  Fluid (ml/day): 1945-6944; Method Used for Fluid Requirements: 1 ml/kcal    Nutrition Related Findings:  Last BM 12/16 (loose)      Wounds:  None       Current Nutrition Therapies:  DIET CARDIAC Regular    Anthropometric Measures:  · Height:  5' 3\" (160 cm)  · Current Body Wt:  59.9 kg (132 lb 0.9 oz)   · Admission Body Wt:  124 lb    · Usual Body Wt:  57.6 kg (127 lb)     · Ideal Body Wt:  115 lbs:  114.8 %   · BMI Category:  Normal weight (BMI 22.0-24.9) age over 72       Nutrition Diagnosis:   · Inadequate oral intake related to early satiety, impaired respiratory function as evidenced by intake 51-75%      Nutrition Interventions:   Food and/or Nutrient Delivery: Continue current diet, Start oral nutrition supplement  Nutrition Education and Counseling: Education not indicated  Coordination of Nutrition Care: Continue to monitor while inpatient    Goals:  PO nutrition intake will meet >75% of patient estimated nutritional needs within the next 7 days.        Nutrition Monitoring and Evaluation:   Behavioral-Environmental Outcomes: None identified  Food/Nutrient Intake Outcomes: Food and nutrient intake, Supplement intake  Physical Signs/Symptoms Outcomes: Biochemical data, Nutrition focused physical findings, Meal time behavior    Discharge Planning:    Continue current diet, Continue oral nutrition supplement     Electronically signed by Saurabh Franklin RD, [address]  on 12/17/2020 at 1:47 PM    Contact: [phone]

## 2020-12-17 NOTE — PROGRESS NOTES
[facility] Hospitalist Group  Progress Note    Patient: Alivia Waite Age: 82 y.o. : 1935 MR#: [id number] SSN: [id number]  Date: 2020     Subjective:   Sitting up on the edge of the bed. NAD. Alert and oriented x4. Denies SOB or cough. Currently on 4L, NC. Endorses wanting her lasix to be 40 mg daily even though she is getting 20 mg BID. She wants more fluid to be pulled off so that her oxygen is better. Assessment/Plan:   1. COVID-19 pneumonia   2. Acute on chronic hypoxic respiratory failure. Home oxygen 2-3L NC  3. Chronic diastolic HF  4. Moderate pulmonary HTN  5. COPD without exacerbation  6. Emphysema   7. CAD  8. HTN with hypertensive episodes   9. HLD  10. New 7mm pulmonary nodule HIRAL Follow up with Dr. Bridget Agustin as OP for repeat CT chest in 3 months  11. Anxiety   12. Advanced age    Plan  1. Monitor oxygen demand. Currently on 4L NC  2. Treated with remdesivir and convalescent plasma. Continue oxygen, symbicort, mucinex, duoneb  3. POC glucose, SSI  4. Monitor metabolic panel and renal function  5. Changed PO lasix from 20 mg BID to 40 mg daily starting tomorrow. 6. Hydralazine as needed, additional dose of nifedipine 30 mg now dose. Monitor BP.   7. Plan to discharge 1-2 days with [facility]. Updated son Mary Kauffman [phone]. Son confirmed that patient's sister will be helping with the care when she is discharge.    Additional Notes:      Case discussed with:  [x]Patient  [x]Family  [x]Nursing  []Case Management  DVT Prophylaxis:  []Lovenox  []Hep SQ  []SCDs  []Coumadin   []On Heparin gtt    Objective:   VS:   Visit Vitals  BP (!) 180/56 (BP 1 Location: Right arm, BP Patient Position: At rest)   Pulse 69   Temp 97.3 °F (36.3 °C)   Resp 15   Ht 5' 3\" (1.6 m)   Wt 59.9 kg (132 lb)   SpO2 97%   Breastfeeding No   BMI 23.38 kg/m²      Tmax/24hrs: Temp (24hrs), Av.1 °F (36.7 °C), Min:97.3 °F (36.3 °C), Max:98.4 °F (36.9 °C)      Intake/Output Summary (Last 24 hours) at 12/17/2020 1506  Last data filed at 12/17/2020 0800  Gross per 24 hour   Intake 480 ml   Output 700 ml   Net -220 ml       General:  Alert, NAD  Cardiovascular:  RRR  Pulmonary:  LSC throughout; respiratory effort WNL  GI:  +BS in all four quadrants, soft, non-tender  Extremities:  Bilateral trace edema   Neuro: alert and oriented x 4         Labs:    Recent Results (from the past 24 hour(s))   GLUCOSE, POC    Collection Time: 12/16/20  4:15 PM   Result Value Ref Range    Glucose (POC) 162 (H) 70 - 110 mg/dL   GLUCOSE, POC    Collection Time: 12/16/20  9:05 PM   Result Value Ref Range    Glucose (POC) 129 (H) 70 - 110 mg/dL   PROTHROMBIN TIME + INR    Collection Time: 12/17/20  2:11 AM   Result Value Ref Range    Prothrombin time 14.0 11.5 - 15.2 sec    INR 1.1 0.8 - 1.2     PTT    Collection Time: 12/17/20  2:11 AM   Result Value Ref Range    aPTT 34.6 23.0 - 36.4 SEC   FIBRINOGEN    Collection Time: 12/17/20  2:11 AM   Result Value Ref Range    Fibrinogen 435 210 - 451 mg/dL   CBC WITH AUTOMATED DIFF    Collection Time: 12/17/20  2:11 AM   Result Value Ref Range    WBC 6.4 4.6 - 13.2 K/uL    RBC 4.52 4.20 - 5.30 M/uL    HGB 12.9 12.0 - 16.0 g/dL    HCT 39.4 35.0 - 45.0 %    MCV 87.2 74.0 - 97.0 FL    MCH 28.5 24.0 - 34.0 PG    MCHC 32.7 31.0 - 37.0 g/dL    RDW 14.5 11.6 - 14.5 %    PLATELET 448 041 - 600 K/uL    MPV 10.4 9.2 - 11.8 FL    NEUTROPHILS 88 (H) 40 - 73 %    LYMPHOCYTES 7 (L) 21 - 52 %    MONOCYTES 5 3 - 10 %    EOSINOPHILS 0 0 - 5 %    BASOPHILS 0 0 - 2 %    ABS. NEUTROPHILS 5.6 1.8 - 8.0 K/UL    ABS. LYMPHOCYTES 0.5 (L) 0.9 - 3.6 K/UL    ABS. MONOCYTES 0.3 0.05 - 1.2 K/UL    ABS. EOSINOPHILS 0.0 0.0 - 0.4 K/UL    ABS.  BASOPHILS 0.0 0.0 - 0.1 K/UL    DF AUTOMATED     METABOLIC PANEL, COMPREHENSIVE    Collection Time: 12/17/20  2:11 AM   Result Value Ref Range    Sodium 142 136 - 145 mmol/L    Potassium 4.5 3.5 - 5.5 mmol/L    Chloride 109 100 - 111 mmol/L    CO2 28 21 - 32 mmol/L    Anion gap 5 3.0 - 18 mmol/L    Glucose 188 (H) 74 - 99 mg/dL    BUN 11 7.0 - 18 MG/DL    Creatinine 0.69 0.6 - 1.3 MG/DL    BUN/Creatinine ratio 16 12 - 20      GFR est AA >60 >60 ml/min/1.73m2    GFR est non-AA >60 >60 ml/min/1.73m2    Calcium 8.1 (L) 8.5 - 10.1 MG/DL    Bilirubin, total 0.5 0.2 - 1.0 MG/DL    ALT (SGPT) 25 13 - 56 U/L    AST (SGOT) 16 10 - 38 U/L    Alk.  phosphatase 41 (L) 45 - 117 U/L    Protein, total 6.1 (L) 6.4 - 8.2 g/dL    Albumin 2.8 (L) 3.4 - 5.0 g/dL    Globulin 3.3 2.0 - 4.0 g/dL    A-G Ratio 0.8 0.8 - 1.7     LD    Collection Time: 12/17/20  2:11 AM   Result Value Ref Range     81 - 234 U/L   FERRITIN    Collection Time: 12/17/20  2:11 AM   Result Value Ref Range    Ferritin 216 8 - 388 NG/ML   C REACTIVE PROTEIN, QT    Collection Time: 12/17/20  2:11 AM   Result Value Ref Range    C-Reactive protein 2.6 (H) 0 - 0.3 mg/dL   D DIMER    Collection Time: 12/17/20  2:11 AM   Result Value Ref Range    D DIMER 0.30 <0.46 ug/ml(FEU)   GLUCOSE, POC    Collection Time: 12/17/20  7:53 AM   Result Value Ref Range    Glucose (POC) 131 (H) 70 - 110 mg/dL   GLUCOSE, POC    Collection Time: 12/17/20 12:07 PM   Result Value Ref Range    Glucose (POC) 115 (H) 70 - 110 mg/dL       Signed By: Annemarie Genao NP     December 17, 2020

## 2020-12-18 LAB
ALBUMIN SERPL-MCNC: 2.9 G/DL (ref 3.4–5)
ALBUMIN/GLOB SERPL: 1 {RATIO} (ref 0.8–1.7)
ALP SERPL-CCNC: 41 U/L (ref 45–117)
ALT SERPL-CCNC: 25 U/L (ref 13–56)
ANION GAP SERPL CALC-SCNC: 4 MMOL/L (ref 3–18)
APTT PPP: 32.7 SEC (ref 23–36.4)
AST SERPL-CCNC: 13 U/L (ref 10–38)
BASOPHILS # BLD: 0 K/UL (ref 0–0.1)
BASOPHILS NFR BLD: 0 % (ref 0–2)
BILIRUB SERPL-MCNC: 0.5 MG/DL (ref 0.2–1)
BUN SERPL-MCNC: 15 MG/DL (ref 7–18)
BUN/CREAT SERPL: 20 (ref 12–20)
CALCIUM SERPL-MCNC: 8.1 MG/DL (ref 8.5–10.1)
CHLORIDE SERPL-SCNC: 109 MMOL/L (ref 100–111)
CO2 SERPL-SCNC: 28 MMOL/L (ref 21–32)
CREAT SERPL-MCNC: 0.74 MG/DL (ref 0.6–1.3)
CRP SERPL-MCNC: 1.1 MG/DL (ref 0–0.3)
D DIMER PPP FEU-MCNC: <0.27 UG/ML(FEU)
DIFFERENTIAL METHOD BLD: ABNORMAL
EOSINOPHIL # BLD: 0 K/UL (ref 0–0.4)
EOSINOPHIL NFR BLD: 0 % (ref 0–5)
ERYTHROCYTE [DISTWIDTH] IN BLOOD BY AUTOMATED COUNT: 14.6 % (ref 11.6–14.5)
FERRITIN SERPL-MCNC: 225 NG/ML (ref 8–388)
FIBRINOGEN PPP-MCNC: 380 MG/DL (ref 210–451)
GLOBULIN SER CALC-MCNC: 2.9 G/DL (ref 2–4)
GLUCOSE BLD STRIP.AUTO-MCNC: 143 MG/DL (ref 70–110)
GLUCOSE BLD STRIP.AUTO-MCNC: 169 MG/DL (ref 70–110)
GLUCOSE BLD STRIP.AUTO-MCNC: 194 MG/DL (ref 70–110)
GLUCOSE BLD STRIP.AUTO-MCNC: 256 MG/DL (ref 70–110)
GLUCOSE BLD STRIP.AUTO-MCNC: 304 MG/DL (ref 70–110)
GLUCOSE SERPL-MCNC: 180 MG/DL (ref 74–99)
HCT VFR BLD AUTO: 39.3 % (ref 35–45)
HGB BLD-MCNC: 13.2 G/DL (ref 12–16)
INR PPP: 1 (ref 0.8–1.2)
LDH SERPL L TO P-CCNC: 238 U/L (ref 81–234)
LYMPHOCYTES # BLD: 0.6 K/UL (ref 0.9–3.6)
LYMPHOCYTES NFR BLD: 6 % (ref 21–52)
MCH RBC QN AUTO: 29.5 PG (ref 24–34)
MCHC RBC AUTO-ENTMCNC: 33.6 G/DL (ref 31–37)
MCV RBC AUTO: 87.7 FL (ref 74–97)
MONOCYTES # BLD: 0.3 K/UL (ref 0.05–1.2)
MONOCYTES NFR BLD: 3 % (ref 3–10)
NEUTS SEG # BLD: 9.7 K/UL (ref 1.8–8)
NEUTS SEG NFR BLD: 91 % (ref 40–73)
PLATELET # BLD AUTO: 249 K/UL (ref 135–420)
PMV BLD AUTO: 10.2 FL (ref 9.2–11.8)
POTASSIUM SERPL-SCNC: 3.9 MMOL/L (ref 3.5–5.5)
PROCALCITONIN SERPL-MCNC: <0.05 NG/ML
PROT SERPL-MCNC: 5.8 G/DL (ref 6.4–8.2)
PROTHROMBIN TIME: 13.2 SEC (ref 11.5–15.2)
RBC # BLD AUTO: 4.48 M/UL (ref 4.2–5.3)
SODIUM SERPL-SCNC: 141 MMOL/L (ref 136–145)
WBC # BLD AUTO: 10.6 K/UL (ref 4.6–13.2)

## 2020-12-18 PROCEDURE — 85025 COMPLETE CBC W/AUTO DIFF WBC: CPT

## 2020-12-18 PROCEDURE — 85379 FIBRIN DEGRADATION QUANT: CPT

## 2020-12-18 PROCEDURE — 74011000250 HC RX REV CODE- 250: Performed by: INTERNAL MEDICINE

## 2020-12-18 PROCEDURE — 80053 COMPREHEN METABOLIC PANEL: CPT

## 2020-12-18 PROCEDURE — 82728 ASSAY OF FERRITIN: CPT

## 2020-12-18 PROCEDURE — 84145 PROCALCITONIN (PCT): CPT

## 2020-12-18 PROCEDURE — 83615 LACTATE (LD) (LDH) ENZYME: CPT

## 2020-12-18 PROCEDURE — 36415 COLL VENOUS BLD VENIPUNCTURE: CPT

## 2020-12-18 PROCEDURE — 74011636637 HC RX REV CODE- 636/637: Performed by: HOSPITALIST

## 2020-12-18 PROCEDURE — 99233 SBSQ HOSP IP/OBS HIGH 50: CPT | Performed by: INTERNAL MEDICINE

## 2020-12-18 PROCEDURE — 82962 GLUCOSE BLOOD TEST: CPT

## 2020-12-18 PROCEDURE — 74011250637 HC RX REV CODE- 250/637: Performed by: HOSPITALIST

## 2020-12-18 PROCEDURE — 74011250636 HC RX REV CODE- 250/636: Performed by: HOSPITALIST

## 2020-12-18 PROCEDURE — 99232 SBSQ HOSP IP/OBS MODERATE 35: CPT | Performed by: HOSPITALIST

## 2020-12-18 PROCEDURE — 2709999900 HC NON-CHARGEABLE SUPPLY

## 2020-12-18 PROCEDURE — 94640 AIRWAY INHALATION TREATMENT: CPT

## 2020-12-18 PROCEDURE — 86140 C-REACTIVE PROTEIN: CPT

## 2020-12-18 PROCEDURE — 74011250637 HC RX REV CODE- 250/637: Performed by: NURSE PRACTITIONER

## 2020-12-18 PROCEDURE — 65660000000 HC RM CCU STEPDOWN

## 2020-12-18 PROCEDURE — 74011250636 HC RX REV CODE- 250/636: Performed by: NURSE PRACTITIONER

## 2020-12-18 PROCEDURE — 85730 THROMBOPLASTIN TIME PARTIAL: CPT

## 2020-12-18 PROCEDURE — 85384 FIBRINOGEN ACTIVITY: CPT

## 2020-12-18 PROCEDURE — 97164 PT RE-EVAL EST PLAN CARE: CPT

## 2020-12-18 PROCEDURE — 97116 GAIT TRAINING THERAPY: CPT

## 2020-12-18 PROCEDURE — 85610 PROTHROMBIN TIME: CPT

## 2020-12-18 RX ORDER — DEXAMETHASONE 4 MG/1
4 TABLET ORAL EVERY 12 HOURS
Status: DISCONTINUED | OUTPATIENT
Start: 2020-12-18 | End: 2020-12-19 | Stop reason: HOSPADM

## 2020-12-18 RX ADMIN — Medication 10 ML: at 22:10

## 2020-12-18 RX ADMIN — GUAIFENESIN 600 MG: 600 TABLET, EXTENDED RELEASE ORAL at 22:07

## 2020-12-18 RX ADMIN — Medication 1 CAPSULE: at 08:18

## 2020-12-18 RX ADMIN — LOSARTAN POTASSIUM 75 MG: 25 TABLET, FILM COATED ORAL at 08:14

## 2020-12-18 RX ADMIN — Medication 10 ML: at 14:00

## 2020-12-18 RX ADMIN — BUDESONIDE AND FORMOTEROL FUMARATE DIHYDRATE 2 PUFF: 80; 4.5 AEROSOL RESPIRATORY (INHALATION) at 19:30

## 2020-12-18 RX ADMIN — INSULIN LISPRO 9 UNITS: 100 INJECTION, SOLUTION INTRAVENOUS; SUBCUTANEOUS at 22:05

## 2020-12-18 RX ADMIN — CHLORHEXIDINE GLUCONATE 0.12% ORAL RINSE 15 ML: 1.2 LIQUID ORAL at 08:14

## 2020-12-18 RX ADMIN — FAMOTIDINE 20 MG: 20 TABLET, FILM COATED ORAL at 08:18

## 2020-12-18 RX ADMIN — THERA TABS 1 TABLET: TAB at 08:14

## 2020-12-18 RX ADMIN — ATORVASTATIN CALCIUM 20 MG: 20 TABLET, FILM COATED ORAL at 08:14

## 2020-12-18 RX ADMIN — ENOXAPARIN SODIUM 40 MG: 40 INJECTION SUBCUTANEOUS at 08:14

## 2020-12-18 RX ADMIN — IPRATROPIUM BROMIDE AND ALBUTEROL SULFATE 3 ML: .5; 3 SOLUTION RESPIRATORY (INHALATION) at 12:00

## 2020-12-18 RX ADMIN — ZINC SULFATE 220 MG (50 MG) CAPSULE 1 CAPSULE: CAPSULE at 08:14

## 2020-12-18 RX ADMIN — ALPRAZOLAM 0.5 MG: 0.5 TABLET ORAL at 22:07

## 2020-12-18 RX ADMIN — Medication 500 MG: at 08:13

## 2020-12-18 RX ADMIN — Medication 1 CAPSULE: at 22:07

## 2020-12-18 RX ADMIN — DEXAMETHASONE 4 MG: 4 TABLET ORAL at 22:10

## 2020-12-18 RX ADMIN — IPRATROPIUM BROMIDE AND ALBUTEROL SULFATE 3 ML: .5; 3 SOLUTION RESPIRATORY (INHALATION) at 04:00

## 2020-12-18 RX ADMIN — METOPROLOL TARTRATE 25 MG: 25 TABLET, FILM COATED ORAL at 08:14

## 2020-12-18 RX ADMIN — INSULIN LISPRO 3 UNITS: 100 INJECTION, SOLUTION INTRAVENOUS; SUBCUTANEOUS at 12:00

## 2020-12-18 RX ADMIN — NIFEDIPINE 90 MG: 90 TABLET, FILM COATED, EXTENDED RELEASE ORAL at 08:13

## 2020-12-18 RX ADMIN — IPRATROPIUM BROMIDE AND ALBUTEROL SULFATE 3 ML: .5; 3 SOLUTION RESPIRATORY (INHALATION) at 08:13

## 2020-12-18 RX ADMIN — FUROSEMIDE 40 MG: 40 TABLET ORAL at 08:14

## 2020-12-18 RX ADMIN — BUDESONIDE AND FORMOTEROL FUMARATE DIHYDRATE 2 PUFF: 80; 4.5 AEROSOL RESPIRATORY (INHALATION) at 08:15

## 2020-12-18 RX ADMIN — INSULIN LISPRO 3 UNITS: 100 INJECTION, SOLUTION INTRAVENOUS; SUBCUTANEOUS at 10:11

## 2020-12-18 RX ADMIN — Medication 6 TABLET: at 08:14

## 2020-12-18 RX ADMIN — IPRATROPIUM BROMIDE AND ALBUTEROL SULFATE 3 ML: .5; 3 SOLUTION RESPIRATORY (INHALATION) at 00:00

## 2020-12-18 RX ADMIN — DEXAMETHASONE SODIUM PHOSPHATE 6 MG: 4 INJECTION, SOLUTION INTRAMUSCULAR; INTRAVENOUS at 08:14

## 2020-12-18 RX ADMIN — Medication 10 ML: at 05:36

## 2020-12-18 RX ADMIN — IPRATROPIUM BROMIDE AND ALBUTEROL SULFATE 3 ML: .5; 3 SOLUTION RESPIRATORY (INHALATION) at 17:40

## 2020-12-18 RX ADMIN — Medication 500 MG: at 22:10

## 2020-12-18 RX ADMIN — GUAIFENESIN 600 MG: 600 TABLET, EXTENDED RELEASE ORAL at 08:14

## 2020-12-18 RX ADMIN — ACETAMINOPHEN 650 MG: 325 TABLET ORAL at 22:12

## 2020-12-18 RX ADMIN — Medication 81 MG: at 08:14

## 2020-12-18 RX ADMIN — CHLORHEXIDINE GLUCONATE 0.12% ORAL RINSE 15 ML: 1.2 LIQUID ORAL at 22:10

## 2020-12-18 NOTE — ROUTINE PROCESS
1920: Verbal shift change report given to Tameka Coles, CARLEEN (oncoming nurse) by Adonis Osullivan RN (offgoing nurse). Report included the following information SBAR, Kardex, ED Summary, Med Rec Status and Cardiac Rhythm NSR to Tachy  
 
0720: Verbal shift change report given to Tarun Das RN (oncoming nurse) by Kathi Estrella RN (offgoing nurse). Report included the following information SBAR, Kardex, ED Summary and Cardiac Rhythm NSR to Tachy.

## 2020-12-18 NOTE — DIABETES MGMT
Glycemic Control Plan of Care    Admitted with COVID-19 pneumonia - consult received  No h/o DM -   Continues with steroids - decadron 6 mg q12h  Receiving corrective lispro - continue with lispro 4 x daily - very insulin resistant  Has elevated BG this am - 304 mg/dl @ 0830 ?   Will continue to monitor        TDD previous DAY = 6 units lispro    Pia Mendoza MPH RN CDE  Pager [phone]

## 2020-12-18 NOTE — PROGRESS NOTES
[facility]  Pulmonary, Critical Care, and Sleep Medicine    Pulmonary Medicine Progress Note    Name: Myra Clifford MRN: [id number]  : 1935 Hospital: [address] [place] [address]   Date: 2020       Subjective:  Pt continues to improve. Chest pain is improved. States she feel ready to go home. Patient Active Problem List   Diagnosis Code    Occlusion and stenosis of carotid artery I65.29    Carotid stenosis I65.29    BURGESS (dyspnea on exertion) R06.00    Hyperlipidemia E78.5    Essential hypertension with goal blood pressure less than 140/90 I10    Chronic diastolic congestive heart failure (HCC) I50.32    Chronic obstructive pulmonary disease (HCC) J44.9    Pulmonary HTN (HCC) I27.20    CAP (community acquired pneumonia) J18.9    Suspected COVID-19 virus infection Z20.828     Assessment:  · Respiratory Failure: Acute on Chronic- Hypoxic- on 2-3L at home  · COVID-19 Pneumonia  · Emphysema/COPD- Pulmonary - [facility]: Dr. Adriana Llanes  · Pulmonary Hypertension  · HFpEF- Chronic  · Hypertension    Impression/Plan:  - O2 weaned to 3L  - Continue duonebs scheduled  - Continue symbicort and spiriva  - s/p remdesivir, Decadron can be stopped on D/C  - PT/OT  - Can follow up in our clinic- order placed. Okay to be discharged from my standpoint. Would recommend continued quarantine 14 days after initial symptoms. FiO2 to keep SpO2 >=92%, HOB >=30 degree, aspiration precautions, aggressive pulmonary toileting, incentive spirometry. Other issues management by primary team and respective consultants. Events and notes from last 24 hours reviewed. Discussed with patient and family, answered all questions to their satisfaction. Care plan discussed with nursing.      Labs and images personally seen and available reports reviewed  All current medicines are reviewed       Medications- Current:  Current Facility-Administered Medications   Medication Dose Route Frequency    albuterol-ipratropium (DUO-NEB) 2.5 MG-0.5 MG/3 ML  3 mL Nebulization Q4H RT    NIFEdipine ER (PROCARDIA XL) tablet 90 mg  90 mg Oral DAILY    losartan (COZAAR) tablet 75 mg  75 mg Oral DAILY    hydrALAZINE (APRESOLINE) 20 mg/mL injection 10 mg  10 mg IntraVENous Q6H PRN    furosemide (LASIX) tablet 40 mg  40 mg Oral DAILY    dexamethasone (DECADRON) 4 mg/mL injection 6 mg  6 mg IntraVENous Q12H    lactobacillus sp. 50 billion cpu (BIO-K PLUS) capsule 1 Cap  1 Cap Oral Q12H    guaiFENesin ER (MUCINEX) tablet 600 mg  600 mg Oral Q12H    benzocaine-menthoL (CEPACOL) lozenge 1 Lozenge  1 Lozenge Mucous Membrane PRN    sodium chloride (NS) flush 5-40 mL  5-40 mL IntraVENous Q8H    sodium chloride (NS) flush 5-40 mL  5-40 mL IntraVENous PRN    acetaminophen (TYLENOL) tablet 650 mg  650 mg Oral Q6H PRN    Or    acetaminophen (TYLENOL) suppository 650 mg  650 mg Rectal Q6H PRN    polyethylene glycol (MIRALAX) packet 17 g  17 g Oral DAILY PRN    promethazine (PHENERGAN) tablet 12.5 mg  12.5 mg Oral Q6H PRN    Or    ondansetron (ZOFRAN) injection 4 mg  4 mg IntraVENous Q6H PRN    enoxaparin (LOVENOX) injection 40 mg  40 mg SubCUTAneous DAILY    famotidine (PEPCID) tablet 20 mg  20 mg Oral DAILY    insulin lispro (HUMALOG) injection   SubCUTAneous AC&HS    glucose chewable tablet 16 g  4 Tab Oral PRN    glucagon (GLUCAGEN) injection 1 mg  1 mg IntraMUSCular PRN    dextrose (D50W) injection syrg 12.5-25 g  25-50 mL IntraVENous PRN    guaiFENesin-dextromethorphan (ROBITUSSIN DM) 100-10 mg/5 mL syrup 5 mL  5 mL Oral Q4H PRN    cholecalciferol (VITAMIN D3) (1000 Units /25 mcg) tablet 6 Tab  6,000 Units Oral DAILY    ascorbic acid (vitamin C) (VITAMIN C) tablet 500 mg  500 mg Oral BID    zinc sulfate (ZINCATE) 220 (50) mg capsule 1 Cap  1 Cap Oral DAILY    albuterol (PROVENTIL HFA, VENTOLIN HFA, PROAIR HFA) inhaler 2 Puff  2 Puff Inhalation Q4H PRN    ALPRAZolam (XANAX) tablet 0.5 mg  0.5 mg Oral QHS    aspirin delayed-release tablet 81 mg  81 mg Oral DAILY    atorvastatin (LIPITOR) tablet 20 mg  20 mg Oral DAILY    metoprolol tartrate (LOPRESSOR) tablet 25 mg  25 mg Oral BID    therapeutic multivitamin (THERAGRAN) tablet 1 Tab  1 Tab Oral DAILY    budesonide-formoterol (SYMBICORT) 80-4.5 mcg inhaler  2 Puff Inhalation BID RT    0.9% sodium chloride infusion 250 mL  250 mL IntraVENous PRN    chlorhexidine (PERIDEX) 0.12 % mouthwash 15 mL  15 mL Oral Q12H    sodium chloride (NS) flush 5-10 mL  5-10 mL IntraVENous PRN       Objective:  Vital Signs:    Visit Vitals  BP (!) 125/44   Pulse 96   Temp 97.7 °F (36.5 °C)   Resp 17   Ht 5' 3\" (1.6 m)   Wt 60.1 kg (132 lb 8 oz)   SpO2 97%   Breastfeeding No   BMI 23.47 kg/m²      O2 Device: Nasal cannula  O2 Flow Rate (L/min): 4 l/min  Temp (24hrs), Av.8 °F (36.6 °C), Min:97.4 °F (36.3 °C), Max:98.1 °F (36.7 °C)      Intake/Output:   Last shift:      No intake/output data recorded. Last 3 shifts:  1901 -  0700  In: 960 [P.O.:960]  Out: 1300 [Urine:1300]    Intake/Output Summary (Last 24 hours) at 2020 1418  Last data filed at 2020 0400  Gross per 24 hour   Intake 480 ml   Output 600 ml   Net -120 ml       Physical Exam:     General/Neurology: Alert, Awake  Head:   Normocephalic, without obvious abnormality  Eye:   PERRL, EOM intact  Oral:  Mucus membranes moist  Lung:   B/l air entry fair. Descreased BS, no wheezing. Heart:   S1 S2 present  Abdomen:  Soft, non tender, BS+nt   Extremities:  No pedal edema.    Skin:   Dry, intact  Data:      Recent Results (from the past 24 hour(s))   GLUCOSE, POC    Collection Time: 20  3:45 PM   Result Value Ref Range    Glucose (POC) 194 (H) 70 - 110 mg/dL   GLUCOSE, POC    Collection Time: 20 10:00 PM   Result Value Ref Range    Glucose (POC) 155 (H) 70 - 110 mg/dL   PROTHROMBIN TIME + INR    Collection Time: 20  3:04 AM   Result Value Ref Range    Prothrombin time 13.2 11.5 - 15.2 sec INR 1.0 0.8 - 1.2     PTT    Collection Time: 12/18/20  3:04 AM   Result Value Ref Range    aPTT 32.7 23.0 - 36.4 SEC   FIBRINOGEN    Collection Time: 12/18/20  3:04 AM   Result Value Ref Range    Fibrinogen 380 210 - 451 mg/dL   CBC WITH AUTOMATED DIFF    Collection Time: 12/18/20  3:04 AM   Result Value Ref Range    WBC 10.6 4.6 - 13.2 K/uL    RBC 4.48 4.20 - 5.30 M/uL    HGB 13.2 12.0 - 16.0 g/dL    HCT 39.3 35.0 - 45.0 %    MCV 87.7 74.0 - 97.0 FL    MCH 29.5 24.0 - 34.0 PG    MCHC 33.6 31.0 - 37.0 g/dL    RDW 14.6 (H) 11.6 - 14.5 %    PLATELET 560 029 - 146 K/uL    MPV 10.2 9.2 - 11.8 FL    NEUTROPHILS 91 (H) 40 - 73 %    LYMPHOCYTES 6 (L) 21 - 52 %    MONOCYTES 3 3 - 10 %    EOSINOPHILS 0 0 - 5 %    BASOPHILS 0 0 - 2 %    ABS. NEUTROPHILS 9.7 (H) 1.8 - 8.0 K/UL    ABS. LYMPHOCYTES 0.6 (L) 0.9 - 3.6 K/UL    ABS. MONOCYTES 0.3 0.05 - 1.2 K/UL    ABS. EOSINOPHILS 0.0 0.0 - 0.4 K/UL    ABS. BASOPHILS 0.0 0.0 - 0.1 K/UL    DF AUTOMATED     METABOLIC PANEL, COMPREHENSIVE    Collection Time: 12/18/20  3:04 AM   Result Value Ref Range    Sodium 141 136 - 145 mmol/L    Potassium 3.9 3.5 - 5.5 mmol/L    Chloride 109 100 - 111 mmol/L    CO2 28 21 - 32 mmol/L    Anion gap 4 3.0 - 18 mmol/L    Glucose 180 (H) 74 - 99 mg/dL    BUN 15 7.0 - 18 MG/DL    Creatinine 0.74 0.6 - 1.3 MG/DL    BUN/Creatinine ratio 20 12 - 20      GFR est AA >60 >60 ml/min/1.73m2    GFR est non-AA >60 >60 ml/min/1.73m2    Calcium 8.1 (L) 8.5 - 10.1 MG/DL    Bilirubin, total 0.5 0.2 - 1.0 MG/DL    ALT (SGPT) 25 13 - 56 U/L    AST (SGOT) 13 10 - 38 U/L    Alk.  phosphatase 41 (L) 45 - 117 U/L    Protein, total 5.8 (L) 6.4 - 8.2 g/dL    Albumin 2.9 (L) 3.4 - 5.0 g/dL    Globulin 2.9 2.0 - 4.0 g/dL    A-G Ratio 1.0 0.8 - 1.7     LD    Collection Time: 12/18/20  3:04 AM   Result Value Ref Range     (H) 81 - 234 U/L   FERRITIN    Collection Time: 12/18/20  3:04 AM   Result Value Ref Range    Ferritin 225 8 - 388 NG/ML   C REACTIVE PROTEIN, QT Collection Time: 12/18/20  3:04 AM   Result Value Ref Range    C-Reactive protein 1.1 (H) 0 - 0.3 mg/dL   D DIMER    Collection Time: 12/18/20  3:04 AM   Result Value Ref Range    D DIMER <0.27 <0.46 ug/ml(FEU)   PROCALCITONIN    Collection Time: 12/18/20  3:04 AM   Result Value Ref Range    Procalcitonin <0.05 ng/mL   GLUCOSE, POC    Collection Time: 12/18/20  8:36 AM   Result Value Ref Range    Glucose (POC) 304 (H) 70 - 110 mg/dL   GLUCOSE, POC    Collection Time: 12/18/20 10:09 AM   Result Value Ref Range    Glucose (POC) 194 (H) 70 - 110 mg/dL   GLUCOSE, POC    Collection Time: 12/18/20 11:41 AM   Result Value Ref Range    Glucose (POC) 169 (H) 70 - 110 mg/dL         Chemistry   Recent Labs     12/18/20  0304 12/17/20  0211 12/16/20  0123   * 188* 151*    142 143   K 3.9 4.5 4.5    109 110   CO2 28 28 28   BUN 15 11 9   CREA 0.74 0.69 0.64   CA 8.1* 8.1* 7.6*   AGAP 4 5 5   BUCR 20 16 14   AP 41* 41* 43*   TP 5.8* 6.1* 5.6*   ALB 2.9* 2.8* 2.7*   GLOB 2.9 3.3 2.9   AGRAT 1.0 0.8 0.9       CBC w/Diff   Recent Labs     12/18/20  0304 12/17/20  0211 12/16/20  0123   WBC 10.6 6.4 6.4   RBC 4.48 4.52 4.54   HGB 13.2 12.9 12.8   HCT 39.3 39.4 39.6    199 173   GRANS 91* 88* 86*   LYMPH 6* 7* 11*   EOS 0 0 0       ABG No results for input(s): PHI, PHI, POC2, PCO2I, PO2, PO2I, HCO3, HCO3I, FIO2, FIO2I in the last 72 hours. Micro  No results for input(s): SDES, CULT in the last 72 hours. No results for input(s): CULT in the last 72 hours. CT (Most Recent)   Results from Hospital Encounter encounter on 12/10/20   CTA CHEST W OR W WO CONT    Narrative CTA CHEST PULMONARY EMBOLISM PROTOCOL      INDICATION: Chills. Body aches. Symptoms since last night. Covid-19 concern.     TECHNIQUE: Thin collimation axial images obtained through the level of the  pulmonary arteries with additional imaging through the chest following the  uneventful administration of 65 cc Isovue-300 intravenous contrast. Images  reconstructed into MIP coronal and sagittal projections for complete evaluation  of the tortuous and overlapping pulmonary vascular structures and to reduce  patient radiation dose. All CT scans are performed using dose optimization  techniques as appropriate to the performed exam including the following:  Automated exposure control, adjustment of mA and/or kV according to patient  size, and use of iterative reconstructive technique. COMPARISON: Chest CT 10/6/2020. FINDINGS:    No filling defects are appreciated within the main, left, right, lobar or  visualized segmental pulmonary arteries to suggest embolism. Motion artifact  limits peripheral, subsegmental branches. The thoracic aorta is not aneurysmal.   No evidence for dissection. Arterial wall calcifications. Biatrial cardiac  enlargement. No pericardial effusion. No pleural effusion. Precarinal 14 mm granulomatous  calcification containing lymph node is slightly larger. New additional  lymphadenopathy along bilateral mediastinum and subcarinal mediastinum. New 13  mm subcarinal lymph node. Left mediastinal nodes measure 11 mm. Bilateral hilar  lymphadenopathy. Left thyroid nodule is similar to prior measuring 3.1 x 1.8 cm. Emphysema. Hypoinflation and respiratory motion lung limitations. New 7 mm  nodule (image 18/series 3). Stable chronic scar at anterior right upper lobe and  mild at the right middle lobe. New mild sites of peripheral consolidation in the  right lateral lower lobe. Mild groundglass density versus atelectasis in the  left lower lung laterally. Small hiatal hernia. Subcentimeter right renal cyst  No acute bone finding. Impression IMPRESSION:    1. No evidence of pulmonary embolism. 2. Limited by motion and hypoinflation. Emphysema with mild infiltrate/pneumonia  suspected in the right lower lobe. 3. New mild hilar and mediastinal lymphadenopathy. 4. New 7 mm left upper lobe lung nodule.  Follow-up recommended in 3 months  posttreatment to assess for its persistence, given the limitations of this exam.  5. Biatrial cardiac enlargement. 6. Stable left thyroid nodule. 7. Small hiatal hernia. XR (Most Recent). CXR reviewed by me and compared with previous CXR   Results from Hospital Encounter encounter on 12/10/20   XR CHEST PORT    Narrative INDICATION: eval for pulmonary edema, wet read please- results pending discharge    TECHNIQUE: Portable chest radiograph    COMPARISON: CT chest 10 December 2020, chest radiograph 10 December 2020, 15 May  2020. FINDINGS: The lungs are adequately inflated. No focal consolidation, effusion or  pneumothorax. Overall improved increased interstitial lung markings, with  residual increased interstitial lung markings -near baseline. Heart size remains  enlarged. No acute osseous abnormality. Impression IMPRESSION:     Overall improved lung aeration with mild residual areas of increased  interstitial lung markings -near baseline. See my orders for details     Total care time exclusive of procedures with complex decision making, coordination of care and counseling patient performed and > 50% time spent in face to face evaluation as mentioned above.     Sancho Barrios MD  Critical Care Medicine

## 2020-12-18 NOTE — PROGRESS NOTES
[facility]  Progress Note    Patient: Mike Garza Age: 82 y.o. : 1935 MR#: [id number] SSN: [id number]  Date: 2020     Subjective:   Alert and oriented x4. Sitting up on edge of bed waiting for breakfast. Requesting cough drops for dry mouth due to oxygen. She ambulated around the room while on 4L. With ambulation decreased oxygen to 83-85% with recovery within 1 minute to greater than 92%. No increase of respiratory effort noted during this time. Patient ambulated around the bed 3-4 times while I was in the room. Denies SOB or chest tightness or pressure. Assessment/Plan:   1. COVID-19 pneumonia   2. Acute on chronic hypoxic respiratory failure. Home oxygen 2-3L NC  3. Chronic diastolic HF  4. Moderate pulmonary HTN  5. COPD without exacerbation  6. Emphysema   7. CAD  8. HTN with hypertensive episodes   9. HLD  10. New 7mm pulmonary nodule HIRAL Follow up with Dr. Alix Hodgkin as OP for repeat CT chest in 3 months  11. Anxiety   12. Advanced age    Plan  1. Monitor oxygen demand. Currently on 4L NC  2. Treated with remdesivir and convalescent plasma. Continue oxygen, symbicort, mucinex, duoneb  3. POC glucose, SSI. Diabetes management for hyperglycemia   4. Monitor metabolic panel and renal function  5. Continue current medications ordered. 6. Plan to discharge 1-2 days with [facility]. Son confirmed on  that patient's sister will be helping with the care when she is discharge. 7. Called son Rylie Salomon today. No answer and LM.      Additional Notes:      Case discussed with:  [x]Patient  []Family  [x]Nursing  []Case Management  DVT Prophylaxis:  [x]Lovenox  []Hep SQ  []SCDs  []Coumadin   []On Heparin gtt    Objective:   VS:   Visit Vitals  BP (!) 140/43   Pulse 92   Temp 98 °F (36.7 °C)   Resp 16   Ht 5' 3\" (1.6 m)   Wt 60.1 kg (132 lb 8 oz)   SpO2 97%   Breastfeeding No   BMI 23.47 kg/m²      Tmax/24hrs: Temp (24hrs), Av.8 °F (36.6 °C), Min:97.3 °F (36.3 °C), Max:98.1 °F (36.7 °C)      Intake/Output Summary (Last 24 hours) at 12/18/2020 0932  Last data filed at 12/18/2020 0400  Gross per 24 hour   Intake 480 ml   Output 600 ml   Net -120 ml       General:  Alert, NAD  Cardiovascular:  RRR  Pulmonary:  LSC throughout; respiratory effort WNL  GI:  +BS in all four quadrants, soft, non-tender  Extremities:  BLE trace edema  Neuro: alert and oriented x4        Labs:    Recent Results (from the past 24 hour(s))   GLUCOSE, POC    Collection Time: 12/17/20 12:07 PM   Result Value Ref Range    Glucose (POC) 115 (H) 70 - 110 mg/dL   GLUCOSE, POC    Collection Time: 12/17/20  3:45 PM   Result Value Ref Range    Glucose (POC) 194 (H) 70 - 110 mg/dL   GLUCOSE, POC    Collection Time: 12/17/20 10:00 PM   Result Value Ref Range    Glucose (POC) 155 (H) 70 - 110 mg/dL   PROTHROMBIN TIME + INR    Collection Time: 12/18/20  3:04 AM   Result Value Ref Range    Prothrombin time 13.2 11.5 - 15.2 sec    INR 1.0 0.8 - 1.2     PTT    Collection Time: 12/18/20  3:04 AM   Result Value Ref Range    aPTT 32.7 23.0 - 36.4 SEC   FIBRINOGEN    Collection Time: 12/18/20  3:04 AM   Result Value Ref Range    Fibrinogen 380 210 - 451 mg/dL   CBC WITH AUTOMATED DIFF    Collection Time: 12/18/20  3:04 AM   Result Value Ref Range    WBC 10.6 4.6 - 13.2 K/uL    RBC 4.48 4.20 - 5.30 M/uL    HGB 13.2 12.0 - 16.0 g/dL    HCT 39.3 35.0 - 45.0 %    MCV 87.7 74.0 - 97.0 FL    MCH 29.5 24.0 - 34.0 PG    MCHC 33.6 31.0 - 37.0 g/dL    RDW 14.6 (H) 11.6 - 14.5 %    PLATELET 342 080 - 712 K/uL    MPV 10.2 9.2 - 11.8 FL    NEUTROPHILS 91 (H) 40 - 73 %    LYMPHOCYTES 6 (L) 21 - 52 %    MONOCYTES 3 3 - 10 %    EOSINOPHILS 0 0 - 5 %    BASOPHILS 0 0 - 2 %    ABS. NEUTROPHILS 9.7 (H) 1.8 - 8.0 K/UL    ABS. LYMPHOCYTES 0.6 (L) 0.9 - 3.6 K/UL    ABS. MONOCYTES 0.3 0.05 - 1.2 K/UL    ABS. EOSINOPHILS 0.0 0.0 - 0.4 K/UL    ABS.  BASOPHILS 0.0 0.0 - 0.1 K/UL    DF AUTOMATED     METABOLIC PANEL, COMPREHENSIVE Collection Time: 12/18/20  3:04 AM   Result Value Ref Range    Sodium 141 136 - 145 mmol/L    Potassium 3.9 3.5 - 5.5 mmol/L    Chloride 109 100 - 111 mmol/L    CO2 28 21 - 32 mmol/L    Anion gap 4 3.0 - 18 mmol/L    Glucose 180 (H) 74 - 99 mg/dL    BUN 15 7.0 - 18 MG/DL    Creatinine 0.74 0.6 - 1.3 MG/DL    BUN/Creatinine ratio 20 12 - 20      GFR est AA >60 >60 ml/min/1.73m2    GFR est non-AA >60 >60 ml/min/1.73m2    Calcium 8.1 (L) 8.5 - 10.1 MG/DL    Bilirubin, total 0.5 0.2 - 1.0 MG/DL    ALT (SGPT) 25 13 - 56 U/L    AST (SGOT) 13 10 - 38 U/L    Alk.  phosphatase 41 (L) 45 - 117 U/L    Protein, total 5.8 (L) 6.4 - 8.2 g/dL    Albumin 2.9 (L) 3.4 - 5.0 g/dL    Globulin 2.9 2.0 - 4.0 g/dL    A-G Ratio 1.0 0.8 - 1.7     LD    Collection Time: 12/18/20  3:04 AM   Result Value Ref Range     (H) 81 - 234 U/L   FERRITIN    Collection Time: 12/18/20  3:04 AM   Result Value Ref Range    Ferritin 225 8 - 388 NG/ML   C REACTIVE PROTEIN, QT    Collection Time: 12/18/20  3:04 AM   Result Value Ref Range    C-Reactive protein 1.1 (H) 0 - 0.3 mg/dL   D DIMER    Collection Time: 12/18/20  3:04 AM   Result Value Ref Range    D DIMER <0.27 <0.46 ug/ml(FEU)   GLUCOSE, POC    Collection Time: 12/18/20  8:36 AM   Result Value Ref Range    Glucose (POC) 304 (H) 70 - 110 mg/dL       Signed By: Gretchen Cheema NP     December 18, 2020

## 2020-12-18 NOTE — ROUTINE PROCESS
Bedside and Verbal shift change report given to Scarlet Carcamo RN and Julianne Hernandez RN (oncoming nurse) by Garo Cortes RN (offgoing nurse). Report included the following information SBAR, Kardex, Intake/Output, MAR, Recent Results, Cardiac Rhythm (SR) and Alarm Parameters .

## 2020-12-18 NOTE — PROGRESS NOTES
Chart reviewed. Discharge plan is home with [facility] IN[facility]. Patient's son Blas Austin already bought a rollator for pt. Per Blas Austin, pt's sister will be assisting pt when she returns home.         Roz Dance, BSN RN  Care Management  Pager: [phone]

## 2020-12-18 NOTE — PROGRESS NOTES
Problem: Mobility Impaired (Adult and Pediatric)  Goal: *Acute Goals and Plan of Care (Insert Text)  Description: Physical Therapy Goals  Initiated 12/11/2020, reevaluated 12/18/2020 and to be accomplished within 7 day(s)  1. Patient will move from supine to sit and sit to supine , scoot up and down, and roll side to side in bed with modified independence. 2.  Patient will transfer from bed to chair and chair to bed with modified independence using the least restrictive device. 3.  Patient will perform sit to stand with modified independence. 4.  Patient will ambulate with modified independence for 100 feet with the least restrictive device. 5.  Patient will ascend/descend 3 stairs with 0 handrail(s) with supervision/set-up. (may have to simulate with box step due to droplet plus precautions)    PLOF: Pt reports living in 1 story house alone with family around but unable to give her 24 hour care if needed. Pt reporting she does not want to go to Rehab. Pt reports no AD and independent in all mobility, still driving. Outcome: Progressing Towards Goal    PHYSICAL THERAPY RE-EVALUATION    Patient: Jessica Price (32 y.o. female)  Date: 12/18/2020  Primary Diagnosis: Suspected COVID-19 virus infection [Z20.828]  CAP (community acquired pneumonia) [J18.9]        Precautions:   Fall(droplet plus)      ASSESSMENT :  Based on the objective data described below, the patient presents with decreased strength, BURGESS with activity, decreased balance and impaired functional mobility. Patient received sitting EOB on 4 L NC in NAD, SPO2 99%, and agreeable to OOB mobility. Patient declines to use RW this session. Pt performs sit to stand transfer with stand by/ supervision and reports stiffness in BLE's. She ambulates 45ft x 2 trials with stand by assistance occasionally reaching for bed rail, though no LOB.  Pt requires min verbal cues to take rest break due to decreased endurance and cues for PLB to improve oxygenation. She desats to 83-85% with activity and recovers to mid 90's with PLB after 3-4 minute rest break. Pt is able to complete toileting using bedside commode with stand by/supervision assistance. Patient reports she will have good support from family and friends when returning home. At end of session, pt left sitting EOB with call bell within reach and needs addressed. Informed RN of patients performance. Patient will benefit from skilled intervention to address the above impairments. Patient's rehabilitation potential is considered to be Good  Factors which may influence rehabilitation potential include:   []         None noted  []         Mental ability/status  [x]         Medical condition  []         Home/family situation and support systems  []         Safety awareness  []         Pain tolerance/management  []         Other:      PLAN :  Recommendations and Planned Interventions:   [x]           Bed Mobility Training             [x]    Neuromuscular Re-Education  [x]           Transfer Training                   []    Orthotic/Prosthetic Training  [x]           Gait Training                          []    Modalities  [x]           Therapeutic Exercises           []    Edema Management/Control  [x]           Therapeutic Activities            []    Family Training/Education  [x]           Patient Education  []           Other (comment):    Frequency/Duration: Patient will be followed by physical therapy 1-2 times per day/4-7 days per week to address goals. Discharge Recommendations: Home Health with family support  Further Equipment Recommendations for Discharge: RW vs cane, BSC, shower chair     SUBJECTIVE:   Patient stated  If I keep walking my oxygen will get better right? Nava Weaver    OBJECTIVE DATA SUMMARY:   Hospital course since last seen and reason for re-evaluation: Patient is making steady progress towards functional mobility goals.  She continues to have decreased endurance and decreased balance l  Past Medical History:   Diagnosis Date    Chronic lung disease     Chronic obstructive pulmonary disease ([facility] [address]     Heart failure (HCC)     Hypertension      Past Surgical History:   Procedure Laterality Date    HX ORTHOPAEDIC      spinal sx 5/6    HX OTHER SURGICAL      Carotid artery    VASCULAR SURGERY PROCEDURE UNLIST      L CEA 10/2014     Barriers to Learning/Limitations: None  Compensate with: N/A  Home Situation:   Home Situation  Home Environment: Private residence  # Steps to Enter: 4  Rails to Enter: No  One/Two Story Residence: One story  Living Alone: Yes  Support Systems: Family member(s), Child(erick), Friends \ neighbors  Patient Expects to be Discharged to[de-identified] Private residence  Current DME Used/Available at Home: Shower chair  Tub or Shower Type: Shower  Critical Behavior:  Neurologic State: Alert  Orientation Level: Oriented X4  Cognition: Follows commands     Psychosocial  Patient Behaviors: Calm; Cooperative          Strength:    Strength: Generally decreased, functional          Tone & Sensation:   Tone: Normal     Sensation: Intact        Range Of Motion:  AROM: Within functional limits                 Functional Mobility:    Transfers:  Sit to Stand: Stand-by assistance;Supervision  Stand to Sit: Supervision           Balance:   Sitting: Intact  Standing: With support  Standing - Static: Good  Standing - Dynamic : Fair(/fair (+))    Ambulation/Gait Training:  Distance (ft): 45 Feet (ft)(+ 45 ft)  Ambulation - Level of Assistance: Stand-by assistance  Gait Abnormalities: Decreased step clearance       Pain:  Pain level pre-treatment: 0/10   Pain level post-treatment: 0/10   Pain Intervention(s) : Medication (see MAR); Rest, Ice, Repositioning   Response to intervention: Nurse notified, See doc flow    Activity Tolerance:   Fair  Please refer to the flowsheet for vital signs taken during this treatment.   After treatment:   [x]         Patient left in no apparent distress sitting EOB  [] Patient left in no apparent distress in bed  [x]         Call bell left within reach  [x]         Nursing notified  []         Caregiver present  []         Bed alarm activated  []         SCDs applied    COMMUNICATION/EDUCATION:   [x]         Role of Physical Therapy in the acute care setting. [x]         Fall prevention education was provided and the patient/caregiver indicated understanding. [x]         Patient/family have participated as able in goal setting and plan of care. []         Patient/family agree to work toward stated goals and plan of care. []         Patient understands intent and goals of therapy, but is neutral about his/her participation. []         Patient is unable to participate in goal setting/plan of care: ongoing with therapy staff.  []         Other:     Thank you for this referral.  Aden Mckay, CALLIE   Time Calculation: 40 mins

## 2020-12-19 VITALS
BODY MASS INDEX: 23.14 KG/M2 | TEMPERATURE: 98.7 F | OXYGEN SATURATION: 100 % | RESPIRATION RATE: 16 BRPM | SYSTOLIC BLOOD PRESSURE: 140 MMHG | HEIGHT: 63 IN | HEART RATE: 93 BPM | DIASTOLIC BLOOD PRESSURE: 51 MMHG | WEIGHT: 130.6 LBS

## 2020-12-19 LAB
ALBUMIN SERPL-MCNC: 3.2 G/DL (ref 3.4–5)
ALBUMIN/GLOB SERPL: 1 {RATIO} (ref 0.8–1.7)
ALP SERPL-CCNC: 44 U/L (ref 45–117)
ALT SERPL-CCNC: 32 U/L (ref 13–56)
ANION GAP SERPL CALC-SCNC: 9 MMOL/L (ref 3–18)
APTT PPP: 29.6 SEC (ref 23–36.4)
AST SERPL-CCNC: 16 U/L (ref 10–38)
BASOPHILS # BLD: 0 K/UL (ref 0–0.1)
BASOPHILS NFR BLD: 0 % (ref 0–2)
BILIRUB SERPL-MCNC: 0.5 MG/DL (ref 0.2–1)
BUN SERPL-MCNC: 24 MG/DL (ref 7–18)
BUN/CREAT SERPL: 24 (ref 12–20)
CALCIUM SERPL-MCNC: 8.6 MG/DL (ref 8.5–10.1)
CHLORIDE SERPL-SCNC: 106 MMOL/L (ref 100–111)
CO2 SERPL-SCNC: 26 MMOL/L (ref 21–32)
CREAT SERPL-MCNC: 0.99 MG/DL (ref 0.6–1.3)
CRP SERPL-MCNC: 0.7 MG/DL (ref 0–0.3)
D DIMER PPP FEU-MCNC: 0.36 UG/ML(FEU)
DIFFERENTIAL METHOD BLD: ABNORMAL
EOSINOPHIL # BLD: 0 K/UL (ref 0–0.4)
EOSINOPHIL NFR BLD: 0 % (ref 0–5)
ERYTHROCYTE [DISTWIDTH] IN BLOOD BY AUTOMATED COUNT: 14.7 % (ref 11.6–14.5)
FERRITIN SERPL-MCNC: 225 NG/ML (ref 8–388)
FIBRINOGEN PPP-MCNC: 340 MG/DL (ref 210–451)
GLOBULIN SER CALC-MCNC: 3.2 G/DL (ref 2–4)
GLUCOSE BLD STRIP.AUTO-MCNC: 122 MG/DL (ref 70–110)
GLUCOSE BLD STRIP.AUTO-MCNC: 191 MG/DL (ref 70–110)
GLUCOSE SERPL-MCNC: 91 MG/DL (ref 74–99)
HCT VFR BLD AUTO: 38.5 % (ref 35–45)
HGB BLD-MCNC: 13 G/DL (ref 12–16)
INR PPP: 1 (ref 0.8–1.2)
LDH SERPL L TO P-CCNC: 264 U/L (ref 81–234)
LYMPHOCYTES # BLD: 2.3 K/UL (ref 0.9–3.6)
LYMPHOCYTES NFR BLD: 14 % (ref 21–52)
MCH RBC QN AUTO: 29.7 PG (ref 24–34)
MCHC RBC AUTO-ENTMCNC: 33.8 G/DL (ref 31–37)
MCV RBC AUTO: 88.1 FL (ref 74–97)
MONOCYTES # BLD: 0.9 K/UL (ref 0.05–1.2)
MONOCYTES NFR BLD: 5 % (ref 3–10)
NEUTS SEG # BLD: 13.2 K/UL (ref 1.8–8)
NEUTS SEG NFR BLD: 81 % (ref 40–73)
PLATELET # BLD AUTO: 326 K/UL (ref 135–420)
PMV BLD AUTO: 10.3 FL (ref 9.2–11.8)
POTASSIUM SERPL-SCNC: 3.7 MMOL/L (ref 3.5–5.5)
PROCALCITONIN SERPL-MCNC: <0.05 NG/ML
PROT SERPL-MCNC: 6.4 G/DL (ref 6.4–8.2)
PROTHROMBIN TIME: 13.4 SEC (ref 11.5–15.2)
RBC # BLD AUTO: 4.37 M/UL (ref 4.2–5.3)
SODIUM SERPL-SCNC: 141 MMOL/L (ref 136–145)
WBC # BLD AUTO: 16.3 K/UL (ref 4.6–13.2)

## 2020-12-19 PROCEDURE — 85610 PROTHROMBIN TIME: CPT

## 2020-12-19 PROCEDURE — 80053 COMPREHEN METABOLIC PANEL: CPT

## 2020-12-19 PROCEDURE — 74011250637 HC RX REV CODE- 250/637: Performed by: NURSE PRACTITIONER

## 2020-12-19 PROCEDURE — 74011250637 HC RX REV CODE- 250/637: Performed by: INTERNAL MEDICINE

## 2020-12-19 PROCEDURE — 36415 COLL VENOUS BLD VENIPUNCTURE: CPT

## 2020-12-19 PROCEDURE — 85384 FIBRINOGEN ACTIVITY: CPT

## 2020-12-19 PROCEDURE — 74011636637 HC RX REV CODE- 636/637: Performed by: HOSPITALIST

## 2020-12-19 PROCEDURE — 82962 GLUCOSE BLOOD TEST: CPT

## 2020-12-19 PROCEDURE — 82728 ASSAY OF FERRITIN: CPT

## 2020-12-19 PROCEDURE — 85379 FIBRIN DEGRADATION QUANT: CPT

## 2020-12-19 PROCEDURE — 85025 COMPLETE CBC W/AUTO DIFF WBC: CPT

## 2020-12-19 PROCEDURE — 74011250636 HC RX REV CODE- 250/636: Performed by: HOSPITALIST

## 2020-12-19 PROCEDURE — 85730 THROMBOPLASTIN TIME PARTIAL: CPT

## 2020-12-19 PROCEDURE — 99239 HOSP IP/OBS DSCHRG MGMT >30: CPT | Performed by: NURSE PRACTITIONER

## 2020-12-19 PROCEDURE — 74011250637 HC RX REV CODE- 250/637: Performed by: HOSPITALIST

## 2020-12-19 PROCEDURE — 74011000250 HC RX REV CODE- 250: Performed by: INTERNAL MEDICINE

## 2020-12-19 PROCEDURE — 86140 C-REACTIVE PROTEIN: CPT

## 2020-12-19 PROCEDURE — 74011250636 HC RX REV CODE- 250/636: Performed by: NURSE PRACTITIONER

## 2020-12-19 PROCEDURE — 83615 LACTATE (LD) (LDH) ENZYME: CPT

## 2020-12-19 PROCEDURE — 84145 PROCALCITONIN (PCT): CPT

## 2020-12-19 RX ORDER — ZINC SULFATE 50(220)MG
220 CAPSULE ORAL DAILY
Qty: 30 CAP | Refills: 0 | Status: SHIPPED | OUTPATIENT
Start: 2020-12-20 | End: 2021-10-24 | Stop reason: ALTCHOICE

## 2020-12-19 RX ORDER — IPRATROPIUM BROMIDE AND ALBUTEROL SULFATE 2.5; .5 MG/3ML; MG/3ML
3 SOLUTION RESPIRATORY (INHALATION)
Status: DISCONTINUED | OUTPATIENT
Start: 2020-12-19 | End: 2020-12-19 | Stop reason: HOSPADM

## 2020-12-19 RX ORDER — ASCORBIC ACID 500 MG
500 TABLET ORAL 2 TIMES DAILY
Qty: 30 TAB | Refills: 0 | Status: SHIPPED | OUTPATIENT
Start: 2020-12-19 | End: 2021-10-24

## 2020-12-19 RX ORDER — FAMOTIDINE 20 MG/1
20 TABLET, FILM COATED ORAL DAILY
Qty: 30 TAB | Refills: 0 | Status: SHIPPED | OUTPATIENT
Start: 2020-12-20 | End: 2022-08-01

## 2020-12-19 RX ORDER — FUROSEMIDE 20 MG/1
40 TABLET ORAL DAILY
Qty: 30 TAB | Refills: 0 | Status: SHIPPED
Start: 2020-12-19 | End: 2021-10-24

## 2020-12-19 RX ORDER — MELATONIN
2000 DAILY
Qty: 60 TAB | Refills: 0 | Status: SHIPPED | OUTPATIENT
Start: 2020-12-20 | End: 2022-08-01

## 2020-12-19 RX ORDER — DEXAMETHASONE 2 MG/1
TABLET ORAL
Qty: 12 TAB | Refills: 0 | Status: SHIPPED | OUTPATIENT
Start: 2020-12-19 | End: 2020-12-23

## 2020-12-19 RX ADMIN — IPRATROPIUM BROMIDE AND ALBUTEROL SULFATE 3 ML: .5; 3 SOLUTION RESPIRATORY (INHALATION) at 00:00

## 2020-12-19 RX ADMIN — IPRATROPIUM BROMIDE AND ALBUTEROL 1 PUFF: 20; 100 SPRAY, METERED RESPIRATORY (INHALATION) at 08:00

## 2020-12-19 RX ADMIN — THERA TABS 1 TABLET: TAB at 08:13

## 2020-12-19 RX ADMIN — DEXAMETHASONE 4 MG: 4 TABLET ORAL at 08:13

## 2020-12-19 RX ADMIN — NIFEDIPINE 90 MG: 90 TABLET, FILM COATED, EXTENDED RELEASE ORAL at 08:12

## 2020-12-19 RX ADMIN — Medication 1 CAPSULE: at 08:13

## 2020-12-19 RX ADMIN — FAMOTIDINE 20 MG: 20 TABLET, FILM COATED ORAL at 08:11

## 2020-12-19 RX ADMIN — ACETAMINOPHEN 650 MG: 325 TABLET ORAL at 12:14

## 2020-12-19 RX ADMIN — GUAIFENESIN 600 MG: 600 TABLET, EXTENDED RELEASE ORAL at 08:13

## 2020-12-19 RX ADMIN — Medication 500 MG: at 08:13

## 2020-12-19 RX ADMIN — BUDESONIDE AND FORMOTEROL FUMARATE DIHYDRATE 2 PUFF: 80; 4.5 AEROSOL RESPIRATORY (INHALATION) at 08:00

## 2020-12-19 RX ADMIN — INSULIN LISPRO 3 UNITS: 100 INJECTION, SOLUTION INTRAVENOUS; SUBCUTANEOUS at 11:53

## 2020-12-19 RX ADMIN — ZINC SULFATE 220 MG (50 MG) CAPSULE 1 CAPSULE: CAPSULE at 08:12

## 2020-12-19 RX ADMIN — ATORVASTATIN CALCIUM 20 MG: 20 TABLET, FILM COATED ORAL at 08:13

## 2020-12-19 RX ADMIN — ENOXAPARIN SODIUM 40 MG: 40 INJECTION SUBCUTANEOUS at 08:14

## 2020-12-19 RX ADMIN — IPRATROPIUM BROMIDE AND ALBUTEROL 1 PUFF: 20; 100 SPRAY, METERED RESPIRATORY (INHALATION) at 12:00

## 2020-12-19 RX ADMIN — Medication 81 MG: at 08:14

## 2020-12-19 RX ADMIN — FUROSEMIDE 40 MG: 40 TABLET ORAL at 08:13

## 2020-12-19 RX ADMIN — LOSARTAN POTASSIUM 75 MG: 25 TABLET, FILM COATED ORAL at 08:13

## 2020-12-19 RX ADMIN — METOPROLOL TARTRATE 25 MG: 25 TABLET, FILM COATED ORAL at 08:13

## 2020-12-19 RX ADMIN — Medication 6 TABLET: at 08:11

## 2020-12-19 NOTE — DISCHARGE SUMMARY
[facility]  Discharge Summary       Patient: Judah Muñoz Age: 82 y.o. : 1935 MR#: [id number] SSN: [id number]  PCP on record: Mariann Peguero NP  Admit date: 12/10/2020  Discharge date: 2020    Disposition:    []Home   [x]Home with Home Health   []SNF/NH   []Rehab   []Home with family   []Alternate Facility:____________________    Discharge Diagnoses:                             COVID-19 pneumonia s/p remdesivir and convalescent plasma, dexamethasone, and increased oxygen supplementation   COVID positive 12/10  Acute on chronic hypoxic respiratory failure. Home oxygen 2-3L  New 7mm pulmonary nodule, HIRAL. Follow up with Dr. Amelia Jeter as OP to repeat CT chest in 3 months    History of   Chronic diastolic HF  Moderate pulmonary HTN  COPD  Emphysema  Chronic respiratory failure  CAD  HTN  HLD  Anxiety  Advanced Age      Discharge Medications:     Current Discharge Medication List      START taking these medications    Details   famotidine (PEPCID) 20 mg tablet Take 1 Tab by mouth daily. Qty: 30 Tab, Refills: 0      dexAMETHasone (DECADRON) 2 mg tablet Take 2 Tabs by mouth two (2) times daily (with meals) for 2 days, THEN 1 Tab two (2) times daily (with meals) for 2 days. Qty: 12 Tab, Refills: 0      ascorbic acid, vitamin C, (VITAMIN C) 500 mg tablet Take 1 Tab by mouth two (2) times a day. Qty: 30 Tab, Refills: 0      cholecalciferol (VITAMIN D3) (1000 Units /25 mcg) tablet Take 2 Tabs by mouth daily. Qty: 60 Tab, Refills: 0      zinc sulfate (ZINCATE) 220 (50) mg capsule Take 1 Cap by mouth daily. Qty: 30 Cap, Refills: 0         CONTINUE these medications which have NOT CHANGED    Details   atorvastatin (LIPITOR) 20 mg tablet Take 20 mg by mouth daily. multivitamin (ONE A DAY) tablet Take 1 Tab by mouth daily. COQ10, UBIQUINOL, PO Take  by mouth.      losartan (COZAAR) 50 mg tablet Take  by mouth daily.  75 mg in a.m fluticasone-umeclidin-vilanter (TRELEGY ELLIPTA) 100-62.5-25 mcg dsdv Take 1 Puff by inhalation daily. diclofenac (VOLTAREN) 1 % gel Apply  to affected area four (4) times daily. Qty: 100 g, Refills: 2      Oxygen       albuterol (PROVENTIL HFA, VENTOLIN HFA, PROAIR HFA) 90 mcg/actuation inhaler Take  by inhalation. albuterol (PROVENTIL VENTOLIN) 2.5 mg /3 mL (0.083 %) nebulizer solution 3 mL by Nebulization route every four (4) hours as needed for Wheezing or Shortness of Breath. Qty: 48 Each, Refills: 0      furosemide (LASIX) 20 mg tablet 20 mg po daily  Qty: 30 Tab, Refills: 0      acetaminophen (TYLENOL EXTRA STRENGTH) 500 mg tablet Take 500 mg by mouth every six (6) hours as needed for Pain.      metoprolol (LOPRESSOR) 25 mg tablet Take 25 mg by mouth two (2) times a day. NIFEdipine ER (ADALAT CC) 60 mg ER tablet Take 60 mg by mouth daily. Associated Diagnoses: Occlusion and stenosis of carotid artery, left; Pre-op testing      aspirin delayed-release 81 mg tablet Take  by mouth daily. Associated Diagnoses: Occlusion and stenosis of carotid artery, left; Pre-op testing      alprazolam (XANAX) 0.5 mg tablet Take  by mouth nightly. Associated Diagnoses: Occlusion and stenosis of carotid artery, left; Pre-op testing             Consults:    Pulmonary   ID   PT/OT  Diabetes education     Significant Diagnostic Studies:   XR Results (most recent):  Results from Hospital Encounter encounter on 12/10/20   XR CHEST PORT    Narrative INDICATION: eval for pulmonary edema, wet read please- results pending discharge    TECHNIQUE: Portable chest radiograph    COMPARISON: CT chest 10 December 2020, chest radiograph 10 December 2020, 15 May  2020. FINDINGS: The lungs are adequately inflated. No focal consolidation, effusion or  pneumothorax. Overall improved increased interstitial lung markings, with  residual increased interstitial lung markings -near baseline. Heart size remains  enlarged. No acute osseous abnormality. Impression IMPRESSION:     Overall improved lung aeration with mild residual areas of increased  interstitial lung markings -near baseline. CT Results (most recent):  Results from [place] encounter on 12/10/20   CTA CHEST W OR W WO CONT    Narrative CTA CHEST PULMONARY EMBOLISM PROTOCOL      INDICATION: Chills. Body aches. Symptoms since last night. Covid-19 concern. TECHNIQUE: Thin collimation axial images obtained through the level of the  pulmonary arteries with additional imaging through the chest following the  uneventful administration of 65 cc Isovue-300 intravenous contrast.  Images  reconstructed into MIP coronal and sagittal projections for complete evaluation  of the tortuous and overlapping pulmonary vascular structures and to reduce  patient radiation dose. All CT scans are performed using dose optimization  techniques as appropriate to the performed exam including the following:  Automated exposure control, adjustment of mA and/or kV according to patient  size, and use of iterative reconstructive technique. COMPARISON: Chest CT 10/6/2020. FINDINGS:    No filling defects are appreciated within the main, left, right, lobar or  visualized segmental pulmonary arteries to suggest embolism. Motion artifact  limits peripheral, subsegmental branches. The thoracic aorta is not aneurysmal.   No evidence for dissection. Arterial wall calcifications. Biatrial cardiac  enlargement. No pericardial effusion. No pleural effusion. Precarinal 14 mm granulomatous  calcification containing lymph node is slightly larger. New additional  lymphadenopathy along bilateral mediastinum and subcarinal mediastinum. New 13  mm subcarinal lymph node. Left mediastinal nodes measure 11 mm. Bilateral hilar  lymphadenopathy. Left thyroid nodule is similar to prior measuring 3.1 x 1.8 cm. Emphysema. Hypoinflation and respiratory motion lung limitations.  New 7 mm  nodule (image 18/series 3). Stable chronic scar at anterior right upper lobe and  mild at the right middle lobe. New mild sites of peripheral consolidation in the  right lateral lower lobe. Mild groundglass density versus atelectasis in the  left lower lung laterally. Small hiatal hernia. Subcentimeter right renal cyst  No acute bone finding. Impression IMPRESSION:    1. No evidence of pulmonary embolism. 2. Limited by motion and hypoinflation. Emphysema with mild infiltrate/pneumonia  suspected in the right lower lobe. 3. New mild hilar and mediastinal lymphadenopathy. 4. New 7 mm left upper lobe lung nodule. Follow-up recommended in 3 months  posttreatment to assess for its persistence, given the limitations of this exam.  5. Biatrial cardiac enlargement. 6. Stable left thyroid nodule. 7. Small hiatal hernia. Hospital Course by Problem   Per H&P on 12/11 82 y.o.  female with hx of COPD, chronic respiratory failure on 2-3L NC, emphysema, moderate pulmonary hypertension, CAD, HTN, chronic diastolic CHF, HLD who presented to [facility] ED yesterday c/o worsening SOB, body aches and dull headache since Sunday 12/6. Her son took her [place] shopping but stated she wasn't in the store more than 20 mins due to very long lines. So she then decided to go to the commissary to go grocery shopping. Pt states it was very cold in there and she couldn't get warm, later that day she felt ill and hasn't improved since. She went to the [facility] on 12/9 but went home after her hip started hurting due to prolonged wait time. She has increased her oxygen \"as high as possible\" due to SOB. Pt denies fever, cough, chest pain, palpitations, abdominal pain, n/v, change in bowel habits. No known exposure to sick contacts. Prior 30 year hx of tobacco abuse. She is followed by Dr. Alonzo Weeks cardiology and Dr. Radha Echeverria pulmonary.   ED Course  Oxygen sats 84% on 4L NC, tachypnea, normal lactic acid, WBC 6, Hgb 14.9, Hct 45.3, platelets 832, normal CMP, , CRP 2.5, ferritin 143, pro-BNP normal at 1362, troponin <0.02. Influenza A/B negative. Blood cultures obtained x 2. CXR enlarged cardiac silhouette w/ diffuse increased interstitial opacities superimposed on COPD which may represent superimposed mild pulmonary interstitial edema vs infectious/inflammatory process. Pt placed on NRBM 11L. ABG obtianed with pH of 7.43, pC02 of 37.1, p02 of 66 and HCO3 of 24.4, Novel Coronavirus obtained. CTa negative for PE but did show emphysema w/ mild infiltrate/pneumonia in RLL, new mild hilar and mediastinal lymphadenopathy, new 7 mm left upper lobe lung nodule f/u in 3 months, biatrial cardiac enlargement, stable left thyroid nodule and small hiatal hernia. Rapid Covid test obtained and POSITIVE. Droplet plus isolation.     Pt was accepted by hospitalist group and transferred from [facility] to [facility] stepdown unit this morning. She is currently on HFNC 25L and 60%. Pt resting in bed in NAD. She is a/ox 4. States SOB has improved significantly. She is able to confirm her home medication list.      Summary of hospital course  COVID-19 virus test positive 12/10/2020. Droplet precaution. Treated with antibiotics, remdesivir, dexamethasone, and convalescent plasma. Vit c/d/zinc. Oxygen supplementation greater than her home oxygen of 2-3L. Patient states at home her home tank can provide up to 5L. This was confirmed with case management prior to discharge. In the hospital prior to discharge patient was on 4L NC with desaturations while ambulating in the room. She would have improvement of oxygen levels once at rest. She would go from >95% to mid 80's with recovery within 30 to 60 seconds. Day of discharge she would go from 95% to 90-91% with recovery time within 30 seconds. Instructions at discharge for home oxygen; 3L at rest and 4L with exertion/ambulation.    ID assisted with COVID-19 treatment  Pulmonary consult with assistance with respiratory status of COVID-19 pneumonia. Additional treatments Symbicort, Spiriva, albuterol as needed, and steroid use. Goal for SPO2 >92% while on oxygen. Today's examination of the patient revealed:     Subjective:    All 10 systems reviewed and negative   Objective:   VS:   Visit Vitals  BP (!) 140/51 (BP 1 Location: Right arm, BP Patient Position: Sitting)   Pulse 93   Temp 98.7 °F (37.1 °C)   Resp 16   Ht 5' 3\" (1.6 m)   Wt 59.2 kg (130 lb 9.6 oz)   SpO2 100%   Breastfeeding No   BMI 23.13 kg/m²      Tmax/24hrs: Temp (24hrs), Av.8 °F (36.6 °C), Min:97 °F (36.1 °C), Max:98.7 °F (37.1 °C)     Input/Output: No intake or output data in the 24 hours ending 20 1034    General:  Alert, NAD  Cardiovascular:  RRR  Pulmonary:  LSC throughout  GI:  +BS in all four quadrants, soft, non-tender  Extremities:  No edema; 2+ dorsalis pedis pulses bilaterally  Neurology: Patient A&O x 4  Ambulated with minimal assist in the room     Labs:    Recent Results (from the past 24 hour(s))   GLUCOSE, POC    Collection Time: 20 11:41 AM   Result Value Ref Range    Glucose (POC) 169 (H) 70 - 110 mg/dL   GLUCOSE, POC    Collection Time: 20  5:56 PM   Result Value Ref Range    Glucose (POC) 143 (H) 70 - 110 mg/dL   GLUCOSE, POC    Collection Time: 20  8:32 PM   Result Value Ref Range    Glucose (POC) 256 (H) 70 - 110 mg/dL   PROTHROMBIN TIME + INR    Collection Time: 20 12:06 AM   Result Value Ref Range    Prothrombin time 13.4 11.5 - 15.2 sec    INR 1.0 0.8 - 1.2     PTT    Collection Time: 20 12:06 AM   Result Value Ref Range    aPTT 29.6 23.0 - 36.4 SEC   FIBRINOGEN    Collection Time: 20 12:06 AM   Result Value Ref Range    Fibrinogen 340 210 - 451 mg/dL   CBC WITH AUTOMATED DIFF    Collection Time: 20 12:06 AM   Result Value Ref Range    WBC 16.3 (H) 4.6 - 13.2 K/uL    RBC 4.37 4.20 - 5.30 M/uL    HGB 13.0 12.0 - 16.0 g/dL    HCT 38.5 35.0 - 45.0 %    MCV 88.1 74.0 - 97.0 FL    MCH 29.7 24.0 - 34.0 PG    MCHC 33.8 31.0 - 37.0 g/dL    RDW 14.7 (H) 11.6 - 14.5 %    PLATELET 845 844 - 738 K/uL    MPV 10.3 9.2 - 11.8 FL    NEUTROPHILS 81 (H) 40 - 73 %    LYMPHOCYTES 14 (L) 21 - 52 %    MONOCYTES 5 3 - 10 %    EOSINOPHILS 0 0 - 5 %    BASOPHILS 0 0 - 2 %    ABS. NEUTROPHILS 13.2 (H) 1.8 - 8.0 K/UL    ABS. LYMPHOCYTES 2.3 0.9 - 3.6 K/UL    ABS. MONOCYTES 0.9 0.05 - 1.2 K/UL    ABS. EOSINOPHILS 0.0 0.0 - 0.4 K/UL    ABS. BASOPHILS 0.0 0.0 - 0.1 K/UL    DF AUTOMATED     METABOLIC PANEL, COMPREHENSIVE    Collection Time: 12/19/20 12:06 AM   Result Value Ref Range    Sodium 141 136 - 145 mmol/L    Potassium 3.7 3.5 - 5.5 mmol/L    Chloride 106 100 - 111 mmol/L    CO2 26 21 - 32 mmol/L    Anion gap 9 3.0 - 18 mmol/L    Glucose 91 74 - 99 mg/dL    BUN 24 (H) 7.0 - 18 MG/DL    Creatinine 0.99 0.6 - 1.3 MG/DL    BUN/Creatinine ratio 24 (H) 12 - 20      GFR est AA >60 >60 ml/min/1.73m2    GFR est non-AA 54 (L) >60 ml/min/1.73m2    Calcium 8.6 8.5 - 10.1 MG/DL    Bilirubin, total 0.5 0.2 - 1.0 MG/DL    ALT (SGPT) 32 13 - 56 U/L    AST (SGOT) 16 10 - 38 U/L    Alk.  phosphatase 44 (L) 45 - 117 U/L    Protein, total 6.4 6.4 - 8.2 g/dL    Albumin 3.2 (L) 3.4 - 5.0 g/dL    Globulin 3.2 2.0 - 4.0 g/dL    A-G Ratio 1.0 0.8 - 1.7     LD    Collection Time: 12/19/20 12:06 AM   Result Value Ref Range     (H) 81 - 234 U/L   FERRITIN    Collection Time: 12/19/20 12:06 AM   Result Value Ref Range    Ferritin 225 8 - 388 NG/ML   C REACTIVE PROTEIN, QT    Collection Time: 12/19/20 12:06 AM   Result Value Ref Range    C-Reactive protein 0.7 (H) 0 - 0.3 mg/dL   D DIMER    Collection Time: 12/19/20 12:06 AM   Result Value Ref Range    D DIMER 0.36 <0.46 ug/ml(FEU)   PROCALCITONIN    Collection Time: 12/19/20 12:06 AM   Result Value Ref Range    Procalcitonin <0.05 ng/mL   GLUCOSE, POC    Collection Time: 12/19/20  8:30 AM   Result Value Ref Range    Glucose (POC) 122 (H) 70 - 110 mg/dL Additional Data Reviewed:     Condition: stable   Follow-up Appointments:   1. Your PCP: Belkis Arellano NP, within 5-7 days  2.  Follow up with OP pulmonology in 3-4 weeks              >30 minutes spent coordinating this discharge (review instructions/follow-up, prescriptions, preparing report for sign off)    Signed:  Lindsey Opitz, NP  12/19/2020  10:34 AM

## 2020-12-19 NOTE — PROGRESS NOTES
RR 26     12/19/20 0724   Oxygen Therapy   O2 Sat (%) 96 %   Pulse via Oximetry 93 beats per minute   O2 Device Nasal cannula   O2 Flow Rate (L/min) 4 l/min

## 2020-12-19 NOTE — DISCHARGE INSTRUCTIONS

## 2020-12-19 NOTE — PROGRESS NOTES
Problem: Patient Education: Go to Patient Education Activity  Goal: Patient/Family Education  Outcome: Progressing Towards Goal     Problem: Patient Education: Go to Patient Education Activity  Goal: Patient/Family Education  Outcome: Progressing Towards Goal     Problem: Patient Education: Go to Patient Education Activity  Goal: Patient/Family Education  Outcome: Progressing Towards Goal     Problem: Pain  Goal: *Control of Pain  Outcome: Progressing Towards Goal     Problem: Airway Clearance - Ineffective  Goal: Achieve or maintain patent airway  Outcome: Progressing Towards Goal     Problem: Gas Exchange - Impaired  Goal: Absence of hypoxia  Outcome: Progressing Towards Goal  Goal: Promote optimal lung function  Outcome: Progressing Towards Goal     Problem: Breathing Pattern - Ineffective  Goal: Ability to achieve and maintain a regular respiratory rate  Outcome: Progressing Towards Goal     Problem:  Body Temperature -  Risk of, Imbalanced  Goal: Ability to maintain a body temperature within defined limits  Outcome: Progressing Towards Goal  Goal: Will regain or maintain usual level of consciousness  Outcome: Progressing Towards Goal  Goal: Complications related to the disease process, condition or treatment will be avoided or minimized  Outcome: Progressing Towards Goal     Problem: Isolation Precautions - Risk of Spread of Infection  Goal: Prevent transmission of infectious organism to others  Outcome: Progressing Towards Goal     Problem: Nutrition Deficits  Goal: Optimize nutrtional status  Outcome: Progressing Towards Goal     Problem: Risk for Fluid Volume Deficit  Goal: Maintain normal heart rhythm  Outcome: Progressing Towards Goal  Goal: Maintain absence of muscle cramping  Outcome: Progressing Towards Goal  Goal: Maintain normal serum potassium, sodium, calcium, phosphorus, and pH  Outcome: Progressing Towards Goal     Problem: Loneliness or Risk for Loneliness  Goal: Demonstrate positive use of time alone when socialization is not possible  Outcome: Progressing Towards Goal     Problem: Fatigue  Goal: Verbalize increase energy and improved vitality  Outcome: Progressing Towards Goal     Problem: Nutrition Deficit  Goal: *Optimize nutritional status  Outcome: Progressing Towards Goal

## 2020-12-19 NOTE — PROGRESS NOTES
12/18/20 2308   Oxygen Therapy   O2 Sat (%) 100 %   Pulse via Oximetry 99 beats per minute   O2 Device Nasal cannula   O2 Flow Rate (L/min) 4 l/min   Pre-Treatment   Breathing Pattern Regular   Breath Sounds Bilateral Diminished   Post-Treatment   Breathing Pattern Regular   Breath Sounds Bilateral Diminished   Pulse 99   SpO2 100 %   Respirations 18   Treatment Tolerance Patient tolerated   Procedures   $$ Initial Procedures Aerosol   Delivery Source Breath Actuated Nebulizer   Aerosolized Medications DuoNeb       Pt only wants DUONEB nebulizers, she doesn't like Combivent(MDI)   No respiratory distress noted, patient is comfortable. Pt instructed to call if help is needed, call bell within reach.   Patient confirms understanding

## 2020-12-19 NOTE — PROGRESS NOTES
Discharge planning    Discharge order noted for today. Pt has been accepted to [facility] agency and spoke with Bijal. Spoke with  patient and  agreeable to the transition plan today. Transport has been arranged with family. Patient's discharge summary and home health  orders have been forwarded to [facility] health  agency via  [facility] Updated bedside RN,  Odalys,  to the transition plan. Discharge information has been documented on the AVS.  Patient has inogen oxygen concentrator and tanks. Patient stated \" My inogen goes up to 5 liters. My son will bring my take to go home. \"   notified Mortimer Santos, NP that patient has home oxygen up to 5 liters. Per DME order, patient will need 4 liters.        ELÍAS Rodriguez, RN  Pager # [phone]  Care Manager

## 2020-12-19 NOTE — ROUTINE PROCESS
Received verbal report from Argentina Anders, Report included SBAR, MAR, and Kdardex. Gave verbal report to Mahsa Davison RN, report included SBAR, MAR, and Kardex.

## 2020-12-20 ENCOUNTER — HOME CARE VISIT (OUTPATIENT)
Dept: SCHEDULING | Facility: HOME HEALTH | Age: 85
End: 2020-12-20
Payer: MEDICARE

## 2020-12-20 ENCOUNTER — PATIENT OUTREACH (OUTPATIENT)
Dept: CASE MANAGEMENT | Age: 85
End: 2020-12-20

## 2020-12-20 PROCEDURE — G0299 HHS/HOSPICE OF RN EA 15 MIN: HCPCS

## 2020-12-20 PROCEDURE — 3331090001 HH PPS REVENUE CREDIT

## 2020-12-20 PROCEDURE — 3331090002 HH PPS REVENUE DEBIT

## 2020-12-20 PROCEDURE — 400013 HH SOC

## 2020-12-20 NOTE — PROGRESS NOTES
Patient contacted regarding COVID-19 diagnosis. Discussed COVID-19 related testing which was available at this time. Test results were positive. Patient informed of results, if available? yes. Outreach made within 2 business days of discharge: Yes    Care Transition Nurse contacted the patient by telephone to perform post discharge assessment. Verified name and  with patient as identifiers. Provided introduction to self, and explanation of the CTN role, and reason for call due to risk factors for infection and/or exposure to COVID-19. Symptoms reviewed with family (sister Horace Nolasco) who verbalized the following symptoms: shortness of breath, fast heart rate, dizziness/lightheadedness, no new symptoms and no worsening symptoms. Patient has some BURGESS at baseline. She slept well last night. VS during call are /59, HR 94, T 97.1, O2 Sats 95%. Due to no new or worsening symptoms encounter was not routed to provider for escalation. Discussed follow-up appointments. If no appointment was previously scheduled, appointment scheduling offered: no-patinet/family will call PCP to schedule  [address] follow up appointment(s):   Future Appointments   Date Time Provider Jaya Nolasco   2020 11:30 AM Zaria Sandoval RN [facility] [facility]   4/15/2021 10:30 AM Milagros Mendiola MD [facility] BS AMB   2021  1:45 PM [facility] BS AMB   2021  1:45 PM Gi Wong PA [facility] BS AMB     Non-BS follow up appointment(s): TBD      Advance Care Planning:   Does patient have an Advance Directive: currently not on file; patient declined education    Patient has following risk factors of: heart failure, COPD, pneumonia, acute respiratory failure and age. CTN reviewed discharge instructions, medical action plan and red flags such as increased shortness of breath, increasing fever and signs of decompensation with family who verbalized understanding.    Discussed exposure protocols and quarantine with [facility] Guidelines What to do if you are sick with coronavirus disease 2019.  Family was given an opportunity for questions and concerns. The family agrees to contact the [employer] exposure line [phone], local [facility] department [address]  ([phone]) and PCP office for questions related to their healthcare. CTN provided contact information for future needs. Unable to review and educate patient/family on any new and changed medications related to discharge diagnosis. Patient/family/caregiver given information for [employer] and agrees to enroll no      Patient/family declined follow up from CTN. Sister stated that she prefers to follow up with home health on all of patient's health matters, so CTN was unable to complete follow up. Resolving COVID Care Transitions episode of care. Patient has CTN's contact information.

## 2020-12-21 ENCOUNTER — PATIENT OUTREACH (OUTPATIENT)
Dept: CASE MANAGEMENT | Age: 85
End: 2020-12-21

## 2020-12-21 PROCEDURE — 3331090002 HH PPS REVENUE DEBIT

## 2020-12-21 PROCEDURE — 3331090001 HH PPS REVENUE CREDIT

## 2020-12-21 NOTE — PROGRESS NOTES
12/21/2020 10:41 AM    Patient declined services when call by another CTN yesterday. Did not call patient. Will resolve episode.

## 2020-12-21 NOTE — HOME CARE
Discharge noted over the weekend, [facility] will follow for SN[facility]  [facility] central intake over the weekend. .PHAN MG LPN.

## 2020-12-21 NOTE — PROGRESS NOTES
SOC completed on Patient Pete Nail  [birth year] (patient of [facility]  NP) SN/PT/OT  all ordered. CBC is ordered for  and will be faxed to your office. Patient was Covid + 12/10/2020. She is asymptomatic at this time.

## 2020-12-22 ENCOUNTER — HOME CARE VISIT (OUTPATIENT)
Dept: HOME HEALTH SERVICES | Facility: HOME HEALTH | Age: 85
End: 2020-12-22
Payer: MEDICARE

## 2020-12-22 VITALS
TEMPERATURE: 98.4 F | HEART RATE: 72 BPM | SYSTOLIC BLOOD PRESSURE: 118 MMHG | DIASTOLIC BLOOD PRESSURE: 68 MMHG | RESPIRATION RATE: 18 BRPM | OXYGEN SATURATION: 97 %

## 2020-12-22 PROCEDURE — 3331090002 HH PPS REVENUE DEBIT

## 2020-12-22 PROCEDURE — 3331090001 HH PPS REVENUE CREDIT

## 2020-12-23 ENCOUNTER — HOME CARE VISIT (OUTPATIENT)
Dept: SCHEDULING | Facility: HOME HEALTH | Age: 85
End: 2020-12-23
Payer: MEDICARE

## 2020-12-23 ENCOUNTER — TELEPHONE (OUTPATIENT)
Dept: PULMONOLOGY | Age: 85
End: 2020-12-23

## 2020-12-23 ENCOUNTER — HOSPITAL ENCOUNTER (OUTPATIENT)
Dept: LAB | Age: 85
Discharge: HOME | End: 2020-12-23
Payer: MEDICARE

## 2020-12-23 LAB
BASOPHILS # BLD: 0 K/UL (ref 0–0.1)
BASOPHILS NFR BLD: 0 % (ref 0–2)
DIFFERENTIAL METHOD BLD: ABNORMAL
EOSINOPHIL # BLD: 0 K/UL (ref 0–0.4)
EOSINOPHIL NFR BLD: 0 % (ref 0–5)
ERYTHROCYTE [DISTWIDTH] IN BLOOD BY AUTOMATED COUNT: 15.3 % (ref 11.6–14.5)
HCT VFR BLD AUTO: 41.2 % (ref 35–45)
HGB BLD-MCNC: 13.2 G/DL (ref 12–16)
LYMPHOCYTES # BLD: 1.9 K/UL (ref 0.9–3.6)
LYMPHOCYTES NFR BLD: 9 % (ref 21–52)
MCH RBC QN AUTO: 29.2 PG (ref 24–34)
MCHC RBC AUTO-ENTMCNC: 32 G/DL (ref 31–37)
MCV RBC AUTO: 91.2 FL (ref 74–97)
MONOCYTES # BLD: 0.6 K/UL (ref 0.05–1.2)
MONOCYTES NFR BLD: 3 % (ref 3–10)
NEUTS SEG # BLD: 19.8 K/UL (ref 1.8–8)
NEUTS SEG NFR BLD: 88 % (ref 40–73)
PLATELET # BLD AUTO: 362 K/UL (ref 135–420)
PMV BLD AUTO: 10.6 FL (ref 9.2–11.8)
RBC # BLD AUTO: 4.52 M/UL (ref 4.2–5.3)
WBC # BLD AUTO: 22.3 K/UL (ref 4.6–13.2)

## 2020-12-23 PROCEDURE — G0299 HHS/HOSPICE OF RN EA 15 MIN: HCPCS

## 2020-12-23 PROCEDURE — 3331090002 HH PPS REVENUE DEBIT

## 2020-12-23 PROCEDURE — 85025 COMPLETE CBC W/AUTO DIFF WBC: CPT

## 2020-12-23 PROCEDURE — 3331090001 HH PPS REVENUE CREDIT

## 2020-12-23 NOTE — TELEPHONE ENCOUNTER
Spoke with BS home darius re a lab order they received from a patient at was discharged from [facility]. NP from hospital put a order in for a CBC  Under lab comment the results are to go to the patients PCP which is Lb Pearce NP. Home health also advised patient's pulmonologist is Dr. Santo Shine.

## 2020-12-23 NOTE — TELEPHONE ENCOUNTER
Barbara Cervantes from [address] [place] [address] she needs to speak w/ nurse regarding lab order put in by Dr. Александр Gaitan. Please advise [phone].

## 2020-12-24 ENCOUNTER — HOME CARE VISIT (OUTPATIENT)
Dept: SCHEDULING | Facility: HOME HEALTH | Age: 85
End: 2020-12-24
Payer: MEDICARE

## 2020-12-24 VITALS
HEART RATE: 66 BPM | DIASTOLIC BLOOD PRESSURE: 64 MMHG | RESPIRATION RATE: 17 BRPM | SYSTOLIC BLOOD PRESSURE: 140 MMHG | OXYGEN SATURATION: 99 % | TEMPERATURE: 98.4 F

## 2020-12-24 PROCEDURE — G0151 HHCP-SERV OF PT,EA 15 MIN: HCPCS

## 2020-12-24 PROCEDURE — 3331090001 HH PPS REVENUE CREDIT

## 2020-12-24 PROCEDURE — 3331090002 HH PPS REVENUE DEBIT

## 2020-12-25 PROCEDURE — 3331090001 HH PPS REVENUE CREDIT

## 2020-12-25 PROCEDURE — 3331090002 HH PPS REVENUE DEBIT

## 2020-12-26 PROCEDURE — 3331090001 HH PPS REVENUE CREDIT

## 2020-12-26 PROCEDURE — 3331090002 HH PPS REVENUE DEBIT

## 2020-12-27 PROCEDURE — 3331090001 HH PPS REVENUE CREDIT

## 2020-12-27 PROCEDURE — 3331090002 HH PPS REVENUE DEBIT

## 2020-12-28 VITALS
TEMPERATURE: 97.7 F | SYSTOLIC BLOOD PRESSURE: 124 MMHG | OXYGEN SATURATION: 94 % | DIASTOLIC BLOOD PRESSURE: 62 MMHG | RESPIRATION RATE: 18 BRPM | HEART RATE: 74 BPM

## 2020-12-28 PROCEDURE — 3331090001 HH PPS REVENUE CREDIT

## 2020-12-28 PROCEDURE — 3331090002 HH PPS REVENUE DEBIT

## 2020-12-29 ENCOUNTER — HOME CARE VISIT (OUTPATIENT)
Dept: SCHEDULING | Facility: HOME HEALTH | Age: 85
End: 2020-12-29
Payer: MEDICARE

## 2020-12-29 VITALS
DIASTOLIC BLOOD PRESSURE: 52 MMHG | SYSTOLIC BLOOD PRESSURE: 108 MMHG | HEART RATE: 76 BPM | TEMPERATURE: 98.1 F | OXYGEN SATURATION: 92 % | BODY MASS INDEX: 21.7 KG/M2 | RESPIRATION RATE: 20 BRPM | WEIGHT: 122.5 LBS

## 2020-12-29 PROCEDURE — 3331090001 HH PPS REVENUE CREDIT

## 2020-12-29 PROCEDURE — 3331090002 HH PPS REVENUE DEBIT

## 2020-12-29 PROCEDURE — G0157 HHC PT ASSISTANT EA 15: HCPCS

## 2020-12-29 PROCEDURE — G0299 HHS/HOSPICE OF RN EA 15 MIN: HCPCS

## 2020-12-30 PROCEDURE — 3331090001 HH PPS REVENUE CREDIT

## 2020-12-30 PROCEDURE — 3331090002 HH PPS REVENUE DEBIT

## 2020-12-31 ENCOUNTER — HOME CARE VISIT (OUTPATIENT)
Dept: SCHEDULING | Facility: HOME HEALTH | Age: 85
End: 2020-12-31
Payer: MEDICARE

## 2020-12-31 VITALS
HEART RATE: 91 BPM | SYSTOLIC BLOOD PRESSURE: 98 MMHG | OXYGEN SATURATION: 92 % | TEMPERATURE: 97.8 F | RESPIRATION RATE: 15 BRPM | DIASTOLIC BLOOD PRESSURE: 40 MMHG

## 2020-12-31 VITALS
RESPIRATION RATE: 17 BRPM | DIASTOLIC BLOOD PRESSURE: 55 MMHG | TEMPERATURE: 97.6 F | OXYGEN SATURATION: 94 % | HEART RATE: 79 BPM | SYSTOLIC BLOOD PRESSURE: 112 MMHG

## 2020-12-31 PROCEDURE — 3331090001 HH PPS REVENUE CREDIT

## 2020-12-31 PROCEDURE — 3331090002 HH PPS REVENUE DEBIT

## 2020-12-31 PROCEDURE — G0157 HHC PT ASSISTANT EA 15: HCPCS

## 2021-01-01 ENCOUNTER — HOME CARE VISIT (OUTPATIENT)
Dept: SCHEDULING | Facility: HOME HEALTH | Age: 86
End: 2021-01-01
Payer: MEDICARE

## 2021-01-01 PROCEDURE — 3331090001 HH PPS REVENUE CREDIT

## 2021-01-01 PROCEDURE — G0300 HHS/HOSPICE OF LPN EA 15 MIN: HCPCS

## 2021-01-01 PROCEDURE — 3331090002 HH PPS REVENUE DEBIT

## 2021-01-02 PROCEDURE — 3331090001 HH PPS REVENUE CREDIT

## 2021-01-02 PROCEDURE — 3331090002 HH PPS REVENUE DEBIT

## 2021-01-03 VITALS
SYSTOLIC BLOOD PRESSURE: 126 MMHG | TEMPERATURE: 98.9 F | DIASTOLIC BLOOD PRESSURE: 68 MMHG | RESPIRATION RATE: 20 BRPM | OXYGEN SATURATION: 97 % | HEART RATE: 75 BPM

## 2021-01-03 PROCEDURE — 3331090001 HH PPS REVENUE CREDIT

## 2021-01-03 PROCEDURE — 3331090002 HH PPS REVENUE DEBIT

## 2021-01-04 ENCOUNTER — HOME CARE VISIT (OUTPATIENT)
Dept: SCHEDULING | Facility: HOME HEALTH | Age: 86
End: 2021-01-04
Payer: MEDICARE

## 2021-01-04 PROCEDURE — 3331090002 HH PPS REVENUE DEBIT

## 2021-01-04 PROCEDURE — 3331090001 HH PPS REVENUE CREDIT

## 2021-01-04 PROCEDURE — G0300 HHS/HOSPICE OF LPN EA 15 MIN: HCPCS

## 2021-01-04 PROCEDURE — G0157 HHC PT ASSISTANT EA 15: HCPCS

## 2021-01-05 VITALS
RESPIRATION RATE: 15 BRPM | HEART RATE: 71 BPM | SYSTOLIC BLOOD PRESSURE: 121 MMHG | DIASTOLIC BLOOD PRESSURE: 58 MMHG | OXYGEN SATURATION: 92 % | TEMPERATURE: 97.6 F

## 2021-01-05 VITALS
HEART RATE: 87 BPM | DIASTOLIC BLOOD PRESSURE: 52 MMHG | TEMPERATURE: 99.1 F | SYSTOLIC BLOOD PRESSURE: 132 MMHG | RESPIRATION RATE: 18 BRPM | OXYGEN SATURATION: 93 % | WEIGHT: 120 LBS | BODY MASS INDEX: 21.26 KG/M2

## 2021-01-05 PROCEDURE — 3331090002 HH PPS REVENUE DEBIT

## 2021-01-05 PROCEDURE — 3331090001 HH PPS REVENUE CREDIT

## 2021-01-06 PROCEDURE — 3331090002 HH PPS REVENUE DEBIT

## 2021-01-06 PROCEDURE — 3331090001 HH PPS REVENUE CREDIT

## 2021-01-07 ENCOUNTER — HOME CARE VISIT (OUTPATIENT)
Dept: SCHEDULING | Facility: HOME HEALTH | Age: 86
End: 2021-01-07
Payer: MEDICARE

## 2021-01-07 VITALS
OXYGEN SATURATION: 92 % | HEART RATE: 93 BPM | DIASTOLIC BLOOD PRESSURE: 56 MMHG | SYSTOLIC BLOOD PRESSURE: 123 MMHG | RESPIRATION RATE: 20 BRPM | TEMPERATURE: 98.4 F

## 2021-01-07 VITALS
TEMPERATURE: 98.4 F | HEART RATE: 93 BPM | DIASTOLIC BLOOD PRESSURE: 52 MMHG | RESPIRATION RATE: 20 BRPM | SYSTOLIC BLOOD PRESSURE: 126 MMHG | OXYGEN SATURATION: 94 %

## 2021-01-07 PROCEDURE — G0300 HHS/HOSPICE OF LPN EA 15 MIN: HCPCS

## 2021-01-07 PROCEDURE — 3331090001 HH PPS REVENUE CREDIT

## 2021-01-07 PROCEDURE — G0157 HHC PT ASSISTANT EA 15: HCPCS

## 2021-01-07 PROCEDURE — 3331090002 HH PPS REVENUE DEBIT

## 2021-01-08 ENCOUNTER — HOME CARE VISIT (OUTPATIENT)
Dept: HOME HEALTH SERVICES | Facility: HOME HEALTH | Age: 86
End: 2021-01-08
Payer: MEDICARE

## 2021-01-08 PROCEDURE — 3331090001 HH PPS REVENUE CREDIT

## 2021-01-08 PROCEDURE — 3331090002 HH PPS REVENUE DEBIT

## 2021-01-09 ENCOUNTER — HOME CARE VISIT (OUTPATIENT)
Dept: SCHEDULING | Facility: HOME HEALTH | Age: 86
End: 2021-01-09
Payer: MEDICARE

## 2021-01-09 ENCOUNTER — HOSPITAL ENCOUNTER (OUTPATIENT)
Dept: LAB | Age: 86
Discharge: HOME | End: 2021-01-09
Payer: MEDICARE

## 2021-01-09 LAB
ALBUMIN SERPL-MCNC: 3.2 G/DL (ref 3.4–5)
ALBUMIN/GLOB SERPL: 0.8 {RATIO} (ref 0.8–1.7)
ALP SERPL-CCNC: 85 U/L (ref 45–117)
ALT SERPL-CCNC: 26 U/L (ref 13–56)
ANION GAP SERPL CALC-SCNC: 7 MMOL/L (ref 3–18)
AST SERPL-CCNC: 14 U/L (ref 10–38)
BASOPHILS # BLD: 0 K/UL (ref 0–0.1)
BASOPHILS NFR BLD: 0 % (ref 0–2)
BILIRUB SERPL-MCNC: 0.3 MG/DL (ref 0.2–1)
BUN SERPL-MCNC: 11 MG/DL (ref 7–18)
BUN/CREAT SERPL: 12 (ref 12–20)
CALCIUM SERPL-MCNC: 9 MG/DL (ref 8.5–10.1)
CHLORIDE SERPL-SCNC: 106 MMOL/L (ref 100–111)
CO2 SERPL-SCNC: 29 MMOL/L (ref 21–32)
CREAT SERPL-MCNC: 0.89 MG/DL (ref 0.6–1.3)
DIFFERENTIAL METHOD BLD: ABNORMAL
EOSINOPHIL # BLD: 0.4 K/UL (ref 0–0.4)
EOSINOPHIL NFR BLD: 6 % (ref 0–5)
ERYTHROCYTE [DISTWIDTH] IN BLOOD BY AUTOMATED COUNT: 15.7 % (ref 11.6–14.5)
GLOBULIN SER CALC-MCNC: 3.9 G/DL (ref 2–4)
GLUCOSE SERPL-MCNC: 85 MG/DL (ref 74–99)
HCT VFR BLD AUTO: 40.2 % (ref 35–45)
HGB BLD-MCNC: 13.1 G/DL (ref 12–16)
LYMPHOCYTES # BLD: 2.6 K/UL (ref 0.9–3.6)
LYMPHOCYTES NFR BLD: 34 % (ref 21–52)
MCH RBC QN AUTO: 30 PG (ref 24–34)
MCHC RBC AUTO-ENTMCNC: 32.6 G/DL (ref 31–37)
MCV RBC AUTO: 92 FL (ref 74–97)
MONOCYTES # BLD: 0 K/UL (ref 0.05–1.2)
MONOCYTES NFR BLD: 0 % (ref 3–10)
NEUTS SEG # BLD: 4.9 K/UL (ref 1.8–8)
NEUTS SEG NFR BLD: 60 % (ref 40–73)
PLATELET # BLD AUTO: 456 K/UL (ref 135–420)
PMV BLD AUTO: 9.4 FL (ref 9.2–11.8)
POTASSIUM SERPL-SCNC: 4.9 MMOL/L (ref 3.5–5.5)
PROT SERPL-MCNC: 7.1 G/DL (ref 6.4–8.2)
RBC # BLD AUTO: 4.37 M/UL (ref 4.2–5.3)
SODIUM SERPL-SCNC: 142 MMOL/L (ref 136–145)
WBC # BLD AUTO: 7.5 K/UL (ref 4.6–13.2)

## 2021-01-09 PROCEDURE — 3331090002 HH PPS REVENUE DEBIT

## 2021-01-09 PROCEDURE — G0299 HHS/HOSPICE OF RN EA 15 MIN: HCPCS

## 2021-01-09 PROCEDURE — 85025 COMPLETE CBC W/AUTO DIFF WBC: CPT

## 2021-01-09 PROCEDURE — 80053 COMPREHEN METABOLIC PANEL: CPT

## 2021-01-09 PROCEDURE — 3331090001 HH PPS REVENUE CREDIT

## 2021-01-10 VITALS
HEART RATE: 76 BPM | OXYGEN SATURATION: 98 % | TEMPERATURE: 98.4 F | DIASTOLIC BLOOD PRESSURE: 76 MMHG | SYSTOLIC BLOOD PRESSURE: 121 MMHG | RESPIRATION RATE: 16 BRPM

## 2021-01-10 PROCEDURE — 3331090001 HH PPS REVENUE CREDIT

## 2021-01-10 PROCEDURE — 3331090002 HH PPS REVENUE DEBIT

## 2021-01-11 ENCOUNTER — HOME CARE VISIT (OUTPATIENT)
Dept: SCHEDULING | Facility: HOME HEALTH | Age: 86
End: 2021-01-11
Payer: MEDICARE

## 2021-01-11 VITALS
RESPIRATION RATE: 18 BRPM | HEART RATE: 81 BPM | SYSTOLIC BLOOD PRESSURE: 120 MMHG | TEMPERATURE: 97.9 F | DIASTOLIC BLOOD PRESSURE: 42 MMHG

## 2021-01-11 PROCEDURE — 3331090002 HH PPS REVENUE DEBIT

## 2021-01-11 PROCEDURE — 3331090001 HH PPS REVENUE CREDIT

## 2021-01-11 PROCEDURE — G0300 HHS/HOSPICE OF LPN EA 15 MIN: HCPCS

## 2021-01-12 ENCOUNTER — HOME CARE VISIT (OUTPATIENT)
Dept: SCHEDULING | Facility: HOME HEALTH | Age: 86
End: 2021-01-12
Payer: MEDICARE

## 2021-01-12 PROCEDURE — 3331090001 HH PPS REVENUE CREDIT

## 2021-01-12 PROCEDURE — G0157 HHC PT ASSISTANT EA 15: HCPCS

## 2021-01-12 PROCEDURE — 3331090002 HH PPS REVENUE DEBIT

## 2021-01-13 VITALS
OXYGEN SATURATION: 94 % | TEMPERATURE: 97.5 F | SYSTOLIC BLOOD PRESSURE: 131 MMHG | HEART RATE: 73 BPM | DIASTOLIC BLOOD PRESSURE: 57 MMHG | RESPIRATION RATE: 19 BRPM

## 2021-01-13 PROCEDURE — 3331090002 HH PPS REVENUE DEBIT

## 2021-01-13 PROCEDURE — 3331090001 HH PPS REVENUE CREDIT

## 2021-01-14 ENCOUNTER — HOME CARE VISIT (OUTPATIENT)
Dept: SCHEDULING | Facility: HOME HEALTH | Age: 86
End: 2021-01-14
Payer: MEDICARE

## 2021-01-14 VITALS
RESPIRATION RATE: 16 BRPM | TEMPERATURE: 98.2 F | OXYGEN SATURATION: 99 % | HEART RATE: 65 BPM | SYSTOLIC BLOOD PRESSURE: 108 MMHG | DIASTOLIC BLOOD PRESSURE: 58 MMHG

## 2021-01-14 PROCEDURE — 3331090002 HH PPS REVENUE DEBIT

## 2021-01-14 PROCEDURE — G0300 HHS/HOSPICE OF LPN EA 15 MIN: HCPCS

## 2021-01-14 PROCEDURE — 3331090001 HH PPS REVENUE CREDIT

## 2021-01-15 ENCOUNTER — HOME CARE VISIT (OUTPATIENT)
Dept: HOME HEALTH SERVICES | Facility: HOME HEALTH | Age: 86
End: 2021-01-15
Payer: MEDICARE

## 2021-01-15 PROCEDURE — G0157 HHC PT ASSISTANT EA 15: HCPCS

## 2021-01-15 PROCEDURE — 3331090002 HH PPS REVENUE DEBIT

## 2021-01-15 PROCEDURE — 3331090001 HH PPS REVENUE CREDIT

## 2021-01-16 PROCEDURE — 3331090002 HH PPS REVENUE DEBIT

## 2021-01-16 PROCEDURE — 3331090001 HH PPS REVENUE CREDIT

## 2021-01-17 PROCEDURE — 3331090002 HH PPS REVENUE DEBIT

## 2021-01-17 PROCEDURE — 3331090001 HH PPS REVENUE CREDIT

## 2021-01-18 PROCEDURE — 3331090002 HH PPS REVENUE DEBIT

## 2021-01-18 PROCEDURE — 3331090001 HH PPS REVENUE CREDIT

## 2021-01-19 ENCOUNTER — HOME CARE VISIT (OUTPATIENT)
Dept: SCHEDULING | Facility: HOME HEALTH | Age: 86
End: 2021-01-19
Payer: MEDICARE

## 2021-01-19 VITALS
SYSTOLIC BLOOD PRESSURE: 130 MMHG | HEART RATE: 73 BPM | DIASTOLIC BLOOD PRESSURE: 60 MMHG | OXYGEN SATURATION: 94 % | RESPIRATION RATE: 18 BRPM | TEMPERATURE: 97.5 F

## 2021-01-19 VITALS
SYSTOLIC BLOOD PRESSURE: 110 MMHG | HEART RATE: 86 BPM | DIASTOLIC BLOOD PRESSURE: 50 MMHG | OXYGEN SATURATION: 92 % | TEMPERATURE: 97.3 F

## 2021-01-19 PROCEDURE — 3331090002 HH PPS REVENUE DEBIT

## 2021-01-19 PROCEDURE — 3331090001 HH PPS REVENUE CREDIT

## 2021-01-19 PROCEDURE — 400018 HH-NO PAY CLAIM PROCEDURE

## 2021-01-19 PROCEDURE — 400013 HH SOC

## 2021-01-19 PROCEDURE — G0157 HHC PT ASSISTANT EA 15: HCPCS

## 2021-01-20 ENCOUNTER — HOME CARE VISIT (OUTPATIENT)
Dept: SCHEDULING | Facility: HOME HEALTH | Age: 86
End: 2021-01-20
Payer: MEDICARE

## 2021-01-20 PROCEDURE — G0300 HHS/HOSPICE OF LPN EA 15 MIN: HCPCS

## 2021-01-20 PROCEDURE — 3331090002 HH PPS REVENUE DEBIT

## 2021-01-20 PROCEDURE — 3331090001 HH PPS REVENUE CREDIT

## 2021-01-21 VITALS
TEMPERATURE: 97.7 F | SYSTOLIC BLOOD PRESSURE: 132 MMHG | HEART RATE: 77 BPM | DIASTOLIC BLOOD PRESSURE: 54 MMHG | RESPIRATION RATE: 18 BRPM | OXYGEN SATURATION: 97 %

## 2021-01-21 PROCEDURE — 3331090001 HH PPS REVENUE CREDIT

## 2021-01-21 PROCEDURE — 3331090002 HH PPS REVENUE DEBIT

## 2021-01-22 ENCOUNTER — HOME CARE VISIT (OUTPATIENT)
Dept: SCHEDULING | Facility: HOME HEALTH | Age: 86
End: 2021-01-22
Payer: MEDICARE

## 2021-01-22 PROCEDURE — G0151 HHCP-SERV OF PT,EA 15 MIN: HCPCS

## 2021-01-22 PROCEDURE — 3331090001 HH PPS REVENUE CREDIT

## 2021-01-22 PROCEDURE — 3331090002 HH PPS REVENUE DEBIT

## 2021-01-23 PROCEDURE — 3331090001 HH PPS REVENUE CREDIT

## 2021-01-23 PROCEDURE — 3331090002 HH PPS REVENUE DEBIT

## 2021-01-24 PROCEDURE — 3331090001 HH PPS REVENUE CREDIT

## 2021-01-24 PROCEDURE — 3331090002 HH PPS REVENUE DEBIT

## 2021-01-25 PROCEDURE — 3331090002 HH PPS REVENUE DEBIT

## 2021-01-25 PROCEDURE — 3331090001 HH PPS REVENUE CREDIT

## 2021-01-26 ENCOUNTER — HOME CARE VISIT (OUTPATIENT)
Dept: SCHEDULING | Facility: HOME HEALTH | Age: 86
End: 2021-01-26
Payer: MEDICARE

## 2021-01-26 VITALS
DIASTOLIC BLOOD PRESSURE: 60 MMHG | HEART RATE: 73 BPM | TEMPERATURE: 97 F | RESPIRATION RATE: 14 BRPM | OXYGEN SATURATION: 96 % | SYSTOLIC BLOOD PRESSURE: 130 MMHG

## 2021-01-26 VITALS
HEART RATE: 73 BPM | OXYGEN SATURATION: 95 % | TEMPERATURE: 99.1 F | SYSTOLIC BLOOD PRESSURE: 142 MMHG | DIASTOLIC BLOOD PRESSURE: 64 MMHG

## 2021-01-26 PROCEDURE — G0299 HHS/HOSPICE OF RN EA 15 MIN: HCPCS

## 2021-01-26 PROCEDURE — 3331090002 HH PPS REVENUE DEBIT

## 2021-01-26 PROCEDURE — 3331090001 HH PPS REVENUE CREDIT

## 2021-02-22 ENCOUNTER — APPOINTMENT (OUTPATIENT)
Dept: GENERAL RADIOLOGY | Age: 86
DRG: 190 | End: 2021-02-22
Attending: EMERGENCY MEDICINE
Payer: MEDICARE

## 2021-02-22 ENCOUNTER — HOSPITAL ENCOUNTER (INPATIENT)
Age: 86
LOS: 3 days | Discharge: HOME HEALTH | DRG: 190 | End: 2021-02-25
Attending: EMERGENCY MEDICINE | Admitting: FAMILY MEDICINE
Payer: MEDICARE

## 2021-02-22 DIAGNOSIS — R60.9 EDEMA, UNSPECIFIED TYPE: ICD-10-CM

## 2021-02-22 DIAGNOSIS — J44.1 ACUTE EXACERBATION OF CHRONIC OBSTRUCTIVE PULMONARY DISEASE (COPD) (HCC): Primary | ICD-10-CM

## 2021-02-22 LAB
ANION GAP SERPL CALC-SCNC: 10 MMOL/L (ref 3–18)
BASOPHILS # BLD: 0 K/UL (ref 0–0.1)
BASOPHILS NFR BLD: 0 % (ref 0–2)
BUN SERPL-MCNC: 20 MG/DL (ref 7–18)
BUN/CREAT SERPL: 15 (ref 12–20)
CALCIUM SERPL-MCNC: 9.3 MG/DL (ref 8.5–10.1)
CHLORIDE SERPL-SCNC: 109 MMOL/L (ref 100–111)
CO2 SERPL-SCNC: 23 MMOL/L (ref 21–32)
CREAT SERPL-MCNC: 1.3 MG/DL (ref 0.6–1.3)
DIFFERENTIAL METHOD BLD: ABNORMAL
EOSINOPHIL # BLD: 0.2 K/UL (ref 0–0.4)
EOSINOPHIL NFR BLD: 1 % (ref 0–5)
ERYTHROCYTE [DISTWIDTH] IN BLOOD BY AUTOMATED COUNT: 13.6 % (ref 11.6–14.5)
GLUCOSE SERPL-MCNC: 142 MG/DL (ref 74–99)
HCT VFR BLD AUTO: 48.7 % (ref 35–45)
HGB BLD-MCNC: 15.5 G/DL (ref 12–16)
LYMPHOCYTES # BLD: 3.1 K/UL (ref 0.9–3.6)
LYMPHOCYTES NFR BLD: 27 % (ref 21–52)
MCH RBC QN AUTO: 29.7 PG (ref 24–34)
MCHC RBC AUTO-ENTMCNC: 31.8 G/DL (ref 31–37)
MCV RBC AUTO: 93.3 FL (ref 74–97)
MONOCYTES # BLD: 1.5 K/UL (ref 0.05–1.2)
MONOCYTES NFR BLD: 13 % (ref 3–10)
NEUTS SEG # BLD: 6.6 K/UL (ref 1.8–8)
NEUTS SEG NFR BLD: 59 % (ref 40–73)
PLATELET # BLD AUTO: 385 K/UL (ref 135–420)
PMV BLD AUTO: 9.9 FL (ref 9.2–11.8)
POTASSIUM SERPL-SCNC: 3.8 MMOL/L (ref 3.5–5.5)
RBC # BLD AUTO: 5.22 M/UL (ref 4.2–5.3)
SARS-COV-2, COV2: NORMAL
SODIUM SERPL-SCNC: 142 MMOL/L (ref 136–145)
WBC # BLD AUTO: 11.4 K/UL (ref 4.6–13.2)

## 2021-02-22 PROCEDURE — 71045 X-RAY EXAM CHEST 1 VIEW: CPT

## 2021-02-22 PROCEDURE — 93005 ELECTROCARDIOGRAM TRACING: CPT

## 2021-02-22 PROCEDURE — 87635 SARS-COV-2 COVID-19 AMP PRB: CPT

## 2021-02-22 PROCEDURE — 80048 BASIC METABOLIC PNL TOTAL CA: CPT

## 2021-02-22 PROCEDURE — 74011000250 HC RX REV CODE- 250: Performed by: EMERGENCY MEDICINE

## 2021-02-22 PROCEDURE — 96374 THER/PROPH/DIAG INJ IV PUSH: CPT

## 2021-02-22 PROCEDURE — 85025 COMPLETE CBC W/AUTO DIFF WBC: CPT

## 2021-02-22 PROCEDURE — 65660000000 HC RM CCU STEPDOWN

## 2021-02-22 PROCEDURE — 99285 EMERGENCY DEPT VISIT HI MDM: CPT

## 2021-02-22 PROCEDURE — 74011250636 HC RX REV CODE- 250/636: Performed by: EMERGENCY MEDICINE

## 2021-02-22 RX ORDER — ALBUTEROL SULFATE 0.83 MG/ML
2.5 SOLUTION RESPIRATORY (INHALATION)
Status: COMPLETED | OUTPATIENT
Start: 2021-02-22 | End: 2021-02-22

## 2021-02-22 RX ORDER — IPRATROPIUM BROMIDE AND ALBUTEROL SULFATE 2.5; .5 MG/3ML; MG/3ML
3 SOLUTION RESPIRATORY (INHALATION) ONCE
Status: COMPLETED | OUTPATIENT
Start: 2021-02-22 | End: 2021-02-22

## 2021-02-22 RX ADMIN — METHYLPREDNISOLONE SODIUM SUCCINATE 125 MG: 125 INJECTION, POWDER, FOR SOLUTION INTRAMUSCULAR; INTRAVENOUS at 19:14

## 2021-02-22 RX ADMIN — ALBUTEROL SULFATE 2.5 MG: 2.5 SOLUTION RESPIRATORY (INHALATION) at 19:51

## 2021-02-22 RX ADMIN — IPRATROPIUM BROMIDE AND ALBUTEROL SULFATE 3 ML: .5; 3 SOLUTION RESPIRATORY (INHALATION) at 19:18

## 2021-02-22 RX ADMIN — ALBUTEROL SULFATE 2.5 MG: 2.5 SOLUTION RESPIRATORY (INHALATION) at 19:33

## 2021-02-22 NOTE — ED TRIAGE NOTES
Patient c/o ongoing shortness of breath. She states she never really improved after having covid. Patient breathing is very labored after her walk to the restroom.

## 2021-02-23 LAB
ATRIAL RATE: 93 BPM
CALCULATED P AXIS, ECG09: 49 DEGREES
CALCULATED R AXIS, ECG10: 23 DEGREES
CALCULATED T AXIS, ECG11: 112 DEGREES
COVID-19 RAPID TEST, COVR: NOT DETECTED
D DIMER PPP FEU-MCNC: 0.6 UG/ML(FEU)
DIAGNOSIS, 93000: NORMAL
P-R INTERVAL, ECG05: 128 MS
Q-T INTERVAL, ECG07: 348 MS
QRS DURATION, ECG06: 80 MS
QTC CALCULATION (BEZET), ECG08: 432 MS
SOURCE, COVRS: NORMAL
VENTRICULAR RATE, ECG03: 93 BPM

## 2021-02-23 PROCEDURE — 36415 COLL VENOUS BLD VENIPUNCTURE: CPT

## 2021-02-23 PROCEDURE — 74011250636 HC RX REV CODE- 250/636: Performed by: EMERGENCY MEDICINE

## 2021-02-23 PROCEDURE — 74011250637 HC RX REV CODE- 250/637: Performed by: PHYSICIAN ASSISTANT

## 2021-02-23 PROCEDURE — 94640 AIRWAY INHALATION TREATMENT: CPT

## 2021-02-23 PROCEDURE — 85379 FIBRIN DEGRADATION QUANT: CPT

## 2021-02-23 PROCEDURE — 65660000000 HC RM CCU STEPDOWN

## 2021-02-23 PROCEDURE — 74011000250 HC RX REV CODE- 250: Performed by: EMERGENCY MEDICINE

## 2021-02-23 PROCEDURE — APPSS60 APP SPLIT SHARED TIME 46-60 MINUTES: Performed by: PHYSICIAN ASSISTANT

## 2021-02-23 PROCEDURE — 74011250636 HC RX REV CODE- 250/636: Performed by: PHYSICIAN ASSISTANT

## 2021-02-23 PROCEDURE — 74011250637 HC RX REV CODE- 250/637: Performed by: EMERGENCY MEDICINE

## 2021-02-23 RX ORDER — ENOXAPARIN SODIUM 100 MG/ML
40 INJECTION SUBCUTANEOUS DAILY
Status: DISCONTINUED | OUTPATIENT
Start: 2021-02-24 | End: 2021-02-25 | Stop reason: HOSPADM

## 2021-02-23 RX ORDER — ACETAMINOPHEN 325 MG/1
650 TABLET ORAL
Status: DISCONTINUED | OUTPATIENT
Start: 2021-02-23 | End: 2021-02-25 | Stop reason: HOSPADM

## 2021-02-23 RX ORDER — AZITHROMYCIN 250 MG/1
500 TABLET, FILM COATED ORAL
Status: COMPLETED | OUTPATIENT
Start: 2021-02-23 | End: 2021-02-23

## 2021-02-23 RX ORDER — ACETAMINOPHEN 650 MG/1
650 SUPPOSITORY RECTAL
Status: DISCONTINUED | OUTPATIENT
Start: 2021-02-23 | End: 2021-02-25 | Stop reason: HOSPADM

## 2021-02-23 RX ORDER — NIFEDIPINE 60 MG/1
60 TABLET, EXTENDED RELEASE ORAL DAILY
Status: DISCONTINUED | OUTPATIENT
Start: 2021-02-24 | End: 2021-02-25 | Stop reason: HOSPADM

## 2021-02-23 RX ORDER — METOPROLOL TARTRATE 25 MG/1
25 TABLET, FILM COATED ORAL ONCE
Status: DISCONTINUED | OUTPATIENT
Start: 2021-02-23 | End: 2021-02-23

## 2021-02-23 RX ORDER — MELATONIN
2000 DAILY
Status: DISCONTINUED | OUTPATIENT
Start: 2021-02-24 | End: 2021-02-23

## 2021-02-23 RX ORDER — POLYETHYLENE GLYCOL 3350 17 G/17G
17 POWDER, FOR SOLUTION ORAL DAILY PRN
Status: DISCONTINUED | OUTPATIENT
Start: 2021-02-23 | End: 2021-02-25 | Stop reason: HOSPADM

## 2021-02-23 RX ORDER — ONDANSETRON 2 MG/ML
4 INJECTION INTRAMUSCULAR; INTRAVENOUS
Status: DISCONTINUED | OUTPATIENT
Start: 2021-02-23 | End: 2021-02-25 | Stop reason: HOSPADM

## 2021-02-23 RX ORDER — ATORVASTATIN CALCIUM 20 MG/1
20 TABLET, FILM COATED ORAL DAILY
Status: DISCONTINUED | OUTPATIENT
Start: 2021-02-24 | End: 2021-02-25 | Stop reason: HOSPADM

## 2021-02-23 RX ORDER — LOSARTAN POTASSIUM 50 MG/1
50 TABLET ORAL DAILY
Status: DISCONTINUED | OUTPATIENT
Start: 2021-02-24 | End: 2021-02-25 | Stop reason: HOSPADM

## 2021-02-23 RX ORDER — IPRATROPIUM BROMIDE AND ALBUTEROL SULFATE 2.5; .5 MG/3ML; MG/3ML
3 SOLUTION RESPIRATORY (INHALATION) ONCE
Status: COMPLETED | OUTPATIENT
Start: 2021-02-23 | End: 2021-02-23

## 2021-02-23 RX ORDER — THERA TABS 400 MCG
1 TAB ORAL DAILY
Status: DISCONTINUED | OUTPATIENT
Start: 2021-02-24 | End: 2021-02-25 | Stop reason: HOSPADM

## 2021-02-23 RX ORDER — ASPIRIN 81 MG/1
81 TABLET ORAL DAILY
Status: DISCONTINUED | OUTPATIENT
Start: 2021-02-24 | End: 2021-02-25 | Stop reason: HOSPADM

## 2021-02-23 RX ORDER — FUROSEMIDE 10 MG/ML
40 INJECTION INTRAMUSCULAR; INTRAVENOUS ONCE
Status: COMPLETED | OUTPATIENT
Start: 2021-02-23 | End: 2021-02-23

## 2021-02-23 RX ORDER — PROMETHAZINE HYDROCHLORIDE 25 MG/1
12.5 TABLET ORAL
Status: DISCONTINUED | OUTPATIENT
Start: 2021-02-23 | End: 2021-02-25 | Stop reason: HOSPADM

## 2021-02-23 RX ORDER — IPRATROPIUM BROMIDE AND ALBUTEROL SULFATE 2.5; .5 MG/3ML; MG/3ML
3 SOLUTION RESPIRATORY (INHALATION)
Status: DISCONTINUED | OUTPATIENT
Start: 2021-02-23 | End: 2021-02-25 | Stop reason: HOSPADM

## 2021-02-23 RX ORDER — FAMOTIDINE 20 MG/1
20 TABLET, FILM COATED ORAL EVERY 12 HOURS
Status: DISCONTINUED | OUTPATIENT
Start: 2021-02-23 | End: 2021-02-25

## 2021-02-23 RX ORDER — ALPRAZOLAM 0.25 MG/1
0.5 TABLET ORAL
Status: DISCONTINUED | OUTPATIENT
Start: 2021-02-23 | End: 2021-02-25 | Stop reason: HOSPADM

## 2021-02-23 RX ORDER — METOPROLOL TARTRATE 25 MG/1
25 TABLET, FILM COATED ORAL 2 TIMES DAILY
Status: DISCONTINUED | OUTPATIENT
Start: 2021-02-23 | End: 2021-02-23

## 2021-02-23 RX ORDER — BUDESONIDE AND FORMOTEROL FUMARATE DIHYDRATE 80; 4.5 UG/1; UG/1
2 AEROSOL RESPIRATORY (INHALATION)
Status: DISCONTINUED | OUTPATIENT
Start: 2021-02-23 | End: 2021-02-25 | Stop reason: HOSPADM

## 2021-02-23 RX ORDER — SODIUM CHLORIDE 0.9 % (FLUSH) 0.9 %
5-40 SYRINGE (ML) INJECTION EVERY 8 HOURS
Status: DISCONTINUED | OUTPATIENT
Start: 2021-02-23 | End: 2021-02-25 | Stop reason: HOSPADM

## 2021-02-23 RX ORDER — SODIUM CHLORIDE 0.9 % (FLUSH) 0.9 %
5-40 SYRINGE (ML) INJECTION AS NEEDED
Status: DISCONTINUED | OUTPATIENT
Start: 2021-02-23 | End: 2021-02-25 | Stop reason: HOSPADM

## 2021-02-23 RX ORDER — IPRATROPIUM BROMIDE AND ALBUTEROL SULFATE 2.5; .5 MG/3ML; MG/3ML
3 SOLUTION RESPIRATORY (INHALATION)
Status: DISCONTINUED | OUTPATIENT
Start: 2021-02-23 | End: 2021-02-23

## 2021-02-23 RX ORDER — METOPROLOL TARTRATE 25 MG/1
25 TABLET, FILM COATED ORAL EVERY 12 HOURS
Status: DISCONTINUED | OUTPATIENT
Start: 2021-02-23 | End: 2021-02-25 | Stop reason: HOSPADM

## 2021-02-23 RX ADMIN — FAMOTIDINE 20 MG: 20 TABLET ORAL at 20:40

## 2021-02-23 RX ADMIN — AZITHROMYCIN 500 MG: 250 TABLET, FILM COATED ORAL at 10:30

## 2021-02-23 RX ADMIN — FUROSEMIDE 40 MG: 10 INJECTION, SOLUTION INTRAMUSCULAR; INTRAVENOUS at 20:41

## 2021-02-23 RX ADMIN — Medication 10 ML: at 22:45

## 2021-02-23 RX ADMIN — METHYLPREDNISOLONE SODIUM SUCCINATE 125 MG: 125 INJECTION, POWDER, FOR SOLUTION INTRAMUSCULAR; INTRAVENOUS at 10:30

## 2021-02-23 RX ADMIN — IPRATROPIUM BROMIDE AND ALBUTEROL SULFATE 3 ML: .5; 3 SOLUTION RESPIRATORY (INHALATION) at 10:30

## 2021-02-23 RX ADMIN — METHYLPREDNISOLONE SODIUM SUCCINATE 40 MG: 40 INJECTION, POWDER, FOR SOLUTION INTRAMUSCULAR; INTRAVENOUS at 22:44

## 2021-02-23 RX ADMIN — BUDESONIDE AND FORMOTEROL FUMARATE DIHYDRATE 2 PUFF: 80; 4.5 AEROSOL RESPIRATORY (INHALATION) at 20:00

## 2021-02-23 RX ADMIN — METOPROLOL TARTRATE 25 MG: 25 TABLET, FILM COATED ORAL at 20:40

## 2021-02-23 RX ADMIN — ALPRAZOLAM 0.5 MG: 0.25 TABLET ORAL at 22:44

## 2021-02-23 NOTE — ED NOTES
O2 sat with O2 @ 3lpm via NC & nebulizer ~83%.  Increased O2 to 4lpm with no change in sats.  Increased to 5 lpm with increased sats 88%.  Discussed with Dr Lane.  Pt states she is to see her pulmonologist next week; that he likes her sats to be ~90%.  States she has O2 for home use that she can go up to 5lpm.  Pt verbalizes that she does not want to be admitted to hospital.  Dr Lane aware of same.  Will continue to monitor.

## 2021-02-23 NOTE — ED NOTES
Pt reassessed at this time, pt placed on hospital bed and assisted to bedside commode. Pt did not tolerate bedside commode well and de-sated significantly. Pt voided and had a bowel movement. Pt no resting in bed in the POC with no complaints. MD notified and will continue to monitor.

## 2021-02-23 NOTE — ED NOTES
TRANSFER - OUT REPORT:    Verbal report given to Yessenia Sherwood RN(name) on [facility] [place]  being transferred to 204(unit) for routine progression of care       Report consisted of patients Situation, Background, Assessment and   Recommendations(SBAR). Information from the following report(s) ED Summary was reviewed with the receiving nurse. Lines:   Peripheral IV 02/22/21 Right Antecubital (Active)   Site Assessment Clean, dry, & intact 02/22/21 1850   Dressing Status Clean, dry, & intact 02/22/21 1850   Dressing Type Transparent 02/22/21 1850   Hub Color/Line Status Flushed 02/22/21 1850   Alcohol Cap Used Yes 02/22/21 1850        Opportunity for questions and clarification was provided. Patient transported with:   Monitor  O2 @ 3 liters     I spoke with CARLEEN Fowler in reference to patients condition when she exerts herself. Pt will desats to lower 80s and recovers after she has stopped the activity. Patient at this moment is stable. No distress. Lung sounds were clear on my assessment. Pt is on 3 LPM nasal cannula. She does wear 3 Lpm at home.

## 2021-02-23 NOTE — H&P
Hospitalist Admission History and Physical    NAME:  Lilian June   :   1935   MRN:   [id number]     PCP:  Odilia Conte NP  Date/Time:  2021 6:25 PM  Subjective:   CHIEF COMPLAINT:  SOB    HISTORY OF PRESENT ILLNESS:     Ms. Sosa Sierra is a very pleasant 83 yo  woman with a past medical hx of COPD, chronic resp failure on 3L NC,  recent COVID infection in 2736,  Diastolic CHF, HTN, CAD, pulm hypertension who presented from home to [facility] ED with SOB. Ever since Friday night/saturday morning, the patient Buck Houston not been feeling good\". She breathing is worse, She has been SOB at rest and with exertion + dry coughing. She  was in  [facility] hospital on 12/10 and discharged on  with acute on chronic respiratory failure from COVID 19 PNA, she got all the covid  treatment and was d/c home with oxygen. She was suppose to be wearing 3L NC at rest and 4L NC with exertion but she admits \"lately not wearing O2\". She has been in her house since discharge from hospital in dec 2020, recently in Feb started to finally leave her house and have lunch with her family, she thinks she over-exerted herself too soon. Also, LE edema in the left leg lately, she is compliant with lasix every morning. No wheezing at home. In the ED-  She was hypoxic, especially with exertion,  despite of NC.     ED course:   Vital signs: 97.4 F, , /56, RR 25, SPO2 80% 3LNC   Labs remarkable for: wbc 11.4   Imaging: chest xray COPD with superimposed fibrosis.  No acute change  EKG: NSR HR 93  Medications received nebs, solumederol, azithromycin   Procedures performed:      REVIEW OF SYSTEMS:     [] Unable to obtain  ROS due to  []mental status change  []sedated   []intubated   [x]Total of 12 systems reviewed as follows:  Review of Systems  Constitutional: Patient denies: fever, chills, weight loss  HEENT: Patient denies: change in vision, change in hearing, rhinorrhea, sinus congestion, neck pain/stiffness, sore throat, tongue swelling, lip swelling   PULMONARY: Patient denies:  wheezing  Cardiovascular: Patient denies chest pain, palpitations, paroxysmal nocturnal dyspnea, orthopnea, lower extremity edema   GASTROINTESTINAL: Patient denies: abdominal pain, nausea, vomiting, diarrhea, constipation, melena, bright red blood per rectum. GENITOURINARY: Patient denies: urinary frequency, urgency, dysuria, hematuria  MUSCULOSKELETAL: No joint or muscle pain, no back pain, no muscle weakness, no recent trauma. DERMATOLOGIC: No rash, no itching, no lesions. ENDOCRINE: No polyuria, polydipsia, no heat or cold intolerance. No recent change in weight. HEMATOLOGICAL: No anemia or easy bruising or bleeding. NEUROLOGIC: No headache, seizures, numbness, tingling or weakness. PSY: No anxiety nor depression   Pertinent Positives include :shortness of breath at rest, dyspnea on exertion, cough,        Past Medical History:   Diagnosis Date    Chronic lung disease     [facility] obstructive [facility] disease ([facility]     Heart failure ([facility]     Hypertension         Past Surgical History:   Procedure Laterality Date    HX ORTHOPAEDIC      spinal sx     HX OTHER SURGICAL      Carotid artery    VASCULAR SURGERY PROCEDURE UNLIST      L CEA 10/2014       Social History     Tobacco Use    Smoking status: Former Smoker     Packs/day: 1.50     Years: 30.00     Pack years: 45.00     Types: Cigarettes     Quit date: 1987     Years since quittin.4    Smokeless tobacco: Never Used   Substance Use Topics    Alcohol use: No     Alcohol/week: 0.0 standard drinks        Family History   Problem Relation Age of Onset    Heart Disease Mother     Hypertension Mother     Heart Attack Father     Hypertension Father         No Known Allergies     Prior to Admission Medications   Prescriptions Last Dose Informant Patient Reported? Taking? COQ10, UBIQUINOL, PO   Yes No   Sig: Take 1 Cap by mouth daily.    NIFEdipine ER (ADALAT CC) 60 mg ER tablet   Yes No   Sig: Take 60 mg by mouth daily. OXYGEN-AIR DELIVERY SYSTEMS   Yes No   Sig: 3 L by IntraNASal route continuous. Oxygen   Yes No   acetaminophen (TYLENOL EXTRA STRENGTH) 500 mg tablet  Self Yes No   Sig: Take 500 mg by mouth every six (6) hours as needed for Pain. albuterol (PROVENTIL HFA, VENTOLIN HFA, PROAIR HFA) 90 mcg/actuation inhaler   Yes No   Sig: Take 2 Puffs by inhalation every four (4) hours as needed for Shortness of Breath or Wheezing. albuterol (PROVENTIL VENTOLIN) 2.5 mg /3 mL (0.083 %) nebulizer solution   No No   Sig: 3 mL by Nebulization route every four (4) hours as needed for Wheezing or Shortness of Breath. alprazolam (XANAX) 0.5 mg tablet   Yes No   Sig: Take 0.5 mg by mouth nightly. ascorbic acid, vitamin C, (VITAMIN C) 500 mg tablet   No No   Sig: Take 1 Tab by mouth two (2) times a day. aspirin delayed-release 81 mg tablet   Yes No   Sig: Take 81 mg by mouth daily. atorvastatin (LIPITOR) 20 mg tablet   Yes No   Sig: Take 20 mg by mouth daily. cholecalciferol (VITAMIN D3) (1000 Units /25 mcg) tablet   No No   Sig: Take 2 Tabs by mouth daily. diclofenac (VOLTAREN) 1 % gel   No No   Sig: Apply  to affected area four (4) times daily. famotidine (PEPCID) 20 mg tablet   No No   Sig: Take 1 Tab by mouth daily. fluticasone-umeclidin-vilanter (TRELEGY ELLIPTA) 100-62.5-25 mcg dsdv   Yes No   Sig: Take 1 Puff by inhalation daily. furosemide (LASIX) 20 mg tablet   No No   Sig: Take 2 Tabs by mouth daily. losartan (COZAAR) 50 mg tablet   Yes No   Sig: Take  by mouth daily. 75 mg in a.m   metoprolol (LOPRESSOR) 25 mg tablet   Yes No   Sig: Take 25 mg by mouth two (2) times a day. multivitamin (ONE A DAY) tablet   Yes No   Sig: Take 1 Tab by mouth daily. zinc sulfate (ZINCATE) 220 (50) mg capsule   No No   Sig: Take 1 Cap by mouth daily.       Facility-Administered Medications: None       Objective:   VITALS:    Visit Vitals  BP (!) 185/65 (BP 1 Location: Left upper arm, BP Patient Position: At rest)   Pulse 89   Temp 98.1 °F (36.7 °C)   Resp 24   SpO2 93%     Temp (24hrs), Av.7 °F (36.5 °C), Min:97.5 °F (36.4 °C), Max:98.1 °F (36.7 °C)      PHYSICAL EXAM:   General:    Elderly  woman sitting up in bed, no acute distress, appears stated age. Head:   Normocephalic, without obvious abnormality  Eyes:   Conjunctivae clear, anicteric sclerae, pupils are equal  Nose:  Nares normal, no drainage   Throat:    Lips, mucosa, and tongue normal.    Neck:  Supple, symmetrical, no JVD. Back:    No CVA tenderness. Lungs: On 4L NC, speaking in complete sentences, clear to auscultation bilaterally- no wheezing, rhonchi or rales  Chest wall:  No tenderness or deformity. No Accessory muscle use. Heart:   Regular rate and rhythm;  no murmur, rub or gallop. Abdomen:   Soft, non-tender. Not distended. Bowel sounds normal. No masses  Skin:     No rashes or lesions. Not Jaundiced  Psych:  Good insight. Not depressed, anxious or agitated. Neurologic: Alert and oriented x3. No facial asymmetry. No aphasia or slurred speech. Normal strength. Moving all extremities. Extremities: Pedal edema and leg edema in the left leg. Non-erythematous, non tender to palpation. LAB DATA REVIEWED:    No components found for: [place]  Recent Labs     21  1857      K 3.8      CO2 23   BUN 20*   CREA 1.30   *   CA 9.3   WBC 11.4   HGB 15.5   HCT 48.7*          IMAGING RESULTS:   []  I have personally reviewed the actual   []CXR  []CT scan    CXR:   XR Results (most recent):  Results from Hospital Encounter encounter on 21   XR CHEST PORT    Narrative Portable Frontal Chest.    CLINICAL HISTORY: Shortness of breath and history of recent Covid 19 infection. Hypertension and COPD. Manuel Ra TECHNIQUE: Single frontal view of the chest, obtained portably. COMPARISONS: Chest x-ray 12/15/2020, 12/10/2020, CT 12/10/2020.     FINDINGS: Reticular densities all lobes. No areas of consolidation. Hyperinflation at  apices. No effusions. Cardiomegaly, stable. Vascularity normal. No effusions. Vascular clips left neck. Impression COPD with superimposed fibrosis. No acute change           CT :    Assessment/Plan:      Active Problems:    COPD exacerbation ([address] (2/22/2021)    ASSESSMENT:   Not clear why she is having acute on chronic resp failure. At this time, she is not wheezing on exam  But ED MD did hear wheezing. Also, she has LE pitting edema with hx of CHF. she had covid 19 in 12/2020- it is unlikely that she is re-infected with the same virus given that she has natural Abs however it is not impossible for re-infection. Consults:     1. Acute on chronic hypoxic respiratory failure - on 3L NC at home   2. Recent covid 19 PNA on 12/10/2020- got remdesivir, convalescent plasma, steroids   3. COPD/emphysema , possible exacerbation? 4. Chronic diastolic CHF , possible exacerbation? Last ECHO in 9/2020 Estimated LVEF is 65 - 70%. 5. Moderate pulmonary hypertension   6. HTN   7. HLD   8. CAD  9. Anxiety    10. 7 mm pulmonary nodule in the HIRAL per CTA chest on 12/10/2020- plan for follow up with outpatient Dr Cyndi Parrish for repeat CT chest     - consider PULM consult in the morning. Check stat ddimer and consider CTA chest if ddimer elevated (CTA chest on 12/2020 negative for PE). For now, will continue IV steroids, duonebs standing and prn, symbicort. Currently on 4L NC, her baseline is 3L NC. check covid PCR, maintain droplet plus. Check procal and inflammatory markers. - consider Cardiology consult in the morning. check probnp, check venous dopplers of the legs to rule out DVT given asymmetry of the legs/edema. Give one dose of IV lasix now (holding home oral lasix)- monitor I/Os, lytes/cr, daily wts.  Repeat ECHO in AM   - xanax for anxity, asa statin for CAD/HLD, losartan, nifedine and lopressor for BP   - consult palliative care for goals of care discussion. Was full code last admission  As well. - PT OT fall precautions, aspiration precautions. Addendum- BP uncontrolled- has not gotten any BP meds since HBV ed presentation, give night dose of lopressor now.      I called the sister Ms Corrie Perea at 714pm on cell phone- no answer , was able to reach on her home phone  And updated on A/P    Code status: FULL CODE per patient wishes   DVT/GI prophylaxis: lovenox, pepcid  Diet: cardiac   Disposition: tbd   __________________________________________________  Risk of deterioration:  []Low    [x]Moderate  []High    Care Plan discussed with:    [x]Patient   [x]Family sister    []ED Care Manager  []ED Doc   []Specialist :    Total Time Coordinating Admission:   60   minutes    []Total Critical Care Time:     ___________________________________________________  Admitting Physician: JOELLE Coffey Ma

## 2021-02-23 NOTE — ED NOTES
Pt lying on stretcher with HOB elevated. Awake, alert & oriented x 4. Pt is speaking in complete sentences and expresses that she is feeling much better. Pt states she wants to be discharged home. Pt has her portable O2 with her. States her sister is waiting in the parking lot and will be staying with her at her home. Pt is awaiting reevaluation & dispo from provider.

## 2021-02-23 NOTE — ED NOTES
Pt. Reassessed and observed sleeping at this time. Pt is in NAD. Pt resting in POC with no new complaints. MD notified and will continue to monitor.

## 2021-02-23 NOTE — ED PROVIDER NOTES
HPI: Patient is a a 83 yo female who has COPD. Patient states she had COVID-19 6 weeks ago. Since recuperation she has had worsening of her COPD. Patient is on home oxygen of 3 liters/minute. Patient c/o ongoing shortness of breath. She states she never really improved after having covid. Patient breathing is very labored with exaceration.     Past Medical History:   Diagnosis Date    Chronic lung disease     [facility]     Heart failure ([facility]     Hypertension        Past Surgical History:   Procedure Laterality Date    HX ORTHOPAEDIC      spinal sx /    HX OTHER SURGICAL      Carotid artery    VASCULAR SURGERY PROCEDURE UNLIST      L CEA 10/2014         Family History:   Problem Relation Age of Onset    Heart Disease Mother     Hypertension Mother     Heart Attack Father     Hypertension Father        Social History     Socioeconomic History    Marital status:      Spouse name: Not on file    Number of children: Not on file    Years of education: Not on file    Highest education level: Not on file   Occupational History    Not on file   Social Needs    Financial resource strain: Not on file    Food insecurity     Worry: Not on file     Inability: Not on file    Transportation needs     Medical: Not on file     Non-medical: Not on file   Tobacco Use    Smoking status: Former Smoker     Packs/day: 1.50     Years: 30.00     Pack years: 45.00     Types: Cigarettes     Quit date: 1987     Years since quittin.4    Smokeless tobacco: Never Used   Substance and Sexual Activity    Alcohol use: No     Alcohol/week: 0.0 standard drinks    Drug use: No    Sexual activity: Never   Lifestyle    Physical activity     Days per week: Not on file     Minutes per session: Not on file    Stress: Not on file   Relationships    Social connections     Talks on phone: Not on file     Gets together: Not on file     Attends Gnosticism service: Not on file Active member of club or organization: Not on file     Attends meetings of clubs or organizations: Not on file     Relationship status: Not on file    Intimate partner violence     Fear of current or ex partner: Not on file     Emotionally abused: Not on file     Physically abused: Not on file     Forced sexual activity: Not on file   Other Topics Concern    Not on file   Social History Narrative    Not on file         ALLERGIES: Patient has no known allergies. Review of Systems    Vitals:    02/22/21 1852 02/22/21 1900   BP:  (!) 168/56   Pulse: (!) 107 92   Resp: 25 17   Temp: 97.5 °F (36.4 °C)    SpO2: (!) 80% 94%            Physical Exam  Vitals signs and nursing note reviewed. Constitutional:       General: She is not in acute distress. Appearance: She is well-developed. She is not diaphoretic. HENT:      Head: Normocephalic. Right Ear: External ear normal.      Left Ear: External ear normal.      Mouth/Throat:      Pharynx: No oropharyngeal exudate. Eyes:      General: No scleral icterus. Right eye: No discharge. Left eye: No discharge. Conjunctiva/sclera: Conjunctivae normal.      Pupils: Pupils are equal, round, and reactive to light. Neck:      Musculoskeletal: Normal range of motion and neck supple. Thyroid: No thyromegaly. Vascular: No JVD. Trachea: No tracheal deviation. Cardiovascular:      Rate and Rhythm: Normal rate and regular rhythm. Heart sounds: Normal heart sounds. No murmur. No friction rub. No gallop. Pulmonary:      Effort: Respiratory distress present. Breath sounds: No stridor. Wheezing and rhonchi present. No rales. Chest:      Chest wall: No tenderness. Abdominal:      General: Bowel sounds are normal. There is no distension. Palpations: Abdomen is soft. There is no mass. Tenderness: There is no abdominal tenderness. There is no guarding or rebound. Musculoskeletal: Normal range of motion. General: No tenderness. Lymphadenopathy:      Cervical: No cervical adenopathy. Skin:     General: Skin is warm and dry. Coloration: Skin is not pale. Findings: No erythema or rash. Neurological:      Mental Status: She is alert and oriented to person, place, and time. Cranial Nerves: No cranial nerve deficit. Motor: No abnormal muscle tone.       Coordination: Coordination normal.      Deep Tendon Reflexes: Reflexes normal.          MDM       Procedures     Consultation: Moiz Arreola MD.  He accepted patient for admission    Dx: acute exacerbation of COPD    Disp: admit to telemetry

## 2021-02-23 NOTE — ED NOTES
Dr Ernesto Bowling requested trial to decrease O2 back to 3lpm as she utilizes this flow rate at home. O2 was decreased with sats 79-80%. Increased to 4lpm; sats unchanged. O2 increased back up to 5 lpm via NC. O2 sats increasing into 80's. Will continue to monitor. Dr Ernesto Bowling aware of same.

## 2021-02-23 NOTE — ED NOTES
Verbal shift change report given to Jose Mathias RN (oncoming nurse) by Lucila Cao RN (offgoing nurse). Report included the following information SBAR   .

## 2021-02-23 NOTE — ED NOTES
Bedside and Verbal shift change report given to [address] [place] [address] (oncoming nurse) by Yamini Mcdermott RN(offgoing nurse). Report included the following information ED Summary and Recent Results. Shift report pt does drop o2 sats when she stands, she does this often even with her last admission. Once she is done exerting herself her o2 returns to normal. It was also reported that last by her son that patient is not wearing home oxygen as prescribed.

## 2021-02-24 ENCOUNTER — APPOINTMENT (OUTPATIENT)
Dept: VASCULAR SURGERY | Age: 86
DRG: 190 | End: 2021-02-24
Attending: PHYSICIAN ASSISTANT
Payer: MEDICARE

## 2021-02-24 ENCOUNTER — APPOINTMENT (OUTPATIENT)
Dept: NON INVASIVE DIAGNOSTICS | Age: 86
DRG: 190 | End: 2021-02-24
Attending: PHYSICIAN ASSISTANT
Payer: MEDICARE

## 2021-02-24 ENCOUNTER — TRANSCRIBE ORDER (OUTPATIENT)
Dept: SCHEDULING | Age: 86
End: 2021-02-24

## 2021-02-24 DIAGNOSIS — J44.9 OBSTRUCTIVE CHRONIC BRONCHITIS WITHOUT EXACERBATION (HCC): Primary | ICD-10-CM

## 2021-02-24 DIAGNOSIS — U07.1 CLINICAL DIAGNOSIS OF COVID-19: ICD-10-CM

## 2021-02-24 LAB
ALBUMIN SERPL-MCNC: 3.6 G/DL (ref 3.4–5)
ALBUMIN/GLOB SERPL: 1.2 {RATIO} (ref 0.8–1.7)
ALP SERPL-CCNC: 64 U/L (ref 45–117)
ALT SERPL-CCNC: 22 U/L (ref 13–56)
ANION GAP SERPL CALC-SCNC: 6 MMOL/L (ref 3–18)
APTT PPP: 34.6 SEC (ref 23–36.4)
AST SERPL-CCNC: 14 U/L (ref 10–38)
BASOPHILS # BLD: 0 K/UL (ref 0–0.1)
BASOPHILS NFR BLD: 0 % (ref 0–2)
BILIRUB DIRECT SERPL-MCNC: 0.1 MG/DL (ref 0–0.2)
BILIRUB SERPL-MCNC: 0.4 MG/DL (ref 0.2–1)
BNP SERPL-MCNC: 2827 PG/ML (ref 0–1800)
BUN SERPL-MCNC: 19 MG/DL (ref 7–18)
BUN/CREAT SERPL: 24 (ref 12–20)
CALCIUM SERPL-MCNC: 8.8 MG/DL (ref 8.5–10.1)
CHLORIDE SERPL-SCNC: 109 MMOL/L (ref 100–111)
CO2 SERPL-SCNC: 26 MMOL/L (ref 21–32)
CREAT SERPL-MCNC: 0.78 MG/DL (ref 0.6–1.3)
CRP SERPL-MCNC: <0.3 MG/DL (ref 0–0.3)
D DIMER PPP FEU-MCNC: 0.45 UG/ML(FEU)
DIFFERENTIAL METHOD BLD: ABNORMAL
ECHO IVC PROX: 2.15 CM
ECHO LV INTERNAL DIMENSION DIASTOLIC: 4.03 CM (ref 3.9–5.3)
ECHO LV INTERNAL DIMENSION SYSTOLIC: 2.12 CM
ECHO LV IVSD: 0.85 CM (ref 0.6–0.9)
ECHO LV MASS 2D: 111.9 G (ref 67–162)
ECHO LV MASS INDEX 2D: 71.9 G/M2 (ref 43–95)
ECHO LV POSTERIOR WALL DIASTOLIC: 0.96 CM (ref 0.6–0.9)
ECHO RV TAPSE: 1.81 CM (ref 1.5–2)
ECHO TV REGURGITANT MAX VELOCITY: 404.51 CM/S
ECHO TV REGURGITANT PEAK GRADIENT: 65.45 MMHG
EOSINOPHIL # BLD: 0 K/UL (ref 0–0.4)
EOSINOPHIL NFR BLD: 0 % (ref 0–5)
ERYTHROCYTE [DISTWIDTH] IN BLOOD BY AUTOMATED COUNT: 14.2 % (ref 11.6–14.5)
FERRITIN SERPL-MCNC: 35 NG/ML (ref 8–388)
GLOBULIN SER CALC-MCNC: 3.1 G/DL (ref 2–4)
GLUCOSE SERPL-MCNC: 158 MG/DL (ref 74–99)
HCT VFR BLD AUTO: 42.8 % (ref 35–45)
HGB BLD-MCNC: 13.5 G/DL (ref 12–16)
INR PPP: 1.1 (ref 0.8–1.2)
LYMPHOCYTES # BLD: 0.9 K/UL (ref 0.9–3.6)
LYMPHOCYTES NFR BLD: 8 % (ref 21–52)
MAGNESIUM SERPL-MCNC: 2.5 MG/DL (ref 1.6–2.6)
MCH RBC QN AUTO: 29.6 PG (ref 24–34)
MCHC RBC AUTO-ENTMCNC: 31.5 G/DL (ref 31–37)
MCV RBC AUTO: 93.9 FL (ref 74–97)
MONOCYTES # BLD: 0.5 K/UL (ref 0.05–1.2)
MONOCYTES NFR BLD: 5 % (ref 3–10)
NEUTS SEG # BLD: 10 K/UL (ref 1.8–8)
NEUTS SEG NFR BLD: 87 % (ref 40–73)
PHOSPHATE SERPL-MCNC: 4.2 MG/DL (ref 2.5–4.9)
PLATELET # BLD AUTO: 319 K/UL (ref 135–420)
PMV BLD AUTO: 10.4 FL (ref 9.2–11.8)
POTASSIUM SERPL-SCNC: 4.6 MMOL/L (ref 3.5–5.5)
PROCALCITONIN SERPL-MCNC: <0.05 NG/ML
PROT SERPL-MCNC: 6.7 G/DL (ref 6.4–8.2)
PROTHROMBIN TIME: 13.9 SEC (ref 11.5–15.2)
RBC # BLD AUTO: 4.56 M/UL (ref 4.2–5.3)
SODIUM SERPL-SCNC: 141 MMOL/L (ref 136–145)
WBC # BLD AUTO: 11.3 K/UL (ref 4.6–13.2)

## 2021-02-24 PROCEDURE — 94640 AIRWAY INHALATION TREATMENT: CPT

## 2021-02-24 PROCEDURE — 2709999900 HC NON-CHARGEABLE SUPPLY

## 2021-02-24 PROCEDURE — 82728 ASSAY OF FERRITIN: CPT

## 2021-02-24 PROCEDURE — 99222 1ST HOSP IP/OBS MODERATE 55: CPT | Performed by: NURSE PRACTITIONER

## 2021-02-24 PROCEDURE — 99232 SBSQ HOSP IP/OBS MODERATE 35: CPT | Performed by: HOSPITALIST

## 2021-02-24 PROCEDURE — 76450000000

## 2021-02-24 PROCEDURE — 65660000000 HC RM CCU STEPDOWN

## 2021-02-24 PROCEDURE — 97535 SELF CARE MNGMENT TRAINING: CPT

## 2021-02-24 PROCEDURE — 74011250636 HC RX REV CODE- 250/636: Performed by: PHYSICIAN ASSISTANT

## 2021-02-24 PROCEDURE — 93321 DOPPLER ECHO F-UP/LMTD STD: CPT | Performed by: INTERNAL MEDICINE

## 2021-02-24 PROCEDURE — 74011250637 HC RX REV CODE- 250/637: Performed by: PHYSICIAN ASSISTANT

## 2021-02-24 PROCEDURE — 85379 FIBRIN DEGRADATION QUANT: CPT

## 2021-02-24 PROCEDURE — 97165 OT EVAL LOW COMPLEX 30 MIN: CPT

## 2021-02-24 PROCEDURE — 85730 THROMBOPLASTIN TIME PARTIAL: CPT

## 2021-02-24 PROCEDURE — 74011250637 HC RX REV CODE- 250/637: Performed by: HOSPITALIST

## 2021-02-24 PROCEDURE — 83735 ASSAY OF MAGNESIUM: CPT

## 2021-02-24 PROCEDURE — 85025 COMPLETE CBC W/AUTO DIFF WBC: CPT

## 2021-02-24 PROCEDURE — 36415 COLL VENOUS BLD VENIPUNCTURE: CPT

## 2021-02-24 PROCEDURE — 97530 THERAPEUTIC ACTIVITIES: CPT

## 2021-02-24 PROCEDURE — 93970 EXTREMITY STUDY: CPT

## 2021-02-24 PROCEDURE — 93325 DOPPLER ECHO COLOR FLOW MAPG: CPT | Performed by: INTERNAL MEDICINE

## 2021-02-24 PROCEDURE — 93321 DOPPLER ECHO F-UP/LMTD STD: CPT

## 2021-02-24 PROCEDURE — 84145 PROCALCITONIN (PCT): CPT

## 2021-02-24 PROCEDURE — 80076 HEPATIC FUNCTION PANEL: CPT

## 2021-02-24 PROCEDURE — 97116 GAIT TRAINING THERAPY: CPT

## 2021-02-24 PROCEDURE — 83880 ASSAY OF NATRIURETIC PEPTIDE: CPT

## 2021-02-24 PROCEDURE — 84100 ASSAY OF PHOSPHORUS: CPT

## 2021-02-24 PROCEDURE — 74011250637 HC RX REV CODE- 250/637: Performed by: INTERNAL MEDICINE

## 2021-02-24 PROCEDURE — 80048 BASIC METABOLIC PNL TOTAL CA: CPT

## 2021-02-24 PROCEDURE — 93308 TTE F-UP OR LMTD: CPT | Performed by: INTERNAL MEDICINE

## 2021-02-24 PROCEDURE — 86140 C-REACTIVE PROTEIN: CPT

## 2021-02-24 PROCEDURE — 85610 PROTHROMBIN TIME: CPT

## 2021-02-24 PROCEDURE — 97161 PT EVAL LOW COMPLEX 20 MIN: CPT

## 2021-02-24 RX ORDER — FUROSEMIDE 40 MG/1
40 TABLET ORAL DAILY
Status: DISCONTINUED | OUTPATIENT
Start: 2021-02-24 | End: 2021-02-25 | Stop reason: HOSPADM

## 2021-02-24 RX ADMIN — FUROSEMIDE 40 MG: 40 TABLET ORAL at 10:51

## 2021-02-24 RX ADMIN — NIFEDIPINE 60 MG: 60 TABLET, FILM COATED, EXTENDED RELEASE ORAL at 08:37

## 2021-02-24 RX ADMIN — IPRATROPIUM BROMIDE AND ALBUTEROL 1 PUFF: 20; 100 SPRAY, METERED RESPIRATORY (INHALATION) at 16:00

## 2021-02-24 RX ADMIN — TIOTROPIUM BROMIDE INHALATION SPRAY 2 PUFF: 3.12 SPRAY, METERED RESPIRATORY (INHALATION) at 09:00

## 2021-02-24 RX ADMIN — IPRATROPIUM BROMIDE AND ALBUTEROL 1 PUFF: 20; 100 SPRAY, METERED RESPIRATORY (INHALATION) at 20:12

## 2021-02-24 RX ADMIN — FAMOTIDINE 20 MG: 20 TABLET ORAL at 08:37

## 2021-02-24 RX ADMIN — IPRATROPIUM BROMIDE AND ALBUTEROL 1 PUFF: 20; 100 SPRAY, METERED RESPIRATORY (INHALATION) at 23:27

## 2021-02-24 RX ADMIN — Medication 10 ML: at 06:21

## 2021-02-24 RX ADMIN — METHYLPREDNISOLONE SODIUM SUCCINATE 40 MG: 40 INJECTION, POWDER, FOR SOLUTION INTRAMUSCULAR; INTRAVENOUS at 06:17

## 2021-02-24 RX ADMIN — LOSARTAN POTASSIUM 50 MG: 50 TABLET, FILM COATED ORAL at 08:38

## 2021-02-24 RX ADMIN — ASPIRIN 81 MG: 81 TABLET, COATED ORAL at 08:37

## 2021-02-24 RX ADMIN — Medication 10 ML: at 13:34

## 2021-02-24 RX ADMIN — IPRATROPIUM BROMIDE AND ALBUTEROL 1 PUFF: 20; 100 SPRAY, METERED RESPIRATORY (INHALATION) at 12:00

## 2021-02-24 RX ADMIN — FAMOTIDINE 20 MG: 20 TABLET ORAL at 21:12

## 2021-02-24 RX ADMIN — Medication 10 ML: at 21:12

## 2021-02-24 RX ADMIN — BUDESONIDE AND FORMOTEROL FUMARATE DIHYDRATE 2 PUFF: 80; 4.5 AEROSOL RESPIRATORY (INHALATION) at 08:00

## 2021-02-24 RX ADMIN — BUDESONIDE AND FORMOTEROL FUMARATE DIHYDRATE 2 PUFF: 80; 4.5 AEROSOL RESPIRATORY (INHALATION) at 20:13

## 2021-02-24 RX ADMIN — ENOXAPARIN SODIUM 40 MG: 100 INJECTION SUBCUTANEOUS at 08:37

## 2021-02-24 RX ADMIN — ATORVASTATIN CALCIUM 20 MG: 20 TABLET, FILM COATED ORAL at 08:38

## 2021-02-24 RX ADMIN — METOPROLOL TARTRATE 25 MG: 25 TABLET, FILM COATED ORAL at 08:38

## 2021-02-24 RX ADMIN — METHYLPREDNISOLONE SODIUM SUCCINATE 40 MG: 40 INJECTION, POWDER, FOR SOLUTION INTRAMUSCULAR; INTRAVENOUS at 21:12

## 2021-02-24 RX ADMIN — METOPROLOL TARTRATE 25 MG: 25 TABLET, FILM COATED ORAL at 21:12

## 2021-02-24 RX ADMIN — THERA TABS 1 TABLET: TAB at 08:37

## 2021-02-24 RX ADMIN — IPRATROPIUM BROMIDE AND ALBUTEROL 1 PUFF: 20; 100 SPRAY, METERED RESPIRATORY (INHALATION) at 08:00

## 2021-02-24 RX ADMIN — ALPRAZOLAM 0.5 MG: 0.25 TABLET ORAL at 21:12

## 2021-02-24 RX ADMIN — METHYLPREDNISOLONE SODIUM SUCCINATE 40 MG: 40 INJECTION, POWDER, FOR SOLUTION INTRAMUSCULAR; INTRAVENOUS at 14:00

## 2021-02-24 NOTE — ROUTINE PROCESS
Bedside and verbal report received from Sara Goldsmith StrWilman (offgoing nurse). Report included the following information; SBAR, MAR, LABS, Intake/output, Kardex, and summary of care. Patient resting quietly in bed. Call bell and bedside in reach. Will continue watching for any changes.

## 2021-02-24 NOTE — PROGRESS NOTES
Problem: Falls - Risk of  Goal: *Absence of Falls  Description: Document Blaine Conrad Fall Risk and appropriate interventions in the flowsheet.   Outcome: Progressing Towards Goal  Note: Fall Risk Interventions:  Mobility Interventions: Bed/chair exit alarm, Patient to call before getting OOB         Medication Interventions: Evaluate medications/consider consulting pharmacy, Patient to call before getting OOB, Teach patient to arise slowly    Elimination Interventions: Call light in reach, Patient to call for help with toileting needs

## 2021-02-24 NOTE — PROGRESS NOTES
Problem: Mobility Impaired (Adult and Pediatric)  Goal: *Acute Goals and Plan of Care (Insert Text)  Description: Physical Therapy Goals  Initiated 2/24/2021 and to be accomplished within 7 day(s)  1. Patient will move from supine to sit and sit to supine  in bed with modified independence. 2.  Patient will transfer from bed to chair and chair to bed with modified independence using the least restrictive device. 3.  Patient will perform sit to stand with modified independence. 4.  Patient will ambulate with modified independence for 100 feet with the least restrictive device. 5.  Patient will ascend/descend 2 stairs without with supervision. PLOF: Patient reports she was independent with self care and functional mobility without AD. Patient lives alone in single story home and report her sister and son check in on her. Outcome: Progressing Towards Goal    PHYSICAL THERAPY EVALUATION    Patient: Aniya Perez (59 y.o. female)  Date: 2/24/2021  Primary Diagnosis: COPD exacerbation (HCC) [J44.1]        Precautions:   Fall, Aspiration  {    ASSESSMENT :  Upon entering room, pt sitting up at EOB, and agreeable to PT evaluation. Patient seen in conjunction with OT to maximize safety and participation with mobility. Based on the objective data described below, the patient presents with decreased strength, decreased balance, and decreased endurance limited safety/independence with functional mobility. Patient performs transfers and gait with CGA/SBA. She is unsteady ambulating to the restroom without AD reaching for support of wall and rails. Educated pt on use of RW to improve stability and safety with mobility. Pt ambulates back to bed using RW with stand by assistance. Pt destats to lowest 75% on 3 L NC and improves within 2-3 minutes of seated rest break with cues for PLB. Educated pt on activity pacing to increase safety with mobility.  Instructed not to get up by herself, to call for assistance to get up OOB, and she verbalizes understanding. At end of session, pt left sitting up at EOB in NAD, O2 94%, and call bell within reach. Patient will benefit from skilled intervention to address the above impairments. Patient's rehabilitation potential is considered to be Good  Factors which may influence rehabilitation potential include:   []         None noted  []         Mental ability/status  [x]         Medical condition  [x]         Home/family situation and support systems  []         Safety awareness  []         Pain tolerance/management  []         Other:      PLAN :  Recommendations and Planned Interventions:   []           Bed Mobility Training             []    Neuromuscular Re-Education  [x]           Transfer Training                   []    Orthotic/Prosthetic Training  [x]           Gait Training                          []    Modalities  [x]           Therapeutic Exercises           []    Edema Management/Control  [x]           Therapeutic Activities            []    Family Training/Education  [x]           Patient Education  []           Other (comment):    Frequency/Duration: Patient will be followed by physical therapy 1-2 times per day/4-7 days per week to address goals. Discharge Recommendations: Home Health with increased family supervision/support  Further Equipment Recommendations for Discharge: rolling walker vs cane     SUBJECTIVE:   Patient stated I would like a cane.     OBJECTIVE DATA SUMMARY:     Past Medical History:   Diagnosis Date    Chronic lung disease     Chronic obstructive pulmonary disease ([facility] [address]     Heart failure ([facility] [address]     Hypertension      Past Surgical History:   Procedure Laterality Date    HX ORTHOPAEDIC      spinal sx 5/6    HX OTHER SURGICAL      Carotid artery    VASCULAR SURGERY PROCEDURE UNLIST      L CEA 10/2014     Barriers to Learning/Limitations: None  Compensate with: Visual Cues and Verbal Cues  Home Situation:  Home Situation  Home Environment: Private residence  # Steps to Enter: 3  One/Two Story Residence: One story  Living Alone: No  Support Systems: Family member(s)  Patient Expects to be Discharged to[de-identified] Private residence  Current DME Used/Available at Home: Oxygen, portable  Critical Behavior:  Neurologic State: Alert  Orientation Level: Oriented X4  Cognition: Follows commands  Safety/Judgement: Fall prevention  Psychosocial  Patient Behaviors: Calm; Cooperative  Purposeful Interaction: Yes  Pt Identified Daily Priority: Clinical issues (comment)  Caritas Process: Attend basic human needs;Establish trust  Caring Interventions: Reassure; Therapeutic modalities  Reassure: Therapeutic listening;Caring rounds  Therapeutic Modalities: Deep breathing       Strength:    Strength: Generally decreased, functional       Tone & Sensation:   Tone: Normal     Sensation: Intact        Range Of Motion:  AROM: Within functional limits          Functional Mobility:  Bed Mobility:     Supine to Sit: (Pt sitting EOB upon arrival)       Transfers:  Sit to Stand: Contact guard assistance  Stand to Sit: Contact guard assistance           Balance:   Sitting: Intact  Standing: Impaired; Without support  Standing - Static: Good  Standing - Dynamic : Fair    Ambulation/Gait Training:  Distance (ft): 10 Feet (ft)  Assistive Device: Walker, rolling  Ambulation - Level of Assistance: Contact guard assistance/ Stand by assistance  Gait Abnormalities: Decreased step clearance;Trunk sway increased  Step Length: Left shortened;Right shortened    Pain:  Pain level pre-treatment: 0/10   Pain level post-treatment: 0/10   Pain Intervention(s) : Medication (see MAR); Rest, Ice, Repositioning  Response to intervention: Nurse notified, See doc flow    Activity Tolerance:   Fair, desats to lowest 75% with activity on 3L  Please refer to the flowsheet for vital signs taken during this treatment.   After treatment:   [x]         Patient left in no apparent distress sitting EOB  []         Patient left in no apparent distress in bed  [x]         Call bell left within reach  [x]         Nursing notified  []         Caregiver present  []         Bed alarm activated  []         SCDs applied    COMMUNICATION/EDUCATION:   [x]         Role of Physical Therapy in the acute care setting. [x]         Fall prevention education was provided and the patient/caregiver indicated understanding. [x]         Patient/family have participated as able in goal setting and plan of care. [x]         Patient/family agree to work toward stated goals and plan of care. []         Patient understands intent and goals of therapy, but is neutral about his/her participation. []         Patient is unable to participate in goal setting/plan of care: ongoing with therapy staff.  []         Other:     Thank you for this referral.  Rema Su, PT   Time Calculation: 38 mins      Eval Complexity: History: LOW Complexity : Zero comorbidities / personal factors that will impact the outcome / POCExam:LOW Complexity : 1-2 Standardized tests and measures addressing body structure, function, activity limitation and / or participation in recreation  Presentation: MEDIUM Complexity : Evolving with changing characteristics  Clinical Decision Making:Low Complexity    Overall Complexity:LOW

## 2021-02-24 NOTE — ACP (ADVANCE CARE PLANNING)
Palliative Medicine    Pt does not have an Advance Directive on file in EMR. She declined to complete one today. Believes that she has completed a \"Living Will\" in the past.  Wants to review the paperwork she has at home. Legal NOK would be the medical decision maker for the pt in the event that she becomes incapacitated. Pt's  has been  for 13 years. Her legal NOK would be her son Davy and her daughter (who [place] in [place]). Pt reports that she was living alone and functioning independently prior to this hospitalization. She states that she still drives and pays all of her own bills. Has been on home O2 since 2016. She was diagnosed with Covid in Dec 2020. States she may have \"over exerted\" herself which led to this hospitalization. Discussed burdens and benefits of CPR and intubation in the setting of someone that is older and has chronic medical conditions. Pt would be accepting of these aggressive medical measures. ACP documents you currently have include:  [] Advance Directive or Living Will  [] Durable Do Not Resuscitate  [] Physician Orders for Scope of Treatment (POST)  [] Medical Power of   [x] Other - None    Pt remains FULL code with FULL aggressive medical management. Thank you for the Palliative Medicine consult and allowing us to participate in the care of Mrs. Mehdi Mcgrath. Will continue to monitor and provide support.     Sherryle Canterbury RN, BSN  Palliative Medicine Inpatient RN  [facility]  Palliative COPE Line: [phone] ([phone])

## 2021-02-24 NOTE — PROGRESS NOTES
[facility]  [facility]    Patient: Ondina Job Age: 82 y.o. : 1935 MR#: [id number] SSN: [id number]  Date/Time: 2021     Subjective:     Patient seen and evaluated, lying in bed, no acute distress. Says she feels better overall and her shortness of breath is improved. Nurse mentions that the patient desaturated on 4 L when she walked to the bathroom. Assessment/Plan:     1. Acute on chronic hypoxic respiratory failurecontinue supplemental oxygen support. 2. History of COPD with possible exacerbationcontinue IV steroids, bronchodilators and supplemental oxygen. 3. History of chronic diastolic CHFgiven IV Lasix in the ER, echocardiogram today, resume home dose of Lasix, monitor strict I's and O's.  4. Recent COVID-19 infection status post treatment. 5. History of hypertensionresume home medications, monitor blood pressure  6. History of hyperlipidemia  7. History of coronary artery disease  8. History of anxiety  9. History of 7 mm pulmonary nodule left upper lobe per CT chestfollow-up as outpatient with pulmonary  DVT prophylaxisLovenox  Full code          Case discussed with:  [x]Patient  []Family  [x]Nursing  []Case Management  DVT Prophylaxis:  [x]Lovenox  []Hep SQ  []SCDs  []Coumadin   []On Heparin gtt    Objective:   VS:   Visit Vitals  BP (!) 146/54 (BP 1 Location: Left upper arm, BP Patient Position: At rest)   Pulse 75   Temp 97.2 °F (36.2 °C)   Resp 22   Wt 57.1 kg (125 lb 12.8 oz)   SpO2 91%   BMI 22.28 kg/m²      Tmax/24hrs: Temp (24hrs), Av.7 °F (36.5 °C), Min:97.2 °F (36.2 °C), Max:98.2 °F (36.8 °C)  IOBRIEF    Intake/Output Summary (Last 24 hours) at 2021 1320  Last data filed at 2021 1930  Gross per 24 hour   Intake 240 ml   Output    Net 240 ml       General:  Alert, cooperative, no acute distress    HEENT: PERRLA, anicteric sclerae.   Pulmonary: Decreased breath sounds in bases  Cardiovascular: Regular rate and Rhythm. GI:  Soft, Non distended, Non tender. + Bowel sounds. Extremities:  No edema, cyanosis, clubbing. No calf tenderness. Neurologic: Alert and oriented X 3. No acute neuro deficits.   Additional:    Medications:   Current Facility-Administered Medications   Medication Dose Route Frequency    furosemide (LASIX) tablet 40 mg  40 mg Oral DAILY    ALPRAZolam (XANAX) tablet 0.5 mg  0.5 mg Oral QHS    aspirin delayed-release tablet 81 mg  81 mg Oral DAILY    atorvastatin (LIPITOR) tablet 20 mg  20 mg Oral DAILY    famotidine (PEPCID) tablet 20 mg  20 mg Oral Q12H    losartan (COZAAR) tablet 50 mg  50 mg Oral DAILY    therapeutic multivitamin (THERAGRAN) tablet 1 Tab  1 Tab Oral DAILY    NIFEdipine ER (PROCARDIA XL) tablet 60 mg  60 mg Oral DAILY    sodium chloride (NS) flush 5-40 mL  5-40 mL IntraVENous Q8H    sodium chloride (NS) flush 5-40 mL  5-40 mL IntraVENous PRN    acetaminophen (TYLENOL) tablet 650 mg  650 mg Oral Q6H PRN    Or    acetaminophen (TYLENOL) suppository 650 mg  650 mg Rectal Q6H PRN    polyethylene glycol (MIRALAX) packet 17 g  17 g Oral DAILY PRN    promethazine (PHENERGAN) tablet 12.5 mg  12.5 mg Oral Q6H PRN    Or    ondansetron (ZOFRAN) injection 4 mg  4 mg IntraVENous Q6H PRN    enoxaparin (LOVENOX) injection 40 mg  40 mg SubCUTAneous DAILY    albuterol-ipratropium (DUO-NEB) 2.5 MG-0.5 MG/3 ML  3 mL Nebulization Q4H PRN    budesonide-formoterol (SYMBICORT) 80-4.5 mcg inhaler  2 Puff Inhalation BID RT    And    tiotropium bromide (SPIRIVA RESPIMAT) 2.5 mcg /actuation  2 Puff Inhalation QAM RT    methylPREDNISolone (PF) (SOLU-MEDROL) injection 40 mg  40 mg IntraVENous Q8H    metoprolol tartrate (LOPRESSOR) tablet 25 mg  25 mg Oral Q12H    ipratropium-albuterol (COMBIVENT RESPIMAT) 20 mcg-100 mcg inhalation spray  1 Puff Inhalation Q4H RT       Imaging:   XR Results (most recent):  Results from Hospital Encounter encounter on 02/22/21   XR CHEST PORT Narrative Portable Frontal Chest.    CLINICAL HISTORY: Shortness of breath and history of recent Covid 19 infection. Hypertension and COPD. Jamia Dye TECHNIQUE: Single frontal view of the chest, obtained portably. COMPARISONS: Chest x-ray 12/15/2020, 12/10/2020, CT 12/10/2020. FINDINGS:     Reticular densities all lobes. No areas of consolidation. Hyperinflation at  apices. No effusions. Cardiomegaly, stable. Vascularity normal. No effusions. Vascular clips left neck. Impression COPD with superimposed fibrosis. No acute change                    Labs:    Recent Results (from the past 48 hour(s))   CBC WITH AUTOMATED DIFF    Collection Time: 02/22/21  6:57 PM   Result Value Ref Range    WBC 11.4 4.6 - 13.2 K/uL    RBC 5.22 4.20 - 5.30 M/uL    HGB 15.5 12.0 - 16.0 g/dL    HCT 48.7 (H) 35.0 - 45.0 %    MCV 93.3 74.0 - 97.0 FL    MCH 29.7 24.0 - 34.0 PG    MCHC 31.8 31.0 - 37.0 g/dL    RDW 13.6 11.6 - 14.5 %    PLATELET 734 715 - 987 K/uL    MPV 9.9 9.2 - 11.8 FL    NEUTROPHILS 59 40 - 73 %    LYMPHOCYTES 27 21 - 52 %    MONOCYTES 13 (H) 3 - 10 %    EOSINOPHILS 1 0 - 5 %    BASOPHILS 0 0 - 2 %    ABS. NEUTROPHILS 6.6 1.8 - 8.0 K/UL    ABS. LYMPHOCYTES 3.1 0.9 - 3.6 K/UL    ABS. MONOCYTES 1.5 (H) 0.05 - 1.2 K/UL    ABS. EOSINOPHILS 0.2 0.0 - 0.4 K/UL    ABS.  BASOPHILS 0.0 0.0 - 0.1 K/UL    DF AUTOMATED     METABOLIC PANEL, BASIC    Collection Time: 02/22/21  6:57 PM   Result Value Ref Range    Sodium 142 136 - 145 mmol/L    Potassium 3.8 3.5 - 5.5 mmol/L    Chloride 109 100 - 111 mmol/L    CO2 23 21 - 32 mmol/L    Anion gap 10 3.0 - 18 mmol/L    Glucose 142 (H) 74 - 99 mg/dL    BUN 20 (H) 7.0 - 18 MG/DL    Creatinine 1.30 0.6 - 1.3 MG/DL    BUN/Creatinine ratio 15 12 - 20      GFR est AA 47 (L) >60 ml/min/1.73m2    GFR est non-AA 39 (L) >60 ml/min/1.73m2    Calcium 9.3 8.5 - 10.1 MG/DL   EKG, 12 LEAD, INITIAL    Collection Time: 02/22/21  6:59 PM   Result Value Ref Range    Ventricular Rate 93 BPM    Atrial Rate 93 BPM    P-R Interval 128 ms    QRS Duration 80 ms    Q-T Interval 348 ms    QTC Calculation (Bezet) 432 ms    Calculated P Axis 49 degrees    Calculated R Axis 23 degrees    Calculated T Axis 112 degrees    Diagnosis       Normal sinus rhythm  Marked ST abnormality, possible inferior subendocardial injury  Abnormal ECG  When compared with ECG of 10-DEC-2020 13:23,  ST now depressed in Inferior leads  Confirmed by Raiza Brizuela [id number]) on 2/23/2021 7:26:46 AM     SARS-COV-2    Collection Time: 02/22/21 11:11 PM   Result Value Ref Range    SARS-CoV-2 Please find results under separate order     COVID-19 RAPID TEST    Collection Time: 02/22/21 11:11 PM   Result Value Ref Range    Specimen source Nasopharyngeal      COVID-19 rapid test Not detected NOTD     D DIMER    Collection Time: 02/23/21  8:49 PM   Result Value Ref Range    D DIMER 0.60 (H) <0.46 ug/ml(FEU)   CBC WITH AUTOMATED DIFF    Collection Time: 02/24/21  3:30 AM   Result Value Ref Range    WBC 11.3 4.6 - 13.2 K/uL    RBC 4.56 4.20 - 5.30 M/uL    HGB 13.5 12.0 - 16.0 g/dL    HCT 42.8 35.0 - 45.0 %    MCV 93.9 74.0 - 97.0 FL    MCH 29.6 24.0 - 34.0 PG    MCHC 31.5 31.0 - 37.0 g/dL    RDW 14.2 11.6 - 14.5 %    PLATELET 159 046 - 428 K/uL    MPV 10.4 9.2 - 11.8 FL    NEUTROPHILS 87 (H) 40 - 73 %    LYMPHOCYTES 8 (L) 21 - 52 %    MONOCYTES 5 3 - 10 %    EOSINOPHILS 0 0 - 5 %    BASOPHILS 0 0 - 2 %    ABS. NEUTROPHILS 10.0 (H) 1.8 - 8.0 K/UL    ABS. LYMPHOCYTES 0.9 0.9 - 3.6 K/UL    ABS. MONOCYTES 0.5 0.05 - 1.2 K/UL    ABS. EOSINOPHILS 0.0 0.0 - 0.4 K/UL    ABS.  BASOPHILS 0.0 0.0 - 0.1 K/UL    DF AUTOMATED     METABOLIC PANEL, BASIC    Collection Time: 02/24/21  3:30 AM   Result Value Ref Range    Sodium 141 136 - 145 mmol/L    Potassium 4.6 3.5 - 5.5 mmol/L    Chloride 109 100 - 111 mmol/L    CO2 26 21 - 32 mmol/L    Anion gap 6 3.0 - 18 mmol/L    Glucose 158 (H) 74 - 99 mg/dL    BUN 19 (H) 7.0 - 18 MG/DL    Creatinine 0.78 0.6 - 1.3 MG/DL BUN/Creatinine ratio 24 (H) 12 - 20      GFR est AA >60 >60 ml/min/1.73m2    GFR est non-AA >60 >60 ml/min/1.73m2    Calcium 8.8 8.5 - 10.1 MG/DL   MAGNESIUM    Collection Time: 02/24/21  3:30 AM   Result Value Ref Range    Magnesium 2.5 1.6 - 2.6 mg/dL   PHOSPHORUS    Collection Time: 02/24/21  3:30 AM   Result Value Ref Range    Phosphorus 4.2 2.5 - 4.9 MG/DL   D DIMER    Collection Time: 02/24/21  3:30 AM   Result Value Ref Range    D DIMER 0.45 <0.46 ug/ml(FEU)   PROCALCITONIN    Collection Time: 02/24/21  3:30 AM   Result Value Ref Range    Procalcitonin <0.05 ng/mL   PROTHROMBIN TIME + INR    Collection Time: 02/24/21  3:30 AM   Result Value Ref Range    Prothrombin time 13.9 11.5 - 15.2 sec    INR 1.1 0.8 - 1.2     HEPATIC FUNCTION PANEL    Collection Time: 02/24/21  3:30 AM   Result Value Ref Range    Protein, total 6.7 6.4 - 8.2 g/dL    Albumin 3.6 3.4 - 5.0 g/dL    Globulin 3.1 2.0 - 4.0 g/dL    A-G Ratio 1.2 0.8 - 1.7      Bilirubin, total 0.4 0.2 - 1.0 MG/DL    Bilirubin, direct 0.1 0.0 - 0.2 MG/DL    Alk. phosphatase 64 45 - 117 U/L    AST (SGOT) 14 10 - 38 U/L    ALT (SGPT) 22 13 - 56 U/L   PTT    Collection Time: 02/24/21  3:30 AM   Result Value Ref Range    aPTT 34.6 23.0 - 36.4 SEC   C REACTIVE PROTEIN, QT    Collection Time: 02/24/21  3:30 AM   Result Value Ref Range    C-Reactive protein <0.3 0 - 0.3 mg/dL   FERRITIN    Collection Time: 02/24/21  3:30 AM   Result Value Ref Range    Ferritin 35 8 - 388 NG/ML   NT-PRO BNP    Collection Time: 02/24/21  3:30 AM   Result Value Ref Range    NT pro-BNP 2,827 (H) 0 - 1,800 PG/ML       Signed By: Poly Rico MD     February 24, 2021      I spent 35 minutes with the patient in face-to-face consultation, of which greater than 50% was spent in counseling and coordination of care as described above    Disclaimer: Sections of this note are dictated using utilizing voice recognition software. Minor typographical errors may be present.  If questions arise, please do not hesitate to contact me or call our department.

## 2021-02-24 NOTE — ROUTINE PROCESS
Bedside and Verbal shift change report given to Magdiel Arshad RN (oncoming nurse) by Rui Zhang RN (offgoing nurse). Report included the following information SBAR, Kardex, Intake/Output, MAR, Recent Results and Cardiac Rhythm NSR. Patient sleeping at this time. Call bell and bedside commode in reach.

## 2021-02-24 NOTE — CONSULTS
[facility]: [phone]  [facility]: [phone]   [facility]: [phone]    Patient Name: Jr Betts  YOB: 1935    Date of consult : 21  Reason for Consult: establish goals of care  Requesting Provider: Suzan LAI   Primary Care Physician: Ani Gómez NP      SUMMARY:   Jr Betts is a 82 y.o. female with a past history of COPD, recent COVID-19 infection, diastolic CHF, HTN, CAD, Pulmonary HTN, who was admitted on 2021 from home with a diagnosis of SOB at rest.    Current medical issues leading to Palliative Medicine involvement include: reoccur ance of acute on chronic respiratory failure. CHIEF COMPLAINT: wants to be independent and go home     HPI/SUBJECTIVE:    Pt is an 82year old  female that lives at home alone. She has 2 children that are local and reports that she does all of her own shopping, bills, and housekeeping. Pt states that her   12 years ago on hospice because he chose not to have dialysis. The experience with her  has caused her some apparent distrust of the medical system. The patient is:   [x] Verbal and participatory  [] Non-participatory due to:     GOALS OF CARE:  Patient/Health Care Proxy Stated Goals: Prolong life      TREATMENT PREFERENCES:   Code Status: Full Code         PALLIATIVE DIAGNOSES:   1. Encounter for Palliative Medicine  2. Goals of care/ACP  3. Acute on chronic respiratory failure  4. COPD  5. Debility       PLAN:   1. Encounter for palliative medicine  Pt with a long term life limiting chronic disease process that warrants discussions about her goals of care. 2.   Goals of care/ACP  This NP and Griselda Gonzales RN in to see patient at the bedside.  She is Awake, Alert and oriented X4 Pt appears to have a good understanding of her long term chronic disease process and that marilee COVID-19 has caused some set back for her. Our discussion was about her goals of care and patient made the statements \"I have that paperwork somewhere at home, but when my  was sick and did not want to be intubated or on dialysis I knew that giving it to the doctors they would just put him on hospice\"  She also stated \"I want to live a few more years, but I don't want to be debilitated\"  We offered to complete AMD paperwork and reviewed a POST with her, but she declined stating again that she has her paperwork in order, but does not want to share with our team or the hospitalist team what her wishes would be. 3.   Acute on Chronic Respiratory Failure  Pt had COVID-19 in December and mostly recovered to go home. She has been dependent on home oxygen since 2016 and had a relapse of increased SOB prior to this hospitalization. 4.   COPD  On 02 at home since 2016. CT scan done in December 2020 showed Emphysema with chronic scaring in the right upper lobe and at the right middle lobe. Is followed Out patient by Pulmonologist. Pt has a 30 Pack year history had quite 33 years ago. 5.   Debility  At baseline patient had been running about 70% on the Palliaitve performance score with inability to do normal job or moderate to some disease process noted. Since COVID-19 infection she has declined to around a 50% with mainly sit to lie positioning, Minimal ambulation and Unable to do any work due to more extensive disease process. She is now in need of more assistance with self care and is seen by home care for PT and OT who reported that patient is requiring assistive devices for ambulation,   2. Initial consult note routed to primary continuity provider  3.  Communicated plan of care with: Palliative IDT      Advance Care Planning:  [] The [facility] Interdisciplinary Team has updated the ACP Navigator with [address] and Patient Capacity    Primary Decision Maker ([address]): Pt does not want to report or chose one for our chartes     Medical Interventions: Full interventions   Other Instructions: Pt reports having a Living will but is nervous about sharing her wishes with her medical team.         As far as possible, the palliative care team has discussed with patient / health care proxy about goals of care / treatment preferences for patient. HISTORY:     History obtained from: Chart review   Active Problems:    COPD exacerbation ([facility] [address] (2021)      Past Medical History:   Diagnosis Date    Chronic lung disease     Chronic obstructive pulmonary disease ([facility] [address]     Heart failure ([facility] [address]     Hypertension       Past Surgical History:   Procedure Laterality Date    HX ORTHOPAEDIC      spinal sx /    HX OTHER SURGICAL      Carotid artery    VASCULAR SURGERY PROCEDURE UNLIST      L CEA 10/2014      Family History   Problem Relation Age of Onset    Heart Disease Mother     Hypertension Mother     Heart Attack Father     Hypertension Father      History reviewed, no pertinent family history.   Social History     Tobacco Use    Smoking status: Former Smoker     Packs/day: 1.50     Years: 30.00     Pack years: 45.00     Types: Cigarettes     Quit date: 1987     Years since quittin.4    Smokeless tobacco: Never Used   Substance Use Topics    Alcohol use: No     Alcohol/week: 0.0 standard drinks     No Known Allergies   Current Facility-Administered Medications   Medication Dose Route Frequency    furosemide (LASIX) tablet 40 mg  40 mg Oral DAILY    ALPRAZolam (XANAX) tablet 0.5 mg  0.5 mg Oral QHS    aspirin delayed-release tablet 81 mg  81 mg Oral DAILY    atorvastatin (LIPITOR) tablet 20 mg  20 mg Oral DAILY    famotidine (PEPCID) tablet 20 mg  20 mg Oral Q12H    losartan (COZAAR) tablet 50 mg  50 mg Oral DAILY    therapeutic multivitamin (THERAGRAN) tablet 1 Tab  1 Tab Oral DAILY    NIFEdipine ER (PROCARDIA XL) tablet 60 mg  60 mg Oral DAILY    sodium chloride (NS) flush 5-40 mL  5-40 mL IntraVENous Q8H    sodium chloride (NS) flush 5-40 mL  5-40 mL IntraVENous PRN    acetaminophen (TYLENOL) tablet 650 mg  650 mg Oral Q6H PRN    Or    acetaminophen (TYLENOL) suppository 650 mg  650 mg Rectal Q6H PRN    polyethylene glycol (MIRALAX) packet 17 g  17 g Oral DAILY PRN    promethazine (PHENERGAN) tablet 12.5 mg  12.5 mg Oral Q6H PRN    Or    ondansetron (ZOFRAN) injection 4 mg  4 mg IntraVENous Q6H PRN    enoxaparin (LOVENOX) injection 40 mg  40 mg SubCUTAneous DAILY    albuterol-ipratropium (DUO-NEB) 2.5 MG-0.5 MG/3 ML  3 mL Nebulization Q4H PRN    budesonide-formoterol (SYMBICORT) 80-4.5 mcg inhaler  2 Puff Inhalation BID RT    And    tiotropium bromide (SPIRIVA RESPIMAT) 2.5 mcg /actuation  2 Puff Inhalation QAM RT    methylPREDNISolone (PF) (SOLU-MEDROL) injection 40 mg  40 mg IntraVENous Q8H    metoprolol tartrate (LOPRESSOR) tablet 25 mg  25 mg Oral Q12H    ipratropium-albuterol (COMBIVENT RESPIMAT) 20 mcg-100 mcg inhalation spray  1 Puff Inhalation Q4H RT          Clinical Pain Assessment (nonverbal scale for nonverbal patients): Clinical Pain Assessment  Severity: 0          Duration: for how long has pt been experiencing pain (e.g., 2 days, 1 month, years)  Frequency: how often pain is an issue (e.g., several times per day, once every few days, constant)     FUNCTIONAL ASSESSMENT:     Palliative Performance Scale (PPS):  PPS: 50    ECOG  ECOG Status : Limited self-care     PSYCHOSOCIAL/SPIRITUAL SCREENING:      Any spiritual / Mormonism concerns:  [] Yes /  [x] No    Caregiver Burnout:  [] Yes /  [] No /  [x] No Caregiver Present      Anticipatory grief assessment:   [x] Normal  / [] Maladaptive        REVIEW OF SYSTEMS:     Systems: constitutional, ears/nose/mouth/throat, respiratory, gastrointestinal, genitourinary, musculoskeletal, integumentary, neurologic, psychiatric, endocrine. Positive findings noted below.   Modified ESAS Completed by: provider           Pain: 0           Dyspnea: 4 Positive and pertinent negative findings in ROS are noted above in HPI. The following systems were [x] reviewed / [] unable to be reviewed as noted in HPI  Other findings are noted below. PHYSICAL EXAM:     Constitutional: alert and interactive, thin and somewhat frail with a nervous disposition and no acute distress   Eyes: pupils equal, anicteric  ENMT: no nasal discharge, moist mucous membranes  Cardiovascular: regular rhythm, distal pulses intact  Respiratory: breathing not labored, symmetric, on 02 n/c  Gastrointestinal: soft non-tender, +bowel sounds  Musculoskeletal: no deformity, no tenderness to palpation  Skin: warm, dry  Neurologic: AA and oriented X 4 following commands, moving all extremities  Psychiatric: full affect, no hallucinations, speaking very quickly     Other: Wt Readings from Last 3 Encounters:   02/23/21 57.1 kg (125 lb 12.8 oz)   01/04/21 54.4 kg (120 lb)   12/29/20 55.6 kg (122 lb 8 oz)     Blood pressure (!) 178/63, pulse 77, temperature 98.2 °F (36.8 °C), resp. rate 24, weight 57.1 kg (125 lb 12.8 oz), SpO2 90 %. Pain:  Pain Scale 1: Numeric (0 - 10)  Pain Intensity 1: 0                      LAB AND IMAGING FINDINGS:     Lab Results   Component Value Date/Time    WBC 11.3 02/24/2021 03:30 AM    HGB 13.5 02/24/2021 03:30 AM    PLATELET 985 60/50/4528 03:30 AM     Lab Results   Component Value Date/Time    Sodium 141 02/24/2021 03:30 AM    Potassium 4.6 02/24/2021 03:30 AM    Chloride 109 02/24/2021 03:30 AM    CO2 26 02/24/2021 03:30 AM    BUN 19 (H) 02/24/2021 03:30 AM    Creatinine 0.78 02/24/2021 03:30 AM    Calcium 8.8 02/24/2021 03:30 AM    Magnesium 2.5 02/24/2021 03:30 AM    Phosphorus 4.2 02/24/2021 03:30 AM      Lab Results   Component Value Date/Time    Alk.  phosphatase 64 02/24/2021 03:30 AM    Protein, total 6.7 02/24/2021 03:30 AM    Albumin 3.6 02/24/2021 03:30 AM    Globulin 3.1 02/24/2021 03:30 AM     Lab Results   Component Value Date/Time    INR 1.1 02/24/2021 03:30 AM    Prothrombin time 13.9 02/24/2021 03:30 AM    aPTT 34.6 02/24/2021 03:30 AM      Lab Results   Component Value Date/Time    Ferritin 35 02/24/2021 03:30 AM      No results found for: PH, PCO2, PO2  No components found for: [place]   Lab Results   Component Value Date/Time     06/02/2016 01:35 AM    CK - MB 2.3 06/02/2016 01:35 AM              Total time: 50 minutes   Counseling / coordination time, spent as noted above:   > 50% counseling / coordination:  Time spent in direct consultation with the patient, medical team, and family     Prolonged service was provided for  []30 min   []75 min in face to face time in the presence of the patient, spent as noted above.   Time Start:   Time End:

## 2021-02-24 NOTE — PROGRESS NOTES
Problem: Falls - Risk of  Goal: *Absence of Falls  Description: Document Filomena Das Fall Risk and appropriate interventions in the flowsheet.   Outcome: Progressing Towards Goal  Note: Fall Risk Interventions:  Mobility Interventions: Bed/chair exit alarm         Medication Interventions: Evaluate medications/consider consulting pharmacy    Elimination Interventions: Call light in reach

## 2021-02-24 NOTE — PROGRESS NOTES
1200 Informed Dr. Lorraine Fernandez of patient desaturated when ambulating to the restroom despite of being on an oxygen at 4L. Dr. Lorraine Fernandez instructed to increase patient's oxygen level but patient refused, stated that she doesn't want it increase. Bedside and Verbal shift change report given to Nimesh Austin (oncoming nurse) by Kia Boston, RN/ Ray Agustin RN   (offgoing nurse).  Report included the following information SBAR, KARDEX, MAR, CARDIAC RHYTHM, Sinus Rhythm

## 2021-02-25 ENCOUNTER — HOME HEALTH ADMISSION (OUTPATIENT)
Dept: HOME HEALTH SERVICES | Facility: HOME HEALTH | Age: 86
End: 2021-02-25
Payer: MEDICARE

## 2021-02-25 VITALS
SYSTOLIC BLOOD PRESSURE: 136 MMHG | WEIGHT: 120 LBS | OXYGEN SATURATION: 95 % | RESPIRATION RATE: 18 BRPM | HEIGHT: 63 IN | DIASTOLIC BLOOD PRESSURE: 60 MMHG | BODY MASS INDEX: 21.26 KG/M2 | HEART RATE: 68 BPM | TEMPERATURE: 97.2 F

## 2021-02-25 PROCEDURE — 94640 AIRWAY INHALATION TREATMENT: CPT

## 2021-02-25 PROCEDURE — 74011250636 HC RX REV CODE- 250/636: Performed by: PHYSICIAN ASSISTANT

## 2021-02-25 PROCEDURE — 74011250637 HC RX REV CODE- 250/637: Performed by: HOSPITALIST

## 2021-02-25 PROCEDURE — 74011250637 HC RX REV CODE- 250/637: Performed by: INTERNAL MEDICINE

## 2021-02-25 PROCEDURE — 74011250637 HC RX REV CODE- 250/637: Performed by: PHYSICIAN ASSISTANT

## 2021-02-25 PROCEDURE — 99239 HOSP IP/OBS DSCHRG MGMT >30: CPT | Performed by: HOSPITALIST

## 2021-02-25 RX ORDER — FAMOTIDINE 20 MG/1
20 TABLET, FILM COATED ORAL DAILY
Status: DISCONTINUED | OUTPATIENT
Start: 2021-02-26 | End: 2021-02-25 | Stop reason: HOSPADM

## 2021-02-25 RX ORDER — PREDNISONE 10 MG/1
TABLET ORAL
Qty: 21 TAB | Refills: 0 | Status: SHIPPED | OUTPATIENT
Start: 2021-02-25 | End: 2021-10-11 | Stop reason: ALTCHOICE

## 2021-02-25 RX ADMIN — FUROSEMIDE 40 MG: 40 TABLET ORAL at 08:32

## 2021-02-25 RX ADMIN — THERA TABS 1 TABLET: TAB at 08:33

## 2021-02-25 RX ADMIN — LOSARTAN POTASSIUM 50 MG: 50 TABLET, FILM COATED ORAL at 08:33

## 2021-02-25 RX ADMIN — IPRATROPIUM BROMIDE AND ALBUTEROL 1 PUFF: 20; 100 SPRAY, METERED RESPIRATORY (INHALATION) at 04:13

## 2021-02-25 RX ADMIN — BUDESONIDE AND FORMOTEROL FUMARATE DIHYDRATE 2 PUFF: 80; 4.5 AEROSOL RESPIRATORY (INHALATION) at 07:47

## 2021-02-25 RX ADMIN — TIOTROPIUM BROMIDE INHALATION SPRAY 2 PUFF: 3.12 SPRAY, METERED RESPIRATORY (INHALATION) at 07:47

## 2021-02-25 RX ADMIN — NIFEDIPINE 60 MG: 60 TABLET, FILM COATED, EXTENDED RELEASE ORAL at 08:32

## 2021-02-25 RX ADMIN — METHYLPREDNISOLONE SODIUM SUCCINATE 40 MG: 40 INJECTION, POWDER, FOR SOLUTION INTRAMUSCULAR; INTRAVENOUS at 05:35

## 2021-02-25 RX ADMIN — ASPIRIN 81 MG: 81 TABLET, COATED ORAL at 08:32

## 2021-02-25 RX ADMIN — FAMOTIDINE 20 MG: 20 TABLET ORAL at 08:32

## 2021-02-25 RX ADMIN — ATORVASTATIN CALCIUM 20 MG: 20 TABLET, FILM COATED ORAL at 08:32

## 2021-02-25 RX ADMIN — METOPROLOL TARTRATE 25 MG: 25 TABLET, FILM COATED ORAL at 08:32

## 2021-02-25 RX ADMIN — ENOXAPARIN SODIUM 40 MG: 100 INJECTION SUBCUTANEOUS at 08:31

## 2021-02-25 RX ADMIN — IPRATROPIUM BROMIDE AND ALBUTEROL 1 PUFF: 20; 100 SPRAY, METERED RESPIRATORY (INHALATION) at 07:47

## 2021-02-25 RX ADMIN — Medication 10 ML: at 05:35

## 2021-02-25 NOTE — PROGRESS NOTES
Problem: Falls - Risk of  Goal: *Absence of Falls  Description: Document Naif Tavarez Fall Risk and appropriate interventions in the flowsheet.   Outcome: Progressing Towards Goal  Note: Fall Risk Interventions:  Mobility Interventions: Bed/chair exit alarm, Patient to call before getting OOB     Wear proper footwear to prevent falls    Medication Interventions: Teach patient to arise slowly    Elimination Interventions: Call light in reach, Patient to call for help with toileting needs

## 2021-02-25 NOTE — PROGRESS NOTES
Palliative Medicine    Goals of care defined. Pt wishes for FULL code with FULL aggressive medical management. Will sign off. Please reconsult team as needed/if appropriate. Thank you for the Palliative Medicine consult and allowing us to participate in the care of Mrs. Gerson Treviño.       Claudy Campbell RN, BSN  Palliative Medicine Inpatient RN  [facility]  Palliative [facility]E Line: [phone] ([phone])

## 2021-02-25 NOTE — PROGRESS NOTES
Discharge order noted for today. Pt has been accepted to [address] [facility] [place]. Met with patient and are agreeable to the transition plan today. Transport has been arranged through [facility]. Patient's discharge summary and home health  orders have been forwarded to [facility] home health  agency via Epic/phone call. Updated bedside RN,,  to the transition plan. Discharge information has been documented on the AVS.       Medicare pt has received, reviewed, and signed 2nd IM letter informing them of their right to appeal the discharge. Signed copy has been placed on pt bedside chart.     Willy Quick RN BSN  Outcomes Manager    Pager # [phone]

## 2021-02-25 NOTE — DISCHARGE SUMMARY
Discharge Summary    Patient: Lexi Juarez MRN: [id number]  CSN: [id number]    YOB: 1935  Age: 82 y.o. Sex: female    DOA: 2/22/2021 LOS:  LOS: 3 days   Discharge Date:      Admission Diagnoses: COPD exacerbation ([address] [J44.1]    Discharge Diagnoses:    COPD exacerbation  Chronic hypoxic respiratory failure  Recent COVID-19 infection  Chronic diastolic CHF  History of hypertension  History of coronary artery disease  History of pulmonary hypertension  History of anxiety  7 mm pulmonary nodule left upper lobeis following up with pulmonary as outpatient    Discharge Condition: Stable    Consults: None    PHYSICAL EXAM  Visit Vitals  BP (!) 148/60 (BP 1 Location: Left upper arm, BP Patient Position: Sitting)   Pulse 76   Temp 97.7 °F (36.5 °C)   Resp 18   Ht 5' 3\" (1.6 m)   Wt 54.4 kg (120 lb)   SpO2 90%   BMI 21.26 kg/m²       General: Alert, cooperative, no acute distress    HEENT: PERRLA, EOMI. Anicteric sclerae. Lungs:  CTA Bilaterally. No Wheezing/Rhonchi/Rales. Heart:  Regular rate and Rhythm. Abdomen: Soft, Non distended, Non tender. + Bowel sounds. Extremities: No edema/ cyanosis/ clubbing  Psych:   Good insight. Not anxious or agitated. Neurologic:  AA oriented X 3. Moves all extremities. HPI:  Ms. Rupali Villalobos is a very pleasant 81 yo  woman with a past medical hx of COPD, chronic resp failure on 3L NC,  recent COVID infection in 16/6164,  Diastolic CHF, HTN, CAD, pulm hypertension who presented from home to [facility] ED with SOB. Ever since Friday night/saturday morning, the patient Romana Rodriguez not been feeling good\". She breathing is worse, She has been SOB at rest and with exertion + dry coughing. She  was in  [facility] hospital on 12/10 and discharged on 12/19 with acute on chronic respiratory failure from COVID 19 PNA, she got all the covid  treatment and was d/c home with oxygen.  She was suppose to be wearing 3L NC at rest and 4L NC with exertion but she admits \"lately not wearing O2\". She has been in her house since discharge from hospital in dec 2020, recently in Feb started to finally leave her house and have lunch with her family, she thinks she over-exerted herself too soon. Also, LE edema in the left leg lately, she is compliant with lasix every morning. No wheezing at home.      In the ED-  She was hypoxic, especially with exertion,  despite of NC.      ED course:   Vital signs: 97.4 F, , /56, RR 25, SPO2 80% 3LNC   Labs remarkable for: wbc 11.4   Imaging: chest xray COPD with superimposed fibrosis. No acute change  EKG: NSR HR 93  Medications received nebs, solumederol, azithromycin   Procedures performed:            Hospital Course:   65-year-old  female with a known history of COPD, chronic hypoxic respiratory failure, chronic diastolic CHF, hypertension, coronary artery disease, anxiety, recent COVID-19 infection presents to the emergency room secondary to shortness of breath. Patient is currently on 3 L nasal cannula at home and is hypoxic in the emergency room. Patient is treated with working diagnosis of COPD exacerbation with acute on chronic diastolic CHF exacerbation. Patient diuresed with IV Lasix as well and was placed on IV steroids. Patient has improved during her hospital stay and is currently being discharged on p.o. prednisone as well as p.o. Lasix. She is advised to be compliant with her medications and will continue her chronic home medications. She will continue home health on discharge. She will continue her home oxygen on discharge. She is advised to follow-up with her PCP and pulmonary in 2 to 3 weeks. Imaging:  XR Results (most recent):  Results from Hospital Encounter encounter on 02/22/21   XR CHEST PORT    Narrative Portable Frontal Chest.    CLINICAL HISTORY: Shortness of breath and history of recent Covid 19 infection. Hypertension and COPD. Chelo Graven     TECHNIQUE: Single frontal view of the chest, obtained portably. COMPARISONS: Chest x-ray 12/15/2020, 12/10/2020, CT 12/10/2020. FINDINGS:     Reticular densities all lobes. No areas of consolidation. Hyperinflation at  apices. No effusions. Cardiomegaly, stable. Vascularity normal. No effusions. Vascular clips left neck. Impression COPD with superimposed fibrosis. No acute change          CT Results (most recent):  Results from [place] encounter on 12/10/20   CTA CHEST W OR W WO CONT    Narrative CTA CHEST PULMONARY EMBOLISM PROTOCOL      INDICATION: Chills. Body aches. Symptoms since last night. Covid-19 concern. TECHNIQUE: Thin collimation axial images obtained through the level of the  pulmonary arteries with additional imaging through the chest following the  uneventful administration of 65 cc Isovue-300 intravenous contrast.  Images  reconstructed into MIP coronal and sagittal projections for complete evaluation  of the tortuous and overlapping pulmonary vascular structures and to reduce  patient radiation dose. All CT scans are performed using dose optimization  techniques as appropriate to the performed exam including the following:  Automated exposure control, adjustment of mA and/or kV according to patient  size, and use of iterative reconstructive technique. COMPARISON: Chest CT 10/6/2020. FINDINGS:    No filling defects are appreciated within the main, left, right, lobar or  visualized segmental pulmonary arteries to suggest embolism. Motion artifact  limits peripheral, subsegmental branches. The thoracic aorta is not aneurysmal.   No evidence for dissection. Arterial wall calcifications. Biatrial cardiac  enlargement. No pericardial effusion. No pleural effusion. Precarinal 14 mm granulomatous  calcification containing lymph node is slightly larger. New additional  lymphadenopathy along bilateral mediastinum and subcarinal mediastinum. New 13  mm subcarinal lymph node. Left mediastinal nodes measure 11 mm. Bilateral hilar  lymphadenopathy. Left thyroid nodule is similar to prior measuring 3.1 x 1.8 cm. Emphysema. Hypoinflation and respiratory motion lung limitations. New 7 mm  nodule (image 18/series 3). Stable chronic scar at anterior right upper lobe and  mild at the right middle lobe. New mild sites of peripheral consolidation in the  right lateral lower lobe. Mild groundglass density versus atelectasis in the  left lower lung laterally. Small hiatal hernia. Subcentimeter right renal cyst  No acute bone finding. Impression IMPRESSION:    1. No evidence of pulmonary embolism. 2. Limited by motion and hypoinflation. Emphysema with mild infiltrate/pneumonia  suspected in the right lower lobe. 3. New mild hilar and mediastinal lymphadenopathy. 4. New 7 mm left upper lobe lung nodule. Follow-up recommended in 3 months  posttreatment to assess for its persistence, given the limitations of this exam.  5. Biatrial cardiac enlargement. 6. Stable left thyroid nodule. 7. Small hiatal hernia.       02/22/21   ECHO ADULT FOLLOW-UP OR LIMITED 02/24/2021 2/24/2021    Narrative · LV: Estimated LVEF is 55 - 60%. Visually measured ejection fraction. Normal cavity size, wall thickness and systolic function (ejection   fraction normal). Wall motion: normal.  · COVID r/o  · RV: Mildly dilated right ventricle. · RA: Mildly dilated right atrium. · PA: Severe pulmonary hypertension. Pulmonary arterial systolic pressure   is 73 mmHg. · IVC: Moderately elevated central venous pressure (8 mmHg); IVC diameter   is larger than 21 mm and collapses more than 50% with respiration.         Signed by: Dasia Uriarte MD          Procedures:   None         Discharge Medications:     Current Discharge Medication List      START taking these medications    Details   predniSONE (STERAPRED DS) 10 mg dose pack See administration instruction per 10mg dose pack  Qty: 21 Tab, Refills: 0         CONTINUE these medications which have NOT CHANGED    Details   OXYGEN-AIR DELIVERY SYSTEMS 3 L by IntraNASal route continuous. famotidine (PEPCID) 20 mg tablet Take 1 Tab by mouth daily. Qty: 30 Tab, Refills: 0      ascorbic acid, vitamin C, (VITAMIN C) 500 mg tablet Take 1 Tab by mouth two (2) times a day. Qty: 30 Tab, Refills: 0      cholecalciferol (VITAMIN D3) (1000 Units /25 mcg) tablet Take 2 Tabs by mouth daily. Qty: 60 Tab, Refills: 0      zinc sulfate (ZINCATE) 220 (50) mg capsule Take 1 Cap by mouth daily. Qty: 30 Cap, Refills: 0      furosemide (LASIX) 20 mg tablet Take 2 Tabs by mouth daily. Qty: 30 Tab, Refills: 0      atorvastatin (LIPITOR) 20 mg tablet Take 20 mg by mouth daily. multivitamin (ONE A DAY) tablet Take 1 Tab by mouth daily. COQ10, UBIQUINOL, PO Take 1 Cap by mouth daily. losartan (COZAAR) 50 mg tablet Take  by mouth daily. 75 mg in a.m      fluticasone-umeclidin-vilanter (TRELEGY ELLIPTA) 100-62.5-25 mcg dsdv Take 1 Puff by inhalation daily. diclofenac (VOLTAREN) 1 % gel Apply  to affected area four (4) times daily. Qty: 100 g, Refills: 2      Oxygen       albuterol (PROVENTIL HFA, VENTOLIN HFA, PROAIR HFA) 90 mcg/actuation inhaler Take 2 Puffs by inhalation every four (4) hours as needed for Shortness of Breath or Wheezing. albuterol (PROVENTIL VENTOLIN) 2.5 mg /3 mL (0.083 %) nebulizer solution 3 mL by Nebulization route every four (4) hours as needed for Wheezing or Shortness of Breath. Qty: 48 Each, Refills: 0      acetaminophen (TYLENOL EXTRA STRENGTH) 500 mg tablet Take 500 mg by mouth every six (6) hours as needed for Pain.      metoprolol (LOPRESSOR) 25 mg tablet Take 25 mg by mouth two (2) times a day. NIFEdipine ER (ADALAT CC) 60 mg ER tablet Take 60 mg by mouth daily. Associated Diagnoses: Occlusion and stenosis of carotid artery, left; Pre-op testing      alprazolam (XANAX) 0.5 mg tablet Take 0.5 mg by mouth nightly.     Associated Diagnoses: Occlusion and stenosis of carotid artery, left; Pre-op testing      aspirin delayed-release 81 mg tablet Take 81 mg by mouth daily.     Associated Diagnoses: Occlusion and stenosis of carotid artery, left; Pre-op testing            Current Facility-Administered Medications:     [START ON 2/26/2021] famotidine (PEPCID) tablet 20 mg, 20 mg, Oral, DAILY, Awilda Boykin MD    furosemide (LASIX) tablet 40 mg, 40 mg, Oral, DAILY, Awilda Boykin MD, 40 mg at 02/25/21 6634    ALPRAZolam Karolyn Barton) tablet 0.5 mg, 0.5 mg, Oral, QHS, RomeroOdessa alvarez, [place], 0.5 mg at 02/24/21 2112    aspirin delayed-release tablet 81 mg, 81 mg, Oral, DAILY, Parisa Slopérezmb T, [place], 81 mg at 02/25/21 7365    atorvastatin (LIPITOR) tablet 20 mg, 20 mg, Oral, DAILY, Odessa Mchugh PA, 20 mg at 02/25/21 5029    losartan (COZAAR) tablet 50 mg, 50 mg, Oral, DAILY, Odessa Mchugh, [place], 50 mg at 02/25/21 3243    therapeutic multivitamin (THERAGRAN) tablet 1 Tab, 1 Tab, Oral, DAILY, Parisa GARZON, [place], 1 Tab at 02/25/21 5315    NIFEdipine ER (PROCARDIA XL) tablet 60 mg, 60 mg, Oral, DAILY, Odessa Mchugh, PA, 60 mg at 02/25/21 5584    sodium chloride (NS) flush 5-40 mL, 5-40 mL, IntraVENous, Q8H, Odessa Romero PA, 10 mL at 02/25/21 0535    sodium chloride (NS) flush 5-40 mL, 5-40 mL, IntraVENous, PRN, Odessa Mchugh PA    acetaminophen (TYLENOL) tablet 650 mg, 650 mg, Oral, Q6H PRN **OR** acetaminophen (TYLENOL) suppository 650 mg, 650 mg, Rectal, Q6H PRN, Odessa Mchugh PA    polyethylene glycol (MIRALAX) packet 17 g, 17 g, Oral, DAILY PRN, Odessa Mchugh PA    promethazine (PHENERGAN) tablet 12.5 mg, 12.5 mg, Oral, Q6H PRN **OR** ondansetron (ZOFRAN) injection 4 mg, 4 mg, IntraVENous, Q6H PRN, Odessa Mchugh PA    enoxaparin (LOVENOX) injection 40 mg, 40 mg, SubCUTAneous, DAILY, Odessa Romero PA, 40 mg at 02/25/21 0831    albuterol-ipratropium (DUO-NEB) 2.5 MG-0.5 MG/3 ML, 3 mL, Nebulization, Q4H PRN, Barbi Moeller, [place]   budesonide-formoterol (SYMBICORT) 80-4.5 mcg inhaler, 2 Puff, Inhalation, BID RT, 2 Puff at 02/25/21 0747 **AND** tiotropium bromide (SPIRIVA RESPIMAT) 2.5 mcg /actuation, 2 Puff, Inhalation, QAM RT, Odessa Romero [place], 2 Puff at 02/25/21 0747    methylPREDNISolone (PF) (SOLU-MEDROL) injection 40 mg, 40 mg, IntraVENous, Q8H, Odessa Romero [place], 40 mg at 02/25/21 0535    metoprolol tartrate (LOPRESSOR) tablet 25 mg, 25 mg, Oral, Q12H, Odessa Romero PA, 25 mg at 02/25/21 0832    ipratropium-albuterol (COMBIVENT RESPIMAT) 20 mcg-100 mcg inhalation spray, 1 Puff, Inhalation, Q4H RT, Nagi Lao MD, 1 Puff at 02/25/21 9170  · It is important that you take the medication exactly as they are prescribed. · Keep your medication in the bottles provided by the pharmacist and keep a list of the medication names, dosages, and times to be taken in your wallet. · Do not take other medications without consulting your doctor. Discharge To:  Home    Minutes spent on discharge: 42 minutes spent coordinating this discharge (review instructions/follow-up, prescriptions, preparing report for sign off)    Jorge Veloz MD  2/25/2021 10:42 AM

## 2021-02-25 NOTE — HOME CARE
Discharge noted for today. Received home health referral for [facility] for SN. Spoke with patient via phone patient identifier verified. Explained home care services and routines. Demographics verified and address confirmed: [address] [address]. Patient lives with son Milton Gong. Patient has the following DME  RW, SC, Portable O2, and oximeter. Orders noted and arranged.     Evelin Walsh RN   Home Health Intake/Liaison

## 2021-02-25 NOTE — DISCHARGE INSTRUCTIONS
Patient Education        Using a Metered-Dose Inhaler: Care Instructions  Your Care Instructions     A metered-dose inhaler lets you breathe medicine into your lungs quickly. Inhaled medicine works faster than the same medicine in a pill. An inhaler allows you to take less medicine than you would need if you took it as a pill. \"Metered-dose\" means that the inhaler gives a measured amount of medicine each time you use it. A metered-dose inhaler gives medicine in the form of a liquid mist.  Your doctor may want you to use a spacer with your inhaler. A spacer is a chamber that you attach to the inhaler. The chamber holds the medicine before you inhale it. That way, you can inhale the medicine in as many breaths as you need. Doctors recommend using a spacer with most metered-dose inhalers, especially those with corticosteroid medicines. Follow-up care is a key part of your treatment and safety. Be sure to make and go to all appointments, and call your doctor if you are having problems. It's also a good idea to know your test results and keep a list of the medicines you take. How can you care for yourself at home? To get started  · Talk with your health care provider to be sure you are using your inhaler the right way. It might help if you practice using it in front of a mirror. Use the inhaler exactly as prescribed. · Check that you have the correct medicine. If you use more than one inhaler, put a label on each one. This will let you know which one to use at the right time. · Keep track of how much medicine is in the inhaler. Check the label to see how many doses are in the container. If you know how many puffs you can take, you can replace the inhaler before you run out. Ask your health care provider how you can keep track of how much medicine is left. · Talk to your health care provider about using a spacer with your inhaler. Spacers make it easier to get the medicine into your lungs.  You may need a spacer if you are using corticosteroid medicines. A spacer can also help if you have problems pressing the inhaler and breathing in at the same time. · If you are using a corticosteroid inhaler, gargle and rinse out your mouth with water after use. Do not swallow the water. Swallowing the water will increase the chance that the medicine will get into your bloodstream. This may make it more likely that you will have side effects. To use a spacer with an inhaler  1. Shake the inhaler. Remove the inhaler cap, and place the mouthpiece of the inhaler into the spacer. Check the inhaler instructions to see if you need to prime your inhaler before you use it. If it needs priming, follow the instructions on how to prime your inhaler. 2. Remove the cap from the spacer. 3. Hold the inhaler upright with the mouthpiece at the bottom. 4. Tilt your head back a little, and breathe out slowly and completely. 5. Place the spacer's mouthpiece in your mouth. 6. Press down on the inhaler to spray one puff of medicine into the spacer, and then start breathing in slowly. Wait to inhale until after you have pressed down on the inhaler. Some spacers have a whistle. If you hear it, you should breathe in more slowly. 7. Hold your breath for 10 seconds. This will let the medicine settle in your lungs. 8. If you need to take a second dose, wait 30 to 60 seconds to allow the inhaler valve to refill. To use an inhaler without a spacer  1. Shake the inhaler as directed. Remove the cap. Check the instructions to see if you need to prime your inhaler before you use it. If it needs priming, follow the instructions on how to prime your inhaler. 2. Hold the inhaler upright with the mouthpiece at the bottom. 3. Tilt your head back a little, and breathe out slowly and completely. 4. Position the inhaler in one of two ways:  ? You can place the inhaler in your mouth. This is easier for most people.  And it lowers the risk that any of the medicine will get into your eyes. ? Or you can place the inhaler 1 to 2 inches in front of your open mouth, without closing your lips over it. Try to open your mouth as wide as you can. Placing the inhaler in front of your open mouth may be better for getting the medicine into your lungs. But some people may find this too hard to do. 5. Start taking slow, even breaths through your mouth. Press down on the inhaler once, then inhale fully. 6. Hold your breath for 10 seconds. This will let the medicine settle in your lungs. 7. If you need to take a second dose, wait 30 to 60 seconds to allow the inhaler valve to refill. Where can you learn more? Go to http://eduardo-evelia.info/  Enter K111 in the search box to learn more about \"Using a Metered-Dose Inhaler: Care Instructions. \"  Current as of: February 24, 2020               Content Version: 12.6  © 2006-2020 [employer] Care instructions adapted under license by [employer] (which disclaims liability or warranty for this information). If you have questions about a medical condition or this instruction, always ask your healthcare professional. [place] [address] any warranty or liability for your use of this information. Patient Education        Chronic Obstructive Pulmonary Disease (COPD): Care Instructions  Your Care Instructions     Chronic obstructive pulmonary disease (COPD) is a general term for a group of lung diseases, including emphysema and chronic bronchitis. People with COPD have decreased airflow in and out of the lungs, which makes it hard to breathe. The airways also can get clogged with thick mucus. Cigarette smoking is a major cause of COPD. Although there is no cure for COPD, you can slow its progress. Following your treatment plan and taking care of yourself can help you feel better and live longer. Follow-up care is a key part of your treatment and safety.  Be sure to make and go to all appointments, and call your doctor if you are having problems. It's also a good idea to know your test results and keep a list of the medicines you take. How can you care for yourself at home? Staying healthy    · Do not smoke. This is the most important step you can take to prevent more damage to your lungs. If you need help quitting, talk to your doctor about stop-smoking programs and medicines. These can increase your chances of quitting for good.     · Avoid colds and flu. Get a pneumococcal vaccine shot. If you have had one before, ask your doctor whether you need a second dose. Get the flu vaccine every fall. If you must be around people with colds or the flu, wash your hands often.     · Avoid secondhand smoke, air pollution, and high altitudes. Also avoid cold, dry air and hot, humid air. Stay at home with your windows closed when air pollution is bad. Medicines and oxygen therapy    · Take your medicines exactly as prescribed. Call your doctor if you think you are having a problem with your medicine. You may be taking medicines such as:  ? Bronchodilators. These help open your airways and make breathing easier. They are either short-acting (work for 6 to 9 hours) or long-acting (work for 24 hours). You inhale most bronchodilators, so they start to act quickly. Always carry your quick-relief inhaler with you in case you need it while you are away from home. ? Corticosteroids (prednisone, budesonide). These reduce airway inflammation. They come in pill or inhaled form. You must take these medicines every day for them to work well.     · Ask your doctor or pharmacist if a spacer is right for you. A spacer may help you get more inhaled medicine to your lungs. If you use one, ask how to use it properly.     · Do not take any vitamins, over-the-counter medicine, or herbal products without talking to your doctor first.     · If your doctor prescribed antibiotics, take them as directed.  Do not stop taking them just because you feel better. You need to take the full course of antibiotics.     · If you use oxygen therapy, use the flow rate your doctor has recommended. Don't change it without talking to your doctor first. Oxygen therapy boosts the amount of oxygen in your blood and helps you breathe easier. Activity    · Get regular exercise. Walking is an easy way to get exercise. Start out slowly, and walk a little more each day.     · Pay attention to your breathing. You are exercising too hard if you can't talk while you exercise.     · Take short rest breaks when doing household chores and other activities.     · Learn breathing methods--such as breathing through pursed lips--to help you become less short of breath.     · If your doctor has not set you up with a pulmonary rehabilitation program, ask if rehab is right for you. Rehab includes exercise programs, education about your disease and how to manage it, help with diet and other changes, and emotional support. Diet    · Eat regular, healthy meals. Use bronchodilators about 1 hour before you eat to make it easier to eat. Eat several small meals instead of three large ones. Drink beverages at the end of the meal. Avoid foods that are hard to chew.     · Eat foods that contain protein so you don't lose muscle mass.     · Talk with your doctor if you gain too much weight or if you lose weight without trying. Mental health    · Talk to your family, friends, or a therapist about your feelings. Some people feel frightened, angry, hopeless, helpless, and even guilty. Talking openly about bad feelings can help you cope. If these feelings last, talk to your doctor. When should you call for help? Call 911 anytime you think you may need emergency care. For example, call if:    · You have severe trouble breathing. Call your doctor now or seek immediate medical care if:    · You have new or worse trouble breathing.     · You cough up blood.     · You have a fever. Watch closely for changes in your health, and be sure to contact your doctor if:    · You cough more deeply or more often, especially if you notice more mucus or a change in the color of your mucus.     · You have new or worse swelling in your legs or belly.     · You are not getting better as expected. Where can you learn more? Go to http://www.gray.com/  Enter W754 in the search box to learn more about \"Chronic Obstructive Pulmonary Disease (COPD): Care Instructions. \"  Current as of: February 24, 2020               Content Version: 12.6  © 9310-8729 [employer]. Care instructions adapted under license by [employer] (which disclaims liability or warranty for this information). If you have questions about a medical condition or this instruction, always ask your healthcare professional. [address] any warranty or liability for your use of this information. Patient Education        Preventing Falls: Care Instructions  Your Care Instructions     Getting around your home safely can be a challenge if you have injuries or health problems that make it easy for you to fall. Loose rugs and furniture in walkways are among the dangers for many older people who have problems walking or who have poor eyesight. People who have conditions such as arthritis, osteoporosis, or dementia also have to be careful not to fall. You can make your home safer with a few simple measures. Follow-up care is a key part of your treatment and safety. Be sure to make and go to all appointments, and call your doctor if you are having problems. It's also a good idea to know your test results and keep a list of the medicines you take. How can you care for yourself at home? Taking care of yourself  · You may get dizzy if you do not drink enough water. To prevent dehydration, drink plenty of fluids, enough so that your urine is light yellow or clear like water. Choose water and other caffeine-free clear liquids. If you have kidney, heart, or liver disease and have to limit fluids, talk with your doctor before you increase the amount of fluids you drink. · Exercise regularly to improve your strength, muscle tone, and balance. Walk if you can. Swimming may be a good choice if you cannot walk easily. · Have your vision and hearing checked each year or any time you notice a change. If you have trouble seeing and hearing, you might not be able to avoid objects and could lose your balance. · Know the side effects of the medicines you take. Ask your doctor or pharmacist whether the medicines you take can affect your balance. Sleeping pills or sedatives can affect your balance. · Limit the amount of alcohol you drink. Alcohol can impair your balance and other senses. · Ask your doctor whether calluses or corns on your feet need to be removed. If you wear loose-fitting shoes because of calluses or corns, you can lose your balance and fall. · Talk to your doctor if you have numbness in your feet. Preventing falls at home  · Remove raised doorway thresholds, throw rugs, and clutter. Repair loose carpet or raised areas in the floor. · Move furniture and electrical cords to keep them out of walking paths. · Use nonskid floor wax, and wipe up spills right away, especially on ceramic tile floors. · If you use a walker or cane, put rubber tips on it. If you use crutches, clean the bottoms of them regularly with an abrasive pad, such as steel wool. · Keep your house well lit, especially [place], and outside walkways. Use night-lights in areas such as hallways and bathrooms. Add extra light switches or use remote switches (such as switches that go on or off when you clap your hands) to make it easier to turn lights on if you have to get up during the night. · Install sturdy handrails on stairways.   · Move items in your cabinets so that the things you use a lot are on the lower shelves (about waist level). · Keep a cordless phone and a flashlight with new batteries by your bed. If possible, put a phone in each of the main rooms of your house, or carry a cell phone in case you fall and cannot reach a phone. Or, you can wear a device around your neck or wrist. You push a button that sends a signal for help. · Wear low-heeled shoes that fit well and give your feet good support. Use footwear with nonskid soles. Check the heels and soles of your shoes for wear. Repair or replace worn heels or soles. · Do not wear socks without shoes on wood floors. · Walk on the grass when the sidewalks are slippery. If you live in an area that gets snow and ice in the winter, sprinkle salt on slippery steps and sidewalks. Preventing falls in the bath  · Install grab bars and nonskid mats inside and outside your shower or tub and near the toilet and sinks. · Use shower chairs and bath benches. · Use a hand-held shower head that will allow you to sit while showering. · Get into a tub or shower by putting the weaker leg in first. Get out of a tub or shower with your strong side first.  · Repair loose toilet seats and consider installing a raised toilet seat to make getting on and off the toilet easier. · Keep your bathroom door unlocked while you are in the shower. Where can you learn more? Go to http://www.[employer].com/  Enter G117 in the search box to learn more about \"Preventing Falls: Care Instructions. \"  Current as of: April 15, 2020               Content Version: 12.6  © 7553-9100 [employer]. Care instructions adapted under license by [employer] (which disclaims liability or warranty for this information). If you have questions about a medical condition or this instruction, always ask your healthcare professional. [address] any warranty or liability for your use of this information.     DISCHARGE SUMMARY from Nurse    PATIENT INSTRUCTIONS:    After general anesthesia or intravenous sedation, for 24 hours or while taking prescription Narcotics:  · Limit your activities  · Do not drive and operate hazardous machinery  · Do not make important personal or business decisions  · Do  not drink alcoholic beverages  · If you have not urinated within 8 hours after discharge, please contact your surgeon on call. Report the following to your surgeon:  · Excessive pain, swelling, redness or odor of or around the surgical area  · Temperature over 100.5  · Nausea and vomiting lasting longer than 4 hours or if unable to take medications  · Any signs of decreased circulation or nerve impairment to extremity: change in color, persistent  numbness, tingling, coldness or increase pain  · Any questions    What to do at Home:  Recommended activity: Activity as tolerated, ***    If you experience any of the following symptoms Shortness of Breath, Fever of greater than 100.4, nausea and vomiting,chest pain please follow up with Primary Care Physician. *  Please give a list of your current medications to your Primary Care Provider. *  Please update this list whenever your medications are discontinued, doses are      changed, or new medications (including over-the-counter products) are added. *  Please carry medication information at all times in case of emergency situations. These are general instructions for a healthy lifestyle:    No smoking/ No tobacco products/ Avoid exposure to second hand smoke  Surgeon General's Warning:  Quitting smoking now greatly reduces serious risk to your health.     Obesity, smoking, and sedentary lifestyle greatly increases your risk for illness    A healthy diet, regular physical exercise & weight monitoring are important for maintaining a healthy lifestyle    You may be retaining fluid if you have a history of heart failure or if you experience any of the following symptoms:  Weight gain of 3 pounds or more overnight or 5 pounds in a week, increased swelling in our hands or feet or shortness of breath while lying flat in bed. Please call your doctor as soon as you notice any of these symptoms; do not wait until your next office visit. The discharge information has been reviewed with the patient. The patient verbalized understanding. Discharge medications reviewed with the patient and appropriate educational materials and side effects teaching were provided.   ___________________________________________________________________________________________________________________________________    Patient armband removed and shredded

## 2021-02-27 ENCOUNTER — HOME CARE VISIT (OUTPATIENT)
Dept: SCHEDULING | Facility: HOME HEALTH | Age: 86
End: 2021-02-27
Payer: MEDICARE

## 2021-02-27 PROCEDURE — 400013 HH SOC

## 2021-02-27 PROCEDURE — G0299 HHS/HOSPICE OF RN EA 15 MIN: HCPCS

## 2021-02-27 PROCEDURE — 400018 HH-NO PAY CLAIM PROCEDURE

## 2021-02-27 PROCEDURE — 3331090001 HH PPS REVENUE CREDIT

## 2021-02-27 PROCEDURE — 3331090002 HH PPS REVENUE DEBIT

## 2021-02-28 VITALS
RESPIRATION RATE: 16 BRPM | TEMPERATURE: 96 F | DIASTOLIC BLOOD PRESSURE: 68 MMHG | HEART RATE: 68 BPM | OXYGEN SATURATION: 94 % | SYSTOLIC BLOOD PRESSURE: 131 MMHG

## 2021-02-28 PROCEDURE — 3331090001 HH PPS REVENUE CREDIT

## 2021-02-28 PROCEDURE — 3331090002 HH PPS REVENUE DEBIT

## 2021-03-01 ENCOUNTER — HOME CARE VISIT (OUTPATIENT)
Dept: HOME HEALTH SERVICES | Facility: HOME HEALTH | Age: 86
End: 2021-03-01
Payer: MEDICARE

## 2021-03-01 PROCEDURE — 3331090002 HH PPS REVENUE DEBIT

## 2021-03-01 PROCEDURE — 3331090001 HH PPS REVENUE CREDIT

## 2021-03-02 ENCOUNTER — HOME CARE VISIT (OUTPATIENT)
Dept: SCHEDULING | Facility: HOME HEALTH | Age: 86
End: 2021-03-02
Payer: MEDICARE

## 2021-03-02 VITALS
SYSTOLIC BLOOD PRESSURE: 142 MMHG | HEART RATE: 62 BPM | DIASTOLIC BLOOD PRESSURE: 58 MMHG | OXYGEN SATURATION: 97 % | RESPIRATION RATE: 16 BRPM | TEMPERATURE: 98.2 F

## 2021-03-02 PROCEDURE — 3331090001 HH PPS REVENUE CREDIT

## 2021-03-02 PROCEDURE — G0300 HHS/HOSPICE OF LPN EA 15 MIN: HCPCS

## 2021-03-02 PROCEDURE — 3331090002 HH PPS REVENUE DEBIT

## 2021-03-03 PROCEDURE — 3331090002 HH PPS REVENUE DEBIT

## 2021-03-03 PROCEDURE — 3331090001 HH PPS REVENUE CREDIT

## 2021-03-04 PROCEDURE — 3331090002 HH PPS REVENUE DEBIT

## 2021-03-04 PROCEDURE — 3331090001 HH PPS REVENUE CREDIT

## 2021-03-05 ENCOUNTER — HOME CARE VISIT (OUTPATIENT)
Dept: HOME HEALTH SERVICES | Facility: HOME HEALTH | Age: 86
End: 2021-03-05
Payer: MEDICARE

## 2021-03-05 PROCEDURE — 3331090002 HH PPS REVENUE DEBIT

## 2021-03-05 PROCEDURE — 3331090001 HH PPS REVENUE CREDIT

## 2021-03-06 PROCEDURE — 3331090002 HH PPS REVENUE DEBIT

## 2021-03-06 PROCEDURE — 3331090001 HH PPS REVENUE CREDIT

## 2021-03-07 PROCEDURE — 3331090002 HH PPS REVENUE DEBIT

## 2021-03-07 PROCEDURE — 3331090001 HH PPS REVENUE CREDIT

## 2021-03-08 PROCEDURE — 3331090002 HH PPS REVENUE DEBIT

## 2021-03-08 PROCEDURE — 3331090001 HH PPS REVENUE CREDIT

## 2021-03-09 ENCOUNTER — HOME CARE VISIT (OUTPATIENT)
Dept: SCHEDULING | Facility: HOME HEALTH | Age: 86
End: 2021-03-09
Payer: MEDICARE

## 2021-03-09 ENCOUNTER — HOSPITAL ENCOUNTER (OUTPATIENT)
Dept: CT IMAGING | Age: 86
Discharge: HOME | End: 2021-03-09
Attending: INTERNAL MEDICINE
Payer: MEDICARE

## 2021-03-09 VITALS
RESPIRATION RATE: 18 BRPM | DIASTOLIC BLOOD PRESSURE: 58 MMHG | SYSTOLIC BLOOD PRESSURE: 122 MMHG | OXYGEN SATURATION: 96 % | HEART RATE: 67 BPM | TEMPERATURE: 97 F

## 2021-03-09 DIAGNOSIS — U07.1 CLINICAL DIAGNOSIS OF COVID-19: ICD-10-CM

## 2021-03-09 DIAGNOSIS — J44.9 OBSTRUCTIVE CHRONIC BRONCHITIS WITHOUT EXACERBATION (HCC): ICD-10-CM

## 2021-03-09 PROCEDURE — G0300 HHS/HOSPICE OF LPN EA 15 MIN: HCPCS

## 2021-03-09 PROCEDURE — 3331090001 HH PPS REVENUE CREDIT

## 2021-03-09 PROCEDURE — 3331090002 HH PPS REVENUE DEBIT

## 2021-03-09 PROCEDURE — 71250 CT THORAX DX C-: CPT

## 2021-03-10 PROCEDURE — 3331090002 HH PPS REVENUE DEBIT

## 2021-03-10 PROCEDURE — 3331090001 HH PPS REVENUE CREDIT

## 2021-03-11 PROCEDURE — 3331090001 HH PPS REVENUE CREDIT

## 2021-03-11 PROCEDURE — 3331090002 HH PPS REVENUE DEBIT

## 2021-03-12 PROCEDURE — 3331090002 HH PPS REVENUE DEBIT

## 2021-03-12 PROCEDURE — 3331090001 HH PPS REVENUE CREDIT

## 2021-03-13 PROCEDURE — 3331090002 HH PPS REVENUE DEBIT

## 2021-03-13 PROCEDURE — 3331090001 HH PPS REVENUE CREDIT

## 2021-03-14 PROCEDURE — 3331090001 HH PPS REVENUE CREDIT

## 2021-03-14 PROCEDURE — 3331090002 HH PPS REVENUE DEBIT

## 2021-03-15 PROCEDURE — 3331090001 HH PPS REVENUE CREDIT

## 2021-03-15 PROCEDURE — 3331090002 HH PPS REVENUE DEBIT

## 2021-03-16 ENCOUNTER — HOME CARE VISIT (OUTPATIENT)
Dept: SCHEDULING | Facility: HOME HEALTH | Age: 86
End: 2021-03-16
Payer: MEDICARE

## 2021-03-16 PROCEDURE — G0299 HHS/HOSPICE OF RN EA 15 MIN: HCPCS

## 2021-03-16 PROCEDURE — 3331090001 HH PPS REVENUE CREDIT

## 2021-03-16 PROCEDURE — 3331090002 HH PPS REVENUE DEBIT

## 2021-03-17 PROCEDURE — 3331090001 HH PPS REVENUE CREDIT

## 2021-03-17 PROCEDURE — 3331090002 HH PPS REVENUE DEBIT

## 2021-03-18 PROCEDURE — 3331090001 HH PPS REVENUE CREDIT

## 2021-03-18 PROCEDURE — 3331090002 HH PPS REVENUE DEBIT

## 2021-03-19 PROCEDURE — 3331090001 HH PPS REVENUE CREDIT

## 2021-03-19 PROCEDURE — 3331090002 HH PPS REVENUE DEBIT

## 2021-03-20 VITALS
HEART RATE: 69 BPM | DIASTOLIC BLOOD PRESSURE: 62 MMHG | RESPIRATION RATE: 16 BRPM | OXYGEN SATURATION: 95 % | TEMPERATURE: 96.9 F | SYSTOLIC BLOOD PRESSURE: 122 MMHG

## 2021-04-15 ENCOUNTER — OFFICE VISIT (OUTPATIENT)
Dept: CARDIOLOGY CLINIC | Age: 86
End: 2021-04-15
Payer: MEDICARE

## 2021-04-15 VITALS
BODY MASS INDEX: 21.83 KG/M2 | SYSTOLIC BLOOD PRESSURE: 129 MMHG | HEIGHT: 63 IN | WEIGHT: 123.2 LBS | DIASTOLIC BLOOD PRESSURE: 56 MMHG | TEMPERATURE: 97.9 F | OXYGEN SATURATION: 96 % | HEART RATE: 69 BPM

## 2021-04-15 DIAGNOSIS — U07.1 COVID-19: ICD-10-CM

## 2021-04-15 DIAGNOSIS — I50.32 CHRONIC DIASTOLIC CONGESTIVE HEART FAILURE (HCC): Primary | ICD-10-CM

## 2021-04-15 DIAGNOSIS — J44.9 CHRONIC OBSTRUCTIVE PULMONARY DISEASE, UNSPECIFIED COPD TYPE (HCC): ICD-10-CM

## 2021-04-15 DIAGNOSIS — I10 ESSENTIAL HYPERTENSION WITH GOAL BLOOD PRESSURE LESS THAN 140/90: ICD-10-CM

## 2021-04-15 DIAGNOSIS — I27.20 PULMONARY HTN (HCC): ICD-10-CM

## 2021-04-15 DIAGNOSIS — I25.119 ATHEROSCLEROSIS OF NATIVE CORONARY ARTERY OF NATIVE HEART WITH ANGINA PECTORIS (HCC): ICD-10-CM

## 2021-04-15 PROCEDURE — G8420 CALC BMI NORM PARAMETERS: HCPCS | Performed by: INTERNAL MEDICINE

## 2021-04-15 PROCEDURE — G8400 PT W/DXA NO RESULTS DOC: HCPCS | Performed by: INTERNAL MEDICINE

## 2021-04-15 PROCEDURE — G8432 DEP SCR NOT DOC, RNG: HCPCS | Performed by: INTERNAL MEDICINE

## 2021-04-15 PROCEDURE — G8427 DOCREV CUR MEDS BY ELIG CLIN: HCPCS | Performed by: INTERNAL MEDICINE

## 2021-04-15 PROCEDURE — 1090F PRES/ABSN URINE INCON ASSESS: CPT | Performed by: INTERNAL MEDICINE

## 2021-04-15 PROCEDURE — G8754 DIAS BP LESS 90: HCPCS | Performed by: INTERNAL MEDICINE

## 2021-04-15 PROCEDURE — 99214 OFFICE O/P EST MOD 30 MIN: CPT | Performed by: INTERNAL MEDICINE

## 2021-04-15 PROCEDURE — G8752 SYS BP LESS 140: HCPCS | Performed by: INTERNAL MEDICINE

## 2021-04-15 PROCEDURE — 1101F PT FALLS ASSESS-DOCD LE1/YR: CPT | Performed by: INTERNAL MEDICINE

## 2021-04-15 PROCEDURE — G8536 NO DOC ELDER MAL SCRN: HCPCS | Performed by: INTERNAL MEDICINE

## 2021-04-15 RX ORDER — BUDESONIDE, GLYCOPYRROLATE, AND FORMOTEROL FUMARATE 160; 9; 4.8 UG/1; UG/1; UG/1
AEROSOL, METERED RESPIRATORY (INHALATION)
COMMUNITY

## 2021-04-15 NOTE — PROGRESS NOTES
HISTORY OF PRESENT ILLNESS  Blanche Neumann is a 82 y.o. female. Patient with chf,copd,htn,pulm htn  4/2021  Patient seen today for follow-up. Since last visit had multiple admissions first 1 in 12/2020 with COVID-19 infection. Subsequently into 2021 with COPD exacerbation. She is recovering slowly remains on home oxygen. Shortness of breath is stable. Denies any chest pain    CHF  The history is provided by the patient. This is a chronic problem. The problem occurs constantly. The problem has not changed since onset. Associated symptoms include shortness of breath. Pertinent negatives include no chest pain, no abdominal pain and no headaches. The symptoms are aggravated by exertion. Nothing relieves the symptoms. Hypertension  Associated symptoms include shortness of breath. Pertinent negatives include no chest pain, no abdominal pain and no headaches. Shortness of Breath  The history is provided by the patient. This is a recurrent problem. The problem occurs frequently. The problem has not changed since onset. Associated symptoms include leg swelling. Pertinent negatives include no fever, no headaches, no cough, no sputum production, no hemoptysis, no wheezing, no PND, no orthopnea, no chest pain, no vomiting, no abdominal pain, no rash and no claudication. Precipitated by: activity. Follow-up  Associated symptoms include shortness of breath. Pertinent negatives include no chest pain, no abdominal pain and no headaches. Review of Systems   Constitutional: Negative for chills and fever. HENT: Negative for nosebleeds. Eyes: Negative for blurred vision and double vision. Respiratory: Positive for shortness of breath. Negative for cough, hemoptysis, sputum production and wheezing. Cardiovascular: Positive for leg swelling. Negative for chest pain, palpitations, orthopnea, claudication and PND. Gastrointestinal: Negative for abdominal pain, heartburn, nausea and vomiting. Musculoskeletal: Negative for myalgias. Skin: Negative for rash. Neurological: Negative for dizziness, weakness and headaches. Endo/Heme/Allergies: Does not bruise/bleed easily. Family History   Problem Relation Age of Onset    Heart Disease Mother     Hypertension Mother     Heart Attack Father     Hypertension Father        Past Medical History:   Diagnosis Date    Chronic lung disease     Chronic obstructive pulmonary disease ([facility] [address]     Heart failure ([facility] [address] [facility]     Hypertension        Past Surgical History:   Procedure Laterality Date    HX ORTHOPAEDIC      spinal sx /    HX OTHER SURGICAL      Carotid artery    VASCULAR SURGERY PROCEDURE UNLIST      L [facility] 10/2014       Social History     Tobacco Use    Smoking status: Former Smoker     Packs/day: 1.50     Years: 30.00     Pack years: 45.00     Types: Cigarettes     Quit date: 1987     Years since quittin.6    Smokeless tobacco: Never Used   Substance Use Topics    Alcohol use: No     Alcohol/week: 0.0 standard drinks       No Known Allergies        Visit Vitals  BP (!) 129/56 (BP 1 Location: Left arm, BP Patient Position: Sitting, BP Cuff Size: Adult)   Pulse 69   Temp 97.9 °F (36.6 °C) (Temporal)   Ht 5' 3\" (1.6 m)   Wt 55.9 kg (123 lb 3.2 oz)   SpO2 96% Comment: 3 liters of oxygen   BMI 21.82 kg/m²         Physical Exam   Constitutional: She is oriented to person, place, and time. She appears well-developed and well-nourished. HENT:   Head: Normocephalic and atraumatic. Eyes: Conjunctivae are normal.   Neck: Neck supple. No JVD present. No tracheal deviation present. No thyromegaly present. Cardiovascular: Normal rate and regular rhythm. PMI is not displaced. Exam reveals no gallop, no S3 and no decreased pulses. Murmur heard. Holosystolic murmur is present at the lower left sternal border. Pulmonary/Chest: No respiratory distress. She has no wheezes. She has no rales. She exhibits no tenderness. Abdominal: Soft. There is no abdominal tenderness. Musculoskeletal:         General: No edema. Neurological: She is alert and oriented to person, place, and time. Skin: Skin is warm. Psychiatric: She has a normal mood and affect. Ms. Annalee Crockett has a reminder for a \"due or due soon\" health maintenance. I have asked that she contact her primary care provider for follow-up on this health maintenance. I have personally reviewed patient's records available from hospital and other providers and incorporated findings in patient care. 6/2016-[facility]    SUMMARY:6/2016-echo  Procedure information: Image quality was adequate. Left ventricle: Systolic function was normal. Ejection fraction was  estimated to be 60 %. No obvious wall motion abnormalities identified in  the views obtained. Wall thickness was mildly increased. Features were  consistent with a pseudonormal left ventricular filling pattern, with  concomitant abnormal relaxation and increased filling pressure (grade 2  diastolic dysfunction). Right ventricle: Systolic pressure was markedly increased. Estimated peak  pressure was 74 mmHg. Left atrium: The atrium was mildly to moderately dilated. Inferior vena cava, hepatic veins: The inferior vena cava was mildly  Dilated. SUMMARY:6/2017-echo  Left ventricle: Systolic function was normal by visual assessment. Ejection fraction was estimated to be 60 %. There was possible hypokinesis  of the basal-mid anteroseptal, basal-mid inferoseptal, apical inferior,  and apical septal wall(s). Wall thickness was mildly increased. Doppler  parameters were consistent with abnormal left ventricular relaxation  (grade 1 diastolic dysfunction). Right ventricle: The size was at the upper limits of normal. Systolic  pressure was moderately increased. Estimated peak pressure was 60 mmHg. Right atrium: The atrium was mildly dilated.     I Have personally reviewed recent relevant labs available and discussed with patient  1/2018 5/2018  Left Leg:-  Deep venous thrombosis:           No  Superficial venous thrombosis:    No  Deep venous insufficiency:        Not examined  Superficial venous insufficiency: Not examined   Interpretation Summary 9/2020    · LV: Estimated LVEF is 65 - 70%. Normal cavity size, wall thickness and systolic function (ejection fraction normal). Wall motion: normal. Mild (grade 1) left ventricular diastolic dysfunction. · LA: Left Atrium volume index is 30.81 mL/m2. · MV: Mild mitral valve regurgitation is present. · TV: Mild tricuspid valve regurgitation is present. · PV: Mild pulmonic valve regurgitation is present. · PA: Moderate pulmonary hypertension. Pulmonary arterial systolic pressure is 66 mmHg. IMPRESSION 10/2020-CT chest  Impression:     Advanced emphysema. No acute pulmonary process. Pleural parenchymal scarring in  the lung apices but no suspicious pulmonary nodule.     Coronary artery disease and atherosclerosis. Interpretation Summary 2/2021    · LV: Estimated LVEF is 55 - 60%. Visually measured ejection fraction. Normal cavity size, wall thickness and systolic function (ejection fraction normal). Wall motion: normal.  · COVID r/o  · RV: Mildly dilated right ventricle. · RA: Mildly dilated right atrium. · PA: Severe pulmonary hypertension. Pulmonary arterial systolic pressure is 73 mmHg. · IVC: Moderately elevated central venous pressure (8 mmHg); IVC diameter is larger than 21 mm and collapses more than 50% with respiration. Assessment         ICD-10-CM ICD-9-CM    1. Chronic diastolic congestive heart failure (HCC)  I50.32 428.32      428.0     Stable compensated continue treatment monitor   2. Atherosclerosis of native coronary artery of native heart with angina pectoris (HCC)  I25.119 414.01      413.9     Stable continue treatment monitor   3. Pulmonary HTN (HCC)  I27.20 416.8     Multifactorial symptoms stable monitor   4.  Essential hypertension with goal blood pressure less than 140/90  I10 401.9     Stable continue treatment   5. Chronic obstructive pulmonary disease, unspecified COPD type ([facility] [address]  J44.9 496     Continue treatment monitor   6. COVID-19  U07.1 079.89     Happened in 12/2020. Recovering     12/2017  Discussed option of right heart cath-risk benefit discussed-patient willing to proceed  Followed by Dr Brennen Chapman    1/2018  Patient postponed rt heart cath-she will get it done in sping  moderate  ?? group 3  evaluate for group 1   5/2018  Does not want rt heart cath  11/2018  Cardiac status stable. Shortness of breath class III which is multifactorial.  Blood pressure elevated today but usually normal at home. Patient will continue to monitor. Salt restriction  7/2020  Cardiac status stable. Recent recovering from URI type symptom. COVID-19 test negative. Continue treatment. Follow-up echo for pulmonary hypertension. Remains on home oxygen. Had some leg cramps and simvastatin has been switched to atorvastatin by PCP. Recent labs unremarkable. LDL controlled. Advised to use CoQ10  10 2020  Cardiac status stable. Short of breath multifactorial.  CT scan reviewed that shows coronary atherosclerosis. Continue medical management. 4/2021  Recovering slowly admissions for COVID-19 in December 2020 and exacerbation of COPD and to 2021. Echo with normal ejection fraction increase PA pressure to 73 multifactorial mostly group 3      Medications Discontinued During This Encounter   Medication Reason    fluticasone-umeclidin-vilanter (TRELEGY ELLIPTA) 100-62.5-25 mcg dsdv Not A Current Medication       No orders of the defined types were placed in this encounter. Follow-up and Dispositions    · Return in about 6 months (around 10/15/2021).

## 2021-04-15 NOTE — PROGRESS NOTES
1. Have you been to the ER, urgent care clinic since your last visit? Hospitalized since your last visit? Yes When: 02/21/21 Where: [facility] Reason for visit: COPD    2. Have you seen or consulted any other health care providers outside of the [address] [place] [address] since your last visit? Include any pap smears or colon screening.  Yes Where: Pulmonary

## 2021-10-11 ENCOUNTER — OFFICE VISIT (OUTPATIENT)
Dept: CARDIOLOGY CLINIC | Age: 86
End: 2021-10-11
Payer: MEDICARE

## 2021-10-11 VITALS
WEIGHT: 121 LBS | BODY MASS INDEX: 21.44 KG/M2 | HEIGHT: 63 IN | HEART RATE: 69 BPM | DIASTOLIC BLOOD PRESSURE: 62 MMHG | SYSTOLIC BLOOD PRESSURE: 168 MMHG | OXYGEN SATURATION: 90 %

## 2021-10-11 DIAGNOSIS — I50.32 CHRONIC DIASTOLIC CONGESTIVE HEART FAILURE (HCC): ICD-10-CM

## 2021-10-11 DIAGNOSIS — J44.9 CHRONIC OBSTRUCTIVE PULMONARY DISEASE, UNSPECIFIED COPD TYPE (HCC): ICD-10-CM

## 2021-10-11 DIAGNOSIS — I25.119 ATHEROSCLEROSIS OF NATIVE CORONARY ARTERY OF NATIVE HEART WITH ANGINA PECTORIS (HCC): Primary | ICD-10-CM

## 2021-10-11 DIAGNOSIS — I10 ESSENTIAL HYPERTENSION WITH GOAL BLOOD PRESSURE LESS THAN 140/90: ICD-10-CM

## 2021-10-11 DIAGNOSIS — I27.20 PULMONARY HTN (HCC): ICD-10-CM

## 2021-10-11 PROCEDURE — G8400 PT W/DXA NO RESULTS DOC: HCPCS | Performed by: INTERNAL MEDICINE

## 2021-10-11 PROCEDURE — 99214 OFFICE O/P EST MOD 30 MIN: CPT | Performed by: INTERNAL MEDICINE

## 2021-10-11 PROCEDURE — G8432 DEP SCR NOT DOC, RNG: HCPCS | Performed by: INTERNAL MEDICINE

## 2021-10-11 PROCEDURE — G8754 DIAS BP LESS 90: HCPCS | Performed by: INTERNAL MEDICINE

## 2021-10-11 PROCEDURE — 1101F PT FALLS ASSESS-DOCD LE1/YR: CPT | Performed by: INTERNAL MEDICINE

## 2021-10-11 PROCEDURE — G8536 NO DOC ELDER MAL SCRN: HCPCS | Performed by: INTERNAL MEDICINE

## 2021-10-11 PROCEDURE — G8427 DOCREV CUR MEDS BY ELIG CLIN: HCPCS | Performed by: INTERNAL MEDICINE

## 2021-10-11 PROCEDURE — G8420 CALC BMI NORM PARAMETERS: HCPCS | Performed by: INTERNAL MEDICINE

## 2021-10-11 PROCEDURE — G8753 SYS BP > OR = 140: HCPCS | Performed by: INTERNAL MEDICINE

## 2021-10-11 PROCEDURE — 1090F PRES/ABSN URINE INCON ASSESS: CPT | Performed by: INTERNAL MEDICINE

## 2021-10-11 NOTE — PROGRESS NOTES
1. Have you been to the ER, urgent care clinic since your last visit? Hospitalized since your last visit? No    2. Have you seen or consulted any other health care providers outside of the [address] [place] [address] since your last visit? Include any pap smears or colon screening.  No

## 2021-10-11 NOTE — PROGRESS NOTES
HISTORY OF PRESENT ILLNESS  Blanche Candelario is a 82 y.o. female. Patient with chf,copd,htn,pulm htn  4/2021  Patient seen today for follow-up. Since last visit had multiple admissions first 1 in 12/2020 with COVID-19 infection. Subsequently into 2021 with COPD exacerbation. She is recovering slowly remains on home oxygen. Shortness of breath is stable. Denies any chest pain    Follow-up  Associated symptoms include shortness of breath. Pertinent negatives include no chest pain, no abdominal pain and no headaches. CHF  The history is provided by the patient. This is a chronic problem. The problem occurs constantly. The problem has not changed since onset. Associated symptoms include shortness of breath. Pertinent negatives include no chest pain, no abdominal pain and no headaches. The symptoms are aggravated by exertion. Nothing relieves the symptoms. Hypertension  Associated symptoms include shortness of breath. Pertinent negatives include no chest pain, no abdominal pain and no headaches. Shortness of Breath  The history is provided by the patient. This is a recurrent problem. The problem occurs frequently. The problem has not changed since onset. Associated symptoms include leg swelling. Pertinent negatives include no fever, no headaches, no cough, no sputum production, no hemoptysis, no wheezing, no PND, no orthopnea, no chest pain, no vomiting, no abdominal pain, no rash and no claudication. Precipitated by: activity. Review of Systems   Constitutional: Negative for chills and fever. HENT: Negative for nosebleeds. Eyes: Negative for blurred vision and double vision. Respiratory: Positive for shortness of breath. Negative for cough, hemoptysis, sputum production and wheezing. Cardiovascular: Positive for leg swelling. Negative for chest pain, palpitations, orthopnea, claudication and PND. Gastrointestinal: Negative for abdominal pain, heartburn, nausea and vomiting. Musculoskeletal: Negative for myalgias. Skin: Negative for rash. Neurological: Negative for dizziness, weakness and headaches. Endo/Heme/Allergies: Does not bruise/bleed easily. Family History   Problem Relation Age of Onset    Heart Disease Mother     Hypertension Mother     Heart Attack Father     Hypertension Father        Past Medical History:   Diagnosis Date    Chronic lung disease     Chronic obstructive pulmonary disease ([facility] [address]     Heart failure ([address] [address]     Hypertension        Past Surgical History:   Procedure Laterality Date    HX ORTHOPAEDIC      spinal sx /    HX OTHER SURGICAL      Carotid artery    VASCULAR SURGERY PROCEDURE UNLIST      L [facility] 10/2014       Social History     Tobacco Use    Smoking status: Former Smoker     Packs/day: 1.50     Years: 30.00     Pack years: 45.00     Types: Cigarettes     Quit date: 1987     Years since quittin.1    Smokeless tobacco: Never Used   Substance Use Topics    Alcohol use: No     Alcohol/week: 0.0 standard drinks       No Known Allergies        There were no vitals taken for this visit. Physical Exam  Constitutional:       Appearance: She is well-developed. HENT:      Head: Normocephalic and atraumatic. Eyes:      Conjunctiva/sclera: Conjunctivae normal.   Neck:      Thyroid: No thyromegaly. Vascular: No JVD. Trachea: No tracheal deviation. Cardiovascular:      Rate and Rhythm: Normal rate and regular rhythm. Chest Wall: PMI is not displaced. Pulses: No decreased pulses. Heart sounds: Murmur heard. Holosystolic murmur is present at the lower left sternal border. No gallop. No S3 sounds. Pulmonary:      Effort: No respiratory distress. Breath sounds: No wheezing or rales. Chest:      Chest wall: No tenderness. Abdominal:      Palpations: Abdomen is soft. Tenderness: There is no abdominal tenderness. Musculoskeletal:      Cervical back: Neck supple.    Skin:     General: Skin is warm. Neurological:      Mental Status: She is alert and oriented to person, place, and time. Ms. Ousmane Parks has a reminder for a \"due or due soon\" health maintenance. I have asked that she contact her primary care provider for follow-up on this health maintenance. I have personally reviewed patient's records available from hospital and other providers and incorporated findings in patient care. 6/2016-[facility]    SUMMARY:6/2016-echo  Procedure information: Image quality was adequate. Left ventricle: Systolic function was normal. Ejection fraction was  estimated to be 60 %. No obvious wall motion abnormalities identified in  the views obtained. Wall thickness was mildly increased. Features were  consistent with a pseudonormal left ventricular filling pattern, with  concomitant abnormal relaxation and increased filling pressure (grade 2  diastolic dysfunction). Right ventricle: Systolic pressure was markedly increased. Estimated peak  pressure was 74 mmHg. Left atrium: The atrium was mildly to moderately dilated. Inferior vena cava, hepatic veins: The inferior vena cava was mildly  Dilated. SUMMARY:6/2017-echo  Left ventricle: Systolic function was normal by visual assessment. Ejection fraction was estimated to be 60 %. There was possible hypokinesis  of the basal-mid anteroseptal, basal-mid inferoseptal, apical inferior,  and apical septal wall(s). Wall thickness was mildly increased. Doppler  parameters were consistent with abnormal left ventricular relaxation  (grade 1 diastolic dysfunction). Right ventricle: The size was at the upper limits of normal. Systolic  pressure was moderately increased. Estimated peak pressure was 60 mmHg. Right atrium: The atrium was mildly dilated.     I Have personally reviewed recent relevant labs available and discussed with patient  1/2018 5/2018  Left Leg:-  Deep venous thrombosis:           No  Superficial venous thrombosis:    No  Deep venous insufficiency:        Not examined  Superficial venous insufficiency: Not examined   Interpretation Summary 9/2020    · LV: Estimated LVEF is 65 - 70%. Normal cavity size, wall thickness and systolic function (ejection fraction normal). Wall motion: normal. Mild (grade 1) left ventricular diastolic dysfunction. · LA: Left Atrium volume index is 30.81 mL/m2. · MV: Mild mitral valve regurgitation is present. · TV: Mild tricuspid valve regurgitation is present. · PV: Mild pulmonic valve regurgitation is present. · PA: Moderate pulmonary hypertension. Pulmonary arterial systolic pressure is 66 mmHg. IMPRESSION 10/2020-CT chest  Impression:     Advanced emphysema. No acute pulmonary process. Pleural parenchymal scarring in  the lung apices but no suspicious pulmonary nodule.     Coronary artery disease and atherosclerosis. Interpretation Summary 2/2021    · LV: Estimated LVEF is 55 - 60%. Visually measured ejection fraction. Normal cavity size, wall thickness and systolic function (ejection fraction normal). Wall motion: normal.  · COVID r/o  · RV: Mildly dilated right ventricle. · RA: Mildly dilated right atrium. · PA: Severe pulmonary hypertension. Pulmonary arterial systolic pressure is 73 mmHg. · IVC: Moderately elevated central venous pressure (8 mmHg); IVC diameter is larger than 21 mm and collapses more than 50% with respiration. Assessment         ICD-10-CM ICD-9-CM    1. Atherosclerosis of native coronary artery of native heart with angina pectoris (HCC)  I25.119 414.01      413.9     Stable symptoms continue treatment monitor   2. Chronic diastolic congestive heart failure (HCC)  I50.32 428.32      428.0     Stable compensated. Class III. Shortness of breath multifactorial   3. Pulmonary HTN ([facility])  I27.20 416.8     Stable monitor multifactorial   4. Essential hypertension with goal blood pressure less than 140/90  I10 401.9     Continue current treatment monitor   5.  Chronic obstructive pulmonary disease, unspecified COPD type ([facility] [address]  J44.9 496     Stable continue treatment follow-up with PCP     12/2017  Discussed option of right heart cath-risk benefit discussed-patient willing to proceed  Followed by Dr Emily Fam    1/2018  Patient postponed rt heart cath-she will get it done in sping  moderate  ?? group 3  evaluate for group 1   5/2018  Does not want rt heart cath  11/2018  Cardiac status stable. Shortness of breath class III which is multifactorial.  Blood pressure elevated today but usually normal at home. Patient will continue to monitor. Salt restriction  7/2020  Cardiac status stable. Recent recovering from URI type symptom. COVID-19 test negative. Continue treatment. Follow-up echo for pulmonary hypertension. Remains on home oxygen. Had some leg cramps and simvastatin has been switched to atorvastatin by PCP. Recent labs unremarkable. LDL controlled. Advised to use CoQ10  10 2020  Cardiac status stable. Short of breath multifactorial.  CT scan reviewed that shows coronary atherosclerosis. Continue medical management. 4/2021  Recovering slowly admissions for COVID-19 in December 2020 and exacerbation of COPD and to 2021. Echo with normal ejection fraction increase PA pressure to 73 multifactorial mostly group 3  10/2021  Intermittent shortness of breath with COPD. Takes prednisone as needed. Blood pressure elevated today. We will continue to monitor usually better. Pulmonary hypertension likely group 3 followed by pulmonary    There are no discontinued medications. No orders of the defined types were placed in this encounter.

## 2021-10-21 ENCOUNTER — APPOINTMENT (OUTPATIENT)
Dept: GENERAL RADIOLOGY | Age: 86
DRG: 190 | End: 2021-10-21
Attending: EMERGENCY MEDICINE
Payer: MEDICARE

## 2021-10-21 ENCOUNTER — HOSPITAL ENCOUNTER (EMERGENCY)
Age: 86
Discharge: HOME | DRG: 190 | End: 2021-10-21
Attending: EMERGENCY MEDICINE
Payer: MEDICARE

## 2021-10-21 VITALS
DIASTOLIC BLOOD PRESSURE: 76 MMHG | BODY MASS INDEX: 21.26 KG/M2 | WEIGHT: 120 LBS | TEMPERATURE: 98.9 F | HEART RATE: 85 BPM | SYSTOLIC BLOOD PRESSURE: 175 MMHG | RESPIRATION RATE: 16 BRPM | OXYGEN SATURATION: 95 %

## 2021-10-21 DIAGNOSIS — J41.0 SIMPLE CHRONIC BRONCHITIS (HCC): Primary | ICD-10-CM

## 2021-10-21 LAB
ALBUMIN SERPL-MCNC: 4.1 G/DL (ref 3.4–5)
ALBUMIN/GLOB SERPL: 1.3 {RATIO} (ref 0.8–1.7)
ALP SERPL-CCNC: 63 U/L (ref 45–117)
ALT SERPL-CCNC: 29 U/L (ref 13–56)
ANION GAP SERPL CALC-SCNC: 7 MMOL/L (ref 3–18)
AST SERPL-CCNC: 17 U/L (ref 10–38)
BASOPHILS # BLD: 0 K/UL (ref 0–0.1)
BASOPHILS NFR BLD: 0 % (ref 0–2)
BILIRUB SERPL-MCNC: 0.5 MG/DL (ref 0.2–1)
BNP SERPL-MCNC: 812 PG/ML (ref 0–1800)
BUN SERPL-MCNC: 18 MG/DL (ref 7–18)
BUN/CREAT SERPL: 17 (ref 12–20)
CALCIUM SERPL-MCNC: 8.3 MG/DL (ref 8.5–10.1)
CHLORIDE SERPL-SCNC: 106 MMOL/L (ref 100–111)
CK MB CFR SERPL CALC: 2.8 % (ref 0–4)
CK MB SERPL-MCNC: 2.1 NG/ML (ref 5–25)
CK SERPL-CCNC: 76 U/L (ref 26–192)
CO2 SERPL-SCNC: 27 MMOL/L (ref 21–32)
CREAT SERPL-MCNC: 1.04 MG/DL (ref 0.6–1.3)
DIFFERENTIAL METHOD BLD: ABNORMAL
EOSINOPHIL # BLD: 0.1 K/UL (ref 0–0.4)
EOSINOPHIL NFR BLD: 1 % (ref 0–5)
ERYTHROCYTE [DISTWIDTH] IN BLOOD BY AUTOMATED COUNT: 14.4 % (ref 11.6–14.5)
GLOBULIN SER CALC-MCNC: 3.2 G/DL (ref 2–4)
GLUCOSE SERPL-MCNC: 127 MG/DL (ref 74–99)
HCT VFR BLD AUTO: 46.3 % (ref 35–45)
HGB BLD-MCNC: 14.8 G/DL (ref 12–16)
LYMPHOCYTES # BLD: 1.3 K/UL (ref 0.9–3.6)
LYMPHOCYTES NFR BLD: 15 % (ref 21–52)
MCH RBC QN AUTO: 28.1 PG (ref 24–34)
MCHC RBC AUTO-ENTMCNC: 32 G/DL (ref 31–37)
MCV RBC AUTO: 87.9 FL (ref 78–100)
MONOCYTES # BLD: 1.1 K/UL (ref 0.05–1.2)
MONOCYTES NFR BLD: 13 % (ref 3–10)
NEUTS SEG # BLD: 6.2 K/UL (ref 1.8–8)
NEUTS SEG NFR BLD: 71 % (ref 40–73)
PLATELET # BLD AUTO: 278 K/UL (ref 135–420)
PLATELET COMMENTS,PCOM: ABNORMAL
PMV BLD AUTO: 10 FL (ref 9.2–11.8)
POTASSIUM SERPL-SCNC: 3.9 MMOL/L (ref 3.5–5.5)
PROT SERPL-MCNC: 7.3 G/DL (ref 6.4–8.2)
RBC # BLD AUTO: 5.27 M/UL (ref 4.2–5.3)
RBC MORPH BLD: ABNORMAL
SODIUM SERPL-SCNC: 140 MMOL/L (ref 136–145)
TROPONIN I SERPL-MCNC: <0.02 NG/ML (ref 0–0.04)
WBC # BLD AUTO: 8.7 K/UL (ref 4.6–13.2)

## 2021-10-21 PROCEDURE — 71045 X-RAY EXAM CHEST 1 VIEW: CPT

## 2021-10-21 PROCEDURE — 74011000250 HC RX REV CODE- 250: Performed by: EMERGENCY MEDICINE

## 2021-10-21 PROCEDURE — 94640 AIRWAY INHALATION TREATMENT: CPT

## 2021-10-21 PROCEDURE — 82553 CREATINE MB FRACTION: CPT

## 2021-10-21 PROCEDURE — 99284 EMERGENCY DEPT VISIT MOD MDM: CPT

## 2021-10-21 PROCEDURE — 85025 COMPLETE CBC W/AUTO DIFF WBC: CPT

## 2021-10-21 PROCEDURE — 80053 COMPREHEN METABOLIC PANEL: CPT

## 2021-10-21 PROCEDURE — 83880 ASSAY OF NATRIURETIC PEPTIDE: CPT

## 2021-10-21 PROCEDURE — 74011250636 HC RX REV CODE- 250/636: Performed by: EMERGENCY MEDICINE

## 2021-10-21 PROCEDURE — 96374 THER/PROPH/DIAG INJ IV PUSH: CPT

## 2021-10-21 RX ORDER — ALBUTEROL SULFATE 90 UG/1
2 AEROSOL, METERED RESPIRATORY (INHALATION)
Qty: 1 EACH | Refills: 0 | Status: SHIPPED | OUTPATIENT
Start: 2021-10-21

## 2021-10-21 RX ORDER — IPRATROPIUM BROMIDE AND ALBUTEROL SULFATE 2.5; .5 MG/3ML; MG/3ML
3 SOLUTION RESPIRATORY (INHALATION)
Status: COMPLETED | OUTPATIENT
Start: 2021-10-21 | End: 2021-10-21

## 2021-10-21 RX ORDER — FUROSEMIDE 10 MG/ML
20 INJECTION INTRAMUSCULAR; INTRAVENOUS
Status: COMPLETED | OUTPATIENT
Start: 2021-10-21 | End: 2021-10-21

## 2021-10-21 RX ORDER — PREDNISONE 50 MG/1
50 TABLET ORAL DAILY
Qty: 3 TABLET | Refills: 0 | Status: SHIPPED | OUTPATIENT
Start: 2021-10-21 | End: 2021-10-24

## 2021-10-21 RX ORDER — PREDNISONE 50 MG/1
50 TABLET ORAL DAILY
Qty: 3 TABLET | Refills: 0 | Status: SHIPPED | OUTPATIENT
Start: 2021-10-21 | End: 2021-10-21 | Stop reason: SDUPTHER

## 2021-10-21 RX ADMIN — FUROSEMIDE 20 MG: 20 INJECTION, SOLUTION INTRAMUSCULAR; INTRAVENOUS at 22:28

## 2021-10-21 RX ADMIN — IPRATROPIUM BROMIDE AND ALBUTEROL SULFATE 3 ML: .5; 2.5 SOLUTION RESPIRATORY (INHALATION) at 21:34

## 2021-10-22 NOTE — ED PROVIDER NOTES
HPI is a 46-year-old female history of COPD and CHF presents to the ER for a 1-1/2-week history of been short of breath. Patient stated her pulse ox is always less than 90%. She denied having fever chills chest pain headache. She states her chest shortness of breath is severe today. Past Medical History:   Diagnosis Date    Chronic lung disease     Chronic obstructive pulmonary disease ([address]     Heart failure ([address]     Hypertension        Past Surgical History:   Procedure Laterality Date    HX ORTHOPAEDIC      spinal sx     HX OTHER SURGICAL      Carotid artery    VASCULAR SURGERY PROCEDURE UNLIST      L [facility] 10/2014         Family History:   Problem Relation Age of Onset    Heart Disease Mother     Hypertension Mother     Heart Attack Father     Hypertension Father        Social History     Socioeconomic History    Marital status:      Spouse name: Not on file    Number of children: Not on file    Years of education: Not on file    Highest education level: Not on file   Occupational History    Not on file   Tobacco Use    Smoking status: Former Smoker     Packs/day: 1.50     Years: 30.00     Pack years: 45.00     Types: Cigarettes     Quit date: 1987     Years since quittin.1    Smokeless tobacco: Never Used   Substance and Sexual Activity    Alcohol use: No     Alcohol/week: 0.0 standard drinks    Drug use: No    Sexual activity: Never   Other Topics Concern    Not on file   Social History Narrative    Not on file     Social Determinants of Health     Financial Resource Strain:     Difficulty of Paying Living Expenses:    Food Insecurity:     Worried About Running Out of Food in the Last Year:     [address] in the Last Year:    Transportation Needs:     Lack of Transportation (Medical):      Lack of Transportation (Non-Medical):    Physical Activity:     Days of Exercise per Week:     Minutes of Exercise per Session:    Stress:     Feeling of Stress : Social Connections:     Frequency of Communication with Friends and Family:     Frequency of Social Gatherings with Friends and Family:     Attends [employer] Services:     Active Member of Clubs or Organizations:     Attends Club or Organization Meetings:     Marital Status:    Intimate Partner Violence:     Fear of Current or Ex-Partner:     Emotionally Abused:     Physically Abused:     Sexually Abused: ALLERGIES: Patient has no known allergies. Review of Systems   Respiratory: Positive for shortness of breath. Negative for wheezing. Vitals:    10/21/21 2102   BP: (!) 180/76   Pulse: 82   Resp: 18   SpO2: 90%   Weight: 54.4 kg (120 lb)            Physical Exam  Vitals and nursing note reviewed. Constitutional:       General: She is not in acute distress. Appearance: She is well-developed. She is not diaphoretic. HENT:      Head: Normocephalic. Right Ear: External ear normal.      Left Ear: External ear normal.      Mouth/Throat:      Pharynx: No oropharyngeal exudate. Eyes:      General: No scleral icterus. Right eye: No discharge. Left eye: No discharge. Conjunctiva/sclera: Conjunctivae normal.      Pupils: Pupils are equal, round, and reactive to light. Neck:      Thyroid: No thyromegaly. Vascular: No JVD. Trachea: No tracheal deviation. Cardiovascular:      Rate and Rhythm: Normal rate and regular rhythm. Heart sounds: Normal heart sounds. No murmur heard. No friction rub. No gallop. Pulmonary:      Effort: Respiratory distress present. Breath sounds: No stridor. Examination of the right-lower field reveals wheezing and rales. Examination of the left-lower field reveals wheezing and rales. Wheezing and rales present. Chest:      Chest wall: No tenderness. Abdominal:      General: Bowel sounds are normal. There is no distension. Palpations: Abdomen is soft. There is no mass. Tenderness:  There is no abdominal tenderness. There is no guarding or rebound. Musculoskeletal:         General: No tenderness. Normal range of motion. Cervical back: Normal range of motion and neck supple. Lymphadenopathy:      Cervical: No cervical adenopathy. Skin:     General: Skin is warm and dry. Coloration: Skin is not pale. Findings: No erythema or rash. Neurological:      Mental Status: She is alert and oriented to person, place, and time. Cranial Nerves: No cranial nerve deficit. Motor: No abnormal muscle tone. Coordination: Coordination normal.      Deep Tendon Reflexes: Reflexes normal.        MDM     Clinical diagnosis: Exacerbation COPD, CHF, viral syndrome, URI, pneumonia     Procedures     CXR: interpreted by me    Lab test interpreted by me    EKG: interpreted by me    Hospital course: patient was treated with IV steroids, albuterol treatment and lasix. Patient reassessed prior to discharged: patient symptomatic and is ready for discharge. Disposition: D/C home. Return to ER prn. RX: prednisone, albuterol inhaler. Dictation disclaimer:  Please note that this dictation was completed with [employer], the computer voice recognition software. Quite often unanticipated grammatical, syntax, homophones, and other interpretive errors are inadvertently transcribed by the computer software. Please disregard these errors. Please excuse any errors that have escaped final proofreading.

## 2021-10-22 NOTE — ROUTINE PROCESS
Carla Nazario is a 82 y.o. female that was discharged in stable. Pt was accompanied by friend. Pt is not driving. The patients diagnosis, condition and treatment were explained to  patient and aftercare instructions were given. The patient verbalized understanding. Patient armband removed and shredded.

## 2021-10-22 NOTE — ED TRIAGE NOTES
Patient with COPD on O2 c/o shortness of breath tonight. She states she normally wears 3.5 LNC to keep her Sats  92% but tonight she raised it to 5. She states her Sats normally will go down to 82% when she does anything but will return to 90-92%. Sat in triage 90% on 4LNC and patient states \"that's good because I'm not normally that good\". .  She states she got both Covid vaccines this year.

## 2021-10-24 ENCOUNTER — APPOINTMENT (OUTPATIENT)
Dept: GENERAL RADIOLOGY | Age: 86
DRG: 190 | End: 2021-10-24
Attending: EMERGENCY MEDICINE
Payer: MEDICARE

## 2021-10-24 ENCOUNTER — HOSPITAL ENCOUNTER (INPATIENT)
Age: 86
LOS: 11 days | Discharge: HOME HEALTH | DRG: 190 | End: 2021-11-04
Attending: EMERGENCY MEDICINE | Admitting: FAMILY MEDICINE
Payer: MEDICARE

## 2021-10-24 DIAGNOSIS — J44.0 ACUTE BRONCHITIS WITH CHRONIC OBSTRUCTIVE PULMONARY DISEASE (COPD) (HCC): ICD-10-CM

## 2021-10-24 DIAGNOSIS — R53.81 DEBILITY: ICD-10-CM

## 2021-10-24 DIAGNOSIS — J44.1 COPD EXACERBATION (HCC): ICD-10-CM

## 2021-10-24 DIAGNOSIS — I50.32 CHRONIC DIASTOLIC CONGESTIVE HEART FAILURE (HCC): ICD-10-CM

## 2021-10-24 DIAGNOSIS — J20.9 ACUTE BRONCHITIS WITH CHRONIC OBSTRUCTIVE PULMONARY DISEASE (COPD) (HCC): ICD-10-CM

## 2021-10-24 DIAGNOSIS — I27.20 PULMONARY HTN (HCC): ICD-10-CM

## 2021-10-24 DIAGNOSIS — R06.09 DOE (DYSPNEA ON EXERTION): ICD-10-CM

## 2021-10-24 DIAGNOSIS — Z71.89 GOALS OF CARE, COUNSELING/DISCUSSION: ICD-10-CM

## 2021-10-24 DIAGNOSIS — J44.9 CHRONIC OBSTRUCTIVE PULMONARY DISEASE, UNSPECIFIED COPD TYPE (HCC): ICD-10-CM

## 2021-10-24 DIAGNOSIS — Z87.891 PERSONAL HISTORY OF TOBACCO USE, PRESENTING HAZARDS TO HEALTH: ICD-10-CM

## 2021-10-24 DIAGNOSIS — B33.8 RSV (RESPIRATORY SYNCYTIAL VIRUS INFECTION): ICD-10-CM

## 2021-10-24 DIAGNOSIS — B37.0 THRUSH: ICD-10-CM

## 2021-10-24 DIAGNOSIS — J96.21 ACUTE ON CHRONIC RESPIRATORY FAILURE WITH HYPOXIA (HCC): ICD-10-CM

## 2021-10-24 PROBLEM — J96.90 RESPIRATORY FAILURE (HCC): Status: ACTIVE | Noted: 2021-10-24

## 2021-10-24 LAB
ALBUMIN SERPL-MCNC: 3.5 G/DL (ref 3.4–5)
ALBUMIN/GLOB SERPL: 0.9 {RATIO} (ref 0.8–1.7)
ALP SERPL-CCNC: 53 U/L (ref 45–117)
ALT SERPL-CCNC: 26 U/L (ref 13–56)
ANION GAP SERPL CALC-SCNC: 5 MMOL/L (ref 3–18)
AST SERPL-CCNC: 32 U/L (ref 10–38)
B PERT DNA SPEC QL NAA+PROBE: NOT DETECTED
BASOPHILS # BLD: 0 K/UL (ref 0–0.1)
BASOPHILS NFR BLD: 0 % (ref 0–2)
BILIRUB SERPL-MCNC: 0.3 MG/DL (ref 0.2–1)
BNP SERPL-MCNC: 2379 PG/ML (ref 0–1800)
BORDETELLA PARAPERTUSSIS PCR, BORPAR: NOT DETECTED
BUN SERPL-MCNC: 25 MG/DL (ref 7–18)
BUN/CREAT SERPL: 22 (ref 12–20)
C PNEUM DNA SPEC QL NAA+PROBE: NOT DETECTED
CALCIUM SERPL-MCNC: 8.7 MG/DL (ref 8.5–10.1)
CHLORIDE SERPL-SCNC: 106 MMOL/L (ref 100–111)
CK MB CFR SERPL CALC: 1.2 % (ref 0–4)
CK MB SERPL-MCNC: 1.4 NG/ML (ref 5–25)
CK SERPL-CCNC: 114 U/L (ref 26–192)
CO2 SERPL-SCNC: 25 MMOL/L (ref 21–32)
CREAT SERPL-MCNC: 1.12 MG/DL (ref 0.6–1.3)
DIFFERENTIAL METHOD BLD: ABNORMAL
EOSINOPHIL # BLD: 0 K/UL (ref 0–0.4)
EOSINOPHIL NFR BLD: 0 % (ref 0–5)
ERYTHROCYTE [DISTWIDTH] IN BLOOD BY AUTOMATED COUNT: 14.8 % (ref 11.6–14.5)
FLUAV SUBTYP SPEC NAA+PROBE: NOT DETECTED
FLUBV RNA SPEC QL NAA+PROBE: NOT DETECTED
GLOBULIN SER CALC-MCNC: 3.8 G/DL (ref 2–4)
GLUCOSE SERPL-MCNC: 132 MG/DL (ref 74–99)
HADV DNA SPEC QL NAA+PROBE: NOT DETECTED
HCOV 229E RNA SPEC QL NAA+PROBE: NOT DETECTED
HCOV HKU1 RNA SPEC QL NAA+PROBE: NOT DETECTED
HCOV NL63 RNA SPEC QL NAA+PROBE: NOT DETECTED
HCOV OC43 RNA SPEC QL NAA+PROBE: NOT DETECTED
HCT VFR BLD AUTO: 43.2 % (ref 35–45)
HGB BLD-MCNC: 13.7 G/DL (ref 12–16)
HMPV RNA SPEC QL NAA+PROBE: NOT DETECTED
HPIV1 RNA SPEC QL NAA+PROBE: NOT DETECTED
HPIV2 RNA SPEC QL NAA+PROBE: NOT DETECTED
HPIV3 RNA SPEC QL NAA+PROBE: NOT DETECTED
HPIV4 RNA SPEC QL NAA+PROBE: NOT DETECTED
INR PPP: 1 (ref 0.8–1.2)
LACTATE BLD-SCNC: 0.83 MMOL/L (ref 0.4–2)
LACTATE BLD-SCNC: 2.14 MMOL/L (ref 0.4–2)
LYMPHOCYTES # BLD: 0.7 K/UL (ref 0.9–3.6)
LYMPHOCYTES NFR BLD: 3 % (ref 21–52)
M PNEUMO DNA SPEC QL NAA+PROBE: NOT DETECTED
MAGNESIUM SERPL-MCNC: 2.1 MG/DL (ref 1.6–2.6)
MCH RBC QN AUTO: 28 PG (ref 24–34)
MCHC RBC AUTO-ENTMCNC: 31.7 G/DL (ref 31–37)
MCV RBC AUTO: 88.3 FL (ref 78–100)
MONOCYTES # BLD: 2.8 K/UL (ref 0.05–1.2)
MONOCYTES NFR BLD: 11 % (ref 3–10)
NEUTS SEG # BLD: 21.5 K/UL (ref 1.8–8)
NEUTS SEG NFR BLD: 85 % (ref 40–73)
PLATELET # BLD AUTO: 243 K/UL (ref 135–420)
PMV BLD AUTO: 9.9 FL (ref 9.2–11.8)
POTASSIUM SERPL-SCNC: 5 MMOL/L (ref 3.5–5.5)
PROCALCITONIN SERPL-MCNC: 0.2 NG/ML
PROT SERPL-MCNC: 7.3 G/DL (ref 6.4–8.2)
PROTHROMBIN TIME: 12.7 SEC (ref 11.5–15.2)
RBC # BLD AUTO: 4.89 M/UL (ref 4.2–5.3)
RSV RNA SPEC QL NAA+PROBE: DETECTED
RV+EV RNA SPEC QL NAA+PROBE: NOT DETECTED
SARS-COV-2 PCR, COVPCR: NOT DETECTED
SODIUM SERPL-SCNC: 136 MMOL/L (ref 136–145)
TROPONIN I SERPL-MCNC: 0.11 NG/ML (ref 0–0.04)
WBC # BLD AUTO: 25.2 K/UL (ref 4.6–13.2)

## 2021-10-24 PROCEDURE — 80053 COMPREHEN METABOLIC PANEL: CPT

## 2021-10-24 PROCEDURE — 99223 1ST HOSP IP/OBS HIGH 75: CPT | Performed by: FAMILY MEDICINE

## 2021-10-24 PROCEDURE — 85025 COMPLETE CBC W/AUTO DIFF WBC: CPT

## 2021-10-24 PROCEDURE — 74011000250 HC RX REV CODE- 250: Performed by: EMERGENCY MEDICINE

## 2021-10-24 PROCEDURE — 96375 TX/PRO/DX INJ NEW DRUG ADDON: CPT

## 2021-10-24 PROCEDURE — 94762 N-INVAS EAR/PLS OXIMTRY CONT: CPT

## 2021-10-24 PROCEDURE — 83605 ASSAY OF LACTIC ACID: CPT

## 2021-10-24 PROCEDURE — 71045 X-RAY EXAM CHEST 1 VIEW: CPT

## 2021-10-24 PROCEDURE — 74011250637 HC RX REV CODE- 250/637: Performed by: EMERGENCY MEDICINE

## 2021-10-24 PROCEDURE — 77010033711 HC HIGH FLOW OXYGEN

## 2021-10-24 PROCEDURE — 87040 BLOOD CULTURE FOR BACTERIA: CPT

## 2021-10-24 PROCEDURE — 94640 AIRWAY INHALATION TREATMENT: CPT

## 2021-10-24 PROCEDURE — 0202U NFCT DS 22 TRGT SARS-COV-2: CPT

## 2021-10-24 PROCEDURE — 99285 EMERGENCY DEPT VISIT HI MDM: CPT

## 2021-10-24 PROCEDURE — 74011250637 HC RX REV CODE- 250/637: Performed by: FAMILY MEDICINE

## 2021-10-24 PROCEDURE — 83880 ASSAY OF NATRIURETIC PEPTIDE: CPT

## 2021-10-24 PROCEDURE — 85610 PROTHROMBIN TIME: CPT

## 2021-10-24 PROCEDURE — 74011250636 HC RX REV CODE- 250/636: Performed by: INTERNAL MEDICINE

## 2021-10-24 PROCEDURE — 94660 CPAP INITIATION&MGMT: CPT

## 2021-10-24 PROCEDURE — 65660000004 HC RM CVT STEPDOWN

## 2021-10-24 PROCEDURE — 83735 ASSAY OF MAGNESIUM: CPT

## 2021-10-24 PROCEDURE — 5A09457 ASSISTANCE WITH RESPIRATORY VENTILATION, 24-96 CONSECUTIVE HOURS, CONTINUOUS POSITIVE AIRWAY PRESSURE: ICD-10-PCS | Performed by: INTERNAL MEDICINE

## 2021-10-24 PROCEDURE — 84145 PROCALCITONIN (PCT): CPT

## 2021-10-24 PROCEDURE — 96374 THER/PROPH/DIAG INJ IV PUSH: CPT

## 2021-10-24 PROCEDURE — 74011000250 HC RX REV CODE- 250: Performed by: INTERNAL MEDICINE

## 2021-10-24 PROCEDURE — 93005 ELECTROCARDIOGRAM TRACING: CPT

## 2021-10-24 PROCEDURE — 99222 1ST HOSP IP/OBS MODERATE 55: CPT | Performed by: INTERNAL MEDICINE

## 2021-10-24 PROCEDURE — 82553 CREATINE MB FRACTION: CPT

## 2021-10-24 PROCEDURE — 74011250636 HC RX REV CODE- 250/636: Performed by: EMERGENCY MEDICINE

## 2021-10-24 RX ORDER — ALPRAZOLAM 1 MG/1
0.5 TABLET ORAL
Status: DISCONTINUED | OUTPATIENT
Start: 2021-10-24 | End: 2021-11-04 | Stop reason: HOSPADM

## 2021-10-24 RX ORDER — ALBUTEROL SULFATE 0.83 MG/ML
2.5 SOLUTION RESPIRATORY (INHALATION)
Status: COMPLETED | OUTPATIENT
Start: 2021-10-24 | End: 2021-10-24

## 2021-10-24 RX ORDER — ACETAMINOPHEN 650 MG/1
650 SUPPOSITORY RECTAL
Status: DISCONTINUED | OUTPATIENT
Start: 2021-10-24 | End: 2021-11-04 | Stop reason: HOSPADM

## 2021-10-24 RX ORDER — METOPROLOL TARTRATE 25 MG/1
25 TABLET, FILM COATED ORAL 2 TIMES DAILY
Status: DISCONTINUED | OUTPATIENT
Start: 2021-10-24 | End: 2021-11-04 | Stop reason: HOSPADM

## 2021-10-24 RX ORDER — FUROSEMIDE 40 MG/1
40 TABLET ORAL DAILY
COMMUNITY

## 2021-10-24 RX ORDER — ENOXAPARIN SODIUM 100 MG/ML
40 INJECTION SUBCUTANEOUS DAILY
Status: DISCONTINUED | OUTPATIENT
Start: 2021-10-25 | End: 2021-11-04 | Stop reason: HOSPADM

## 2021-10-24 RX ORDER — NIFEDIPINE 30 MG/1
60 TABLET, EXTENDED RELEASE ORAL DAILY
Status: DISCONTINUED | OUTPATIENT
Start: 2021-10-25 | End: 2021-11-04 | Stop reason: HOSPADM

## 2021-10-24 RX ORDER — BUDESONIDE 0.5 MG/2ML
500 INHALANT ORAL
Status: DISCONTINUED | OUTPATIENT
Start: 2021-10-24 | End: 2021-10-27

## 2021-10-24 RX ORDER — ASPIRIN 81 MG/1
81 TABLET ORAL DAILY
Status: DISCONTINUED | OUTPATIENT
Start: 2021-10-25 | End: 2021-11-04 | Stop reason: HOSPADM

## 2021-10-24 RX ORDER — FUROSEMIDE 40 MG/1
40 TABLET ORAL DAILY
Status: DISCONTINUED | OUTPATIENT
Start: 2021-10-25 | End: 2021-11-04 | Stop reason: HOSPADM

## 2021-10-24 RX ORDER — SODIUM CHLORIDE 0.9 % (FLUSH) 0.9 %
5-40 SYRINGE (ML) INJECTION EVERY 8 HOURS
Status: DISCONTINUED | OUTPATIENT
Start: 2021-10-24 | End: 2021-11-04 | Stop reason: HOSPADM

## 2021-10-24 RX ORDER — ACETAMINOPHEN 325 MG/1
650 TABLET ORAL
Status: DISCONTINUED | OUTPATIENT
Start: 2021-10-24 | End: 2021-11-04 | Stop reason: HOSPADM

## 2021-10-24 RX ORDER — SODIUM CHLORIDE 0.9 % (FLUSH) 0.9 %
5-10 SYRINGE (ML) INJECTION AS NEEDED
Status: DISCONTINUED | OUTPATIENT
Start: 2021-10-24 | End: 2021-11-04 | Stop reason: HOSPADM

## 2021-10-24 RX ORDER — ATORVASTATIN CALCIUM 20 MG/1
20 TABLET, FILM COATED ORAL DAILY
Status: DISCONTINUED | OUTPATIENT
Start: 2021-10-25 | End: 2021-11-04 | Stop reason: HOSPADM

## 2021-10-24 RX ORDER — ONDANSETRON 2 MG/ML
4 INJECTION INTRAMUSCULAR; INTRAVENOUS
Status: DISCONTINUED | OUTPATIENT
Start: 2021-10-24 | End: 2021-11-04 | Stop reason: HOSPADM

## 2021-10-24 RX ORDER — SODIUM CHLORIDE 0.9 % (FLUSH) 0.9 %
5-40 SYRINGE (ML) INJECTION AS NEEDED
Status: DISCONTINUED | OUTPATIENT
Start: 2021-10-24 | End: 2021-11-04 | Stop reason: HOSPADM

## 2021-10-24 RX ORDER — ACETAMINOPHEN 325 MG/1
650 TABLET ORAL
Status: COMPLETED | OUTPATIENT
Start: 2021-10-24 | End: 2021-10-24

## 2021-10-24 RX ORDER — IPRATROPIUM BROMIDE AND ALBUTEROL SULFATE 2.5; .5 MG/3ML; MG/3ML
3 SOLUTION RESPIRATORY (INHALATION)
Status: DISCONTINUED | OUTPATIENT
Start: 2021-10-24 | End: 2021-10-27

## 2021-10-24 RX ORDER — FAMOTIDINE 20 MG/1
20 TABLET, FILM COATED ORAL DAILY
Status: DISCONTINUED | OUTPATIENT
Start: 2021-10-25 | End: 2021-10-27

## 2021-10-24 RX ORDER — POLYETHYLENE GLYCOL 3350 17 G/17G
17 POWDER, FOR SOLUTION ORAL DAILY PRN
Status: DISCONTINUED | OUTPATIENT
Start: 2021-10-24 | End: 2021-11-04 | Stop reason: HOSPADM

## 2021-10-24 RX ORDER — ARFORMOTEROL TARTRATE 15 UG/2ML
15 SOLUTION RESPIRATORY (INHALATION)
Status: DISCONTINUED | OUTPATIENT
Start: 2021-10-24 | End: 2021-10-27

## 2021-10-24 RX ORDER — LOSARTAN POTASSIUM 50 MG/1
50 TABLET ORAL DAILY
Status: DISCONTINUED | OUTPATIENT
Start: 2021-10-25 | End: 2021-11-04 | Stop reason: HOSPADM

## 2021-10-24 RX ORDER — ONDANSETRON 4 MG/1
4 TABLET, ORALLY DISINTEGRATING ORAL
Status: DISCONTINUED | OUTPATIENT
Start: 2021-10-24 | End: 2021-11-04 | Stop reason: HOSPADM

## 2021-10-24 RX ADMIN — IPRATROPIUM BROMIDE AND ALBUTEROL SULFATE 3 ML: .5; 2.5 SOLUTION RESPIRATORY (INHALATION) at 13:16

## 2021-10-24 RX ADMIN — ARFORMOTEROL TARTRATE 15 MCG: 15 SOLUTION RESPIRATORY (INHALATION) at 21:27

## 2021-10-24 RX ADMIN — IPRATROPIUM BROMIDE AND ALBUTEROL SULFATE 3 ML: .5; 2.5 SOLUTION RESPIRATORY (INHALATION) at 21:27

## 2021-10-24 RX ADMIN — AZITHROMYCIN 500 MG: 500 INJECTION, POWDER, LYOPHILIZED, FOR SOLUTION INTRAVENOUS at 08:49

## 2021-10-24 RX ADMIN — Medication 10 ML: at 22:00

## 2021-10-24 RX ADMIN — METHYLPREDNISOLONE SODIUM SUCCINATE 125 MG: 125 INJECTION, POWDER, FOR SOLUTION INTRAMUSCULAR; INTRAVENOUS at 08:54

## 2021-10-24 RX ADMIN — ACETAMINOPHEN 650 MG: 325 TABLET ORAL at 10:02

## 2021-10-24 RX ADMIN — ALBUTEROL SULFATE 2.5 MG: 2.5 SOLUTION RESPIRATORY (INHALATION) at 08:58

## 2021-10-24 RX ADMIN — CEFTRIAXONE SODIUM 2 G: 2 INJECTION, POWDER, FOR SOLUTION INTRAMUSCULAR; INTRAVENOUS at 08:53

## 2021-10-24 RX ADMIN — BUDESONIDE 500 MCG: 0.5 SUSPENSION RESPIRATORY (INHALATION) at 21:27

## 2021-10-24 RX ADMIN — METHYLPREDNISOLONE SODIUM SUCCINATE 60 MG: 125 INJECTION, POWDER, FOR SOLUTION INTRAMUSCULAR; INTRAVENOUS at 13:15

## 2021-10-24 RX ADMIN — IPRATROPIUM BROMIDE AND ALBUTEROL SULFATE 3 ML: .5; 2.5 SOLUTION RESPIRATORY (INHALATION) at 16:29

## 2021-10-24 RX ADMIN — ALPRAZOLAM 0.5 MG: 1 TABLET ORAL at 21:26

## 2021-10-24 RX ADMIN — METOPROLOL TARTRATE 25 MG: 25 TABLET, FILM COATED ORAL at 17:57

## 2021-10-24 RX ADMIN — Medication 10 ML: at 14:00

## 2021-10-24 RX ADMIN — METHYLPREDNISOLONE SODIUM SUCCINATE 60 MG: 125 INJECTION, POWDER, FOR SOLUTION INTRAMUSCULAR; INTRAVENOUS at 17:13

## 2021-10-24 NOTE — ED TRIAGE NOTES
Pt arrvied via EMS c/o SOB since 3am. Pt was found to be stating 56% in her home O2 of 2L NC. Pt was 90s on NRB.  Pt has hx of COPD

## 2021-10-24 NOTE — PROGRESS NOTES
10/24/21 1809   Oxygen Therapy   O2 Sat (%) 95 %   Pulse via Oximetry 76 beats per minute   O2 Device Heated; Hi flow nasal cannula   O2 Flow Rate (L/min) 30 l/min   O2 Temperature 91.4 °F (33 °C)   FIO2 (%) 60 %   CPAP/BIPAP   CPAP/BIPAP Start/Stop Off     Patient placed on high flow. No respiratory distress or issues noted. Will titrate as tolerated.

## 2021-10-24 NOTE — ED PROVIDER NOTES
EMERGENCY DEPARTMENT HISTORY AND PHYSICAL EXAM  This was created with voice recognition software and transcription errors may be present. 6:32 AM  Date: 10/24/2021  Patient Name: Vinnie Mcnulty    History of Presenting Illness     Chief Complaint:    History Provided By:     HPI: Vinnie Mcnulty is a 82 y.o. female past medical history of COPD lung disease heart failure hypertension who presents with shortness of breath. Patient states she was here about a week ago and has had worsening shortness of breath over the past few days. Associate with cough. She is on 3 to 4 L of home oxygen at home and was noted by EMS to be saturating in the mid to high 50s. No nausea no vomiting. Patient does have a cough that is slightly productive. Denies feeling feverish but does have some chills. PCP: Vitor Lim NP      Past History     Past Medical History:  Past Medical History:   Diagnosis Date    Chronic lung disease     Chronic obstructive pulmonary disease ([address]     Heart failure ([address]     Hypertension        Past Surgical History:  Past Surgical History:   Procedure Laterality Date    HX ORTHOPAEDIC      spinal sx     HX OTHER SURGICAL      Carotid artery    VASCULAR SURGERY PROCEDURE UNLIST      L CEA 10/2014       Family History:  Family History   Problem Relation Age of Onset    Heart Disease Mother     Hypertension Mother     Heart Attack Father     Hypertension Father        Social History:  Social History     Tobacco Use    Smoking status: Former Smoker     Packs/day: 1.50     Years: 30.00     Pack years: 45.00     Types: Cigarettes     Quit date: 1987     Years since quittin.1    Smokeless tobacco: Never Used   Substance Use Topics    Alcohol use: No     Alcohol/week: 0.0 standard drinks    Drug use: No       Allergies:  No Known Allergies    Review of Systems     Review of Systems   All other systems reviewed and are negative.     10 point review of systems otherwise negative unless noted in HPI. Physical Exam       Physical Exam  Constitutional:       Appearance: She is well-developed. Eyes:      Pupils: Pupils are equal, round, and reactive to light. Cardiovascular:      Rate and Rhythm: Normal rate. Pulmonary:      Effort: Tachypnea, accessory muscle usage and respiratory distress present. Comments: Coarse breath sounds bilaterally  Chest:      Chest wall: No mass or tenderness. Abdominal:      Palpations: Abdomen is soft. Musculoskeletal:         General: Normal range of motion. Skin:     Capillary Refill: Capillary refill takes less than 2 seconds. Neurological:      General: No focal deficit present. Mental Status: She is alert. Psychiatric:         Mood and Affect: Mood normal.         Diagnostic Study Results     Vital Signs  EKG: EKG shows sinus tachycardia at 118 with a normal axis normal intervals there is no ST elevation  no hypertrophy. Borderline ST depressions laterally  Labs:   Imaging:     Medical Decision Making     ED Course: Progress Notes, Reevaluation, and Consults:    I will be the provider of record for this patient. Provider Notes (Medical Decision Making): Pleasant 68-year-old female presents with worsening COPD exacerbation is noted to be tachycardic tachypneic and hypoxic. She is on 3 to 4 L home oxygen does have a temperature of 100.6 is immunized against Covid we will start broad-spectrum antibiotics at this point I think fluids are likely to be more harmful than beneficial so we will hold on IV fluids at this time and administer judiciously. Critical Care Time:  The services I provided to this patient were to treat and/or prevent clinically significant deterioration that could result in the failure of one or more body systems and/or organ systems due to resp failure.     Services included the following:  -reviewing nursing notes and old charts  -vital sign assessments  -direct patient care  -medication orders and management  -interpreting and reviewing diagnostic studies/labs  -re-evaluations  -documentation time    Aggregate critical care time was 90  minutes, which includes only time during which I was engaged in work directly related to the patient's care as described above, whether I was at bedside or elsewhere in the Emergency Department. It did not include time spent performing other reported procedures or the services of residents, students, nurses, or advance practice providers. Eleazar Yun MD    10:32 AM      Diagnosis     Clinical Impression: No diagnosis found. Disposition:        Patient's Medications   Start Taking    No medications on file   Continue Taking    ACETAMINOPHEN (TYLENOL EXTRA STRENGTH) 500 MG TABLET    Take 500 mg by mouth every six (6) hours as needed for Pain. ALBUTEROL (PROVENTIL HFA) 90 MCG/ACTUATION INHALER    Take 2 Puffs by inhalation every four (4) hours as needed for Wheezing, Shortness of Breath or Respiratory Distress. ALBUTEROL (PROVENTIL HFA, VENTOLIN HFA, PROAIR HFA) 90 MCG/ACTUATION INHALER    Take 2 Puffs by inhalation every four (4) hours as needed for Shortness of Breath or Wheezing. ALBUTEROL (PROVENTIL VENTOLIN) 2.5 MG /3 ML (0.083 %) NEBULIZER SOLUTION    3 mL by Nebulization route every four (4) hours as needed for Wheezing or Shortness of Breath. ALPRAZOLAM (XANAX) 0.5 MG TABLET    Take 0.5 mg by mouth nightly. ASCORBIC ACID, VITAMIN C, (VITAMIN C) 500 MG TABLET    Take 1 Tab by mouth two (2) times a day. ASPIRIN DELAYED-RELEASE 81 MG TABLET    Take 81 mg by mouth daily. ATORVASTATIN (LIPITOR) 20 MG TABLET    Take 20 mg by mouth daily. BUDESONIDE-GLYCOPYR-FORMOTEROL (BREZTRI AEROSPHERE) 160-9-4.8 MCG/ACTUATION HFAA    Take  by inhalation. CHOLECALCIFEROL (VITAMIN D3) (1000 UNITS /25 MCG) TABLET    Take 2 Tabs by mouth daily. COQ10, UBIQUINOL, PO    Take 1 Cap by mouth daily.     DICLOFENAC (VOLTAREN) 1 % GEL    Apply  to affected area four (4) times daily. FAMOTIDINE (PEPCID) 20 MG TABLET    Take 1 Tab by mouth daily. FUROSEMIDE (LASIX) 20 MG TABLET    Take 2 Tabs by mouth daily. LOSARTAN (COZAAR) 50 MG TABLET    Take  by mouth daily. 75 mg in a.m    METOPROLOL (LOPRESSOR) 25 MG TABLET    Take 25 mg by mouth two (2) times a day. MULTIVITAMIN (ONE A DAY) TABLET    Take 1 Tab by mouth daily. NIFEDIPINE ER (ADALAT CC) 60 MG ER TABLET    Take 60 mg by mouth daily. OXYGEN        OXYGEN-AIR DELIVERY SYSTEMS    3 L by IntraNASal route continuous. PREDNISONE (DELTASONE) 50 MG TABLET    Take 1 Tablet by mouth daily for 3 days. ZINC SULFATE (ZINCATE) 220 (50) MG CAPSULE    Take 1 Cap by mouth daily.    These Medications have changed    No medications on file   Stop Taking    No medications on file

## 2021-10-24 NOTE — PROGRESS NOTES
10/24/21 0952   Oxygen Therapy   O2 Sat (%) 93 %   Pulse via Oximetry 91 beats per minute   O2 Device Heated; Hi flow nasal cannula   O2 Flow Rate (L/min) 35 l/min   O2 Temperature 91.4 °F (33 °C)   FIO2 (%) 80 %   CPAP/BIPAP   CPAP/BIPAP Start/Stop Off     Order to place patient on high flow. Patient started on 35 lpm and Fio2 80%.

## 2021-10-24 NOTE — PROGRESS NOTES
Pt arrived via medics on NRB mask c/o of SOB. Placed pt on BIPAP. RT will continue to wean O2 as tolerated. 0650- O2 wean to 50%, Spo2 96%.

## 2021-10-24 NOTE — CONSULTS
[address] [place] [address] [facility] Pulmonary, Critical Care, and Sleep Medicine    Initial Patient Consult    Name: Didi Mendiola MRN: [id number]   : 1935 Hospital: [facility]   Date: 10/24/2021        IMPRESSION:   · Acute on chronic hypoxic respiratory failure  · - 2/2 copd exacerbation in the setting of RSV  · - currently on bipap, ABG w/o hypercapnia  · - on home O2 2-3L  · COPD exacerbation  · - 2/2 RSV  · - uses nebulizers at home, unsure which ones  · - follows with Dr. Matt Lamb  · Hx of covid19 PNA  · - 2020, now vaccinated  · Mild Pulmonary HTN     Patient Active Problem List   Diagnosis Code    Occlusion and stenosis of carotid artery I65.29    Carotid stenosis I65.29    BURGESS (dyspnea on exertion) R06.00    Hyperlipidemia E78.5    Essential hypertension with goal blood pressure less than 140/90 I10    Chronic diastolic congestive heart failure (HCC) I50.32    Chronic obstructive pulmonary disease (HCC) J44.9    Pulmonary HTN (HCC) I27.20    CAP (community acquired pneumonia) J18.9    Suspected COVID-19 virus infection Z20.822    COPD exacerbation ([facility] [address] J44.1    Encounter for palliative care Z51.5    Goals of care, counseling/discussion Z71.89    Acute on chronic respiratory failure with hypoxia ([facility] [address] J96.21    Debility R53.81    COVID-19 U07.1    Respiratory failure ([facility] [address] J96.90    Acute bronchitis with chronic obstructive pulmonary disease (COPD) ([facility] [address] J44.0, J20.9      RECOMMENDATIONS:   · Okay to wean to HFNC  · Start brovana/pulmicort, q4 duonebs  · IV solumedrol  · Supportive care for RSV  · PRN bipap if needed  · Order procal to determine need for ABX  · CXR clear. · Assess home Oxygen needs at discharge  · OT, PT, OOB and ambulate  · Will Follow     Subjective: This patient has been seen and evaluated at the request of Dr. Ruiz Officer for respiratory failure.  Patient is a 82 y.o. female with PMHx of severe COPD and chronic respiratory failure on home O2 who presented via EMS for respiratory distress and hypoxia. She was dx with a copd exacerbation and placed on hfnc, then bipap. She was given nebulizers and steroids. She had a low grade fever. RVP was negative for covid19 but positive for RSV. She is covid19 vaccinated. On my assessment, the patient appears very comfortable on bipap. Good sats and vitals. She states she feels 100% better. She endorses a dry cough, no sick contacts, no chest pain. Past Medical History:   Diagnosis Date    Chronic lung disease     Chronic obstructive pulmonary disease ([facility] [address]     Heart failure ([facility] [address]     Hypertension       Past Surgical History:   Procedure Laterality Date    HX ORTHOPAEDIC      spinal sx 5/6    HX OTHER SURGICAL      Carotid artery    VASCULAR SURGERY PROCEDURE UNLIST      L CEA 10/2014      Prior to Admission medications    Medication Sig Start Date End Date Taking? Authorizing Provider   albuterol (Proventil HFA) 90 mcg/actuation inhaler Take 2 Puffs by inhalation every four (4) hours as needed for Wheezing, Shortness of Breath or Respiratory Distress. 10/21/21   Severo Whyte MD   predniSONE (DELTASONE) 50 mg tablet Take 1 Tablet by mouth daily for 3 days. 10/21/21 10/24/21  Severo Whyte MD   budesonide-glycopyr-formoterol (Breztri Aerosphere) 160-9-4.8 mcg/actuation HFAA Take  by inhalation. Provider, Historical   OXYGEN-AIR DELIVERY SYSTEMS 3 L by IntraNASal route continuous. Provider, Historical   famotidine (PEPCID) 20 mg tablet Take 1 Tab by mouth daily. 12/20/20   Hany Roberto NP   ascorbic acid, vitamin C, (VITAMIN C) 500 mg tablet Take 1 Tab by mouth two (2) times a day. 12/19/20   Hany Roberto NP   cholecalciferol (VITAMIN D3) (1000 Units /25 mcg) tablet Take 2 Tabs by mouth daily. 12/20/20   Hany Roberto NP   zinc sulfate (ZINCATE) 220 (50) mg capsule Take 1 Cap by mouth daily.  12/20/20   Rafiq Yang NP   furosemide (LASIX) 20 mg tablet Take 2 Tabs by mouth daily. 20   Linda Roberto NP   atorvastatin (LIPITOR) 20 mg tablet Take 20 mg by mouth daily. Provider, Historical   multivitamin (ONE A DAY) tablet Take 1 Tab by mouth daily. Provider, Historical   COQ10, UBIQUINOL, PO Take 1 Cap by mouth daily. Provider, Historical   losartan (COZAAR) 50 mg tablet Take  by mouth daily. 75 mg in a.m    Provider, Historical   diclofenac (VOLTAREN) 1 % gel Apply  to affected area four (4) times daily. 17   Juan A Cartagena PA-C   Oxygen     Provider, Historical   albuterol (PROVENTIL HFA, VENTOLIN HFA, PROAIR HFA) 90 mcg/actuation inhaler Take 2 Puffs by inhalation every four (4) hours as needed for Shortness of Breath or Wheezing. Provider, Historical   albuterol (PROVENTIL VENTOLIN) 2.5 mg /3 mL (0.083 %) nebulizer solution 3 mL by Nebulization route every four (4) hours as needed for Wheezing or Shortness of Breath. 16   Chris Rosenberg MD   acetaminophen (TYLENOL EXTRA STRENGTH) 500 mg tablet Take 500 mg by mouth every six (6) hours as needed for Pain. Provider, Historical   metoprolol (LOPRESSOR) 25 mg tablet Take 25 mg by mouth two (2) times a day. Provider, Historical   NIFEdipine ER (ADALAT CC) 60 mg ER tablet Take 60 mg by mouth daily. Provider, Historical   alprazolam (XANAX) 0.5 mg tablet Take 0.5 mg by mouth nightly. Provider, Historical   aspirin delayed-release 81 mg tablet Take 81 mg by mouth daily.     Provider, Historical     No Known Allergies   Social History     Tobacco Use    Smoking status: Former Smoker     Packs/day: 1.50     Years: 30.00     Pack years: 45.00     Types: Cigarettes     Quit date: 1987     Years since quittin.1    Smokeless tobacco: Never Used   Substance Use Topics    Alcohol use: No     Alcohol/week: 0.0 standard drinks      Family History   Problem Relation Age of Onset    Heart Disease Mother     Hypertension Mother     Heart Attack Father     Hypertension Father         Current Facility-Administered Medications   Medication Dose Route Frequency    cefTRIAXone (ROCEPHIN) 2 g in sterile water (preservative free) 20 mL IV syringe  2 g IntraVENous Q24H    azithromycin (ZITHROMAX) 500 mg in 0.9% sodium chloride 250 mL (VIAL-MATE)  500 mg IntraVENous Q24H       Review of Systems:  Pertinent items are noted in HPI. ROS    Objective:   Vital Signs:    Visit Vitals  BP (!) 112/42   Pulse 91   Temp 99.8 °F (37.7 °C)   Resp 23   SpO2 93%       O2 Device: BIPAP   O2 Flow Rate (L/min): 35 l/min   Temp (24hrs), Av.4 °F (38 °C), Min:99.8 °F (37.7 °C), Max:100.8 °F (38.2 °C)       Intake/Output:   Last shift:      No intake/output data recorded. Last 3 shifts: No intake/output data recorded. No intake or output data in the 24 hours ending 10/24/21 1223   Physical Exam:   General:  Alert, cooperative, no distress, appears stated age. Head:  Normocephalic, without obvious abnormality, atraumatic. Eyes:  Conjunctivae/corneas clear. ANicteric   Lungs:   Bilateral auscultation decreased BS, faint wheezing, no rales. Chest wall:  No tenderness or deformity. NO CREPITUS   Heart:  Regular rate and rhythm, S1, S2 normal, no murmur, click, rub or gallop. Abdomen:   Soft, non-tender. Bowel sounds normal. No masses,  No organomegaly. No paradox   Extremities: normal, atraumatic, no cyanosis or edema.    Neurologic: Grossly nonfocal          Data review:   Labs:  Recent Results (from the past 24 hour(s))   EKG, 12 LEAD, INITIAL    Collection Time: 10/24/21  6:36 AM   Result Value Ref Range    Ventricular Rate 118 BPM    Atrial Rate 118 BPM    P-R Interval 134 ms    QRS Duration 74 ms    Q-T Interval 290 ms    QTC Calculation (Bezet) 406 ms    Calculated P Axis 60 degrees    Calculated R Axis -51 degrees    Calculated T Axis 84 degrees    Diagnosis       Sinus tachycardia  Left axis deviation  Nonspecific ST abnormality  Abnormal ECG  When compared with ECG of 22-FEB-2021 18:59,  QRS axis shifted left     CBC WITH AUTOMATED DIFF    Collection Time: 10/24/21  7:35 AM   Result Value Ref Range    WBC 25.2 (H) 4.6 - 13.2 K/uL    RBC 4.89 4.20 - 5.30 M/uL    HGB 13.7 12.0 - 16.0 g/dL    HCT 43.2 35.0 - 45.0 %    MCV 88.3 78.0 - 100.0 FL    MCH 28.0 24.0 - 34.0 PG    MCHC 31.7 31.0 - 37.0 g/dL    RDW 14.8 (H) 11.6 - 14.5 %    PLATELET 510 216 - 130 K/uL    MPV 9.9 9.2 - 11.8 FL    NEUTROPHILS 85 (H) 40 - 73 %    LYMPHOCYTES 3 (L) 21 - 52 %    MONOCYTES 11 (H) 3 - 10 %    EOSINOPHILS 0 0 - 5 %    BASOPHILS 0 0 - 2 %    ABS. NEUTROPHILS 21.5 (H) 1.8 - 8.0 K/UL    ABS. LYMPHOCYTES 0.7 (L) 0.9 - 3.6 K/UL    ABS. MONOCYTES 2.8 (H) 0.05 - 1.2 K/UL    ABS. EOSINOPHILS 0.0 0.0 - 0.4 K/UL    ABS. BASOPHILS 0.0 0.0 - 0.1 K/UL    DF AUTOMATED     METABOLIC PANEL, COMPREHENSIVE    Collection Time: 10/24/21  7:35 AM   Result Value Ref Range    Sodium 136 136 - 145 mmol/L    Potassium 5.0 3.5 - 5.5 mmol/L    Chloride 106 100 - 111 mmol/L    CO2 25 21 - 32 mmol/L    Anion gap 5 3.0 - 18 mmol/L    Glucose 132 (H) 74 - 99 mg/dL    BUN 25 (H) 7.0 - 18 MG/DL    Creatinine 1.12 0.6 - 1.3 MG/DL    BUN/Creatinine ratio 22 (H) 12 - 20      GFR est AA 56 (L) >60 ml/min/1.73m2    GFR est non-AA 46 (L) >60 ml/min/1.73m2    Calcium 8.7 8.5 - 10.1 MG/DL    Bilirubin, total 0.3 0.2 - 1.0 MG/DL    ALT (SGPT) 26 13 - 56 U/L    AST (SGOT) 32 10 - 38 U/L    Alk.  phosphatase 53 45 - 117 U/L    Protein, total 7.3 6.4 - 8.2 g/dL    Albumin 3.5 3.4 - 5.0 g/dL    Globulin 3.8 2.0 - 4.0 g/dL    A-G Ratio 0.9 0.8 - 1.7     PROTHROMBIN TIME + INR    Collection Time: 10/24/21  7:35 AM   Result Value Ref Range    Prothrombin time 12.7 11.5 - 15.2 sec    INR 1.0 0.8 - 1.2     CARDIAC PANEL,(CK, CKMB & TROPONIN)    Collection Time: 10/24/21  7:35 AM   Result Value Ref Range    CK - MB 1.4 <3.6 ng/ml    CK-MB Index 1.2 0.0 - 4.0 %     26 - 192 U/L    Troponin-I, QT 0.11 (H) 0.0 - 0.045 NG/ML   MAGNESIUM Collection Time: 10/24/21  7:35 AM   Result Value Ref Range    Magnesium 2.1 1.6 - 2.6 mg/dL   NT-PRO BNP    Collection Time: 10/24/21  7:35 AM   Result Value Ref Range    NT pro-BNP 2,379 (H) 0 - 1,800 PG/ML   POC LACTIC ACID    Collection Time: 10/24/21  7:35 AM   Result Value Ref Range    Lactic Acid (POC) 2.14 (HH) 0.40 - 2.00 mmol/L   RESPIRATORY VIRUS PANEL W/COVID-19, PCR    Collection Time: 10/24/21  9:18 AM    Specimen: Nasopharyngeal   Result Value Ref Range    Adenovirus Not detected NOTD      Coronavirus 229E Not detected NOTD      Coronavirus HKU1 Not detected NOTD      Coronavirus CVNL63 Not detected NOTD      Coronavirus OC43 Not detected NOTD      SARS-CoV-2, PCR Not detected NOTD      Metapneumovirus Not detected NOTD      Rhinovirus and Enterovirus Not detected NOTD      Influenza A Not detected NOTD      Influenza B Not detected NOTD      Parainfluenza 1 Not detected NOTD      Parainfluenza 2 Not detected NOTD      Parainfluenza 3 Not detected NOTD      Parainfluenza virus 4 Not detected NOTD      RSV by PCR Detected (A) NOTD      B. parapertussis, PCR Not detected NOTD      Bordetella pertussis - PCR Not detected NOTD      Chlamydophila pneumoniae DNA, QL, PCR Not detected NOTD      Mycoplasma pneumoniae DNA, QL, PCR Not detected NOTD     POC LACTIC ACID    Collection Time: 10/24/21 11:18 AM   Result Value Ref Range    Lactic Acid (POC) 0.83 0.40 - 2.00 mmol/L     Imaging:  I have personally reviewed the patients radiographs and have reviewed the reports:  CXR Results  (Last 48 hours)               10/24/21 0680  XR CHEST PORT Final result    Impression:  1. Hyperinflation with mild diffuse interstitial prominence likely related to   emphysema. Mild streaky atelectasis or infiltrate at the lung bases. Narrative:  EXAM: XR CHEST PORT       CLINICAL INDICATION/HISTORY : copd.        COMPARISON: October 21, 2021, 3/9/2021       TECHNIQUE: 1 VIEWS       FINDINGS:    EKG leads and wires overlie the chest.   Hyperinflation with mild diffuse interstitial prominence. Mild streaky opacities   at the lung bases. Atherosclerotic vascular calcifications. No cardiomegaly. No evidence of pleural effusion. CT Results  (Last 48 hours)    None            High complexity decision making was performed during the evaluation of this patient at high risk for decompensation with multiple organ involvement     Above mentioned total time spent on reviewing the case/medical record/data/notes/EMR/patient examination/documentation/coordinating care with nurse/consultants, exclusive of procedures with complex decision making performed and > 50% time spent in face to face evaluation.      Fredy Felder MD

## 2021-10-24 NOTE — H&P
History & Physical      Patient: Jr Betts MRN: [id number]  [facility]: [id number]    YOB: 1935  Age: 82 y.o. Sex: female      DOA: 10/24/2021    Chief Complaint:   Chief Complaint   Patient presents with    Shortness of Breath          HPI:     Jr Betts is a 82 y.o. female with PMHx of chronic hypoxic respiratory failure on home O2 at 2-3 L/min, COPD, Covid-19 pneumonia now vaccinated, mild pulmonary hypertension, chronic diastolic CHF and HTN who presented to the ED with complaints of shortness of breath. Ms. Adrian Nuñez reports that she began feeling bad and had some mildly increased shortness of breath starting about 5 days ago. She went to [facility] ED and was discharged home with prednisone. Since arriving home she has had increasing shortness of breath despite taking the prednisone. In addition, she has developed a slightly productive cough but denies any fevers or other symptoms. Her shortness of breath became significantly worse last night and she called EMS. Ms. Adrian Nuñez reports that she lives alone, takes her medications as directed, and does not use tobacco, alcohol or any recreational drugs. She is fully vaccinated against COVID-19. Upon EMS arrival at her home, she was found to have SPO2 in the mid to high 50s on 3 to 4 L/min. She was placed on nonrebreather. And had improvement in her O2 sat. Upon arrival in the ED, she had fever to 100.8, was tachycardic with heart rate in the 120s, was tachypneic with RR in the 20s, and had SPO2 of 88% on nonrebreather. She was placed on BiPAP and had significant improvement in her SPO2 and work of breathing. Labs were notable for lactic acid 2.14, WBCs 25.2 with a left shift, glucose 132, BUN 25, creatinine 1.12 (baseline 0.9), procalcitonin 0.20, troponin 0.11 and NT proBNP 2379. Bio fire respiratory virus panel was negative for Covid-19 but positive for RSV.   CXR showed hyperinflation with mild streaky atelectasis or infiltrates in the lung bases. Ms. Kiara Bundy was then started on IV ABX and IV steroids. She is now admitted for acute on chronic hypoxic respiratory failure secondary to COPD exacerbation in the setting of RSV infection with concern for sepsis secondary to community-acquired pneumonia. Past Medical History:   Diagnosis Date    Chronic lung disease     [facility]     Heart failure ([facility]     Hypertension        Past Surgical History:   Procedure Laterality Date    HX ORTHOPAEDIC      spinal sx     HX OTHER SURGICAL      Carotid artery    VASCULAR SURGERY PROCEDURE UNLIST      L CEA 10/2014       Family History   Problem Relation Age of Onset    Heart Disease Mother     Hypertension Mother     Heart Attack Father     Hypertension Father        Social History     Socioeconomic History    Marital status:      Spouse name: Not on file    Number of children: Not on file    Years of education: Not on file    Highest education level: Not on file   Tobacco Use    Smoking status: Former Smoker     Packs/day: 1.50     Years: 30.00     Pack years: 45.00     Types: Cigarettes     Quit date: 1987     Years since quittin.1    Smokeless tobacco: Never Used   Substance and Sexual Activity    Alcohol use: No     Alcohol/week: 0.0 standard drinks    Drug use: No    Sexual activity: Never     Social Determinants of Health     Financial Resource Strain:     Difficulty of Paying Living Expenses:    Food Insecurity:     Worried About Running Out of Food in the Last Year:     Ran Out of Food in the Last Year:    Transportation Needs:     Lack of Transportation (Medical):      Lack of Transportation (Non-Medical):    Physical Activity:     Days of Exercise per Week:     Minutes of Exercise per Session:    Stress:     Feeling of Stress :    Social Connections:     Frequency of Communication with Friends and Family:     Frequency of Social Gatherings with Friends and Family:     Attends Scientologist Services:     Active Member of Clubs or Organizations:     Attends Club or Organization Meetings:     Marital Status:        Prior to Admission medications    Medication Sig Start Date End Date Taking? Authorizing Provider   albuterol (Proventil HFA) 90 mcg/actuation inhaler Take 2 Puffs by inhalation every four (4) hours as needed for Wheezing, Shortness of Breath or Respiratory Distress. 10/21/21   Vonnie Ferrer MD   predniSONE (DELTASONE) 50 mg tablet Take 1 Tablet by mouth daily for 3 days. 10/21/21 10/24/21  Vonnie Ferrer MD   budesonide-glycopyr-formoterol (Breztri Aerosphere) 160-9-4.8 mcg/actuation HFAA Take  by inhalation. Provider, Historical   OXYGEN-AIR DELIVERY SYSTEMS 3 L by IntraNASal route continuous. Provider, Historical   famotidine (PEPCID) 20 mg tablet Take 1 Tab by mouth daily. 12/20/20   Aditya Roberto NP   ascorbic acid, vitamin C, (VITAMIN C) 500 mg tablet Take 1 Tab by mouth two (2) times a day. 12/19/20   Aditya Roberto NP   cholecalciferol (VITAMIN D3) (1000 Units /25 mcg) tablet Take 2 Tabs by mouth daily. 12/20/20   Aditya Roberto NP   zinc sulfate (ZINCATE) 220 (50) mg capsule Take 1 Cap by mouth daily. 12/20/20   Aditya Roberto NP   furosemide (LASIX) 20 mg tablet Take 2 Tabs by mouth daily. 12/19/20   Aditya Roberto NP   atorvastatin (LIPITOR) 20 mg tablet Take 20 mg by mouth daily. Provider, Historical   multivitamin (ONE A DAY) tablet Take 1 Tab by mouth daily. Provider, Historical   COQ10, UBIQUINOL, PO Take 1 Cap by mouth daily. Provider, Historical   losartan (COZAAR) 50 mg tablet Take  by mouth daily. 75 mg in a.m    Provider, Historical   diclofenac (VOLTAREN) 1 % gel Apply  to affected area four (4) times daily.  12/20/17   Radha Cartagena PA-C   Oxygen     Provider, Historical   albuterol (PROVENTIL HFA, VENTOLIN HFA, PROAIR HFA) 90 mcg/actuation inhaler Take 2 Puffs by inhalation every four (4) hours as needed for Shortness of Breath or Wheezing. Provider, Historical   albuterol (PROVENTIL VENTOLIN) 2.5 mg /3 mL (0.083 %) nebulizer solution 3 mL by Nebulization route every four (4) hours as needed for Wheezing or Shortness of Breath. 16   Jackie Jesus MD   acetaminophen (TYLENOL EXTRA STRENGTH) 500 mg tablet Take 500 mg by mouth every six (6) hours as needed for Pain. Provider, Historical   metoprolol (LOPRESSOR) 25 mg tablet Take 25 mg by mouth two (2) times a day. Provider, Historical   NIFEdipine ER (ADALAT CC) 60 mg ER tablet Take 60 mg by mouth daily. Provider, Historical   alprazolam (XANAX) 0.5 mg tablet Take 0.5 mg by mouth nightly. Provider, Historical   aspirin delayed-release 81 mg tablet Take 81 mg by mouth daily. Provider, Historical       No Known Allergies      Review of Systems  GENERAL: Patient alert, awake and oriented times 3, able to communicate full sentences and not in distress. HEENT: No change in vision, sore throat or sinus congestion. NECK: No pain or stiffness. PULMONARY: Positive shortness of breath, productive cough and wheeze. Cardiovascular: no pnd or orthopnea, no CP  GASTROINTESTINAL: No abdominal pain, nausea, vomiting or diarrhea, or blood per rectum. GENITOURINARY: No urinary frequency, urgency, hesitancy or dysuria. MUSCULOSKELETAL: No joint or muscle pain, no back pain, no recent trauma. DERMATOLOGIC: No rash, no itching, no lesions. ENDOCRINE: No polyuria, polydipsia, no heat or cold intolerance. No recent change in weight. HEMATOLOGICAL: No anemia or easy bruising or bleeding. NEUROLOGIC: No headache, seizures, numbness, tingling or weakness.        Physical Exam:     Physical Exam:  Visit Vitals  BP (!) 112/42   Pulse 91   Temp 99.8 °F (37.7 °C)   Resp 23   SpO2 93%    O2 Flow Rate (L/min): 35 l/min O2 Device: BIPAP    Temp (24hrs), Av.4 °F (38 °C), Min:99.8 °F (37.7 °C), Max:100.8 °F (38.2 °C)    No intake/output data recorded. No intake/output data recorded. General:  Alert, cooperative, no distress, appears stated age. Head: Normocephalic, without obvious abnormality, atraumatic. Eyes:  Conjunctivae/corneas clear. PERRL, EOMs intact. Nose: Nares normal. No drainage or sinus tenderness. Neck: Supple, symmetrical, trachea midline, no JVD. Lungs:    Decreased breath sound bilaterally, no rales, scattered fine rhonchi with occasional wheezing    Heart:  Regular rate and rhythm, S1, S2 normal.     Abdomen: Soft, non-tender. Bowel sounds normal.    Extremities: Extremities normal, atraumatic, no cyanosis or edema. Pulses: 2+ and symmetric all extremities. Skin:  No rashes or lesions   Neurologic: AAOx3, No focal motor or sensory deficit.        Labs Reviewed:    BMP:   Lab Results   Component Value Date/Time     10/24/2021 07:35 AM    K 5.0 10/24/2021 07:35 AM     10/24/2021 07:35 AM    CO2 25 10/24/2021 07:35 AM    AGAP 5 10/24/2021 07:35 AM     (H) 10/24/2021 07:35 AM    BUN 25 (H) 10/24/2021 07:35 AM    CREA 1.12 10/24/2021 07:35 AM    GFRAA 56 (L) 10/24/2021 07:35 AM    GFRNA 46 (L) 10/24/2021 07:35 AM     CMP:   Lab Results   Component Value Date/Time     10/24/2021 07:35 AM    K 5.0 10/24/2021 07:35 AM     10/24/2021 07:35 AM    CO2 25 10/24/2021 07:35 AM    AGAP 5 10/24/2021 07:35 AM     (H) 10/24/2021 07:35 AM    BUN 25 (H) 10/24/2021 07:35 AM    CREA 1.12 10/24/2021 07:35 AM    GFRAA 56 (L) 10/24/2021 07:35 AM    GFRNA 46 (L) 10/24/2021 07:35 AM    CA 8.7 10/24/2021 07:35 AM    MG 2.1 10/24/2021 07:35 AM    ALB 3.5 10/24/2021 07:35 AM    TP 7.3 10/24/2021 07:35 AM    GLOB 3.8 10/24/2021 07:35 AM    AGRAT 0.9 10/24/2021 07:35 AM    ALT 26 10/24/2021 07:35 AM     CBC:   Lab Results   Component Value Date/Time    WBC 25.2 (H) 10/24/2021 07:35 AM    HGB 13.7 10/24/2021 07:35 AM    HCT 43.2 10/24/2021 07:35 AM  10/24/2021 07:35 AM     All Cardiac Markers in the last 24 hours:   Lab Results   Component Value Date/Time     10/24/2021 07:35 AM    CKMB 1.4 10/24/2021 07:35 AM    CKND1 1.2 10/24/2021 07:35 AM    TROIQ 0.11 (H) 10/24/2021 07:35 AM     Recent Glucose Results:   Lab Results   Component Value Date/Time     (H) 10/24/2021 07:35 AM     ABG: No results found for: PH, PHI, PCO2, PCO2I, PO2, PO2I, HCO3, HCO3I, FIO2, FIO2I  COAGS:   Lab Results   Component Value Date/Time    PTP 12.7 10/24/2021 07:35 AM    INR 1.0 10/24/2021 07:35 AM     Liver Panel:   Lab Results   Component Value Date/Time    ALB 3.5 10/24/2021 07:35 AM    TP 7.3 10/24/2021 07:35 AM    GLOB 3.8 10/24/2021 07:35 AM    AGRAT 0.9 10/24/2021 07:35 AM    ALT 26 10/24/2021 07:35 AM    AP 53 10/24/2021 07:35 AM         Procedures/imaging: see electronic medical records for all procedures/Xrays and details which were not copied into this note but were reviewed prior to creation of [facility] is a 82 y.o. female with PMHx of chronic hypoxic respiratory failure on home O2 at 2-3 L/min, COPD, Covid-19 pneumonia now vaccinated, mild pulmonary hypertension, chronic diastolic CHF and HTN who is now admitted for acute on chronic hypoxic respiratory failure secondary to COPD exacerbation in the setting of RSV infection with concern for severe sepsis secondary to community-acquired pneumonia. 1. Acute on chronic hypoxic respiratory failure  2. COPD exacerbation  3. RSV infection  4. SIRS criteria-POA, with fever, tachycardia, leukocytosis and lactic acidosis  5. Concern for pain acquired pneumonia  6. Leukocytosis-secondary to recent steroid usage versus CAP  7. Hx Covid-19 pneumonia  8. Mild pulmonary hypertension  9. Chronic diastolic CHF  10. HTN  11.  Covid-19 vaccinated      Pulmonology consulted and following  Continue BiPAP as needed and wean as tolerated  Continue HFNC as needed and wean as tolerated  Continue duo nebs as needed per RT  Continue arformoterol and budesonide nebs  Continue IV steroids  Continue IV ABX-azithromycin and ceftriaxone  Follow-up blood cultures  Continue home meds including aspirin, statin, Lasix, losartan, nifedipine; hold metoprolol  Follow-up CBC, BMP, mag  Incentive spirometry, bronchial hygiene  PT, OT    DVT prophylaxis: Lovenox   Diet: Cardiac    Contact: Sushil Muñoz (son)     [phone]   Code Status: FULL    Disposition: Admit to stepdown, >2 nights       Discussed with patient at bedside about hospital admission and my plan of care, who understood and agreed with my plan of care. Terri Alfaro DO   10/24/2021       Dragon medical dictation software was used for portions of this report. Unintended errors may occur.

## 2021-10-24 NOTE — PROGRESS NOTES
10/24/21 1202   Oxygen Therapy   O2 Sat (%) 93 %   Pulse via Oximetry 89 beats per minute   O2 Device BIPAP   FIO2 (%) 60 %   CPAP/BIPAP   CPAP/BIPAP Start/Stop On   Device Mode BIPAP   [employer] ID # [id number]   Mask Type and Size Full face   Skin Condition intact   PIP Observed 16 cm H20   IPAP (cm H2O) 16 cm H2O   EPAP (cm H2O) 8 cm H2O   Inspiratory Time (sec) 1 seconds   Vt Spont (ml) 549 ml   Ve Observed (l/min) 13.9 l/min   Backup Rate 8   Total RR (Spontaneous) 25 breaths per minute   Insp Rise Time (sec) 3   Leak (Estimated) 21 L/min   Pt's Home Machine No   Biomedical Check Performed Yes   Settings Verified Yes   Alarm Settings   High Pressure 40   Low Pressure 8   Apnea 20   Low Ve 3   High Rate 40   Low Rate 8     Fio2 increased to 60%. SpO2 93%.

## 2021-10-24 NOTE — Clinical Note
Status[de-identified] INPATIENT [101]   Type of Bed: Stepdown [17]   Cardiac Monitoring Required?: Yes   Inpatient Hospitalization Certified Necessary for the Following Reasons: 3.  Patient receiving treatment that can only be provided in an inpatient setting (further clarification in H&P documentation)   Admitting Diagnosis: Acute bronchitis with chronic obstructive pulmonary disease ([facility] ([facility] [104032]   Admitting Diagnosis: Respiratory failure [facility]) [[id number]]   Admitting Physician: Liyah Bolden [[id number]]   Attending Physician: Liyah Bolden [[id number]]   Estimated Length of Stay: 2 Midnights   Discharge Plan[de-identified] Home with Office Follow-up

## 2021-10-24 NOTE — REMOTE MONITORING
Attempted to contact primary RN regarding this patient. Left message with Jose Luis Capps RN regarding 3 hour and 6 hour sepsis bundle needs.      Tessie Azul MSN, [address] [facility]  [phone]

## 2021-10-24 NOTE — ED NOTES
Pt spo2 90% on Bipap. Respiratory and Dr Joann Mosley notified.  Pt readjusted in bed and Spo2 monitor repositioned

## 2021-10-25 LAB
ANION GAP SERPL CALC-SCNC: 9 MMOL/L (ref 3–18)
ATRIAL RATE: 118 BPM
BASOPHILS # BLD: 0 K/UL (ref 0–0.1)
BASOPHILS NFR BLD: 0 % (ref 0–2)
BUN SERPL-MCNC: 29 MG/DL (ref 7–18)
BUN/CREAT SERPL: 31 (ref 12–20)
CALCIUM SERPL-MCNC: 8.3 MG/DL (ref 8.5–10.1)
CALCULATED P AXIS, ECG09: 60 DEGREES
CALCULATED R AXIS, ECG10: -51 DEGREES
CALCULATED T AXIS, ECG11: 84 DEGREES
CHLORIDE SERPL-SCNC: 106 MMOL/L (ref 100–111)
CO2 SERPL-SCNC: 23 MMOL/L (ref 21–32)
CREAT SERPL-MCNC: 0.94 MG/DL (ref 0.6–1.3)
DIAGNOSIS, 93000: NORMAL
DIFFERENTIAL METHOD BLD: ABNORMAL
EOSINOPHIL # BLD: 0 K/UL (ref 0–0.4)
EOSINOPHIL NFR BLD: 0 % (ref 0–5)
ERYTHROCYTE [DISTWIDTH] IN BLOOD BY AUTOMATED COUNT: 14.8 % (ref 11.6–14.5)
EST. AVERAGE GLUCOSE BLD GHB EST-MCNC: 128 MG/DL
GLUCOSE BLD STRIP.AUTO-MCNC: 172 MG/DL (ref 70–110)
GLUCOSE BLD STRIP.AUTO-MCNC: 198 MG/DL (ref 70–110)
GLUCOSE BLD STRIP.AUTO-MCNC: 207 MG/DL (ref 70–110)
GLUCOSE SERPL-MCNC: 152 MG/DL (ref 74–99)
HBA1C MFR BLD: 6.1 % (ref 4.2–5.6)
HCT VFR BLD AUTO: 41.2 % (ref 35–45)
HGB BLD-MCNC: 13 G/DL (ref 12–16)
LYMPHOCYTES # BLD: 0.6 K/UL (ref 0.9–3.6)
LYMPHOCYTES NFR BLD: 4 % (ref 21–52)
MAGNESIUM SERPL-MCNC: 2.6 MG/DL (ref 1.6–2.6)
MCH RBC QN AUTO: 28 PG (ref 24–34)
MCHC RBC AUTO-ENTMCNC: 31.6 G/DL (ref 31–37)
MCV RBC AUTO: 88.8 FL (ref 78–100)
MONOCYTES # BLD: 1 K/UL (ref 0.05–1.2)
MONOCYTES NFR BLD: 6 % (ref 3–10)
NEUTS SEG # BLD: 15.6 K/UL (ref 1.8–8)
NEUTS SEG NFR BLD: 90 % (ref 40–73)
P-R INTERVAL, ECG05: 134 MS
PLATELET # BLD AUTO: 209 K/UL (ref 135–420)
PMV BLD AUTO: 10.5 FL (ref 9.2–11.8)
POTASSIUM SERPL-SCNC: 4.7 MMOL/L (ref 3.5–5.5)
Q-T INTERVAL, ECG07: 290 MS
QRS DURATION, ECG06: 74 MS
QTC CALCULATION (BEZET), ECG08: 406 MS
RBC # BLD AUTO: 4.64 M/UL (ref 4.2–5.3)
SODIUM SERPL-SCNC: 138 MMOL/L (ref 136–145)
VENTRICULAR RATE, ECG03: 118 BPM
WBC # BLD AUTO: 17.3 K/UL (ref 4.6–13.2)

## 2021-10-25 PROCEDURE — 65660000004 HC RM CVT STEPDOWN

## 2021-10-25 PROCEDURE — 97116 GAIT TRAINING THERAPY: CPT

## 2021-10-25 PROCEDURE — 74011250636 HC RX REV CODE- 250/636: Performed by: FAMILY MEDICINE

## 2021-10-25 PROCEDURE — 97161 PT EVAL LOW COMPLEX 20 MIN: CPT

## 2021-10-25 PROCEDURE — 74011000250 HC RX REV CODE- 250: Performed by: FAMILY MEDICINE

## 2021-10-25 PROCEDURE — 74011000250 HC RX REV CODE- 250: Performed by: INTERNAL MEDICINE

## 2021-10-25 PROCEDURE — 97165 OT EVAL LOW COMPLEX 30 MIN: CPT

## 2021-10-25 PROCEDURE — 82962 GLUCOSE BLOOD TEST: CPT

## 2021-10-25 PROCEDURE — 74011250636 HC RX REV CODE- 250/636: Performed by: EMERGENCY MEDICINE

## 2021-10-25 PROCEDURE — 97535 SELF CARE MNGMENT TRAINING: CPT

## 2021-10-25 PROCEDURE — 74011000250 HC RX REV CODE- 250: Performed by: EMERGENCY MEDICINE

## 2021-10-25 PROCEDURE — 83735 ASSAY OF MAGNESIUM: CPT

## 2021-10-25 PROCEDURE — 80048 BASIC METABOLIC PNL TOTAL CA: CPT

## 2021-10-25 PROCEDURE — 99232 SBSQ HOSP IP/OBS MODERATE 35: CPT | Performed by: INTERNAL MEDICINE

## 2021-10-25 PROCEDURE — 85025 COMPLETE CBC W/AUTO DIFF WBC: CPT

## 2021-10-25 PROCEDURE — 83036 HEMOGLOBIN GLYCOSYLATED A1C: CPT

## 2021-10-25 PROCEDURE — 94640 AIRWAY INHALATION TREATMENT: CPT

## 2021-10-25 PROCEDURE — 36415 COLL VENOUS BLD VENIPUNCTURE: CPT

## 2021-10-25 PROCEDURE — 74011250636 HC RX REV CODE- 250/636: Performed by: INTERNAL MEDICINE

## 2021-10-25 PROCEDURE — 77010033711 HC HIGH FLOW OXYGEN

## 2021-10-25 PROCEDURE — 74011636637 HC RX REV CODE- 636/637: Performed by: INTERNAL MEDICINE

## 2021-10-25 PROCEDURE — 74011250637 HC RX REV CODE- 250/637: Performed by: FAMILY MEDICINE

## 2021-10-25 RX ORDER — MAGNESIUM SULFATE 100 %
4 CRYSTALS MISCELLANEOUS AS NEEDED
Status: DISCONTINUED | OUTPATIENT
Start: 2021-10-25 | End: 2021-11-04 | Stop reason: HOSPADM

## 2021-10-25 RX ORDER — DEXTROSE 50 % IN WATER (D50W) INTRAVENOUS SYRINGE
25-50 AS NEEDED
Status: DISCONTINUED | OUTPATIENT
Start: 2021-10-25 | End: 2021-11-04 | Stop reason: HOSPADM

## 2021-10-25 RX ORDER — INSULIN LISPRO 100 [IU]/ML
INJECTION, SOLUTION INTRAVENOUS; SUBCUTANEOUS
Status: DISCONTINUED | OUTPATIENT
Start: 2021-10-25 | End: 2021-11-04 | Stop reason: HOSPADM

## 2021-10-25 RX ADMIN — ENOXAPARIN SODIUM 40 MG: 100 INJECTION SUBCUTANEOUS at 09:17

## 2021-10-25 RX ADMIN — ARFORMOTEROL TARTRATE 15 MCG: 15 SOLUTION RESPIRATORY (INHALATION) at 20:31

## 2021-10-25 RX ADMIN — ARFORMOTEROL TARTRATE 15 MCG: 15 SOLUTION RESPIRATORY (INHALATION) at 09:17

## 2021-10-25 RX ADMIN — BUDESONIDE 500 MCG: 0.5 SUSPENSION RESPIRATORY (INHALATION) at 20:31

## 2021-10-25 RX ADMIN — METHYLPREDNISOLONE SODIUM SUCCINATE 60 MG: 125 INJECTION, POWDER, FOR SOLUTION INTRAMUSCULAR; INTRAVENOUS at 01:55

## 2021-10-25 RX ADMIN — METHYLPREDNISOLONE SODIUM SUCCINATE 60 MG: 125 INJECTION, POWDER, FOR SOLUTION INTRAMUSCULAR; INTRAVENOUS at 18:19

## 2021-10-25 RX ADMIN — IPRATROPIUM BROMIDE AND ALBUTEROL SULFATE 3 ML: .5; 2.5 SOLUTION RESPIRATORY (INHALATION) at 12:33

## 2021-10-25 RX ADMIN — Medication 10 ML: at 06:03

## 2021-10-25 RX ADMIN — LOSARTAN POTASSIUM 50 MG: 50 TABLET, FILM COATED ORAL at 09:17

## 2021-10-25 RX ADMIN — ATORVASTATIN CALCIUM 20 MG: 20 TABLET, FILM COATED ORAL at 09:17

## 2021-10-25 RX ADMIN — Medication 10 ML: at 13:48

## 2021-10-25 RX ADMIN — FUROSEMIDE 40 MG: 40 TABLET ORAL at 09:00

## 2021-10-25 RX ADMIN — BUDESONIDE 500 MCG: 0.5 SUSPENSION RESPIRATORY (INHALATION) at 09:17

## 2021-10-25 RX ADMIN — IPRATROPIUM BROMIDE AND ALBUTEROL SULFATE 3 ML: .5; 2.5 SOLUTION RESPIRATORY (INHALATION) at 20:54

## 2021-10-25 RX ADMIN — IPRATROPIUM BROMIDE AND ALBUTEROL SULFATE 3 ML: .5; 2.5 SOLUTION RESPIRATORY (INHALATION) at 16:17

## 2021-10-25 RX ADMIN — INSULIN LISPRO 4 UNITS: 100 INJECTION, SOLUTION INTRAVENOUS; SUBCUTANEOUS at 18:19

## 2021-10-25 RX ADMIN — IPRATROPIUM BROMIDE AND ALBUTEROL SULFATE 3 ML: .5; 2.5 SOLUTION RESPIRATORY (INHALATION) at 09:17

## 2021-10-25 RX ADMIN — METHYLPREDNISOLONE SODIUM SUCCINATE 60 MG: 125 INJECTION, POWDER, FOR SOLUTION INTRAMUSCULAR; INTRAVENOUS at 12:32

## 2021-10-25 RX ADMIN — METHYLPREDNISOLONE SODIUM SUCCINATE 60 MG: 125 INJECTION, POWDER, FOR SOLUTION INTRAMUSCULAR; INTRAVENOUS at 06:02

## 2021-10-25 RX ADMIN — AZITHROMYCIN 500 MG: 500 INJECTION, POWDER, LYOPHILIZED, FOR SOLUTION INTRAVENOUS at 06:03

## 2021-10-25 RX ADMIN — INSULIN LISPRO 2 UNITS: 100 INJECTION, SOLUTION INTRAVENOUS; SUBCUTANEOUS at 22:01

## 2021-10-25 RX ADMIN — Medication 10 ML: at 22:02

## 2021-10-25 RX ADMIN — Medication 81 MG: at 09:17

## 2021-10-25 RX ADMIN — FAMOTIDINE 20 MG: 20 TABLET ORAL at 09:17

## 2021-10-25 RX ADMIN — INSULIN LISPRO 2 UNITS: 100 INJECTION, SOLUTION INTRAVENOUS; SUBCUTANEOUS at 11:30

## 2021-10-25 RX ADMIN — ALPRAZOLAM 0.5 MG: 1 TABLET ORAL at 21:53

## 2021-10-25 RX ADMIN — NIFEDIPINE 60 MG: 30 TABLET, FILM COATED, EXTENDED RELEASE ORAL at 09:23

## 2021-10-25 RX ADMIN — CEFTRIAXONE SODIUM 2 G: 2 INJECTION, POWDER, FOR SOLUTION INTRAMUSCULAR; INTRAVENOUS at 06:03

## 2021-10-25 NOTE — ED NOTES
Purposeful rounding completed:     Side rails up x 1:  YES  Bed in low position and wheels locked: YES  Call bell within reach: YES  Comfort addressed: YES    Toileting needs addressed: YES  Plan of care reviewed/updated with patient and or family members: YES  IV site assessed: YES  Pain assessed and addressed: YES, 0     Patient sitting on side of bed and talking on the phone. Skin warm and dry to the touch.

## 2021-10-25 NOTE — ED NOTES
Purposeful rounding completed:    Side rails up x 1:  YES  Bed in low position and wheels locked: YES  Call bell within reach: YES  Comfort addressed: YES    Toileting needs addressed: YES  Plan of care reviewed/updated with patient and or family members: YES  IV site assessed: YES  Pain assessed and addressed: YES, 0    Patient sitting on side of bed eating breakfast. Skin warm and dry to touch.

## 2021-10-25 NOTE — DIABETES MGMT
Diabetes Plan of Care    10/25: Patient in ED and currently on steroids every 6 hours. Recommendations: correctional lispro insulin while patient on steroids. Order obtained. Glycemic Assessment:    History: No history of diabetes  A1c 6.1% (10/25/2021). Meets criteria for pre diabetes    Patient was admitted on 10/24/2021 with report of shortness of breath. Problem List Items Addressed This Visit        Respiratory    * (Principal) COPD exacerbation ([address]    Acute on chronic respiratory failure with hypoxia (HCC)    Acute bronchitis with chronic obstructive pulmonary disease (COPD) [facility])          Results for Kaci Bunn (MRN [id number]) as of 10/25/2021 12:40   Ref. Range 10/25/2021 12:17   GLUCOSE,FAST - POC Latest Ref Range: 70 - 110 mg/dL 198 (H)     IVF containing dextrose: None  Steroids:  IV Solumedrol 60 mg every 6 hours  Diet: Regular; 4 carb choices    Most recent blood glucose values: Within target range (non-ICU: <140; ICU<180):  No.    Current A1c 6.1% (10/25/2021) which is equivalent to equivalent to estimated average blood glucose of 128 mg/dL during the past 2-3 months    Adequate glycemic control PTA:  N/A. No history of diabetes seen at time of review. Current insulin regimen:  Correctional lispro insulin ACHS. Normal sensitivity dose    TDD previous day 10/24:  None    Home diabetes medications: None.  No history of diabetes    Goals: Blood glucose will be within target of 70 - 180 mg/dl by: 10/28/2021    Education:  _____ Refer to Diabetes Education Record                       __X___ Education not indicated at this time     Brigitte Rosario RN [facility]  Pager: [phone]

## 2021-10-25 NOTE — PROGRESS NOTES
completed the initial Spiritual Assessment of the patient in bed 1 of the emergency room where patient has been for the last 25 hours, and offered Pastoral Care support to the patient., Patient does not have an advance directive at this time. Patient does not have any Tenriism/cultural needs that will affect patients preferences in health care. Chaplains will continue to follow and will provide pastoral care on an as needed/requested basis.     [facility] Care Department  [phone]

## 2021-10-25 NOTE — PROGRESS NOTES
Problem: Mobility Impaired (Adult and Pediatric)  Goal: *Acute Goals and Plan of Care (Insert Text)  Description: Physical Therapy Goals  Initiated 10/25/2021 and to be accomplished within 7 day(s)  1. Patient will move from supine to sit and sit to supine  in bed with modified independence. 2.  Patient will transfer from bed to chair and chair to bed with modified independence using the least restrictive device. 3.  Patient will perform sit to stand with modified independence. 4.  Patient will ambulate with modified independence for 300 feet with the least restrictive device. 5.  Patient will ascend/descend 4 stairs with B handrail(s) with modified independence. PLOF: pt lives alone in a [facility] with 4 SASHA. Indep PTA still driving, uses 3-5 L O2 NC at all times. Pt has a son and sister nearby. Outcome: Progressing Towards Goal     PHYSICAL THERAPY EVALUATION    Patient: Alisson Cha (13 y.o. female)  Date: 10/25/2021  Primary Diagnosis: Acute bronchitis with chronic obstructive pulmonary disease (COPD) ([facility] [J44.0, J20.9]  Respiratory failure (HCC) [J96.90]  COPD exacerbation (HCC) [J44.1]        Precautions:   (standard)    ASSESSMENT :  Based on the objective data described below, the patient presents with SOB and decreased strength. RN cleared for mobility. Pt found sitting up on EOB with 30L HFNC on, in NAD, willing to work with PT. Spo2 above 94% throughout all mobility. She reports no pain. Grossly 4/5 strength B LE. Pt stood and amb 30 ft of side steps along EOB, distance limited by HFNC lines. Pt would reach for support from bed at times and may benefit from use of a SPC. Pt back sitting on EOB with call bell and all needs met. She would benefit from 1-2 more PT sessions to ensure she is at baseline mobility. Recommend d/c home with [facility] and increased supervision. Patient will benefit from skilled intervention to address the above impairments.   Patient's rehabilitation potential is considered to be Good  Factors which may influence rehabilitation potential include:   []         None noted  []         Mental ability/status  [x]         Medical condition  [x]         Home/family situation and support systems  []         Safety awareness  []         Pain tolerance/management  []         Other:      PLAN :  Recommendations and Planned Interventions:   [x]           Bed Mobility Training             [x]    Neuromuscular Re-Education  [x]           Transfer Training                   []    Orthotic/Prosthetic Training  [x]           Gait Training                          []    Modalities  [x]           Therapeutic Exercises           []    Edema Management/Control  [x]           Therapeutic Activities            [x]    Family Training/Education  [x]           Patient Education  []           Other (comment):    Frequency/Duration: Patient will be followed by physical therapy 3-4 times a week to address goals. Discharge Recommendations: Home Health with increased supervision from son/sister  Further Equipment Recommendations for Discharge: Possibly SPC     SUBJECTIVE:   Patient stated I probably should get a cane.     OBJECTIVE DATA SUMMARY:     Past Medical History:   Diagnosis Date    Chronic lung disease     Chronic obstructive pulmonary disease ([facility] [address]     Heart failure ([facility] [address] [facility]     Hypertension      Past Surgical History:   Procedure Laterality Date    HX ORTHOPAEDIC      spinal sx 5/6    HX OTHER SURGICAL      Carotid artery    VASCULAR SURGERY PROCEDURE UNLIST      L CEA 10/2014     Barriers to Learning/Limitations: None  Compensate with: N/A  Home Situation:  Home Situation  Home Environment: Private residence  # Steps to Enter: 1 (garage steps)  Rails to [employer]: Yes  One/Two Story Residence: One story  Living Alone: Yes  Current DME Used/Available at Home: Oxygen, portable, Shower chair  Tub or Shower Type: Tub/Shower combination  Critical Behavior:  Neurologic State: Alert  Orientation Level: Oriented X4  Cognition: Follows commands  Safety/Judgement: Fall prevention  Psychosocial  Patient Behaviors: Calm; Cooperative                 Strength:    Strength: Generally decreased, functional     Tone & Sensation:   Tone: Normal      Sensation: Intact    Range Of Motion:  AROM: Within functional limits    Functional Mobility:  Bed Mobility:     Supine to Sit: Modified independent  Sit to Supine: Modified independent  Scooting: Modified independent  Transfers:  Sit to Stand: Supervision  Stand to Sit: Supervision    Balance:   Sitting: Intact  Standing: Impaired; Without support  Standing - Static: Good  Standing - Dynamic : Fair        Ambulation/Gait Training:  Distance (ft): 30 Feet (ft)  Assistive Device: Other (comment) (Bed for support)  Ambulation - Level of Assistance: Contact guard assistance        Gait Abnormalities: Decreased step clearance        Base of Support: Center of gravity altered     Speed/Gissel: Slow  Step Length: Right shortened;Left shortened    Pain:  Pain level pre-treatment: 0/10   Pain level post-treatment: 0/10   Pain Intervention(s) : Medication (see MAR); Rest, Ice, Repositioning  Response to intervention: Nurse notified, See doc flow    Activity Tolerance:   Pt tolerated mobility well  Please refer to the flowsheet for vital signs taken during this treatment. After treatment:   []         Patient left in no apparent distress sitting up in chair  [x]         Patient left in no apparent distress in bed  [x]         Call bell left within reach  [x]         Nursing notified  []         Caregiver present  []         Bed alarm activated  []         SCDs applied    COMMUNICATION/EDUCATION:   [x]         Role of Physical Therapy in the acute care setting. [x]         Fall prevention education was provided and the patient/caregiver indicated understanding. [x]         Patient/family have participated as able in goal setting and plan of care.   []         Patient/family agree to work toward stated goals and plan of care. []         Patient understands intent and goals of therapy, but is neutral about his/her participation. []         Patient is unable to participate in goal setting/plan of care: ongoing with therapy staff.  []         Other:     Thank you for this referral.  Abbie Pizarro   Time Calculation: 16 mins      Eval Complexity: History: LOW Complexity : Zero comorbidities / personal factors that will impact the outcome / POCExam:LOW Complexity : 1-2 Standardized tests and measures addressing body structure, function, activity limitation and / or participation in recreation  Presentation: LOW Complexity : Stable, uncomplicated  Clinical Decision Making:Low Complexity    Overall Complexity:LOW

## 2021-10-25 NOTE — PROGRESS NOTES
Problem: Self Care Deficits Care Plan (Adult)  Goal: *Acute Goals and Plan of Care (Insert Text)  Outcome: Resolved/Met     OCCUPATIONAL THERAPY EVALUATION/DISCHARGE    Patient: Alisson Cha (44 y.o. female)  Date: 10/25/2021  Primary Diagnosis: Acute bronchitis with chronic obstructive pulmonary disease (COPD) ([facility] [J44.0, J20.9]  Respiratory failure (HCC) [J96.90]  COPD exacerbation ([facility] [J44.1]  Precautions:   (standard)  PLOF: Patient was independent with self-care and functional mobility PTA. Patient reports she furniture surfs prn and uses a cart for grocery shopping. ASSESSMENT AND RECOMMENDATIONS:  Patient cleared to participate in OT evaluation by RN. Upon entering the room, patient was semi reclined in bed with nasal cannula doffed, saturating at 57%. Patient immediately instructed to don nasal cannula and O2 increased to 90% after approx. 2 minutes. Patient with no s/s distress, able to hold conversation with decreased O2. Patient educated on energy conservation techniques, pursed lip breathing, and self pacing during daily activities. Patient modified independent with bed mobility, modified independent with donning sock EOB and independent with grooming task seated EOB. Patient performed functional transfer to chair in room this session simulating toilet transfer with supervision. The patient presents with fair dynamic standing balance, however will defer to PT for functional balance and functional mobility tasks. Based on the objective data described below, the patient presents with no deficits that impede pt function with ADLs. At this time patient with no further self-care concerns. OT to d/c from caseload. Skilled occupational therapy is not indicated at this time.   Discharge Recommendations: Home Health Safety Eval  Further Equipment Recommendations for Discharge: possible rolling walker for community reintegration /further distances     SUBJECTIVE:   Patient stated I wish I could go to the bathroom and not a chair    OBJECTIVE DATA SUMMARY:     Past Medical History:   Diagnosis Date    Chronic lung disease     Chronic obstructive pulmonary disease ([facility] [address]     Heart failure ([facility] [address]     Hypertension      Past Surgical History:   Procedure Laterality Date    HX ORTHOPAEDIC      spinal sx 5/6    HX OTHER SURGICAL      Carotid artery    VASCULAR SURGERY PROCEDURE UNLIST      L CEA 10/2014     Barriers to Learning/Limitations: None  Compensate with: visual, verbal, tactile, kinesthetic cues/model    Home Situation:   Home Situation  Home Environment: Private residence  # Steps to Enter: 1 (garage steps)  Rails to [employer]: Yes  One/Two Story Residence: One story  Living Alone: Yes  Current DME Used/Available at Home: Oxygen, portable, Shower chair  Tub or Shower Type: Tub/Shower combination  [x]     Right hand dominant   []     Left hand dominant    Cognitive/Behavioral Status:  Neurologic State: Alert  Orientation Level: Oriented X4  Cognition: Follows commands  Safety/Judgement: Fall prevention    Skin: Intact  Edema: None noted    Vision/Perceptual:    Acuity: Within Defined Limits    Corrective Lenses: Glasses    Coordination: BUE  Fine Motor Skills-Upper: Left Intact; Right Intact    Gross Motor Skills-Upper: Left Intact; Right Intact    Balance:  Sitting: Intact  Standing: Impaired; Without support  Standing - Static: Good  Standing - Dynamic : Fair    Strength: BUE  Strength:  Within functional limits    Tone & Sensation: BUE  Tone: Normal  Sensation: Intact    Range of Motion: BUE  AROM: Within functional limits      Functional Mobility and Transfers for ADLs:  Bed Mobility:  Supine to Sit: Modified independent  Sit to Supine: Modified independent  Scooting: Modified independent    Transfers:  Sit to Stand: Supervision  Stand to Sit: Supervision   Toilet Transfer : Supervision (simulation with chair in room)    ADL Assessment:  Feeding: Independent    Oral Facial Hygiene/Grooming: Independent    Bathing: Modified independent    Upper Body Dressing: Independent    Lower Body Dressing: Modified independent    Toileting: Modified independent    ADL Intervention:   Grooming  Grooming Assistance: Independent  Position Performed: Seated edge of bed  Washing Face: Independent    Lower Body Dressing Assistance  Dressing Assistance: Modified independent  Socks: Modified independent  Leg Crossed Method Used: Yes  Position Performed: Seated edge of bed    Cognitive Retraining  Safety/Judgement: Fall prevention    Pain:  Pain level pre-treatment: 0/10   Pain level post-treatment: 0/10   Pain Intervention(s): Medication (see MAR); Response to intervention: Nurse notified, See doc flow    Activity Tolerance:   Fair+    Please refer to the flowsheet for vital signs taken during this treatment. After treatment:   []  Patient left in no apparent distress sitting up in chair  [x]  Patient left in no apparent distress in bed  [x]  Call bell left within reach  [x]  Nursing notified  []  Caregiver present  []  Bed alarm activated    COMMUNICATION/EDUCATION:   [x]      Role of Occupational Therapy in the acute care setting  [x]      Home safety education was provided and the patient/caregiver indicated understanding. [x]      Patient/family have participated as able and agree with findings and recommendations. []      Patient is unable to participate in plan of care at this time. Thank you for this referral.  Rhiannon Xie OTR/L  Time Calculation: 19 mins      Eval Complexity: History: MEDIUM Complexity : Expanded review of history including physical, cognitive and psychosocial  history ; Examination: LOW Complexity : 1-3 performance deficits relating to physical, cognitive , or psychosocial skils that result in activity limitations and / or participation restrictions ;    Decision Making:LOW Complexity : No comorbidities that affect functional and no verbal or physical assistance needed to complete eval tasks

## 2021-10-25 NOTE — ED NOTES
Purposeful rounding completed:     Side rails up x 1:  YES  Bed in low position and wheels locked: YES  Call bell within reach: YES  Comfort addressed: YES    Toileting needs addressed: YES  Plan of care reviewed/updated with patient and or family members: YES  IV site assessed: YES  Pain assessed and addressed: YES, 0     Patient laying in bed with eyes open. Calm, quiet.  Skin warm and dry to the touch.

## 2021-10-25 NOTE — PROGRESS NOTES
[facility]  Progress Note    Patient: Liliana Jones Age: 82 y.o. : 1935 MR#: [id number] SSN: [id number]  Date/Time: 10/25/2021  Subjective:     Pt states she is feeling better but not back at baseline. Seen comfortable on HFNC. Assessment/Plan:   45-year-old female with history of COPD on home oxygen at 2-3L/min COPD, previous COVID pneumonia now vaccinated, mild pulmonary hypertension and chronic diastolic heart failure who was admitted after she presented to the emergency room with complaints of shortness of breath.    -Acute on chronic hypoxic respiratory failure: Bio fire positive for RSV. On high flow oxygen, IV steroids and antibiotics for CAP pneumonia. Pulmonology consult input noted. Appreciate assistance. -COPD exacerbation    -SIRS:POA, with fever, tachycardia, leukocytosis and lactic acidosis    HISTORY OF:  -COPD  -HTN  -Chronic diastolic heart failure  -Covid pneumonia    PLAN:  -Wean 02 down as tolerated  -Cont IV steroids and abx  -PT/OT  -Neb rx      Additional Notes:      Case discussed with:  [x]Patient  []Family  []Nursing  []Case Management  DVT Prophylaxis:  [x]Lovenox  []Hep SQ  []SCDs  []Coumadin   []On Heparin gtt    Objective:   VS:   Visit Vitals  BP (!) 109/96   Pulse 68   Temp 99.8 °F (37.7 °C)   Resp 16   SpO2 94%      Tmax/24hrs: No data recorded.     Input/Output: No intake or output data in the 24 hours ending 10/25/21 1459    General:  Alert, awake, in NAD  Cardiovascular:  RRR, no murmurs  Pulmonary:  CTAB  GI:  abd soft, nt, nd  Extremities:  No edema  Additional:      Labs:    Recent Results (from the past 24 hour(s))   METABOLIC PANEL, BASIC    Collection Time: 10/25/21  4:15 AM   Result Value Ref Range    Sodium 138 136 - 145 mmol/L    Potassium 4.7 3.5 - 5.5 mmol/L    Chloride 106 100 - 111 mmol/L    CO2 23 21 - 32 mmol/L    Anion gap 9 3.0 - 18 mmol/L    Glucose 152 (H) 74 - 99 mg/dL    BUN 29 (H) 7.0 - 18 MG/DL Creatinine 0.94 0.6 - 1.3 MG/DL    BUN/Creatinine ratio 31 (H) 12 - 20      GFR est AA >60 >60 ml/min/1.73m2    GFR est non-AA 57 (L) >60 ml/min/1.73m2    Calcium 8.3 (L) 8.5 - 10.1 MG/DL   MAGNESIUM    Collection Time: 10/25/21  4:15 AM   Result Value Ref Range    Magnesium 2.6 1.6 - 2.6 mg/dL   CBC WITH AUTOMATED DIFF    Collection Time: 10/25/21  4:15 AM   Result Value Ref Range    WBC 17.3 (H) 4.6 - 13.2 K/uL    RBC 4.64 4.20 - 5.30 M/uL    HGB 13.0 12.0 - 16.0 g/dL    HCT 41.2 35.0 - 45.0 %    MCV 88.8 78.0 - 100.0 FL    MCH 28.0 24.0 - 34.0 PG    MCHC 31.6 31.0 - 37.0 g/dL    RDW 14.8 (H) 11.6 - 14.5 %    PLATELET 553 904 - 771 K/uL    MPV 10.5 9.2 - 11.8 FL    NEUTROPHILS 90 (H) 40 - 73 %    LYMPHOCYTES 4 (L) 21 - 52 %    MONOCYTES 6 3 - 10 %    EOSINOPHILS 0 0 - 5 %    BASOPHILS 0 0 - 2 %    ABS. NEUTROPHILS 15.6 (H) 1.8 - 8.0 K/UL    ABS. LYMPHOCYTES 0.6 (L) 0.9 - 3.6 K/UL    ABS. MONOCYTES 1.0 0.05 - 1.2 K/UL    ABS. EOSINOPHILS 0.0 0.0 - 0.4 K/UL    ABS.  BASOPHILS 0.0 0.0 - 0.1 K/UL    DF AUTOMATED     HEMOGLOBIN A1C WITH EAG    Collection Time: 10/25/21  4:15 AM   Result Value Ref Range    Hemoglobin A1c 6.1 (H) 4.2 - 5.6 %    Est. average glucose 128 mg/dL   GLUCOSE, POC    Collection Time: 10/25/21 12:17 PM   Result Value Ref Range    Glucose (POC) 198 (H) 70 - 110 mg/dL     Additional Data Reviewed:      Signed By: Cheyenne Liazma MD     October 25, 2021 2:59 PM

## 2021-10-26 LAB
ANION GAP SERPL CALC-SCNC: 8 MMOL/L (ref 3–18)
BUN SERPL-MCNC: 42 MG/DL (ref 7–18)
BUN/CREAT SERPL: 37 (ref 12–20)
CALCIUM SERPL-MCNC: 8 MG/DL (ref 8.5–10.1)
CHLORIDE SERPL-SCNC: 104 MMOL/L (ref 100–111)
CO2 SERPL-SCNC: 23 MMOL/L (ref 21–32)
CREAT SERPL-MCNC: 1.13 MG/DL (ref 0.6–1.3)
GLUCOSE BLD STRIP.AUTO-MCNC: 158 MG/DL (ref 70–110)
GLUCOSE BLD STRIP.AUTO-MCNC: 166 MG/DL (ref 70–110)
GLUCOSE BLD STRIP.AUTO-MCNC: 172 MG/DL (ref 70–110)
GLUCOSE BLD STRIP.AUTO-MCNC: 202 MG/DL (ref 70–110)
GLUCOSE SERPL-MCNC: 209 MG/DL (ref 74–99)
POTASSIUM SERPL-SCNC: 4.2 MMOL/L (ref 3.5–5.5)
SODIUM SERPL-SCNC: 135 MMOL/L (ref 136–145)

## 2021-10-26 PROCEDURE — 74011250636 HC RX REV CODE- 250/636: Performed by: FAMILY MEDICINE

## 2021-10-26 PROCEDURE — 80048 BASIC METABOLIC PNL TOTAL CA: CPT

## 2021-10-26 PROCEDURE — 99232 SBSQ HOSP IP/OBS MODERATE 35: CPT | Performed by: INTERNAL MEDICINE

## 2021-10-26 PROCEDURE — 77010033711 HC HIGH FLOW OXYGEN

## 2021-10-26 PROCEDURE — 74011250637 HC RX REV CODE- 250/637: Performed by: FAMILY MEDICINE

## 2021-10-26 PROCEDURE — 94640 AIRWAY INHALATION TREATMENT: CPT

## 2021-10-26 PROCEDURE — 74011000250 HC RX REV CODE- 250: Performed by: FAMILY MEDICINE

## 2021-10-26 PROCEDURE — 36415 COLL VENOUS BLD VENIPUNCTURE: CPT

## 2021-10-26 PROCEDURE — 82962 GLUCOSE BLOOD TEST: CPT

## 2021-10-26 PROCEDURE — 94762 N-INVAS EAR/PLS OXIMTRY CONT: CPT

## 2021-10-26 PROCEDURE — 2709999900 HC NON-CHARGEABLE SUPPLY

## 2021-10-26 PROCEDURE — 74011636637 HC RX REV CODE- 636/637: Performed by: INTERNAL MEDICINE

## 2021-10-26 PROCEDURE — 65660000000 HC RM CCU STEPDOWN

## 2021-10-26 RX ORDER — GUAIFENESIN 100 MG/5ML
100 SOLUTION ORAL
Status: DISCONTINUED | OUTPATIENT
Start: 2021-10-26 | End: 2021-11-04 | Stop reason: HOSPADM

## 2021-10-26 RX ADMIN — Medication 10 ML: at 16:02

## 2021-10-26 RX ADMIN — Medication 10 ML: at 22:00

## 2021-10-26 RX ADMIN — BUDESONIDE 500 MCG: 0.5 SUSPENSION RESPIRATORY (INHALATION) at 22:42

## 2021-10-26 RX ADMIN — IPRATROPIUM BROMIDE AND ALBUTEROL SULFATE 3 ML: .5; 2.5 SOLUTION RESPIRATORY (INHALATION) at 22:42

## 2021-10-26 RX ADMIN — ARFORMOTEROL TARTRATE 15 MCG: 15 SOLUTION RESPIRATORY (INHALATION) at 08:57

## 2021-10-26 RX ADMIN — INSULIN LISPRO 3 UNITS: 100 INJECTION, SOLUTION INTRAVENOUS; SUBCUTANEOUS at 22:26

## 2021-10-26 RX ADMIN — IPRATROPIUM BROMIDE AND ALBUTEROL SULFATE 3 ML: .5; 2.5 SOLUTION RESPIRATORY (INHALATION) at 08:57

## 2021-10-26 RX ADMIN — INSULIN LISPRO 3 UNITS: 100 INJECTION, SOLUTION INTRAVENOUS; SUBCUTANEOUS at 16:00

## 2021-10-26 RX ADMIN — INSULIN LISPRO 6 UNITS: 100 INJECTION, SOLUTION INTRAVENOUS; SUBCUTANEOUS at 11:58

## 2021-10-26 RX ADMIN — Medication 10 ML: at 07:18

## 2021-10-26 RX ADMIN — BUDESONIDE 500 MCG: 0.5 SUSPENSION RESPIRATORY (INHALATION) at 08:57

## 2021-10-26 RX ADMIN — ALPRAZOLAM 0.5 MG: 1 TABLET ORAL at 22:25

## 2021-10-26 RX ADMIN — METHYLPREDNISOLONE SODIUM SUCCINATE 60 MG: 125 INJECTION, POWDER, FOR SOLUTION INTRAMUSCULAR; INTRAVENOUS at 07:17

## 2021-10-26 RX ADMIN — CEFTRIAXONE SODIUM 2 G: 2 INJECTION, POWDER, FOR SOLUTION INTRAMUSCULAR; INTRAVENOUS at 07:17

## 2021-10-26 RX ADMIN — AZITHROMYCIN 500 MG: 500 INJECTION, POWDER, LYOPHILIZED, FOR SOLUTION INTRAVENOUS at 07:17

## 2021-10-26 RX ADMIN — METHYLPREDNISOLONE SODIUM SUCCINATE 60 MG: 125 INJECTION, POWDER, FOR SOLUTION INTRAMUSCULAR; INTRAVENOUS at 12:28

## 2021-10-26 RX ADMIN — METHYLPREDNISOLONE SODIUM SUCCINATE 60 MG: 125 INJECTION, POWDER, FOR SOLUTION INTRAMUSCULAR; INTRAVENOUS at 18:00

## 2021-10-26 RX ADMIN — ARFORMOTEROL TARTRATE 15 MCG: 15 SOLUTION RESPIRATORY (INHALATION) at 22:42

## 2021-10-26 RX ADMIN — METHYLPREDNISOLONE SODIUM SUCCINATE 60 MG: 125 INJECTION, POWDER, FOR SOLUTION INTRAMUSCULAR; INTRAVENOUS at 00:23

## 2021-10-26 NOTE — PROGRESS NOTES
Hospitalist Progress Note    Subjective:   Daily Progress Note: 10/26/2021     Patient was seen earlier this is a late entry note. Patient was seen in presence of her daughter. Continues to have shortness of breath and dry cough. Denies any headaches or dizziness. No nausea vomiting denies any chest pain or abdominal pain.     Current Facility-Administered Medications   Medication Dose Route Frequency    insulin lispro (HUMALOG) injection   SubCUTAneous AC&HS    glucose chewable tablet 16 g  4 Tablet Oral PRN    glucagon (GLUCAGEN) injection 1 mg  1 mg IntraMUSCular PRN    dextrose (D50W) injection syrg 12.5-25 g  25-50 mL IntraVENous PRN    sodium chloride (NS) flush 5-10 mL  5-10 mL IntraVENous PRN    cefTRIAXone (ROCEPHIN) 2 g in sterile water (preservative free) 20 mL IV syringe  2 g IntraVENous Q24H    azithromycin (ZITHROMAX) 500 mg in 0.9% sodium chloride 250 mL (VIAL-MATE)  500 mg IntraVENous Q24H    arformoteroL (BROVANA) neb solution 15 mcg  15 mcg Nebulization BID RT    budesonide (PULMICORT) 500 mcg/2 ml nebulizer suspension  500 mcg Nebulization BID RT    albuterol-ipratropium (DUO-NEB) 2.5 MG-0.5 MG/3 ML  3 mL Nebulization QID RT    methylPREDNISolone (PF) (SOLU-MEDROL) injection 60 mg  60 mg IntraVENous Q6H    sodium chloride (NS) flush 5-40 mL  5-40 mL IntraVENous Q8H    sodium chloride (NS) flush 5-40 mL  5-40 mL IntraVENous PRN    acetaminophen (TYLENOL) tablet 650 mg  650 mg Oral Q6H PRN    Or    acetaminophen (TYLENOL) suppository 650 mg  650 mg Rectal Q6H PRN    polyethylene glycol (MIRALAX) packet 17 g  17 g Oral DAILY PRN    ondansetron (ZOFRAN ODT) tablet 4 mg  4 mg Oral Q8H PRN    Or    ondansetron (ZOFRAN) injection 4 mg  4 mg IntraVENous Q6H PRN    enoxaparin (LOVENOX) injection 40 mg  40 mg SubCUTAneous DAILY    ALPRAZolam (XANAX) tablet 0.5 mg  0.5 mg Oral QHS    aspirin delayed-release tablet 81 mg  81 mg Oral DAILY    atorvastatin (LIPITOR) tablet 20 mg  20 mg Oral DAILY    famotidine (PEPCID) tablet 20 mg  20 mg Oral DAILY    losartan (COZAAR) tablet 50 mg  50 mg Oral DAILY    [Held by provider] metoprolol tartrate (LOPRESSOR) tablet 25 mg  25 mg Oral BID    furosemide (LASIX) tablet 40 mg  40 mg Oral DAILY    NIFEdipine ER (PROCARDIA XL) tablet 60 mg  60 mg Oral DAILY        Review of Systems  Pertinent items are noted in HPI. Objective:     Visit Vitals  BP (!) 113/43 (BP 1 Location: Right upper arm, BP Patient Position: At rest)   Pulse 85   Temp 98.1 °F (36.7 °C)   Resp 28   SpO2 96%    O2 Flow Rate (L/min): 50 l/min O2 Device: Hi flow nasal cannula    Temp (24hrs), Av.4 °F (36.9 °C), Min:98.1 °F (36.7 °C), Max:98.7 °F (37.1 °C)      No intake/output data recorded. 10/24 1901 - 10/26 0700  In: -   Out: 850 [Urine:850]      General appearance -awake, high flow oxygen in situ, using accessory muscle of respiration, not in acute distress  Eyes - sclera anicteric, no pallor  Nose - no obvious nasal discharge.    Neck - supple, no JVD, trachea is midline  Chest -clear air entry noted in bases, rhonchi present  Heart - S1 and S2 normal  Abdomen - soft, nontender, nondistended, Bowel sounds present  Neurological - alert, oriented, normal speech, no focal findings noted  Musculoskeletal - no joint tenderness or erythema of knees bilaterally  Extremities - no pedal edema noted      Data Review    Recent Results (from the past 24 hour(s))   GLUCOSE, POC    Collection Time: 10/25/21  6:03 PM   Result Value Ref Range    Glucose (POC) 207 (H) 70 - 110 mg/dL   GLUCOSE, POC    Collection Time: 10/25/21 10:01 PM   Result Value Ref Range    Glucose (POC) 172 (H) 70 - 097 mg/dL   METABOLIC PANEL, BASIC    Collection Time: 10/26/21  3:50 AM   Result Value Ref Range    Sodium 135 (L) 136 - 145 mmol/L    Potassium 4.2 3.5 - 5.5 mmol/L    Chloride 104 100 - 111 mmol/L    CO2 23 21 - 32 mmol/L    Anion gap 8 3.0 - 18 mmol/L    Glucose 209 (H) 74 - 99 mg/dL    BUN 42 (H) 7.0 - 18 MG/DL    Creatinine 1.13 0.6 - 1.3 MG/DL    BUN/Creatinine ratio 37 (H) 12 - 20      GFR est AA 56 (L) >60 ml/min/1.73m2    GFR est non-AA 46 (L) >60 ml/min/1.73m2    Calcium 8.0 (L) 8.5 - 10.1 MG/DL   GLUCOSE, POC    Collection Time: 10/26/21  8:37 AM   Result Value Ref Range    Glucose (POC) 172 (H) 70 - 110 mg/dL   GLUCOSE, POC    Collection Time: 10/26/21 11:57 AM   Result Value Ref Range    Glucose (POC) 202 (H) 70 - 110 mg/dL   GLUCOSE, POC    Collection Time: 10/26/21  3:42 PM   Result Value Ref Range    Glucose (POC) 158 (H) 70 - 110 mg/dL         Assessment/Plan:     ASSESSMENT:    1. Acute on chronic hypoxic respiratory failure  2. COPD exacerbation  3.  RSV bronchitis  4. Hypertension  5. Chronic diastolic CHF without exacerbation  6 recent COVID-19 pneumonia    PLAN:    Continue high flow oxygen  Continue nebulizer current  Continue antibiotic and steroid  Continue sliding scale for hyper glycemia management  CT chest will be ordered  Chest x-ray report reviewed  Pulmonology consulted    Total time to take care of this patient was 35 minutes and more than 50% of time was spent counseling and coordinating care. Disclaimer: Sections of this note are dictated using utilizing voice recognition software, which may have resulted in some phonetic based errors in grammar and contents. Even though attempts were made to correct all the mistakes, some may have been missed, and remained in the body of the document. If questions arise, please contact our department.

## 2021-10-26 NOTE — PROGRESS NOTES
Patient received to unit via stretcher with Transportation Tech and Respiratory Therapist.  Patient alert and oriented with no signs of pain or distress. Valuables consist of:  Slippers, eyeglasses, 2 night sets, black purse with $ 238.00 in wallet. Patient advised she will likely give it to her son on his arrival.  Advised her to let us know when she does so we can document so.       Dual skin assessment completed with primary RN Agens Randall

## 2021-10-26 NOTE — ROUTINE PROCESS
1915: Bedside and Verbal shift change report given to [address] (oncoming nurse) by Kandis Kapoor (offgoing nurse). Report included the following information SBAR, Kardex, Intake/Output, MAR and Recent Results.

## 2021-10-26 NOTE — ED NOTES
Bedside, Verbal  shift change report given to Kassie [address] RN (oncoming nurse) by Yoselin DURANT(offgoing nurse). Report included the following information SBAR, Kardex, and MAR. Hourly rounding and  chart checks completed.

## 2021-10-26 NOTE — ROUTINE PROCESS
1110: TRANSFER - IN REPORT:    Verbal report received from [facility] LILIA (name) on [facility] [place]  being received from ED (unit) for change in patient condition(O2)      Report consisted of patients Situation, Background, Assessment and   Recommendations(SBAR). Information from the following report(s) SBAR, Kardex, Intake/Output, MAR and Recent Results was reviewed with the receiving nurse. Opportunity for questions and clarification was provided. Assessment completed upon patients arrival to unit and care assumed.

## 2021-10-26 NOTE — DIABETES MGMT
Diabetes Plan of Care    10/26: Patient seen in ED this morning before transf to [address] [place] [address] for routine progression of care and patient cont on steroids every 6 hours. Recommendations: modify correctional lispro insulin to very resistant dose per protocol. Done. Glycemic Assessment:    History: No history of diabetes  A1c 6.1% (10/25/2021). Meets criteria for pre diabetes    Patient was admitted on 10/24/2021 with report of shortness of breath. Problem List Items Addressed This Visit        Respiratory    * (Principal) COPD exacerbation ([address]    Acute on chronic respiratory failure with hypoxia (HCC)    Acute bronchitis with chronic obstructive pulmonary disease (COPD) [facility])          Results for Andrea García (MRN [id number]) as of 10/26/2021 13:51   Ref. Range 10/25/2021 12:17 10/25/2021 18:03 10/25/2021 22:01   GLUCOSE,FAST - POC Latest Ref Range: 70 - 110 mg/dL 198 (H) 207 (H) 172 (H)       Results for Andrea García (MRN [id number]) as of 10/26/2021 13:51   Ref. Range 10/26/2021 08:37 10/26/2021 11:57   GLUCOSE,FAST - POC Latest Ref Range: 70 - 110 mg/dL 172 (H) 202 (H)     IVF containing dextrose: None  Steroids:  IV Solumedrol 60 mg every 6 hours  Diet: Regular; 4 carb choices    Most recent blood glucose values: Within target range (non-ICU: <140; ICU<180):  No.    Current A1c 6.1% (10/25/2021) which is equivalent to equivalent to estimated average blood glucose of 128 mg/dL during the past 2-3 months    Adequate glycemic control PTA:  N/A. No history of diabetes seen at time of review. Current insulin regimen:  Correctional lispro insulin ACHS. Very resistant dose    TDD previous day 10/25:  Lispro: 8 units    Home diabetes medications: None.  No history of diabetes    Goals: Blood glucose will be within target of 70 - 180 mg/dl by: 10/29/2021    Education:  _____ Refer to Diabetes Education Record                       __X___ Education not indicated at this time     Anibal Bustillo CARLEEN [facility]  Pager: [phone]

## 2021-10-26 NOTE — ED NOTES
Assume care of patient, patient alert and oriented x 4, skin warm and dry, patient on Hi-Flow O2, tolerating well, patient on monitor SR noted at this time, POC discussed with patient , patient with no complaints and in no distress

## 2021-10-27 ENCOUNTER — APPOINTMENT (OUTPATIENT)
Dept: CT IMAGING | Age: 86
DRG: 190 | End: 2021-10-27
Attending: INTERNAL MEDICINE
Payer: MEDICARE

## 2021-10-27 ENCOUNTER — APPOINTMENT (OUTPATIENT)
Dept: NON INVASIVE DIAGNOSTICS | Age: 86
DRG: 190 | End: 2021-10-27
Attending: INTERNAL MEDICINE
Payer: MEDICARE

## 2021-10-27 LAB
ANION GAP SERPL CALC-SCNC: 6 MMOL/L (ref 3–18)
BASOPHILS # BLD: 0 K/UL (ref 0–0.1)
BASOPHILS NFR BLD: 0 % (ref 0–2)
BUN SERPL-MCNC: 31 MG/DL (ref 7–18)
BUN/CREAT SERPL: 32 (ref 12–20)
CALCIUM SERPL-MCNC: 8 MG/DL (ref 8.5–10.1)
CHLORIDE SERPL-SCNC: 108 MMOL/L (ref 100–111)
CO2 SERPL-SCNC: 24 MMOL/L (ref 21–32)
CREAT SERPL-MCNC: 0.96 MG/DL (ref 0.6–1.3)
DIFFERENTIAL METHOD BLD: ABNORMAL
ECHO LV INTERNAL DIMENSION DIASTOLIC: 5.22 CM (ref 3.9–5.3)
ECHO LV INTERNAL DIMENSION SYSTOLIC: 3.67 CM
ECHO LV IVSD: 0.99 CM (ref 0.6–0.9)
ECHO LV MASS 2D: 173.8 G (ref 67–162)
ECHO LV MASS INDEX 2D: 111.4 G/M2 (ref 43–95)
ECHO LV POSTERIOR WALL DIASTOLIC: 0.84 CM (ref 0.6–0.9)
ECHO RV TAPSE: 2 CM (ref 1.5–2)
ECHO TV REGURGITANT MAX VELOCITY: 427.29 CM/S
ECHO TV REGURGITANT PEAK GRADIENT: 73.03 MMHG
EOSINOPHIL # BLD: 0 K/UL (ref 0–0.4)
EOSINOPHIL NFR BLD: 0 % (ref 0–5)
ERYTHROCYTE [DISTWIDTH] IN BLOOD BY AUTOMATED COUNT: 14.6 % (ref 11.6–14.5)
GLUCOSE BLD STRIP.AUTO-MCNC: 150 MG/DL (ref 70–110)
GLUCOSE BLD STRIP.AUTO-MCNC: 169 MG/DL (ref 70–110)
GLUCOSE BLD STRIP.AUTO-MCNC: 179 MG/DL (ref 70–110)
GLUCOSE BLD STRIP.AUTO-MCNC: 201 MG/DL (ref 70–110)
GLUCOSE SERPL-MCNC: 192 MG/DL (ref 74–99)
HCT VFR BLD AUTO: 39.3 % (ref 35–45)
HGB BLD-MCNC: 12.5 G/DL (ref 12–16)
LYMPHOCYTES # BLD: 0.3 K/UL (ref 0.9–3.6)
LYMPHOCYTES NFR BLD: 3 % (ref 21–52)
MCH RBC QN AUTO: 27.5 PG (ref 24–34)
MCHC RBC AUTO-ENTMCNC: 31.8 G/DL (ref 31–37)
MCV RBC AUTO: 86.6 FL (ref 78–100)
MONOCYTES # BLD: 0.6 K/UL (ref 0.05–1.2)
MONOCYTES NFR BLD: 5 % (ref 3–10)
NEUTS SEG # BLD: 11.3 K/UL (ref 1.8–8)
NEUTS SEG NFR BLD: 92 % (ref 40–73)
PLATELET # BLD AUTO: 234 K/UL (ref 135–420)
PMV BLD AUTO: 10.5 FL (ref 9.2–11.8)
POTASSIUM SERPL-SCNC: 4.3 MMOL/L (ref 3.5–5.5)
RBC # BLD AUTO: 4.54 M/UL (ref 4.2–5.3)
SODIUM SERPL-SCNC: 138 MMOL/L (ref 136–145)
WBC # BLD AUTO: 12.3 K/UL (ref 4.6–13.2)

## 2021-10-27 PROCEDURE — 74011250636 HC RX REV CODE- 250/636: Performed by: FAMILY MEDICINE

## 2021-10-27 PROCEDURE — 74011000250 HC RX REV CODE- 250: Performed by: FAMILY MEDICINE

## 2021-10-27 PROCEDURE — 99222 1ST HOSP IP/OBS MODERATE 55: CPT | Performed by: NURSE PRACTITIONER

## 2021-10-27 PROCEDURE — 74011250637 HC RX REV CODE- 250/637: Performed by: STUDENT IN AN ORGANIZED HEALTH CARE EDUCATION/TRAINING PROGRAM

## 2021-10-27 PROCEDURE — 80048 BASIC METABOLIC PNL TOTAL CA: CPT

## 2021-10-27 PROCEDURE — 74011636637 HC RX REV CODE- 636/637: Performed by: INTERNAL MEDICINE

## 2021-10-27 PROCEDURE — 93321 DOPPLER ECHO F-UP/LMTD STD: CPT

## 2021-10-27 PROCEDURE — APPSS30 APP SPLIT SHARED TIME 16-30 MINUTES: Performed by: PHYSICIAN ASSISTANT

## 2021-10-27 PROCEDURE — 76450000000

## 2021-10-27 PROCEDURE — 74011250637 HC RX REV CODE- 250/637: Performed by: FAMILY MEDICINE

## 2021-10-27 PROCEDURE — 99232 SBSQ HOSP IP/OBS MODERATE 35: CPT | Performed by: INTERNAL MEDICINE

## 2021-10-27 PROCEDURE — 77010033711 HC HIGH FLOW OXYGEN

## 2021-10-27 PROCEDURE — 82962 GLUCOSE BLOOD TEST: CPT

## 2021-10-27 PROCEDURE — 94640 AIRWAY INHALATION TREATMENT: CPT

## 2021-10-27 PROCEDURE — 74011000636 HC RX REV CODE- 636: Performed by: INTERNAL MEDICINE

## 2021-10-27 PROCEDURE — 65660000000 HC RM CCU STEPDOWN

## 2021-10-27 PROCEDURE — 99233 SBSQ HOSP IP/OBS HIGH 50: CPT | Performed by: INTERNAL MEDICINE

## 2021-10-27 PROCEDURE — 36415 COLL VENOUS BLD VENIPUNCTURE: CPT

## 2021-10-27 PROCEDURE — 71250 CT THORAX DX C-: CPT

## 2021-10-27 PROCEDURE — 85025 COMPLETE CBC W/AUTO DIFF WBC: CPT

## 2021-10-27 PROCEDURE — 71260 CT THORAX DX C+: CPT

## 2021-10-27 PROCEDURE — 74011250636 HC RX REV CODE- 250/636: Performed by: INTERNAL MEDICINE

## 2021-10-27 RX ORDER — BUDESONIDE 1 MG/2ML
1000 INHALANT ORAL
Status: DISCONTINUED | OUTPATIENT
Start: 2021-10-28 | End: 2021-11-04 | Stop reason: HOSPADM

## 2021-10-27 RX ORDER — PANTOPRAZOLE SODIUM 40 MG/1
40 TABLET, DELAYED RELEASE ORAL
Status: DISCONTINUED | OUTPATIENT
Start: 2021-10-28 | End: 2021-11-04 | Stop reason: HOSPADM

## 2021-10-27 RX ORDER — IPRATROPIUM BROMIDE AND ALBUTEROL SULFATE 2.5; .5 MG/3ML; MG/3ML
3 SOLUTION RESPIRATORY (INHALATION)
Status: DISCONTINUED | OUTPATIENT
Start: 2021-10-28 | End: 2021-11-02

## 2021-10-27 RX ADMIN — BUDESONIDE 500 MCG: 0.5 SUSPENSION RESPIRATORY (INHALATION) at 20:39

## 2021-10-27 RX ADMIN — ACETAMINOPHEN 650 MG: 325 TABLET ORAL at 02:04

## 2021-10-27 RX ADMIN — ATORVASTATIN CALCIUM 20 MG: 20 TABLET, FILM COATED ORAL at 09:25

## 2021-10-27 RX ADMIN — INSULIN LISPRO 3 UNITS: 100 INJECTION, SOLUTION INTRAVENOUS; SUBCUTANEOUS at 16:55

## 2021-10-27 RX ADMIN — ARFORMOTEROL TARTRATE 15 MCG: 15 SOLUTION RESPIRATORY (INHALATION) at 11:37

## 2021-10-27 RX ADMIN — METHYLPREDNISOLONE SODIUM SUCCINATE 60 MG: 125 INJECTION, POWDER, FOR SOLUTION INTRAMUSCULAR; INTRAVENOUS at 06:02

## 2021-10-27 RX ADMIN — METHYLPREDNISOLONE SODIUM SUCCINATE 60 MG: 125 INJECTION, POWDER, FOR SOLUTION INTRAMUSCULAR; INTRAVENOUS at 16:53

## 2021-10-27 RX ADMIN — NIFEDIPINE 60 MG: 30 TABLET, FILM COATED, EXTENDED RELEASE ORAL at 09:25

## 2021-10-27 RX ADMIN — ALPRAZOLAM 0.5 MG: 1 TABLET ORAL at 21:44

## 2021-10-27 RX ADMIN — FUROSEMIDE 40 MG: 40 TABLET ORAL at 09:25

## 2021-10-27 RX ADMIN — BUDESONIDE 500 MCG: 0.5 SUSPENSION RESPIRATORY (INHALATION) at 11:37

## 2021-10-27 RX ADMIN — CEFTRIAXONE SODIUM 2 G: 2 INJECTION, POWDER, FOR SOLUTION INTRAMUSCULAR; INTRAVENOUS at 06:02

## 2021-10-27 RX ADMIN — IPRATROPIUM BROMIDE AND ALBUTEROL SULFATE 3 ML: .5; 2.5 SOLUTION RESPIRATORY (INHALATION) at 11:37

## 2021-10-27 RX ADMIN — IPRATROPIUM BROMIDE AND ALBUTEROL SULFATE 3 ML: .5; 2.5 SOLUTION RESPIRATORY (INHALATION) at 20:39

## 2021-10-27 RX ADMIN — Medication 10 ML: at 22:00

## 2021-10-27 RX ADMIN — ENOXAPARIN SODIUM 40 MG: 100 INJECTION SUBCUTANEOUS at 09:25

## 2021-10-27 RX ADMIN — FAMOTIDINE 20 MG: 20 TABLET ORAL at 09:25

## 2021-10-27 RX ADMIN — METHYLPREDNISOLONE SODIUM SUCCINATE 60 MG: 125 INJECTION, POWDER, FOR SOLUTION INTRAMUSCULAR; INTRAVENOUS at 02:04

## 2021-10-27 RX ADMIN — AZITHROMYCIN 500 MG: 500 INJECTION, POWDER, LYOPHILIZED, FOR SOLUTION INTRAVENOUS at 07:09

## 2021-10-27 RX ADMIN — Medication 81 MG: at 09:25

## 2021-10-27 RX ADMIN — Medication 10 ML: at 16:53

## 2021-10-27 RX ADMIN — IPRATROPIUM BROMIDE AND ALBUTEROL SULFATE 3 ML: .5; 2.5 SOLUTION RESPIRATORY (INHALATION) at 16:53

## 2021-10-27 RX ADMIN — ARFORMOTEROL TARTRATE 15 MCG: 15 SOLUTION RESPIRATORY (INHALATION) at 20:39

## 2021-10-27 RX ADMIN — LOSARTAN POTASSIUM 50 MG: 50 TABLET, FILM COATED ORAL at 09:26

## 2021-10-27 RX ADMIN — METHYLPREDNISOLONE SODIUM SUCCINATE 60 MG: 125 INJECTION, POWDER, FOR SOLUTION INTRAMUSCULAR; INTRAVENOUS at 22:00

## 2021-10-27 RX ADMIN — GUAIFENESIN 100 MG: 200 SOLUTION ORAL at 02:05

## 2021-10-27 RX ADMIN — Medication 10 ML: at 06:22

## 2021-10-27 RX ADMIN — INSULIN LISPRO 3 UNITS: 100 INJECTION, SOLUTION INTRAVENOUS; SUBCUTANEOUS at 09:27

## 2021-10-27 RX ADMIN — INSULIN LISPRO 3 UNITS: 100 INJECTION, SOLUTION INTRAVENOUS; SUBCUTANEOUS at 11:36

## 2021-10-27 RX ADMIN — IOPAMIDOL 100 ML: 612 INJECTION, SOLUTION INTRAVENOUS at 01:53

## 2021-10-27 NOTE — PROGRESS NOTES
Hospitalist Progress Note    Subjective:   Daily Progress Note: 10/27/2021     Patient feels much better other than being on high flow oxygen. She continues to have dyspnea on exertion. No cough currently. Denies any chest pain abdominal pain. No headaches or dizziness. She stated her daughter is a medical power of .     Current Facility-Administered Medications   Medication Dose Route Frequency    methylPREDNISolone (PF) (Solu-MEDROL) injection 60 mg  60 mg IntraVENous Q8H    guaiFENesin (ROBITUSSIN) 100 mg/5 mL oral liquid 100 mg  100 mg Oral Q4H PRN    insulin lispro (HUMALOG) injection   SubCUTAneous AC&HS    glucose chewable tablet 16 g  4 Tablet Oral PRN    glucagon (GLUCAGEN) injection 1 mg  1 mg IntraMUSCular PRN    dextrose (D50W) injection syrg 12.5-25 g  25-50 mL IntraVENous PRN    sodium chloride (NS) flush 5-10 mL  5-10 mL IntraVENous PRN    azithromycin (ZITHROMAX) 500 mg in 0.9% sodium chloride 250 mL (VIAL-MATE)  500 mg IntraVENous Q24H    arformoteroL (BROVANA) neb solution 15 mcg  15 mcg Nebulization BID RT    budesonide (PULMICORT) 500 mcg/2 ml nebulizer suspension  500 mcg Nebulization BID RT    albuterol-ipratropium (DUO-NEB) 2.5 MG-0.5 MG/3 ML  3 mL Nebulization QID RT    sodium chloride (NS) flush 5-40 mL  5-40 mL IntraVENous Q8H    sodium chloride (NS) flush 5-40 mL  5-40 mL IntraVENous PRN    acetaminophen (TYLENOL) tablet 650 mg  650 mg Oral Q6H PRN    Or    acetaminophen (TYLENOL) suppository 650 mg  650 mg Rectal Q6H PRN    polyethylene glycol (MIRALAX) packet 17 g  17 g Oral DAILY PRN    ondansetron (ZOFRAN ODT) tablet 4 mg  4 mg Oral Q8H PRN    Or    ondansetron (ZOFRAN) injection 4 mg  4 mg IntraVENous Q6H PRN    enoxaparin (LOVENOX) injection 40 mg  40 mg SubCUTAneous DAILY    ALPRAZolam (XANAX) tablet 0.5 mg  0.5 mg Oral QHS    aspirin delayed-release tablet 81 mg  81 mg Oral DAILY    atorvastatin (LIPITOR) tablet 20 mg  20 mg Oral DAILY    famotidine (PEPCID) tablet 20 mg  20 mg Oral DAILY    losartan (COZAAR) tablet 50 mg  50 mg Oral DAILY    [Held by provider] metoprolol tartrate (LOPRESSOR) tablet 25 mg  25 mg Oral BID    furosemide (LASIX) tablet 40 mg  40 mg Oral DAILY    NIFEdipine ER (PROCARDIA XL) tablet 60 mg  60 mg Oral DAILY        Review of Systems  Pertinent items are noted in HPI. Objective:     Visit Vitals  BP (!) 150/61   Pulse 89   Temp 98.3 °F (36.8 °C)   Resp 16   SpO2 94%    O2 Flow Rate (L/min): 20 l/min O2 Device: Hi flow nasal cannula (HI-VNI TECH.)    Temp (24hrs), Av °F (36.7 °C), Min:97.7 °F (36.5 °C), Max:98.3 °F (36.8 °C)      No intake/output data recorded. 10/25 1901 - 10/27 0700  In: 240 [P.O.:240]  Out: -       General appearance -awake, high flow oxygen in situ,  not in acute distress  Chest -diminished air entry bilaterally, no wheezes or rhonchi currently.   Heart - S1 and S2 normal  Abdomen - soft, nontender, nondistended, Bowel sounds present  Neurological - alert, oriented, normal speech, no focal findings noted  Musculoskeletal - no joint tenderness or erythema of knees bilaterally  Extremities - no pedal edema noted      Data Review    Recent Results (from the past 24 hour(s))   GLUCOSE, POC    Collection Time: 10/26/21 11:57 AM   Result Value Ref Range    Glucose (POC) 202 (H) 70 - 110 mg/dL   GLUCOSE, POC    Collection Time: 10/26/21  3:42 PM   Result Value Ref Range    Glucose (POC) 158 (H) 70 - 110 mg/dL   GLUCOSE, POC    Collection Time: 10/26/21 10:11 PM   Result Value Ref Range    Glucose (POC) 166 (H) 70 - 872 mg/dL   METABOLIC PANEL, BASIC    Collection Time: 10/27/21  5:24 AM   Result Value Ref Range    Sodium 138 136 - 145 mmol/L    Potassium 4.3 3.5 - 5.5 mmol/L    Chloride 108 100 - 111 mmol/L    CO2 24 21 - 32 mmol/L    Anion gap 6 3.0 - 18 mmol/L    Glucose 192 (H) 74 - 99 mg/dL    BUN 31 (H) 7.0 - 18 MG/DL    Creatinine 0.96 0.6 - 1.3 MG/DL    BUN/Creatinine ratio 32 (H) 12 - 20 GFR est AA >60 >60 ml/min/1.73m2    GFR est non-AA 55 (L) >60 ml/min/1.73m2    Calcium 8.0 (L) 8.5 - 10.1 MG/DL   CBC WITH AUTOMATED DIFF    Collection Time: 10/27/21  5:24 AM   Result Value Ref Range    WBC 12.3 4.6 - 13.2 K/uL    RBC 4.54 4.20 - 5.30 M/uL    HGB 12.5 12.0 - 16.0 g/dL    HCT 39.3 35.0 - 45.0 %    MCV 86.6 78.0 - 100.0 FL    MCH 27.5 24.0 - 34.0 PG    MCHC 31.8 31.0 - 37.0 g/dL    RDW 14.6 (H) 11.6 - 14.5 %    PLATELET 951 879 - 530 K/uL    MPV 10.5 9.2 - 11.8 FL    NEUTROPHILS 92 (H) 40 - 73 %    LYMPHOCYTES 3 (L) 21 - 52 %    MONOCYTES 5 3 - 10 %    EOSINOPHILS 0 0 - 5 %    BASOPHILS 0 0 - 2 %    ABS. NEUTROPHILS 11.3 (H) 1.8 - 8.0 K/UL    ABS. LYMPHOCYTES 0.3 (L) 0.9 - 3.6 K/UL    ABS. MONOCYTES 0.6 0.05 - 1.2 K/UL    ABS. EOSINOPHILS 0.0 0.0 - 0.4 K/UL    ABS. BASOPHILS 0.0 0.0 - 0.1 K/UL    DF AUTOMATED     GLUCOSE, POC    Collection Time: 10/27/21  8:17 AM   Result Value Ref Range    Glucose (POC) 179 (H) 70 - 110 mg/dL         Assessment/Plan:     ASSESSMENT:    1. Acute on chronic hypoxic respiratory failure  2. COPD exacerbation in setting of severe emphysematous disease and severe pulmonary hypertension  3.  RSV bronchitis  4. Hypertension  5. Chronic diastolic CHF without exacerbation  6. Recent COVID-19 pneumonia    PLAN:    Continue high flow oxygen and wean her off  Continue nebulizer meant  Continue azithromycin and reduce steroid  Limited echocardiogram has been ordered  Continue sliding scale for hyper glycemia management  CT chest report reviewed  Blood cultures are negative d  Pulmonology input is pending  PT and OT to follow this patient  Discussed with patient at length about her current lung condition and had a DNR discussion. Palliative care has been consulted. Total time to take care of this patient was 30 minutes and more than 50% of time was spent counseling and coordinating care.      Disclaimer: Sections of this note are dictated using utilizing voice recognition software, which may have resulted in some phonetic based errors in grammar and contents. Even though attempts were made to correct all the mistakes, some may have been missed, and remained in the body of the document. If questions arise, please contact our department.

## 2021-10-27 NOTE — CONSULTS
[facility]. Pulmonary, Critical Care, and Sleep Medicine    F/U Pulmonary Patient Consult    Name: Liz Ramires MRN: [id number]   : 1935 Hospital: [address] [place] [address] Dr   Date: 10/27/2021                Subjective: This patient has been seen and evaluated at the request of Dr. Gilbert Callaway for acute on chronic Hypoxic Respiratory failure requiring HFNC 2/2 RSV infection in setting of severe COPD/Emphysema. Patient is a 82 y.o. female with PMH of COPD/emphysema (on 3.5 L NC home O2), chronic diastolic CHF, mild pulmonary HTN, COVID-19 PNA (now fully vaccinated), who presented to [facility] ED on 10/24/2021 c/o progressive worsening S OB. Patient reports that she began feeling ill approximately 5 days PTA with associated mild increase of SOB beyond her baseline. She reports going to [facility] ED,, where she was diagnosed with bronchitis, and was treated with IV steroids, albuterol treatment and Lasix. ,  And was subsequently D/C'd home on Prednisone. Unfortunately, despite taking the prednisone, she continued to have progressive worsening SOB and developed an overall dry but slightly productive cough. Denies F/C, HA, N/V/D, CP, dysuria. Ultimately, patient's S OB escalated to the point of activating EMS system. Upon arrival of EMS, patient's SPO2 was noted in the 50s on 3 to 4 L nasal cannula. Patient was placed on NRB and brought to ED. Upon arrival to ED, patient was found to be febrile, tachycardic and tachypneic with SPO2 of 88% on NRB. Patient was briefly required BiPAP and had significant improvement in her SPO2 and work of breathing. COVID-19 PCR (bio fire) was (-) for COVID-19, however was (+) for RSV. CXR showed hyperinflation with mild streaky atelectasis or infiltrate in the lung bases. CT Chest (-) for PE. She was started on azithromycin, ceftriaxone, IV steroids, and scheduled nebulizers.   Patient was admitted to hospitalist service for COPD exacerbations in setting of RSV infection. [facility] was then consulted on 10/27 for her continued hypoxia. Upon [facility] evaluation, patient was found in bed with her sister at bedside. Patient is talkative and AOx4 and able to answer questions appropriately. She denies any current tobacco use, and states that she quit smoking 30 years ago. Denies any alcohol or drug use. Is now fully vaccinated against COVID-19 after having COVID-19 PNA in 12/2020. She is currently wearing HFNC at 20 L and 50% FiO2. Patient does admit to some dyspnea with exertion, however she is able to hold nearly a full conversation without much conversational dyspnea. Her SPO2 while conversing maintained >90% on these settings. She did have a persistent dry cough during our conversation, which seemed to be somewhat relieved with cough drop that she took from her purse. She currently denies any CP, N/V/D, HA, F/C, ABD pain, dysuria. She continues to inquire if we can titrate down her FiO2 as she is eager to go back home. In addition to this, we did briefly discuss patient's GOC and CODE STATUS. I recommended in setting of her severe COPD/emphysema that she be a DNR as she is of high risk of not coming off the ventilator should she ever require intubation given her poor lung function overall..  Ms. Kalia Lyles agreed with these statements and agreed that she would not want to be maintained on a ventilator. She is already familiar with hospice and shares that her  went home on hospice and has since passed away. Although she would wish to remain FULL CODE at this time, she agreed that she would be discussing her CODE STATUS and overall advanced directives with her 2 children and her sister and is committed to filling out a POST form this admission. I also advised her that the Palliative Care team would be by to see her to further discuss this topic and also add additional layer of support for her during this admission.       Past Medical History:   Diagnosis Date    Chronic lung disease     [facility] pulmonary [facility] ([facility]     Heart failure ([facility]     Hypertension       Past Surgical History:   Procedure Laterality Date    HX ORTHOPAEDIC      spinal sx 5/6    HX OTHER SURGICAL      Carotid artery    VASCULAR SURGERY PROCEDURE UNLIST      L CEA 10/2014      Prior to Admission medications    Medication Sig Start Date End Date Taking? Authorizing Provider   furosemide (Lasix) 40 mg tablet Take 40 mg by mouth daily. Yes Provider, Historical   albuterol (Proventil HFA) 90 mcg/actuation inhaler Take 2 Puffs by inhalation every four (4) hours as needed for Wheezing, Shortness of Breath or Respiratory Distress. 10/21/21  Yes Tawana Stone MD   budesonide-glycopyr-formoterol (Breztri Aerosphere) 160-9-4.8 mcg/actuation HFAA Take  by inhalation. Yes Provider, Historical   OXYGEN-AIR DELIVERY SYSTEMS 3 L by IntraNASal route continuous. Yes Provider, Historical   famotidine (PEPCID) 20 mg tablet Take 1 Tab by mouth daily. 12/20/20  Yes Mayo Roberto NP   cholecalciferol (VITAMIN D3) (1000 Units /25 mcg) tablet Take 2 Tabs by mouth daily. 12/20/20  Yes Mayo Roberto NP   atorvastatin (LIPITOR) 20 mg tablet Take 20 mg by mouth daily. Yes Provider, Historical   COQ10, UBIQUINOL, PO Take 1 Cap by mouth daily. Yes Provider, Historical   losartan (COZAAR) 50 mg tablet Take  by mouth daily. 75 mg in a.m   Yes Provider, Historical   diclofenac (VOLTAREN) 1 % gel Apply  to affected area four (4) times daily. 12/20/17  Yes Jeanna Vazquez PA-C   Oxygen    Yes Provider, Historical   albuterol (PROVENTIL VENTOLIN) 2.5 mg /3 mL (0.083 %) nebulizer solution 3 mL by Nebulization route every four (4) hours as needed for Wheezing or Shortness of Breath. 6/4/16  Yes Velvet Clements MD   acetaminophen (TYLENOL EXTRA STRENGTH) 500 mg tablet Take 500 mg by mouth every six (6) hours as needed for Pain.    Yes Provider, Historical   metoprolol (LOPRESSOR) 25 mg tablet Take 25 mg by mouth two (2) times a day. Yes Provider, Historical   NIFEdipine ER (ADALAT CC) 60 mg ER tablet Take 60 mg by mouth daily. Yes Provider, Historical   alprazolam (XANAX) 0.5 mg tablet Take 0.5 mg by mouth nightly. Yes Provider, Historical   aspirin delayed-release 81 mg tablet Take 81 mg by mouth daily.    Yes Provider, Historical     No Known Allergies   Social History     Tobacco Use    Smoking status: Former Smoker     Packs/day: 1.50     Years: 30.00     Pack years: 45.00     Types: Cigarettes     Quit date: 1987     Years since quittin.1    Smokeless tobacco: Never Used   Substance Use Topics    Alcohol use: No     Alcohol/week: 0.0 standard drinks      Family History   Problem Relation Age of Onset    Heart Disease Mother     Hypertension Mother     Heart Attack Father     Hypertension Father         Current Facility-Administered Medications   Medication Dose Route Frequency    methylPREDNISolone (PF) (Solu-MEDROL) injection 60 mg  60 mg IntraVENous Q8H    insulin lispro (HUMALOG) injection   SubCUTAneous AC&HS    azithromycin (ZITHROMAX) 500 mg in 0.9% sodium chloride 250 mL (VIAL-MATE)  500 mg IntraVENous Q24H    arformoteroL (BROVANA) neb solution 15 mcg  15 mcg Nebulization BID RT    budesonide (PULMICORT) 500 mcg/2 ml nebulizer suspension  500 mcg Nebulization BID RT    albuterol-ipratropium (DUO-NEB) 2.5 MG-0.5 MG/3 ML  3 mL Nebulization QID RT    sodium chloride (NS) flush 5-40 mL  5-40 mL IntraVENous Q8H    enoxaparin (LOVENOX) injection 40 mg  40 mg SubCUTAneous DAILY    ALPRAZolam (XANAX) tablet 0.5 mg  0.5 mg Oral QHS    aspirin delayed-release tablet 81 mg  81 mg Oral DAILY    atorvastatin (LIPITOR) tablet 20 mg  20 mg Oral DAILY    famotidine (PEPCID) tablet 20 mg  20 mg Oral DAILY    losartan (COZAAR) tablet 50 mg  50 mg Oral DAILY    [Held by provider] metoprolol tartrate (LOPRESSOR) tablet 25 mg  25 mg Oral BID    furosemide (LASIX) tablet 40 mg  40 mg Oral DAILY    NIFEdipine ER (PROCARDIA XL) tablet 60 mg  60 mg Oral DAILY       Review of Systems:  A comprehensive ROS was obtained as stated in HPI, all others are negative        Objective:   Vital Signs:    Visit Vitals  BP (!) 150/61   Pulse 89   Temp 98.3 °F (36.8 °C)   Resp 16   Ht 5' 3\" (1.6 m)   Wt 54.4 kg (120 lb)   SpO2 94%   BMI 21.26 kg/m²       O2 Device: Hi flow nasal cannula   O2 Flow Rate (L/min): 20 l/min   Temp (24hrs), Av °F (36.7 °C), Min:97.7 °F (36.5 °C), Max:98.3 °F (36.8 °C)       Intake/Output:   Last shift:      10/27 07 - 10/27 1900  In: 120 [P.O.:120]  Out: -   Last 3 shifts: 10/25 1901 - 10/27 07  In: 240 [P.O.:240]  Out: -     Intake/Output Summary (Last 24 hours) at 10/27/2021 1544  Last data filed at 10/27/2021 0800  Gross per 24 hour   Intake 120 ml   Output --   Net 120 ml        Physical Exam:   General:  Alert, awake, currently resting on HFNC; NAD,appears stated age. HEENT:  Normocephalic, atraumatic; Conjunctivae/corneas clear; anicteric; Nares/mucosa normal/moist; No drainage/sinus tenderness; Lips/oral mucosa moist; No thrush; Neck supple, symmetrical; trachea midline; No adenopathy; No thyroid enlargement/tenderness/nodules; No JVD noted   Back:   Symmetric, no curvature, no spine tenderness or flank pain   Resp:   Symmetrical chest rise; mod AE bilat; Diminished throughout. Scattered crackles with mild/faint end expiratory wheezes. CVS:  No chest wall tenderness/ deformity/crepitus; RRR; S1,S2 normal; No m/r/g   GI:   Abdomen Soft, non-tender.(+) B. S x4; No masses/ organomegaly/ paradox   Extremities: Atraumatic; No cyanosis/clubbing. +Trace B/L pedal edema    Pulses: 1-2+ and symmetric all extremities.    Skin: Skin color, texture, turgor normal. No rashes or lesions   Lymph nodes: Cervical, supraclavicular, and axillary nodes normal.   Neurologic: Grossly nonfocal          Data review:   Labs:  Recent Results (from the past 24 hour(s))   GLUCOSE, POC    Collection Time: 10/26/21 10:11 PM   Result Value Ref Range    Glucose (POC) 166 (H) 70 - 853 mg/dL   METABOLIC PANEL, BASIC    Collection Time: 10/27/21  5:24 AM   Result Value Ref Range    Sodium 138 136 - 145 mmol/L    Potassium 4.3 3.5 - 5.5 mmol/L    Chloride 108 100 - 111 mmol/L    CO2 24 21 - 32 mmol/L    Anion gap 6 3.0 - 18 mmol/L    Glucose 192 (H) 74 - 99 mg/dL    BUN 31 (H) 7.0 - 18 MG/DL    Creatinine 0.96 0.6 - 1.3 MG/DL    BUN/Creatinine ratio 32 (H) 12 - 20      GFR est AA >60 >60 ml/min/1.73m2    GFR est non-AA 55 (L) >60 ml/min/1.73m2    Calcium 8.0 (L) 8.5 - 10.1 MG/DL   CBC WITH AUTOMATED DIFF    Collection Time: 10/27/21  5:24 AM   Result Value Ref Range    WBC 12.3 4.6 - 13.2 K/uL    RBC 4.54 4.20 - 5.30 M/uL    HGB 12.5 12.0 - 16.0 g/dL    HCT 39.3 35.0 - 45.0 %    MCV 86.6 78.0 - 100.0 FL    MCH 27.5 24.0 - 34.0 PG    MCHC 31.8 31.0 - 37.0 g/dL    RDW 14.6 (H) 11.6 - 14.5 %    PLATELET 474 790 - 310 K/uL    MPV 10.5 9.2 - 11.8 FL    NEUTROPHILS 92 (H) 40 - 73 %    LYMPHOCYTES 3 (L) 21 - 52 %    MONOCYTES 5 3 - 10 %    EOSINOPHILS 0 0 - 5 %    BASOPHILS 0 0 - 2 %    ABS. NEUTROPHILS 11.3 (H) 1.8 - 8.0 K/UL    ABS. LYMPHOCYTES 0.3 (L) 0.9 - 3.6 K/UL    ABS. MONOCYTES 0.6 0.05 - 1.2 K/UL    ABS. EOSINOPHILS 0.0 0.0 - 0.4 K/UL    ABS.  BASOPHILS 0.0 0.0 - 0.1 K/UL    DF AUTOMATED     GLUCOSE, POC    Collection Time: 10/27/21  8:17 AM   Result Value Ref Range    Glucose (POC) 179 (H) 70 - 110 mg/dL   GLUCOSE, POC    Collection Time: 10/27/21 11:35 AM   Result Value Ref Range    Glucose (POC) 150 (H) 70 - 110 mg/dL         PFT [08/02/2016 ]  SPIROMETRY 8/2/2016   Effort poor; did not meet ATS test time     FLOWS:   FEV1/FVC ratio is normal       Maximal Mid Expiratory Flow is supra-normal   FEV1 is normal and FVC is mildly decreased       Pre bronchodilator FEV1 is 1.54 L (81% predicted)   Pre bronchodilator FVC is 1.73 L (67% predicted)     Flow Volume Loop: Poor tracing     Bronchodilator:   Not done       Patient could not do lung volumes or diffusion capacity        Impression:   Poor study, hence would not be able to interpret reliably     Thank you Onesimo Agrawal MD for the opportunity to participate in   the care of this patient      ECHO [10/27/21]:  Pending    Imaging:    CXR [10/24/21]: FINDINGS:   EKG leads and wires overlie the chest.  Hyperinflation with mild diffuse interstitial prominence. Mild streaky opacities  at the lung bases. Atherosclerotic vascular calcifications. No cardiomegaly. No evidence of pleural effusion. CT  CHEST W CONT [10/27/21]: IMPRESSION  1. Hyperinflation with mild diffuse interstitial prominence likely related to  emphysema. Mild streaky atelectasis or infiltrate at the lung bases      FINDINGS:      Lungs:  Fairly extensive emphysematous changes in both lungs. No dominant mass  or discrete suspicious lung nodule is detected. No definite airspace opacities. Focal scarring in the right upper lobe anterior aspect and in the left lower  lobe.     Pleura:  No significant pleural effusion is detected bilaterally.     Mediastinum:  1.5 x 1.3 cm precarinal node.     Cardiovascular: Moderate atherosclerotic calcifications along the aorta.     Base of Neck, Axillae:  Left thyroid nodule. No adenopathy.     Esophagus:  Unremarkable.     Upper Abdomen:  Negative     Bones:  No acute findings.     IMPRESSION             1.  Emphysematous changes. 2.  No airspace opacities    CT  CHEST WO CONT [10/27/21]: FINDINGS:      Lungs:    - Fairly extensive emphysematous changes in both lungs. - No dominant mass or discrete suspicious lung nodule is detected. - No definite airspace opacities.     - Focal scarring in the right upper lobe anterior aspect and in the left lower  lobe.     Pleura:  No significant pleural effusion     Mediastinum:  1.5 x 1.3 cm precarinal node with eccentric calcification.     Cardiovascular:  No acute abnormalities. Moderate atherosclerotic  calcifications along the aorta.     Base of Neck, Axillae:  2.4 x 1.7 cm left thyroid nodule.     Esophagus:  Unremarkable.     Upper Abdomen:  Negative     Bones:  No acute findings.     IMPRESSION             1.  Emphysematous changes.     2. No airspace opacities        I have personally reviewed the patients radiographs and have reviewed the reports: Eric Sharif PA-C    Work done to facilitate the patient's plan of care by reviewing EMR, completing initial care plan and assess and initiate treatment, diagnostic work-up for attending MD to complete consultation. More than 50% time spent in coordination of plan and counseling. Eric Sharif PA-C  10/27/21    Pulmonary [address] [place] [address] Pulmonary Specialists        Attending Note:  I saw and evaluated the patient, performing all elements of service personally. I discussed the findings, assessment and plan with the PA and agree with the PA's findings and plan as documented in the PA's note. All edits and changes made above or are mentioned in my attending note which was independently assessed as well as written. 82-year-old female with past medical history of COPD (follows with TPM-Dr. Vanna Jensen), chronic hypoxic respiratory failure on 3-4 L of oxygen by nasal cannula, CHFpEF, pulmonary hypertension, [facility] with shortness of breath. Patient reported symptoms started about 5 days prior to arrival, progressively worsening dyspnea with bronchospasm, no relief with as needed albuterol HFA or nebs, patient reports using Breztri Aerosphere 2 puffs twice daily as her daily controller medicine. Patient was initially seen at the [facility] ER and discharged after getting IV steroids and Lasix and prednisone, patient reported that prednisone only helped slightly, however patient continued to have ongoing cough, so patient presented back to the ER.   During the course of hospitalization, patient was found to have worsening hypoxia, patient requiring high flow nasal cannula. At bedside, patient reports that goals of care were discussed by her, she is afraid that if she chooses to be a DNR, she will not be treated. 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 Avoid excessive beta agonist with DuoNeb and Brovana  Please hold home bronchodilators (Breztri Aerosphere). May resume on discharge  Supplemental oxygen to maintain SpO2 88-93%, agree with high flow nasal cannula with higher flows and lower FiO2.   Advise BiPAP-ST or AVAPS-ST nightly and as needed  Aggressive pulmonary toileting/bronchial hygiene  Frequent incentive spirometry  Aspiration precautions including elevating HOB >30deg  When possible (off of HFNC), PT/OT, OOB, ambulate with assistance as tolerated  DVT ppx per primary service  At discharge, please arrange followup with pt's primary pulmonologist - Dr. Taylor Og with [facility]  Will follow     Poor prognosis      Venancio Mason MD/MPH     Pulmonary, [address] [facility]

## 2021-10-27 NOTE — ROUTINE PROCESS
0710: Bedside and Verbal shift change report given to [address] (oncoming nurse) by Bonifacio Parsons RN (offgoing nurse). Report included the following information SBAR, Kardex, Intake/Output, MAR and Recent Results. 5946: Pt unaware why Lopressor is held.  Will speak with MD.

## 2021-10-27 NOTE — PROGRESS NOTES
Reason for Admission:  Acute bronchitis with chronic obstructive pulmonary disease (COPD) ([facility] [J44.0, J20.9]  Respiratory failure ([facility] [J96.90]  COPD exacerbation ([facility] [J44.1]                 RUR Score:    17%           Plan for utilizing home health:   [place] of choice for any home health agency that can accommodate services. Likelihood of Readmission:   Moderate                         Do you (patient/family) have any concerns for transition/discharge?  no    Transition of Care Plan:       Initial assessment completed with patient. Cognitive status of patient: oriented to time, place, person and situation. Face sheet information confirmed:  yes. The patient designates J to participate in her discharge plan and to receive any needed information. This patient lives alone in a single family home with family/friends nearby. Patient was able to navigate steps as needed. Prior to hospitalization, patient was considered to be independent with ADLs/IADLS : yes . Patient has a current ACP document on file:  POST in chart      The patient's family will be available to transport patient home upon discharge. The patient already has Shower chair, and home oxygen (5 LNC. Provided by [employer] medical equipment available in the home. Patient is not currently active with home health. Patient has not stayed in a skilled nursing facility or rehab. This patient is on dialysis :no     Freedom of choice signed: yes, for any home health agency that can accommodate services. Currently, the discharge plan is Home with [address] [place] [address] [facility]. - Will need home health orders at prior to discharge. CM will continue to monitor and assist with transition of care needs. The patient states that she can obtain her medications from the pharmacy, and take her medications as directed.     Patient's current insurance is Medicare Part A & B and       Care Management Interventions  PCP Verified by CM: Yes  Mode of Transport at Discharge: Self  Transition of Care Consult (CM Consult): Discharge Planning, Home Health  Discharge Durable Medical Equipment: No  Physical Therapy Consult: Yes  Occupational Therapy Consult: Yes  Speech Therapy Consult: No  Support Systems: Child(erick)  The Plan for Transition of Care is Related to the Following Treatment Goals : home with home health  The Patient and/or Patient Representative was Provided with a Choice of Provider and Agrees with the Discharge Plan?: Yes  Freedom of Choice List was Provided with Basic Dialogue that Supports the Patient's Individualized Plan of Care/Goals, Treatment Preferences and Shares the Quality Data Associated with the Providers?: Yes  Discharge Location  Discharge Placement: Home with home health        *ELÍAS Flor, RN  Pager # [phone]  Care Manager

## 2021-10-27 NOTE — ROUTINE PROCESS
Pt requesting \"cough syrup\" for frequent coughing. SPO2 @ 88- 92%. Will also call resp. therapist for nebs.

## 2021-10-27 NOTE — DIABETES MGMT
Diabetes Plan of Care    Assessment:  COPD exacerbation -   IVF containing dextrose: None  Steroids:  IV Solumedrol 60 mg every 8 hours  Diet: Regular; 4 carb choices  Lab  mg/dl this am    Recommendations: continue current DM plan of care     Most recent blood glucose values: Within target range (non-ICU: <140; ICU<180): No    Current A1c  Lab Results   Component Value Date/Time    Hemoglobin A1c 6.1 (H) 10/25/2021 04:15 AM     Adequate glycemic control PTA:  Yes      Current insulin regime:  Corrective humalog, very insulin resistant, 4 times daily     TDD previous day = 12 units humalog     Home diabetes medications;   None listed     Goals: Blood glucose will be within target of 70 - 180 mg/dl by - 10/31/2021    Education:  _____ Refer to Diabetes Education Record                       __X___ Education not indicated at this time      Kalyani Pond MPH RN [facility] [employer]  Pager [phone]  Office [phone]

## 2021-10-27 NOTE — ROUTINE PROCESS
Hospitalist phoned by nurse to confirm CT Scan of chest order. Scan w/ contrast completed. Pt returned to bed on hi flow. She c/o headache. See M.A.R.

## 2021-10-27 NOTE — CONSULTS
[facility]: [phone] ([phone])  [facility]: [phone]     Patient Name: Leticia Champion  YOB: 1935    Date of consult : 10/27/21  Reason for Consult: establish goals of care  Requesting Provider: Fina Marrero MD   Primary Care Physician: Joellen Alfredo NP      SUMMARY:   Leticia Champion is a 82 y.o. female with a past history of COPD,COVID,CHF, HTN, CAD, Pulmonary HTN, who was admitted on 10/24/2021 from home with a diagnosis of RSV with acute respiratory failure. Current medical issues leading to Palliative Medicine involvement include: establish goals of care. CHIEF COMPLAINT: \"My lungs are bad\"    HPI/SUBJECTIVE:    Patient is a 42-year-old  female known to our services from prior admission. She has longstanding COPD with acute on chronic respiratory failure debility. Patient continues to live independently and reports that she \"pretty much does everything on her own\" reports that she has multiple family members that live within a couple of miles from her. She values her independence above all. The patient is:   [x] Verbal and participatory  [] Non-participatory due to:     GOALS OF CARE:  Patient/Health Care Proxy Stated Goals: Prolong life      TREATMENT PREFERENCES:   Code Status: Full Code         PALLIATIVE DIAGNOSES:   1. Goals of care/acp  2. COPD  3. Acute on chronic respiratory failure  4. Debility       PLAN:   1. Goals of care/ACP  This NP and Gabriel Mack RN in to see patient at the bedside. She is on contact precautions. Our team has met with patient in the past and she verbalizes the same that she would not want CPR or to be placed on machines at end-of-life. When asked for her to complete any paperwork patient states that she has \"a living will at home\". She also states that she does not want the hospital to have a copy of her living will because \"then they will stop doing everything\".   We presented the POST and reviewed with her. Patient asked for a copy of it so she could review with her children and stated it would be okay for us to come back tomorrow to talk more about it. I expressed to her that unless she is willing to complete paperwork demoralizing her decisions she will remain a full code with full interventions including intubation and mechanical ventilation if that is needed during this hospital stay. She acknowledges and accepts this. Goals of care at this time remain full code with full interventions. 2.   COPD  On home oxygen since 2016 patient has emphysema with chronic scarring in the right upper lobe and right middle lobe is followed by outpatient pulmonologist.  Patient has a 30-pack-year history quit 33 years ago. 3.  Acute on chronic respiratory failure  Patient came in with RSV now on high flow oxygen at 50%. 4.  Debility  At baseline patient is a 70% palliative performance score unable to do normal job with severe disease process, she is independent with self-care. At this time her palliative performance score has dropped somewhat to between 50 and 60% mainly bed to chair, unable to do any work due to disease progression and now needing occasional to considerable amount of assistance for self-care and functional ADLs. 5.   Initial consult note routed to primary continuity provider  6. Communicated plan of care with: Palliative IDT      Advance Care Planning:  [] [facility] Interdisciplinary Team has updated the ACP Navigator with [address] and Patient Capacity    Primary Decision Maker ([address]): Next of kin are patient's son and daughter    Medical Interventions: Full interventions   Other Instructions:         As far as possible, the palliative care team has discussed with patient / health care proxy about goals of care / treatment preferences for patient.          HISTORY:     History obtained from: chart review   Principal Problem:    COPD exacerbation ([facility] (2021)    Active Problems:    Respiratory failure ([facility] (10/24/2021)      Acute bronchitis with chronic obstructive pulmonary disease (COPD) ([facility] (10/24/2021)      Past Medical History:   Diagnosis Date    Chronic lung disease     Chronic obstructive pulmonary disease ([facility]     Heart failure ([facility]     Hypertension       Past Surgical History:   Procedure Laterality Date    HX ORTHOPAEDIC      spinal sx /    HX OTHER SURGICAL      Carotid artery    VASCULAR SURGERY PROCEDURE UNLIST      L CEA 10/2014      Family History   Problem Relation Age of Onset    Heart Disease Mother     Hypertension Mother     Heart Attack Father     Hypertension Father      History reviewed, no pertinent family history.   Social History     Tobacco Use    Smoking status: Former Smoker     Packs/day: 1.50     Years: 30.00     Pack years: 45.00     Types: Cigarettes     Quit date: 1987     Years since quittin.1    Smokeless tobacco: Never Used   Substance Use Topics    Alcohol use: No     Alcohol/week: 0.0 standard drinks     No Known Allergies   Current Facility-Administered Medications   Medication Dose Route Frequency    methylPREDNISolone (PF) (Solu-MEDROL) injection 60 mg  60 mg IntraVENous Q8H    guaiFENesin (ROBITUSSIN) 100 mg/5 mL oral liquid 100 mg  100 mg Oral Q4H PRN    insulin lispro (HUMALOG) injection   SubCUTAneous AC&HS    glucose chewable tablet 16 g  4 Tablet Oral PRN    glucagon (GLUCAGEN) injection 1 mg  1 mg IntraMUSCular PRN    dextrose (D50W) injection syrg 12.5-25 g  25-50 mL IntraVENous PRN    sodium chloride (NS) flush 5-10 mL  5-10 mL IntraVENous PRN    azithromycin (ZITHROMAX) 500 mg in 0.9% sodium chloride 250 mL (VIAL-MATE)  500 mg IntraVENous Q24H    arformoteroL (BROVANA) neb solution 15 mcg  15 mcg Nebulization BID RT    budesonide (PULMICORT) 500 mcg/2 ml nebulizer suspension  500 mcg Nebulization BID RT    albuterol-ipratropium (DUO-NEB) 2.5 MG-0.5 MG/3 ML  3 mL Nebulization QID RT    sodium chloride (NS) flush 5-40 mL  5-40 mL IntraVENous Q8H    sodium chloride (NS) flush 5-40 mL  5-40 mL IntraVENous PRN    acetaminophen (TYLENOL) tablet 650 mg  650 mg Oral Q6H PRN    Or    acetaminophen (TYLENOL) suppository 650 mg  650 mg Rectal Q6H PRN    polyethylene glycol (MIRALAX) packet 17 g  17 g Oral DAILY PRN    ondansetron (ZOFRAN ODT) tablet 4 mg  4 mg Oral Q8H PRN    Or    ondansetron (ZOFRAN) injection 4 mg  4 mg IntraVENous Q6H PRN    enoxaparin (LOVENOX) injection 40 mg  40 mg SubCUTAneous DAILY    ALPRAZolam (XANAX) tablet 0.5 mg  0.5 mg Oral QHS    aspirin delayed-release tablet 81 mg  81 mg Oral DAILY    atorvastatin (LIPITOR) tablet 20 mg  20 mg Oral DAILY    famotidine (PEPCID) tablet 20 mg  20 mg Oral DAILY    losartan (COZAAR) tablet 50 mg  50 mg Oral DAILY    [Held by provider] metoprolol tartrate (LOPRESSOR) tablet 25 mg  25 mg Oral BID    furosemide (LASIX) tablet 40 mg  40 mg Oral DAILY    NIFEdipine ER (PROCARDIA XL) tablet 60 mg  60 mg Oral DAILY          Clinical Pain Assessment (nonverbal scale for nonverbal patients): Clinical Pain Assessment  Severity: 0          Duration: for how long has pt been experiencing pain (e.g., 2 days, 1 month, years)  Frequency: how often pain is an issue (e.g., several times per day, once every few days, constant)     FUNCTIONAL ASSESSMENT:     Palliative Performance Scale (PPS):  PPS: 70    ECOG  ECOG Status : Restricted     PSYCHOSOCIAL/SPIRITUAL SCREENING:      Any spiritual / Confucianism concerns:  [] Yes /  [x] No    Caregiver Burnout:  [] Yes /  [] No /  [x] No Caregiver Present      Anticipatory grief assessment:   [x] Normal  / [] Maladaptive        REVIEW OF SYSTEMS:     Systems: constitutional, ears/nose/mouth/throat, respiratory, gastrointestinal, genitourinary, musculoskeletal, integumentary, neurologic, psychiatric, endocrine. Positive findings noted below.   Modified ESAS Completed by: provider           Pain: 0           Dyspnea: 3           Stool Occurrence(s): 1   Positive and pertinent negative findings in ROS are noted above in HPI. The following systems were [x] reviewed / [] unable to be reviewed as noted in HPI  Other findings are noted below. PHYSICAL EXAM:     Constitutional: alert and interactive, not in distress   Eyes: pupils equal, anicteric  ENMT: no nasal discharge, moist mucous membranes  Cardiovascular: regular rhythm, distal pulses intact  Respiratory: breathing not labored, on salter 50%  Gastrointestinal: soft non-tender, +bowel sounds  Musculoskeletal: no deformity, no tenderness to palpation  Skin: warm, dry  Neurologic: AA and oriented X 4 following commands, moving all extremities  Psychiatric: full affect, no hallucinations    Other: Wt Readings from Last 3 Encounters:   10/21/21 54.4 kg (120 lb)   10/11/21 54.9 kg (121 lb)   04/15/21 55.9 kg (123 lb 3.2 oz)     Blood pressure (!) 150/61, pulse 89, temperature 98.3 °F (36.8 °C), resp. rate 16, SpO2 94 %. Pain:  Pain Scale 1: Numeric (0 - 10)  Pain Intensity 1: 0     Pain Location 1:  (\"R side front- burning; when I cough. \")  Pain Orientation 1: Anterior, Lateral  Pain Description 1: Burning  Pain Intervention(s) 1: Medication (see MAR)       LAB AND IMAGING FINDINGS:     Lab Results   Component Value Date/Time    WBC 12.3 10/27/2021 05:24 AM    HGB 12.5 10/27/2021 05:24 AM    PLATELET 191 94/25/3413 05:24 AM     Lab Results   Component Value Date/Time    Sodium 138 10/27/2021 05:24 AM    Potassium 4.3 10/27/2021 05:24 AM    Chloride 108 10/27/2021 05:24 AM    CO2 24 10/27/2021 05:24 AM    BUN 31 (H) 10/27/2021 05:24 AM    Creatinine 0.96 10/27/2021 05:24 AM    Calcium 8.0 (L) 10/27/2021 05:24 AM    Magnesium 2.6 10/25/2021 04:15 AM    Phosphorus 4.2 02/24/2021 03:30 AM      Lab Results   Component Value Date/Time    Alk.  phosphatase 53 10/24/2021 07:35 AM    Protein, total 7.3 10/24/2021 07:35 AM    Albumin 3.5 10/24/2021 07:35 AM    Globulin 3.8 10/24/2021 07:35 AM     Lab Results   Component Value Date/Time    INR 1.0 10/24/2021 07:35 AM    Prothrombin time 12.7 10/24/2021 07:35 AM    aPTT 34.6 02/24/2021 03:30 AM      Lab Results   Component Value Date/Time    Ferritin 35 02/24/2021 03:30 AM      No results found for: PH, PCO2, PO2  No components found for: [place]   Lab Results   Component Value Date/Time     10/24/2021 07:35 AM    CK - MB 1.4 10/24/2021 07:35 AM              Total time: 50 minutes   Counseling / coordination time, spent as noted above:   > 50% counseling / coordination:  Time spent in direct consultation with the patient, medical team, and family     Prolonged service was provided for  []30 min   []75 min in face to face time in the presence of the patient, spent as noted above. Time Start:   Time End:     Disclaimer: Sections of this note are dictated using utilizing voice recognition software, which may have resulted in some phonetic based errors in grammar and contents. Even though attempts were made to correct all the mistakes, some may have been missed, and remained in the body of the document. If questions arise, please contact our department.

## 2021-10-27 NOTE — ROUTINE PROCESS
Nurse left voice msg for CT tech regarding CT Scan of chest.  New #20 inserted to left basilic vein for contrast.

## 2021-10-27 NOTE — ROUTINE PROCESS
Telephone order was given to day nurse for CT of chest, order was placed however the CT needs to be with contrast per info the patient's nurse received from the technician. Patient;'s nurse called to the floor asking for modification of existing order or placement of new order.

## 2021-10-27 NOTE — PROGRESS NOTES
Physician Progress Note      Judson Adamson  CSN #:                  [id number]  :                       1935  ADMIT DATE:       10/24/2021 6:25 AM  DISCH DATE:  RESPONDING  PROVIDER #:        Margaret SMITH MD          QUERY TEXT:    Pt admitted with acute respiratory failure with COPD exacerbation. H&P states \"admitted for acute on chronic hypoxic respiratory failure secondary to COPD exacerbation in the setting of RSV infection with concern for sepsis secondary to community-acquired pneumonia. \" If possible, please document in the progress notes and discharge summary if you are evaluating and /or treating any of the following: The medical record reflects the following:    Risk Factors: History of COVID pneumonia    Clinical Indicators:  > Per H&P \"SIRS: POA, with fever, tachycardia, leukocytosis and lactic acidosis\"  > 10/24//21 Temp 100.8, 100.6, 99.8  > 10/24/21 /43, 101/42, 105/33  > WBC 10/24/21= 25.52, 10/25/21= 17.3  > 10/24/21 , 105, 129  > Lactic acid 10/24/21= 2.14    Treatment: Receiving Lactic acid, CBC,  IV Rocephin, IV Zithromax    Thank you,  Armando Urias, RN, BSN, [place], [address] [facility]  Options provided:  -- Sepsis, present on admission  -- Pneumonia without Sepsis  -- Sepsis was ruled out  -- Other - I will add my own diagnosis  -- Disagree - Not applicable / Not valid  -- Disagree - Clinically unable to determine / Unknown  -- Refer to Clinical Documentation Reviewer    PROVIDER RESPONSE TEXT:    After further study, sepsis was ruled out for this patient.     Query created by: Tram Nunes on 10/26/2021 1:24 PM      Electronically signed by:  Marylene Second MD 10/27/2021 9:27 AM

## 2021-10-27 NOTE — PROGRESS NOTES
Palliative Medicine    Palliative Medicine team Genna Mohamud NP and Brisa Liriano RN met at the pt's bedside. Pt was sitting on the edge of the bed on HFNC. Pt was alert and oriented x 4. States that the Pulmonologist gave her \"bad news\" today. States that she was told that \"my lungs are shot\". Discussed benefits and burdens of CPR and intubation with the pt. While the pt stated that she did not want those measures taken, she did NOT want us to change her code status and she did not want to complete a POST. Pt understands that she will remain a FULL code with FULL aggressive medical management, which will including doing resuscitation and intubating her if necessary. A copy of a POST form was left for the pt to review with her family. Will follow up with the pt at a later time to see if she is agreeable to POST completion. Pt remains FULL code with FULL aggressive medical management. Thank you for the Palliative Medicine consult and allowing us to participate in the care of Mrs. Juan Cody. Will continue to monitor and provide support.     Brisa Liriano RN, BSN  Palliative Medicine Inpatient [facility]  Palliative COPE Line: [phone] ([phone])

## 2021-10-28 LAB
ANION GAP SERPL CALC-SCNC: 5 MMOL/L (ref 3–18)
BUN SERPL-MCNC: 27 MG/DL (ref 7–18)
BUN/CREAT SERPL: 29 (ref 12–20)
CALCIUM SERPL-MCNC: 8.1 MG/DL (ref 8.5–10.1)
CHLORIDE SERPL-SCNC: 108 MMOL/L (ref 100–111)
CO2 SERPL-SCNC: 26 MMOL/L (ref 21–32)
CREAT SERPL-MCNC: 0.94 MG/DL (ref 0.6–1.3)
GLUCOSE BLD STRIP.AUTO-MCNC: 137 MG/DL (ref 70–110)
GLUCOSE BLD STRIP.AUTO-MCNC: 155 MG/DL (ref 70–110)
GLUCOSE BLD STRIP.AUTO-MCNC: 197 MG/DL (ref 70–110)
GLUCOSE BLD STRIP.AUTO-MCNC: 205 MG/DL (ref 70–110)
GLUCOSE SERPL-MCNC: 157 MG/DL (ref 74–99)
POTASSIUM SERPL-SCNC: 4.1 MMOL/L (ref 3.5–5.5)
SODIUM SERPL-SCNC: 139 MMOL/L (ref 136–145)

## 2021-10-28 PROCEDURE — 99232 SBSQ HOSP IP/OBS MODERATE 35: CPT | Performed by: INTERNAL MEDICINE

## 2021-10-28 PROCEDURE — 74011250636 HC RX REV CODE- 250/636: Performed by: FAMILY MEDICINE

## 2021-10-28 PROCEDURE — 74011250637 HC RX REV CODE- 250/637: Performed by: FAMILY MEDICINE

## 2021-10-28 PROCEDURE — 77010033678 HC OXYGEN DAILY

## 2021-10-28 PROCEDURE — 97112 NEUROMUSCULAR REEDUCATION: CPT

## 2021-10-28 PROCEDURE — 74011250637 HC RX REV CODE- 250/637: Performed by: STUDENT IN AN ORGANIZED HEALTH CARE EDUCATION/TRAINING PROGRAM

## 2021-10-28 PROCEDURE — 74011250636 HC RX REV CODE- 250/636: Performed by: INTERNAL MEDICINE

## 2021-10-28 PROCEDURE — 74011636637 HC RX REV CODE- 636/637: Performed by: INTERNAL MEDICINE

## 2021-10-28 PROCEDURE — 65660000000 HC RM CCU STEPDOWN

## 2021-10-28 PROCEDURE — 74011250637 HC RX REV CODE- 250/637: Performed by: INTERNAL MEDICINE

## 2021-10-28 PROCEDURE — 74011000250 HC RX REV CODE- 250: Performed by: INTERNAL MEDICINE

## 2021-10-28 PROCEDURE — 77010033711 HC HIGH FLOW OXYGEN

## 2021-10-28 PROCEDURE — 99233 SBSQ HOSP IP/OBS HIGH 50: CPT | Performed by: INTERNAL MEDICINE

## 2021-10-28 PROCEDURE — 80048 BASIC METABOLIC PNL TOTAL CA: CPT

## 2021-10-28 PROCEDURE — 94660 CPAP INITIATION&MGMT: CPT

## 2021-10-28 PROCEDURE — 97110 THERAPEUTIC EXERCISES: CPT

## 2021-10-28 PROCEDURE — 82962 GLUCOSE BLOOD TEST: CPT

## 2021-10-28 PROCEDURE — 36415 COLL VENOUS BLD VENIPUNCTURE: CPT

## 2021-10-28 PROCEDURE — 94640 AIRWAY INHALATION TREATMENT: CPT

## 2021-10-28 RX ADMIN — GUAIFENESIN 100 MG: 200 SOLUTION ORAL at 21:50

## 2021-10-28 RX ADMIN — METHYLPREDNISOLONE SODIUM SUCCINATE 40 MG: 125 INJECTION, POWDER, FOR SOLUTION INTRAMUSCULAR; INTRAVENOUS at 21:49

## 2021-10-28 RX ADMIN — ACETAMINOPHEN 650 MG: 325 TABLET ORAL at 20:23

## 2021-10-28 RX ADMIN — IPRATROPIUM BROMIDE AND ALBUTEROL SULFATE 3 ML: .5; 2.5 SOLUTION RESPIRATORY (INHALATION) at 14:11

## 2021-10-28 RX ADMIN — INSULIN LISPRO 3 UNITS: 100 INJECTION, SOLUTION INTRAVENOUS; SUBCUTANEOUS at 08:58

## 2021-10-28 RX ADMIN — INSULIN LISPRO 6 UNITS: 100 INJECTION, SOLUTION INTRAVENOUS; SUBCUTANEOUS at 16:43

## 2021-10-28 RX ADMIN — INSULIN LISPRO 3 UNITS: 100 INJECTION, SOLUTION INTRAVENOUS; SUBCUTANEOUS at 21:48

## 2021-10-28 RX ADMIN — IPRATROPIUM BROMIDE AND ALBUTEROL SULFATE 3 ML: .5; 2.5 SOLUTION RESPIRATORY (INHALATION) at 07:47

## 2021-10-28 RX ADMIN — PANTOPRAZOLE SODIUM 40 MG: 40 TABLET, DELAYED RELEASE ORAL at 08:57

## 2021-10-28 RX ADMIN — BUDESONIDE 1000 MCG: 1 SUSPENSION RESPIRATORY (INHALATION) at 02:46

## 2021-10-28 RX ADMIN — METHYLPREDNISOLONE SODIUM SUCCINATE 60 MG: 125 INJECTION, POWDER, FOR SOLUTION INTRAMUSCULAR; INTRAVENOUS at 06:39

## 2021-10-28 RX ADMIN — Medication 81 MG: at 08:56

## 2021-10-28 RX ADMIN — METHYLPREDNISOLONE SODIUM SUCCINATE 40 MG: 125 INJECTION, POWDER, FOR SOLUTION INTRAMUSCULAR; INTRAVENOUS at 13:17

## 2021-10-28 RX ADMIN — FUROSEMIDE 40 MG: 40 TABLET ORAL at 08:57

## 2021-10-28 RX ADMIN — ATORVASTATIN CALCIUM 20 MG: 20 TABLET, FILM COATED ORAL at 08:57

## 2021-10-28 RX ADMIN — BUDESONIDE 1000 MCG: 1 SUSPENSION RESPIRATORY (INHALATION) at 07:47

## 2021-10-28 RX ADMIN — IPRATROPIUM BROMIDE AND ALBUTEROL SULFATE 3 ML: .5; 2.5 SOLUTION RESPIRATORY (INHALATION) at 21:17

## 2021-10-28 RX ADMIN — NIFEDIPINE 60 MG: 30 TABLET, FILM COATED, EXTENDED RELEASE ORAL at 08:57

## 2021-10-28 RX ADMIN — AZITHROMYCIN 500 MG: 500 INJECTION, POWDER, LYOPHILIZED, FOR SOLUTION INTRAVENOUS at 06:40

## 2021-10-28 RX ADMIN — BUDESONIDE 1000 MCG: 1 SUSPENSION RESPIRATORY (INHALATION) at 21:17

## 2021-10-28 RX ADMIN — Medication 10 ML: at 13:18

## 2021-10-28 RX ADMIN — IPRATROPIUM BROMIDE AND ALBUTEROL SULFATE 3 ML: .5; 2.5 SOLUTION RESPIRATORY (INHALATION) at 02:46

## 2021-10-28 RX ADMIN — ALPRAZOLAM 0.5 MG: 1 TABLET ORAL at 21:49

## 2021-10-28 RX ADMIN — Medication 10 ML: at 21:50

## 2021-10-28 RX ADMIN — ENOXAPARIN SODIUM 40 MG: 100 INJECTION SUBCUTANEOUS at 08:56

## 2021-10-28 RX ADMIN — LOSARTAN POTASSIUM 50 MG: 50 TABLET, FILM COATED ORAL at 08:57

## 2021-10-28 NOTE — ROUTINE PROCESS
Nurse phoned R.RYANN. regarding new orders placed by Dr. Clarita Norwood. \"I will be up there in 20 minutes. \"

## 2021-10-28 NOTE — PROGRESS NOTES
0715: Bedside shift change report given to Hair Canseco RN (oncoming nurse) by Carrol Pike RN (offgoing nurse). Report included the following information SBAR, Kardex, Intake/Output, MAR and Cardiac Rhythm NSR.     1000: Dr. Vanessa Bailey rounding on pt.    1920: Bedside shift change report given to CARLEEN Fay (oncoming nurse) by Hair Canseco RN (offgoing nurse). Report included the following information SBAR, Kardex, Intake/Output, MAR and Cardiac Rhythm NSR.

## 2021-10-28 NOTE — PROGRESS NOTES
[facility]. Pulmonary, Critical Care, and Sleep Medicine    Progress note    Name: Angely Corona MRN: [id number]   : 1935 Hospital: [address] [place] [address] Dr   Date: 10/28/2021                Subjective: This patient has been seen and evaluated at the request of Dr. Cole Melendez for acute on chronic Hypoxic Respiratory failure requiring HFNC 2/2 RSV infection in setting of severe COPD/Emphysema. 10/28/21   Patient states that she feels much better. She is not as anxious and worried today. Currently out of bed-standing at bedside and talking to OT therapist.  Zelalem Gimenez with some shortness of breath or overall better  Remains on high flow nasal cannula saturating adequately-94 to 95%  Denies increasing cough  Denies any productive expectoration  Denies chest pain  Had concerns about returning home and not being able to get enough oxygen. She currently has a [employer] device  She has support from her son and his girlfriend    HPI  Patient is a 82 y.o. female with PMH of COPD/emphysema (on 3.5 L NC home O2), chronic diastolic CHF, mild pulmonary HTN, COVID-19 PNA (now fully vaccinated), who presented to [facility] ED on 10/24/2021 c/o progressive worsening S OB. Patient reports that she began feeling ill approximately 5 days PTA with associated mild increase of SOB beyond her baseline. She reports going to [facility] ED,, where she was diagnosed with bronchitis, and was treated with IV steroids, albuterol treatment and Lasix. ,  And was subsequently D/C'd home on Prednisone. Unfortunately, despite taking the prednisone, she continued to have progressive worsening SOB and developed an overall dry but slightly productive cough. Denies F/C, HA, N/V/D, CP, dysuria. Ultimately, patient's S OB escalated to the point of activating EMS system. Upon arrival of EMS, patient's SPO2 was noted in the 50s on 3 to 4 L nasal cannula. Patient was placed on NRB and brought to ED.   Upon arrival to ED, patient was found to be febrile, tachycardic and tachypneic with SPO2 of 88% on NRB. Patient was briefly required BiPAP and had significant improvement in her SPO2 and work of breathing. COVID-19 PCR (bio fire) was (-) for COVID-19, however was (+) for RSV. CXR showed hyperinflation with mild streaky atelectasis or infiltrate in the lung bases. CT Chest (-) for PE. She was started on azithromycin, ceftriaxone, IV steroids, and scheduled nebulizers. Patient was admitted to hospitalist service for COPD exacerbations in setting of RSV infection. PCCM was then consulted on 10/27 for her continued hypoxia. Upon PCC evaluation, patient was found in bed with her sister at bedside. Patient is talkative and AOx4 and able to answer questions appropriately. She denies any current tobacco use, and states that she quit smoking 30 years ago. Denies any alcohol or drug use. Is now fully vaccinated against COVID-19 after having COVID-19 PNA in 12/2020. She is currently wearing HFNC at 20 L and 50% FiO2. Patient does admit to some dyspnea with exertion, however she is able to hold nearly a full conversation without much conversational dyspnea. Her SPO2 while conversing maintained >90% on these settings. She did have a persistent dry cough during our conversation, which seemed to be somewhat relieved with cough drop that she took from her purse. She currently denies any CP, N/V/D, HA, F/C, ABD pain, dysuria. She continues to inquire if we can titrate down her FiO2 as she is eager to go back home. In addition to this, we did briefly discuss patient's GOC and CODE STATUS. I recommended in setting of her severe COPD/emphysema that she be a DNR as she is of high risk of not coming off the ventilator should she ever require intubation given her poor lung function overall..  Ms. Xiomara Pierce agreed with these statements and agreed that she would not want to be maintained on a ventilator.   She is already familiar with hospice and shares that her  went home on hospice and has since passed away. Although she would wish to remain FULL CODE at this time, she agreed that she would be discussing her CODE STATUS and overall advanced directives with her 2 children and her sister and is committed to filling out a POST form this admission. I also advised her that the Palliative Care team would be by to see her to further discuss this topic and also add additional layer of support for her during this admission.       Past Medical History:   Diagnosis Date    Chronic lung disease     [facility] obstructive pulmonary [facility]     Heart failure ([facility]     Hypertension       Past Surgical History:   Procedure Laterality Date    HX ORTHOPAEDIC      spinal sx     HX OTHER SURGICAL      Carotid artery    VASCULAR SURGERY PROCEDURE UNLIST      L CEA 10/2014        No Known Allergies   Social History     Tobacco Use    Smoking status: Former Smoker     Packs/day: 1.50     Years: 30.00     Pack years: 45.00     Types: Cigarettes     Quit date: 1987     Years since quittin.1    Smokeless tobacco: Never Used   Substance Use Topics    Alcohol use: No     Alcohol/week: 0.0 standard drinks      Family History   Problem Relation Age of Onset    Heart Disease Mother     Hypertension Mother     Heart Attack Father     Hypertension Father       Current Facility-Administered Medications   Medication Dose Route Frequency    methylPREDNISolone (PF) (Solu-MEDROL) injection 40 mg  40 mg IntraVENous Q8H    albuterol-ipratropium (DUO-NEB) 2.5 MG-0.5 MG/3 ML  3 mL Nebulization Q4H RT    budesonide (PULMICORT) 1,000 mcg/2 mL nebulizer susp  1,000 mcg Nebulization BID RT    pantoprazole (PROTONIX) tablet 40 mg  40 mg Oral ACB    insulin lispro (HUMALOG) injection   SubCUTAneous AC&HS    azithromycin (ZITHROMAX) 500 mg in 0.9% sodium chloride 250 mL (VIAL-MATE)  500 mg IntraVENous Q24H    sodium chloride (NS) flush 5-40 mL  5-40 mL IntraVENous Q8H    enoxaparin (LOVENOX) injection 40 mg  40 mg SubCUTAneous DAILY    ALPRAZolam (XANAX) tablet 0.5 mg  0.5 mg Oral QHS    aspirin delayed-release tablet 81 mg  81 mg Oral DAILY    atorvastatin (LIPITOR) tablet 20 mg  20 mg Oral DAILY    losartan (COZAAR) tablet 50 mg  50 mg Oral DAILY    [Held by provider] metoprolol tartrate (LOPRESSOR) tablet 25 mg  25 mg Oral BID    furosemide (LASIX) tablet 40 mg  40 mg Oral DAILY    NIFEdipine ER (PROCARDIA XL) tablet 60 mg  60 mg Oral DAILY       Review of Systems:  A comprehensive ROS was obtained as stated in HPI, all others are negative    Objective:   Vital Signs:    Visit Vitals  BP (!) 139/51 (BP 1 Location: Right upper arm, BP Patient Position: At rest)   Pulse 95   Temp 98 °F (36.7 °C)   Resp 18   Ht 5' 3\" (1.6 m)   Wt 54.4 kg (120 lb)   SpO2 91%   BMI 21.26 kg/m²       O2 Device: Hi flow nasal cannula, Heated   O2 Flow Rate (L/min): 35 l/min   Temp (24hrs), Av.1 °F (36.7 °C), Min:97.8 °F (36.6 °C), Max:98.4 °F (36.9 °C)       Intake/Output:   Last shift:      No intake/output data recorded. Last 3 shifts: 10/26 1901 - 10/28 0700  In: 410 [P.O.:410]  Out: -     Intake/Output Summary (Last 24 hours) at 10/28/2021 1120  Last data filed at 10/27/2021 1704  Gross per 24 hour   Intake 290 ml   Output --   Net 290 ml        Physical Exam:   General:  Alert, awake, currently resting on HFNC; NAD,appears stated age. HEENT:  Normocephalic, atraumatic; Conjunctivae/corneas clear; anicteric; Nares/mucosa normal/moist; No drainage/sinus tenderness; Lips/oral mucosa moist; No thrush; Neck supple, symmetrical; trachea midline; No adenopathy; No thyroid enlargement/tenderness/nodules; No JVD noted   Back:   Symmetric, no curvature, no spine tenderness or flank pain   Resp:   Symmetrical chest rise; mod AE bilat; Diminished throughout. Scattered crackles with mild/faint end expiratory wheezes.     CVS:  No chest wall tenderness/ deformity/crepitus; RRR; S1,S2 normal; No m/r/g   GI:   Abdomen Soft, non-tender.(+) B. S x4; No masses/ organomegaly/ paradox   Extremities: Atraumatic; No cyanosis/clubbing. +Trace B/L pedal edema    Pulses: 1-2+ and symmetric all extremities.    Skin: Skin color, texture, turgor normal. No rashes or lesions   Lymph nodes: Cervical, supraclavicular, and axillary nodes normal.   Neurologic: Grossly nonfocal          Data review:   Labs:  Recent Results (from the past 24 hour(s))   GLUCOSE, POC    Collection Time: 10/27/21 11:35 AM   Result Value Ref Range    Glucose (POC) 150 (H) 70 - 110 mg/dL   ECHO ADULT FOLLOW-UP OR LIMITED    Collection Time: 10/27/21  4:29 PM   Result Value Ref Range    IVSd 0.99 (A) 0.60 - 0.90 cm    LVIDd 5.22 3.90 - 5.30 cm    LVIDs 3.67 cm    LVPWd 0.84 0.60 - 0.90 cm    Tapse 2.00 1.50 - 2.00 cm    Triscuspid Valve Regurgitation Peak Gradient 73.03 mmHg    TR Max Velocity 427.29 cm/s    LV Mass .8 67.0 - 162.0 g    LV Mass AL Index 111.4 43.0 - 95.0 g/m2   GLUCOSE, POC    Collection Time: 10/27/21  4:55 PM   Result Value Ref Range    Glucose (POC) 169 (H) 70 - 110 mg/dL   GLUCOSE, POC    Collection Time: 10/27/21  9:48 PM   Result Value Ref Range    Glucose (POC) 201 (H) 70 - 737 mg/dL   METABOLIC PANEL, BASIC    Collection Time: 10/28/21 12:52 AM   Result Value Ref Range    Sodium 139 136 - 145 mmol/L    Potassium 4.1 3.5 - 5.5 mmol/L    Chloride 108 100 - 111 mmol/L    CO2 26 21 - 32 mmol/L    Anion gap 5 3.0 - 18 mmol/L    Glucose 157 (H) 74 - 99 mg/dL    BUN 27 (H) 7.0 - 18 MG/DL    Creatinine 0.94 0.6 - 1.3 MG/DL    BUN/Creatinine ratio 29 (H) 12 - 20      GFR est AA >60 >60 ml/min/1.73m2    GFR est non-AA 57 (L) >60 ml/min/1.73m2    Calcium 8.1 (L) 8.5 - 10.1 MG/DL   GLUCOSE, POC    Collection Time: 10/28/21  8:12 AM   Result Value Ref Range    Glucose (POC) 197 (H) 70 - 110 mg/dL         PFT [08/02/2016 ]  SPIROMETRY 8/2/2016   Effort poor; did not meet ATS test time     FLOWS:   FEV1/FVC ratio is normal       Maximal Mid Expiratory Flow is supra-normal   FEV1 is normal and FVC is mildly decreased       Pre bronchodilator FEV1 is 1.54 L (81% predicted)   Pre bronchodilator FVC is 1.73 L (67% predicted)     Flow Volume Loop:   Poor tracing     Bronchodilator:   Not done       Patient could not do lung volumes or diffusion capacity        Impression:   Poor study, hence would not be able to interpret reliably       ECHO [10/27/21]:  Pending    Imaging:    CXR [10/24/21]: FINDINGS:   EKG leads and wires overlie the chest.  Hyperinflation with mild diffuse interstitial prominence. Mild streaky opacities  at the lung bases. Atherosclerotic vascular calcifications. No cardiomegaly. No evidence of pleural effusion. CT  CHEST W CONT [10/27/21]: IMPRESSION  1. Hyperinflation with mild diffuse interstitial prominence likely related to  emphysema. Mild streaky atelectasis or infiltrate at the lung bases      FINDINGS:      Lungs:  Fairly extensive emphysematous changes in both lungs. No dominant mass  or discrete suspicious lung nodule is detected. No definite airspace opacities. Focal scarring in the right upper lobe anterior aspect and in the left lower  lobe.     Pleura:  No significant pleural effusion is detected bilaterally.     Mediastinum:  1.5 x 1.3 cm precarinal node.     Cardiovascular: Moderate atherosclerotic calcifications along the aorta.     Base of Neck, Axillae:  Left thyroid nodule. No adenopathy.     Esophagus:  Unremarkable.     Upper Abdomen:  Negative     Bones:  No acute findings.     IMPRESSION             1.  Emphysematous changes. 2.  No airspace opacities    CT  CHEST WO CONT [10/27/21]: FINDINGS:      Lungs:    - Fairly extensive emphysematous changes in both lungs. - No dominant mass or discrete suspicious lung nodule is detected. - No definite airspace opacities.     - Focal scarring in the right upper lobe anterior aspect and in the left lower  lobe.     Pleura:  No significant pleural effusion     Mediastinum:  1.5 x 1.3 cm precarinal node with eccentric calcification.     Cardiovascular:  No acute abnormalities. Moderate atherosclerotic  calcifications along the aorta.     Base of Neck, Axillae:  2.4 x 1.7 cm left thyroid nodule.     Esophagus:  Unremarkable.     Upper Abdomen:  Negative     Bones:  No acute findings.     IMPRESSION             1.  Emphysematous changes.     2. 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 Can use BiPAP-ST at night as needed  Agree with IV antibiotics-de-escalate to p.o. as tolerated  Adjusted bronchodilators, please schedule: duonebs q4h, pulmicort nebs 1mg BID, and albuterol PRN. Avoid excessive beta agonist with DuoNeb and Brovana  Please hold home bronchodilators ([email or web address]).   May resume on discharge  Aggressive pulmonary toileting/bronchial hygiene  Frequent incentive spirometry  Aspiration precautions including elevating HOB >30deg   PT/OT, OOB, ambulate with assistance as tolerated  DVT ppx per primary service  Discussed patient's concerns about supplemental oxygen at home explained-that if unable to wean to acceptable levels to be delivered by her current device will order additional DME to support her oxygen needs  At discharge, please arrange followup with pt's primary pulmonologist - Dr. Augustine Hyatt with [facility].  Will follow      High complexity decision making was performed during the evaluation of this patient at high risk for decompensation with multiple organ involvement     Above mentioned total time spent on reviewing the case/medical record/data/notes/EMR/patient examination/documentation/coordinating care with nurse/consultants, exclusive of procedures with complex decision making performed and > 50% time spent in face to face evaluation.     Windsor Cooks, MD  10/28/21    Pulmonary Critical Care Medicine  [facility]

## 2021-10-28 NOTE — PROGRESS NOTES
Hospitalist Progress Note    Subjective:   Daily Progress Note: 10/28/2021     Patient remains on high flow oxygen 35 L. Denies any headaches or dizziness. Dyspnea on exertion present. No chest pain or abdominal pain. She has a DNR paper but she is still thinking to sign it. She wants to talk to her family member before see signs it.     Current Facility-Administered Medications   Medication Dose Route Frequency    methylPREDNISolone (PF) (Solu-MEDROL) injection 40 mg  40 mg IntraVENous Q8H    albuterol-ipratropium (DUO-NEB) 2.5 MG-0.5 MG/3 ML  3 mL Nebulization Q4H RT    budesonide (PULMICORT) 1,000 mcg/2 mL nebulizer susp  1,000 mcg Nebulization BID RT    pantoprazole (PROTONIX) tablet 40 mg  40 mg Oral ACB    guaiFENesin (ROBITUSSIN) 100 mg/5 mL oral liquid 100 mg  100 mg Oral Q4H PRN    insulin lispro (HUMALOG) injection   SubCUTAneous AC&HS    glucose chewable tablet 16 g  4 Tablet Oral PRN    glucagon (GLUCAGEN) injection 1 mg  1 mg IntraMUSCular PRN    dextrose (D50W) injection syrg 12.5-25 g  25-50 mL IntraVENous PRN    sodium chloride (NS) flush 5-10 mL  5-10 mL IntraVENous PRN    azithromycin (ZITHROMAX) 500 mg in 0.9% sodium chloride 250 mL (VIAL-MATE)  500 mg IntraVENous Q24H    sodium chloride (NS) flush 5-40 mL  5-40 mL IntraVENous Q8H    sodium chloride (NS) flush 5-40 mL  5-40 mL IntraVENous PRN    acetaminophen (TYLENOL) tablet 650 mg  650 mg Oral Q6H PRN    Or    acetaminophen (TYLENOL) suppository 650 mg  650 mg Rectal Q6H PRN    polyethylene glycol (MIRALAX) packet 17 g  17 g Oral DAILY PRN    ondansetron (ZOFRAN ODT) tablet 4 mg  4 mg Oral Q8H PRN    Or    ondansetron (ZOFRAN) injection 4 mg  4 mg IntraVENous Q6H PRN    enoxaparin (LOVENOX) injection 40 mg  40 mg SubCUTAneous DAILY    ALPRAZolam (XANAX) tablet 0.5 mg  0.5 mg Oral QHS    aspirin delayed-release tablet 81 mg  81 mg Oral DAILY    atorvastatin (LIPITOR) tablet 20 mg  20 mg Oral DAILY    losartan (COZAAR) tablet 50 mg  50 mg Oral DAILY    [Held by provider] metoprolol tartrate (LOPRESSOR) tablet 25 mg  25 mg Oral BID    furosemide (LASIX) tablet 40 mg  40 mg Oral DAILY    NIFEdipine ER (PROCARDIA XL) tablet 60 mg  60 mg Oral DAILY        Review of Systems  Pertinent items are noted in HPI. Objective:     Visit Vitals  BP (!) 139/51 (BP 1 Location: Right upper arm, BP Patient Position: At rest)   Pulse 95   Temp 98 °F (36.7 °C)   Resp 18   Ht 5' 3\" (1.6 m)   Wt 54.4 kg (120 lb)   SpO2 91%   BMI 21.26 kg/m²    O2 Flow Rate (L/min): 35 l/min O2 Device: Hi flow nasal cannula, Heated    Temp (24hrs), Av.1 °F (36.7 °C), Min:97.8 °F (36.6 °C), Max:98.4 °F (36.9 °C)      No intake/output data recorded. 10/26 1901 - 10/28 0700  In: 410 [P.O.:410]  Out: -       General appearance -awake, high flow oxygen in situ,  not in acute distress  Chest -diminished air entry bilaterally, no wheezes or rhonchi currently.   Heart - S1 and S2 normal  Abdomen - soft, nontender, nondistended, Bowel sounds present  Neurological - alert, oriented, normal speech, no focal findings noted  Musculoskeletal - no joint tenderness or erythema of knees bilaterally  Extremities - no pedal edema noted      Data Review    Recent Results (from the past 24 hour(s))   GLUCOSE, POC    Collection Time: 10/27/21 11:35 AM   Result Value Ref Range    Glucose (POC) 150 (H) 70 - 110 mg/dL   ECHO ADULT FOLLOW-UP OR LIMITED    Collection Time: 10/27/21  4:29 PM   Result Value Ref Range    IVSd 0.99 (A) 0.60 - 0.90 cm    LVIDd 5.22 3.90 - 5.30 cm    LVIDs 3.67 cm    LVPWd 0.84 0.60 - 0.90 cm    Tapse 2.00 1.50 - 2.00 cm    Triscuspid Valve Regurgitation Peak Gradient 73.03 mmHg    TR Max Velocity 427.29 cm/s    LV Mass .8 67.0 - 162.0 g    LV Mass AL Index 111.4 43.0 - 95.0 g/m2   GLUCOSE, POC    Collection Time: 10/27/21  4:55 PM   Result Value Ref Range    Glucose (POC) 169 (H) 70 - 110 mg/dL   GLUCOSE, POC    Collection Time: 10/27/21  9:48 PM Result Value Ref Range    Glucose (POC) 201 (H) 70 - 481 mg/dL   METABOLIC PANEL, BASIC    Collection Time: 10/28/21 12:52 AM   Result Value Ref Range    Sodium 139 136 - 145 mmol/L    Potassium 4.1 3.5 - 5.5 mmol/L    Chloride 108 100 - 111 mmol/L    CO2 26 21 - 32 mmol/L    Anion gap 5 3.0 - 18 mmol/L    Glucose 157 (H) 74 - 99 mg/dL    BUN 27 (H) 7.0 - 18 MG/DL    Creatinine 0.94 0.6 - 1.3 MG/DL    BUN/Creatinine ratio 29 (H) 12 - 20      GFR est AA >60 >60 ml/min/1.73m2    GFR est non-AA 57 (L) >60 ml/min/1.73m2    Calcium 8.1 (L) 8.5 - 10.1 MG/DL   GLUCOSE, POC    Collection Time: 10/28/21  8:12 AM   Result Value Ref Range    Glucose (POC) 197 (H) 70 - 110 mg/dL         Assessment/Plan:     ASSESSMENT:    1. Acute on chronic hypoxic respiratory failure  2. COPD exacerbation in setting of severe emphysematous disease and severe pulmonary hypertension  3.  RSV bronchitis  4. Hypertension  5. Chronic diastolic CHF without exacerbation  6. Recent COVID-19 pneumonia    PLAN:    Continue high flow oxygen and wean her off  Pulmonologist to follow  Continue nebulizer treatment  Continue azithromycin and steroid  Limited echocardiogram shows ejection fraction 60-65 percentage with severe pulmonary hypertension  Continue sliding scale for hyperglycemia management  PT and OT to follow this patient    Patient is aware about her poor prognosis at this point. Total time to take care of this patient was 30 minutes and more than 50% of time was spent counseling and coordinating care. Disclaimer: Sections of this note are dictated using utilizing voice recognition software, which may have resulted in some phonetic based errors in grammar and contents. Even though attempts were made to correct all the mistakes, some may have been missed, and remained in the body of the document. If questions arise, please contact our department.

## 2021-10-28 NOTE — DIABETES MGMT
Diabetes/ Glycemic Control Plan of Care  Recommendations:   1. Continue corrective lispro, very insulin resistant ACHS. 2. Should pt require > 10 units corrective lispro/24 hours, may want to consider low dose of lantus starting with 5 units Lantus/day. Assessment:   DX:  COPD;  No history of diabetes;  A1C in prediabetes range. Blood glucose 137 to 201 mg/dL past 2 days - steroid associated hyperglycemia. Fasting/ Morning blood glucose:   Lab Results   Component Value Date/Time    Glucose 157 (H) 10/28/2021 12:52 AM    Glucose (POC) 137 (H) 10/28/2021 11:51 AM     IV Fluids containing dextrose: none  Steroids: Solumedrol  40 mg q 8 hours     Blood glucose values: Within target range ( mg/dL):  variable  Current insulin orders: corrective lispro, very insulin resistant dosing ACHS  Total Daily Dose previous 24 hours = 9 units (corrective lispro)  Current A1c:   Lab Results   Component Value Date/Time    Hemoglobin A1c 6.1 (H) 10/25/2021 04:15 AM      equivalent  to ave Blood Glucose of 123 mg/dl for 2-3 months prior to admission  Adequate glycemic control PTA: YES  Nutrition/Diet:   Active Orders   Diet    ADULT DIET Regular; 4 carb choices (60 gm/meal)      Meal Intake:  Patient Vitals for the past 168 hrs:   % Diet Eaten   10/28/21 0900 26 - 50%   10/27/21 1704 1 - 25%   10/27/21 1200 51 - 75%   10/27/21 0800 76 - 100%   10/26/21 1544 76 - 100%     Supplement Intake:  No data found. Home diabetes medications:   Key Antihyperglycemic Medications     Patient is on no antihyperglycemic meds.         Plan/Goals:   Blood glucose will be within target of 70 - 180 mg/dl within 72 hours    Education:  [] Refer to Diabetes Education Record                       [x] Education not indicated at this time     Angela Tee,  MS, RD, [facility]  Diabetes and Glycemic Control   [employer]

## 2021-10-29 LAB
GLUCOSE BLD STRIP.AUTO-MCNC: 112 MG/DL (ref 70–110)
GLUCOSE BLD STRIP.AUTO-MCNC: 152 MG/DL (ref 70–110)
GLUCOSE BLD STRIP.AUTO-MCNC: 169 MG/DL (ref 70–110)
GLUCOSE BLD STRIP.AUTO-MCNC: 212 MG/DL (ref 70–110)

## 2021-10-29 PROCEDURE — 94640 AIRWAY INHALATION TREATMENT: CPT

## 2021-10-29 PROCEDURE — 74011250636 HC RX REV CODE- 250/636: Performed by: FAMILY MEDICINE

## 2021-10-29 PROCEDURE — 99232 SBSQ HOSP IP/OBS MODERATE 35: CPT | Performed by: INTERNAL MEDICINE

## 2021-10-29 PROCEDURE — 74011636637 HC RX REV CODE- 636/637: Performed by: INTERNAL MEDICINE

## 2021-10-29 PROCEDURE — 74011250637 HC RX REV CODE- 250/637: Performed by: INTERNAL MEDICINE

## 2021-10-29 PROCEDURE — 77010033711 HC HIGH FLOW OXYGEN

## 2021-10-29 PROCEDURE — 82962 GLUCOSE BLOOD TEST: CPT

## 2021-10-29 PROCEDURE — 74011250637 HC RX REV CODE- 250/637: Performed by: STUDENT IN AN ORGANIZED HEALTH CARE EDUCATION/TRAINING PROGRAM

## 2021-10-29 PROCEDURE — 74011250636 HC RX REV CODE- 250/636: Performed by: INTERNAL MEDICINE

## 2021-10-29 PROCEDURE — 74011000250 HC RX REV CODE- 250: Performed by: INTERNAL MEDICINE

## 2021-10-29 PROCEDURE — 74011250637 HC RX REV CODE- 250/637: Performed by: FAMILY MEDICINE

## 2021-10-29 PROCEDURE — 65660000000 HC RM CCU STEPDOWN

## 2021-10-29 PROCEDURE — 99233 SBSQ HOSP IP/OBS HIGH 50: CPT | Performed by: INTERNAL MEDICINE

## 2021-10-29 RX ADMIN — IPRATROPIUM BROMIDE AND ALBUTEROL SULFATE 3 ML: .5; 2.5 SOLUTION RESPIRATORY (INHALATION) at 15:49

## 2021-10-29 RX ADMIN — NIFEDIPINE 60 MG: 30 TABLET, FILM COATED, EXTENDED RELEASE ORAL at 10:28

## 2021-10-29 RX ADMIN — IPRATROPIUM BROMIDE AND ALBUTEROL SULFATE 3 ML: .5; 2.5 SOLUTION RESPIRATORY (INHALATION) at 00:02

## 2021-10-29 RX ADMIN — FUROSEMIDE 40 MG: 40 TABLET ORAL at 10:28

## 2021-10-29 RX ADMIN — INSULIN LISPRO 6 UNITS: 100 INJECTION, SOLUTION INTRAVENOUS; SUBCUTANEOUS at 17:48

## 2021-10-29 RX ADMIN — GUAIFENESIN 100 MG: 200 SOLUTION ORAL at 22:01

## 2021-10-29 RX ADMIN — ACETAMINOPHEN 650 MG: 325 TABLET ORAL at 17:44

## 2021-10-29 RX ADMIN — IPRATROPIUM BROMIDE AND ALBUTEROL SULFATE 3 ML: .5; 2.5 SOLUTION RESPIRATORY (INHALATION) at 11:29

## 2021-10-29 RX ADMIN — METHYLPREDNISOLONE SODIUM SUCCINATE 40 MG: 125 INJECTION, POWDER, FOR SOLUTION INTRAMUSCULAR; INTRAVENOUS at 22:00

## 2021-10-29 RX ADMIN — GUAIFENESIN 100 MG: 200 SOLUTION ORAL at 17:44

## 2021-10-29 RX ADMIN — ALPRAZOLAM 0.5 MG: 1 TABLET ORAL at 22:01

## 2021-10-29 RX ADMIN — PANTOPRAZOLE SODIUM 40 MG: 40 TABLET, DELAYED RELEASE ORAL at 10:28

## 2021-10-29 RX ADMIN — IPRATROPIUM BROMIDE AND ALBUTEROL SULFATE 3 ML: .5; 2.5 SOLUTION RESPIRATORY (INHALATION) at 21:02

## 2021-10-29 RX ADMIN — ATORVASTATIN CALCIUM 20 MG: 20 TABLET, FILM COATED ORAL at 10:28

## 2021-10-29 RX ADMIN — GUAIFENESIN 100 MG: 200 SOLUTION ORAL at 10:40

## 2021-10-29 RX ADMIN — IPRATROPIUM BROMIDE AND ALBUTEROL SULFATE 3 ML: .5; 2.5 SOLUTION RESPIRATORY (INHALATION) at 09:33

## 2021-10-29 RX ADMIN — IPRATROPIUM BROMIDE AND ALBUTEROL SULFATE 3 ML: .5; 2.5 SOLUTION RESPIRATORY (INHALATION) at 03:17

## 2021-10-29 RX ADMIN — ACETAMINOPHEN 650 MG: 325 TABLET ORAL at 12:59

## 2021-10-29 RX ADMIN — Medication 2 DROP: at 20:13

## 2021-10-29 RX ADMIN — METHYLPREDNISOLONE SODIUM SUCCINATE 40 MG: 125 INJECTION, POWDER, FOR SOLUTION INTRAMUSCULAR; INTRAVENOUS at 15:49

## 2021-10-29 RX ADMIN — GUAIFENESIN 100 MG: 200 SOLUTION ORAL at 03:32

## 2021-10-29 RX ADMIN — ENOXAPARIN SODIUM 40 MG: 100 INJECTION SUBCUTANEOUS at 10:28

## 2021-10-29 RX ADMIN — BUDESONIDE 1000 MCG: 1 SUSPENSION RESPIRATORY (INHALATION) at 09:33

## 2021-10-29 RX ADMIN — METHYLPREDNISOLONE SODIUM SUCCINATE 40 MG: 125 INJECTION, POWDER, FOR SOLUTION INTRAMUSCULAR; INTRAVENOUS at 06:10

## 2021-10-29 RX ADMIN — BUDESONIDE 1000 MCG: 1 SUSPENSION RESPIRATORY (INHALATION) at 21:02

## 2021-10-29 RX ADMIN — INSULIN LISPRO 3 UNITS: 100 INJECTION, SOLUTION INTRAVENOUS; SUBCUTANEOUS at 12:56

## 2021-10-29 RX ADMIN — LOSARTAN POTASSIUM 50 MG: 50 TABLET, FILM COATED ORAL at 10:28

## 2021-10-29 RX ADMIN — Medication 81 MG: at 10:28

## 2021-10-29 RX ADMIN — Medication 10 ML: at 22:02

## 2021-10-29 RX ADMIN — Medication 10 ML: at 06:11

## 2021-10-29 RX ADMIN — AZITHROMYCIN 500 MG: 500 INJECTION, POWDER, LYOPHILIZED, FOR SOLUTION INTRAVENOUS at 06:10

## 2021-10-29 RX ADMIN — Medication 10 ML: at 17:51

## 2021-10-29 NOTE — PROGRESS NOTES
Palliative Medicine    Palliative Medicine team Sadiq Flores NP and Brianna Rock RN met at the pt's bedside. Pt was sitting on the edge of the bed and alert. She remains on HFNC. Pt states that she would like more time to review the [facility] POST form with her sister and daughter. Pt still has the copy of the POST given to her on Wednesday. Pt states that at this time she would like to remain FULL code. Will follow up at a later date to see if pt wishes to complete a POST form. Pt remains FULL code with FULL aggressive medical management. Thank you for the Palliative Medicine consult and allowing us to participate in the care of Mrs. Campos Young. Will continue to monitor and provide support.     Brianna Rcok RN, BSN  Palliative Medicine Inpatient RN  [facility]  Palliative COPE Line: [phone] ([phone])

## 2021-10-29 NOTE — PROGRESS NOTES
Received bedside shift change report given by Judyth Hammans RN (off going nurse).  Report included the following information SBAR, Kardex, Intake/Output, MAR and Recent Results

## 2021-10-29 NOTE — PROGRESS NOTES
[address] [place] [address] [facility]. Pulmonary, Critical Care, and Sleep Medicine    Progress note    Name: Michael Guillermo MRN: [id number]   : 1935 Hospital: [address] [place] [address]    Date: 10/29/2021                Subjective: This patient has been seen and evaluated at the request of Dr. Jarek Jacob for acute on chronic Hypoxic Respiratory failure requiring HFNC 2/2 RSV infection in setting of severe COPD/Emphysema. 10/29/21     Patient states that she feels much better. She is not as anxious and worried today. Currently out of bed-standing at bedside  Still with some shortness of breath or overall better  Remains on high flow nasal cannula saturating adequately-94 to 95%       Oxygen Therapy   O2 Sat (%) 95 %   Pulse via Oximetry 92 beats per minute   O2 Device Heated; Hi flow nasal cannula  (vapotherm)   O2 Flow Rate (L/min) 35 l/min   O2 Temperature 91.4 °F (33 °C)   FIO2 (%) 45 %     O2 wean to 45%fio2, no respiratory issue at this time. Denies increasing cough  Denies any productive expectoration  Denies chest pain  Had concerns about returning home and not being able to get enough oxygen. She currently has a INOGEN device  She has support from her son and his girlfriend    HPI  Patient is a 82 y.o. female with PMH of COPD/emphysema (on 3.5 L NC home O2), chronic diastolic CHF, mild pulmonary HTN, COVID-19 PNA (now fully vaccinated), who presented to [facility] ED on 10/24/2021 c/o progressive worsening S OB. Patient reports that she began feeling ill approximately 5 days PTA with associated mild increase of SOB beyond her baseline. She reports going to [facility] ED,, where she was diagnosed with bronchitis, and was treated with IV steroids, albuterol treatment and Lasix. ,  And was subsequently D/C'd home on Prednisone. Unfortunately, despite taking the prednisone, she continued to have progressive worsening SOB and developed an overall dry but slightly productive cough.   Denies F/C, HA, N/V/D, CP, dysuria. Ultimately, patient's S OB escalated to the point of activating EMS system. Upon arrival of EMS, patient's SPO2 was noted in the 50s on 3 to 4 L nasal cannula. Patient was placed on NRB and brought to ED. Upon arrival to ED, patient was found to be febrile, tachycardic and tachypneic with SPO2 of 88% on NRB. Patient was briefly required BiPAP and had significant improvement in her SPO2 and work of breathing. COVID-19 PCR (bio fire) was (-) for COVID-19, however was (+) for RSV. CXR showed hyperinflation with mild streaky atelectasis or infiltrate in the lung bases. CT Chest (-) for PE. She was started on azithromycin, ceftriaxone, IV steroids, and scheduled nebulizers. Patient was admitted to hospitalist service for COPD exacerbations in setting of RSV infection. PCCM was then consulted on 10/27 for her continued hypoxia. Upon PCCM evaluation, patient was found in bed with her sister at bedside. Patient is talkative and AOx4 and able to answer questions appropriately. She denies any current tobacco use, and states that she quit smoking 30 years ago. Denies any alcohol or drug use. Is now fully vaccinated against COVID-19 after having COVID-19 PNA in 12/2020. She is currently wearing HFNC at 20 L and 50% FiO2. Patient does admit to some dyspnea with exertion, however she is able to hold nearly a full conversation without much conversational dyspnea. Her SPO2 while conversing maintained >90% on these settings. She did have a persistent dry cough during our conversation, which seemed to be somewhat relieved with cough drop that she took from her purse. She currently denies any CP, N/V/D, HA, F/C, ABD pain, dysuria. She continues to inquire if we can titrate down her FiO2 as she is eager to go back home. In addition to this, we did briefly discuss patient's GOC and CODE STATUS.   I recommended in setting of her severe COPD/emphysema that she be a DNR as she is of high risk of not coming off the ventilator should she ever require intubation given her poor lung function overall..  Ms. Danielle Watts agreed with these statements and agreed that she would not want to be maintained on a ventilator. She is already familiar with hospice and shares that her  went home on hospice and has since passed away. Although she would wish to remain FULL CODE at this time, she agreed that she would be discussing her CODE STATUS and overall advanced directives with her 2 children and her sister and is committed to filling out a POST form this admission. I also advised her that the Palliative Care team would be by to see her to further discuss this topic and also add additional layer of support for her during this admission.       Past Medical History:   Diagnosis Date    Chronic lung disease     Chronic obstructive pulmonary disease ([facility]     Heart failure ([facility]     Hypertension       Past Surgical History:   Procedure Laterality Date    HX ORTHOPAEDIC      spinal sx /    HX OTHER SURGICAL      Carotid artery    VASCULAR SURGERY PROCEDURE UNLIST      L CEA 10/2014        No Known Allergies   Social History     Tobacco Use    Smoking status: Former Smoker     Packs/day: 1.50     Years: 30.00     Pack years: 45.00     Types: Cigarettes     Quit date: 1987     Years since quittin.1    Smokeless tobacco: Never Used   Substance Use Topics    Alcohol use: No     Alcohol/week: 0.0 standard drinks      Family History   Problem Relation Age of Onset    Heart Disease Mother     Hypertension Mother     Heart Attack Father     Hypertension Father       Current Facility-Administered Medications   Medication Dose Route Frequency    methylPREDNISolone (PF) (Solu-MEDROL) injection 40 mg  40 mg IntraVENous Q8H    albuterol-ipratropium (DUO-NEB) 2.5 MG-0.5 MG/3 ML  3 mL Nebulization Q4H RT    budesonide (PULMICORT) 1,000 mcg/2 mL nebulizer susp  1,000 mcg Nebulization BID RT    pantoprazole (PROTONIX) tablet 40 mg  40 mg Oral ACB    insulin lispro (HUMALOG) injection   SubCUTAneous AC&HS    azithromycin (ZITHROMAX) 500 mg in 0.9% sodium chloride 250 mL (VIAL-MATE)  500 mg IntraVENous Q24H    sodium chloride (NS) flush 5-40 mL  5-40 mL IntraVENous Q8H    enoxaparin (LOVENOX) injection 40 mg  40 mg SubCUTAneous DAILY    ALPRAZolam (XANAX) tablet 0.5 mg  0.5 mg Oral QHS    aspirin delayed-release tablet 81 mg  81 mg Oral DAILY    atorvastatin (LIPITOR) tablet 20 mg  20 mg Oral DAILY    losartan (COZAAR) tablet 50 mg  50 mg Oral DAILY    [Held by provider] metoprolol tartrate (LOPRESSOR) tablet 25 mg  25 mg Oral BID    furosemide (LASIX) tablet 40 mg  40 mg Oral DAILY    NIFEdipine ER (PROCARDIA XL) tablet 60 mg  60 mg Oral DAILY       Review of Systems:  A comprehensive ROS was obtained as stated in HPI, all others are negative    Objective:   Vital Signs:    Visit Vitals  BP (!) 154/78 (BP 1 Location: Right upper arm, BP Patient Position: Sitting)   Pulse 96   Temp 97.9 °F (36.6 °C)   Resp 22   Ht 5' 3\" (1.6 m)   Wt 54.4 kg (120 lb)   SpO2 90%   BMI 21.26 kg/m²       O2 Device: Hi flow nasal cannula, Heated (Vapo)   O2 Flow Rate (L/min): 35 l/min   Temp (24hrs), Av.1 °F (36.7 °C), Min:97.9 °F (36.6 °C), Max:98.3 °F (36.8 °C)       Intake/Output:   Last shift:      No intake/output data recorded. Last 3 shifts: No intake/output data recorded. No intake or output data in the 24 hours ending 10/29/21 1224     Physical Exam:   General:  Alert, awake, currently resting on HFNC; NAD,appears stated age. HEENT:  Normocephalic, atraumatic; Conjunctivae/corneas clear; anicteric; Nares/mucosa normal/moist; No drainage/sinus tenderness; Lips/oral mucosa moist; No thrush; Neck supple, symmetrical; trachea midline; No adenopathy; No thyroid enlargement/tenderness/nodules;  No JVD noted   Back:   Symmetric, no curvature, no spine tenderness or flank pain   Resp:   Symmetrical chest rise; mod AE bilat; Diminished throughout. Scattered crackles with mild/faint end expiratory wheezes. CVS:  No chest wall tenderness/ deformity/crepitus; RRR; S1,S2 normal; No m/r/g   GI:   Abdomen Soft, non-tender.(+) B. S x4; No masses/ organomegaly/ paradox   Extremities: Atraumatic; No cyanosis/clubbing. +Trace B/L pedal edema    Pulses: 1-2+ and symmetric all extremities. Skin: Skin color, texture, turgor normal. No rashes or lesions   Lymph nodes: Cervical, supraclavicular, and axillary nodes normal.   Neurologic: Grossly nonfocal          Data review:   Labs:  Recent Results (from the past 24 hour(s))   GLUCOSE, POC    Collection Time: 10/28/21  4:08 PM   Result Value Ref Range    Glucose (POC) 205 (H) 70 - 110 mg/dL   GLUCOSE, POC    Collection Time: 10/28/21  9:45 PM   Result Value Ref Range    Glucose (POC) 155 (H) 70 - 110 mg/dL   GLUCOSE, POC    Collection Time: 10/29/21  8:00 AM   Result Value Ref Range    Glucose (POC) 169 (H) 70 - 110 mg/dL   GLUCOSE, POC    Collection Time: 10/29/21 11:55 AM   Result Value Ref Range    Glucose (POC) 152 (H) 70 - 110 mg/dL         PFT [08/02/2016 ]  SPIROMETRY 8/2/2016   Effort poor; did not meet ATS test time     FLOWS:   FEV1/FVC ratio is normal       Maximal Mid Expiratory Flow is supra-normal   FEV1 is normal and FVC is mildly decreased       Pre bronchodilator FEV1 is 1.54 L (81% predicted)   Pre bronchodilator FVC is 1.73 L (67% predicted)     Flow Volume Loop:   Poor tracing     Bronchodilator:   Not done       Patient could not do lung volumes or diffusion capacity        Impression:   Poor study, hence would not be able to interpret reliably       ECHO [10/27/21]:  Pending    Imaging:    CXR [10/24/21]: FINDINGS:   EKG leads and wires overlie the chest.  Hyperinflation with mild diffuse interstitial prominence. Mild streaky opacities  at the lung bases. Atherosclerotic vascular calcifications. No cardiomegaly. No evidence of pleural effusion.      CT  CHEST W CONT [10/27/21]: IMPRESSION  1. Hyperinflation with mild diffuse interstitial prominence likely related to  emphysema. Mild streaky atelectasis or infiltrate at the lung bases      FINDINGS:      Lungs:  Fairly extensive emphysematous changes in both lungs. No dominant mass  or discrete suspicious lung nodule is detected. No definite airspace opacities. Focal scarring in the right upper lobe anterior aspect and in the left lower  lobe.     Pleura:  No significant pleural effusion is detected bilaterally.     Mediastinum:  1.5 x 1.3 cm precarinal node.     Cardiovascular: Moderate atherosclerotic calcifications along the aorta.     Base of Neck, Axillae:  Left thyroid nodule. No adenopathy.     Esophagus:  Unremarkable.     Upper Abdomen:  Negative     Bones:  No acute findings.     IMPRESSION             1.  Emphysematous changes. 2.  No airspace opacities    CT  CHEST WO CONT [10/27/21]: FINDINGS:      Lungs:    - Fairly extensive emphysematous changes in both lungs. - No dominant mass or discrete suspicious lung nodule is detected. - No definite airspace opacities. - Focal scarring in the right upper lobe anterior aspect and in the left lower  lobe.     Pleura:  No significant pleural effusion     Mediastinum:  1.5 x 1.3 cm precarinal node with eccentric calcification.     Cardiovascular:  No acute abnormalities. Moderate atherosclerotic  calcifications along the aorta.     Base of Neck, Axillae:  2.4 x 1.7 cm left thyroid nodule.     Esophagus:  Unremarkable.     Upper Abdomen:  Negative     Bones:  No acute findings.     IMPRESSION             1.  Emphysematous changes.     2.   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 Can use BiPAP-ST at night as needed  Agree with IV antibiotics-de-escalate to p.o. as tolerated  Adjusted bronchodilators, please schedule: duonebs q4h, pulmicort nebs 1mg BID, and albuterol PRN. Avoid excessive beta agonist with DuoNeb and Brovana  Please hold home bronchodilators (Breztri Aerosphere). May resume on discharge  Aggressive pulmonary toileting/bronchial hygiene  Frequent incentive spirometry  Aspiration precautions including elevating HOB >30deg   PT/OT, OOB, ambulate with assistance as tolerated  DVT ppx per primary service  Discussed patient's concerns about supplemental oxygen at home explained-that if unable to wean to acceptable levels to be delivered by her current device will order additional DME to support her oxygen needs  At discharge, please arrange followup with pt's primary pulmonologist - Dr. Vince Mohamud with [facility], [facility].  Will follow      High complexity decision making was performed during the evaluation of this patient at high risk for decompensation with multiple organ involvement     Above mentioned total time spent on reviewing the case/medical record/data/notes/EMR/patient examination/documentation/coordinating care with nurse/consultants, exclusive of procedures with complex decision making performed and > 50% time spent in face to face evaluation.     Audra Wilhelm MD  10/29/21    Pulmonary [id number] [facility]

## 2021-10-29 NOTE — PROGRESS NOTES
[facility]  Progress Note    Patient: Era Paulino Age: 82 y.o. : 1935 MR#: [id number] SSN: [id number]  Date/Time: 10/29/2021     C/C: Shortness of breath/COPD exacerbation      Subjective:   HPI : Patient admitted for acute hypoxic respiratory failure currently on high flow oxygen, COPD exacerbation, O2 saturation ranging from 98 to 90% on 35 L/min          Review of Systems:  positive responses in bold type   Constitutional: Negative for fever, chills, diaphoresis and unexpected weight change. HENT: Dryness and hearing difficulty since using high flow  Eyes: Negative for photophobia, pain, redness and visual disturbance. Respiratory: negative for shortness of breath, cough, choking, chest tightness, wheezing or stridor. Cardiovascular: Negative for chest pain, palpitations and leg swelling. Gastrointestinal: Negative for nausea, vomiting, abdominal pain, diarrhea, constipation, blood in stool, abdominal distention and anal bleeding. Genitourinary: Negative for dysuria, urgency, frequency, hematuria, flank pain and difficulty urinating. Musculoskeletal: Negative for back pain and arthralgias. Skin: Negative for color change, rash and wound. Neurological: Negative for dizziness, seizures, syncope, speech difficulty, light-headedness or headaches. Hematological: Does not bruise/bleed easily. Psychiatric/Behavioral: Negative for suicidal ideas, hallucinations, behavioral problems, self-injury or agitation     Assessment/Plan:     1.   Acute on chronic hypoxic respiratory failure  2 COPD acute  3 RSV positive by PCR/negative influenza, negative Covid  4 echo-preserved EF    Plan  -Patient says that she is getting better continue nasal drops and Chloraseptic spray    -Continue steroids    -Discussed with pulmonary    -At home patient is on 3 to 4 L nasal cannula O2       Time spent on direct patient care >30 mints     Complexity : High complex - due to multiple medical issues outlined above. CODE Status : Full code    Case discussed with:  [x]Patient  [] Family  []Nursing  []Case Management     DVT Prophylaxis:  [x]Lovenox  []Hep SQ  []SCDs  []Coumadin   []On Heparin gtt    [] Eliquis [] Xarelto     Objective:   VS:   Visit Vitals  BP (!) 154/78 (BP 1 Location: Right upper arm, BP Patient Position: Sitting)   Pulse 96   Temp 97.9 °F (36.6 °C)   Resp 22   Ht 5' 3\" (1.6 m)   Wt 54.4 kg (120 lb)   SpO2 90%   BMI 21.26 kg/m²      Tmax/24hrs: Temp (24hrs), Av °F (36.7 °C), Min:97.9 °F (36.6 °C), Max:98.2 °F (36.8 °C)  IOBRIEF    Intake/Output Summary (Last 24 hours) at 10/29/2021 1633  Last data filed at 10/29/2021 1238  Gross per 24 hour   Intake 240 ml   Output --   Net 240 ml       General:  Alert, cooperative, no acute distress   HEENT: No facial asymmetry, LAINA Abdirashid, External ears - WNL    Cardiovascular: S1S2 - regular , No Murmur   Pulmonary: Equal expansion , No Use of accessory muscles , bilateral decreased air entry with occasional rhonchi  GI:  +BS in all four quadrants, soft, non-tender  Extremities:  No edema; 2+ dorsalis pedis pulses bilaterally  Neuro: Alert and oriented X 2.        Medications:   Current Facility-Administered Medications   Medication Dose Route Frequency    methylPREDNISolone (PF) (Solu-MEDROL) injection 40 mg  40 mg IntraVENous Q8H    albuterol-ipratropium (DUO-NEB) 2.5 MG-0.5 MG/3 ML  3 mL Nebulization Q4H RT    budesonide (PULMICORT) 1,000 mcg/2 mL nebulizer susp  1,000 mcg Nebulization BID RT    pantoprazole (PROTONIX) tablet 40 mg  40 mg Oral ACB    guaiFENesin (ROBITUSSIN) 100 mg/5 mL oral liquid 100 mg  100 mg Oral Q4H PRN    insulin lispro (HUMALOG) injection   SubCUTAneous AC&HS    glucose chewable tablet 16 g  4 Tablet Oral PRN    glucagon (GLUCAGEN) injection 1 mg  1 mg IntraMUSCular PRN    dextrose (D50W) injection syrg 12.5-25 g  25-50 mL IntraVENous PRN    sodium chloride (NS) flush 5-10 mL  5-10 mL IntraVENous PRN    azithromycin (ZITHROMAX) 500 mg in 0.9% sodium chloride 250 mL (VIAL-MATE)  500 mg IntraVENous Q24H    sodium chloride (NS) flush 5-40 mL  5-40 mL IntraVENous Q8H    sodium chloride (NS) flush 5-40 mL  5-40 mL IntraVENous PRN    acetaminophen (TYLENOL) tablet 650 mg  650 mg Oral Q6H PRN    Or    acetaminophen (TYLENOL) suppository 650 mg  650 mg Rectal Q6H PRN    polyethylene glycol (MIRALAX) packet 17 g  17 g Oral DAILY PRN    ondansetron (ZOFRAN ODT) tablet 4 mg  4 mg Oral Q8H PRN    Or    ondansetron (ZOFRAN) injection 4 mg  4 mg IntraVENous Q6H PRN    enoxaparin (LOVENOX) injection 40 mg  40 mg SubCUTAneous DAILY    ALPRAZolam (XANAX) tablet 0.5 mg  0.5 mg Oral QHS    aspirin delayed-release tablet 81 mg  81 mg Oral DAILY    atorvastatin (LIPITOR) tablet 20 mg  20 mg Oral DAILY    losartan (COZAAR) tablet 50 mg  50 mg Oral DAILY    [Held by provider] metoprolol tartrate (LOPRESSOR) tablet 25 mg  25 mg Oral BID    furosemide (LASIX) tablet 40 mg  40 mg Oral DAILY    NIFEdipine ER (PROCARDIA XL) tablet 60 mg  60 mg Oral DAILY       Labs:    Recent Labs     10/27/21  0524   WBC 12.3   HGB 12.5   HCT 39.3        Recent Labs     10/28/21  0052 10/27/21  0524    138   K 4.1 4.3    108   CO2 26 24   * 192*   BUN 27* 31*   CREA 0.94 0.96   CA 8.1* 8.0*         Disclaimer: Sections of this note are dictated utilizing voice recognition software, which may have resulted in some phonetic based errors in grammar and contents. Even though attempts were made to correct all the mistakes, some may have been missed, and remained in the body of the document. If questions arise, please contact our department.     Signed By: Connor Barnhart MD     October 29, 2021

## 2021-10-29 NOTE — PROGRESS NOTES
10/28/21 2135   Oxygen Therapy   O2 Sat (%) 95 %   Pulse via Oximetry 92 beats per minute   O2 Device Heated; Hi flow nasal cannula  (vapotherm)   O2 Flow Rate (L/min) 35 l/min   O2 Temperature 91.4 °F (33 °C)   FIO2 (%) 45 %   O2 wean to 45%fio2, no respiratory issue at this time. Pt refusing to wear BIPAP at QHS. BIPAP on standby at the bedside if needed.

## 2021-10-29 NOTE — PROGRESS NOTES
Discharge planning    Reviewed chart. PT changed in recommendations from Located within [facility] to none. Patient has home oxygen. CM will continue to monitor and assist with transition of care needs.      ELÍAS Mccann, RN  Pager # [phone]  Care Manager

## 2021-10-30 LAB
BACTERIA SPEC CULT: NORMAL
BACTERIA SPEC CULT: NORMAL
GLUCOSE BLD STRIP.AUTO-MCNC: 123 MG/DL (ref 70–110)
GLUCOSE BLD STRIP.AUTO-MCNC: 175 MG/DL (ref 70–110)
GLUCOSE BLD STRIP.AUTO-MCNC: 178 MG/DL (ref 70–110)
GLUCOSE BLD STRIP.AUTO-MCNC: 96 MG/DL (ref 70–110)
SERVICE CMNT-IMP: NORMAL
SERVICE CMNT-IMP: NORMAL

## 2021-10-30 PROCEDURE — 65660000000 HC RM CCU STEPDOWN

## 2021-10-30 PROCEDURE — 82962 GLUCOSE BLOOD TEST: CPT

## 2021-10-30 PROCEDURE — 74011636637 HC RX REV CODE- 636/637: Performed by: INTERNAL MEDICINE

## 2021-10-30 PROCEDURE — 2709999900 HC NON-CHARGEABLE SUPPLY

## 2021-10-30 PROCEDURE — 94640 AIRWAY INHALATION TREATMENT: CPT

## 2021-10-30 PROCEDURE — 74011000250 HC RX REV CODE- 250: Performed by: INTERNAL MEDICINE

## 2021-10-30 PROCEDURE — 77010033711 HC HIGH FLOW OXYGEN

## 2021-10-30 PROCEDURE — 74011250636 HC RX REV CODE- 250/636: Performed by: FAMILY MEDICINE

## 2021-10-30 PROCEDURE — 94762 N-INVAS EAR/PLS OXIMTRY CONT: CPT

## 2021-10-30 PROCEDURE — 74011250637 HC RX REV CODE- 250/637: Performed by: FAMILY MEDICINE

## 2021-10-30 PROCEDURE — 99232 SBSQ HOSP IP/OBS MODERATE 35: CPT | Performed by: STUDENT IN AN ORGANIZED HEALTH CARE EDUCATION/TRAINING PROGRAM

## 2021-10-30 PROCEDURE — 74011250636 HC RX REV CODE- 250/636: Performed by: INTERNAL MEDICINE

## 2021-10-30 PROCEDURE — 74011250637 HC RX REV CODE- 250/637: Performed by: STUDENT IN AN ORGANIZED HEALTH CARE EDUCATION/TRAINING PROGRAM

## 2021-10-30 PROCEDURE — 74011250637 HC RX REV CODE- 250/637: Performed by: INTERNAL MEDICINE

## 2021-10-30 RX ADMIN — Medication 81 MG: at 08:41

## 2021-10-30 RX ADMIN — LOSARTAN POTASSIUM 50 MG: 50 TABLET, FILM COATED ORAL at 08:43

## 2021-10-30 RX ADMIN — ENOXAPARIN SODIUM 40 MG: 100 INJECTION SUBCUTANEOUS at 08:42

## 2021-10-30 RX ADMIN — METHYLPREDNISOLONE SODIUM SUCCINATE 40 MG: 125 INJECTION, POWDER, FOR SOLUTION INTRAMUSCULAR; INTRAVENOUS at 21:25

## 2021-10-30 RX ADMIN — ACETAMINOPHEN 650 MG: 325 TABLET ORAL at 12:34

## 2021-10-30 RX ADMIN — IPRATROPIUM BROMIDE AND ALBUTEROL SULFATE 3 ML: .5; 2.5 SOLUTION RESPIRATORY (INHALATION) at 13:27

## 2021-10-30 RX ADMIN — PANTOPRAZOLE SODIUM 40 MG: 40 TABLET, DELAYED RELEASE ORAL at 07:52

## 2021-10-30 RX ADMIN — METHYLPREDNISOLONE SODIUM SUCCINATE 40 MG: 125 INJECTION, POWDER, FOR SOLUTION INTRAMUSCULAR; INTRAVENOUS at 06:12

## 2021-10-30 RX ADMIN — IPRATROPIUM BROMIDE AND ALBUTEROL SULFATE 3 ML: .5; 2.5 SOLUTION RESPIRATORY (INHALATION) at 03:23

## 2021-10-30 RX ADMIN — Medication 10 ML: at 06:12

## 2021-10-30 RX ADMIN — INSULIN LISPRO 3 UNITS: 100 INJECTION, SOLUTION INTRAVENOUS; SUBCUTANEOUS at 12:36

## 2021-10-30 RX ADMIN — IPRATROPIUM BROMIDE AND ALBUTEROL SULFATE 3 ML: .5; 2.5 SOLUTION RESPIRATORY (INHALATION) at 16:35

## 2021-10-30 RX ADMIN — INSULIN LISPRO 3 UNITS: 100 INJECTION, SOLUTION INTRAVENOUS; SUBCUTANEOUS at 21:26

## 2021-10-30 RX ADMIN — IPRATROPIUM BROMIDE AND ALBUTEROL SULFATE 3 ML: .5; 2.5 SOLUTION RESPIRATORY (INHALATION) at 00:42

## 2021-10-30 RX ADMIN — Medication 10 ML: at 21:26

## 2021-10-30 RX ADMIN — ALPRAZOLAM 0.5 MG: 1 TABLET ORAL at 21:26

## 2021-10-30 RX ADMIN — FUROSEMIDE 40 MG: 40 TABLET ORAL at 08:43

## 2021-10-30 RX ADMIN — IPRATROPIUM BROMIDE AND ALBUTEROL SULFATE 3 ML: .5; 2.5 SOLUTION RESPIRATORY (INHALATION) at 08:07

## 2021-10-30 RX ADMIN — ACETAMINOPHEN 650 MG: 325 TABLET ORAL at 21:35

## 2021-10-30 RX ADMIN — BUDESONIDE 1000 MCG: 1 SUSPENSION RESPIRATORY (INHALATION) at 08:08

## 2021-10-30 RX ADMIN — Medication 10 ML: at 14:47

## 2021-10-30 RX ADMIN — METHYLPREDNISOLONE SODIUM SUCCINATE 40 MG: 125 INJECTION, POWDER, FOR SOLUTION INTRAMUSCULAR; INTRAVENOUS at 14:46

## 2021-10-30 RX ADMIN — AZITHROMYCIN 500 MG: 500 INJECTION, POWDER, LYOPHILIZED, FOR SOLUTION INTRAVENOUS at 06:12

## 2021-10-30 RX ADMIN — ATORVASTATIN CALCIUM 20 MG: 20 TABLET, FILM COATED ORAL at 08:43

## 2021-10-30 RX ADMIN — NIFEDIPINE 60 MG: 30 TABLET, FILM COATED, EXTENDED RELEASE ORAL at 08:42

## 2021-10-30 RX ADMIN — GUAIFENESIN 100 MG: 200 SOLUTION ORAL at 14:46

## 2021-10-30 NOTE — PROGRESS NOTES
[facility]. Pulmonary, Critical Care, and Sleep Medicine    Progress note    Name: Lilian June MRN: [id number]   : 1935 Hospital: [address] [place] [address] Dr   Date: 10/30/2021                Subjective: This patient has been seen and evaluated at the request of Dr. Karla Arrieta for acute on chronic Hypoxic Respiratory failure requiring HFNC 2/2 RSV infection in setting of severe COPD/Emphysema. 10/30/21     Patient states that she feels much better today. Sitting on edge of bed. Some BURGESS, but overall SOB much improved. Remains on high flow nasal cannula saturating adequately - 95% while in room on 35L/40% FiO2.        Oxygen Therapy   O2 Sat (%) 95 %   Pulse via Oximetry 92 beats per minute   O2 Device Heated; Hi flow nasal cannula  (vapotherm)   O2 Flow Rate (L/min) 35 l/min   O2 Temperature 91.4 °F (33 °C)   FIO2 (%) 45 %       Improved cough, no sputum production. Denies chest pain  Had concerns about returning home and not being able to get enough oxygen; she currently has a INOGEN device at home. She has support from her son and his girlfriend. HPI  Patient is a 82 y.o. female with PMH of COPD/emphysema (on 3.5 L NC home O2), chronic diastolic CHF, mild pulmonary HTN, COVID-19 PNA (now fully vaccinated), who presented to [facility] ED on 10/24/2021 c/o progressive worsening S OB. Patient reports that she began feeling ill approximately 5 days PTA with associated mild increase of SOB beyond her baseline. She reports going to [facility] ED,, where she was diagnosed with bronchitis, and was treated with IV steroids, albuterol treatment and Lasix. ,  And was subsequently D/C'd home on Prednisone. Unfortunately, despite taking the prednisone, she continued to have progressive worsening SOB and developed an overall dry but slightly productive cough. Denies F/C, HA, N/V/D, CP, dysuria. Ultimately, patient's S OB escalated to the point of activating EMS system.   Upon arrival of EMS, patient's SPO2 was noted in the 50s on 3 to 4 L nasal cannula. Patient was placed on NRB and brought to ED. Upon arrival to ED, patient was found to be febrile, tachycardic and tachypneic with SPO2 of 88% on NRB. Patient was briefly required BiPAP and had significant improvement in her SPO2 and work of breathing. COVID-19 PCR (bio fire) was (-) for COVID-19, however was (+) for RSV. CXR showed hyperinflation with mild streaky atelectasis or infiltrate in the lung bases. CT Chest (-) for PE. She was started on azithromycin, ceftriaxone, IV steroids, and scheduled nebulizers. Patient was admitted to hospitalist service for COPD exacerbations in setting of RSV infection. PCCM was then consulted on 10/27 for her continued hypoxia. Upon PCCM evaluation, patient was found in bed with her sister at bedside. Patient is talkative and AOx4 and able to answer questions appropriately. She denies any current tobacco use, and states that she quit smoking 30 years ago. Denies any alcohol or drug use. Is now fully vaccinated against COVID-19 after having COVID-19 PNA in 12/2020. She is currently wearing HFNC at 20 L and 50% FiO2. Patient does admit to some dyspnea with exertion, however she is able to hold nearly a full conversation without much conversational dyspnea. Her SPO2 while conversing maintained >90% on these settings. She did have a persistent dry cough during our conversation, which seemed to be somewhat relieved with cough drop that she took from her purse. She currently denies any CP, N/V/D, HA, F/C, ABD pain, dysuria. She continues to inquire if we can titrate down her FiO2 as she is eager to go back home. In addition to this, we did briefly discuss patient's GOC and CODE STATUS.   I recommended in setting of her severe COPD/emphysema that she be a DNR as she is of high risk of not coming off the ventilator should she ever require intubation given her poor lung function overall..  Ms. Osman agreed with these statements and agreed that she would not want to be maintained on a ventilator. She is already familiar with hospice and shares that her  went home on hospice and has since passed away. Although she would wish to remain FULL CODE at this time, she agreed that she would be discussing her CODE STATUS and overall advanced directives with her 2 children and her sister and is committed to filling out a POST form this admission. I also advised her that the Palliative Care team would be by to see her to further discuss this topic and also add additional layer of support for her during this admission.       Past Medical History:   Diagnosis Date    Chronic lung disease     [facility] obstructive pulmonary [facility] ([facility]     Heart failure ([facility]     Hypertension       Past Surgical History:   Procedure Laterality Date    HX ORTHOPAEDIC      spinal sx /    HX OTHER SURGICAL      Carotid artery    VASCULAR SURGERY PROCEDURE UNLIST      L CEA 10/2014        No Known Allergies   Social History     Tobacco Use    Smoking status: Former Smoker     Packs/day: 1.50     Years: 30.00     Pack years: 45.00     Types: Cigarettes     Quit date: 1987     Years since quittin.1    Smokeless tobacco: Never Used   Substance Use Topics    Alcohol use: No     Alcohol/week: 0.0 standard drinks      Family History   Problem Relation Age of Onset    Heart Disease Mother     Hypertension Mother     Heart Attack Father     Hypertension Father       Current Facility-Administered Medications   Medication Dose Route Frequency    methylPREDNISolone (PF) (Solu-MEDROL) injection 40 mg  40 mg IntraVENous Q8H    albuterol-ipratropium (DUO-NEB) 2.5 MG-0.5 MG/3 ML  3 mL Nebulization Q4H RT    budesonide (PULMICORT) 1,000 mcg/2 mL nebulizer susp  1,000 mcg Nebulization BID RT    pantoprazole (PROTONIX) tablet 40 mg  40 mg Oral ACB    insulin lispro (HUMALOG) injection   SubCUTAneous AC&HS    azithromycin (ZITHROMAX) 500 mg in 0.9% sodium chloride 250 mL (VIAL-MATE)  500 mg IntraVENous Q24H    sodium chloride (NS) flush 5-40 mL  5-40 mL IntraVENous Q8H    enoxaparin (LOVENOX) injection 40 mg  40 mg SubCUTAneous DAILY    ALPRAZolam (XANAX) tablet 0.5 mg  0.5 mg Oral QHS    aspirin delayed-release tablet 81 mg  81 mg Oral DAILY    atorvastatin (LIPITOR) tablet 20 mg  20 mg Oral DAILY    losartan (COZAAR) tablet 50 mg  50 mg Oral DAILY    [Held by provider] metoprolol tartrate (LOPRESSOR) tablet 25 mg  25 mg Oral BID    furosemide (LASIX) tablet 40 mg  40 mg Oral DAILY    NIFEdipine ER (PROCARDIA XL) tablet 60 mg  60 mg Oral DAILY       Review of Systems:  A comprehensive ROS was obtained as stated in HPI, all others are negative    Objective:   Vital Signs:    Visit Vitals  /60 (BP 1 Location: Right upper arm, BP Patient Position: At rest)   Pulse 97   Temp 98 °F (36.7 °C)   Resp 19   Ht 5' 3\" (1.6 m)   Wt 59.7 kg (131 lb 9.6 oz)   SpO2 94%   BMI 23.31 kg/m²       O2 Device: Hi flow nasal cannula, Heated (Vapo)   O2 Flow Rate (L/min): 30 l/min   Temp (24hrs), Av.9 °F (36.6 °C), Min:97.3 °F (36.3 °C), Max:98.3 °F (36.8 °C)       Intake/Output:   Last shift:      No intake/output data recorded. Last 3 shifts: 10/28 1901 - 10/30 0700  In: 240 [P.O.:240]  Out: -   No intake or output data in the 24 hours ending 10/30/21 1713     Physical Exam:   General:  Alert, awake, currently resting on HFNC; NAD,appears stated age. HEENT:  Normocephalic, atraumatic; Conjunctivae/corneas clear; anicteric; Nares/mucosa normal/moist; No drainage/sinus tenderness; Lips/oral mucosa moist; No thrush; Neck supple, symmetrical; trachea midline; No adenopathy; No thyroid enlargement/tenderness/nodules; No JVD noted   Back:   Symmetric, no curvature, no spine tenderness or flank pain   Resp:   Symmetrical chest rise; Diminished breath sounds throughout, mostly in upper lung fields. No wheezes / rales/rhonchi. CVS:  No chest wall tenderness/ deformity/crepitus; RRR; S1,S2 normal; No m/r/g   GI:   Abdomen Soft, non-tender.(+) B. S x4; No masses/ organomegaly/ paradox   Extremities: Atraumatic; No cyanosis/clubbing. No pedal edema    Pulses: 1-2+ and symmetric all extremities. Skin: Skin color, texture, turgor normal. No rashes or lesions   Lymph nodes: Cervical, supraclavicular, and axillary nodes normal.   Neurologic: Grossly nonfocal          Data review:   Labs:  Recent Results (from the past 24 hour(s))   GLUCOSE, POC    Collection Time: 10/29/21  5:46 PM   Result Value Ref Range    Glucose (POC) 212 (H) 70 - 110 mg/dL   GLUCOSE, POC    Collection Time: 10/29/21 10:00 PM   Result Value Ref Range    Glucose (POC) 112 (H) 70 - 110 mg/dL   GLUCOSE, POC    Collection Time: 10/30/21  7:56 AM   Result Value Ref Range    Glucose (POC) 123 (H) 70 - 110 mg/dL   GLUCOSE, POC    Collection Time: 10/30/21 12:15 PM   Result Value Ref Range    Glucose (POC) 178 (H) 70 - 110 mg/dL   GLUCOSE, POC    Collection Time: 10/30/21  3:17 PM   Result Value Ref Range    Glucose (POC) 96 70 - 110 mg/dL         PFT [08/02/2016 ]  SPIROMETRY 8/2/2016   Effort poor; did not meet ATS test time     FLOWS:   FEV1/FVC ratio is normal       Maximal Mid Expiratory Flow is supra-normal   FEV1 is normal and FVC is mildly decreased       Pre bronchodilator FEV1 is 1.54 L (81% predicted)   Pre bronchodilator FVC is 1.73 L (67% predicted)     Flow Volume Loop:   Poor tracing     Bronchodilator:   Not done       Patient could not do lung volumes or diffusion capacity        Impression:   Poor study, hence would not be able to interpret reliably       ECHO [10/27/21]:  Result status: Final result   · LV: Estimated LVEF is 60 - 65%. Visually measured ejection fraction. Normal cavity size, wall thickness and systolic function (ejection fraction normal). Wall motion: normal.  · RV: Dilated right ventricle. · RA: Dilated right atrium.   · TV: Mild tricuspid valve regurgitation is present. · PA: Severe pulmonary hypertension. Pulmonary arterial systolic pressure is 76 mmHg. Imaging:    CXR [10/24/21]: FINDINGS:   EKG leads and wires overlie the chest.  Hyperinflation with mild diffuse interstitial prominence. Mild streaky opacities  at the lung bases. Atherosclerotic vascular calcifications. No cardiomegaly. No evidence of pleural effusion. CT  CHEST W CONT [10/27/21]: IMPRESSION  1. Hyperinflation with mild diffuse interstitial prominence likely related to  emphysema. Mild streaky atelectasis or infiltrate at the lung bases      FINDINGS:      Lungs:  Fairly extensive emphysematous changes in both lungs. No dominant mass  or discrete suspicious lung nodule is detected. No definite airspace opacities. Focal scarring in the right upper lobe anterior aspect and in the left lower  lobe.     Pleura:  No significant pleural effusion is detected bilaterally.     Mediastinum:  1.5 x 1.3 cm precarinal node.     Cardiovascular: Moderate atherosclerotic calcifications along the aorta.     Base of Neck, Axillae:  Left thyroid nodule. No adenopathy.     Esophagus:  Unremarkable.     Upper Abdomen:  Negative     Bones:  No acute findings.     IMPRESSION             1.  Emphysematous changes. 2.  No airspace opacities    CT  CHEST WO CONT [10/27/21]: FINDINGS:      Lungs:    - Fairly extensive emphysematous changes in both lungs. - No dominant mass or discrete suspicious lung nodule is detected. - No definite airspace opacities. - Focal scarring in the right upper lobe anterior aspect and in the left lower  lobe.     Pleura:  No significant pleural effusion     Mediastinum:  1.5 x 1.3 cm precarinal node with eccentric calcification.     Cardiovascular:  No acute abnormalities.   Moderate atherosclerotic  calcifications along the aorta.     Base of Neck, Axillae:  2.4 x 1.7 cm left thyroid nodule.     Esophagus:  Unremarkable.     Upper Abdomen: Negative     Bones:  No acute findings.     IMPRESSION             1.  Emphysematous changes.     2. 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 Can use BiPAP-ST at night as needed  Agree with IV antibiotics - de-escalate to p.o. as tolerated  Steroids - Currently on Solumedrol 40mg q8h. May downtitrate to q12h dosing. Will likely require prolonged steroid taper due to prior failure of burst steroids. Bronchodilators: duonebs q4h, pulmicort nebs 1mg BID, and albuterol PRN. Please avoid excessive beta agonist with DuoNeb and Brovana  Please hold home bronchodilators ([email or web address]).   May resume on discharge  Aggressive pulmonary toileting/bronchial hygiene  Frequent incentive spirometry  Aspiration precautions including elevating HOB >30deg   PT/OT, OOB, ambulate with assistance as tolerated  DVT ppx per primary service  Discussed patient's concerns about supplemental oxygen at home explained - that if unable to wean to acceptable levels to be delivered by her current device will order additional DME to support her oxygen needs  At discharge, please arrange followup with pt's primary pulmonologist - Dr. Ramy Guzman with [facility].  Will follow      High complexity decision making was performed during the evaluation of this patient at high risk for decompensation with multiple organ involvement     Above mentioned total time spent on reviewing the case/medical record/data/notes/EMR/patient examination/documentation/coordinating care with nurse/consultants, exclusive of procedures with complex decision making performed and > 50% time spent in face to face evaluation.     Valentina Smiley DO  10/30/21    Pulmonary Critical Care Medicine  [facility]

## 2021-10-30 NOTE — PROGRESS NOTES
0725: Bedside shift change report given to Josh Mendoza RN (oncoming nurse) by CARLEEN Fay (offgoing nurse). Report included the following information SBAR, Kardex, Intake/Output, MAR and Cardiac Rhythm NSR.     1225: Dr. Leeanna Mayorga rounding on pt.

## 2021-10-31 LAB
ANION GAP SERPL CALC-SCNC: 5 MMOL/L (ref 3–18)
BUN SERPL-MCNC: 23 MG/DL (ref 7–18)
BUN/CREAT SERPL: 24 (ref 12–20)
CALCIUM SERPL-MCNC: 8.8 MG/DL (ref 8.5–10.1)
CHLORIDE SERPL-SCNC: 101 MMOL/L (ref 100–111)
CO2 SERPL-SCNC: 29 MMOL/L (ref 21–32)
CREAT SERPL-MCNC: 0.94 MG/DL (ref 0.6–1.3)
ERYTHROCYTE [DISTWIDTH] IN BLOOD BY AUTOMATED COUNT: 14.3 % (ref 11.6–14.5)
GLUCOSE BLD STRIP.AUTO-MCNC: 112 MG/DL (ref 70–110)
GLUCOSE BLD STRIP.AUTO-MCNC: 114 MG/DL (ref 70–110)
GLUCOSE BLD STRIP.AUTO-MCNC: 129 MG/DL (ref 70–110)
GLUCOSE BLD STRIP.AUTO-MCNC: 164 MG/DL (ref 70–110)
GLUCOSE SERPL-MCNC: 159 MG/DL (ref 74–99)
HCT VFR BLD AUTO: 45.9 % (ref 35–45)
HGB BLD-MCNC: 14.2 G/DL (ref 12–16)
MCH RBC QN AUTO: 26.6 PG (ref 24–34)
MCHC RBC AUTO-ENTMCNC: 30.9 G/DL (ref 31–37)
MCV RBC AUTO: 86 FL (ref 78–100)
PLATELET # BLD AUTO: 319 K/UL (ref 135–420)
PMV BLD AUTO: 9.6 FL (ref 9.2–11.8)
POTASSIUM SERPL-SCNC: 4.9 MMOL/L (ref 3.5–5.5)
RBC # BLD AUTO: 5.34 M/UL (ref 4.2–5.3)
SODIUM SERPL-SCNC: 135 MMOL/L (ref 136–145)
WBC # BLD AUTO: 15 K/UL (ref 4.6–13.2)

## 2021-10-31 PROCEDURE — 74011250637 HC RX REV CODE- 250/637: Performed by: INTERNAL MEDICINE

## 2021-10-31 PROCEDURE — 74011250636 HC RX REV CODE- 250/636: Performed by: FAMILY MEDICINE

## 2021-10-31 PROCEDURE — 77010033711 HC HIGH FLOW OXYGEN

## 2021-10-31 PROCEDURE — 36415 COLL VENOUS BLD VENIPUNCTURE: CPT

## 2021-10-31 PROCEDURE — 74011250637 HC RX REV CODE- 250/637: Performed by: FAMILY MEDICINE

## 2021-10-31 PROCEDURE — 74011250636 HC RX REV CODE- 250/636: Performed by: INTERNAL MEDICINE

## 2021-10-31 PROCEDURE — 74011000250 HC RX REV CODE- 250: Performed by: INTERNAL MEDICINE

## 2021-10-31 PROCEDURE — 80048 BASIC METABOLIC PNL TOTAL CA: CPT

## 2021-10-31 PROCEDURE — 99232 SBSQ HOSP IP/OBS MODERATE 35: CPT | Performed by: INTERNAL MEDICINE

## 2021-10-31 PROCEDURE — 82962 GLUCOSE BLOOD TEST: CPT

## 2021-10-31 PROCEDURE — 99232 SBSQ HOSP IP/OBS MODERATE 35: CPT | Performed by: STUDENT IN AN ORGANIZED HEALTH CARE EDUCATION/TRAINING PROGRAM

## 2021-10-31 PROCEDURE — 85027 COMPLETE CBC AUTOMATED: CPT

## 2021-10-31 PROCEDURE — 74011636637 HC RX REV CODE- 636/637: Performed by: INTERNAL MEDICINE

## 2021-10-31 PROCEDURE — 94640 AIRWAY INHALATION TREATMENT: CPT

## 2021-10-31 PROCEDURE — 65660000000 HC RM CCU STEPDOWN

## 2021-10-31 PROCEDURE — 2709999900 HC NON-CHARGEABLE SUPPLY

## 2021-10-31 RX ORDER — PREDNISONE 20 MG/1
20 TABLET ORAL 2 TIMES DAILY WITH MEALS
Status: DISCONTINUED | OUTPATIENT
Start: 2021-10-31 | End: 2021-11-01

## 2021-10-31 RX ADMIN — Medication 10 ML: at 13:02

## 2021-10-31 RX ADMIN — ENOXAPARIN SODIUM 40 MG: 100 INJECTION SUBCUTANEOUS at 08:25

## 2021-10-31 RX ADMIN — PANTOPRAZOLE SODIUM 40 MG: 40 TABLET, DELAYED RELEASE ORAL at 07:56

## 2021-10-31 RX ADMIN — BUDESONIDE 1000 MCG: 1 SUSPENSION RESPIRATORY (INHALATION) at 20:00

## 2021-10-31 RX ADMIN — ALPRAZOLAM 0.05 MG: 1 TABLET ORAL at 20:36

## 2021-10-31 RX ADMIN — BUDESONIDE 1000 MCG: 1 SUSPENSION RESPIRATORY (INHALATION) at 08:00

## 2021-10-31 RX ADMIN — NIFEDIPINE 60 MG: 30 TABLET, FILM COATED, EXTENDED RELEASE ORAL at 08:25

## 2021-10-31 RX ADMIN — IPRATROPIUM BROMIDE AND ALBUTEROL SULFATE 3 ML: .5; 2.5 SOLUTION RESPIRATORY (INHALATION) at 09:55

## 2021-10-31 RX ADMIN — IPRATROPIUM BROMIDE AND ALBUTEROL SULFATE 3 ML: .5; 2.5 SOLUTION RESPIRATORY (INHALATION) at 20:00

## 2021-10-31 RX ADMIN — AZITHROMYCIN 500 MG: 500 INJECTION, POWDER, LYOPHILIZED, FOR SOLUTION INTRAVENOUS at 06:12

## 2021-10-31 RX ADMIN — Medication 10 ML: at 21:00

## 2021-10-31 RX ADMIN — ATORVASTATIN CALCIUM 20 MG: 20 TABLET, FILM COATED ORAL at 08:25

## 2021-10-31 RX ADMIN — LOSARTAN POTASSIUM 50 MG: 50 TABLET, FILM COATED ORAL at 08:25

## 2021-10-31 RX ADMIN — PREDNISONE 20 MG: 20 TABLET ORAL at 16:38

## 2021-10-31 RX ADMIN — METHYLPREDNISOLONE SODIUM SUCCINATE 40 MG: 125 INJECTION, POWDER, FOR SOLUTION INTRAMUSCULAR; INTRAVENOUS at 06:12

## 2021-10-31 RX ADMIN — Medication 10 ML: at 06:13

## 2021-10-31 RX ADMIN — ACETAMINOPHEN 650 MG: 325 TABLET ORAL at 13:01

## 2021-10-31 RX ADMIN — FUROSEMIDE 40 MG: 40 TABLET ORAL at 08:25

## 2021-10-31 RX ADMIN — Medication 81 MG: at 08:25

## 2021-10-31 RX ADMIN — IPRATROPIUM BROMIDE AND ALBUTEROL SULFATE 3 ML: .5; 2.5 SOLUTION RESPIRATORY (INHALATION) at 12:46

## 2021-10-31 NOTE — PROGRESS NOTES
[facility]. Pulmonary, Critical Care, and Sleep Medicine    Progress note    Name: Era Paulino MRN: [id number]   : 1935 Hospital: [facility]   Date: 10/31/2021                Subjective: This patient has been seen and evaluated at the request of Dr. Todd Castellanos for acute on chronic Hypoxic Respiratory failure requiring HFNC 2/2 RSV infection in setting of severe COPD/Emphysema. 10/31/21     Patient continues to feel better today. Sitting on edge of bed eating lunch. Continues to have BURGESS, but overall SOB much improved. Remains on high flow nasal cannula saturating adequately - 93-95% while in room on 30L/40% FiO2.        Oxygen Therapy   O2 Sat (%) 95 %   Pulse via Oximetry 92 beats per minute   O2 Device Heated; Hi flow nasal cannula  (vapotherm)   O2 Flow Rate (L/min) 35 l/min   O2 Temperature 91.4 °F (33 °C)   FIO2 (%) 45 %     Improved cough, no sputum production. Denies chest pain    Note: She currently has a INOGEN device at home. She has support from her son and his girlfriend. HPI by Dr. Vanessa Bailey  Patient is a 82 y.o. female with PMH of COPD/emphysema (on 3.5 L NC home O2), chronic diastolic CHF, mild pulmonary HTN, COVID-19 PNA (now fully vaccinated), who presented to [facility] ED on 10/24/2021 c/o progressive worsening S OB. Patient reports that she began feeling ill approximately 5 days PTA with associated mild increase of SOB beyond her baseline. She reports going to [facility] ED, where she was diagnosed with bronchitis, and was treated with IV steroids, albuterol treatment and Lasix. ,  And was subsequently D/C'd home on Prednisone. Unfortunately, despite taking the prednisone, she continued to have progressive worsening SOB and developed an overall dry but slightly productive cough. Denies F/C, HA, N/V/D, CP, dysuria. Ultimately, patient's S OB escalated to the point of activating EMS system.   Upon arrival of EMS, patient's SPO2 was noted in the 50s on 3 to 4 L nasal cannula. Patient was placed on NRB and brought to ED. Upon arrival to ED, patient was found to be febrile, tachycardic and tachypneic with SPO2 of 88% on NRB. Patient was briefly required BiPAP and had significant improvement in her SPO2 and work of breathing. COVID-19 PCR (bio fire) was (-) for COVID-19, however was (+) for RSV. CXR showed hyperinflation with mild streaky atelectasis or infiltrate in the lung bases. CT Chest (-) for PE. She was started on azithromycin, ceftriaxone, IV steroids, and scheduled nebulizers. Patient was admitted to hospitalist service for COPD exacerbations in setting of RSV infection. PCCM was then consulted on 10/27 for her continued hypoxia. Upon PCCM evaluation, patient was found in bed with her sister at bedside. Patient is talkative and AOx4 and able to answer questions appropriately. She denies any current tobacco use, and states that she quit smoking 30 years ago. Denies any alcohol or drug use. Is now fully vaccinated against COVID-19 after having COVID-19 PNA in 12/2020. She is currently wearing HFNC at 20 L and 50% FiO2. Patient does admit to some dyspnea with exertion, however she is able to hold nearly a full conversation without much conversational dyspnea. Her SPO2 while conversing maintained >90% on these settings. She did have a persistent dry cough during our conversation, which seemed to be somewhat relieved with cough drop that she took from her purse. She currently denies any CP, N/V/D, HA, F/C, ABD pain, dysuria. She inquires if we can titrate down her FiO2 as she is eager to go back home.        Past Medical History:   Diagnosis Date    Chronic lung disease     Chronic obstructive pulmonary disease ([address]     Heart failure ([address]     Hypertension       Past Surgical History:   Procedure Laterality Date    HX ORTHOPAEDIC      spinal sx 5/6    HX OTHER SURGICAL      Carotid artery    VASCULAR SURGERY PROCEDURE UNLIST      L CEA 10/2014        No Known Allergies   Social History     Tobacco Use    Smoking status: Former Smoker     Packs/day: 1.50     Years: 30.00     Pack years: 45.00     Types: Cigarettes     Quit date: 1987     Years since quittin.1    Smokeless tobacco: Never Used   Substance Use Topics    Alcohol use: No     Alcohol/week: 0.0 standard drinks      Family History   Problem Relation Age of Onset    Heart Disease Mother     Hypertension Mother     Heart Attack Father     Hypertension Father       Current Facility-Administered Medications   Medication Dose Route Frequency    predniSONE (DELTASONE) tablet 20 mg  20 mg Oral BID WITH MEALS    albuterol-ipratropium (DUO-NEB) 2.5 MG-0.5 MG/3 ML  3 mL Nebulization Q4H RT    budesonide (PULMICORT) 1,000 mcg/2 mL nebulizer susp  1,000 mcg Nebulization BID RT    pantoprazole (PROTONIX) tablet 40 mg  40 mg Oral ACB    insulin lispro (HUMALOG) injection   SubCUTAneous AC&HS    azithromycin (ZITHROMAX) 500 mg in 0.9% sodium chloride 250 mL (VIAL-MATE)  500 mg IntraVENous Q24H    sodium chloride (NS) flush 5-40 mL  5-40 mL IntraVENous Q8H    enoxaparin (LOVENOX) injection 40 mg  40 mg SubCUTAneous DAILY    ALPRAZolam (XANAX) tablet 0.5 mg  0.5 mg Oral QHS    aspirin delayed-release tablet 81 mg  81 mg Oral DAILY    atorvastatin (LIPITOR) tablet 20 mg  20 mg Oral DAILY    losartan (COZAAR) tablet 50 mg  50 mg Oral DAILY    [Held by provider] metoprolol tartrate (LOPRESSOR) tablet 25 mg  25 mg Oral BID    furosemide (LASIX) tablet 40 mg  40 mg Oral DAILY    NIFEdipine ER (PROCARDIA XL) tablet 60 mg  60 mg Oral DAILY       Review of Systems:  A comprehensive ROS was obtained as stated in HPI, all others are negative    Objective:   Vital Signs:    Visit Vitals  BP (!) 134/57   Pulse 94   Temp 98 °F (36.7 °C)   Resp 20   Ht 5' 3\" (1.6 m)   Wt 59.8 kg (131 lb 14.4 oz)   SpO2 94%   BMI 23.37 kg/m²       O2 Device: Heated, Hi flow nasal cannula   O2 Flow Rate (L/min): 30 l/min   Temp (24hrs), Av °F (36.7 °C), Min:97.9 °F (36.6 °C), Max:98.1 °F (36.7 °C)       Intake/Output:   Last shift:      No intake/output data recorded. Last 3 shifts: No intake/output data recorded. No intake or output data in the 24 hours ending 10/31/21 1601     Physical Exam:   General:  Alert, awake, currently resting on HFNC; NAD,appears stated age. HEENT:  Normocephalic, atraumatic; Conjunctivae/corneas clear; anicteric; Nares/mucosa normal/moist; No drainage/sinus tenderness; Lips/oral mucosa moist; No thrush; Neck supple, symmetrical; trachea midline; No adenopathy; No thyroid enlargement/tenderness/nodules; No JVD noted   Back:   Symmetric, no curvature, no spine tenderness or flank pain   Resp:   Symmetrical chest rise; Diminished breath sounds throughout, mostly in upper lung fields. No wheezes / rales/rhonchi. CVS:  No chest wall tenderness/ deformity/crepitus; RRR; S1,S2 normal; No m/r/g   GI:   Abdomen Soft, non-tender.(+) B. S x4; No masses/ organomegaly/ paradox   Extremities: Atraumatic; No cyanosis/clubbing. No pedal edema    Pulses: 1-2+ and symmetric all extremities.    Skin: Skin color, texture, turgor normal. No rashes or lesions   Lymph nodes: Cervical, supraclavicular, and axillary nodes normal.   Neurologic: Grossly nonfocal          Data review:   Labs:  Recent Results (from the past 24 hour(s))   GLUCOSE, POC    Collection Time: 10/30/21  8:57 PM   Result Value Ref Range    Glucose (POC) 175 (H) 70 - 110 mg/dL   CBC W/O DIFF    Collection Time: 10/31/21  2:09 AM   Result Value Ref Range    WBC 15.0 (H) 4.6 - 13.2 K/uL    RBC 5.34 (H) 4.20 - 5.30 M/uL    HGB 14.2 12.0 - 16.0 g/dL    HCT 45.9 (H) 35.0 - 45.0 %    MCV 86.0 78.0 - 100.0 FL    MCH 26.6 24.0 - 34.0 PG    MCHC 30.9 (L) 31.0 - 37.0 g/dL    RDW 14.3 11.6 - 14.5 %    PLATELET 999 025 - 855 K/uL    MPV 9.6 9.2 - 46.0 FL   METABOLIC PANEL, BASIC    Collection Time: 10/31/21  2:09 AM   Result Value Ref Range Sodium 135 (L) 136 - 145 mmol/L    Potassium 4.9 3.5 - 5.5 mmol/L    Chloride 101 100 - 111 mmol/L    CO2 29 21 - 32 mmol/L    Anion gap 5 3.0 - 18 mmol/L    Glucose 159 (H) 74 - 99 mg/dL    BUN 23 (H) 7.0 - 18 MG/DL    Creatinine 0.94 0.6 - 1.3 MG/DL    BUN/Creatinine ratio 24 (H) 12 - 20      GFR est AA >60 >60 ml/min/1.73m2    GFR est non-AA 57 (L) >60 ml/min/1.73m2    Calcium 8.8 8.5 - 10.1 MG/DL   GLUCOSE, POC    Collection Time: 10/31/21  7:29 AM   Result Value Ref Range    Glucose (POC) 114 (H) 70 - 110 mg/dL   GLUCOSE, POC    Collection Time: 10/31/21 11:40 AM   Result Value Ref Range    Glucose (POC) 129 (H) 70 - 110 mg/dL         PFT [08/02/2016 ]  SPIROMETRY 8/2/2016   Effort poor; did not meet ATS test time     FLOWS:   FEV1/FVC ratio is normal       Maximal Mid Expiratory Flow is supra-normal   FEV1 is normal and FVC is mildly decreased       Pre bronchodilator FEV1 is 1.54 L (81% predicted)   Pre bronchodilator FVC is 1.73 L (67% predicted)     Flow Volume Loop:   Poor tracing     Bronchodilator:   Not done       Patient could not do lung volumes or diffusion capacity        Impression:   Poor study, hence would not be able to interpret reliably       ECHO [10/27/21]:  Result status: Final result   · LV: Estimated LVEF is 60 - 65%. Visually measured ejection fraction. Normal cavity size, wall thickness and systolic function (ejection fraction normal). Wall motion: normal.  · RV: Dilated right ventricle. · RA: Dilated right atrium. · TV: Mild tricuspid valve regurgitation is present. · PA: Severe pulmonary hypertension. Pulmonary arterial systolic pressure is 76 mmHg. Imaging:    CXR [10/24/21]: FINDINGS:   EKG leads and wires overlie the chest.  Hyperinflation with mild diffuse interstitial prominence. Mild streaky opacities  at the lung bases. Atherosclerotic vascular calcifications. No cardiomegaly. No evidence of pleural effusion. CT  CHEST W CONT [10/27/21]:   IMPRESSION  1. Hyperinflation with mild diffuse interstitial prominence likely related to  emphysema. Mild streaky atelectasis or infiltrate at the lung bases      FINDINGS:      Lungs:  Fairly extensive emphysematous changes in both lungs. No dominant mass  or discrete suspicious lung nodule is detected. No definite airspace opacities. Focal scarring in the right upper lobe anterior aspect and in the left lower  lobe.     Pleura:  No significant pleural effusion is detected bilaterally.     Mediastinum:  1.5 x 1.3 cm precarinal node.     Cardiovascular: Moderate atherosclerotic calcifications along the aorta.     Base of Neck, Axillae:  Left thyroid nodule. No adenopathy.     Esophagus:  Unremarkable.     Upper Abdomen:  Negative     Bones:  No acute findings.     IMPRESSION             1.  Emphysematous changes. 2.  No airspace opacities    CT  CHEST WO CONT [10/27/21]: FINDINGS:      Lungs:    - Fairly extensive emphysematous changes in both lungs. - No dominant mass or discrete suspicious lung nodule is detected. - No definite airspace opacities. - Focal scarring in the right upper lobe anterior aspect and in the left lower  lobe.     Pleura:  No significant pleural effusion     Mediastinum:  1.5 x 1.3 cm precarinal node with eccentric calcification.     Cardiovascular:  No acute abnormalities. Moderate atherosclerotic  calcifications along the aorta.     Base of Neck, Axillae:  2.4 x 1.7 cm left thyroid nodule.     Esophagus:  Unremarkable.     Upper Abdomen:  Negative     Bones:  No acute findings.     IMPRESSION             1.  Emphysematous changes.     2.   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 Can use BiPAP-ST at night as needed  Agree with IV antibiotics - de-escalate to p.o. as tolerated  Steroids - Transitioned to PO prednisone 20mg BID. Will likely need prolonged steroid taper. Bronchodilators: duonebs q4h, pulmicort nebs 1mg BID, and albuterol PRN. Please avoid excessive beta agonist with DuoNeb and Brovana  Please hold home bronchodilators (Breztri Aerosphere). May resume on discharge  Aggressive pulmonary toileting/bronchial hygiene  Frequent incentive spirometry  Aspiration precautions including elevating HOB >30deg   PT/OT, OOB, ambulate with assistance as tolerated  DVT ppx per primary service  Discussed patient's concerns about supplemental oxygen at home explained - that if unable to wean to acceptable levels to be delivered by her current device, will order additional DME to support her oxygen needs  At discharge, please arrange followup with pt's primary pulmonologist - Dr. Rodriguez Resides with [facility].  Goals of care: Patient is understanding of her underlying severe pulmonary disease; however, would like to remain FULL CODE at this time. She would not, however, want to have prolonged intubation, be intubated if felt her condition were irreversible.    Will follow      High complexity decision making was performed during the evaluation of this patient at high risk for decompensation.     Above mentioned total time spent on reviewing the case/medical record/data/notes/EMR/patient examination/documentation/coordinating care with nurse/consultants, exclusive of procedures with complex decision making performed and > 50% time spent in face to face evaluation.     Tacos Leslie DO  10/31/21    Pulmonary Critical Care Medicine  [facility]

## 2021-10-31 NOTE — PROGRESS NOTES
0725: Bedside shift change report given to Fernando Rebollar RN (oncoming nurse) by CARLEEN Fay (offgoing nurse). Report included the following information SBAR, Kardex, Intake/Output, MAR and Cardiac Rhythm NSR.     1925: Bedside shift change report given to Radha Bronson RN (oncoming nurse) by Fernando Rebollar RN (offgoing nurse). Report included the following information SBAR, Kardex, Intake/Output, MAR and Cardiac Rhythm NSR.

## 2021-10-31 NOTE — PROGRESS NOTES
[facility]  [facility]    Patient: Yanick Martino Age: 82 y.o. : 1935 MR#: [id number] SSN: [id number]  Date/Time: 10/31/2021   C/C: Shortness of breath/COPD exacerbation        Subjective:   HPI : Patient admitted for acute hypoxic respiratory failure currently on high flow oxygen, COPD exacerbation, O2 saturation ranging from 98 to 90% on 35 L/min              Review of Systems:  positive responses in bold type   Constitutional: Negative for fever, chills, diaphoresis and unexpected weight change. HENT:  Improved dryness and improved ear irritation  Eyes: Negative for photophobia, pain, redness and visual disturbance. Respiratory: negative for shortness of breath, cough, choking, chest tightness, wheezing or stridor. Cardiovascular: Negative for chest pain, palpitations and leg swelling. Gastrointestinal: Negative for nausea, vomiting, abdominal pain, diarrhea, constipation, blood in stool, abdominal distention and anal bleeding. Genitourinary: Negative for dysuria, urgency, frequency, hematuria, flank pain and difficulty urinating. Musculoskeletal: Negative for back pain and arthralgias. Skin: Negative for color change, rash and wound. Neurological: Negative for dizziness, seizures, syncope, speech difficulty, light-headedness or headaches. Hematological: Does not bruise/bleed easily. Psychiatric/Behavioral: Negative for suicidal ideas, hallucinations, behavioral problems, self-injury or agitation      Assessment/Plan:      1.   Acute on chronic hypoxic respiratory failure  2 COPD acute  3 RSV positive by PCR/negative influenza, negative Covid  4 echo-preserved EF  5 hypertension  6 hyperglycemia-current due to steroid use and stress, not on any diabetic medication at home     Plan    -Subjective improvement in her respiration respiratory effort, sitting in bed and eating her lunch still on high flow oxygen attempts are made to bring it down,     -Continue steroids-change steroids IV Solu-Medrol to p.o. prednisone     -Discussed with pulmonary     -At home patient is on 3 to 4 L nasal cannula O2         Time spent on direct patient care >30 mints      Complexity : High complex - due to multiple medical issues outlined above. CODE Status : Full code     Case discussed with:  [x]Patient  [] Family  []Nursing  []Case Management      DVT Prophylaxis:  [x]Lovenox  []Hep SQ  []SCDs  []Coumadin   []On Heparin gtt     [] Eliquis [] Xarelto   Objective:        Tmax/24hrs: Temp (24hrs), Av °F (36.7 °C), Min:97.9 °F (36.6 °C), Max:98.2 °F (36.8 °C)  IOBRIEF     Intake/Output Summary (Last 24 hours) at 10/29/2021 1633  Last data filed at 10/29/2021 1238      Gross per 24 hour   Intake 240 ml   Output --   Net 240 ml         General:  Alert, cooperative, no acute distress   HEENT: No facial asymmetry, LAINA Abdirashid, External ears - WNL    Cardiovascular: S1S2 - regular , No Murmur   Pulmonary: Equal expansion , No Use of accessory muscles , bilateral decreased air entry with occasional rhonchi  GI:  +BS in all four quadrants, soft, non-tender  Extremities:  No edema; 2+ dorsalis pedis pulses bilaterally  Neuro: Alert and oriented X 2.           Vitals:         VS:   Visit Vitals  BP (!) 137/50 (BP 1 Location: Right upper arm, BP Patient Position: At rest)   Pulse 84   Temp 98.1 °F (36.7 °C)   Resp 15   Ht 5' 3\" (1.6 m)   Wt 59.8 kg (131 lb 14.4 oz)   SpO2 92%   BMI 23.37 kg/m²      Tmax/24hrs: Temp (24hrs), Av °F (36.7 °C), Min:97.9 °F (36.6 °C), Max:98.1 °F (36.7 °C)  IOBRIEFNo intake or output data in the 24 hours ending 10/31/21 1257      Medications:   Current Facility-Administered Medications   Medication Dose Route Frequency    phenol throat spray (CHLORASEPTIC) 1 Spray  1 Spray Oral PRN    sodium chloride (AYR SALINE) 0.65 % nasal drops 2 Drop  2 Drop Both Nostrils Q2H PRN    methylPREDNISolone (PF) (Solu-MEDROL) injection 40 mg  40 mg IntraVENous Q8H    albuterol-ipratropium (DUO-NEB) 2.5 MG-0.5 MG/3 ML  3 mL Nebulization Q4H RT    budesonide (PULMICORT) 1,000 mcg/2 mL nebulizer susp  1,000 mcg Nebulization BID RT    pantoprazole (PROTONIX) tablet 40 mg  40 mg Oral ACB    guaiFENesin (ROBITUSSIN) 100 mg/5 mL oral liquid 100 mg  100 mg Oral Q4H PRN    insulin lispro (HUMALOG) injection   SubCUTAneous AC&HS    glucose chewable tablet 16 g  4 Tablet Oral PRN    glucagon (GLUCAGEN) injection 1 mg  1 mg IntraMUSCular PRN    dextrose (D50W) injection syrg 12.5-25 g  25-50 mL IntraVENous PRN    sodium chloride (NS) flush 5-10 mL  5-10 mL IntraVENous PRN    azithromycin (ZITHROMAX) 500 mg in 0.9% sodium chloride 250 mL (VIAL-MATE)  500 mg IntraVENous Q24H    sodium chloride (NS) flush 5-40 mL  5-40 mL IntraVENous Q8H    sodium chloride (NS) flush 5-40 mL  5-40 mL IntraVENous PRN    acetaminophen (TYLENOL) tablet 650 mg  650 mg Oral Q6H PRN    Or    acetaminophen (TYLENOL) suppository 650 mg  650 mg Rectal Q6H PRN    polyethylene glycol (MIRALAX) packet 17 g  17 g Oral DAILY PRN    ondansetron (ZOFRAN ODT) tablet 4 mg  4 mg Oral Q8H PRN    Or    ondansetron (ZOFRAN) injection 4 mg  4 mg IntraVENous Q6H PRN    enoxaparin (LOVENOX) injection 40 mg  40 mg SubCUTAneous DAILY    ALPRAZolam (XANAX) tablet 0.5 mg  0.5 mg Oral QHS    aspirin delayed-release tablet 81 mg  81 mg Oral DAILY    atorvastatin (LIPITOR) tablet 20 mg  20 mg Oral DAILY    losartan (COZAAR) tablet 50 mg  50 mg Oral DAILY    [Held by provider] metoprolol tartrate (LOPRESSOR) tablet 25 mg  25 mg Oral BID    furosemide (LASIX) tablet 40 mg  40 mg Oral DAILY    NIFEdipine ER (PROCARDIA XL) tablet 60 mg  60 mg Oral DAILY       Labs:    Recent Labs     10/31/21  0209   WBC 15.0*   HGB 14.2   HCT 45.9*        Recent Labs     10/31/21  0209   *   K 4.9      CO2 29   *   BUN 23*   CREA 0.94   CA 8.8         Time spent on direct patient care >30 mints     Complexity : High complex - due to multiple medical issues outlined above. CODE Status : Full code    Case discussed with:  [x]Patient  [] Family  [x]Nursing  []Case Management         Disclaimer: Sections of this note are dictated utilizing voice recognition software, which may have resulted in some phonetic based errors in grammar and contents. Even though attempts were made to correct all the mistakes, some may have been missed, and remained in the body of the document. If questions arise, please contact our department.     Signed By: Mar Loera MD     October 31, 2021

## 2021-11-01 LAB
GLUCOSE BLD STRIP.AUTO-MCNC: 107 MG/DL (ref 70–110)
GLUCOSE BLD STRIP.AUTO-MCNC: 138 MG/DL (ref 70–110)
GLUCOSE BLD STRIP.AUTO-MCNC: 140 MG/DL (ref 70–110)
GLUCOSE BLD STRIP.AUTO-MCNC: 97 MG/DL (ref 70–110)

## 2021-11-01 PROCEDURE — 74011250636 HC RX REV CODE- 250/636: Performed by: FAMILY MEDICINE

## 2021-11-01 PROCEDURE — 74011250637 HC RX REV CODE- 250/637: Performed by: INTERNAL MEDICINE

## 2021-11-01 PROCEDURE — 94640 AIRWAY INHALATION TREATMENT: CPT

## 2021-11-01 PROCEDURE — 82962 GLUCOSE BLOOD TEST: CPT

## 2021-11-01 PROCEDURE — 94762 N-INVAS EAR/PLS OXIMTRY CONT: CPT

## 2021-11-01 PROCEDURE — 74011636637 HC RX REV CODE- 636/637: Performed by: INTERNAL MEDICINE

## 2021-11-01 PROCEDURE — 74011250637 HC RX REV CODE- 250/637: Performed by: FAMILY MEDICINE

## 2021-11-01 PROCEDURE — 65660000000 HC RM CCU STEPDOWN

## 2021-11-01 PROCEDURE — 74011000250 HC RX REV CODE- 250: Performed by: INTERNAL MEDICINE

## 2021-11-01 PROCEDURE — 99232 SBSQ HOSP IP/OBS MODERATE 35: CPT | Performed by: INTERNAL MEDICINE

## 2021-11-01 PROCEDURE — 77010033711 HC HIGH FLOW OXYGEN

## 2021-11-01 PROCEDURE — 99232 SBSQ HOSP IP/OBS MODERATE 35: CPT | Performed by: NURSE PRACTITIONER

## 2021-11-01 RX ORDER — FLUCONAZOLE 200 MG/1
200 TABLET ORAL DAILY
Status: COMPLETED | OUTPATIENT
Start: 2021-11-01 | End: 2021-11-01

## 2021-11-01 RX ORDER — NYSTATIN 100000 [USP'U]/ML
500000 SUSPENSION ORAL 4 TIMES DAILY
Status: DISCONTINUED | OUTPATIENT
Start: 2021-11-01 | End: 2021-11-04 | Stop reason: HOSPADM

## 2021-11-01 RX ADMIN — Medication 10 ML: at 22:38

## 2021-11-01 RX ADMIN — ACETAMINOPHEN 650 MG: 325 TABLET ORAL at 18:39

## 2021-11-01 RX ADMIN — PREDNISONE 20 MG: 20 TABLET ORAL at 08:29

## 2021-11-01 RX ADMIN — LOSARTAN POTASSIUM 50 MG: 50 TABLET, FILM COATED ORAL at 08:29

## 2021-11-01 RX ADMIN — IPRATROPIUM BROMIDE AND ALBUTEROL SULFATE 3 ML: .5; 2.5 SOLUTION RESPIRATORY (INHALATION) at 19:16

## 2021-11-01 RX ADMIN — BUDESONIDE 1000 MCG: 1 SUSPENSION RESPIRATORY (INHALATION) at 19:16

## 2021-11-01 RX ADMIN — ATORVASTATIN CALCIUM 20 MG: 20 TABLET, FILM COATED ORAL at 08:29

## 2021-11-01 RX ADMIN — FUROSEMIDE 40 MG: 40 TABLET ORAL at 08:29

## 2021-11-01 RX ADMIN — IPRATROPIUM BROMIDE AND ALBUTEROL SULFATE 3 ML: .5; 2.5 SOLUTION RESPIRATORY (INHALATION) at 17:29

## 2021-11-01 RX ADMIN — NYSTATIN 500000 UNITS: 100000 SUSPENSION ORAL at 22:38

## 2021-11-01 RX ADMIN — IPRATROPIUM BROMIDE AND ALBUTEROL SULFATE 3 ML: .5; 2.5 SOLUTION RESPIRATORY (INHALATION) at 02:13

## 2021-11-01 RX ADMIN — BUDESONIDE 1000 MCG: 1 SUSPENSION RESPIRATORY (INHALATION) at 08:43

## 2021-11-01 RX ADMIN — AZITHROMYCIN 500 MG: 500 INJECTION, POWDER, LYOPHILIZED, FOR SOLUTION INTRAVENOUS at 06:23

## 2021-11-01 RX ADMIN — Medication 81 MG: at 08:30

## 2021-11-01 RX ADMIN — Medication 10 ML: at 06:25

## 2021-11-01 RX ADMIN — PANTOPRAZOLE SODIUM 40 MG: 40 TABLET, DELAYED RELEASE ORAL at 08:29

## 2021-11-01 RX ADMIN — ALPRAZOLAM 0.5 MG: 1 TABLET ORAL at 22:38

## 2021-11-01 RX ADMIN — IPRATROPIUM BROMIDE AND ALBUTEROL SULFATE 3 ML: .5; 2.5 SOLUTION RESPIRATORY (INHALATION) at 08:43

## 2021-11-01 RX ADMIN — Medication 10 ML: at 13:36

## 2021-11-01 RX ADMIN — NYSTATIN 500000 UNITS: 100000 SUSPENSION ORAL at 18:31

## 2021-11-01 RX ADMIN — IPRATROPIUM BROMIDE AND ALBUTEROL SULFATE 3 ML: .5; 2.5 SOLUTION RESPIRATORY (INHALATION) at 23:08

## 2021-11-01 RX ADMIN — ENOXAPARIN SODIUM 40 MG: 100 INJECTION SUBCUTANEOUS at 08:28

## 2021-11-01 RX ADMIN — FLUCONAZOLE 200 MG: 200 TABLET ORAL at 18:31

## 2021-11-01 RX ADMIN — NIFEDIPINE 60 MG: 30 TABLET, FILM COATED, EXTENDED RELEASE ORAL at 08:28

## 2021-11-01 RX ADMIN — IPRATROPIUM BROMIDE AND ALBUTEROL SULFATE 3 ML: .5; 2.5 SOLUTION RESPIRATORY (INHALATION) at 13:07

## 2021-11-01 RX ADMIN — DIPHENHYDRAMINE HYDROCHLORIDE AND LIDOCAINE HYDROCHLORIDE AND ALUMINUM HYDROXIDE AND MAGNESIUM HYDRO 10 ML: KIT at 18:39

## 2021-11-01 NOTE — ROUTINE PROCESS
19- 2200 meds provided. Moderate labored breathing noted. Pt conversing on phone. Crisis nursing w/o CNA. See shift assessment. Will continue to monitor sPo2 with new pulse ox.

## 2021-11-01 NOTE — PROGRESS NOTES
[facility] (Physician Orders for Scope of Treatment)       Date of conversation: 11/1/2021   Location: [facility]                Length (minutes): 20    Participants:   [x] Patient      Other persons present:   Name: Burke Leger NP (Palliative)   Name: Ethanphill Aguero. Joann Gore. MSW (Palliative)      Nicanor Gallardo ACP Facilitator: Erlin Gore. MSW      Conversation Topics   (If Patient does not have decision making capacity, Agent/Surrogate responds based on understanding of how patient would respond if capable)    Understanding of Medical Condition/s AND Potential Complications:    Patient response: Patient has full understanding of her medical condition(s) and the potential complications. Patient has had time to think about her goals of care and what her wishes are. Patient is now ready to sign a POST form. Patient does not want to be resuscitated and does not want to be intubated. She also does not want a feeding tube. She does way all the medications that could be used to help treat her. The POST form was completed and patient signed the POST form. Copies were placed on the chart to be scanned in to the EMR and patient was given the original. At this time, patient is a DNR/DNI with Limited Additional Interventions and NO feeding tubes. Cardiopulmonary Resuscitation      \"What do you understand about CPR? \" Response: Patient has full understanding of the risks and burdens of CPR.     Order Elected for CPR:  []  Attempt Resuscitation [x]  Do Not Attempt Resuscitation      When NOT in Cardiopulmonary Arrest, Order Elected:      [] Comfort Measures  [x] Limited Additional Interventions  [] Full Interventions    Artificially Administered Nutrition, Order Elected:    [x] No Feeding Tube   [] Feeding Tube for a defined trial period  [] Feeding Tube long-term if indicated    The following was provided (check all that apply):     [] Review of existing Advance Directive  [] Assistance with Completion of New Advance Directive   [x] Review of [place] POST Form       Meeting Outcomes:   [] ACP discussion completed   [x] Sarah form completed  [x] [place] prepared for Provider review and signature   [x] Original placed on Chart, if in facility (form to be sent with patient at discharge)  [x] Copy given to healthcare agent    [] Copy scanned to electronic medical record    If ACP discussion not completed, last interview topic discussed: complete a POST form (Limited). Follow-up plan:     [] Schedule follow-up conversation to continue planning   [] Referred individual to Provider for additional questions/concerns   [x] Advised patient/agent/surrogate to review completed POST form and update if needed with changes in condition, patient preferences or care setting      Recommendations: The Palliative Care team will continue to offer support to patient and her family, at this time. [x] This note routed to one or more involved healthcare providers         Brandin Duque MSW  Palliative Medicine Inpatient   [address]: [phone])

## 2021-11-01 NOTE — PROGRESS NOTES
[facility]: [phone]  [facility]: [phone]     Patient Name: Ramona Vazquez  YOB: 1935    Date of consult : 11/1/21  Reason for Consult: establish goals of care  Requesting Provider: Irena Guan MD   Primary Care Physician: Yohana Almanzar NP      SUMMARY:   Ramona Vazquez is a 82 y.o. female with a past history of COPD,COVID,CHF, HTN, CAD, Pulmonary HTN, who was admitted on 10/24/2021 from home with a diagnosis of RSV with acute respiratory failure. Current medical issues leading to Palliative Medicine involvement include: establish goals of care. CHIEF COMPLAINT: \"My lungs are bad\"    HPI/SUBJECTIVE:    Patient is a 72-year-old  female known to our services from prior admission. She has longstanding COPD with acute on chronic respiratory failure debility. Patient continues to live independently and reports that she \"pretty much does everything on her own\" reports that she has multiple family members that live within a couple of miles from her. She values her independence above all. The patient is:   [x] Verbal and participatory  [] Non-participatory due to:     GOALS OF CARE:  Patient/Health Care Proxy Stated Goals: Prolong life      TREATMENT PREFERENCES:   Code Status: Full Code         PALLIATIVE DIAGNOSES:   1. Goals of care/acp  2. COPD  3. Acute on chronic respiratory failure  4. Debility       PLAN:   11/1/21:  Pt completed her POST with this NP and Ricardo MCALLISTER electing DO NOT RESUSCITATE/DO NOT INTUBATE/no feeding tubes. She took much time to consider her options and write out what she wants. We have filed on her chart and given patient the original copy  1. Goals of care/ACP  This NP and Isaiah Avitia RN in to see patient at the bedside. She is on contact precautions.   Our team has met with patient in the past and she verbalizes the same that she would not want CPR or to be placed on machines at end-of-life. When asked for her to complete any paperwork patient states that she has \"a living will at home\". She also states that she does not want the hospital to have a copy of her living will because \"then they will stop doing everything\". We presented the POST and reviewed with her. Patient asked for a copy of it so she could review with her children and stated it would be okay for us to come back tomorrow to talk more about it. I expressed to her that unless she is willing to complete paperwork demoralizing her decisions she will remain a full code with full interventions including intubation and mechanical ventilation if that is needed during this hospital stay. She acknowledges and accepts this. Goals of care at this time remain full code with full interventions. 2.   COPD  On home oxygen since 2016 patient has emphysema with chronic scarring in the right upper lobe and right middle lobe is followed by outpatient pulmonologist.  Patient has a 30-pack-year history quit 33 years ago. 3.  Acute on chronic respiratory failure  Patient came in with RSV now on high flow oxygen at 50%. 4.  Debility  At baseline patient is a 70% palliative performance score unable to do normal job with severe disease process, she is independent with self-care. At this time her palliative performance score has dropped somewhat to between 50 and 60% mainly bed to chair, unable to do any work due to disease progression and now needing occasional to considerable amount of assistance for self-care and functional ADLs. 5.   Initial consult note routed to primary continuity provider  6.    Communicated plan of care with: Palliative IDT      Advance Care Planning:  [] The [facility] Interdisciplinary Team has updated the ACP Navigator with [address] and Patient Capacity    Primary Decision Maker ([address]): Next of kin are patient's son and daughter    Medical Interventions: Full interventions   Other Instructions:         As far as possible, the palliative care team has discussed with patient / health care proxy about goals of care / treatment preferences for patient. HISTORY:     History obtained from: chart review   Principal Problem:    COPD exacerbation ([facility] (2021)    Active Problems:    Respiratory failure ([facility] (10/24/2021)      Acute bronchitis with chronic obstructive pulmonary disease (COPD) ([facility] (10/24/2021)      Past Medical History:   Diagnosis Date    Chronic lung disease     Chronic obstructive pulmonary disease ([facility]     [facility]     Hypertension       Past Surgical History:   Procedure Laterality Date    HX ORTHOPAEDIC      spinal sx     HX OTHER SURGICAL      Carotid artery    VASCULAR SURGERY PROCEDURE UNLIST      L CEA 10/2014      Family History   Problem Relation Age of Onset    Heart Disease Mother     Hypertension Mother     Heart Attack Father     Hypertension Father      History reviewed, no pertinent family history.   Social History     Tobacco Use    Smoking status: Former Smoker     Packs/day: 1.50     Years: 30.00     Pack years: 45.00     Types: Cigarettes     Quit date: 1987     Years since quittin.1    Smokeless tobacco: Never Used   Substance Use Topics    Alcohol use: No     Alcohol/week: 0.0 standard drinks     No Known Allergies   Current Facility-Administered Medications   Medication Dose Route Frequency    [START ON 2021] predniSONE (DELTASONE) tablet 30 mg  30 mg Oral DAILY WITH BREAKFAST    nystatin (MYCOSTATIN) 100,000 unit/mL oral suspension 500,000 Units  500,000 Units Oral QID    fluconazole (DIFLUCAN) tablet 200 mg  200 mg Oral DAILY    magic mouthwash (FIRST-MOUTHWASH BLM) oral suspension 10 mL  10 mL Oral TIDAC    phenol throat spray (CHLORASEPTIC) 1 Spray  1 Spray Oral PRN    sodium chloride (AYR SALINE) 0.65 % nasal drops 2 Drop  2 Drop Both Nostrils Q2H PRN    albuterol-ipratropium (DUO-NEB) 2.5 MG-0.5 MG/3 ML  3 mL Nebulization Q4H RT    budesonide (PULMICORT) 1,000 mcg/2 mL nebulizer susp  1,000 mcg Nebulization BID RT    pantoprazole (PROTONIX) tablet 40 mg  40 mg Oral ACB    guaiFENesin (ROBITUSSIN) 100 mg/5 mL oral liquid 100 mg  100 mg Oral Q4H PRN    insulin lispro (HUMALOG) injection   SubCUTAneous AC&HS    glucose chewable tablet 16 g  4 Tablet Oral PRN    glucagon (GLUCAGEN) injection 1 mg  1 mg IntraMUSCular PRN    dextrose (D50W) injection syrg 12.5-25 g  25-50 mL IntraVENous PRN    sodium chloride (NS) flush 5-10 mL  5-10 mL IntraVENous PRN    sodium chloride (NS) flush 5-40 mL  5-40 mL IntraVENous Q8H    sodium chloride (NS) flush 5-40 mL  5-40 mL IntraVENous PRN    acetaminophen (TYLENOL) tablet 650 mg  650 mg Oral Q6H PRN    Or    acetaminophen (TYLENOL) suppository 650 mg  650 mg Rectal Q6H PRN    polyethylene glycol (MIRALAX) packet 17 g  17 g Oral DAILY PRN    ondansetron (ZOFRAN ODT) tablet 4 mg  4 mg Oral Q8H PRN    Or    ondansetron (ZOFRAN) injection 4 mg  4 mg IntraVENous Q6H PRN    enoxaparin (LOVENOX) injection 40 mg  40 mg SubCUTAneous DAILY    ALPRAZolam (XANAX) tablet 0.5 mg  0.5 mg Oral QHS    aspirin delayed-release tablet 81 mg  81 mg Oral DAILY    atorvastatin (LIPITOR) tablet 20 mg  20 mg Oral DAILY    losartan (COZAAR) tablet 50 mg  50 mg Oral DAILY    [Held by provider] metoprolol tartrate (LOPRESSOR) tablet 25 mg  25 mg Oral BID    furosemide (LASIX) tablet 40 mg  40 mg Oral DAILY    NIFEdipine ER (PROCARDIA XL) tablet 60 mg  60 mg Oral DAILY          Clinical Pain Assessment (nonverbal scale for nonverbal patients): Clinical Pain Assessment  Severity: 0          Duration: for how long has pt been experiencing pain (e.g., 2 days, 1 month, years)  Frequency: how often pain is an issue (e.g., several times per day, once every few days, constant)     FUNCTIONAL ASSESSMENT:     Palliative Performance Scale (PPS):  PPS: 70    ECOG  ECOG Status : Restricted     PSYCHOSOCIAL/SPIRITUAL SCREENING:      Any spiritual / Jewish concerns:  [] Yes /  [x] No    Caregiver Burnout:  [] Yes /  [] No /  [x] No Caregiver Present      Anticipatory grief assessment:   [x] Normal  / [] Maladaptive        REVIEW OF SYSTEMS:     Systems: constitutional, ears/nose/mouth/throat, respiratory, gastrointestinal, genitourinary, musculoskeletal, integumentary, neurologic, psychiatric, endocrine. Positive findings noted below. Modified ESAS Completed by: provider           Pain: 0           Dyspnea: 3           Stool Occurrence(s): 1   Positive and pertinent negative findings in ROS are noted above in HPI. The following systems were [x] reviewed / [] unable to be reviewed as noted in HPI  Other findings are noted below. PHYSICAL EXAM:     Constitutional: alert and interactive, not in distress   Eyes: pupils equal, anicteric  ENMT: no nasal discharge, moist mucous membranes  Cardiovascular: regular rhythm, distal pulses intact  Respiratory: breathing not labored, on salter 50%  Gastrointestinal: soft non-tender, +bowel sounds  Musculoskeletal: no deformity, no tenderness to palpation  Skin: warm, dry  Neurologic: AA and oriented X 4 following commands, moving all extremities  Psychiatric: full affect, no hallucinations    Other: Wt Readings from Last 3 Encounters:   10/31/21 59.8 kg (131 lb 14.4 oz)   10/21/21 54.4 kg (120 lb)   10/11/21 54.9 kg (121 lb)     Blood pressure (!) 163/68, pulse 91, temperature 98.2 °F (36.8 °C), resp. rate 18, height 5' 3\" (1.6 m), weight 59.8 kg (131 lb 14.4 oz), SpO2 95 %. Pain:  Pain Scale 1: Numeric (0 - 10)  Pain Intensity 1: 0     Pain Location 1:  (\"Rt side & ears are clogged up from the machine. \")  Pain Orientation 1: Posterior  Pain Description 1: Aching  Pain Intervention(s) 1: Medication (see MAR), Emotional support, Repositioned       LAB AND IMAGING FINDINGS:     Lab Results   Component Value Date/Time    WBC 15.0 (H) 10/31/2021 02:09 AM    HGB 14.2 10/31/2021 02:09 AM    PLATELET 133 64/47/5195 02:09 AM     Lab Results   Component Value Date/Time    Sodium 135 (L) 10/31/2021 02:09 AM    Potassium 4.9 10/31/2021 02:09 AM    Chloride 101 10/31/2021 02:09 AM    CO2 29 10/31/2021 02:09 AM    BUN 23 (H) 10/31/2021 02:09 AM    Creatinine 0.94 10/31/2021 02:09 AM    Calcium 8.8 10/31/2021 02:09 AM    Magnesium 2.6 10/25/2021 04:15 AM    Phosphorus 4.2 02/24/2021 03:30 AM      Lab Results   Component Value Date/Time    Alk. phosphatase 53 10/24/2021 07:35 AM    Protein, total 7.3 10/24/2021 07:35 AM    Albumin 3.5 10/24/2021 07:35 AM    Globulin 3.8 10/24/2021 07:35 AM     Lab Results   Component Value Date/Time    INR 1.0 10/24/2021 07:35 AM    Prothrombin time 12.7 10/24/2021 07:35 AM    aPTT 34.6 02/24/2021 03:30 AM      Lab Results   Component Value Date/Time    Ferritin 35 02/24/2021 03:30 AM      No results found for: PH, PCO2, PO2  No components found for: [place]   Lab Results   Component Value Date/Time     10/24/2021 07:35 AM    CK - MB 1.4 10/24/2021 07:35 AM              Total time: 25 minutes   Counseling / coordination time, spent as noted above:   > 50% counseling / coordination:  Time spent in direct consultation with the patient, medical team, and family     Prolonged service was provided for  []30 min   []75 min in face to face time in the presence of the patient, spent as noted above. Time Start:   Time End:     Disclaimer: Sections of this note are dictated using utilizing voice recognition software, which may have resulted in some phonetic based errors in grammar and contents. Even though attempts were made to correct all the mistakes, some may have been missed, and remained in the body of the document. If questions arise, please contact our department.

## 2021-11-01 NOTE — ROUTINE PROCESS
Pt able to sleep throughout the night. She arises c/o of \"ears clogged. \"  Pt assured discussion w/ MD regarding ears.

## 2021-11-01 NOTE — PROGRESS NOTES
Comprehensive Nutrition Assessment    Type and Reason for Visit: Initial, RD nutrition re-screen/LOS    Nutrition Recommendations/Plan: Continue current diet order. Nutrition Assessment:  Tolerating diet with good meal intake. Variable a bit based on food preferences. Overall nutrition intake adequate to meet patients nutritional needs. Malnutrition Assessment:  Malnutrition Status:  No malnutrition      Nutrition History and Allergies: PMH: COPD, heart failure, HTN. Presented with SOB. Stable wt hx per chart, pt confirmed. Good intake PTA. NKFA. Estimated Daily Nutrient Needs:  Energy (kcal): 1142-5710; Weight Used for Energy Requirements: Current  Protein (g): 48-60; Weight Used for Protein Requirements: Current (0.8-1)  Fluid (ml/day): 1533-0703; Method Used for Fluid Requirements: 1 ml/kcal      Nutrition Related Findings:  BM 11/1. Meds: Lasix, SSI, prednisone. Wounds:    None       Current Nutrition Therapies:  ADULT DIET Regular; 4 carb choices (60 gm/meal)    Anthropometric Measures:  · Height:  5' 3\" (160 cm)  · Current Body Wt:  59.8 kg (131 lb 13.4 oz)   · Admission Body Wt:  119 lb 14.9 oz (pt stated)    · Usual Body Wt:  56.7 kg (125 lb) (120-130 lbs)     · Ideal Body Wt:  115 lbs:  114.6 %   · BMI Category:  Normal weight (BMI 22.0-24.9) age over 72       Nutrition Diagnosis:   No nutrition diagnosis at this time     Nutrition Interventions:   Food and/or Nutrient Delivery: Continue current diet  Nutrition Education and Counseling: Education not indicated  Coordination of Nutrition Care: Continue to monitor while inpatient    Goals:  PO nutrition intake will continue to meet >75% of patients estimated nutritional needs over the next 7 days.        Nutrition Monitoring and Evaluation:   Behavioral-Environmental Outcomes: None identified  Food/Nutrient Intake Outcomes: Food and nutrient intake  Physical Signs/Symptoms Outcomes: Meal time behavior, Nutrition focused physical findings    Discharge Planning:    No discharge needs at this time     Electronically signed by Liz Wooten RD on 11/1/2021 at 2:48 PM    Contact: [phone]

## 2021-11-01 NOTE — PROGRESS NOTES
10/31/21 4908   Oxygen Therapy   O2 Sat (%) 99 %   Pulse via Oximetry 82 beats per minute   O2 Device Hi flow nasal cannula  (vaportrem)   Skin Assessment Clean, dry, & intact   O2 Flow Rate (L/min) 30 l/min   O2 Temperature 91.4 °F (33 °C)   FIO2 (%) 40 %

## 2021-11-01 NOTE — ROUTINE PROCESS
Pt dangling to bedside w/ moderately labored breathing and receiving nebulizer. New DNR code status w/ palliative care.

## 2021-11-01 NOTE — PROGRESS NOTES
[facility]  Progress Note    Patient: Chace Coles Age: 82 y.o. : 1935 MR#: [id number] SSN: [id number]  Date/Time: 2021       C/C: Shortness of breath/COPD exacerbation        Subjective:   HPI : Patient admitted for acute hypoxic respiratory failure currently on high flow oxygen, COPD exacerbation, O2 saturation ranging from 98 to 90% on 35 L/min              Review of Systems:  positive responses in bold type   Constitutional: Negative for fever, chills, diaphoresis and unexpected weight change. HENT:  Improved dryness and improved ear irritation  Eyes: Negative for photophobia, pain, redness and visual disturbance. Respiratory: negative for shortness of breath, cough, choking, chest tightness, wheezing or stridor. Cardiovascular: Negative for chest pain, palpitations and leg swelling. Gastrointestinal: Negative for nausea, vomiting, abdominal pain, diarrhea, constipation, blood in stool, abdominal distention and anal bleeding. Genitourinary: Negative for dysuria, urgency, frequency, hematuria, flank pain and difficulty urinating. Musculoskeletal: Negative for back pain and arthralgias. Skin: Negative for color change, rash and wound. Neurological: Negative for dizziness, seizures, syncope, speech difficulty, light-headedness or headaches. Hematological: Does not bruise/bleed easily. Psychiatric/Behavioral: Negative for suicidal ideas, hallucinations, behavioral problems, self-injury or agitation      Assessment/Plan:      1.   Acute on chronic hypoxic respiratory failure  2 COPD acute  3 RSV positive by PCR/negative influenza, negative Covid  4 echo-preserved EF  5 hypertension  6 hyperglycemia-current due to steroid use and stress, not on any diabetic medication at home     Plan    -Subjective improvement in her respiration respiratory effort, sitting in bed and eating her lunch still on high flow oxygen attempts are made to bring it down,     -We will taper steroids further-      -Discussed with pulmonary     -At home patient is on 3 to 4 L nasal cannula O2         Time spent on direct patient care >30 mints      Complexity : High complex - due to multiple medical issues outlined above. CODE Status : Full code     Case discussed with:  [x]Patient  [] Family  []Nursing  []Case Management      DVT Prophylaxis:  [x]Lovenox  []Hep SQ  []SCDs  []Coumadin   []On Heparin gtt     [] Eliquis [] Xarelto   Objective:        Tmax/24hrs: Temp (24hrs), Av °F (36.7 °C), Min:97.9 °F (36.6 °C), Max:98.2 °F (36.8 °C)  IOBRIEF     Intake/Output Summary (Last 24 hours) at 10/29/2021 1633  Last data filed at 10/29/2021 1238      Gross per 24 hour   Intake 240 ml   Output --   Net 240 ml         General:  Alert, cooperative, no acute distress   HEENT: No facial asymmetry, LAINA Abdirashid, External ears - WNL    Cardiovascular: S1S2 - regular , No Murmur   Pulmonary: Equal expansion , No Use of accessory muscles , bilateral decreased air entry with occasional rhonchi  GI:  +BS in all four quadrants, soft, non-tender  Extremities:  No edema; 2+ dorsalis pedis pulses bilaterally  Neuro: Alert and oriented X 2.           Vitals:         VS:   Visit Vitals  /70   Pulse 91   Temp 97.9 °F (36.6 °C)   Resp 15   Ht 5' 3\" (1.6 m)   Wt 59.8 kg (131 lb 14.4 oz)   SpO2 95%   BMI 23.37 kg/m²      Tmax/24hrs: Temp (24hrs), Av.8 °F (36.6 °C), Min:97 °F (36.1 °C), Max:98.3 °F (36.8 °C)  IOBRIEF    Intake/Output Summary (Last 24 hours) at 2021 1228  Last data filed at 2021 6441  Gross per 24 hour   Intake --   Output 4 ml   Net -4 ml         Medications:   Current Facility-Administered Medications   Medication Dose Route Frequency    predniSONE (DELTASONE) tablet 20 mg  20 mg Oral BID WITH MEALS    phenol throat spray (CHLORASEPTIC) 1 Spray  1 Spray Oral PRN    sodium chloride (AYR SALINE) 0.65 % nasal drops 2 Drop  2 Drop Both Nostrils Q2H PRN    albuterol-ipratropium (DUO-NEB) 2.5 MG-0.5 MG/3 ML  3 mL Nebulization Q4H RT    budesonide (PULMICORT) 1,000 mcg/2 mL nebulizer susp  1,000 mcg Nebulization BID RT    pantoprazole (PROTONIX) tablet 40 mg  40 mg Oral ACB    guaiFENesin (ROBITUSSIN) 100 mg/5 mL oral liquid 100 mg  100 mg Oral Q4H PRN    insulin lispro (HUMALOG) injection   SubCUTAneous AC&HS    glucose chewable tablet 16 g  4 Tablet Oral PRN    glucagon (GLUCAGEN) injection 1 mg  1 mg IntraMUSCular PRN    dextrose (D50W) injection syrg 12.5-25 g  25-50 mL IntraVENous PRN    sodium chloride (NS) flush 5-10 mL  5-10 mL IntraVENous PRN    azithromycin (ZITHROMAX) 500 mg in 0.9% sodium chloride 250 mL (VIAL-MATE)  500 mg IntraVENous Q24H    sodium chloride (NS) flush 5-40 mL  5-40 mL IntraVENous Q8H    sodium chloride (NS) flush 5-40 mL  5-40 mL IntraVENous PRN    acetaminophen (TYLENOL) tablet 650 mg  650 mg Oral Q6H PRN    Or    acetaminophen (TYLENOL) suppository 650 mg  650 mg Rectal Q6H PRN    polyethylene glycol (MIRALAX) packet 17 g  17 g Oral DAILY PRN    ondansetron (ZOFRAN ODT) tablet 4 mg  4 mg Oral Q8H PRN    Or    ondansetron (ZOFRAN) injection 4 mg  4 mg IntraVENous Q6H PRN    enoxaparin (LOVENOX) injection 40 mg  40 mg SubCUTAneous DAILY    ALPRAZolam (XANAX) tablet 0.5 mg  0.5 mg Oral QHS    aspirin delayed-release tablet 81 mg  81 mg Oral DAILY    atorvastatin (LIPITOR) tablet 20 mg  20 mg Oral DAILY    losartan (COZAAR) tablet 50 mg  50 mg Oral DAILY    [Held by provider] metoprolol tartrate (LOPRESSOR) tablet 25 mg  25 mg Oral BID    furosemide (LASIX) tablet 40 mg  40 mg Oral DAILY    NIFEdipine ER (PROCARDIA XL) tablet 60 mg  60 mg Oral DAILY       Labs:    Recent Labs     10/31/21  0209   WBC 15.0*   HGB 14.2   HCT 45.9*        Recent Labs     10/31/21  0209   *   K 4.9      CO2 29   *   BUN 23*   CREA 0.94   CA 8.8         Time spent on direct patient care >30 mints     Complexity : High complex - due to multiple medical issues outlined above. CODE Status : full   Case discussed with:  [x]Patient  [] Family  []Nursing  []Case Management         Disclaimer: Sections of this note are dictated utilizing voice recognition software, which may have resulted in some phonetic based errors in grammar and contents. Even though attempts were made to correct all the mistakes, some may have been missed, and remained in the body of the document. If questions arise, please contact our department.     Signed By: Matias Moore MD     November 1, 2021

## 2021-11-01 NOTE — PROGRESS NOTES
[address] [place] [address] [facility]. Pulmonary, Critical Care, and Sleep Medicine    Progress note    Name: Michael Guillermo MRN: [id number]   : 1935 Hospital: [address] [place] [address]    Date: 2021                Subjective: This patient has been seen and evaluated at the request of Dr. Jarek Jacob for acute on chronic Hypoxic Respiratory failure requiring HFNC 2/2 RSV infection in setting of severe COPD/Emphysema. 21   LOS: 8  · Patient states she is breathing better  · She is talking with a visitor and both hospital and cell phone ringing- I encouraged patient not to overexert herself and talk too much. · We reviewed slow deep breathing exercises. When she did purse lip breathing- her SpO2 improved 3-4 points  · She is reporting painful sores in her mouth- she appears to have thrush on pharyngeal arch, tongue and both hard and soft palate- She is taking Nystatin  · Tolerating PO but states sores makes it hard to eat  · She remains on Vapotherm: I was able to decrease FiO2 at 1422 with SpO2 remaining 93-95%  · No nausea or emesis. No hemoptysis. Sputum has improved- she is wondering if she needs more \"cough syrup\"    Note: She currently has a INOGEN device at home. She has support from her son and his girlfriend. Inpat Anti-Infectives (From admission, onward)     Start     Ordered Stop    21 1800  nystatin (MYCOSTATIN) 100,000 unit/mL oral suspension 500,000 Units  500,000 Units,   Oral,   4 TIMES DAILY      21 1524 --    21 1600  fluconazole (DIFLUCAN) tablet 200 mg  200 mg,   Oral,   DAILY      21 1524 21 0859                  HPI by Dr. Prachi Calderón  Patient is a 82 y.o. female with PMH of COPD/emphysema (on 3.5 L NC home O2), chronic diastolic CHF, mild pulmonary HTN, COVID-19 PNA (now fully vaccinated), who presented to [facility] ED on 10/24/2021 c/o progressive worsening S OB.   Patient reports that she began feeling ill approximately 5 days PTA with associated mild increase of SOB beyond her baseline. She reports going to [facility] ED, where she was diagnosed with bronchitis, and was treated with IV steroids, albuterol treatment and Lasix. ,  And was subsequently D/C'd home on Prednisone. Unfortunately, despite taking the prednisone, she continued to have progressive worsening SOB and developed an overall dry but slightly productive cough. Denies F/C, HA, N/V/D, CP, dysuria. Ultimately, patient's S OB escalated to the point of activating EMS system. Upon arrival of EMS, patient's SPO2 was noted in the 50s on 3 to 4 L nasal cannula. Patient was placed on NRB and brought to ED. Upon arrival to ED, patient was found to be febrile, tachycardic and tachypneic with SPO2 of 88% on NRB. Patient was briefly required BiPAP and had significant improvement in her SPO2 and work of breathing. COVID-19 PCR (bio fire) was (-) for COVID-19, however was (+) for RSV. CXR showed hyperinflation with mild streaky atelectasis or infiltrate in the lung bases. CT Chest (-) for PE. She was started on azithromycin, ceftriaxone, IV steroids, and scheduled nebulizers. Patient was admitted to hospitalist service for COPD exacerbations in setting of RSV infection. PCCM was then consulted on 10/27 for her continued hypoxia. Upon PCCM evaluation, patient was found in bed with her sister at bedside. Patient is talkative and AOx4 and able to answer questions appropriately. She denies any current tobacco use, and states that she quit smoking 30 years ago. Denies any alcohol or drug use. Is now fully vaccinated against COVID-19 after having COVID-19 PNA in 12/2020. She is currently wearing HFNC at 20 L and 50% FiO2. Patient does admit to some dyspnea with exertion, however she is able to hold nearly a full conversation without much conversational dyspnea. Her SPO2 while conversing maintained >90% on these settings.   She did have a persistent dry cough during our conversation, which seemed to be somewhat relieved with cough drop that she took from her purse. She currently denies any CP, N/V/D, HA, F/C, ABD pain, dysuria. She inquires if we can titrate down her FiO2 as she is eager to go back home.        Past Medical History:   Diagnosis Date    Chronic lung disease     [facility] obstructive pulmonary disease ([facility]     Heart failure ([facility] [facility]     Hypertension       Past Surgical History:   Procedure Laterality Date    HX ORTHOPAEDIC      spinal sx /    HX OTHER SURGICAL      Carotid artery    VASCULAR SURGERY PROCEDURE UNLIST      L CEA 10/2014        No Known Allergies   Social History     Tobacco Use    Smoking status: Former Smoker     Packs/day: 1.50     Years: 30.00     Pack years: 45.00     Types: Cigarettes     Quit date: 1987     Years since quittin.1    Smokeless tobacco: Never Used   Substance Use Topics    Alcohol use: No     Alcohol/week: 0.0 standard drinks      Family History   Problem Relation Age of Onset    Heart Disease Mother     Hypertension Mother     Heart Attack Father     Hypertension Father       Current Facility-Administered Medications   Medication Dose Route Frequency    [START ON 2021] predniSONE (DELTASONE) tablet 30 mg  30 mg Oral DAILY WITH BREAKFAST    albuterol-ipratropium (DUO-NEB) 2.5 MG-0.5 MG/3 ML  3 mL Nebulization Q4H RT    budesonide (PULMICORT) 1,000 mcg/2 mL nebulizer susp  1,000 mcg Nebulization BID RT    pantoprazole (PROTONIX) tablet 40 mg  40 mg Oral ACB    insulin lispro (HUMALOG) injection   SubCUTAneous AC&HS    azithromycin (ZITHROMAX) 500 mg in 0.9% sodium chloride 250 mL (VIAL-MATE)  500 mg IntraVENous Q24H    sodium chloride (NS) flush 5-40 mL  5-40 mL IntraVENous Q8H    enoxaparin (LOVENOX) injection 40 mg  40 mg SubCUTAneous DAILY    ALPRAZolam (XANAX) tablet 0.5 mg  0.5 mg Oral QHS    aspirin delayed-release tablet 81 mg  81 mg Oral DAILY    atorvastatin (LIPITOR) tablet 20 mg  20 mg Oral DAILY    losartan (COZAAR) tablet 50 mg  50 mg Oral DAILY    [Held by provider] metoprolol tartrate (LOPRESSOR) tablet 25 mg  25 mg Oral BID    furosemide (LASIX) tablet 40 mg  40 mg Oral DAILY    NIFEdipine ER (PROCARDIA XL) tablet 60 mg  60 mg Oral DAILY       Review of Systems:  A comprehensive ROS was obtained as stated in HPI, all others are negative    Objective:   Vital Signs:    Visit Vitals  /70   Pulse 91   Temp 97.9 °F (36.6 °C)   Resp 15   Ht 5' 3\" (1.6 m)   Wt 59.8 kg (131 lb 14.4 oz)   SpO2 94%   BMI 23.37 kg/m²       O2 Device: Hi flow nasal cannula   O2 Flow Rate (L/min): 30 l/min   Temp (24hrs), Av.8 °F (36.6 °C), Min:97 °F (36.1 °C), Max:98.3 °F (36.8 °C)       Intake/Output:   Last shift:      No intake/output data recorded. Last 3 shifts: 10/30 1901 -  0700  In: -   Out: 4 [Urine:2]    Intake/Output Summary (Last 24 hours) at 2021 1339  Last data filed at 2021 5027  Gross per 24 hour   Intake --   Output 4 ml   Net -4 ml        Physical Exam:   General:  Alert, awake, currently resting on HFNC; NAD,appears stated age. Does have some mild conversational dyspnea   HEENT:  Normocephalic, atraumatic; Conjunctivae/corneas clear; anicteric; Nares/mucosa normal/moist; No drainage/sinus tenderness; Lips/oral mucosa moist; + thrush with white lesions and erythema; Neck supple, symmetrical; trachea midline; No adenopathy; No thyroid enlargement/tenderness/nodules; No JVD noted   Back:   Symmetric   Resp:   Symmetrical chest rise; Diminished breath sounds throughout, mostly in upper lung fields. No wheezes / rales/rhonchi. CVS:  No chest wall tenderness/ deformity/crepitus; RRR; S1,S2 normal; No m/r/g   GI:   Abdomen Soft, non-tender, normal active bowel sound No masses/ organomegaly/ paradox noted   Extremities: Atraumatic; No cyanosis/clubbing. No pedal edema    Pulses: 1-2+ and symmetric all extremities.    Skin: Skin color, texture, turgor normal. No rashes or lesions   Lymph nodes: Cervical, supraclavicular nodes are unremarkable   Neurologic: Grossly nonfocal          Data review:   Labs:  Recent Results (from the past 24 hour(s))   GLUCOSE, POC    Collection Time: 10/31/21  4:23 PM   Result Value Ref Range    Glucose (POC) 112 (H) 70 - 110 mg/dL   GLUCOSE, POC    Collection Time: 10/31/21  8:19 PM   Result Value Ref Range    Glucose (POC) 164 (H) 70 - 110 mg/dL   GLUCOSE, POC    Collection Time: 11/01/21  8:49 AM   Result Value Ref Range    Glucose (POC) 97 70 - 110 mg/dL   GLUCOSE, POC    Collection Time: 11/01/21 12:15 PM   Result Value Ref Range    Glucose (POC) 107 70 - 110 mg/dL         PFT [08/02/2016 ]  SPIROMETRY 8/2/2016   Effort poor; did not meet ATS test time     FLOWS:   FEV1/FVC ratio is normal       Maximal Mid Expiratory Flow is supra-normal   FEV1 is normal and FVC is mildly decreased       Pre bronchodilator FEV1 is 1.54 L (81% predicted)   Pre bronchodilator FVC is 1.73 L (67% predicted)     Flow Volume Loop:   Poor tracing     Bronchodilator:   Not done       Patient could not do lung volumes or diffusion capacity        Impression:   Poor study, hence would not be able to interpret reliably       ECHO [10/27/21]:  Result status: Final result   · LV: Estimated LVEF is 60 - 65%. Visually measured ejection fraction. Normal cavity size, wall thickness and systolic function (ejection fraction normal). Wall motion: normal.  · RV: Dilated right ventricle. · RA: Dilated right atrium. · TV: Mild tricuspid valve regurgitation is present. · PA: Severe pulmonary hypertension. Pulmonary arterial systolic pressure is 76 mmHg. Imaging:  CXR Results  (Last 48 hours)    None        CT Results  (Last 48 hours)    None        CT Results (most recent):  Results from Hospital Encounter encounter on 10/24/21    CT CHEST W CONT    Narrative  EXAM:  CT Chest with Contrast.    CLINICAL INDICATION:  Shortness of breath.     COMPARISON:  Earlier study at 01:10 (without contrast). TECHNIQUE:    - Helically scanned volumetric axial sections of the chest are obtained  following IV contrast administration. Coronal and sagittal multiplanar  reconstruction images are generated for improved anatomic delineation.  - Contrast dose 100 mL Isovue-300  - Radiation dose optimization techniques are utilized as appropriate to the  exam, with combination of automated exposure control, adjustment of the mA  and/or kV according to patient's size (Including appropriate matching for  site-specific examinations), or use of iterative reconstruction technique. FINDINGS:    Lungs:  Fairly extensive emphysematous changes in both lungs. No dominant mass  or discrete suspicious lung nodule is detected. No definite airspace opacities. Focal scarring in the right upper lobe anterior aspect and in the left lower  lobe. Pleura:  No significant pleural effusion is detected bilaterally. Mediastinum:  1.5 x 1.3 cm precarinal node. Cardiovascular: Moderate atherosclerotic calcifications along the aorta. Base of Neck, Axillae:  Left thyroid nodule. No adenopathy. Esophagus:  Unremarkable. Upper Abdomen:  Negative    Bones:  No acute findings. Impression  1. Emphysematous changes. 2.  No airspace opacities. 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 Can use BiPAP-ST at night as needed  Agree with IV antibiotics - de-escalate to p.o. as tolerated  Steroids - Transitioned to PO prednisone 20mg BID. Will likely need prolonged steroid taper. Bronchodilators: duonebs q4h, pulmicort nebs 1mg BID, and albuterol PRN. Please avoid excessive beta agonist with DuoNeb and Brovana  Please hold home bronchodilators (Breztri Aerosphere). May resume on discharge  Aggressive pulmonary toileting/bronchial hygiene  Frequent incentive spirometry- patient needs at bedside  Continue Nystatin and add Honey's Magic Mouthwash  Aspiration precautions including elevating HOB >30deg  PT/OT, OOB, ambulate with assistance as tolerated  DVT ppx per primary service  Discussed patient's concerns about supplemental oxygen at home explained - that if unable to wean to acceptable levels to be delivered by her current device, will order additional DME to support her oxygen needs  At discharge, please arrange followup with pt's primary pulmonologist - Dr. Catarino Rodriguez with [facility].  Goals of care: Patient is understanding of her underlying severe pulmonary disease; however, would like to remain FULL CODE at this time. She would not, however, want to have prolonged intubation, be intubated if felt her condition were irreversible. May benefit from OP Pulmonary Rehab Program  Will follow      High complexity decision making was performed during the evaluation of this patient at high risk for decompensation.     Above mentioned total time spent on reviewing the case/medical record/data/notes/EMR/patient examination/documentation/coordinating care with nurse/consultants, exclusive of procedures with complex decision making performed and > 50% time spent in face to face evaluation.          Complex decision making was made in the evaluation and management plans during this consultation. More than 50% of time was spent in counseling and coordination of care including review of data and discussion with other team members.       Clinical care, chart review and time spent coordinating care: 26 min      Cece Padilla DO, [facility]  Pulmonary, Critical Care, and Sleep Medicine

## 2021-11-02 LAB
GLUCOSE BLD STRIP.AUTO-MCNC: 116 MG/DL (ref 70–110)
GLUCOSE BLD STRIP.AUTO-MCNC: 124 MG/DL (ref 70–110)
GLUCOSE BLD STRIP.AUTO-MCNC: 162 MG/DL (ref 70–110)
GLUCOSE BLD STRIP.AUTO-MCNC: 85 MG/DL (ref 70–110)

## 2021-11-02 PROCEDURE — 74011250637 HC RX REV CODE- 250/637: Performed by: INTERNAL MEDICINE

## 2021-11-02 PROCEDURE — 82962 GLUCOSE BLOOD TEST: CPT

## 2021-11-02 PROCEDURE — 99232 SBSQ HOSP IP/OBS MODERATE 35: CPT | Performed by: INTERNAL MEDICINE

## 2021-11-02 PROCEDURE — 94762 N-INVAS EAR/PLS OXIMTRY CONT: CPT

## 2021-11-02 PROCEDURE — 65660000000 HC RM CCU STEPDOWN

## 2021-11-02 PROCEDURE — 74011636637 HC RX REV CODE- 636/637: Performed by: INTERNAL MEDICINE

## 2021-11-02 PROCEDURE — 74011250636 HC RX REV CODE- 250/636: Performed by: FAMILY MEDICINE

## 2021-11-02 PROCEDURE — 74011000250 HC RX REV CODE- 250: Performed by: INTERNAL MEDICINE

## 2021-11-02 PROCEDURE — 94640 AIRWAY INHALATION TREATMENT: CPT

## 2021-11-02 PROCEDURE — 74011250637 HC RX REV CODE- 250/637: Performed by: FAMILY MEDICINE

## 2021-11-02 PROCEDURE — 74011250637 HC RX REV CODE- 250/637: Performed by: STUDENT IN AN ORGANIZED HEALTH CARE EDUCATION/TRAINING PROGRAM

## 2021-11-02 PROCEDURE — 77010033711 HC HIGH FLOW OXYGEN

## 2021-11-02 RX ORDER — IPRATROPIUM BROMIDE AND ALBUTEROL SULFATE 2.5; .5 MG/3ML; MG/3ML
3 SOLUTION RESPIRATORY (INHALATION)
Status: DISCONTINUED | OUTPATIENT
Start: 2021-11-02 | End: 2021-11-04 | Stop reason: HOSPADM

## 2021-11-02 RX ADMIN — IPRATROPIUM BROMIDE AND ALBUTEROL SULFATE 3 ML: .5; 2.5 SOLUTION RESPIRATORY (INHALATION) at 16:11

## 2021-11-02 RX ADMIN — ACETAMINOPHEN 650 MG: 325 TABLET ORAL at 16:58

## 2021-11-02 RX ADMIN — NIFEDIPINE 60 MG: 30 TABLET, FILM COATED, EXTENDED RELEASE ORAL at 08:11

## 2021-11-02 RX ADMIN — DIPHENHYDRAMINE HYDROCHLORIDE AND LIDOCAINE HYDROCHLORIDE AND ALUMINUM HYDROXIDE AND MAGNESIUM HYDRO 10 ML: KIT at 16:59

## 2021-11-02 RX ADMIN — BUDESONIDE 1000 MCG: 1 SUSPENSION RESPIRATORY (INHALATION) at 21:14

## 2021-11-02 RX ADMIN — DIPHENHYDRAMINE HYDROCHLORIDE AND LIDOCAINE HYDROCHLORIDE AND ALUMINUM HYDROXIDE AND MAGNESIUM HYDRO 10 ML: KIT at 08:12

## 2021-11-02 RX ADMIN — PANTOPRAZOLE SODIUM 40 MG: 40 TABLET, DELAYED RELEASE ORAL at 08:11

## 2021-11-02 RX ADMIN — IPRATROPIUM BROMIDE AND ALBUTEROL SULFATE 3 ML: .5; 2.5 SOLUTION RESPIRATORY (INHALATION) at 12:21

## 2021-11-02 RX ADMIN — GUAIFENESIN 100 MG: 200 SOLUTION ORAL at 21:14

## 2021-11-02 RX ADMIN — LOSARTAN POTASSIUM 50 MG: 50 TABLET, FILM COATED ORAL at 08:11

## 2021-11-02 RX ADMIN — Medication 10 ML: at 21:15

## 2021-11-02 RX ADMIN — ATORVASTATIN CALCIUM 20 MG: 20 TABLET, FILM COATED ORAL at 08:11

## 2021-11-02 RX ADMIN — NYSTATIN 500000 UNITS: 100000 SUSPENSION ORAL at 21:15

## 2021-11-02 RX ADMIN — NYSTATIN 500000 UNITS: 100000 SUSPENSION ORAL at 17:01

## 2021-11-02 RX ADMIN — Medication 10 ML: at 17:01

## 2021-11-02 RX ADMIN — IPRATROPIUM BROMIDE AND ALBUTEROL SULFATE 3 ML: .5; 2.5 SOLUTION RESPIRATORY (INHALATION) at 21:14

## 2021-11-02 RX ADMIN — IPRATROPIUM BROMIDE AND ALBUTEROL SULFATE 3 ML: .5; 2.5 SOLUTION RESPIRATORY (INHALATION) at 03:02

## 2021-11-02 RX ADMIN — FUROSEMIDE 40 MG: 40 TABLET ORAL at 08:12

## 2021-11-02 RX ADMIN — NYSTATIN 500000 UNITS: 100000 SUSPENSION ORAL at 11:41

## 2021-11-02 RX ADMIN — ENOXAPARIN SODIUM 40 MG: 100 INJECTION SUBCUTANEOUS at 08:11

## 2021-11-02 RX ADMIN — IPRATROPIUM BROMIDE AND ALBUTEROL SULFATE 3 ML: .5; 2.5 SOLUTION RESPIRATORY (INHALATION) at 07:41

## 2021-11-02 RX ADMIN — INSULIN LISPRO 3 UNITS: 100 INJECTION, SOLUTION INTRAVENOUS; SUBCUTANEOUS at 17:03

## 2021-11-02 RX ADMIN — Medication 81 MG: at 08:11

## 2021-11-02 RX ADMIN — PREDNISONE 30 MG: 20 TABLET ORAL at 08:11

## 2021-11-02 RX ADMIN — ALPRAZOLAM 0.5 MG: 1 TABLET ORAL at 21:14

## 2021-11-02 RX ADMIN — NYSTATIN 500000 UNITS: 100000 SUSPENSION ORAL at 08:11

## 2021-11-02 RX ADMIN — DIPHENHYDRAMINE HYDROCHLORIDE AND LIDOCAINE HYDROCHLORIDE AND ALUMINUM HYDROXIDE AND MAGNESIUM HYDRO 10 ML: KIT at 11:38

## 2021-11-02 RX ADMIN — BUDESONIDE 1000 MCG: 1 SUSPENSION RESPIRATORY (INHALATION) at 07:41

## 2021-11-02 NOTE — PROGRESS NOTES
Tit     11/02/21 1223   Oxygen Therapy   O2 Sat (%) 93 %   Pulse via Oximetry 96 beats per minute   O2 Device Hi flow nasal cannula   O2 Flow Rate (L/min) 20 l/min   O2 Temperature 91.4 °F (33 °C)   FIO2 (%) 30 %   rate flow 20L

## 2021-11-02 NOTE — PROGRESS NOTES
[facility]: [phone] ([phone])  [facility]: [phone]    Goals of care defined. Palliative team will sign off. Please consult team as needed, if appropriate. Thank you for the Palliative Medicine consult and allowing us to participate in the care of Ms. Vicky Hawkins. CODE STATUS - DNR/DNI Limited Interventions NO Feeding tubes. The POST form was completed and signed by patient.     Margie Mcdaniel BSN, RN, [facility]  Palliative Medicine Inpatient RN  [address]: [phone]

## 2021-11-02 NOTE — ROUTINE PROCESS
0730: Bedside and Verbal shift change report given to Suzie Howell (oncoming nurse) by Len Gandara (offgoing nurse). Report included the following information SBAR, Kardex, Intake/Output, MAR and Recent Results. 1800: Pt sat at 95% on 4L NC.    1915: Bedside and Verbal shift change report given to [employer]  (oncoming nurse) by Suzie Howell (offgoing nurse). Report included the following information SBAR, Kardex, Intake/Output, MAR and Recent Results.

## 2021-11-02 NOTE — PROGRESS NOTES
Titrate to 4L NC     11/02/21 1559   Oxygen Therapy   O2 Sat (%) 95 %   Pulse via Oximetry 91 beats per minute   O2 Device Nasal cannula;Humidifier   O2 Flow Rate (L/min) 4 l/min   FIO2 (%) 36 %

## 2021-11-02 NOTE — PROGRESS NOTES
[facility]  [facility]    Patient: Era Paulino Age: 82 y.o. : 1935 MR#: [id number] SSN: [id number]  Date/Time: 2021     C/C: Shortness of breath/COPD exacerbation        Subjective:   HPI : Patient admitted for acute hypoxic respiratory failure currently on high flow oxygen, COPD exacerbation, O2 saturation ranging from 98 to 90% on 35 L/min              Review of Systems:  Patient's very gradually improving less stress less anxious, no nausea vomiting diarrhea no chest pain no palpitation no cough shortness of breath is chronic      positive responses in bold type   Constitutional: Negative for fever, chills, diaphoresis and unexpected weight change. HENT:  Improved dryness and improved ear irritation  Eyes: Negative for photophobia, pain, redness and visual disturbance. Respiratory: negative for shortness of breath, cough, choking, chest tightness, wheezing or stridor. Cardiovascular: Negative for chest pain, palpitations and leg swelling. Gastrointestinal: Negative for nausea, vomiting, abdominal pain, diarrhea, constipation, blood in stool, abdominal distention and anal bleeding. Genitourinary: Negative for dysuria, urgency, frequency, hematuria, flank pain and difficulty urinating. Musculoskeletal: Negative for back pain and arthralgias. Skin: Negative for color change, rash and wound. Neurological: Negative for dizziness, seizures, syncope, speech difficulty, light-headedness or headaches. Hematological: Does not bruise/bleed easily. Psychiatric/Behav ioral: Negative for suicidal ideas, hallucinations, behavioral problems, self-injury or agitation      Assessment/Plan:      1.   Acute on chronic hypoxic respiratory failure  2 COPD acute exacerbation secondary to viral infection  3 RSV positive by PCR/negative influenza, negative Covid  4 echo-preserved EF  5 hypertension  6 hyperglycemia-current due to steroid use and stress, not on any diabetic medication at home   7 oral candidiasis      Plan    -Patient's acute on chronic hypoxic respiratory failure secondary to COPD/emphysema exacerbation.  -Assistant requirement for high flow oxygen currently  -Patient now is DNR/DNI  -Mild leukocytosis secondary to steroids  -  Patient was on empiric antibiotic  -Continue oral antifungal , nystatin, s/p 1 dose of Diflucan, oral hygiene     Time spent on direct patient care >30 mints      Complexity : High complex - due to multiple medical issues outlined above. CODE Status :  DNR/DNI/palliative note     Case discussed with:  [x]Patient  [] Family  []Nursing  []Case Management      DVT Prophylaxis:  [x]Lovenox  []Hep SQ  []SCDs  []Coumadin   []On Heparin gtt     [] Eliquis [] Xarelto   Objective:        Tmax/24hrs: Temp (24hrs), Av °F (36.7 °C), Min:97.9 °F (36.6 °C), Max:98.2 °F (36.8 °C)  IOBRIEF     Intake/Output Summary (Last 24 hours) at 10/29/2021 1633  Last data filed at 10/29/2021 1238      Gross per 24 hour   Intake 240 ml   Output --   Net 240 ml         General:   Alert awake oriented and very cooperative appears in mild distress  HEENT: No facial asymmetry, LAINA Abdirashid, External ears - WNL    Cardiovascular: S1S2 - regular , No Murmur   Pulmonary: Equal expansion , No Use of accessory muscles , bilateral decreased air entry with occasional rhonchi  GI:  +BS in all four quadrants, soft, non-tender  Extremities:  No edema; 2+ dorsalis pedis pulses bilaterally  Neuro: Alert and oriented X 2.           Vitals:         VS:   Visit Vitals  BP (!) 143/54 (BP Patient Position: Sitting)   Pulse 99   Temp 98.3 °F (36.8 °C)   Resp 16   Ht 5' 3\" (1.6 m)   Wt 59 kg (130 lb 1.6 oz)   SpO2 94%   BMI 23.05 kg/m²      Tmax/24hrs: Temp (24hrs), Av.2 °F (36.8 °C), Min:97.9 °F (36.6 °C), Max:98.3 °F (36.8 °C)  IOBRIEFNo intake or output data in the 24 hours ending 21 0957      Medications:   Current Facility-Administered Medications Medication Dose Route Frequency    predniSONE (DELTASONE) tablet 30 mg  30 mg Oral DAILY WITH BREAKFAST    nystatin (MYCOSTATIN) 100,000 unit/mL oral suspension 500,000 Units  500,000 Units Oral QID    magic mouthwash (FIRST-MOUTHWASH BLM) oral suspension 10 mL  10 mL Oral TIDAC    phenol throat spray (CHLORASEPTIC) 1 Spray  1 Spray Oral PRN    sodium chloride (AYR SALINE) 0.65 % nasal drops 2 Drop  2 Drop Both Nostrils Q2H PRN    albuterol-ipratropium (DUO-NEB) 2.5 MG-0.5 MG/3 ML  3 mL Nebulization Q4H RT    budesonide (PULMICORT) 1,000 mcg/2 mL nebulizer susp  1,000 mcg Nebulization BID RT    pantoprazole (PROTONIX) tablet 40 mg  40 mg Oral ACB    guaiFENesin (ROBITUSSIN) 100 mg/5 mL oral liquid 100 mg  100 mg Oral Q4H PRN    insulin lispro (HUMALOG) injection   SubCUTAneous AC&HS    glucose chewable tablet 16 g  4 Tablet Oral PRN    glucagon (GLUCAGEN) injection 1 mg  1 mg IntraMUSCular PRN    dextrose (D50W) injection syrg 12.5-25 g  25-50 mL IntraVENous PRN    sodium chloride (NS) flush 5-10 mL  5-10 mL IntraVENous PRN    sodium chloride (NS) flush 5-40 mL  5-40 mL IntraVENous Q8H    sodium chloride (NS) flush 5-40 mL  5-40 mL IntraVENous PRN    acetaminophen (TYLENOL) tablet 650 mg  650 mg Oral Q6H PRN    Or    acetaminophen (TYLENOL) suppository 650 mg  650 mg Rectal Q6H PRN    polyethylene glycol (MIRALAX) packet 17 g  17 g Oral DAILY PRN    ondansetron (ZOFRAN ODT) tablet 4 mg  4 mg Oral Q8H PRN    Or    ondansetron (ZOFRAN) injection 4 mg  4 mg IntraVENous Q6H PRN    enoxaparin (LOVENOX) injection 40 mg  40 mg SubCUTAneous DAILY    ALPRAZolam (XANAX) tablet 0.5 mg  0.5 mg Oral QHS    aspirin delayed-release tablet 81 mg  81 mg Oral DAILY    atorvastatin (LIPITOR) tablet 20 mg  20 mg Oral DAILY    losartan (COZAAR) tablet 50 mg  50 mg Oral DAILY    [Held by provider] metoprolol tartrate (LOPRESSOR) tablet 25 mg  25 mg Oral BID    furosemide (LASIX) tablet 40 mg  40 mg Oral DAILY    NIFEdipine ER (PROCARDIA XL) tablet 60 mg  60 mg Oral DAILY       Labs:    Recent Labs     10/31/21  0209   WBC 15.0*   HGB 14.2   HCT 45.9*        Recent Labs     10/31/21  0209   *   K 4.9      CO2 29   *   BUN 23*   CREA 0.94   CA 8.8         Time spent on direct patient care >30 mints     Complexity : High complex - due to multiple medical issues outlined above. Case discussed with:  [x]Patient  [] Family  []Nursing  []Case Management         Disclaimer: Sections of this note are dictated utilizing voice recognition software, which may have resulted in some phonetic based errors in grammar and contents. Even though attempts were made to correct all the mistakes, some may have been missed, and remained in the body of the document. If questions arise, please contact our department.     Signed By: Bonita Garcia MD     November 2, 2021

## 2021-11-02 NOTE — PROGRESS NOTES
[facility]. Pulmonary, Critical Care, and Sleep Medicine    Progress note    Name: Adilson Campo MRN: [id number]   : 1935 Hospital: [address] [place] [address] Dr   Date: 2021                Subjective: This patient has been seen and evaluated at the request of Dr. Heather Fisher for acute on chronic Hypoxic Respiratory failure requiring HFNC 2/2 RSV infection in setting of severe COPD/Emphysema. 21   LOS: 9  · Patient states she is breathing better, she is sitting up in chair and just got off commode with assistance  · She has weaned nicely over the past 24 hours from Vapotherm down to NC @4LPM  · Her mouth is still sore from thrush but improved from yesterday- less plaques noted today but still with some erythema in her mouth  · Tolerating PO but states sores makes it hard to eat  ·    · No nausea or emesis. No hemoptysis. Sputum has improved     Note: She currently has a INOGEN device at home. She has support from her son and his girlfriend. Inpat Anti-Infectives (From admission, onward)     Start     Ordered Stop    21 1800  nystatin (MYCOSTATIN) 100,000 unit/mL oral suspension 500,000 Units  500,000 Units,   Oral,   4 TIMES DAILY      21 1524 --                    HPI by Dr. Dora Cardenas  Patient is a 82 y.o. female with PMH of COPD/emphysema (on 3.5 L NC home O2), chronic diastolic CHF, mild pulmonary HTN, COVID-19 PNA (now fully vaccinated), who presented to [facility] ED on 10/24/2021 c/o progressive worsening S OB. Patient reports that she began feeling ill approximately 5 days PTA with associated mild increase of SOB beyond her baseline. She reports going to [facility] ED, where she was diagnosed with bronchitis, and was treated with IV steroids, albuterol treatment and Lasix. ,  And was subsequently D/C'd home on Prednisone.   Unfortunately, despite taking the prednisone, she continued to have progressive worsening SOB and developed an overall dry but slightly productive cough. Denies F/C, HA, N/V/D, CP, dysuria. Ultimately, patient's S OB escalated to the point of activating EMS system. Upon arrival of EMS, patient's SPO2 was noted in the 50s on 3 to 4 L nasal cannula. Patient was placed on NRB and brought to ED. Upon arrival to ED, patient was found to be febrile, tachycardic and tachypneic with SPO2 of 88% on NRB. Patient was briefly required BiPAP and had significant improvement in her SPO2 and work of breathing. COVID-19 PCR (bio fire) was (-) for COVID-19, however was (+) for RSV. CXR showed hyperinflation with mild streaky atelectasis or infiltrate in the lung bases. CT Chest (-) for PE. She was started on azithromycin, ceftriaxone, IV steroids, and scheduled nebulizers. Patient was admitted to hospitalist service for COPD exacerbations in setting of RSV infection. PCCM was then consulted on 10/27 for her continued hypoxia. Upon PCCM evaluation, patient was found in bed with her sister at bedside. Patient is talkative and AOx4 and able to answer questions appropriately. She denies any current tobacco use, and states that she quit smoking 30 years ago. Denies any alcohol or drug use. Is now fully vaccinated against COVID-19 after having COVID-19 PNA in 12/2020. She is currently wearing HFNC at 20 L and 50% FiO2. Patient does admit to some dyspnea with exertion, however she is able to hold nearly a full conversation without much conversational dyspnea. Her SPO2 while conversing maintained >90% on these settings. She did have a persistent dry cough during our conversation, which seemed to be somewhat relieved with cough drop that she took from her purse. She currently denies any CP, N/V/D, HA, F/C, ABD pain, dysuria. She inquires if we can titrate down her FiO2 as she is eager to go back home.        Past Medical History:   Diagnosis Date    Chronic lung disease     Chronic obstructive pulmonary [facility] [address]     Heart failure ([facility] [address] [facility]     Hypertension       Past Surgical History:   Procedure Laterality Date    HX ORTHOPAEDIC      spinal sx /    HX OTHER SURGICAL      Carotid artery    VASCULAR SURGERY PROCEDURE UNLIST      L CEA 10/2014        No Known Allergies   Social History     Tobacco Use    Smoking status: Former Smoker     Packs/day: 1.50     Years: 30.00     Pack years: 45.00     Types: Cigarettes     Quit date: 1987     Years since quittin.1    Smokeless tobacco: Never Used   Substance Use Topics    Alcohol use: No     Alcohol/week: 0.0 standard drinks      Family History   Problem Relation Age of Onset    Heart Disease Mother     Hypertension Mother     Heart Attack Father     Hypertension Father       Current Facility-Administered Medications   Medication Dose Route Frequency    predniSONE (DELTASONE) tablet 30 mg  30 mg Oral DAILY WITH BREAKFAST    nystatin (MYCOSTATIN) 100,000 unit/mL oral suspension 500,000 Units  500,000 Units Oral QID    magic mouthwash (FIRST-MOUTHWASH BLM) oral suspension 10 mL  10 mL Oral TIDAC    albuterol-ipratropium (DUO-NEB) 2.5 MG-0.5 MG/3 ML  3 mL Nebulization Q4H RT    budesonide (PULMICORT) 1,000 mcg/2 mL nebulizer susp  1,000 mcg Nebulization BID RT    pantoprazole (PROTONIX) tablet 40 mg  40 mg Oral ACB    insulin lispro (HUMALOG) injection   SubCUTAneous AC&HS    sodium chloride (NS) flush 5-40 mL  5-40 mL IntraVENous Q8H    enoxaparin (LOVENOX) injection 40 mg  40 mg SubCUTAneous DAILY    ALPRAZolam (XANAX) tablet 0.5 mg  0.5 mg Oral QHS    aspirin delayed-release tablet 81 mg  81 mg Oral DAILY    atorvastatin (LIPITOR) tablet 20 mg  20 mg Oral DAILY    losartan (COZAAR) tablet 50 mg  50 mg Oral DAILY    [Held by provider] metoprolol tartrate (LOPRESSOR) tablet 25 mg  25 mg Oral BID    furosemide (LASIX) tablet 40 mg  40 mg Oral DAILY    NIFEdipine ER (PROCARDIA XL) tablet 60 mg  60 mg Oral DAILY       Review of Systems:  A comprehensive ROS was obtained as stated in HPI, all others are negative    Objective:   Vital Signs:    Visit Vitals  /82 (BP Patient Position: Sitting)   Pulse 91   Temp 97.9 °F (36.6 °C)   Resp 20   Ht 5' 3\" (1.6 m)   Wt 59 kg (130 lb 1.6 oz)   SpO2 93%   BMI 23.05 kg/m²       O2 Device: Hi flow nasal cannula   O2 Flow Rate (L/min): 20 l/min   Temp (24hrs), Av.2 °F (36.8 °C), Min:97.9 °F (36.6 °C), Max:98.3 °F (36.8 °C)       Intake/Output:   Last shift:      No intake/output data recorded. Last 3 shifts: 10/31 1901 -  0700  In: -   Out: 4 [Urine:2]  No intake or output data in the 24 hours ending 21 1426     Physical Exam:   General:  Alert, awake, currently resting on HFNC; NAD,appears stated age. Does have some mild conversational dyspnea   HEENT:  Normocephalic, atraumatic; Conjunctivae/corneas clear; anicteric; Nares/mucosa normal/moist; No drainage/sinus tenderness; Lips/oral mucosa moist; mucosa red Neck supple, symmetrical; trachea midline; No adenopathy; No thyroid enlargement/tenderness/nodules; No JVD noted   Back:   Symmetric   Resp:   Symmetrical chest rise; Diminished breath sounds throughout, mostly in upper lung fields. No wheezes / rales/rhonchi. CVS:  No chest wall tenderness/ deformity/crepitus; RRR; S1,S2 normal; No m/r/g   GI:   Abdomen Soft, non-tender, normal active bowel sound No masses/ organomegaly/ paradox noted   Extremities: Atraumatic; No cyanosis/clubbing. No pedal edema    Pulses: 1-2+ and symmetric all extremities.    Skin: Skin color, texture, turgor normal. No rashes or lesions   Lymph nodes: Cervical, supraclavicular nodes are unremarkable   Neurologic: Grossly nonfocal          Data review:   Labs:  Recent Results (from the past 24 hour(s))   GLUCOSE, POC    Collection Time: 21  4:40 PM   Result Value Ref Range    Glucose (POC) 138 (H) 70 - 110 mg/dL   GLUCOSE, POC    Collection Time: 21 10:28 PM   Result Value Ref Range    Glucose (POC) 140 (H) 70 - 110 mg/dL   GLUCOSE, POC Collection Time: 11/02/21  8:16 AM   Result Value Ref Range    Glucose (POC) 85 70 - 110 mg/dL   GLUCOSE, POC    Collection Time: 11/02/21 11:37 AM   Result Value Ref Range    Glucose (POC) 124 (H) 70 - 110 mg/dL         PFT [08/02/2016 ]  SPIROMETRY 8/2/2016   Effort poor; did not meet ATS test time     FLOWS:   FEV1/FVC ratio is normal       Maximal Mid Expiratory Flow is supra-normal   FEV1 is normal and FVC is mildly decreased       Pre bronchodilator FEV1 is 1.54 L (81% predicted)   Pre bronchodilator FVC is 1.73 L (67% predicted)     Flow Volume Loop:   Poor tracing     Bronchodilator:   Not done       Patient could not do lung volumes or diffusion capacity        Impression:   Poor study, hence would not be able to interpret reliably       ECHO [10/27/21]:  Result status: Final result   · LV: Estimated LVEF is 60 - 65%. Visually measured ejection fraction. Normal cavity size, wall thickness and systolic function (ejection fraction normal). Wall motion: normal.  · RV: Dilated right ventricle. · RA: Dilated right atrium. · TV: Mild tricuspid valve regurgitation is present. · PA: Severe pulmonary hypertension. Pulmonary arterial systolic pressure is 76 mmHg. Imaging:  CXR Results  (Last 48 hours)    None        CT Results  (Last 48 hours)    None        CT Results (most recent):  Results from Hospital Encounter encounter on 10/24/21    CT CHEST W CONT    Narrative  EXAM:  CT Chest with Contrast.    CLINICAL INDICATION:  Shortness of breath. COMPARISON:  Earlier study at 01:10 (without contrast). TECHNIQUE:    - Helically scanned volumetric axial sections of the chest are obtained  following IV contrast administration.   Coronal and sagittal multiplanar  reconstruction images are generated for improved anatomic delineation.  - Contrast dose 100 mL Isovue-300  - Radiation dose optimization techniques are utilized as appropriate to the  exam, with combination of automated exposure control, adjustment of the mA  and/or kV according to patient's size (Including appropriate matching for  site-specific examinations), or use of iterative reconstruction technique. FINDINGS:    Lungs:  Fairly extensive emphysematous changes in both lungs. No dominant mass  or discrete suspicious lung nodule is detected. No definite airspace opacities. Focal scarring in the right upper lobe anterior aspect and in the left lower  lobe. Pleura:  No significant pleural effusion is detected bilaterally. Mediastinum:  1.5 x 1.3 cm precarinal node. Cardiovascular: Moderate atherosclerotic calcifications along the aorta. Base of Neck, Axillae:  Left thyroid nodule. No adenopathy. Esophagus:  Unremarkable. Upper Abdomen:  Negative    Bones:  No acute findings. Impression  1. Emphysematous changes. 2.  No airspace opacities. 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 Steroids - Transitioned to PO prednisone 20mg BID. Will likely need prolonged steroid taper.-possible step down to 30mg daily tomorrow   Bronchodilators: duonebs q6h from q4, pulmicort nebs 1mg BID, and albuterol PRN.   Please avoid excessive beta agonist with DuoNeb and Brovana  Please hold home bronchodilators [facility]). May resume on discharge  Aggressive pulmonary toileting/bronchial hygiene  Frequent incentive spirometry- patient needs at bedside  Continue Nystatin and add Honey's Magic Mouthwash  Aspiration precautions including elevating HOB >30deg  PT/OT, OOB, ambulate with assistance as tolerated  DVT ppx per primary service  Discussed patient's concerns about supplemental oxygen at home explained - that if unable to wean to acceptable levels to be delivered by her current device, will order additional DME to support her oxygen needs  At discharge, please arrange followup with pt's primary pulmonologist - Dr. Rajinder Espinoza with [facility].  Goals of care: Patient is understanding of her underlying severe pulmonary disease; however, would like to remain FULL CODE at this time. She would not, however, want to have prolonged intubation, be intubated if felt her condition were irreversible. May benefit from OP Pulmonary Rehab Program  Will follow      High complexity decision making was performed during the evaluation of this patient at high risk for decompensation.     Above mentioned total time spent on reviewing the case/medical record/data/notes/EMR/patient examination/documentation/coordinating care with nurse/consultants, exclusive of procedures with complex decision making performed and > 50% time spent in face to face evaluation. Complex decision making was made in the evaluation and management plans during this consultation. More than 50% of time was spent in counseling and coordination of care including review of data and discussion with other team members.       Clinical care, chart review and time spent coordinating care: 26 min      Dallin Lyles DO, [facility]    [facility]  Pulmonary, Critical Care, and Sleep Medicine

## 2021-11-03 LAB
GLUCOSE BLD STRIP.AUTO-MCNC: 128 MG/DL (ref 70–110)
GLUCOSE BLD STRIP.AUTO-MCNC: 138 MG/DL (ref 70–110)
GLUCOSE BLD STRIP.AUTO-MCNC: 172 MG/DL (ref 70–110)
GLUCOSE BLD STRIP.AUTO-MCNC: 80 MG/DL (ref 70–110)

## 2021-11-03 PROCEDURE — 74011636637 HC RX REV CODE- 636/637: Performed by: INTERNAL MEDICINE

## 2021-11-03 PROCEDURE — 74011250636 HC RX REV CODE- 250/636: Performed by: FAMILY MEDICINE

## 2021-11-03 PROCEDURE — 74011250637 HC RX REV CODE- 250/637: Performed by: INTERNAL MEDICINE

## 2021-11-03 PROCEDURE — 94762 N-INVAS EAR/PLS OXIMTRY CONT: CPT

## 2021-11-03 PROCEDURE — 77010033678 HC OXYGEN DAILY

## 2021-11-03 PROCEDURE — 94660 CPAP INITIATION&MGMT: CPT

## 2021-11-03 PROCEDURE — 82962 GLUCOSE BLOOD TEST: CPT

## 2021-11-03 PROCEDURE — 74011250637 HC RX REV CODE- 250/637: Performed by: STUDENT IN AN ORGANIZED HEALTH CARE EDUCATION/TRAINING PROGRAM

## 2021-11-03 PROCEDURE — 65660000000 HC RM CCU STEPDOWN

## 2021-11-03 PROCEDURE — 74011250637 HC RX REV CODE- 250/637: Performed by: FAMILY MEDICINE

## 2021-11-03 PROCEDURE — 74011000250 HC RX REV CODE- 250: Performed by: INTERNAL MEDICINE

## 2021-11-03 PROCEDURE — 94640 AIRWAY INHALATION TREATMENT: CPT

## 2021-11-03 RX ADMIN — NYSTATIN 500000 UNITS: 100000 SUSPENSION ORAL at 18:05

## 2021-11-03 RX ADMIN — Medication 81 MG: at 10:22

## 2021-11-03 RX ADMIN — ALPRAZOLAM 0.5 MG: 1 TABLET ORAL at 21:20

## 2021-11-03 RX ADMIN — NYSTATIN 500000 UNITS: 100000 SUSPENSION ORAL at 14:05

## 2021-11-03 RX ADMIN — FUROSEMIDE 40 MG: 40 TABLET ORAL at 10:22

## 2021-11-03 RX ADMIN — BUDESONIDE 1000 MCG: 1 SUSPENSION RESPIRATORY (INHALATION) at 11:17

## 2021-11-03 RX ADMIN — ATORVASTATIN CALCIUM 20 MG: 20 TABLET, FILM COATED ORAL at 10:22

## 2021-11-03 RX ADMIN — NYSTATIN 500000 UNITS: 100000 SUSPENSION ORAL at 21:21

## 2021-11-03 RX ADMIN — PANTOPRAZOLE SODIUM 40 MG: 40 TABLET, DELAYED RELEASE ORAL at 10:22

## 2021-11-03 RX ADMIN — NYSTATIN 500000 UNITS: 100000 SUSPENSION ORAL at 10:22

## 2021-11-03 RX ADMIN — INSULIN LISPRO 3 UNITS: 100 INJECTION, SOLUTION INTRAVENOUS; SUBCUTANEOUS at 18:05

## 2021-11-03 RX ADMIN — ENOXAPARIN SODIUM 40 MG: 100 INJECTION SUBCUTANEOUS at 10:22

## 2021-11-03 RX ADMIN — IPRATROPIUM BROMIDE AND ALBUTEROL SULFATE 3 ML: .5; 2.5 SOLUTION RESPIRATORY (INHALATION) at 14:05

## 2021-11-03 RX ADMIN — GUAIFENESIN 100 MG: 200 SOLUTION ORAL at 14:06

## 2021-11-03 RX ADMIN — IPRATROPIUM BROMIDE AND ALBUTEROL SULFATE 3 ML: .5; 2.5 SOLUTION RESPIRATORY (INHALATION) at 08:00

## 2021-11-03 RX ADMIN — ACETAMINOPHEN 650 MG: 325 TABLET ORAL at 14:06

## 2021-11-03 RX ADMIN — IPRATROPIUM BROMIDE AND ALBUTEROL SULFATE 3 ML: .5; 2.5 SOLUTION RESPIRATORY (INHALATION) at 02:00

## 2021-11-03 RX ADMIN — Medication 10 ML: at 05:00

## 2021-11-03 RX ADMIN — LOSARTAN POTASSIUM 50 MG: 50 TABLET, FILM COATED ORAL at 10:22

## 2021-11-03 RX ADMIN — PREDNISONE 30 MG: 20 TABLET ORAL at 10:22

## 2021-11-03 RX ADMIN — Medication 10 ML: at 21:21

## 2021-11-03 RX ADMIN — GUAIFENESIN 100 MG: 200 SOLUTION ORAL at 05:00

## 2021-11-03 RX ADMIN — NIFEDIPINE 60 MG: 30 TABLET, FILM COATED, EXTENDED RELEASE ORAL at 10:22

## 2021-11-03 NOTE — PROGRESS NOTES
[facility]  [facility]    Patient: Freddy Beck Age: 82 y.o. : 1935 MR#: [id number] SSN: [id number]  Date/Time: 11/3/2021   C/C: Shortness of breath/COPD exacerbation        Subjective:   HPI : Patient admitted for acute hypoxic respiratory failure currently on high flow oxygen, COPD exacerbation, O2 saturation ranging from 98 to 90% on 35 L/min              Review of Systems:  positive responses in bold type   Constitutional: Negative for fever, chills, diaphoresis and unexpected weight change. HENT:  Improved dryness and improved ear irritation  Eyes: Negative for photophobia, pain, redness and visual disturbance. Respiratory: negative for shortness of breath, cough, choking, chest tightness, wheezing or stridor. Cardiovascular: Negative for chest pain, palpitations and leg swelling. Gastrointestinal: Negative for nausea, vomiting, abdominal pain, diarrhea, constipation, blood in stool, abdominal distention and anal bleeding. Genitourinary: Negative for dysuria, urgency, frequency, hematuria, flank pain and difficulty urinating. Musculoskeletal: Negative for back pain and arthralgias. Skin: Negative for color change, rash and wound. Neurological: Negative for dizziness, seizures, syncope, speech difficulty, light-headedness or headaches. Hematological: Does not bruise/bleed easily. Psychiatric/Behavioral: Negative for suicidal ideas, hallucinations, behavioral problems, self-injury or agitation      Assessment/Plan:      1.   Acute on chronic hypoxic respiratory failure  2 COPD acute  3 RSV positive by PCR/negative influenza, negative Covid  4 echo-preserved EF  5 hypertension  6 hyperglycemia-current due to steroid use and stress, not on any diabetic medication at home     Plan    -Oral thrush has improved still some soreness with little difficulty eating but I saw her eating very well, currently on Magic mouthwash and nystatin and status post 1 dose of Diflucan, will continue to monitor    -Steroids are placed on a taper currently on prednisone 20 mg once a day    -Oxygenation requirement is still high continue to maintain O2 saturation above 88%,  Currently O2 saturation is 95% on 4 L/min with FiO2 of 36%    -Follow pulmonary recommendation    -PT OT      Time spent on direct patient care >30 mints      Complexity : High complex - due to multiple medical issues outlined above. CODE Status : Full code     Case discussed with:  [x]Patient  [] Family  []Nursing  []Case Management      DVT Prophylaxis:  [x]Lovenox  []Hep SQ  []SCDs  []Coumadin   []On Heparin gtt     [] Eliquis [] Xarelto   Objective:        Tmax/24hrs: Temp (24hrs), Av °F (36.7 °C), Min:97.9 °F (36.6 °C), Max:98.2 °F (36.8 °C)  IOBRIEF     Intake/Output Summary (Last 24 hours) at 10/29/2021 1633  Last data filed at 10/29/2021 1238      Gross per 24 hour   Intake 240 ml   Output --   Net 240 ml         General:  Alert, cooperative, no acute distress   HEENT: No facial asymmetry, LAINA Abdirashid, External ears - WNL    Cardiovascular: S1S2 - regular , No Murmur   Pulmonary: Equal expansion , No Use of accessory muscles , bilateral decreased air entry with occasional rhonchi  GI:  +BS in all four quadrants, soft, non-tender  Extremities:  No edema; 2+ dorsalis pedis pulses bilaterally  Neuro: Alert and oriented X 2.         Vitals:         VS:   Visit Vitals  /60 (BP 1 Location: Left upper arm, BP Patient Position: Sitting)   Pulse 97   Temp 98.1 °F (36.7 °C)   Resp 18   Ht 5' 3\" (1.6 m)   Wt 59 kg (130 lb 1.6 oz)   SpO2 92%   BMI 23.05 kg/m²      Tmax/24hrs: Temp (24hrs), Av.9 °F (36.6 °C), Min:97.6 °F (36.4 °C), Max:98.1 °F (36.7 °C)  IOBRIEFNo intake or output data in the 24 hours ending 21 1016      Medications:   Current Facility-Administered Medications   Medication Dose Route Frequency    albuterol-ipratropium (DUO-NEB) 2.5 MG-0.5 MG/3 ML  3 mL Nebulization Q6H RT    predniSONE (DELTASONE) tablet 30 mg  30 mg Oral DAILY WITH BREAKFAST    nystatin (MYCOSTATIN) 100,000 unit/mL oral suspension 500,000 Units  500,000 Units Oral QID    magic mouthwash (FIRST-MOUTHWASH BLM) oral suspension 10 mL  10 mL Oral TIDAC    phenol throat spray (CHLORASEPTIC) 1 Spray  1 Spray Oral PRN    sodium chloride (AYR SALINE) 0.65 % nasal drops 2 Drop  2 Drop Both Nostrils Q2H PRN    budesonide (PULMICORT) 1,000 mcg/2 mL nebulizer susp  1,000 mcg Nebulization BID RT    pantoprazole (PROTONIX) tablet 40 mg  40 mg Oral ACB    guaiFENesin (ROBITUSSIN) 100 mg/5 mL oral liquid 100 mg  100 mg Oral Q4H PRN    insulin lispro (HUMALOG) injection   SubCUTAneous AC&HS    glucose chewable tablet 16 g  4 Tablet Oral PRN    glucagon (GLUCAGEN) injection 1 mg  1 mg IntraMUSCular PRN    dextrose (D50W) injection syrg 12.5-25 g  25-50 mL IntraVENous PRN    sodium chloride (NS) flush 5-10 mL  5-10 mL IntraVENous PRN    sodium chloride (NS) flush 5-40 mL  5-40 mL IntraVENous Q8H    sodium chloride (NS) flush 5-40 mL  5-40 mL IntraVENous PRN    acetaminophen (TYLENOL) tablet 650 mg  650 mg Oral Q6H PRN    Or    acetaminophen (TYLENOL) suppository 650 mg  650 mg Rectal Q6H PRN    polyethylene glycol (MIRALAX) packet 17 g  17 g Oral DAILY PRN    ondansetron (ZOFRAN ODT) tablet 4 mg  4 mg Oral Q8H PRN    Or    ondansetron (ZOFRAN) injection 4 mg  4 mg IntraVENous Q6H PRN    enoxaparin (LOVENOX) injection 40 mg  40 mg SubCUTAneous DAILY    ALPRAZolam (XANAX) tablet 0.5 mg  0.5 mg Oral QHS    aspirin delayed-release tablet 81 mg  81 mg Oral DAILY    atorvastatin (LIPITOR) tablet 20 mg  20 mg Oral DAILY    losartan (COZAAR) tablet 50 mg  50 mg Oral DAILY    [Held by provider] metoprolol tartrate (LOPRESSOR) tablet 25 mg  25 mg Oral BID    furosemide (LASIX) tablet 40 mg  40 mg Oral DAILY    NIFEdipine ER (PROCARDIA XL) tablet 60 mg  60 mg Oral DAILY       Labs:    No results for input(s): WBC, HGB, HCT, PLT, HGBEXT, HCTEXT, PLTEXT in the last 72 hours. No results for input(s): NA, K, CL, CO2, GLU, BUN, CREA, CA, MG, PHOS, ALB, TBIL, ALT, INR, INREXT in the last 72 hours. No lab exists for component: SGOT      Time spent on direct patient care >30 mints     Complexity : High complex - due to multiple medical issues outlined above. CODE Status : Full code  Case discussed with:  [x]Patient  [] Family  []Nursing  [x]Case Management         Disclaimer: Sections of this note are dictated utilizing voice recognition software, which may have resulted in some phonetic based errors in grammar and contents. Even though attempts were made to correct all the mistakes, some may have been missed, and remained in the body of the document. If questions arise, please contact our department.     Signed By: Mitesh Patino MD     November 3, 2021

## 2021-11-03 NOTE — PROGRESS NOTES
Discharge planning    Reviewed chart. Has home oxygen. Plan is for home. CM will continue to monitor with transition of care needs.        ELÍAS Rizo, RN  Pager # [phone]  Care Manager

## 2021-11-04 VITALS
RESPIRATION RATE: 18 BRPM | BODY MASS INDEX: 20.93 KG/M2 | HEIGHT: 63 IN | SYSTOLIC BLOOD PRESSURE: 139 MMHG | DIASTOLIC BLOOD PRESSURE: 53 MMHG | WEIGHT: 118.1 LBS | TEMPERATURE: 97.6 F | OXYGEN SATURATION: 95 % | HEART RATE: 94 BPM

## 2021-11-04 LAB
GLUCOSE BLD STRIP.AUTO-MCNC: 181 MG/DL (ref 70–110)
GLUCOSE BLD STRIP.AUTO-MCNC: 62 MG/DL (ref 70–110)
GLUCOSE BLD STRIP.AUTO-MCNC: 69 MG/DL (ref 70–110)
GLUCOSE BLD STRIP.AUTO-MCNC: 86 MG/DL (ref 70–110)
GLUCOSE BLD STRIP.AUTO-MCNC: 88 MG/DL (ref 70–110)

## 2021-11-04 PROCEDURE — 74011250637 HC RX REV CODE- 250/637: Performed by: INTERNAL MEDICINE

## 2021-11-04 PROCEDURE — 97116 GAIT TRAINING THERAPY: CPT

## 2021-11-04 PROCEDURE — 74011000250 HC RX REV CODE- 250: Performed by: INTERNAL MEDICINE

## 2021-11-04 PROCEDURE — 99239 HOSP IP/OBS DSCHRG MGMT >30: CPT | Performed by: INTERNAL MEDICINE

## 2021-11-04 PROCEDURE — 74011250636 HC RX REV CODE- 250/636: Performed by: FAMILY MEDICINE

## 2021-11-04 PROCEDURE — 74011250637 HC RX REV CODE- 250/637: Performed by: FAMILY MEDICINE

## 2021-11-04 PROCEDURE — 97530 THERAPEUTIC ACTIVITIES: CPT

## 2021-11-04 PROCEDURE — 82962 GLUCOSE BLOOD TEST: CPT

## 2021-11-04 PROCEDURE — 74011636637 HC RX REV CODE- 636/637: Performed by: INTERNAL MEDICINE

## 2021-11-04 PROCEDURE — 94640 AIRWAY INHALATION TREATMENT: CPT

## 2021-11-04 PROCEDURE — 97164 PT RE-EVAL EST PLAN CARE: CPT

## 2021-11-04 RX ORDER — PREDNISONE 10 MG/1
TABLET ORAL
Qty: 30 TABLET | Refills: 0 | Status: SHIPPED | OUTPATIENT
Start: 2021-11-04 | End: 2022-05-09

## 2021-11-04 RX ORDER — NYSTATIN 100000 [USP'U]/ML
500000 SUSPENSION ORAL 2 TIMES DAILY
Qty: 60 ML | Refills: 2 | Status: SHIPPED | OUTPATIENT
Start: 2021-11-04 | End: 2022-08-01

## 2021-11-04 RX ADMIN — BUDESONIDE 1000 MCG: 1 SUSPENSION RESPIRATORY (INHALATION) at 08:19

## 2021-11-04 RX ADMIN — FUROSEMIDE 40 MG: 40 TABLET ORAL at 08:49

## 2021-11-04 RX ADMIN — LOSARTAN POTASSIUM 50 MG: 50 TABLET, FILM COATED ORAL at 08:49

## 2021-11-04 RX ADMIN — NYSTATIN 500000 UNITS: 100000 SUSPENSION ORAL at 12:01

## 2021-11-04 RX ADMIN — Medication 10 ML: at 16:17

## 2021-11-04 RX ADMIN — IPRATROPIUM BROMIDE AND ALBUTEROL SULFATE 3 ML: .5; 2.5 SOLUTION RESPIRATORY (INHALATION) at 08:19

## 2021-11-04 RX ADMIN — ENOXAPARIN SODIUM 40 MG: 100 INJECTION SUBCUTANEOUS at 08:48

## 2021-11-04 RX ADMIN — IPRATROPIUM BROMIDE AND ALBUTEROL SULFATE 3 ML: .5; 2.5 SOLUTION RESPIRATORY (INHALATION) at 14:34

## 2021-11-04 RX ADMIN — ACETAMINOPHEN 650 MG: 325 TABLET ORAL at 15:11

## 2021-11-04 RX ADMIN — PREDNISONE 30 MG: 20 TABLET ORAL at 08:49

## 2021-11-04 RX ADMIN — DIPHENHYDRAMINE HYDROCHLORIDE AND LIDOCAINE HYDROCHLORIDE AND ALUMINUM HYDROXIDE AND MAGNESIUM HYDRO 10 ML: KIT at 16:18

## 2021-11-04 RX ADMIN — DIPHENHYDRAMINE HYDROCHLORIDE AND LIDOCAINE HYDROCHLORIDE AND ALUMINUM HYDROXIDE AND MAGNESIUM HYDRO 10 ML: KIT at 08:59

## 2021-11-04 RX ADMIN — NYSTATIN 500000 UNITS: 100000 SUSPENSION ORAL at 08:49

## 2021-11-04 RX ADMIN — Medication 10 ML: at 06:35

## 2021-11-04 RX ADMIN — ATORVASTATIN CALCIUM 20 MG: 20 TABLET, FILM COATED ORAL at 08:49

## 2021-11-04 RX ADMIN — INSULIN LISPRO 3 UNITS: 100 INJECTION, SOLUTION INTRAVENOUS; SUBCUTANEOUS at 16:16

## 2021-11-04 RX ADMIN — Medication 81 MG: at 08:49

## 2021-11-04 RX ADMIN — PANTOPRAZOLE SODIUM 40 MG: 40 TABLET, DELAYED RELEASE ORAL at 08:49

## 2021-11-04 RX ADMIN — NIFEDIPINE 60 MG: 30 TABLET, FILM COATED, EXTENDED RELEASE ORAL at 08:49

## 2021-11-04 RX ADMIN — DIPHENHYDRAMINE HYDROCHLORIDE AND LIDOCAINE HYDROCHLORIDE AND ALUMINUM HYDROXIDE AND MAGNESIUM HYDRO 10 ML: KIT at 11:59

## 2021-11-04 NOTE — PROGRESS NOTES
Problem: Patient Education: Go to Patient Education Activity  Goal: Patient/Family Education  Outcome: Progressing Towards Goal     Problem: Patient Education: Go to Patient Education Activity  Goal: Patient/Family Education  Outcome: Progressing Towards Goal     Problem: Patient Education:  Go to Education Activity  Goal: Patient/Family Education  Outcome: Progressing Towards Goal     Problem: Falls - Risk of  Goal: *Absence of Falls  Description: Document Felipe Rodarte Fall Risk and appropriate interventions in the flowsheet.   Outcome: Progressing Towards Goal  Note: Fall Risk Interventions:  Mobility Interventions: Communicate number of staff needed for ambulation/transfer         Medication Interventions: Bed/chair exit alarm    Elimination Interventions: Call light in reach, Bed/chair exit alarm              Problem: Patient Education: Go to Patient Education Activity  Goal: Patient/Family Education  Outcome: Progressing Towards Goal     Problem: Pain  Goal: *Control of Pain  Outcome: Progressing Towards Goal     Problem: Heart Failure: Day 1  Goal: Respiratory  Outcome: Progressing Towards Goal  Goal: Psychosocial  Outcome: Progressing Towards Goal  Goal: *Oxygen saturation within defined limits  Outcome: Progressing Towards Goal  Goal: *Anxiety reduced or absent  Outcome: Progressing Towards Goal     Problem: Heart Failure: Day 2  Goal: Activity/Safety  Outcome: Progressing Towards Goal  Goal: Nutrition/Diet  Outcome: Progressing Towards Goal  Goal: Respiratory  Outcome: Progressing Towards Goal  Goal: Psychosocial  Outcome: Progressing Towards Goal  Goal: *Oxygen saturation within defined limits  Outcome: Progressing Towards Goal  Goal: *Anxiety reduced or absent  Outcome: Progressing Towards Goal  Goal: *Demonstrates progressive activity  Outcome: Progressing Towards Goal     Problem: Heart Failure: Day 3  Goal: Off Pathway (Use only if patient is Off Pathway)  Outcome: Progressing Towards Goal  Goal: Activity/Safety  Outcome: Progressing Towards Goal  Goal: Nutrition/Diet  Outcome: Progressing Towards Goal  Goal: Medications  Outcome: Progressing Towards Goal  Goal: Respiratory  Outcome: Progressing Towards Goal  Goal: Psychosocial  Outcome: Progressing Towards Goal  Goal: *Oxygen saturation within defined limits  Outcome: Progressing Towards Goal  Goal: *Anxiety reduced or absent  Outcome: Progressing Towards Goal  Goal: *Demonstrates progressive activity  Outcome: Progressing Towards Goal     Problem: Heart Failure: Day 4  Goal: Nutrition/Diet  Outcome: Progressing Towards Goal  Goal: Medications  Outcome: Progressing Towards Goal  Goal: Respiratory  Outcome: Progressing Towards Goal  Goal: Treatments/Interventions/Procedures  Outcome: Progressing Towards Goal  Goal: Psychosocial  Outcome: Progressing Towards Goal  Goal: *Oxygen saturation within defined limits  Outcome: Progressing Towards Goal  Goal: *Hemodynamically stable  Outcome: Progressing Towards Goal     Problem: Heart Failure: Day 5  Goal: Nutrition/Diet  Outcome: Progressing Towards Goal  Goal: Medications  Outcome: Progressing Towards Goal  Goal: Respiratory  Outcome: Progressing Towards Goal  Goal: Treatments/Interventions/Procedures  Outcome: Progressing Towards Goal  Goal: Psychosocial  Outcome: Progressing Towards Goal     Problem: Heart Failure: Discharge Outcomes  Goal: *Verbalizes understanding/describes prescribed medications  Outcome: Progressing Towards Goal  Goal: *Understands and describes signs and symptoms to report to providers(Stroke Metric)  Outcome: Progressing Towards Goal  Goal: *Describes importance of continuing daily weights and changes to report to physician  Outcome: Progressing Towards Goal

## 2021-11-04 NOTE — DISCHARGE SUMMARY
Discharge Summary     Patient ID:  Vinnie Mcnulty  [id number]  11 y.o.  1935  Body mass index is 20.92 kg/m². PCP on record: Vitor Lim NP    Admit date: 10/24/2021  Discharge date and time: 11/4/2021        Discharge Diagnoses:                                           1.  Acute on chronic hypoxic respiratory failure  2 COPD acute  3 RSV positive by PCR/negative influenza, negative Covid  4 echo-preserved EF  5 hypertension  6 hyperglycemia-current due to steroid use and stress, not on any diabetic medication at home         Consults: Pulmonary/Intensive care          Hospital Course by problems:    - HPI per admitting MD\" Vinnie Mcnulty is a 82 y.o. female with PMHx of chronic hypoxic respiratory failure on home O2 at 2-3 L/min, COPD, Covid-19 pneumonia now vaccinated, mild pulmonary hypertension, chronic diastolic CHF and HTN who presented to the ED with complaints of shortness of breath. Ms. Hermilo Elkins reports that she began feeling bad and had some mildly increased shortness of breath starting about 5 days ago. She went to [facility] ED and was discharged home with prednisone. Since arriving home she has had increasing shortness of breath despite taking the prednisone. In addition, she has developed a slightly productive cough but denies any fevers or other symptoms. Her shortness of breath became significantly worse last night and she called EMS. Ms. Hermilo Elkins reports that she lives alone, takes her medications as directed, and does not use tobacco, alcohol or any recreational drugs. She is fully vaccinated against COVID-19.     Upon EMS arrival at her home, she was found to have SPO2 in the mid to high 50s on 3 to 4 L/min. She was placed on nonrebreather. And had improvement in her O2 sat. Upon arrival in the ED, she had fever to 100.8, was tachycardic with heart rate in the 120s, was tachypneic with RR in the 20s, and had SPO2 of 88% on nonrebreather.   She was placed on BiPAP and had significant improvement in her SPO2 and work of breathing. Labs were notable for lactic acid 2.14, WBCs 25.2 with a left shift, glucose 132, BUN 25, creatinine 1.12 (baseline 0.9), procalcitonin 0.20, troponin 0.11 and NT proBNP 2379. Bio fire respiratory virus panel was negative for Covid-19 but positive for RSV. CXR showed hyperinflation with mild streaky atelectasis or infiltrates in the lung bases. Ms. Chanel Meléndez was then started on IV ABX and IV steroids. She is now admitted for acute on chronic hypoxic respiratory failure secondary to COPD exacerbation in the setting of RSV infection with concern for sepsis secondary to community-acquired pneumonia.     -----------------------    -Patient with COPD/emphysema admitted with COPD exacerbation with severe hypoxemia increased work of breathing tachycardia. Patient was started on bronchodilators and steroids. Patient improved over time took longer than usual she has a home oxygen which he uses at 3 to 4 L currently she has reached a stage of 4 L after nonrebreather, then high flow. -During her stay patient developed oral candidiasis which was treated successfully with oral nystatin and Diflucan    -Since COPD exacerbation was secondary to her RSV , bio fire was negative for Covid positive for RSV          Patient seen and examined by me on discharge day.   Pertinent Findings:  Stable    Significant Diagnostic Studies:  Results  CT CHEST W CONT (Accession [id number]) (Order [id number])    Allergies       No Known Allergies     Exam Information    Status Exam Begun  Exam Ended    Final [99] 10/27/2021 01:48 10/27/2021  1:54 AM [id number]  1:54 AM     Result Information    Status: Final result (Exam End: 10/27/2021 01:54) Provider Status: Open       CT CHEST W CONT: Patient Communication     Released  Seen     Study Result    Narrative & Impression   EXAM:  CT Chest with Contrast.             CLINICAL INDICATION:  Shortness of breath.       COMPARISON: Earlier study at 01:10 (without contrast). TECHNIQUE:     - Helically scanned volumetric axial sections of the chest are obtained  following IV contrast administration. Coronal and sagittal multiplanar  reconstruction images are generated for improved anatomic delineation.  - Contrast dose 100 mL Isovue-300  - Radiation dose optimization techniques are utilized as appropriate to the  exam, with combination of automated exposure control, adjustment of the mA  and/or kV according to patient's size (Including appropriate matching for  site-specific examinations), or use of iterative reconstruction technique.     FINDINGS:      Lungs:  Fairly extensive emphysematous changes in both lungs. No dominant mass  or discrete suspicious lung nodule is detected. No definite airspace opacities. Focal scarring in the right upper lobe anterior aspect and in the left lower  lobe.     Pleura:  No significant pleural effusion is detected bilaterally.     Mediastinum:  1.5 x 1.3 cm precarinal node.     Cardiovascular: Moderate atherosclerotic calcifications along the aorta.     Base of Neck, Axillae:  Left thyroid nodule. No adenopathy.     Esophagus:  Unremarkable.     Upper Abdomen:  Negative     Bones:  No acute findings.     IMPRESSION             1.  Emphysematous changes. 2.  No airspace opacities. Pertinent Lab Data:  No results for input(s): WBC, HGB, HCT, PLT, HGBEXT, HCTEXT, PLTEXT in the last 72 hours. No results for input(s): NA, K, CL, CO2, GLU, BUN, CREA, CA, MG, PHOS, ALB, TBIL, ALT, INR, INREXT in the last 72 hours. No lab exists for component: SGOT    DISCHARGE MEDICATIONS:   @  Current Discharge Medication List      START taking these medications    Details   nystatin (MYCOSTATIN) 100,000 unit/mL suspension Take 5 mL by mouth two (2) times a day.  swish and spit  Qty: 60 mL, Refills: 2  Start date: 11/4/2021         CONTINUE these medications which have CHANGED    Details   predniSONE (DELTASONE) 10 mg tablet Take one tab PO tid  for 5 days, then 1 tab PO BID for 5 days, and then 1 tab PO daily for 5 days,  Qty: 30 Tablet, Refills: 0  Start date: 11/4/2021         CONTINUE these medications which have NOT CHANGED    Details   furosemide (Lasix) 40 mg tablet Take 40 mg by mouth daily. albuterol (Proventil HFA) 90 mcg/actuation inhaler Take 2 Puffs by inhalation every four (4) hours as needed for Wheezing, Shortness of Breath or Respiratory Distress. Qty: 1 Each, Refills: 0      budesonide-glycopyr-formoterol (Breztri Aerosphere) 160-9-4.8 mcg/actuation HFAA Take  by inhalation. OXYGEN-AIR DELIVERY SYSTEMS 3 L by IntraNASal route continuous. famotidine (PEPCID) 20 mg tablet Take 1 Tab by mouth daily. Qty: 30 Tab, Refills: 0      cholecalciferol (VITAMIN D3) (1000 Units /25 mcg) tablet Take 2 Tabs by mouth daily. Qty: 60 Tab, Refills: 0      atorvastatin (LIPITOR) 20 mg tablet Take 20 mg by mouth daily. COQ10, UBIQUINOL, PO Take 1 Cap by mouth daily. losartan (COZAAR) 50 mg tablet Take  by mouth daily. 75 mg in a.m      diclofenac (VOLTAREN) 1 % gel Apply  to affected area four (4) times daily. Qty: 100 g, Refills: 2      Oxygen       albuterol (PROVENTIL VENTOLIN) 2.5 mg /3 mL (0.083 %) nebulizer solution 3 mL by Nebulization route every four (4) hours as needed for Wheezing or Shortness of Breath. Qty: 48 Each, Refills: 0      acetaminophen (TYLENOL EXTRA STRENGTH) 500 mg tablet Take 500 mg by mouth every six (6) hours as needed for Pain.      metoprolol (LOPRESSOR) 25 mg tablet Take 25 mg by mouth two (2) times a day. NIFEdipine ER (ADALAT CC) 60 mg ER tablet Take 60 mg by mouth daily. Associated Diagnoses: Occlusion and stenosis of carotid artery, left; Pre-op testing      alprazolam (XANAX) 0.5 mg tablet Take 0.5 mg by mouth nightly.     Associated Diagnoses: Occlusion and stenosis of carotid artery, left; Pre-op testing      aspirin delayed-release 81 mg tablet Take 81 mg by mouth daily. Associated Diagnoses: Occlusion and stenosis of carotid artery, left; Pre-op testing         STOP taking these medications       zinc sulfate (ZINCATE) 220 (50) mg capsule Comments:   Reason for Stopping:                 My Recommended Diet, Activity, Wound Care, and follow-up labs are listed in the patient's Discharge Insturctions which I have personally completed and reviewed. Disposition:     [] Home with family     [x] [place] PT/RN   [] SNF/NH   [] Inpatient Rehab/[facility]  Condition at Discharge:  Stable    Follow up with:   PCP : Olivier Olivares NP      Please follow-up tests/labs that are still pendin. None  2.    >30 minutes spent coordinating this discharge (review instructions/follow-up, prescriptions, preparing report for sign off)    Disclaimer: Sections of this note are dictated utilizing voice recognition software, which may have resulted in some phonetic based errors in grammar and contents. Even though attempts were made to correct all the mistakes, some may have been missed, and remained in the body of the document. If questions arise, please contact our department.     Signed:  Conner Andrade MD

## 2021-11-04 NOTE — PROGRESS NOTES
Problem: Mobility Impaired (Adult and Pediatric)  Goal: *Acute Goals and Plan of Care (Insert Text)  Description: Physical Therapy Goals  Initiated 11/4/2021 and to be accomplished within 7 day(s)  1. Patient will move from supine to sit and sit to supine  in bed with modified independence. 2.  Patient will transfer from bed to chair and chair to bed with modified independence using the least restrictive device. 3.  Patient will perform sit to stand with modified independence. 4.  Patient will ambulate with modified independence for 150 feet with the least restrictive device. 5.  Patient will ascend/descend 3 stairs with handrail(s) with supervision/set-up. PLOF: Patient reports she was independent with mobility, furniture cruises at home, and uses [facility] for community. She is able to drive. Outcome: Progressing Towards Goal     PHYSICAL THERAPY EVALUATION    Patient: Fritz Dawn (44 y.o. female)  Date: 11/4/2021  Primary Diagnosis: Acute bronchitis with chronic obstructive pulmonary disease (COPD) ([facility] [J44.0, J20.9]  Respiratory failure (HCC) [J96.90]  COPD exacerbation ([facility] [J44.1]        Precautions:   Fall      ASSESSMENT :  Based on the objective data described below, the patient presents with decreased strength, decreased endurance, and gait impairments. She performs bed mobility and transfers to [facility] with Mod I. Patient ambulates around the bed holding onto bed rail with SBA. States this is normal for her as she furniture cruise at home. Educated on [facility] to increase stability and practiced ambulating with cane around the room with SBA. Patient states her sister will be checking in on her often and assisting as needed. O2 >88% with mobility. Self aware of pacing and rest breaks. Will follow up one more treatment session for practice of gait with SPC. Patient will benefit from skilled intervention to address the above impairments.   Patient's rehabilitation potential is considered to be Good  Factors which may influence rehabilitation potential include:   []         None noted  []         Mental ability/status  [x]         Medical condition  [x]         Home/family situation and support systems  []         Safety awareness  []         Pain tolerance/management  []         Other:      PLAN :  Recommendations and Planned Interventions:   [x]           Bed Mobility Training             [x]    Neuromuscular Re-Education  [x]           Transfer Training                   []    Orthotic/Prosthetic Training  [x]           Gait Training                          []    Modalities  [x]           Therapeutic Exercises           []    Edema Management/Control  [x]           Therapeutic Activities            [x]    Family Training/Education  [x]           Patient Education  []           Other (comment):    Frequency/Duration: Patient will be followed by physical therapy 1-2 times per day/4-7 days per week to address goals. Discharge Recommendations: Home Health with increased supervision  Further Equipment Recommendations for Discharge: straight cane and N/A     SUBJECTIVE:   Patient stated [facility] were suppose to send me home yesterday .     OBJECTIVE DATA SUMMARY:     Past Medical History:   Diagnosis Date    Chronic lung disease     Chronic obstructive pulmonary disease ([facility]     Heart failure ([facility]     Hypertension      Past Surgical History:   Procedure Laterality Date    HX ORTHOPAEDIC      spinal sx 5/6    HX OTHER SURGICAL      Carotid artery    VASCULAR SURGERY PROCEDURE UNLIST      L CEA 10/2014     Barriers to Learning/Limitations: None  Compensate with: N/A  Home Situation:  Home Situation  Home Environment: Private residence  # Steps to Enter: 1 (garage steps)  Rails to [employer]: Yes  One/Two Story Residence: One story  Living Alone: Yes  Support Systems: Child(erick)  Patient Expects to be Discharged to[de-identified] Unknown  Current DME Used/Available at Home: Oxygen, portable, Shower chair  Tub or Shower Type: Tub/Shower combination  Critical Behavior:  Neurologic State: Alert; Appropriate for age  Orientation Level: Oriented X4  Cognition: Appropriate decision making; Follows commands     Psychosocial  Patient Behaviors: Combative; Cooperative  Purposeful Interaction: Yes  Pt Identified Daily Priority: Clinical issues (comment)  Caritas Process: Establish trust; Nurture loving kindness; Teaching/learning  Caring Interventions: Reassure  Reassure: Therapeutic listening  Therapeutic Modalities: Intentional therapeutic touch  Skin Condition/Temp: Dry; Warm     Skin Integrity: Intact  Skin Integumentary  Skin Color: Appropriate for ethnicity  Skin Condition/Temp: Dry; Warm  Skin Integrity: Intact  Turgor: Non-tenting     Strength:    Strength: Generally decreased, functional                    Tone & Sensation:   Tone: Normal       Sensation: Intact            Range Of Motion:  AROM: Generally decreased, functional          Functional Mobility:  Bed Mobility:        Sit to Supine: Modified independent  Scooting: Modified independent  Transfers:  Sit to Stand: Modified independent  Stand to Sit: Modified independent                Balance:   Sitting: Intact  Standing: Intact    Ambulation/Gait Training:  Distance (ft): 40 Feet (ft)  Assistive Device:  (n/a)  Ambulation - Level of Assistance: Supervision  Gait Abnormalities: Decreased step clearance  Step Length: Right shortened; Left shortened       Pain:  Pain level pre-treatment: 0/10   Pain level post-treatment: 0/10   Pain Intervention(s) : Medication (see MAR); Rest, Ice, Repositioning  Response to intervention: Nurse notified, See doc flow    Activity Tolerance: Fair +  Please refer to the flowsheet for vital signs taken during this treatment.   After treatment:   []         Patient left in no apparent distress sitting up in chair  [x]         Patient left in no apparent distress in bed  [x]         Call bell left within reach  [x]         Nursing notified  [] Caregiver present  []         Bed alarm activated  []         SCDs applied    COMMUNICATION/EDUCATION:   [x]         Role of Physical Therapy in the acute care setting. [x]         Fall prevention education was provided and the patient/caregiver indicated understanding. [x]         Patient/family have participated as able in goal setting and plan of care. []         Patient/family agree to work toward stated goals and plan of care. []         Patient understands intent and goals of therapy, but is neutral about his/her participation. []         Patient is unable to participate in goal setting/plan of care: ongoing with therapy staff.  []         Other:     Thank you for this referral.  Royetta Saint, PT   Time Calculation: 23 mins      Eval Complexity: History: LOW Complexity : Zero comorbidities / personal factors that will impact the outcome / POCExam:LOW Complexity : 1-2 Standardized tests and measures addressing body structure, function, activity limitation and / or participation in recreation  Presentation: LOW Complexity : Stable, uncomplicated  Clinical Decision Making:Low Complexity    Overall Complexity:LOW

## 2021-11-04 NOTE — PROGRESS NOTES
0715: Bedside shift change report given to Katelyn Thakkar RN (oncoming nurse) by Alla Chapman RN (offgoing nurse). Report included the following information SBAR, Kardex, Intake/Output, MAR and Cardiac Rhythm NSR.

## 2021-11-04 NOTE — ROUTINE PROCESS
Bedside and Verbal shift change report given to [facility] (oncoming nurse) by Lawyer Shaan RN  (offgoing nurse).  Report included the following information SBAR, Intake/Output, MAR, Accordion, Recent Results and Cardiac Rhythm SR.

## 2021-11-04 NOTE — PROGRESS NOTES
D/C order noted for today. Orders reviewed. No needs identified at this time. CM remains available if needed.      Patient has oxygen at home      Braxton Valentine RN BSN  Outcomes Manager    Pager # [phone]

## 2021-11-12 ENCOUNTER — DOCUMENTATION ONLY (OUTPATIENT)
Dept: PULMONOLOGY | Age: 86
End: 2021-11-12

## 2021-11-17 DIAGNOSIS — I65.23 CAROTID STENOSIS, ASYMPTOMATIC, BILATERAL: ICD-10-CM

## 2021-11-17 DIAGNOSIS — Z98.890 S/P CAROTID ENDARTERECTOMY: ICD-10-CM

## 2021-12-07 ENCOUNTER — HOSPITAL ENCOUNTER (OUTPATIENT)
Dept: GENERAL RADIOLOGY | Age: 86
Discharge: HOME | End: 2021-12-07
Payer: MEDICARE

## 2021-12-07 DIAGNOSIS — J44.9 COPD (CHRONIC OBSTRUCTIVE PULMONARY DISEASE) (HCC): ICD-10-CM

## 2021-12-07 DIAGNOSIS — J96.11 CHRONIC RESPIRATORY FAILURE WITH HYPOXIA (HCC): ICD-10-CM

## 2021-12-07 PROCEDURE — 71046 X-RAY EXAM CHEST 2 VIEWS: CPT

## 2022-03-18 PROBLEM — J18.9 CAP (COMMUNITY ACQUIRED PNEUMONIA): Status: ACTIVE | Noted: 2020-12-10

## 2022-03-18 PROBLEM — Z20.822 SUSPECTED COVID-19 VIRUS INFECTION: Status: ACTIVE | Noted: 2020-12-10

## 2022-03-19 PROBLEM — U07.1 COVID-19: Status: ACTIVE | Noted: 2021-04-15

## 2022-03-19 PROBLEM — J44.0 ACUTE BRONCHITIS WITH CHRONIC OBSTRUCTIVE PULMONARY DISEASE (COPD) (HCC): Status: ACTIVE | Noted: 2021-10-24

## 2022-03-19 PROBLEM — J20.9 ACUTE BRONCHITIS WITH CHRONIC OBSTRUCTIVE PULMONARY DISEASE (COPD) (HCC): Status: ACTIVE | Noted: 2021-10-24

## 2022-03-19 PROBLEM — J44.1 COPD EXACERBATION (HCC): Status: ACTIVE | Noted: 2021-02-22

## 2022-03-20 PROBLEM — J96.90 RESPIRATORY FAILURE (HCC): Status: ACTIVE | Noted: 2021-10-24

## 2022-05-05 ENCOUNTER — APPOINTMENT (OUTPATIENT)
Dept: GENERAL RADIOLOGY | Age: 87
DRG: 871 | End: 2022-05-05
Attending: PHYSICIAN ASSISTANT
Payer: MEDICARE

## 2022-05-05 ENCOUNTER — HOSPITAL ENCOUNTER (INPATIENT)
Age: 87
LOS: 3 days | Discharge: HOME HEALTH | DRG: 871 | End: 2022-05-09
Attending: STUDENT IN AN ORGANIZED HEALTH CARE EDUCATION/TRAINING PROGRAM | Admitting: INTERNAL MEDICINE
Payer: MEDICARE

## 2022-05-05 ENCOUNTER — APPOINTMENT (OUTPATIENT)
Dept: CT IMAGING | Age: 87
DRG: 871 | End: 2022-05-05
Attending: PHYSICIAN ASSISTANT
Payer: MEDICARE

## 2022-05-05 DIAGNOSIS — N12 PYELONEPHRITIS: Primary | ICD-10-CM

## 2022-05-05 LAB
ALBUMIN SERPL-MCNC: 3.4 G/DL (ref 3.4–5)
ALBUMIN SERPL-MCNC: 3.4 G/DL (ref 3.4–5)
ALBUMIN/GLOB SERPL: 0.9 {RATIO} (ref 0.8–1.7)
ALBUMIN/GLOB SERPL: 0.9 {RATIO} (ref 0.8–1.7)
ALP SERPL-CCNC: 66 U/L (ref 45–117)
ALP SERPL-CCNC: 68 U/L (ref 45–117)
ALT SERPL-CCNC: 37 U/L (ref 13–56)
ALT SERPL-CCNC: 37 U/L (ref 13–56)
ANION GAP SERPL CALC-SCNC: 12 MMOL/L (ref 3–18)
ANION GAP SERPL CALC-SCNC: 9 MMOL/L (ref 3–18)
APPEARANCE UR: CLEAR
APTT PPP: 36.8 SEC (ref 23–36.4)
AST SERPL-CCNC: 26 U/L (ref 10–38)
AST SERPL-CCNC: 30 U/L (ref 10–38)
BACTERIA URNS QL MICRO: ABNORMAL /HPF
BASOPHILS # BLD: 0 K/UL (ref 0–0.1)
BASOPHILS # BLD: 0 K/UL (ref 0–0.1)
BASOPHILS NFR BLD: 0 % (ref 0–2)
BASOPHILS NFR BLD: 0 % (ref 0–2)
BILIRUB SERPL-MCNC: 0.6 MG/DL (ref 0.2–1)
BILIRUB SERPL-MCNC: 0.8 MG/DL (ref 0.2–1)
BILIRUB UR QL: NEGATIVE
BNP SERPL-MCNC: 2045 PG/ML (ref 0–1800)
BUN SERPL-MCNC: 17 MG/DL (ref 7–18)
BUN SERPL-MCNC: 20 MG/DL (ref 7–18)
BUN/CREAT SERPL: 15 (ref 12–20)
BUN/CREAT SERPL: 18 (ref 12–20)
CALCIUM SERPL-MCNC: 8.8 MG/DL (ref 8.5–10.1)
CALCIUM SERPL-MCNC: 8.9 MG/DL (ref 8.5–10.1)
CHLORIDE SERPL-SCNC: 104 MMOL/L (ref 100–111)
CHLORIDE SERPL-SCNC: 107 MMOL/L (ref 100–111)
CO2 SERPL-SCNC: 20 MMOL/L (ref 21–32)
CO2 SERPL-SCNC: 26 MMOL/L (ref 21–32)
COLOR UR: ABNORMAL
CREAT SERPL-MCNC: 1.1 MG/DL (ref 0.6–1.3)
CREAT SERPL-MCNC: 1.11 MG/DL (ref 0.6–1.3)
D DIMER PPP FEU-MCNC: 1.2 UG/ML(FEU)
DIFFERENTIAL METHOD BLD: ABNORMAL
DIFFERENTIAL METHOD BLD: ABNORMAL
EOSINOPHIL # BLD: 0 K/UL (ref 0–0.4)
EOSINOPHIL # BLD: 0 K/UL (ref 0–0.4)
EOSINOPHIL NFR BLD: 0 % (ref 0–5)
EOSINOPHIL NFR BLD: 0 % (ref 0–5)
EPITH CASTS URNS QL MICRO: ABNORMAL /LPF (ref 0–5)
ERYTHROCYTE [DISTWIDTH] IN BLOOD BY AUTOMATED COUNT: 14.5 % (ref 11.6–14.5)
ERYTHROCYTE [DISTWIDTH] IN BLOOD BY AUTOMATED COUNT: 14.6 % (ref 11.6–14.5)
GLOBULIN SER CALC-MCNC: 3.8 G/DL (ref 2–4)
GLOBULIN SER CALC-MCNC: 4 G/DL (ref 2–4)
GLUCOSE BLD STRIP.AUTO-MCNC: 128 MG/DL (ref 70–110)
GLUCOSE SERPL-MCNC: 109 MG/DL (ref 74–99)
GLUCOSE SERPL-MCNC: 113 MG/DL (ref 74–99)
GLUCOSE UR STRIP.AUTO-MCNC: NEGATIVE MG/DL
HCT VFR BLD AUTO: 41.6 % (ref 35–45)
HCT VFR BLD AUTO: 41.9 % (ref 35–45)
HGB BLD-MCNC: 13.2 G/DL (ref 12–16)
HGB BLD-MCNC: 13.3 G/DL (ref 12–16)
HGB UR QL STRIP: NEGATIVE
IMM GRANULOCYTES # BLD AUTO: 0 K/UL
IMM GRANULOCYTES # BLD AUTO: 0.1 K/UL (ref 0–0.04)
IMM GRANULOCYTES NFR BLD AUTO: 0 %
IMM GRANULOCYTES NFR BLD AUTO: 1 % (ref 0–0.5)
INR PPP: 1 (ref 0.8–1.2)
KETONES UR QL STRIP.AUTO: NEGATIVE MG/DL
LACTATE BLD-SCNC: 1.96 MMOL/L (ref 0.4–2)
LEUKOCYTE ESTERASE UR QL STRIP.AUTO: ABNORMAL
LYMPHOCYTES # BLD: 0.9 K/UL (ref 0.9–3.6)
LYMPHOCYTES # BLD: 1.6 K/UL (ref 0.9–3.6)
LYMPHOCYTES NFR BLD: 5 % (ref 21–52)
LYMPHOCYTES NFR BLD: 9 % (ref 21–52)
MCH RBC QN AUTO: 28.7 PG (ref 24–34)
MCH RBC QN AUTO: 28.8 PG (ref 24–34)
MCHC RBC AUTO-ENTMCNC: 31.5 G/DL (ref 31–37)
MCHC RBC AUTO-ENTMCNC: 32 G/DL (ref 31–37)
MCV RBC AUTO: 89.7 FL (ref 78–100)
MCV RBC AUTO: 91.5 FL (ref 78–100)
MONOCYTES # BLD: 2.6 K/UL (ref 0.05–1.2)
MONOCYTES # BLD: 3.1 K/UL (ref 0.05–1.2)
MONOCYTES NFR BLD: 14 % (ref 3–10)
MONOCYTES NFR BLD: 18 % (ref 3–10)
NEUTS SEG # BLD: 12.6 K/UL (ref 1.8–8)
NEUTS SEG # BLD: 14.9 K/UL (ref 1.8–8)
NEUTS SEG NFR BLD: 73 % (ref 40–73)
NEUTS SEG NFR BLD: 81 % (ref 40–73)
NITRITE UR QL STRIP.AUTO: POSITIVE
NRBC # BLD: 0 K/UL (ref 0–0.01)
NRBC # BLD: 0 K/UL (ref 0–0.01)
NRBC BLD-RTO: 0 PER 100 WBC
NRBC BLD-RTO: 0 PER 100 WBC
PH UR STRIP: 6.5 [PH] (ref 5–8)
PLATELET # BLD AUTO: 301 K/UL (ref 135–420)
PLATELET # BLD AUTO: 339 K/UL (ref 135–420)
PLATELET COMMENTS,PCOM: ABNORMAL
PMV BLD AUTO: 10.1 FL (ref 9.2–11.8)
PMV BLD AUTO: 10.1 FL (ref 9.2–11.8)
POTASSIUM SERPL-SCNC: 4.2 MMOL/L (ref 3.5–5.5)
POTASSIUM SERPL-SCNC: 4.3 MMOL/L (ref 3.5–5.5)
PROCALCITONIN SERPL-MCNC: 1.29 NG/ML
PROT SERPL-MCNC: 7.2 G/DL (ref 6.4–8.2)
PROT SERPL-MCNC: 7.4 G/DL (ref 6.4–8.2)
PROT UR STRIP-MCNC: ABNORMAL MG/DL
PROTHROMBIN TIME: 13.4 SEC (ref 11.5–15.2)
RBC # BLD AUTO: 4.58 M/UL (ref 4.2–5.3)
RBC # BLD AUTO: 4.64 M/UL (ref 4.2–5.3)
RBC #/AREA URNS HPF: ABNORMAL /HPF (ref 0–5)
RBC MORPH BLD: ABNORMAL
SODIUM SERPL-SCNC: 139 MMOL/L (ref 136–145)
SODIUM SERPL-SCNC: 139 MMOL/L (ref 136–145)
SP GR UR REFRACTOMETRY: 1.01 (ref 1–1.03)
UROBILINOGEN UR QL STRIP.AUTO: 1 EU/DL (ref 0.2–1)
WBC # BLD AUTO: 17.3 K/UL (ref 4.6–13.2)
WBC # BLD AUTO: 18.4 K/UL (ref 4.6–13.2)
WBC URNS QL MICRO: ABNORMAL /HPF (ref 0–4)

## 2022-05-05 PROCEDURE — 85610 PROTHROMBIN TIME: CPT

## 2022-05-05 PROCEDURE — 74177 CT ABD & PELVIS W/CONTRAST: CPT

## 2022-05-05 PROCEDURE — 74011000258 HC RX REV CODE- 258: Performed by: STUDENT IN AN ORGANIZED HEALTH CARE EDUCATION/TRAINING PROGRAM

## 2022-05-05 PROCEDURE — 96376 TX/PRO/DX INJ SAME DRUG ADON: CPT

## 2022-05-05 PROCEDURE — 74011000250 HC RX REV CODE- 250: Performed by: INTERNAL MEDICINE

## 2022-05-05 PROCEDURE — 74011000636 HC RX REV CODE- 636: Performed by: STUDENT IN AN ORGANIZED HEALTH CARE EDUCATION/TRAINING PROGRAM

## 2022-05-05 PROCEDURE — 85025 COMPLETE CBC W/AUTO DIFF WBC: CPT

## 2022-05-05 PROCEDURE — 94640 AIRWAY INHALATION TREATMENT: CPT

## 2022-05-05 PROCEDURE — 82962 GLUCOSE BLOOD TEST: CPT

## 2022-05-05 PROCEDURE — 80053 COMPREHEN METABOLIC PANEL: CPT

## 2022-05-05 PROCEDURE — 74011250637 HC RX REV CODE- 250/637: Performed by: INTERNAL MEDICINE

## 2022-05-05 PROCEDURE — G0378 HOSPITAL OBSERVATION PER HR: HCPCS

## 2022-05-05 PROCEDURE — 96365 THER/PROPH/DIAG IV INF INIT: CPT

## 2022-05-05 PROCEDURE — 93005 ELECTROCARDIOGRAM TRACING: CPT

## 2022-05-05 PROCEDURE — 2709999900 HC NON-CHARGEABLE SUPPLY

## 2022-05-05 PROCEDURE — 85379 FIBRIN DEGRADATION QUANT: CPT

## 2022-05-05 PROCEDURE — 96375 TX/PRO/DX INJ NEW DRUG ADDON: CPT

## 2022-05-05 PROCEDURE — 74011250636 HC RX REV CODE- 250/636: Performed by: INTERNAL MEDICINE

## 2022-05-05 PROCEDURE — 77010033711 HC HIGH FLOW OXYGEN

## 2022-05-05 PROCEDURE — 74011250636 HC RX REV CODE- 250/636: Performed by: STUDENT IN AN ORGANIZED HEALTH CARE EDUCATION/TRAINING PROGRAM

## 2022-05-05 PROCEDURE — 99285 EMERGENCY DEPT VISIT HI MDM: CPT

## 2022-05-05 PROCEDURE — 87040 BLOOD CULTURE FOR BACTERIA: CPT

## 2022-05-05 PROCEDURE — 87086 URINE CULTURE/COLONY COUNT: CPT

## 2022-05-05 PROCEDURE — 84145 PROCALCITONIN (PCT): CPT

## 2022-05-05 PROCEDURE — 81001 URINALYSIS AUTO W/SCOPE: CPT

## 2022-05-05 PROCEDURE — 74011250637 HC RX REV CODE- 250/637: Performed by: STUDENT IN AN ORGANIZED HEALTH CARE EDUCATION/TRAINING PROGRAM

## 2022-05-05 PROCEDURE — 83880 ASSAY OF NATRIURETIC PEPTIDE: CPT

## 2022-05-05 PROCEDURE — 85730 THROMBOPLASTIN TIME PARTIAL: CPT

## 2022-05-05 PROCEDURE — 74011250637 HC RX REV CODE- 250/637: Performed by: PHYSICIAN ASSISTANT

## 2022-05-05 PROCEDURE — 94761 N-INVAS EAR/PLS OXIMETRY MLT: CPT

## 2022-05-05 PROCEDURE — 94760 N-INVAS EAR/PLS OXIMETRY 1: CPT

## 2022-05-05 PROCEDURE — 96361 HYDRATE IV INFUSION ADD-ON: CPT

## 2022-05-05 PROCEDURE — 71045 X-RAY EXAM CHEST 1 VIEW: CPT

## 2022-05-05 PROCEDURE — 74011250636 HC RX REV CODE- 250/636: Performed by: PHYSICIAN ASSISTANT

## 2022-05-05 PROCEDURE — 87077 CULTURE AEROBIC IDENTIFY: CPT

## 2022-05-05 PROCEDURE — 83605 ASSAY OF LACTIC ACID: CPT

## 2022-05-05 PROCEDURE — 99223 1ST HOSP IP/OBS HIGH 75: CPT | Performed by: INTERNAL MEDICINE

## 2022-05-05 PROCEDURE — 36415 COLL VENOUS BLD VENIPUNCTURE: CPT

## 2022-05-05 PROCEDURE — 87186 SC STD MICRODIL/AGAR DIL: CPT

## 2022-05-05 PROCEDURE — 96367 TX/PROPH/DG ADDL SEQ IV INF: CPT

## 2022-05-05 RX ORDER — AZITHROMYCIN 250 MG/1
500 TABLET, FILM COATED ORAL DAILY
Status: DISCONTINUED | OUTPATIENT
Start: 2022-05-06 | End: 2022-05-06

## 2022-05-05 RX ORDER — ENOXAPARIN SODIUM 100 MG/ML
40 INJECTION SUBCUTANEOUS DAILY
Status: DISCONTINUED | OUTPATIENT
Start: 2022-05-06 | End: 2022-05-09 | Stop reason: HOSPADM

## 2022-05-05 RX ORDER — NIFEDIPINE 30 MG/1
60 TABLET, EXTENDED RELEASE ORAL DAILY
Status: DISCONTINUED | OUTPATIENT
Start: 2022-05-06 | End: 2022-05-09 | Stop reason: HOSPADM

## 2022-05-05 RX ORDER — ACETAMINOPHEN 325 MG/1
325 TABLET ORAL
Status: DISCONTINUED | OUTPATIENT
Start: 2022-05-05 | End: 2022-05-09 | Stop reason: HOSPADM

## 2022-05-05 RX ORDER — ALPRAZOLAM 0.5 MG/1
0.5 TABLET ORAL
Status: DISCONTINUED | OUTPATIENT
Start: 2022-05-05 | End: 2022-05-09 | Stop reason: HOSPADM

## 2022-05-05 RX ORDER — LEVOFLOXACIN 5 MG/ML
750 INJECTION, SOLUTION INTRAVENOUS EVERY 24 HOURS
Status: DISCONTINUED | OUTPATIENT
Start: 2022-05-05 | End: 2022-05-05

## 2022-05-05 RX ORDER — ACETAMINOPHEN 325 MG/1
650 TABLET ORAL ONCE
Status: COMPLETED | OUTPATIENT
Start: 2022-05-05 | End: 2022-05-05

## 2022-05-05 RX ORDER — ARFORMOTEROL TARTRATE 15 UG/2ML
15 SOLUTION RESPIRATORY (INHALATION)
Status: DISCONTINUED | OUTPATIENT
Start: 2022-05-05 | End: 2022-05-09 | Stop reason: HOSPADM

## 2022-05-05 RX ORDER — ACETAMINOPHEN 500 MG
500 TABLET ORAL
Status: DISCONTINUED | OUTPATIENT
Start: 2022-05-05 | End: 2022-05-05 | Stop reason: SDUPTHER

## 2022-05-05 RX ORDER — ONDANSETRON 8 MG/1
4 TABLET, ORALLY DISINTEGRATING ORAL
Status: DISCONTINUED | OUTPATIENT
Start: 2022-05-05 | End: 2022-05-09 | Stop reason: HOSPADM

## 2022-05-05 RX ORDER — METOPROLOL TARTRATE 25 MG/1
25 TABLET, FILM COATED ORAL 2 TIMES DAILY
Status: DISCONTINUED | OUTPATIENT
Start: 2022-05-05 | End: 2022-05-09 | Stop reason: HOSPADM

## 2022-05-05 RX ORDER — FAMOTIDINE 20 MG/1
20 TABLET, FILM COATED ORAL DAILY
Status: DISCONTINUED | OUTPATIENT
Start: 2022-05-06 | End: 2022-05-09 | Stop reason: HOSPADM

## 2022-05-05 RX ORDER — SODIUM CHLORIDE 0.9 % (FLUSH) 0.9 %
5-40 SYRINGE (ML) INJECTION EVERY 8 HOURS
Status: DISCONTINUED | OUTPATIENT
Start: 2022-05-05 | End: 2022-05-09 | Stop reason: HOSPADM

## 2022-05-05 RX ORDER — ATORVASTATIN CALCIUM 20 MG/1
20 TABLET, FILM COATED ORAL DAILY
Status: DISCONTINUED | OUTPATIENT
Start: 2022-05-06 | End: 2022-05-09 | Stop reason: HOSPADM

## 2022-05-05 RX ORDER — ONDANSETRON 2 MG/ML
4 INJECTION INTRAMUSCULAR; INTRAVENOUS
Status: DISCONTINUED | OUTPATIENT
Start: 2022-05-05 | End: 2022-05-09 | Stop reason: HOSPADM

## 2022-05-05 RX ORDER — ASPIRIN 81 MG/1
81 TABLET ORAL DAILY
Status: DISCONTINUED | OUTPATIENT
Start: 2022-05-06 | End: 2022-05-09 | Stop reason: HOSPADM

## 2022-05-05 RX ORDER — ACETAMINOPHEN 325 MG/1
650 TABLET ORAL
Status: DISCONTINUED | OUTPATIENT
Start: 2022-05-05 | End: 2022-05-09 | Stop reason: HOSPADM

## 2022-05-05 RX ORDER — SODIUM CHLORIDE 0.9 % (FLUSH) 0.9 %
5-10 SYRINGE (ML) INJECTION AS NEEDED
Status: DISCONTINUED | OUTPATIENT
Start: 2022-05-05 | End: 2022-05-05

## 2022-05-05 RX ORDER — BUDESONIDE 0.5 MG/2ML
500 INHALANT ORAL
Status: DISCONTINUED | OUTPATIENT
Start: 2022-05-05 | End: 2022-05-09 | Stop reason: HOSPADM

## 2022-05-05 RX ORDER — FUROSEMIDE 40 MG/1
40 TABLET ORAL DAILY
Status: DISCONTINUED | OUTPATIENT
Start: 2022-05-06 | End: 2022-05-09 | Stop reason: HOSPADM

## 2022-05-05 RX ORDER — ALBUTEROL SULFATE 0.83 MG/ML
2.5 SOLUTION RESPIRATORY (INHALATION)
Status: DISCONTINUED | OUTPATIENT
Start: 2022-05-05 | End: 2022-05-09 | Stop reason: HOSPADM

## 2022-05-05 RX ORDER — SODIUM CHLORIDE 0.9 % (FLUSH) 0.9 %
5-40 SYRINGE (ML) INJECTION AS NEEDED
Status: DISCONTINUED | OUTPATIENT
Start: 2022-05-05 | End: 2022-05-09 | Stop reason: HOSPADM

## 2022-05-05 RX ORDER — MELATONIN
2000 DAILY
Status: DISCONTINUED | OUTPATIENT
Start: 2022-05-06 | End: 2022-05-09 | Stop reason: HOSPADM

## 2022-05-05 RX ORDER — POLYETHYLENE GLYCOL 3350 17 G/17G
17 POWDER, FOR SOLUTION ORAL DAILY PRN
Status: DISCONTINUED | OUTPATIENT
Start: 2022-05-05 | End: 2022-05-09 | Stop reason: HOSPADM

## 2022-05-05 RX ORDER — ACETAMINOPHEN 650 MG/1
650 SUPPOSITORY RECTAL
Status: DISCONTINUED | OUTPATIENT
Start: 2022-05-05 | End: 2022-05-09 | Stop reason: HOSPADM

## 2022-05-05 RX ADMIN — METHYLPREDNISOLONE SODIUM SUCCINATE 60 MG: 40 INJECTION, POWDER, FOR SOLUTION INTRAMUSCULAR; INTRAVENOUS at 22:24

## 2022-05-05 RX ADMIN — ALPRAZOLAM 0.5 MG: 0.5 TABLET ORAL at 22:23

## 2022-05-05 RX ADMIN — IOPAMIDOL 70 ML: 612 INJECTION, SOLUTION INTRAVENOUS at 13:36

## 2022-05-05 RX ADMIN — ARFORMOTEROL TARTRATE 15 MCG: 15 SOLUTION RESPIRATORY (INHALATION) at 23:38

## 2022-05-05 RX ADMIN — ACETAMINOPHEN 325 MG: 325 TABLET ORAL at 18:37

## 2022-05-05 RX ADMIN — ACETAMINOPHEN 650 MG: 325 TABLET ORAL at 12:43

## 2022-05-05 RX ADMIN — SODIUM CHLORIDE 1000 ML: 900 INJECTION, SOLUTION INTRAVENOUS at 13:50

## 2022-05-05 RX ADMIN — BUDESONIDE 500 MCG: 0.5 SUSPENSION RESPIRATORY (INHALATION) at 23:38

## 2022-05-05 RX ADMIN — LEVOFLOXACIN 750 MG: 5 INJECTION, SOLUTION INTRAVENOUS at 14:37

## 2022-05-05 RX ADMIN — CEFEPIME 2 G: 2 INJECTION, POWDER, FOR SOLUTION INTRAVENOUS at 13:57

## 2022-05-05 RX ADMIN — METOPROLOL TARTRATE 25 MG: 25 TABLET, FILM COATED ORAL at 22:23

## 2022-05-05 RX ADMIN — SODIUM CHLORIDE, PRESERVATIVE FREE 10 ML: 5 INJECTION INTRAVENOUS at 22:27

## 2022-05-05 RX ADMIN — METHYLPREDNISOLONE SODIUM SUCCINATE 60 MG: 125 INJECTION, POWDER, FOR SOLUTION INTRAMUSCULAR; INTRAVENOUS at 18:27

## 2022-05-05 NOTE — ED PROVIDER NOTES
Ms. Armen Arredondo is an 81 y/o female with COPD, HF and HTN that presents with fever, chills, flank pain, LQ abdominal pain and urinary frequency since this morning. On Tuesday she was treated for a UTI by her PCM. Today she is feeling worse and also developed diarrhea as well. No nausea or emesis reported. The patient also has COPD and is concerned that she has an infection in her lungs. Nursing nurses regarding the HPI and triage nursing notes were reviewed. Current Facility-Administered Medications   Medication Dose Route Frequency    sodium chloride (NS) flush 5-10 mL  5-10 mL IntraVENous PRN    sodium chloride 0.9 % bolus infusion 566 mL  566 mL IntraVENous ONCE    cefepime (MAXIPIME) 2 g in 0.9% sodium chloride (MBP/ADV) 100 mL MBP  2 g IntraVENous Q24H    levoFLOXacin (LEVAQUIN) 750 mg in D5W IVPB  750 mg IntraVENous Q24H     Current Outpatient Medications   Medication Sig    predniSONE (DELTASONE) 10 mg tablet Take one tab PO tid  for 5 days, then 1 tab PO BID for 5 days, and then 1 tab PO daily for 5 days,    nystatin (MYCOSTATIN) 100,000 unit/mL suspension Take 5 mL by mouth two (2) times a day. swish and spit    furosemide (Lasix) 40 mg tablet Take 40 mg by mouth daily.  albuterol (Proventil HFA) 90 mcg/actuation inhaler Take 2 Puffs by inhalation every four (4) hours as needed for Wheezing, Shortness of Breath or Respiratory Distress.  budesonide-glycopyr-formoterol (Breztri Aerosphere) 160-9-4.8 mcg/actuation HFAA Take  by inhalation.  OXYGEN-AIR DELIVERY SYSTEMS 3 L by IntraNASal route continuous.  famotidine (PEPCID) 20 mg tablet Take 1 Tab by mouth daily.  cholecalciferol (VITAMIN D3) (1000 Units /25 mcg) tablet Take 2 Tabs by mouth daily.  atorvastatin (LIPITOR) 20 mg tablet Take 20 mg by mouth daily.  COQ10, UBIQUINOL, PO Take 1 Cap by mouth daily.  losartan (COZAAR) 50 mg tablet Take  by mouth daily.  75 mg in a.m    diclofenac (VOLTAREN) 1 % gel Apply  to affected area four (4) times daily.  Oxygen     albuterol (PROVENTIL VENTOLIN) 2.5 mg /3 mL (0.083 %) nebulizer solution 3 mL by Nebulization route every four (4) hours as needed for Wheezing or Shortness of Breath.  acetaminophen (TYLENOL EXTRA STRENGTH) 500 mg tablet Take 500 mg by mouth every six (6) hours as needed for Pain.  metoprolol (LOPRESSOR) 25 mg tablet Take 25 mg by mouth two (2) times a day.  NIFEdipine ER (ADALAT CC) 60 mg ER tablet Take 60 mg by mouth daily.  alprazolam (XANAX) 0.5 mg tablet Take 0.5 mg by mouth nightly.  aspirin delayed-release 81 mg tablet Take 81 mg by mouth daily.        Past Medical History:   Diagnosis Date    Chronic lung disease     [facility]     Heart failure ([facility]     Hypertension        Past Surgical History:   Procedure Laterality Date    HX ORTHOPAEDIC      spinal sx     HX OTHER SURGICAL      Carotid artery    VASCULAR SURGERY PROCEDURE UNLIST      L CEA 10/2014       Family History   Problem Relation Age of Onset    Heart Disease Mother     Hypertension Mother     Heart Attack Father     Hypertension Father        Social History     Socioeconomic History    Marital status:      Spouse name: Not on file    Number of children: Not on file    Years of education: Not on file    Highest education level: Not on file   Occupational History    Not on file   Tobacco Use    Smoking status: Former Smoker     Packs/day: 1.50     Years: 30.00     Pack years: 45.00     Types: Cigarettes     Quit date: 1987     Years since quittin.6    Smokeless tobacco: Never Used   Substance and Sexual Activity    Alcohol use: No     Alcohol/week: 0.0 standard drinks    Drug use: No    Sexual activity: Never   Other Topics Concern    Not on file   Social History Narrative    Not on file     Social Determinants of Health     Financial Resource Strain:     Difficulty of Paying Living Expenses: Not on file Food Insecurity:     Worried About Running Out of Food in the Last Year: Not on file    Masood of Food in the Last Year: Not on file   Transportation Needs:     Lack of Transportation (Medical): Not on file    Lack of Transportation (Non-Medical): Not on file   Physical Activity:     Days of Exercise per Week: Not on file    Minutes of Exercise per Session: Not on file   Stress:     Feeling of Stress : Not on file   Social Connections:     Frequency of Communication with Friends and Family: Not on file    Frequency of Social Gatherings with Friends and Family: Not on file    Attends [employer] Services: Not on file    Active Member of [address] [place] [address] [employer] or Organizations: Not on file    Attends Club or Organization Meetings: Not on file    Marital Status: Not on file   Intimate Partner Violence:     Fear of Current or Ex-Partner: Not on file    Emotionally Abused: Not on file    Physically Abused: Not on file    Sexually Abused: Not on file   Housing Stability:     Unable to Pay for Housing in the Last Year: Not on file    Number of [place] in the Last Year: Not on file    Unstable Housing in the Last Year: Not on file       No Known Allergies    Patient's primary care provider (as noted in EPIC):  Jean-Pierre Leblanc NP    Review of Systems   Constitutional: Positive for chills, fatigue and fever. HENT: Negative. Respiratory: Negative. Cardiovascular: Negative. Gastrointestinal: Positive for abdominal pain. Genitourinary: Positive for dysuria and flank pain. Musculoskeletal: Positive for back pain. Skin: Negative. Neurological: Negative. Visit Vitals  BP (!) 135/92   Pulse 89   Temp 99 °F (37.2 °C)   Resp 20   Ht 5' 3\" (1.6 m)   Wt 52.2 kg (115 lb)   SpO2 93%   BMI 20.37 kg/m²       PHYSICAL EXAM:    CONSTITUTIONAL:  Alert, in no apparent distress;  well developed;  well nourished. HEAD:  Normocephalic, atraumatic. EYES:  EOMI. Non-icteric sclera. Normal conjunctiva.   ENTM: Nose:  no rhinorrhea. Throat:  no erythema or exudate, mucous membranes moist.  NECK:  No JVD. Supple    RESPIRATORY:  Chest clear, equal breath sounds, good air movement. CARDIOVASCULAR:  Regular rate and rhythm. No murmurs, rubs, or gallops. GI:  Normal bowel sounds, abdomen with RLQ and LLQ tenderness. No rebound or guarding. No organomegaly. BACK:  Left flank pain  UPPER EXT:  Normal inspection. LOWER EXT:  No edema, no calf tenderness. Distal pulses intact. NEURO:  Moves all four extremities, and grossly normal motor exam.  SKIN:  No rashes;  Normal for age. PSYCH:  Alert and normal affect. DIFFERENTIAL DIAGNOSES/ MEDICAL DECISION MAKING:  Cough etiologies include viral upper respiratory infection, acute bronchitis, influenza/ flu, pneumonia, new onset asthma, congestive heart failure, other etiologies versus a combination of the above (ex. uri on top of pneumonia). Abnormal lab results from this emergency department encounter:  Labs Reviewed   CBC WITH AUTOMATED DIFF - Abnormal; Notable for the following components:       Result Value    WBC 18.4 (*)     NEUTROPHILS 81 (*)     LYMPHOCYTES 5 (*)     MONOCYTES 14 (*)     ABS. NEUTROPHILS 14.9 (*)     ABS.  MONOCYTES 2.6 (*)     All other components within normal limits   METABOLIC PANEL, COMPREHENSIVE - Abnormal; Notable for the following components:    Glucose 109 (*)     BUN 20 (*)     GFR est AA 57 (*)     GFR est non-AA 47 (*)     All other components within normal limits   PTT - Abnormal; Notable for the following components:    aPTT 36.8 (*)     All other components within normal limits   URINALYSIS W/ RFLX MICROSCOPIC - Abnormal; Notable for the following components:    Protein TRACE (*)     Nitrites Positive (*)     Leukocyte Esterase MODERATE (*)     All other components within normal limits   URINE MICROSCOPIC ONLY - Abnormal; Notable for the following components:    Bacteria FEW (*)     All other components within normal limits CULTURE, URINE   CULTURE, BLOOD   CULTURE, BLOOD   PROTHROMBIN TIME + INR   PROCALCITONIN   LACTIC ACID   POC LACTIC ACID       Lab values for this patient within approximately the last 12 hours:  Recent Results (from the past 12 hour(s))   POC LACTIC ACID    Collection Time: 05/05/22 12:36 PM   Result Value Ref Range    Lactic Acid (POC) 1.96 0.40 - 2.00 mmol/L   CBC WITH AUTOMATED DIFF    Collection Time: 05/05/22 12:45 PM   Result Value Ref Range    WBC 18.4 (H) 4.6 - 13.2 K/uL    RBC 4.64 4.20 - 5.30 M/uL    HGB 13.3 12.0 - 16.0 g/dL    HCT 41.6 35.0 - 45.0 %    MCV 89.7 78.0 - 100.0 FL    MCH 28.7 24.0 - 34.0 PG    MCHC 32.0 31.0 - 37.0 g/dL    RDW 14.5 11.6 - 14.5 %    PLATELET 100 884 - 961 K/uL    MPV 10.1 9.2 - 11.8 FL    NRBC 0.0 0  WBC    ABSOLUTE NRBC 0.00 0.00 - 0.01 K/uL    NEUTROPHILS 81 (H) 40 - 73 %    LYMPHOCYTES 5 (L) 21 - 52 %    MONOCYTES 14 (H) 3 - 10 %    EOSINOPHILS 0 0 - 5 %    BASOPHILS 0 0 - 2 %    IMMATURE GRANULOCYTES 0 %    ABS. NEUTROPHILS 14.9 (H) 1.8 - 8.0 K/UL    ABS. LYMPHOCYTES 0.9 0.9 - 3.6 K/UL    ABS. MONOCYTES 2.6 (H) 0.05 - 1.2 K/UL    ABS. EOSINOPHILS 0.0 0.0 - 0.4 K/UL    ABS. BASOPHILS 0.0 0.0 - 0.1 K/UL    ABS. IMM. GRANS. 0.0 K/UL    DF MANUAL      PLATELET COMMENTS ADEQUATE PLATELETS      RBC COMMENTS NORMOCYTIC, NORMOCHROMIC     METABOLIC PANEL, COMPREHENSIVE    Collection Time: 05/05/22 12:45 PM   Result Value Ref Range    Sodium 139 136 - 145 mmol/L    Potassium 4.2 3.5 - 5.5 mmol/L    Chloride 104 100 - 111 mmol/L    CO2 26 21 - 32 mmol/L    Anion gap 9 3.0 - 18 mmol/L    Glucose 109 (H) 74 - 99 mg/dL    BUN 20 (H) 7.0 - 18 MG/DL    Creatinine 1.10 0.6 - 1.3 MG/DL    BUN/Creatinine ratio 18 12 - 20      GFR est AA 57 (L) >60 ml/min/1.73m2    GFR est non-AA 47 (L) >60 ml/min/1.73m2    Calcium 8.9 8.5 - 10.1 MG/DL    Bilirubin, total 0.8 0.2 - 1.0 MG/DL    ALT (SGPT) 37 13 - 56 U/L    AST (SGOT) 30 10 - 38 U/L    Alk.  phosphatase 68 45 - 117 U/L    Protein, total 7.4 6.4 - 8.2 g/dL    Albumin 3.4 3.4 - 5.0 g/dL    Globulin 4.0 2.0 - 4.0 g/dL    A-G Ratio 0.9 0.8 - 1.7     PROTHROMBIN TIME + INR    Collection Time: 05/05/22 12:45 PM   Result Value Ref Range    Prothrombin time 13.4 11.5 - 15.2 sec    INR 1.0 0.8 - 1.2     PTT    Collection Time: 05/05/22 12:45 PM   Result Value Ref Range    aPTT 36.8 (H) 23.0 - 36.4 SEC   URINALYSIS W/ RFLX MICROSCOPIC    Collection Time: 05/05/22  1:20 PM   Result Value Ref Range    Color DARK YELLOW      Appearance CLEAR      Specific gravity 1.008 1.005 - 1.030      pH (UA) 6.5 5.0 - 8.0      Protein TRACE (A) NEG mg/dL    Glucose Negative NEG mg/dL    Ketone Negative NEG mg/dL    Bilirubin Negative NEG      Blood Negative NEG      Urobilinogen 1.0 0.2 - 1.0 EU/dL    Nitrites Positive (A) NEG      Leukocyte Esterase MODERATE (A) NEG     URINE MICROSCOPIC ONLY    Collection Time: 05/05/22  1:20 PM   Result Value Ref Range    WBC 11 to 20 0 - 4 /hpf    RBC 0 to 3 0 - 5 /hpf    Epithelial cells FEW 0 - 5 /lpf    Bacteria FEW (A) NEG /hpf   EKG, 12 LEAD, INITIAL    Collection Time: 05/05/22  1:52 PM   Result Value Ref Range    Ventricular Rate 84 BPM    Atrial Rate 84 BPM    P-R Interval 144 ms    QRS Duration 80 ms    Q-T Interval 378 ms    QTC Calculation (Bezet) 446 ms    Calculated P Axis 48 degrees    Calculated R Axis 50 degrees    Calculated T Axis 51 degrees    Diagnosis       Normal sinus rhythm  Anteroseptal infarct , age undetermined  Abnormal ECG  When compared with ECG of 24-OCT-2021 06:36,  QRS axis shifted right  T wave inversion no longer evident in Lateral leads         Radiologist and cardiologist interpretations if available at time of this note:  CT ABD PELV W CONT    Result Date: 5/5/2022  CT of abdomen and pelvis with contrast INDICATION: flank pain, LQ abdominal pain COMPARISON: 10/27/21.  TECHNIQUE: 5 mm helical scan to the abdomen and pelvis is obtained  from the diaphragm to the symphysis pubis after uneventful nonionic IV contrast administration. All CT scans at this facility performed using dose optimization techniques as appreciated to a performed exam, to include automated exposure control, adjustment of the mA and or KU according to patient size (including appropriate matching for site specific examination), or use of iterative reconstruction technique. FINDINGS: Lung Bases: Moderate interstitial reticular opacities and emphysematous changes appear unchanged. Liver: Normal. Gallbladder: Normal. Biliary System: No ductal dilatation. Spleen: Normal. Pancreas: Normal. Bowel: Small hiatal hernia. The small and large bowel are nondilated. Normal appendix. Numerous diverticula throughout the colon, more pronounced in sigmoid colon. No acute inflammation. Adrenal Glands: Normal. Kidneys: 1.0 x 1.2 cm cortical cyst at the posterior left kidney present. In the lateral inferior aspect of right renal cortex, there is subtle hypoenhancement with ill-defined border noted and roughly measures 2.2 x 3.1 cm. Lower genitourinary system: The bladder is moderately distended. The uterus appears atrophic. No adnexal mass. Peritoneum/Retroperitoneum: No adenopathy. Vasculature: Unremarkable for age. Other:  No free fluid. CT OSSEOUS STRUCTURES:   Unremarkable for age. 1.  Subtle ill-defined area of cortical hypoenhancement at lateral inferior right renal cortex, concerning focal pyelonephritis rather than discrete mass. Recommend clinical correlation and follow-up CT for interval resolution. 2.  Small left renal cyst. 3.  Diverticulosis coli. Thank you for this referral.     XR CHEST PORT    Result Date: 5/5/2022  EXAM: Chest Radiograph INDICATION:  meets SIRS criteria TECHNIQUE: AP view of the chest COMPARISON: 12/7/2021, 10/24/2021, 10/21/2021 FINDINGS: No pneumothorax identified. The lungs are hyperinflated. Chronic interstitial lung changes are noted bilaterally.  However, there appears to be increased interstitial thickening in the mid to lower lung fields. No effusions identified. The cardiac silhouette is enlarged. This is unchanged. The pulmonary vasculature is unremarkable. The osseous structures are unremarkable. 1.  Findings concerning for interstitial pneumonitis. Medication(s) ordered for patient during this emergency visit encounter:  Medications   sodium chloride (NS) flush 5-10 mL (has no administration in time range)   sodium chloride 0.9 % bolus infusion 1,000 mL (1,000 mL IntraVENous New Bag 5/5/22 1350)     Followed by   sodium chloride 0.9 % bolus infusion 566 mL (has no administration in time range)   cefepime (MAXIPIME) 2 g in 0.9% sodium chloride (MBP/ADV) 100 mL MBP (2 g IntraVENous New Bag 5/5/22 1357)   levoFLOXacin (LEVAQUIN) 750 mg in D5W IVPB (750 mg IntraVENous New Bag 5/5/22 1437)   acetaminophen (TYLENOL) tablet 650 mg (650 mg Oral Given 5/5/22 1243)   iopamidoL (ISOVUE 300) 61 % contrast injection 80 mL (70 mL IntraVENous Given 5/5/22 1336)       IMPRESSION AND MEDICAL DECISION MAKING:  Based upon the patient's presentation with noted HPI and PE, along with the work up done in the emergency department, I believe that the patient is having pyelonephritis. Given prolonged time course, will cover with antibiotics. I spoke with Dr. Verónica Coreas about admission for the patient due to her failed outpatient treatment, age and PMHx. DIAGNOSIS:  1. Acute pyelonephritis    Patient is improved, resting quietly and comfortably. The patient will be admitted to the hospital.        All questions regarding care, test results, and follow up were answered. The patient is stable and appropriate to discharge. They understand that they should return to the emergency department for any new or worsening symptoms. I stressed the importance of follow up for repeat assessment and possibly further evaluation/treatment.     Dictation disclaimer:  Please note that this dictation was completed with [employer], the [employer] voice recognition software. Quite often unanticipated grammatical, syntax, homophones, and other interpretive errors are inadvertently transcribed by the computer software. Please disregard these errors. Please excuse any errors that have escaped final proofreading. Coding Diagnoses     Clinical Impression:   1.  Pyelonephritis        Disposition     Disposition:  Admit    Mike Shannon PA-C.

## 2022-05-05 NOTE — ED TRIAGE NOTES
Patient with complaints of body aches, chills pt states \" I think I have and infection in my body somewhere other than my bladder. \" Currently being treated for a UTI. Hx of COPD, on Ox 3L.  Per pt oxygen levels fluctuates between 89-93

## 2022-05-05 NOTE — PROGRESS NOTES
responded to Rapid Response for  [facility] KAREN BRADSHAW, who is a 82 y.o.,female,     The  provided the following Interventions:  Provided crisis spiritual care and support. Offered prayers on behalf for the patient. Chart reviewed. Assessment:  There are no further spiritual or [employer] issues which require intervention at this time. Plan:  Chaplains will continue to follow and will provide spiritual care as needed.  recommends bedside caregivers page  on duty if patient or family shows signs of acute spiritual or emotional distress.        [address] [facility]   [phone]

## 2022-05-05 NOTE — PROGRESS NOTES
Rapid Response Note  [address] [facility]    Patient: Kg Brewer 82 y.o. female  [id number]  1935      Admit Date: 5/5/2022   Admission Diagnosis: Pyelonephritis [N12]    RAPID RESPONSE     Rapid response called for decrease in O2 sats. Patient had just come upstairs from [facility] for pyelonephritis. Patient has a PMH of COPD and a rapid reponse was called     Medications Reviewed      OBJECTIVE     Patient Vitals for the past 12 hrs:   Temp Pulse Resp BP SpO2   05/05/22 1624 98.2 °F (36.8 °C) 84 20 (!) 147/62 --   05/05/22 1450 98.9 °F (37.2 °C) 84 20 (!) 146/48 93 %   05/05/22 1206 99 °F (37.2 °C) 89 20 (!) 135/92 93 %       PHYSICAL:  General:  Alert and responsive   CV:  RRR, no murmurs, rubs, or gallops. No visible pulsations or thrills. RESP:  Unlabored breathing. CTAB. ABD:  Soft, nontender, nondistended. Normoactive bowel sounds. No hepatosplenomegaly. No suprapubic tenderness. Neuro:  5/5 strength bilateral upper extremities and lower extremities. CN II-XII intact. Sensation grossly intact. A+Ox3. EKG: SINUS     Labs:  CBC  CMP  ABG  LACTIC ACID       ASSESSMENT, [facility] [place] is a 82y.o. year old female admitted for Pyelonephritis [N12]. Rapid response called for decrease in O2 sats. Patient had just come upstairs from [facility] for pyelonephritis. Patient has a PMH of COPD and a rapid reponse was called     Medications Administered: NONE    Labs ordered/Pending: CBC CMP ABG LACTIC ACID , EKG      Disposition:  5S    Attending Dr. Benita Sharp notified of rapid response. In agreement with plan. Primary team resuming care.      Senior resident Dr. Jacinda Reveles present during RRT and evaluation    Etta Green MD, PGY-1   [facility]   May 5, 2022, 5:42 PM

## 2022-05-05 NOTE — PROGRESS NOTES
Pt arrived from [facility]. Pt got off stretcher to go to the bathroom. Pt was on 4L O2. Returning from [facility] pts O2 dropped in the 70's and sustaining in 70-80's. Non rebreather placed on pt - MD and Respiratory were paged. RRT called at 1725 - pt was still hypoxic.  RRT team came to pts bedside

## 2022-05-05 NOTE — H&P
Date of Admission: 5/5/2022      Assessment:   Recurrent UTI with possilbe pyelonephritis: Failed outpatient Macrobid. CT scan with possible right-sided pyelonephritis. SIRS  Acute on chronic hypoxic respiratory failure:  RRT called as soon as patient had arrived due to hypoxia; CXR with pneumonitis vs vascular congestion  Severe COPD requiring 4 L oxygen at home-  Admitted recently with RSV  Hypertension   CHF:  CXR with possible congestion    Plan:   Continue to titrated to maintain O2 sats 88 to 92% please avoid over oxygenation and hypercapnia. Start Solu-Medrol 60 mg IV every  Continue duo nebs,Pulmicort, Brovana  F/u blood cx  F/u urine cx. Prn hydralazine for SBP >160  Resume home medications. D/c cefepime and levofloxacin. Change to Cefriaxone + Azithromycin. For both UTI/Pyelo and exacerbation of COPD. Will give Lasix 20mg x 1 dose      Aroldo Cornejo D.O. Internal Medicine and Infectious Diseases      Subjective:    Patient is a 82 y. o.female who is being evaluated for UTI. Ms. Adalgisa Vu is a very pleasant 28-year-old female with a past medical history of hypertension, CHF, COPD on 4 L of home O2 who is presenting with dysuria and frequency. She started develop fevers and chills a few days ago and she had been seen in her PCPs office was diagnosed with a urinary tract infection. She had initially started on Macrobid of which she is taken about 2 doses. She is coming back in to the ER today with complaints of unchanged dysuria and frequency. Additionally she has now developed some right-sided low back pain. She denies any blood in her stools. She has been having tremors and shakes since at least Tuesday. She tells me these are improved with Tylenol as well as Xanax. In the ER she had a T-max of 99.0, pulse was 89 BP was 135/92 and her O2 sats were 93% on 4 L.  UA revealed positive nitrites, moderate leukocyte esterase, 11-20 WBCs and few bacteria.   Blood and urine cultures were sent and are currently pending. She was started on levofloxacin and cefepime. A CT scan of the abdomen and pelvis was performed which shows very subtle hypoenhancement in the right renal cortex concerning for pyelonephritis. Chest x-ray done in the ER shows interstitial pneumonitis not significantly changed when compared to prior imaging. Upon presentation of the floor the patient had gotten up to go to the bathroom and was wearing her oxygen. When she returned back to bed vital signs were obtained by nursing staff and it was noted that she had O2 sats in the low 80s and upper 70s. She was restarted on her oxygen and had a nonrebreather placed. Ultimately a rapid response was called for hypoxia. SBP noted ot be in the 180's with sinus tachycardia on EKG. In regards to her hypoxia, the patient has a hx of severe  COPD/emphysema the patient is followed as an outpatient by pulmonology. She has 4 L of O2 at home and her baseline O2 sats ranged between 88 and 91%. She has a 30-pack-year history of smoking. She reports a chronic dry cough. She tells me she is never quite recovered since her last hospitalization 6 months ago.   She does follow with Dr. April Bo with [facility].        Past Medical History:   Diagnosis Date    Chronic lung disease     Chronic obstructive pulmonary disease ([address]     Heart failure ([address]     Hypertension      Past Surgical History:   Procedure Laterality Date    HX ORTHOPAEDIC      spinal sx 5/6    HX OTHER SURGICAL      Carotid artery    VASCULAR SURGERY PROCEDURE UNLIST      L CEA 10/2014     Family History   Problem Relation Age of Onset    Heart Disease Mother     Hypertension Mother     Heart Attack Father     Hypertension Father      Medications reviewed as below:   Current Facility-Administered Medications   Medication Dose Route Frequency Provider Last Rate Last Admin    sodium chloride (NS) flush 5-10 mL  5-10 mL IntraVENous PRN Mo Meier PA-C        cefepime (MAXIPIME) 2 g in 0.9% sodium chloride (MBP/ADV) 100 mL MBP  2 g IntraVENous Q24H Windy Winston MD   IV Completed at 22 1430    levoFLOXacin (LEVAQUIN) 750 mg in D5W IVPB  750 mg IntraVENous Q24H Mukund Lyles MD   IV Completed at 22 1542    acetaminophen (TYLENOL) tablet 325 mg  325 mg Oral Q4H PRN Nahomi Metzgre MD        methylPREDNISolone (PF) (SOLU-MEDROL) injection 60 mg  60 mg IntraVENous ONCE Nahomi Metzger MD         No Known Allergies  Social History     Socioeconomic History    Marital status:      Spouse name: Not on file    Number of children: Not on file    Years of education: Not on file    Highest education level: Not on file   Occupational History    Not on file   Tobacco Use    Smoking status: Former Smoker     Packs/day: 1.50     Years: 30.00     Pack years: 45.00     Types: Cigarettes     Quit date: 1987     Years since quittin.6    Smokeless tobacco: Never Used   Substance and Sexual Activity    Alcohol use: No     Alcohol/week: 0.0 standard drinks    Drug use: No    Sexual activity: Never   Other Topics Concern    Not on file   Social History Narrative    Not on file     Social Determinants of Health     Financial Resource Strain:     Difficulty of Paying Living Expenses: Not on file   Food Insecurity:     Worried About [address] in the Last Year: Not on file    Masood of Food in the Last Year: Not on file   Transportation Needs:     Lack of Transportation (Medical): Not on file    Lack of Transportation (Non-Medical):  Not on file   Physical Activity:     Days of Exercise per Week: Not on file    Minutes of Exercise per Session: Not on file   Stress:     Feeling of Stress : Not on file   Social Connections:     Frequency of Communication with Friends and Family: Not on file    Frequency of Social Gatherings with Friends and Family: Not on file    Attends Gnosticism Services: Not on file   [employer]s or Organizations: Not on file    Attends Club or Organization Meetings: Not on file    Marital Status: Not on file   Intimate Partner Violence:     Fear of Current or Ex-Partner: Not on file    Emotionally Abused: Not on file    Physically Abused: Not on file    Sexually Abused: Not on file   Housing Stability:     Unable to Pay for Housing in the Last Year: Not on file    Number of [place] in the Last Year: Not on file    Unstable Housing in the Last Year: Not on file        Review of Systems    Negative Unless BOLDED    General: fevers, chills, myalgias, arthralgias, unexplained weight loss, malaise, fatigue. HEENT:  headaches,sinus pain or presure, recent URI, recent dental procedures;  tinnitus, hearing loss , visual changes, catarats, dizziness or blurred vision  PUlMONARY:  cough , shortness of breath, sputum production, hx of asthma or COPD. previous treatement for TB or PPD. Cardiovascular: chest pain, previous CAD/MI, vavlular heart disease,  murmurs  GI:   nausea, vomiting, diarrhea, abdominal pain, prior C.diff  :  urinary frequency, dysuria, hematuria, bladder incontinence.    Neurologic:  seizures, syncope or prior CVA/TIA, confusion, memory impairment, neuropathy  Musculoskeletal:  myalgias arthralgias, joint pain/ swelling,  back pain  Skin:  Purities,  recurrent cellulitis,  chronic stasis ulcer, diabetic foot ulcers  Endocrine: polyuria, polydipsia, hair loss, weight gain  Psych: Denies depression or treatment by a psychiatrist/psycologist  Heme-Onc: prior DVT, easy bruising, fatigue, malignancy        Objective:        Visit Vitals  BP (!) 147/62   Pulse 84   Temp 98.2 °F (36.8 °C)   Resp 20   Ht 5' 3\" (1.6 m)   Wt 52.2 kg (115 lb)   SpO2 93%   BMI 20.37 kg/m²     Temp (24hrs), Av.7 °F (37.1 °C), Min:98.2 °F (36.8 °C), Max:99 °F (37.2 °C)        General:   awake alert and oriented   Skin:   no rashes or skin lesions noted on limited exam   HEENT:  Normocephalic, atraumatic, PERRL, EOMI, no scleral icterus or pallor; no conjunctival hemmohage;  nasal and oral mucous are moist and without evidence of lesions. Neck supple, no bruits. Lymph Nodes:   no cervical, axillary or inguinal adenopathy   Lungs:   mild tachypnea, scattered wheezes   Heart:  RRR, s1 and s2; no murmurs rubs or gallops, no edema, + pedal pulses   Abdomen:  soft, non-distended, active bowel sounds, no hepatomegaly, no splenomegaly. mild flank tenderness. Genitourinary:  deferred   Extremities:   no clubbing, cyanosis; no joint effusions or swelling; Full ROM of all large joints to the upper and lower extremities; muscle mass appropriate for age   Neurologic:  No gross focal sensory abnormalities; 5/5 muscle strength to upper and lower extremities. Speech appropirate. Cranial nerves intact   Psychiatric:   appropriate and interactive. Labs: Results:   Chemistry Recent Labs     05/05/22  1245   *      K 4.2      CO2 26   BUN 20*   CREA 1.10   CA 8.9   AGAP 9   BUCR 18   AP 68   TP 7.4   ALB 3.4   GLOB 4.0   AGRAT 0.9      CBC w/Diff Recent Labs     05/05/22  1245   WBC 18.4*   RBC 4.64   HGB 13.3   HCT 41.6      GRANS 81*   LYMPH 5*   EOS 0            No results found for: SDES Lab Results   Component Value Date/Time    Culture result: NO GROWTH 6 DAYS 10/24/2021 07:35 AM    Culture result: NO GROWTH 6 DAYS 10/24/2021 07:25 AM    Culture result: MRSA NOT PRESENT 12/11/2020 11:30 AM    Culture result:  12/11/2020 11:30 AM         Screening of patient nares for MRSA is for surveillance purposes and, if positive, to facilitate isolation considerations in high risk settings. It is not intended for automatic decolonization interventions per se as regimens are not sufficiently effective to warrant routine use. Culture result: NO GROWTH 6 DAYS 12/10/2020 12:18 PM    Culture result: NO GROWTH 6 DAYS 12/10/2020 12:18 PM          Imaging:      All imaging reviewed from Admission to present as per radiology interpretation in [address] [facility]

## 2022-05-05 NOTE — ROUTINE PROCESS
TRANSFER - OUT REPORT:    Verbal report given to Aissatou Hodges RN(name) on [facility] [place]  being transferred to [facility] room 503(unit) for routine progression of care       Report consisted of patients Situation, Background, Assessment and   Recommendations(SBAR). Information from the following report(s) ED Summary was reviewed with the receiving nurse. Lines:   Peripheral IV 05/05/22 Right Antecubital (Active)        Opportunity for questions and clarification was provided.       Patient transported with:

## 2022-05-06 PROBLEM — A41.9 SEPSIS (HCC): Status: ACTIVE | Noted: 2022-05-06

## 2022-05-06 LAB
ANION GAP SERPL CALC-SCNC: 7 MMOL/L (ref 3–18)
ATRIAL RATE: 84 BPM
ATRIAL RATE: 97 BPM
BUN SERPL-MCNC: 17 MG/DL (ref 7–18)
BUN/CREAT SERPL: 17 (ref 12–20)
CALCIUM SERPL-MCNC: 8.9 MG/DL (ref 8.5–10.1)
CALCULATED P AXIS, ECG09: 48 DEGREES
CALCULATED P AXIS, ECG09: 74 DEGREES
CALCULATED R AXIS, ECG10: 43 DEGREES
CALCULATED R AXIS, ECG10: 50 DEGREES
CALCULATED T AXIS, ECG11: 51 DEGREES
CALCULATED T AXIS, ECG11: 64 DEGREES
CHLORIDE SERPL-SCNC: 107 MMOL/L (ref 100–111)
CO2 SERPL-SCNC: 27 MMOL/L (ref 21–32)
CREAT SERPL-MCNC: 1.03 MG/DL (ref 0.6–1.3)
DIAGNOSIS, 93000: NORMAL
DIAGNOSIS, 93000: NORMAL
ERYTHROCYTE [DISTWIDTH] IN BLOOD BY AUTOMATED COUNT: 14.4 % (ref 11.6–14.5)
GLUCOSE SERPL-MCNC: 190 MG/DL (ref 74–99)
HCT VFR BLD AUTO: 41.2 % (ref 35–45)
HGB BLD-MCNC: 13 G/DL (ref 12–16)
LACTATE SERPL-SCNC: 2.2 MMOL/L (ref 0.4–2)
LACTATE SERPL-SCNC: 2.4 MMOL/L (ref 0.4–2)
LACTATE SERPL-SCNC: 2.7 MMOL/L (ref 0.4–2)
LACTATE SERPL-SCNC: 3.1 MMOL/L (ref 0.4–2)
LACTATE SERPL-SCNC: 3.3 MMOL/L (ref 0.4–2)
MAGNESIUM SERPL-MCNC: 2.5 MG/DL (ref 1.6–2.6)
MCH RBC QN AUTO: 28.3 PG (ref 24–34)
MCHC RBC AUTO-ENTMCNC: 31.6 G/DL (ref 31–37)
MCV RBC AUTO: 89.8 FL (ref 78–100)
NRBC # BLD: 0 K/UL (ref 0–0.01)
NRBC BLD-RTO: 0 PER 100 WBC
P-R INTERVAL, ECG05: 134 MS
P-R INTERVAL, ECG05: 144 MS
PLATELET # BLD AUTO: 255 K/UL (ref 135–420)
PMV BLD AUTO: 10.4 FL (ref 9.2–11.8)
POTASSIUM SERPL-SCNC: 4.6 MMOL/L (ref 3.5–5.5)
Q-T INTERVAL, ECG07: 340 MS
Q-T INTERVAL, ECG07: 378 MS
QRS DURATION, ECG06: 80 MS
QRS DURATION, ECG06: 80 MS
QTC CALCULATION (BEZET), ECG08: 431 MS
QTC CALCULATION (BEZET), ECG08: 446 MS
RBC # BLD AUTO: 4.59 M/UL (ref 4.2–5.3)
SODIUM SERPL-SCNC: 141 MMOL/L (ref 136–145)
VENTRICULAR RATE, ECG03: 84 BPM
VENTRICULAR RATE, ECG03: 97 BPM
WBC # BLD AUTO: 9.6 K/UL (ref 4.6–13.2)

## 2022-05-06 PROCEDURE — 36415 COLL VENOUS BLD VENIPUNCTURE: CPT

## 2022-05-06 PROCEDURE — G0378 HOSPITAL OBSERVATION PER HR: HCPCS

## 2022-05-06 PROCEDURE — 74011000250 HC RX REV CODE- 250: Performed by: INTERNAL MEDICINE

## 2022-05-06 PROCEDURE — 77030038269 HC DRN EXT URIN PURWCK BARD -A

## 2022-05-06 PROCEDURE — 94762 N-INVAS EAR/PLS OXIMTRY CONT: CPT

## 2022-05-06 PROCEDURE — 96372 THER/PROPH/DIAG INJ SC/IM: CPT

## 2022-05-06 PROCEDURE — 83735 ASSAY OF MAGNESIUM: CPT

## 2022-05-06 PROCEDURE — 96375 TX/PRO/DX INJ NEW DRUG ADDON: CPT

## 2022-05-06 PROCEDURE — 83605 ASSAY OF LACTIC ACID: CPT

## 2022-05-06 PROCEDURE — 99232 SBSQ HOSP IP/OBS MODERATE 35: CPT | Performed by: INTERNAL MEDICINE

## 2022-05-06 PROCEDURE — 74011250637 HC RX REV CODE- 250/637: Performed by: INTERNAL MEDICINE

## 2022-05-06 PROCEDURE — 77010033711 HC HIGH FLOW OXYGEN

## 2022-05-06 PROCEDURE — 74011250636 HC RX REV CODE- 250/636: Performed by: INTERNAL MEDICINE

## 2022-05-06 PROCEDURE — 94640 AIRWAY INHALATION TREATMENT: CPT

## 2022-05-06 PROCEDURE — 2709999900 HC NON-CHARGEABLE SUPPLY

## 2022-05-06 PROCEDURE — 85027 COMPLETE CBC AUTOMATED: CPT

## 2022-05-06 PROCEDURE — 80048 BASIC METABOLIC PNL TOTAL CA: CPT

## 2022-05-06 PROCEDURE — 65660000004 HC RM CVT STEPDOWN

## 2022-05-06 RX ORDER — LEVOFLOXACIN 5 MG/ML
750 INJECTION, SOLUTION INTRAVENOUS
Status: DISCONTINUED | OUTPATIENT
Start: 2022-05-07 | End: 2022-05-09 | Stop reason: HOSPADM

## 2022-05-06 RX ORDER — LEVOFLOXACIN 5 MG/ML
500 INJECTION, SOLUTION INTRAVENOUS EVERY 24 HOURS
Status: DISCONTINUED | OUTPATIENT
Start: 2022-05-07 | End: 2022-05-06

## 2022-05-06 RX ADMIN — ARFORMOTEROL TARTRATE 15 MCG: 15 SOLUTION RESPIRATORY (INHALATION) at 20:49

## 2022-05-06 RX ADMIN — METHYLPREDNISOLONE SODIUM SUCCINATE 60 MG: 40 INJECTION, POWDER, FOR SOLUTION INTRAMUSCULAR; INTRAVENOUS at 05:42

## 2022-05-06 RX ADMIN — ASPIRIN 81 MG: 81 TABLET, COATED ORAL at 08:46

## 2022-05-06 RX ADMIN — SODIUM CHLORIDE, PRESERVATIVE FREE 10 ML: 5 INJECTION INTRAVENOUS at 05:40

## 2022-05-06 RX ADMIN — AZITHROMYCIN DIHYDRATE 500 MG: 250 TABLET, FILM COATED ORAL at 08:45

## 2022-05-06 RX ADMIN — ATORVASTATIN CALCIUM 20 MG: 20 TABLET, FILM COATED ORAL at 08:46

## 2022-05-06 RX ADMIN — ENOXAPARIN SODIUM 40 MG: 100 INJECTION SUBCUTANEOUS at 08:52

## 2022-05-06 RX ADMIN — WATER 1 G: 1 INJECTION INTRAMUSCULAR; INTRAVENOUS; SUBCUTANEOUS at 00:20

## 2022-05-06 RX ADMIN — LOSARTAN POTASSIUM 75 MG: 50 TABLET, FILM COATED ORAL at 08:45

## 2022-05-06 RX ADMIN — METOPROLOL TARTRATE 25 MG: 25 TABLET, FILM COATED ORAL at 17:51

## 2022-05-06 RX ADMIN — SODIUM CHLORIDE, PRESERVATIVE FREE 10 ML: 5 INJECTION INTRAVENOUS at 13:44

## 2022-05-06 RX ADMIN — SODIUM CHLORIDE, PRESERVATIVE FREE 10 ML: 5 INJECTION INTRAVENOUS at 21:37

## 2022-05-06 RX ADMIN — CEFTRIAXONE SODIUM 2 G: 2 INJECTION, POWDER, FOR SOLUTION INTRAMUSCULAR; INTRAVENOUS at 20:14

## 2022-05-06 RX ADMIN — ALPRAZOLAM 0.5 MG: 0.5 TABLET ORAL at 21:11

## 2022-05-06 RX ADMIN — NIFEDIPINE 60 MG: 30 TABLET, FILM COATED, EXTENDED RELEASE ORAL at 08:46

## 2022-05-06 RX ADMIN — METOPROLOL TARTRATE 25 MG: 25 TABLET, FILM COATED ORAL at 08:46

## 2022-05-06 RX ADMIN — ACETAMINOPHEN 650 MG: 325 TABLET ORAL at 17:10

## 2022-05-06 RX ADMIN — FAMOTIDINE 20 MG: 20 TABLET ORAL at 08:45

## 2022-05-06 RX ADMIN — CHOLECALCIFEROL TAB 25 MCG (1000 UNIT) 2000 UNITS: 25 TAB at 08:45

## 2022-05-06 RX ADMIN — METHYLPREDNISOLONE SODIUM SUCCINATE 60 MG: 40 INJECTION, POWDER, FOR SOLUTION INTRAMUSCULAR; INTRAVENOUS at 21:11

## 2022-05-06 RX ADMIN — BUDESONIDE 500 MCG: 0.5 SUSPENSION RESPIRATORY (INHALATION) at 20:49

## 2022-05-06 RX ADMIN — FUROSEMIDE 40 MG: 40 TABLET ORAL at 08:45

## 2022-05-06 RX ADMIN — BUDESONIDE 500 MCG: 0.5 SUSPENSION RESPIRATORY (INHALATION) at 09:24

## 2022-05-06 RX ADMIN — ARFORMOTEROL TARTRATE 15 MCG: 15 SOLUTION RESPIRATORY (INHALATION) at 09:23

## 2022-05-06 RX ADMIN — METHYLPREDNISOLONE SODIUM SUCCINATE 60 MG: 40 INJECTION, POWDER, FOR SOLUTION INTRAMUSCULAR; INTRAVENOUS at 13:39

## 2022-05-06 NOTE — PROGRESS NOTES
INTERIM UPDATE - 2159 EST on 5/05/2022    Nursing Staff reports that Patient is requiring 8 L/min O2 via Salter, which Respiratory placed on Patient after examining her. Plan:  Placed order to allow High Flow O2 via Salter at 8 L/min O2. Ordered Pulse Oximetry and Transferred Patient to Telemetry Floor (as Patient could not have Pulse Oximetry on Medical Floor). [FreeTextEntry1] : Mr. Vega is a 61 years old male with a history of CAD s/p sten #3 11/2018, TIA, COPD, GERD, NAN, obesity, Abnormal CT - still snoring however less shortness of breath. \par \par SOB is multifactorial due to:\par -obesity\par -COPD\par -CAD\par -poor breathing mechanics\par \par problem 1: COPD\par -continue Anoro at 1 inhalation QD\par \par -COPD is a progressive disease and although it can’t be cured , appropriate management can slow its progression, reduce frequency and severity of exacerbations, and improve symptoms and the patient quality of life. Hospitalizations are the greatest contributor to the total COPD costs and account for up to 87% of total COPD related costs. Exacerbations are the main cause of admissions and subsequently account for the 40-75% of COPD costs. Inhaled maintenance therapy reduces the incidence of exacerbations in patients with stable COPD. Incorrect inhaler use and nonadherence are major obstacles to achieving COPD treatment goals. Many COPD patients have challenges (impaired inhalation, limited dexterity, reduced cognition: that limit their ability to correctly use their COPD treatment devices resulting in reduced symptom control. Of most importance is smoking cessation and early intervention with respiratory illnesses and contemplation for pulmonary rehab to enhance quality of life.\par -Inhaler technique reviewed as well as oral hygiene techniques reviewed with patient. Avoidance of cold air, extremes of temperature, rescue inhaler should be used before exercise. Order of medication reviewed with patient. Recommended use of a cool mist humidifier in the bedroom.\par \par problem 2: CAD s/p stent #3 11/2018\par -continue to follow up with Dr. Conley regularly (recommended)\par \par problem 3: obesity\par -Weight loss, exercise, and diet control were discussed and are highly encouraged. Treatment options were given such as, aqua therapy, and contacting a nutritionist. Recommended to use the elliptical, stationary bike, less use of treadmill.  Obesity is associated with worsening asthma, shortness of breath, and potential for cardiac disease, diabetes, and other underlying medical conditions.\par \par problem 4: poor breathing mechanics\par -Proper breathing techniques were reviewed with an emphasis of exhalation. Patient instructed to breath in for 1 second and out for four seconds. Patient was encouraged to not talk while walking.\par \par problem 5: OSAS\par -continue to use the CPAP machine, tolerating it well\par -continue to use Provigil 200 mg QAM\par \par -Sleep apnea is associated with adverse clinical consequences which an affect most organ systems.  Cardiovascular disease risk includes arrhythmias, atrial fibrillation, hypertension, coronary artery disease, and stroke. Metabolic disorders include diabetes type 2, non-alcoholic fatty liver disease. Mood disorder especially depression; and cognitive decline especially in the elderly. Associations with  chronic reflux/Borrero’s esophagus some but not all inclusive. \par -Reasons to assess this include arousal consistent with hypopnea; respiratory events most prominent in REM sleep or supine position; therefore sleep staging and body position are important for accurate diagnosis and estimation of AHI.\par \par problem 6: nicotine addiction \par -recommended to use Nicotine Control center / Wellbutrin \par -Discussed for five minutes with the patient the risks/associations with continued smoking including COPD, emphysema, shortness of breath, renal cancer, bladder cancer, stroke risk, cardiac disease, etc. Smoking cessation was discussed at length and highly encouraged. Various options to aid cessation was discussed including use of Chantix, Nicotrol, nicotine products, laser therapy, hypnosis, Wellbutrin, etc.\par \par problem 7: lung cancer screening (noncompliance) - continue yearly\par -follow up chest CT in 7/19\par \par problem 8: health maintenance \par -recommended yearly flu shot 2018. \par -recommended strep pneumonia vaccines: Prevnar-13 vaccine, followed by Pneumo vaccine 23 one year following\par -recommended early intervention for URIs\par -recommended regular osteoporosis evaluations\par -recommended early dermatological evaluations\par -recommended after the age of 50 to the age of 70, colonoscopy every 5 years \par \par F/U in 6 months with full PFTs\par He is encouraged to call with any changes, concerns, or questions.

## 2022-05-06 NOTE — PROGRESS NOTES
Called at the bedside due to desaturation low 80's on 6L nasal cannula. Pt placed  on 15L high flow salter, Spo2 improved to 90%. 2100- O2 weaned from 15L HF salter to 8L, Spo2 89-90%. No respiratory issue at this time. MD is aware. RT will wean O2 as tolerated.

## 2022-05-06 NOTE — ROUTINE PROCESS
6696 Received report from [facility]. Patient alert and oriented. 0900 Patient assisted to the [facility]. Still with SOB on exertion. 4989 Received a call from Lab regarding positive blood culture. Dr Chi Lopez aware via [employer]. 1400 Patient assisted to use the [facility] and back in the bed. 1710 Pain medicine was given for back pain. 1800 Patient resting comfortably in the bed. Call bell placed within reach. Conversing on her phone. 1900 Bedside and Verbal shift change report given to [facility] (oncoming nurse). Report included the following information SBAR, Kardex, Intake/Output, MAR, Recent Results and Cardiac Rhythm NSR.

## 2022-05-06 NOTE — PROGRESS NOTES
Reason for Admission:  Pyelonephritis [N12]  Sepsis ([facility] [A41.9]                 RUR Score:    N/A           Plan for utilizing home health: Yes                       Likelihood of Readmission:   LOW                         Transition of Care Plan:              Initial assessment completed with patient. Cognitive status of patient: oriented to time, place, person and situation. Face sheet information confirmed:  yes. The patient designates her son Miki Wagner and sister Yue Christensen to participate in her discharge plan and to receive any needed information. This patient lives in a  Home alone but her son and sister checks up on her everyday  Patient is able to navigate 3 steps as needed to get in her house. Prior to hospitalization, patient was considered to be independent with ADLs/IADLS : no . If not independent,  patient needs assist with : food preparation and cooking. Pt stated her granddaughter cooks for her. She stated she pays someone to clean her house    Patient has a current ACP document on file: yes      Healthcare Decision Maker:     Click here to complete [address] including selection of the Healthcare Decision Maker Relationship (ie \"Primary\")    The sister or son will be available to transport patient home upon discharge. They will bring her portable oxygen to pick her up  The patient already has Cane and oxygen 3.5L to 4L  [employer] medical equipment available in the home. Patient is not currently active with home health. Patient has not stayed in a skilled nursing facility or rehab. Was  stay within last 60 days : no. This patient is on dialysis :no    List of available Home Health agencies were provided and reviewed with the patient prior to discharge. Freedom of choice signed: yes, for [facility] if needed. Currently, the discharge plan is Home with [address] [place] [address] [facility] Kincaid.     The patient states that she can obtain her medications from the pharmacy, and take her medications as directed. Patient's current insurance is Medicare,        Care Management Interventions  PCP Verified by CM: Yes  Palliative Care Criteria Met (RRAT>21 & CHF Dx)?: No  Mode of Transport at Discharge:  Other (see comment) (family)  Transition of Care Consult (CM Consult): Discharge Planning  Support Systems: Child(erick),Other Family Member(s)  Confirm Follow Up Transport: Self  The Patient and/or Patient Representative was Provided with a Choice of Provider and Agrees with the Discharge Plan?: Yes  Freedom of Choice List was Provided with Basic Dialogue that Supports the Patient's Individualized Plan of Care/Goals, Treatment Preferences and Shares the Quality Data Associated with the Providers?: Yes  Discharge Location  Patient Expects to be Discharged to[de-identified] Home with home health        ELÍAS Rao RN  Care Management  Pager: [phone]

## 2022-05-06 NOTE — PROGRESS NOTES
Pt's O2 sats dropped to 76-80% on 6L NC. Respiratory called. Pt placed on 15L hi flow via salter. Pt was reassessed approx 30 min to 1 hr later and and respiratory dropped pt from 15L/min to 8L/min. This nurse paged MD on call and got order to transfer pt for continuous O2 monitoring. RN supervisor and respiratory notified.

## 2022-05-06 NOTE — CONSULTS
Infectious Disease Consultation Note        Reason: UTI, hypoxia    Current abx Prior abx   Ceftriaxone since 5/6  Azithromycin since 5/6 Cefepime, levofloxacin on 5/5     Lines:       Assessment :    29-year-old female with a past medical history of hypertension, CHF, COPD on 4 L of home O2 presented to  [facility] ED on 5/5/2022 with fever, chills, dysuria.     Hospitalization 12/2020 for acute on chronic hypoxic respiratory failure secondary to confirmed COVID-19 pneumonia   Status post remdesivir since 12/11/2020-12/15  Status post convalescent plasma 12/11/2020    Recent outpatient diagnosis of cystitis, status post p.o. Macrobid 5/3-5/6     Clinical presentation consistent with partially treated sepsis- POA due to right kidney pyelonephritis, cystitis    Single positive blood culture for gram-positive belgiac on 5/5-could be contaminant. Will need to follow-up identification of gram-positive belgica and repeat blood cultures to confirm this. Patient is currently improving on treatment without vancomycin. Hence will hold off on vancomycin    Acute on chronic hypoxic respiratory failure-likely secondary to sepsis. No clinical evidence to suggest pneumonia at this time. Patient's baseline oxygen saturation is around 88 to 92% at home on 4 L     Recommendations:     1. Continue ceftriaxone. Increase dose to 2 g every 24 hours. Discontinue azithromycin. Add levofloxacin to cover for ceftriaxone resistant gram-negative still further information obtained   2. Continue steroids per primary team  3. Wean oxygen as tolerated  4. Follow-up ID of gram-positive cocci in blood cultures, repeat blood culture in a.m.  5.  Follow-up results of urine culture. If results of urine culture 5/5 remain negative, will contact primary care's office to see if any outpatient urine cultures done. Currently no pending urine cultures seen on [facility]  6.   Monitor CBC, temperature, clinically     Thank you for consultation request. Above plan was discussed in details with patient,  and dr Ruth Keith. Please call me if any further questions or concerns. Will continue to participate in the care of this patient. HPI:    82-year-old female with a past medical history of hypertension, CHF, COPD on 4 L of home O2 presented to [facility] ED on 5/5/2022 with fever, chills, dysuria. Patient is known to me from prior inpatient consultation at [facility]. She states that she has not been feeling good for couple weeks. She is feeling generally weak. Few days ago she started having burning while urinating. She went to see her PCP on Tuesday that is 3 days ago and was prescribed Macrobid. She took Macrobid on Tuesday evening, 2 doses on Wednesday. She felt a little bit better on Wednesday. However yesterday she started having fever, chills. She also noted some right-sided back pain. She is coming back in to the ER for further evaluation. In the ER she had a T-max of 99.0, pulse was 89 BP was 135/92 and her O2 sats were 93% on 4 L.  UA revealed positive nitrites, moderate leukocyte esterase, 11-20 WBCs and few bacteria. Blood and urine cultures were sent. She was started on levofloxacin and cefepime. A CT scan of the abdomen and pelvis was performed which shows very subtle hypoenhancement in the right renal cortex concerning for pyelonephritis. Chest x-ray done in the ER shows interstitial pneumonitis not significantly changed when compared to prior imaging. Her antibiotics switched to ceftriaxone, azithromycin by admitting hospitalist.  Upon presentation of the floor the patient had gotten up to go to the bathroom and was wearing her oxygen. When she returned back to bed vital signs were obtained by nursing staff and it was noted that she had O2 sats in the low 80s and upper 70s. She was restarted on her oxygen and had a nonrebreather placed. Ultimately a rapid response was called for hypoxia. SBP noted ot be in the 180's with sinus tachycardia on EKG.  blood culture done on admission now positive for gram-positive cocci. I been consulted for further recommendations. Patient states that she has been on 4 L of oxygen since 2016. She tends to have oxygen level in the 80s at home with exertion. However she states that she feels fine and does not bump up her oxygen. Currently she denies any worsening shortness of breath, cough. She states that her breathing is fine and she does not think she has pneumonia. She follows with Dr. Ailin Cervantes with [facility]            Past Medical History:   Diagnosis Date    Chronic lung disease     [facility] obstructive pulmonary disease [facility]     Heart failure ([facility]     Hypertension        Past Surgical History:   Procedure Laterality Date    HX ORTHOPAEDIC      spinal sx 5/6    HX OTHER SURGICAL      Carotid artery    VASCULAR SURGERY PROCEDURE UNLIST      L CEA 10/2014       home Medication List    Details   furosemide (Lasix) 40 mg tablet Take 40 mg by mouth daily. albuterol (Proventil HFA) 90 mcg/actuation inhaler Take 2 Puffs by inhalation every four (4) hours as needed for Wheezing, Shortness of Breath or Respiratory Distress. Qty: 1 Each, Refills: 0      budesonide-glycopyr-formoterol (Breztri Aerosphere) 160-9-4.8 mcg/actuation HFAA Take  by inhalation. OXYGEN-AIR DELIVERY SYSTEMS 3 L by IntraNASal route continuous. famotidine (PEPCID) 20 mg tablet Take 1 Tab by mouth daily. Qty: 30 Tab, Refills: 0      cholecalciferol (VITAMIN D3) (1000 Units /25 mcg) tablet Take 2 Tabs by mouth daily. Qty: 60 Tab, Refills: 0      atorvastatin (LIPITOR) 20 mg tablet Take 20 mg by mouth daily. losartan (COZAAR) 50 mg tablet Take  by mouth daily. 75 mg in a.m      diclofenac (VOLTAREN) 1 % gel Apply  to affected area four (4) times daily.   Qty: 100 g, Refills: 2      Oxygen       albuterol (PROVENTIL VENTOLIN) 2.5 mg /3 mL (0.083 %) nebulizer solution 3 mL by Nebulization route every four (4) hours as needed for Wheezing or Shortness of Breath. Qty: 48 Each, Refills: 0      acetaminophen (TYLENOL EXTRA STRENGTH) 500 mg tablet Take 500 mg by mouth every six (6) hours as needed for Pain.      metoprolol (LOPRESSOR) 25 mg tablet Take 25 mg by mouth two (2) times a day. NIFEdipine ER (ADALAT CC) 60 mg ER tablet Take 60 mg by mouth daily. Associated Diagnoses: Occlusion and stenosis of carotid artery, left; Pre-op testing      alprazolam (XANAX) 0.5 mg tablet Take 0.5 mg by mouth nightly. Associated Diagnoses: Occlusion and stenosis of carotid artery, left; Pre-op testing      aspirin delayed-release 81 mg tablet Take 81 mg by mouth daily. Associated Diagnoses: Occlusion and stenosis of carotid artery, left; Pre-op testing      predniSONE (DELTASONE) 10 mg tablet Take one tab PO tid  for 5 days, then 1 tab PO BID for 5 days, and then 1 tab PO daily for 5 days,  Qty: 30 Tablet, Refills: 0      nystatin (MYCOSTATIN) 100,000 unit/mL suspension Take 5 mL by mouth two (2) times a day. swish and spit  Qty: 60 mL, Refills: 2      COQ10, UBIQUINOL, PO Take 1 Cap by mouth daily.              Current Facility-Administered Medications   Medication Dose Route Frequency    acetaminophen (TYLENOL) tablet 325 mg  325 mg Oral Q4H PRN    albuterol (PROVENTIL VENTOLIN) nebulizer solution 2.5 mg  2.5 mg Nebulization Q4H PRN    ALPRAZolam (XANAX) tablet 0.5 mg  0.5 mg Oral QHS    aspirin delayed-release tablet 81 mg  81 mg Oral DAILY    atorvastatin (LIPITOR) tablet 20 mg  20 mg Oral DAILY    budesonide (PULMICORT) 500 mcg/2 ml nebulizer suspension  500 mcg Nebulization BID RT    arformoteroL (BROVANA) neb solution 15 mcg  15 mcg Nebulization BID RT    cholecalciferol (VITAMIN D3) (1000 Units /25 mcg) tablet 2,000 Units  2,000 Units Oral DAILY    famotidine (PEPCID) tablet 20 mg  20 mg Oral DAILY    furosemide (LASIX) tablet 40 mg  40 mg Oral DAILY    losartan (COZAAR) tablet 75 mg  75 mg Oral DAILY    metoprolol tartrate (LOPRESSOR) tablet 25 mg  25 mg Oral BID    NIFEdipine ER (PROCARDIA XL) tablet 60 mg  60 mg Oral DAILY    sodium chloride (NS) flush 5-40 mL  5-40 mL IntraVENous Q8H    sodium chloride (NS) flush 5-40 mL  5-40 mL IntraVENous PRN    acetaminophen (TYLENOL) tablet 650 mg  650 mg Oral Q6H PRN    Or    acetaminophen (TYLENOL) suppository 650 mg  650 mg Rectal Q6H PRN    polyethylene glycol (MIRALAX) packet 17 g  17 g Oral DAILY PRN    ondansetron (ZOFRAN ODT) tablet 4 mg  4 mg Oral Q8H PRN    Or    ondansetron (ZOFRAN) injection 4 mg  4 mg IntraVENous Q6H PRN    enoxaparin (LOVENOX) injection 40 mg  40 mg SubCUTAneous DAILY    methylPREDNISolone (PF) (SOLU-MEDROL) injection 60 mg  60 mg IntraVENous Q8H    cefTRIAXone (ROCEPHIN) 1 g in sterile water (preservative free) 10 mL IV syringe  1 g IntraVENous Q24H    azithromycin (ZITHROMAX) tablet 500 mg  500 mg Oral DAILY       Allergies: Patient has no known allergies.     Family History   Problem Relation Age of Onset    Heart Disease Mother     Hypertension Mother     Heart Attack Father     Hypertension Father      Social History     Socioeconomic History    Marital status:      Spouse name: Not on file    Number of children: Not on file    Years of education: Not on file    Highest education level: Not on file   Occupational History    Not on file   Tobacco Use    Smoking status: Former Smoker     Packs/day: 1.50     Years: 30.00     Pack years: 45.00     Types: Cigarettes     Quit date: 1987     Years since quittin.6    Smokeless tobacco: Never Used   Substance and Sexual Activity    Alcohol use: No     Alcohol/week: 0.0 standard drinks    Drug use: No    Sexual activity: Never   Other Topics Concern    Not on file   Social History Narrative    Not on file     Social Determinants of Health     Financial Resource Strain:     Difficulty of Paying Living Expenses: Not on file   Food Insecurity:     Worried About Running Out of Food in the Last Year: Not on file    Ran Out of Food in the Last Year: Not on file   Transportation Needs:     Lack of Transportation (Medical): Not on file    Lack of Transportation (Non-Medical): Not on file   Physical Activity:     Days of Exercise per Week: Not on file    Minutes of Exercise per Session: Not on file   Stress:     Feeling of Stress : Not on file   Social Connections:     Frequency of Communication with Friends and Family: Not on file    Frequency of Social Gatherings with Friends and Family: Not on file    Attends Buddhism Services: Not on file    Active Member of [address] [place] [address] [employer] or Organizations: Not on file    Attends Club or Organization Meetings: Not on file    Marital Status: Not on file   Intimate Partner Violence:     Fear of Current or Ex-Partner: Not on file    Emotionally Abused: Not on file    Physically Abused: Not on file    Sexually Abused: Not on file   Housing Stability:     Unable to Pay for Housing in the Last Year: Not on file    Number of [place] in the Last Year: Not on file    Unstable Housing in the Last Year: Not on file     Social History     Tobacco Use   Smoking Status Former Smoker    Packs/day: 1.50    Years: 30.00    Pack years: 45.00    Types: Cigarettes    Quit date: 1987    Years since quittin.6   Smokeless Tobacco Never Used        Temp (24hrs), Av.6 °F (37 °C), Min:97.7 °F (36.5 °C), Max:100 °F (37.8 °C)    Visit Vitals  BP (!) 136/59 (BP 1 Location: Left upper arm, BP Patient Position: At rest)   Pulse 71   Temp 97.7 °F (36.5 °C)   Resp 17   Ht 5' 3\" (1.6 m)   Wt 53.2 kg (117 lb 4.8 oz)   SpO2 96%   BMI 20.78 kg/m²       ROS: 12 point ROS obtained in details. Pertinent positives as mentioned in HPI,   otherwise negative    Physical Exam:    General: Well developed, well nourished female sitting on the  bed AAOx3 in no acute distress.     General:   awake alert and oriented   HEENT:  Normocephalic, atraumatic, EOMI, no scleral icterus or pallor; no conjunctival hemmohage;  nasal and oral mucous are moist and without evidence of lesions. . Neck supple, no bruits. Lymph Nodes:   no cervical, axillary or inguinal adenopathy   Lungs:   non-labored, decreased breath sounds bilaterally, no rales or wheezing appreciated   Heart:  RRR, s1 and s2; no edema   Abdomen:  soft, non-distended, active bowel sounds, no hepatomegaly, no splenomegaly. Non-tender   Genitourinary:  deferred   Extremities:   no clubbing, cyanosis; no joint effusions or swelling; Full ROM of all large joints to the upper and lower extremities; muscle mass appropriate for age   Neurologic:  No gross focal sensory abnormalities; 5/5 muscle strength to upper and lower extremities. Speech appropriate. Cranial nerves intact                        Skin:  No rash or ulcers noted   Back:  no spinal or paraspinal muscle tenderness or rigidity, no CVA tenderness     Psychiatric:  No suicidal or homicidal ideations, appropriate mood and affect         Labs: Results:   Chemistry Recent Labs     05/06/22 0451 05/05/22 1742 05/05/22  1245   * 113* 109*    139 139   K 4.6 4.3 4.2    107 104   CO2 27 20* 26   BUN 17 17 20*   CREA 1.03 1.11 1.10   CA 8.9 8.8 8.9   AGAP 7 12 9   BUCR 17 15 18   AP  --  66 68   TP  --  7.2 7.4   ALB  --  3.4 3.4   GLOB  --  3.8 4.0   AGRAT  --  0.9 0.9      CBC w/Diff Recent Labs     05/06/22 0451 05/05/22 1742 05/05/22  1245   WBC 9.6 17.3* 18.4*   RBC 4.59 4.58 4.64   HGB 13.0 13.2 13.3   HCT 41.2 41.9 41.6    301 339   GRANS  --  73 81*   LYMPH  --  9* 5*   EOS  --  0 0      Microbiology Recent Labs     05/05/22 1742 05/05/22  1245   CULT NO GROWTH AFTER 14 HOURS Sent to [facility] for ID/Susceptibility if indicated.  CULTURE IN PROGRESS,FURTHER UPDATES TO FOLLOW          RADIOLOGY:    All available imaging studies/reports in [facility] for this admission were reviewed      Disclaimer: Sections of this note are dictated utilizing voice recognition software, which may have resulted in some phonetic based errors in grammar and contents. Even though attempts were made to correct all the mistakes, some may have been missed, and remained in the body of the document. If questions arise, please contact our department.     Dr. Starla Alaniz, Infectious Disease Specialist  [phone]  May 6, 2022  10:51 AM

## 2022-05-06 NOTE — PROGRESS NOTES
Problem: Falls - Risk of  Goal: *Absence of Falls  Description: Document Izzy Chavez Fall Risk and appropriate interventions in the flowsheet.   Outcome: Progressing Towards Goal  Note: Fall Risk Interventions:  Mobility Interventions: Patient to call before getting OOB,OT consult for ADLs,PT Consult for mobility concerns,PT Consult for assist device competence         Medication Interventions: Bed/chair exit alarm,Patient to call before getting OOB,Teach patient to arise slowly    Elimination Interventions: Bed/chair exit alarm,Call light in reach,Patient to call for help with toileting needs              Problem: Patient Education: Go to Patient Education Activity  Goal: Patient/Family Education  Outcome: Progressing Towards Goal

## 2022-05-06 NOTE — PROGRESS NOTES
[facility]  [facility]    Patient: Misbah Kidney Age: 82 y.o. : 1935 MR#: [id number] SSN: [id number]  Date/Time: 2022     C/C: Fever       Subjective:   HPI : Patient was admitted with fever chills and generalized body ache, patient has history of recurrent UTI, chronic respiratory failure secondary to COPD on 4 L nasal cannula oxygen at home and hypertension. Since hospitalization she is more alert awake. Culture gram-positive cocci in blood currently patient is on antibiotic          Review of Systems:  positive responses in bold type   Constitutional: Negative for fever, chills, diaphoresis and unexpected weight change. HENT: Negative for ear pain, congestion, sore throat, rhinorrhea, drooling, trouble swallowing, neck pain and tinnitus. Eyes: Negative for photophobia, pain, redness and visual disturbance. Respiratory: negative for shortness of breath, cough, choking, chest tightness, wheezing or stridor. Cardiovascular: Negative for chest pain, palpitations and leg swelling. Gastrointestinal: Negative for nausea, vomiting, abdominal pain, diarrhea, constipation, blood in stool, abdominal distention and anal bleeding. Genitourinary: Negative for dysuria, urgency, frequency, hematuria, flank pain and difficulty urinating. Musculoskeletal: Negative for back pain and arthralgias. Skin: Negative for color change, rash and wound. Neurological: Negative for dizziness, seizures, syncope, speech difficulty, light-headedness or headaches. Hematological: Does not bruise/bleed easily. Psychiatric/Behavioral: Negative for suicidal ideas, hallucinations, behavioral problems, self-injury or agitation     Assessment/Plan:     1.   SIRS/SEPSIS  2 [id number] contamination ID consulted-   3 COPD acute-continue steroid continue oxygen-bronchodilator        Objective:       General:  Alert, cooperative, no acute distress   HEENT: No facial asymmetry, LAINA Abdirashid, External ears - WNL    Cardiovascular: S1S2 - regular , No Murmur   Pulmonary: Equal expansion , No Use of accessory muscles , bilateral wheezing present  GI:  +BS in all four quadrants, soft, non-tender  Extremities:  No edema; 2+ dorsalis pedis pulses bilaterally  Neuro: Alert and oriented X 2.        DVT Prophylaxis:  [x]Lovenox  []Hep SQ  []SCDs  []Coumadin   []On Heparin gtt    [] Eliquis [] Xarelto     Vitals:         VS:   Visit Vitals  BP (!) 136/59 (BP 1 Location: Left upper arm, BP Patient Position: At rest)   Pulse 71   Temp 97.7 °F (36.5 °C)   Resp 17   Ht 5' 3\" (1.6 m)   Wt 53.2 kg (117 lb 4.8 oz)   SpO2 96%   BMI 20.78 kg/m²      Tmax/24hrs: Temp (24hrs), Av.6 °F (37 °C), Min:97.7 °F (36.5 °C), Max:100 °F (37.8 °C)  IOBRIEF@      Medications:   Current Facility-Administered Medications   Medication Dose Route Frequency    acetaminophen (TYLENOL) tablet 325 mg  325 mg Oral Q4H PRN    albuterol (PROVENTIL VENTOLIN) nebulizer solution 2.5 mg  2.5 mg Nebulization Q4H PRN    ALPRAZolam (XANAX) tablet 0.5 mg  0.5 mg Oral QHS    aspirin delayed-release tablet 81 mg  81 mg Oral DAILY    atorvastatin (LIPITOR) tablet 20 mg  20 mg Oral DAILY    budesonide (PULMICORT) 500 mcg/2 ml nebulizer suspension  500 mcg Nebulization BID RT    arformoteroL (BROVANA) neb solution 15 mcg  15 mcg Nebulization BID RT    cholecalciferol (VITAMIN D3) (1000 Units /25 mcg) tablet 2,000 Units  2,000 Units Oral DAILY    famotidine (PEPCID) tablet 20 mg  20 mg Oral DAILY    furosemide (LASIX) tablet 40 mg  40 mg Oral DAILY    losartan (COZAAR) tablet 75 mg  75 mg Oral DAILY    metoprolol tartrate (LOPRESSOR) tablet 25 mg  25 mg Oral BID    NIFEdipine ER (PROCARDIA XL) tablet 60 mg  60 mg Oral DAILY    sodium chloride (NS) flush 5-40 mL  5-40 mL IntraVENous Q8H    sodium chloride (NS) flush 5-40 mL  5-40 mL IntraVENous PRN    acetaminophen (TYLENOL) tablet 650 mg  650 mg Oral Q6H PRN    Or  acetaminophen (TYLENOL) suppository 650 mg  650 mg Rectal Q6H PRN    polyethylene glycol (MIRALAX) packet 17 g  17 g Oral DAILY PRN    ondansetron (ZOFRAN ODT) tablet 4 mg  4 mg Oral Q8H PRN    Or    ondansetron (ZOFRAN) injection 4 mg  4 mg IntraVENous Q6H PRN    enoxaparin (LOVENOX) injection 40 mg  40 mg SubCUTAneous DAILY    methylPREDNISolone (PF) (SOLU-MEDROL) injection 60 mg  60 mg IntraVENous Q8H    cefTRIAXone (ROCEPHIN) 1 g in sterile water (preservative free) 10 mL IV syringe  1 g IntraVENous Q24H    azithromycin (ZITHROMAX) tablet 500 mg  500 mg Oral DAILY       Labs:    Recent Labs     05/06/22  0451 05/05/22  1742 05/05/22  1245   WBC 9.6 17.3* 18.4*   HGB 13.0 13.2 13.3   HCT 41.2 41.9 41.6    301 339     Recent Labs     05/06/22  0451 05/05/22  1742 05/05/22  1245    139 139   K 4.6 4.3 4.2    107 104   CO2 27 20* 26   * 113* 109*   BUN 17 17 20*   CREA 1.03 1.11 1.10   CA 8.9 8.8 8.9   MG 2.5  --   --    ALB  --  3.4 3.4   ALT  --  37 37   INR  --   --  1.0         Time spent on direct patient care >30 mints     Complexity : High complex - due to multiple medical issues outlined above. CODE Status : DNR    Case discussed with:  [x]Patient  [] Family  []Nursing  []Case Management       Disclaimer: Sections of this note are dictated utilizing voice recognition software, which may have resulted in some phonetic based errors in grammar and contents. Even though attempts were made to correct all the mistakes, some may have been missed, and remained in the body of the document. If questions arise, please contact our department.     Signed By: Regina Pacheco MD     May 6, 2022

## 2022-05-06 NOTE — PROGRESS NOTES
Bedside shift change report given to Marialuisa Dey RN (oncoming nurse) by Matteo Lopez RN (offgoing nurse). Report included the following information SBAR, Procedure Summary, Intake/Output, MAR, Recent Results and Cardiac Rhythm NSR. Patient resting comfortably, on 8 L NC. No complaints of SOB and NAD.  VSS. No complaints of pain. Call bell in reach.

## 2022-05-06 NOTE — PROGRESS NOTES
TRANSFER - IN REPORT:    Verbal report received from [place] (name) on [address]  being received from [address] [facility] (unit) for change in patient condition(need for continuous telemetery monitoring)      Report consisted of patients Situation, Background, Assessment and   Recommendations(SBAR). Information from the following report(s) SBAR, ED Summary, Intake/Output, MAR, Recent Results and Cardiac Rhythm NSR was reviewed with the receiving nurse. Opportunity for questions and clarification was provided. Assessment completed upon patients arrival to unit and care assumed. 2350 Patient arrived to CVTSD room 2314. Patient on NRB, transitioned to HiFlo NC at 10 L by RT. Patient satting between 88-91%. Patient is AOx4. NSR on tele. VSS. No complaints of SOB, no increased WOB. Purewick in place and connected to suction. No complaints of pain. Skin assessment done, skin intact. Repositioned for comfort. Call bell in reach. Patient has no complaints at this time.

## 2022-05-07 LAB
ANION GAP SERPL CALC-SCNC: 8 MMOL/L (ref 3–18)
BACTERIA SPEC CULT: NORMAL
BUN SERPL-MCNC: 35 MG/DL (ref 7–18)
BUN/CREAT SERPL: 28 (ref 12–20)
CALCIUM SERPL-MCNC: 7.9 MG/DL (ref 8.5–10.1)
CHLORIDE SERPL-SCNC: 106 MMOL/L (ref 100–111)
CO2 SERPL-SCNC: 23 MMOL/L (ref 21–32)
CREAT SERPL-MCNC: 1.25 MG/DL (ref 0.6–1.3)
ERYTHROCYTE [DISTWIDTH] IN BLOOD BY AUTOMATED COUNT: 14.4 % (ref 11.6–14.5)
GLUCOSE SERPL-MCNC: 267 MG/DL (ref 74–99)
HCT VFR BLD AUTO: 37.4 % (ref 35–45)
HGB BLD-MCNC: 11.9 G/DL (ref 12–16)
LACTATE SERPL-SCNC: 1.3 MMOL/L (ref 0.4–2)
LACTATE SERPL-SCNC: 2.2 MMOL/L (ref 0.4–2)
MAGNESIUM SERPL-MCNC: 2.4 MG/DL (ref 1.6–2.6)
MCH RBC QN AUTO: 28.2 PG (ref 24–34)
MCHC RBC AUTO-ENTMCNC: 31.8 G/DL (ref 31–37)
MCV RBC AUTO: 88.6 FL (ref 78–100)
NRBC # BLD: 0 K/UL (ref 0–0.01)
NRBC BLD-RTO: 0 PER 100 WBC
PLATELET # BLD AUTO: 274 K/UL (ref 135–420)
PMV BLD AUTO: 10.3 FL (ref 9.2–11.8)
POTASSIUM SERPL-SCNC: 4.5 MMOL/L (ref 3.5–5.5)
RBC # BLD AUTO: 4.22 M/UL (ref 4.2–5.3)
SERVICE CMNT-IMP: NORMAL
SODIUM SERPL-SCNC: 137 MMOL/L (ref 136–145)
WBC # BLD AUTO: 12.9 K/UL (ref 4.6–13.2)

## 2022-05-07 PROCEDURE — 77010033711 HC HIGH FLOW OXYGEN

## 2022-05-07 PROCEDURE — 74011250636 HC RX REV CODE- 250/636: Performed by: INTERNAL MEDICINE

## 2022-05-07 PROCEDURE — 74011636637 HC RX REV CODE- 636/637: Performed by: INTERNAL MEDICINE

## 2022-05-07 PROCEDURE — 85027 COMPLETE CBC AUTOMATED: CPT

## 2022-05-07 PROCEDURE — 99232 SBSQ HOSP IP/OBS MODERATE 35: CPT | Performed by: INTERNAL MEDICINE

## 2022-05-07 PROCEDURE — 83605 ASSAY OF LACTIC ACID: CPT

## 2022-05-07 PROCEDURE — 74011250637 HC RX REV CODE- 250/637: Performed by: INTERNAL MEDICINE

## 2022-05-07 PROCEDURE — 94762 N-INVAS EAR/PLS OXIMTRY CONT: CPT

## 2022-05-07 PROCEDURE — 80048 BASIC METABOLIC PNL TOTAL CA: CPT

## 2022-05-07 PROCEDURE — 83735 ASSAY OF MAGNESIUM: CPT

## 2022-05-07 PROCEDURE — 74011000250 HC RX REV CODE- 250: Performed by: INTERNAL MEDICINE

## 2022-05-07 PROCEDURE — 87040 BLOOD CULTURE FOR BACTERIA: CPT

## 2022-05-07 PROCEDURE — 65660000004 HC RM CVT STEPDOWN

## 2022-05-07 PROCEDURE — 2709999900 HC NON-CHARGEABLE SUPPLY

## 2022-05-07 PROCEDURE — 77030038269 HC DRN EXT URIN PURWCK BARD -A

## 2022-05-07 PROCEDURE — 36415 COLL VENOUS BLD VENIPUNCTURE: CPT

## 2022-05-07 PROCEDURE — 94640 AIRWAY INHALATION TREATMENT: CPT

## 2022-05-07 RX ORDER — PREDNISONE 20 MG/1
20 TABLET ORAL 2 TIMES DAILY WITH MEALS
Status: DISCONTINUED | OUTPATIENT
Start: 2022-05-07 | End: 2022-05-09 | Stop reason: HOSPADM

## 2022-05-07 RX ADMIN — ASPIRIN 81 MG: 81 TABLET, COATED ORAL at 09:24

## 2022-05-07 RX ADMIN — ALPRAZOLAM 0.5 MG: 0.5 TABLET ORAL at 22:15

## 2022-05-07 RX ADMIN — METOPROLOL TARTRATE 25 MG: 25 TABLET, FILM COATED ORAL at 18:29

## 2022-05-07 RX ADMIN — METOPROLOL TARTRATE 25 MG: 25 TABLET, FILM COATED ORAL at 09:27

## 2022-05-07 RX ADMIN — ARFORMOTEROL TARTRATE 15 MCG: 15 SOLUTION RESPIRATORY (INHALATION) at 07:36

## 2022-05-07 RX ADMIN — METHYLPREDNISOLONE SODIUM SUCCINATE 60 MG: 40 INJECTION, POWDER, FOR SOLUTION INTRAMUSCULAR; INTRAVENOUS at 14:31

## 2022-05-07 RX ADMIN — SODIUM CHLORIDE, PRESERVATIVE FREE 10 ML: 5 INJECTION INTRAVENOUS at 22:16

## 2022-05-07 RX ADMIN — METHYLPREDNISOLONE SODIUM SUCCINATE 60 MG: 40 INJECTION, POWDER, FOR SOLUTION INTRAMUSCULAR; INTRAVENOUS at 06:52

## 2022-05-07 RX ADMIN — CEFTRIAXONE SODIUM 2 G: 2 INJECTION, POWDER, FOR SOLUTION INTRAMUSCULAR; INTRAVENOUS at 20:27

## 2022-05-07 RX ADMIN — BUDESONIDE 500 MCG: 0.5 SUSPENSION RESPIRATORY (INHALATION) at 07:36

## 2022-05-07 RX ADMIN — ARFORMOTEROL TARTRATE 15 MCG: 15 SOLUTION RESPIRATORY (INHALATION) at 20:58

## 2022-05-07 RX ADMIN — ATORVASTATIN CALCIUM 20 MG: 20 TABLET, FILM COATED ORAL at 09:24

## 2022-05-07 RX ADMIN — FUROSEMIDE 40 MG: 40 TABLET ORAL at 09:28

## 2022-05-07 RX ADMIN — SODIUM CHLORIDE, PRESERVATIVE FREE 10 ML: 5 INJECTION INTRAVENOUS at 14:41

## 2022-05-07 RX ADMIN — SODIUM CHLORIDE, PRESERVATIVE FREE 10 ML: 5 INJECTION INTRAVENOUS at 07:08

## 2022-05-07 RX ADMIN — ACETAMINOPHEN 650 MG: 325 TABLET ORAL at 22:15

## 2022-05-07 RX ADMIN — BUDESONIDE 500 MCG: 0.5 SUSPENSION RESPIRATORY (INHALATION) at 20:58

## 2022-05-07 RX ADMIN — PREDNISONE 20 MG: 20 TABLET ORAL at 22:15

## 2022-05-07 RX ADMIN — FAMOTIDINE 20 MG: 20 TABLET ORAL at 09:26

## 2022-05-07 RX ADMIN — NIFEDIPINE 60 MG: 30 TABLET, FILM COATED, EXTENDED RELEASE ORAL at 14:30

## 2022-05-07 RX ADMIN — CHOLECALCIFEROL TAB 25 MCG (1000 UNIT) 2000 UNITS: 25 TAB at 09:25

## 2022-05-07 RX ADMIN — LEVOFLOXACIN 750 MG: 5 INJECTION, SOLUTION INTRAVENOUS at 11:16

## 2022-05-07 RX ADMIN — LOSARTAN POTASSIUM 75 MG: 50 TABLET, FILM COATED ORAL at 11:14

## 2022-05-07 RX ADMIN — ENOXAPARIN SODIUM 40 MG: 100 INJECTION SUBCUTANEOUS at 09:30

## 2022-05-07 NOTE — PROGRESS NOTES
Problem: Falls - Risk of  Goal: *Absence of Falls  Description: Document Xochilt Sanchez Fall Risk and appropriate interventions in the flowsheet. Outcome: Progressing Towards Goal  Note: Fall Risk Interventions:  Mobility Interventions: Patient to call before getting OOB         Medication Interventions: Evaluate medications/consider consulting pharmacy,Patient to call before getting OOB,Teach patient to arise slowly    Elimination Interventions: Call light in reach,Toileting schedule/hourly rounds              Problem: Patient Education: Go to Patient Education Activity  Goal: Patient/Family Education  Outcome: Progressing Towards Goal     Problem: Sepsis: Day 2  Goal: *Central Venous Pressure maintained at 8-12 mm Hg  Outcome: Progressing Towards Goal  Goal: *Hemodynamically stable  Outcome: Progressing Towards Goal  Goal: *Tolerating diet  Outcome: Progressing Towards Goal  Goal: Activity/Safety  Outcome: Progressing Towards Goal  Goal: Consults, if ordered  Outcome: Progressing Towards Goal  Goal: Diagnostic Test/Procedures  Outcome: Progressing Towards Goal  Goal: Nutrition/Diet  Outcome: Progressing Towards Goal  Goal: Discharge Planning  Outcome: Progressing Towards Goal  Goal: Medications  Outcome: Progressing Towards Goal  Goal: Respiratory  Outcome: Progressing Towards Goal  Goal: Treatments/Interventions/Procedures  Outcome: Progressing Towards Goal  Goal: Psychosocial  Outcome: Progressing Towards Goal     Problem: Pain  Goal: *Control of Pain  Outcome: Progressing Towards Goal     Problem: Falls - Risk of  Goal: *Absence of Falls  Description: Document Seth Fall Risk and appropriate interventions in the flowsheet.   5/7/2022 1722 by Carlos Dinero RN  Outcome: Progressing Towards Goal  Note: Fall Risk Interventions:  Mobility Interventions: Patient to call before getting OOB         Medication Interventions: Evaluate medications/consider consulting pharmacy,Patient to call before getting OOB,Teach patient to arise slowly    Elimination Interventions: Call light in reach,Toileting schedule/hourly rounds           5/7/2022 1716 by Doroteo Burt RN  Outcome: Progressing Towards Goal  Note: Fall Risk Interventions:  Mobility Interventions: Patient to call before getting OOB         Medication Interventions: Evaluate medications/consider consulting pharmacy,Patient to call before getting OOB,Teach patient to arise slowly    Elimination Interventions: Call light in reach,Toileting schedule/hourly rounds              Problem: Patient Education: Go to Patient Education Activity  Goal: Patient/Family Education  5/7/2022 1722 by Doroteo Burt RN  Outcome: Progressing Towards Goal  5/7/2022 1716 by Doroteo Burt RN  Outcome: Progressing Towards Goal     Problem: Patient Education: Go to Patient Education Activity  Goal: Patient/Family Education  Outcome: Progressing Towards Goal     Problem: Pain  Goal: *Control of Pain  5/7/2022 1722 by Doroteo Burt RN  Outcome: Progressing Towards Goal  5/7/2022 1716 by Doroteo Burt RN  Outcome: Progressing Towards Goal     Problem: Sepsis: Day 2  Goal: *Central Venous Pressure maintained at 8-12 mm Hg  5/7/2022 1722 by Doroteo Burt RN  Outcome: Resolved/Met  5/7/2022 1716 by Doroteo Burt RN  Outcome: Progressing Towards Goal  Goal: *Hemodynamically stable  5/7/2022 1722 by Doroteo Burt RN  Outcome: Resolved/Met  5/7/2022 1716 by Doroteo Burt RN  Outcome: Progressing Towards Goal  Goal: *Tolerating diet  5/7/2022 1722 by Doroteo Burt RN  Outcome: Resolved/Met  5/7/2022 1716 by Doroteo Burt RN  Outcome: Progressing Towards Goal  Goal: Activity/Safety  5/7/2022 1722 by Doroteo Burt RN  Outcome: Resolved/Met  5/7/2022 1716 by Doroteo Burt RN  Outcome: Progressing Towards Goal  Goal: Consults, if ordered  5/7/2022 1722 by Doroteo Burt RN  Outcome: Resolved/Met  5/7/2022 1716 by Doroteo Burt RN  Outcome: Progressing Towards Goal  Goal: Diagnostic Test/Procedures  5/7/2022 1722 by Eladia Pulse, RN  Outcome: Resolved/Met  5/7/2022 1716 by Eladia Pulse, RN  Outcome: Progressing Towards Goal  Goal: Nutrition/Diet  5/7/2022 1722 by Eladia Pulse, RN  Outcome: Resolved/Met  5/7/2022 1716 by Eladia Pulse, RN  Outcome: Progressing Towards Goal  Goal: Discharge Planning  5/7/2022 1722 by Eladia Pulse, RN  Outcome: Resolved/Met  5/7/2022 1716 by Eladia Pulse, RN  Outcome: Progressing Towards Goal  Goal: Medications  5/7/2022 1722 by Eladia Pulse, RN  Outcome: Resolved/Met  5/7/2022 1716 by Eladia Pulse, RN  Outcome: Progressing Towards Goal  Goal: Respiratory  5/7/2022 1722 by Eladia Pulse, RN  Outcome: Resolved/Met  5/7/2022 1716 by Eladia Pulse, RN  Outcome: Progressing Towards Goal  Goal: Treatments/Interventions/Procedures  5/7/2022 1722 by Eladia Pulse, RN  Outcome: Resolved/Met  5/7/2022 1716 by Eladia Pulse, RN  Outcome: Progressing Towards Goal  Goal: Psychosocial  5/7/2022 1722 by Eladia Pulse, RN  Outcome: Resolved/Met  5/7/2022 1716 by Eladia Pulse, RN  Outcome: Progressing Towards Goal

## 2022-05-07 NOTE — PROGRESS NOTES
Bedside and Verbal shift change report given to Kandi Sotomayor (oncoming nurse) by Estefanía Miller RN (offgoing nurse). Report included the following information SBAR, Kardex, Intake/Output and Cardiac Rhythm Normal Sinus Rhythm.

## 2022-05-08 PROBLEM — B96.20 E COLI BACTEREMIA: Status: ACTIVE | Noted: 2022-05-08

## 2022-05-08 PROBLEM — R78.81 E COLI BACTEREMIA: Status: ACTIVE | Noted: 2022-05-08

## 2022-05-08 PROBLEM — J44.1 CHRONIC OBSTRUCTIVE PULMONARY DISEASE WITH ACUTE EXACERBATION (HCC): Status: ACTIVE | Noted: 2021-02-22

## 2022-05-08 LAB
ANION GAP SERPL CALC-SCNC: 6 MMOL/L (ref 3–18)
BACTERIA SPEC CULT: ABNORMAL
BUN SERPL-MCNC: 29 MG/DL (ref 7–18)
BUN/CREAT SERPL: 29 (ref 12–20)
CALCIUM SERPL-MCNC: 8.5 MG/DL (ref 8.5–10.1)
CHLORIDE SERPL-SCNC: 107 MMOL/L (ref 100–111)
CO2 SERPL-SCNC: 25 MMOL/L (ref 21–32)
CREAT SERPL-MCNC: 1 MG/DL (ref 0.6–1.3)
ERYTHROCYTE [DISTWIDTH] IN BLOOD BY AUTOMATED COUNT: 14.3 % (ref 11.6–14.5)
GLUCOSE SERPL-MCNC: 179 MG/DL (ref 74–99)
GRAM STN SPEC: ABNORMAL
HCT VFR BLD AUTO: 40.1 % (ref 35–45)
HGB BLD-MCNC: 12.4 G/DL (ref 12–16)
MAGNESIUM SERPL-MCNC: 2.6 MG/DL (ref 1.6–2.6)
MCH RBC QN AUTO: 27.4 PG (ref 24–34)
MCHC RBC AUTO-ENTMCNC: 30.9 G/DL (ref 31–37)
MCV RBC AUTO: 88.5 FL (ref 78–100)
NRBC # BLD: 0 K/UL (ref 0–0.01)
NRBC BLD-RTO: 0 PER 100 WBC
PLATELET # BLD AUTO: 312 K/UL (ref 135–420)
PMV BLD AUTO: 10.2 FL (ref 9.2–11.8)
POTASSIUM SERPL-SCNC: 4.4 MMOL/L (ref 3.5–5.5)
RBC # BLD AUTO: 4.53 M/UL (ref 4.2–5.3)
SERVICE CMNT-IMP: ABNORMAL
SODIUM SERPL-SCNC: 138 MMOL/L (ref 136–145)
WBC # BLD AUTO: 10.7 K/UL (ref 4.6–13.2)

## 2022-05-08 PROCEDURE — 74011250637 HC RX REV CODE- 250/637: Performed by: INTERNAL MEDICINE

## 2022-05-08 PROCEDURE — 94640 AIRWAY INHALATION TREATMENT: CPT

## 2022-05-08 PROCEDURE — 74011250636 HC RX REV CODE- 250/636: Performed by: INTERNAL MEDICINE

## 2022-05-08 PROCEDURE — 65660000004 HC RM CVT STEPDOWN

## 2022-05-08 PROCEDURE — 74011000250 HC RX REV CODE- 250: Performed by: INTERNAL MEDICINE

## 2022-05-08 PROCEDURE — 80048 BASIC METABOLIC PNL TOTAL CA: CPT

## 2022-05-08 PROCEDURE — 85027 COMPLETE CBC AUTOMATED: CPT

## 2022-05-08 PROCEDURE — 77010033711 HC HIGH FLOW OXYGEN

## 2022-05-08 PROCEDURE — 94762 N-INVAS EAR/PLS OXIMTRY CONT: CPT

## 2022-05-08 PROCEDURE — 74011636637 HC RX REV CODE- 636/637: Performed by: INTERNAL MEDICINE

## 2022-05-08 PROCEDURE — 83735 ASSAY OF MAGNESIUM: CPT

## 2022-05-08 PROCEDURE — 2709999900 HC NON-CHARGEABLE SUPPLY

## 2022-05-08 PROCEDURE — 36415 COLL VENOUS BLD VENIPUNCTURE: CPT

## 2022-05-08 PROCEDURE — APPSS30 APP SPLIT SHARED TIME 16-30 MINUTES: Performed by: PHYSICIAN ASSISTANT

## 2022-05-08 RX ADMIN — METOPROLOL TARTRATE 25 MG: 25 TABLET, FILM COATED ORAL at 08:23

## 2022-05-08 RX ADMIN — BUDESONIDE 500 MCG: 0.5 SUSPENSION RESPIRATORY (INHALATION) at 22:00

## 2022-05-08 RX ADMIN — PREDNISONE 20 MG: 20 TABLET ORAL at 17:10

## 2022-05-08 RX ADMIN — ACETAMINOPHEN 650 MG: 325 TABLET ORAL at 21:14

## 2022-05-08 RX ADMIN — FAMOTIDINE 20 MG: 20 TABLET ORAL at 09:58

## 2022-05-08 RX ADMIN — LOSARTAN POTASSIUM 75 MG: 50 TABLET, FILM COATED ORAL at 11:42

## 2022-05-08 RX ADMIN — SODIUM CHLORIDE, PRESERVATIVE FREE 10 ML: 5 INJECTION INTRAVENOUS at 06:26

## 2022-05-08 RX ADMIN — CHOLECALCIFEROL TAB 25 MCG (1000 UNIT) 2000 UNITS: 25 TAB at 09:57

## 2022-05-08 RX ADMIN — ARFORMOTEROL TARTRATE 15 MCG: 15 SOLUTION RESPIRATORY (INHALATION) at 22:00

## 2022-05-08 RX ADMIN — SODIUM CHLORIDE, PRESERVATIVE FREE 10 ML: 5 INJECTION INTRAVENOUS at 16:00

## 2022-05-08 RX ADMIN — CEFTRIAXONE SODIUM 2 G: 2 INJECTION, POWDER, FOR SOLUTION INTRAMUSCULAR; INTRAVENOUS at 21:13

## 2022-05-08 RX ADMIN — ALPRAZOLAM 0.5 MG: 0.5 TABLET ORAL at 21:10

## 2022-05-08 RX ADMIN — BUDESONIDE 500 MCG: 0.5 SUSPENSION RESPIRATORY (INHALATION) at 07:56

## 2022-05-08 RX ADMIN — METOPROLOL TARTRATE 25 MG: 25 TABLET, FILM COATED ORAL at 17:10

## 2022-05-08 RX ADMIN — ARFORMOTEROL TARTRATE 15 MCG: 15 SOLUTION RESPIRATORY (INHALATION) at 07:56

## 2022-05-08 RX ADMIN — FUROSEMIDE 40 MG: 40 TABLET ORAL at 08:23

## 2022-05-08 RX ADMIN — ENOXAPARIN SODIUM 40 MG: 100 INJECTION SUBCUTANEOUS at 09:59

## 2022-05-08 RX ADMIN — PREDNISONE 20 MG: 20 TABLET ORAL at 08:23

## 2022-05-08 RX ADMIN — ASPIRIN 81 MG: 81 TABLET, COATED ORAL at 09:57

## 2022-05-08 RX ADMIN — SODIUM CHLORIDE, PRESERVATIVE FREE 10 ML: 5 INJECTION INTRAVENOUS at 21:13

## 2022-05-08 RX ADMIN — NIFEDIPINE 60 MG: 30 TABLET, FILM COATED, EXTENDED RELEASE ORAL at 13:14

## 2022-05-08 RX ADMIN — ATORVASTATIN CALCIUM 20 MG: 20 TABLET, FILM COATED ORAL at 09:58

## 2022-05-08 NOTE — PROGRESS NOTES
Decreased oxygen to 6lpm     05/08/22 0758   Oxygen Therapy   O2 Sat (%) 91 %   Pulse via Oximetry 70 beats per minute   O2 Device Hi flow nasal cannula   O2 Flow Rate (L/min) 6 l/min

## 2022-05-08 NOTE — PROGRESS NOTES
[facility]    Patient: Misbah Kidney Age: 82 y.o. : 1935 MR#: [id number] SSN: [id number]  Date/Time: 2022     C/C: Fever         Subjective:   HPI : Patient was admitted with fever chills and generalized body ache, patient has history of recurrent UTI, chronic respiratory failure secondary to COPD on 4 L nasal cannula oxygen at home and hypertension. Since hospitalization she is more alert awake. Culture gram-positive cocci in blood currently patient is on antibiotic              Review of Systems:  positive responses in bold type   Constitutional: Negative for fever, chills, diaphoresis and unexpected weight change. HENT: Negative for ear pain, congestion, sore throat, rhinorrhea, drooling, trouble swallowing, neck pain and tinnitus. Eyes: Negative for photophobia, pain, redness and visual disturbance. Respiratory: Shortness of breath has improved, cough, choking, chest tightness, wheezing or stridor. Cardiovascular: Negative for chest pain, palpitations and leg swelling. Gastrointestinal: Negative for nausea, vomiting, abdominal pain, diarrhea, constipation, blood in stool, abdominal distention and anal bleeding. Genitourinary: Negative for dysuria, urgency, frequency, hematuria, flank pain and difficulty urinating. Musculoskeletal: Negative for back pain and arthralgias. Skin: Negative for color change, rash and wound. Neurological: Negative for dizziness, seizures, syncope, speech difficulty, light-headedness or headaches. Hematological: Does not bruise/bleed easily. Psychiatric/Behavioral: Negative for suicidal ideas, hallucinations, behavioral problems, self-injury or agitation      Assessment/Plan:      1.   SIRS/SEPSIS-tachycardia+ pyelonephritis+ respiratory failure  2 [id number] contamination ID consulted-   3 COPD acute-continue steroid continue oxygen-bronchodilator-acute on chronic respiratory failure(on home O2 at 3 L nasal cannula ), currently on high flow oxygen O2 saturation on admission 89%. 4 history of COVID-pneumonia in 2020  5 right sided pyelonephritis POA  6  hypertension     Plan  -Continue current antibiotics follow with ID follow cultures till final  -Currently on very high dose of methylprednisone will taper, changed to p.o. prednisone 20 twice daily  -Continue bronchodilators and oxygen supplementation       Objective:         General:  Alert, cooperative, no acute distress   HEENT: No facial asymmetry, LAINA Abdirashid, External ears - WNL    Cardiovascular: S1S2 - regular , No Murmur   Pulmonary: Equal expansion , No Use of accessory muscles , bilateral wheezing present -( appears improving since admission clinically)  GI:  +BS in all four quadrants, soft, non-tender  Extremities:  No edema; 2+ dorsalis pedis pulses bilaterally  Neuro: Alert and oriented X 2.          DVT Prophylaxis:  [x]Lovenox  []Hep SQ  []SCDs  []Coumadin   []On Heparin gtt     [] Eliquis [] Xarelto        Vitals:         VS:   Visit Vitals  BP (!) 125/46   Pulse 74   Temp 97.5 °F (36.4 °C)   Resp 17   Ht 5' 3\" (1.6 m)   Wt 53.9 kg (118 lb 14.4 oz)   SpO2 92%   BMI 21.06 kg/m²      Tmax/24hrs: Temp (24hrs), Av.8 °F (36.6 °C), Min:97.5 °F (36.4 °C), Max:98 °F (36.7 °C)        Medications:   Current Facility-Administered Medications   Medication Dose Route Frequency    cefTRIAXone (ROCEPHIN) 2 g in sterile water (preservative free) 20 mL IV syringe  2 g IntraVENous Q24H    levoFLOXacin (LEVAQUIN) 750 mg in D5W IVPB  750 mg IntraVENous Q48H    acetaminophen (TYLENOL) tablet 325 mg  325 mg Oral Q4H PRN    albuterol (PROVENTIL VENTOLIN) nebulizer solution 2.5 mg  2.5 mg Nebulization Q4H PRN    ALPRAZolam (XANAX) tablet 0.5 mg  0.5 mg Oral QHS    aspirin delayed-release tablet 81 mg  81 mg Oral DAILY    atorvastatin (LIPITOR) tablet 20 mg  20 mg Oral DAILY    budesonide (PULMICORT) 500 mcg/2 ml nebulizer suspension  500 mcg Nebulization BID RT    arformoteroL (BROVANA) neb solution 15 mcg  15 mcg Nebulization BID RT    cholecalciferol (VITAMIN D3) (1000 Units /25 mcg) tablet 2,000 Units  2,000 Units Oral DAILY    famotidine (PEPCID) tablet 20 mg  20 mg Oral DAILY    furosemide (LASIX) tablet 40 mg  40 mg Oral DAILY    losartan (COZAAR) tablet 75 mg  75 mg Oral DAILY    metoprolol tartrate (LOPRESSOR) tablet 25 mg  25 mg Oral BID    NIFEdipine ER (PROCARDIA XL) tablet 60 mg  60 mg Oral DAILY    sodium chloride (NS) flush 5-40 mL  5-40 mL IntraVENous Q8H    sodium chloride (NS) flush 5-40 mL  5-40 mL IntraVENous PRN    acetaminophen (TYLENOL) tablet 650 mg  650 mg Oral Q6H PRN    Or    acetaminophen (TYLENOL) suppository 650 mg  650 mg Rectal Q6H PRN    polyethylene glycol (MIRALAX) packet 17 g  17 g Oral DAILY PRN    ondansetron (ZOFRAN ODT) tablet 4 mg  4 mg Oral Q8H PRN    Or    ondansetron (ZOFRAN) injection 4 mg  4 mg IntraVENous Q6H PRN    enoxaparin (LOVENOX) injection 40 mg  40 mg SubCUTAneous DAILY    methylPREDNISolone (PF) (SOLU-MEDROL) injection 60 mg  60 mg IntraVENous Q8H       Labs:    Recent Labs     05/07/22  0030 05/06/22  0451 05/05/22  1742   WBC 12.9 9.6 17.3*   HGB 11.9* 13.0 13.2   HCT 37.4 41.2 41.9    255 301     Recent Labs     05/07/22  0030 05/06/22  0451 05/05/22  1742 05/05/22  1245 05/05/22  1245    141 139   < > 139   K 4.5 4.6 4.3   < > 4.2    107 107   < > 104   CO2 23 27 20*   < > 26   * 190* 113*   < > 109*   BUN 35* 17 17   < > 20*   CREA 1.25 1.03 1.11   < > 1.10   CA 7.9* 8.9 8.8   < > 8.9   MG 2.4 2.5  --   --   --    ALB  --   --  3.4  --  3.4   ALT  --   --  37  --  37   INR  --   --   --   --  1.0    < > = values in this interval not displayed. Time spent on direct patient care >30 mints     Complexity : High complex - due to multiple medical issues outlined above.      CODE Status : full code    Case discussed with: [x]Patient  [x] Family/patient's son in room with patient Case discussed with all present in room[x]Nursing  []Case Management       Disclaimer: Sections of this note are dictated utilizing voice recognition software, which may have resulted in some phonetic based errors in grammar and contents. Even though attempts were made to correct all the mistakes, some may have been missed, and remained in the body of the document. If questions arise, please contact our department.     Signed By: Pradip Nicole MD     May 7, 2022

## 2022-05-08 NOTE — PROGRESS NOTES
Problem: Falls - Risk of  Goal: *Absence of Falls  Description: Document Ana Roman Fall Risk and appropriate interventions in the flowsheet.   Outcome: Progressing Towards Goal  Note: Fall Risk Interventions:  Mobility Interventions: Assess mobility with egress test,Communicate number of staff needed for ambulation/transfer,Patient to call before getting OOB         Medication Interventions: Evaluate medications/consider consulting pharmacy,Patient to call before getting OOB    Elimination Interventions: Call light in reach,Patient to call for help with toileting needs              Problem: Patient Education: Go to Patient Education Activity  Goal: Patient/Family Education  Outcome: Progressing Towards Goal

## 2022-05-08 NOTE — PROGRESS NOTES
[facility]  Progress Note    Patient: Yary Collins Age: 82 y.o. : 1935 MR#: [id number] SSN: [id number]  Date: 2022         Assessment/Plan:     Hospital Problems  Date Reviewed: 2019          Codes Class Noted POA    E coli bacteremia ICD-10-CM: R78.81, B96.20  ICD-9-CM: 790.7, 041.49  2022 Yes        * (Principal) Sepsis ([address] ICD-10-CM: A41.9  ICD-9-CM: 038.9, 995.91  2022 Yes        Pyelonephritis ICD-10-CM: N12  ICD-9-CM: 590.80  2022 Yes        Acute on chronic respiratory failure with hypoxia (HCC) ICD-10-CM: J96.21  ICD-9-CM: 518.84, 799.02  Unknown Yes        Chronic obstructive pulmonary disease with acute exacerbation (HCC) ICD-10-CM: J44.1  ICD-9-CM: 491.21  2021 Yes        Essential hypertension with goal blood pressure less than 140/90 ICD-10-CM: I10  ICD-9-CM: 401.9  2016 Yes    Overview Signed 2016  1:23 PM by Jennifer Ferguson MD     controlled                 Sepsis 2/2 pyelonephritis, E coli bacteremia - improving. Leukocytosis resolved. - monitor WBC - improving  - appreciate ID  - 1/2 sets of BCx from  +E coli (res to amp, unasyn)  - repeat BCx  negative  - UCx negative  - cont IV abx - can likely d/c levaquin    Acute on chronic resp failure with hypoxia - due to COPD with AE. On 3.5-4L O2 at home. Improving.  - cont PO steroids  - brovana/pulmicort  - wean O2 as tolerated back to baseline    HTN  - cont home meds      DVT Prophylaxis:  [x]Lovenox  []Hep SQ  []SCDs  []Coumadin   []On Heparin gtt []PO anticoagulant    Anticipated discharge: 1-2 days    Subjective:     Pt s/e @ bedside. No major events overnight. Pt states she feels nearly back to normal respiratory-[place]. Still has some right CVA pain. Denies dysuria, frequency, cp, abd pain, nvd.       Objective:   VS:   Visit Vitals  BP (!) 137/51 (BP 1 Location: Right upper arm, BP Patient Position: At rest)   Pulse 74   Temp 97.6 °F (36.4 °C) Resp 17   Ht 5' 3\" (1.6 m)   Wt 53.2 kg (117 lb 3.2 oz)   SpO2 93%   BMI 20.76 kg/m²      Tmax/24hrs: Temp (24hrs), Av.5 °F (36.4 °C), Min:96.4 °F (35.8 °C), Max:98.4 °F (36.9 °C)      Intake/Output Summary (Last 24 hours) at 2022 1637  Last data filed at 2022 1559  Gross per 24 hour   Intake 750 ml   Output 1975 ml   Net -1225 ml       General Appearance: NAD, conversant  HENT: normocephalic/atraumatic, moist mucus membranes  Lungs: CTA with normal respiratory effort  CV: RRR, no m/r/g  Abdomen: soft, non-tender, normal bowel sounds  Extremities: no cyanosis, no peripheral edema  Back: +right CVA TTP  Neuro: No focal deficits, motor/sensory intact  Skin: Normal color, intact  Psych: appropriate affect    Current Facility-Administered Medications   Medication Dose Route Frequency    predniSONE (DELTASONE) tablet 20 mg  20 mg Oral BID WITH MEALS    cefTRIAXone (ROCEPHIN) 2 g in sterile water (preservative free) 20 mL IV syringe  2 g IntraVENous Q24H    levoFLOXacin (LEVAQUIN) 750 mg in D5W IVPB  750 mg IntraVENous Q48H    acetaminophen (TYLENOL) tablet 325 mg  325 mg Oral Q4H PRN    albuterol (PROVENTIL VENTOLIN) nebulizer solution 2.5 mg  2.5 mg Nebulization Q4H PRN    ALPRAZolam (XANAX) tablet 0.5 mg  0.5 mg Oral QHS    aspirin delayed-release tablet 81 mg  81 mg Oral DAILY    atorvastatin (LIPITOR) tablet 20 mg  20 mg Oral DAILY    budesonide (PULMICORT) 500 mcg/2 ml nebulizer suspension  500 mcg Nebulization BID RT    arformoteroL (BROVANA) neb solution 15 mcg  15 mcg Nebulization BID RT    cholecalciferol (VITAMIN D3) (1000 Units /25 mcg) tablet 2,000 Units  2,000 Units Oral DAILY    famotidine (PEPCID) tablet 20 mg  20 mg Oral DAILY    furosemide (LASIX) tablet 40 mg  40 mg Oral DAILY    losartan (COZAAR) tablet 75 mg  75 mg Oral DAILY    metoprolol tartrate (LOPRESSOR) tablet 25 mg  25 mg Oral BID    NIFEdipine ER (PROCARDIA XL) tablet 60 mg  60 mg Oral DAILY    sodium chloride (NS) flush 5-40 mL  5-40 mL IntraVENous Q8H    sodium chloride (NS) flush 5-40 mL  5-40 mL IntraVENous PRN    acetaminophen (TYLENOL) tablet 650 mg  650 mg Oral Q6H PRN    Or    acetaminophen (TYLENOL) suppository 650 mg  650 mg Rectal Q6H PRN    polyethylene glycol (MIRALAX) packet 17 g  17 g Oral DAILY PRN    ondansetron (ZOFRAN ODT) tablet 4 mg  4 mg Oral Q8H PRN    Or    ondansetron (ZOFRAN) injection 4 mg  4 mg IntraVENous Q6H PRN    enoxaparin (LOVENOX) injection 40 mg  40 mg SubCUTAneous DAILY        Labs:    Recent Results (from the past 24 hour(s))   METABOLIC PANEL, BASIC    Collection Time: 05/08/22  5:39 AM   Result Value Ref Range    Sodium 138 136 - 145 mmol/L    Potassium 4.4 3.5 - 5.5 mmol/L    Chloride 107 100 - 111 mmol/L    CO2 25 21 - 32 mmol/L    Anion gap 6 3.0 - 18 mmol/L    Glucose 179 (H) 74 - 99 mg/dL    BUN 29 (H) 7.0 - 18 MG/DL    Creatinine 1.00 0.6 - 1.3 MG/DL    BUN/Creatinine ratio 29 (H) 12 - 20      GFR est AA >60 >60 ml/min/1.73m2    GFR est non-AA 53 (L) >60 ml/min/1.73m2    Calcium 8.5 8.5 - 10.1 MG/DL   MAGNESIUM    Collection Time: 05/08/22  5:39 AM   Result Value Ref Range    Magnesium 2.6 1.6 - 2.6 mg/dL   CBC W/O DIFF    Collection Time: 05/08/22  5:39 AM   Result Value Ref Range    WBC 10.7 4.6 - 13.2 K/uL    RBC 4.53 4.20 - 5.30 M/uL    HGB 12.4 12.0 - 16.0 g/dL    HCT 40.1 35.0 - 45.0 %    MCV 88.5 78.0 - 100.0 FL    MCH 27.4 24.0 - 34.0 PG    MCHC 30.9 (L) 31.0 - 37.0 g/dL    RDW 14.3 11.6 - 14.5 %    PLATELET 408 694 - 085 K/uL    MPV 10.2 9.2 - 11.8 FL    NRBC 0.0 0  WBC    ABSOLUTE NRBC 0.00 0.00 - 0.01 K/uL       Imaging:  No results found.       Signed By: Caroline Miramontes PA-C [facility]  Hospitalist Division  Office:  [phone]  Pager: [phone]         May 8, 2022 4:37 PM

## 2022-05-08 NOTE — ROUTINE PROCESS
Bedside and Verbal shift change report given to Ashlie Padilla (oncoming nurse) by Suni Mendoza (offgoing nurse). Report included the following information SBAR, Kardex, Intake/Output, MAR and Cardiac Rhythm NSR.

## 2022-05-08 NOTE — PROGRESS NOTES
Problem: Falls - Risk of  Goal: *Absence of Falls  Description: Document [place] Fall Risk and appropriate interventions in the flowsheet.   Outcome: Progressing Towards Goal  Note: Fall Risk Interventions:  Mobility Interventions: Patient to call before getting OOB         Medication Interventions: Patient to call before getting OOB,Teach patient to arise slowly    Elimination Interventions: Call light in reach,Patient to call for help with toileting needs,Toilet paper/wipes in reach              Problem: Patient Education: Go to Patient Education Activity  Goal: Patient/Family Education  Outcome: Progressing Towards Goal     Problem: Patient Education: Go to Patient Education Activity  Goal: Patient/Family Education  Outcome: Progressing Towards Goal     Problem: Pain  Goal: *Control of Pain  Outcome: Progressing Towards Goal     Problem: Patient Education: Go to Patient Education Activity  Goal: Patient/Family Education  Outcome: Progressing Towards Goal     Problem: Sepsis: Day 3  Goal: *Central Venous Pressure maintained at 8-12 mm Hg  Outcome: Resolved/Met  Goal: *Oxygen saturation within defined limits  Outcome: Resolved/Met  Goal: *Vital sign stability  Outcome: Resolved/Met  Goal: *Tolerating diet  Outcome: Resolved/Met  Goal: *Demonstrates progressive activity  Outcome: Resolved/Met  Goal: Activity/Safety  Outcome: Resolved/Met  Goal: Consults, if ordered  Outcome: Resolved/Met  Goal: Diagnostic Test/Procedures  Outcome: Resolved/Met  Goal: Nutrition/Diet  Outcome: Resolved/Met  Goal: Discharge Planning  Outcome: Resolved/Met  Goal: Medications  Outcome: Resolved/Met  Goal: Respiratory  Outcome: Resolved/Met  Goal: Treatments/Interventions/Procedures  Outcome: Resolved/Met  Goal: Psychosocial  Outcome: Resolved/Met

## 2022-05-09 ENCOUNTER — HOME HEALTH ADMISSION (OUTPATIENT)
Dept: HOME HEALTH SERVICES | Facility: HOME HEALTH | Age: 87
End: 2022-05-09
Payer: MEDICARE

## 2022-05-09 VITALS
BODY MASS INDEX: 20.7 KG/M2 | HEIGHT: 63 IN | WEIGHT: 116.8 LBS | DIASTOLIC BLOOD PRESSURE: 61 MMHG | HEART RATE: 70 BPM | RESPIRATION RATE: 14 BRPM | SYSTOLIC BLOOD PRESSURE: 163 MMHG | TEMPERATURE: 97.6 F | OXYGEN SATURATION: 94 %

## 2022-05-09 LAB
ERYTHROCYTE [DISTWIDTH] IN BLOOD BY AUTOMATED COUNT: 14.3 % (ref 11.6–14.5)
HCT VFR BLD AUTO: 41.7 % (ref 35–45)
HGB BLD-MCNC: 13.2 G/DL (ref 12–16)
MCH RBC QN AUTO: 28.3 PG (ref 24–34)
MCHC RBC AUTO-ENTMCNC: 31.7 G/DL (ref 31–37)
MCV RBC AUTO: 89.3 FL (ref 78–100)
NRBC # BLD: 0 K/UL (ref 0–0.01)
NRBC BLD-RTO: 0 PER 100 WBC
PLATELET # BLD AUTO: 377 K/UL (ref 135–420)
PMV BLD AUTO: 9.8 FL (ref 9.2–11.8)
RBC # BLD AUTO: 4.67 M/UL (ref 4.2–5.3)
WBC # BLD AUTO: 14.2 K/UL (ref 4.6–13.2)

## 2022-05-09 PROCEDURE — 77010033678 HC OXYGEN DAILY

## 2022-05-09 PROCEDURE — 94762 N-INVAS EAR/PLS OXIMTRY CONT: CPT

## 2022-05-09 PROCEDURE — 36415 COLL VENOUS BLD VENIPUNCTURE: CPT

## 2022-05-09 PROCEDURE — 74011636637 HC RX REV CODE- 636/637: Performed by: INTERNAL MEDICINE

## 2022-05-09 PROCEDURE — 99239 HOSP IP/OBS DSCHRG MGMT >30: CPT | Performed by: PHYSICIAN ASSISTANT

## 2022-05-09 PROCEDURE — 85027 COMPLETE CBC AUTOMATED: CPT

## 2022-05-09 PROCEDURE — 74011250636 HC RX REV CODE- 250/636: Performed by: INTERNAL MEDICINE

## 2022-05-09 PROCEDURE — APPSS45 APP SPLIT SHARED TIME 31-45 MINUTES: Performed by: PHYSICIAN ASSISTANT

## 2022-05-09 PROCEDURE — 74011000250 HC RX REV CODE- 250: Performed by: INTERNAL MEDICINE

## 2022-05-09 PROCEDURE — 74011250637 HC RX REV CODE- 250/637: Performed by: INTERNAL MEDICINE

## 2022-05-09 RX ORDER — LEVOFLOXACIN 500 MG/1
500 TABLET, FILM COATED ORAL DAILY
Qty: 10 TABLET | Refills: 0 | Status: SHIPPED | OUTPATIENT
Start: 2022-05-09 | End: 2022-05-19

## 2022-05-09 RX ORDER — PREDNISONE 5 MG/1
TABLET ORAL
Qty: 21 TABLET | Refills: 0 | OUTPATIENT
Start: 2022-05-09 | End: 2022-07-07

## 2022-05-09 RX ADMIN — ASPIRIN 81 MG: 81 TABLET, COATED ORAL at 08:34

## 2022-05-09 RX ADMIN — ATORVASTATIN CALCIUM 20 MG: 20 TABLET, FILM COATED ORAL at 08:35

## 2022-05-09 RX ADMIN — CHOLECALCIFEROL TAB 25 MCG (1000 UNIT) 2000 UNITS: 25 TAB at 08:37

## 2022-05-09 RX ADMIN — METOPROLOL TARTRATE 25 MG: 25 TABLET, FILM COATED ORAL at 08:38

## 2022-05-09 RX ADMIN — LOSARTAN POTASSIUM 75 MG: 50 TABLET, FILM COATED ORAL at 08:38

## 2022-05-09 RX ADMIN — LEVOFLOXACIN 750 MG: 5 INJECTION, SOLUTION INTRAVENOUS at 08:40

## 2022-05-09 RX ADMIN — FUROSEMIDE 40 MG: 40 TABLET ORAL at 08:35

## 2022-05-09 RX ADMIN — PREDNISONE 20 MG: 20 TABLET ORAL at 08:37

## 2022-05-09 RX ADMIN — SODIUM CHLORIDE, PRESERVATIVE FREE 10 ML: 5 INJECTION INTRAVENOUS at 08:40

## 2022-05-09 RX ADMIN — ARFORMOTEROL TARTRATE 15 MCG: 15 SOLUTION RESPIRATORY (INHALATION) at 08:03

## 2022-05-09 RX ADMIN — ENOXAPARIN SODIUM 40 MG: 100 INJECTION SUBCUTANEOUS at 09:35

## 2022-05-09 RX ADMIN — NIFEDIPINE 60 MG: 30 TABLET, FILM COATED, EXTENDED RELEASE ORAL at 10:07

## 2022-05-09 RX ADMIN — BUDESONIDE 500 MCG: 0.5 SUSPENSION RESPIRATORY (INHALATION) at 08:03

## 2022-05-09 RX ADMIN — FAMOTIDINE 20 MG: 20 TABLET ORAL at 08:35

## 2022-05-09 NOTE — HOME CARE
Received home health referral for [address] for (SN, PT, OT). Spoke with patient via phone;  patient identifiers verified. Explained home care services and routines. Verbalized understanding as previously with [address]. Demographics verified including insurance, phone and address confirmed. Patient has the following DME: Oxygen, RW, and cane for use. Caregivers available family available for assistance. Orders noted and arranged to be processed to central intake.       ---   Hae Madelin Saint, LPN  [facility] Liaison

## 2022-05-09 NOTE — PROGRESS NOTES
Problem: Falls - Risk of  Goal: *Absence of Falls  Description: Document Xochilt Sanchez Fall Risk and appropriate interventions in the flowsheet.   Outcome: Progressing Towards Goal  Note: Fall Risk Interventions:  Mobility Interventions: Bed/chair exit alarm,Patient to call before getting OOB         Medication Interventions: Bed/chair exit alarm,Patient to call before getting OOB    Elimination Interventions: Call light in reach,Patient to call for help with toileting needs              Problem: Sepsis: Day 4  Goal: Activity/Safety  Outcome: Progressing Towards Goal  Goal: Consults, if ordered  Outcome: Progressing Towards Goal  Goal: Diagnostic Test/Procedures  Outcome: Progressing Towards Goal  Goal: Nutrition/Diet  Outcome: Progressing Towards Goal  Goal: Discharge Planning  Outcome: Progressing Towards Goal  Goal: Respiratory  Outcome: Progressing Towards Goal     Problem: Sepsis: Discharge Outcomes  Goal: *Vital signs within defined limits  Outcome: Progressing Towards Goal  Goal: *Tolerating diet  Outcome: Progressing Towards Goal  Goal: *Verbalizes understanding and describes prescribed diet  Outcome: Progressing Towards Goal  Goal: *Demonstrates progressive activity  Outcome: Progressing Towards Goal     Problem: Pain  Goal: *Control of Pain  Outcome: Progressing Towards Goal     Problem: Patient Education: Go to Patient Education Activity  Goal: Patient/Family Education  Outcome: Progressing Towards Goal

## 2022-05-09 NOTE — PROGRESS NOTES
Home health orders noted. Met with pt and she is in agreement to use [facility] INPAT[facility]. Discharge order noted for today. Pt has been accepted to [facility] agency. Met with patient  and are agreeable to the transition plan today. Transport has been arranged through pt's sister. Patient's discharge summary and home health  orders have been forwarded to [address] [place] [address]  agency. Updated bedside RN, ,  to the transition plan.   Discharge information has been documented on the AVS.               ELÍAS Cuadra RN  Care Management  Pager: [phone]

## 2022-05-09 NOTE — PROGRESS NOTES
Infectious Disease progress Note        Reason: UTI, hypoxia    Current abx Prior abx   Ceftriaxone since 5/6  Levofloxacin restarted 5/6   Cefepime, levofloxacin on 5/5  Azithromycin  5/6     Lines:       Assessment :    61-year-old female with a past medical history of hypertension, CHF, COPD on 4 L of home O2 presented to [facility] ED on 5/5/2022 with fever, chills, dysuria.     Hospitalization 12/2020 for acute on chronic hypoxic respiratory failure secondary to confirmed COVID-19 pneumonia   Status post remdesivir since 12/11/2020-12/15  Status post convalescent plasma 12/11/2020    Recent outpatient diagnosis of cystitis, status post p.o. Macrobid 5/3-5/6     Clinical presentation consistent with partially treated sepsis- POA due to e.coli BSI (positive blood cx 5/5, negative blood cx 5/7) right kidney pyelonephritis, cystitis    Most likely source of e.coli BSI is right kidney pyelonephritis  Urine cx 5/5 likely false negative due to prior outpt abx    Acute on chronic hypoxic respiratory failure-likely secondary to sepsis. No clinical evidence to suggest pneumonia at this time. Patient's baseline oxygen saturation is around 88 to 92% at home on 4 L- currently on 5L     Recommendations:     1.  D/c ceftriaxone. recommend levofloxacin till 5/18  2. Continue steroids per primary team  3. Wean oxygen as tolerated  4. D/c planning per primary team      Above plan was discussed in details with patient,  and dr Krystal Guadarrama. Please call me if any further questions or concerns. Will continue to participate in the care of this patient. HPI:    Feels better. Chelle Mcintosh to go home.      Past Medical History:   Diagnosis Date    Chronic lung disease     Chronic obstructive pulmonary disease ([address]     Heart failure ([address]     Hypertension        Past Surgical History:   Procedure Laterality Date    HX ORTHOPAEDIC      spinal sx 5/6    HX OTHER SURGICAL      Carotid artery    VASCULAR SURGERY PROCEDURE UNLIST      L [facility] 10/2014 home Medication List    Details   furosemide (Lasix) 40 mg tablet Take 40 mg by mouth daily. albuterol (Proventil HFA) 90 mcg/actuation inhaler Take 2 Puffs by inhalation every four (4) hours as needed for Wheezing, Shortness of Breath or Respiratory Distress. Qty: 1 Each, Refills: 0      budesonide-glycopyr-formoterol (Breztri Aerosphere) 160-9-4.8 mcg/actuation HFAA Take  by inhalation. OXYGEN-AIR DELIVERY SYSTEMS 3 L by IntraNASal route continuous. famotidine (PEPCID) 20 mg tablet Take 1 Tab by mouth daily. Qty: 30 Tab, Refills: 0      cholecalciferol (VITAMIN D3) (1000 Units /25 mcg) tablet Take 2 Tabs by mouth daily. Qty: 60 Tab, Refills: 0      atorvastatin (LIPITOR) 20 mg tablet Take 20 mg by mouth daily. losartan (COZAAR) 50 mg tablet Take  by mouth daily. 75 mg in a.m      diclofenac (VOLTAREN) 1 % gel Apply  to affected area four (4) times daily. Qty: 100 g, Refills: 2      Oxygen       albuterol (PROVENTIL VENTOLIN) 2.5 mg /3 mL (0.083 %) nebulizer solution 3 mL by Nebulization route every four (4) hours as needed for Wheezing or Shortness of Breath. Qty: 48 Each, Refills: 0      acetaminophen (TYLENOL EXTRA STRENGTH) 500 mg tablet Take 500 mg by mouth every six (6) hours as needed for Pain.      metoprolol (LOPRESSOR) 25 mg tablet Take 25 mg by mouth two (2) times a day. NIFEdipine ER (ADALAT CC) 60 mg ER tablet Take 60 mg by mouth daily. Associated Diagnoses: Occlusion and stenosis of carotid artery, left; Pre-op testing      alprazolam (XANAX) 0.5 mg tablet Take 0.5 mg by mouth nightly. Associated Diagnoses: Occlusion and stenosis of carotid artery, left; Pre-op testing      aspirin delayed-release 81 mg tablet Take 81 mg by mouth daily.     Associated Diagnoses: Occlusion and stenosis of carotid artery, left; Pre-op testing      predniSONE (DELTASONE) 10 mg tablet Take one tab PO tid  for 5 days, then 1 tab PO BID for 5 days, and then 1 tab PO daily for 5 days,  Qty: 30 Tablet, Refills: 0      nystatin (MYCOSTATIN) 100,000 unit/mL suspension Take 5 mL by mouth two (2) times a day. swish and spit  Qty: 60 mL, Refills: 2      COQ10, UBIQUINOL, PO Take 1 Cap by mouth daily.              Current Facility-Administered Medications   Medication Dose Route Frequency    predniSONE (DELTASONE) tablet 20 mg  20 mg Oral BID WITH MEALS    cefTRIAXone (ROCEPHIN) 2 g in sterile water (preservative free) 20 mL IV syringe  2 g IntraVENous Q24H    levoFLOXacin (LEVAQUIN) 750 mg in D5W IVPB  750 mg IntraVENous Q48H    acetaminophen (TYLENOL) tablet 325 mg  325 mg Oral Q4H PRN    albuterol (PROVENTIL VENTOLIN) nebulizer solution 2.5 mg  2.5 mg Nebulization Q4H PRN    ALPRAZolam (XANAX) tablet 0.5 mg  0.5 mg Oral QHS    aspirin delayed-release tablet 81 mg  81 mg Oral DAILY    atorvastatin (LIPITOR) tablet 20 mg  20 mg Oral DAILY    budesonide (PULMICORT) 500 mcg/2 ml nebulizer suspension  500 mcg Nebulization BID RT    arformoteroL (BROVANA) neb solution 15 mcg  15 mcg Nebulization BID RT    cholecalciferol (VITAMIN D3) (1000 Units /25 mcg) tablet 2,000 Units  2,000 Units Oral DAILY    famotidine (PEPCID) tablet 20 mg  20 mg Oral DAILY    furosemide (LASIX) tablet 40 mg  40 mg Oral DAILY    losartan (COZAAR) tablet 75 mg  75 mg Oral DAILY    metoprolol tartrate (LOPRESSOR) tablet 25 mg  25 mg Oral BID    NIFEdipine ER (PROCARDIA XL) tablet 60 mg  60 mg Oral DAILY    sodium chloride (NS) flush 5-40 mL  5-40 mL IntraVENous Q8H    sodium chloride (NS) flush 5-40 mL  5-40 mL IntraVENous PRN    acetaminophen (TYLENOL) tablet 650 mg  650 mg Oral Q6H PRN    Or    acetaminophen (TYLENOL) suppository 650 mg  650 mg Rectal Q6H PRN    polyethylene glycol (MIRALAX) packet 17 g  17 g Oral DAILY PRN    ondansetron (ZOFRAN ODT) tablet 4 mg  4 mg Oral Q8H PRN    Or    ondansetron (ZOFRAN) injection 4 mg  4 mg IntraVENous Q6H PRN    enoxaparin (LOVENOX) injection 40 mg  40 mg SubCUTAneous DAILY       Allergies: Patient has no known allergies. Family History   Problem Relation Age of Onset    Heart Disease Mother     Hypertension Mother     Heart Attack Father     Hypertension Father      Social History     Socioeconomic History    Marital status:      Spouse name: Not on file    Number of children: Not on file    Years of education: Not on file    Highest education level: Not on file   Occupational History    Not on file   Tobacco Use    Smoking status: Former Smoker     Packs/day: 1.50     Years: 30.00     Pack years: 45.00     Types: Cigarettes     Quit date: 1987     Years since quittin.6    Smokeless tobacco: Never Used   Substance and Sexual Activity    Alcohol use: No     Alcohol/week: 0.0 standard drinks    Drug use: No    Sexual activity: Never   Other Topics Concern    Not on file   Social History Narrative    Not on file     Social Determinants of Health     Financial Resource Strain:     Difficulty of Paying Living Expenses: Not on file   Food Insecurity:     Worried About Running Out of Food in the Last Year: Not on file    Masood of Food in the Last Year: Not on file   Transportation Needs:     Lack of Transportation (Medical): Not on file    Lack of Transportation (Non-Medical):  Not on file   Physical Activity:     Days of Exercise per Week: Not on file    Minutes of Exercise per Session: Not on file   Stress:     Feeling of Stress : Not on file   Social Connections:     Frequency of Communication with Friends and Family: Not on file    Frequency of Social Gatherings with Friends and Family: Not on file    Attends [employer] Services: Not on file    Active Member of Clubs or Organizations: Not on file    Attends Club or Organization Meetings: Not on file    Marital Status: Not on file   Intimate Partner Violence:     Fear of Current or Ex-Partner: Not on file    Emotionally Abused: Not on file    Physically Abused: Not on file    Sexually Abused: Not on file   Housing Stability:     Unable to Pay for Housing in the Last Year: Not on file    Number of Places Lived in the Last Year: Not on file    Unstable Housing in the Last Year: Not on file     Social History     Tobacco Use   Smoking Status Former Smoker    Packs/day: 1.50    Years: 30.00    Pack years: 45.00    Types: Cigarettes    Quit date: 1987    Years since quittin.6   Smokeless Tobacco Never Used        Temp (24hrs), Av.5 °F (36.4 °C), Min:96.4 °F (35.8 °C), Max:98 °F (36.7 °C)    Visit Vitals  BP (!) 149/55 (BP 1 Location: Right upper arm, BP Patient Position: At rest)   Pulse 60   Temp 97.9 °F (36.6 °C)   Resp 12   Ht 5' 3\" (1.6 m)   Wt 53 kg (116 lb 12.8 oz)   SpO2 94%   BMI 20.69 kg/m²       ROS: 12 point ROS obtained in details. Pertinent positives as mentioned in HPI,   otherwise negative    Physical Exam:    General: Well developed, well nourished female sitting on the  bed AAOx3 in no acute distress. General:   awake alert and oriented   HEENT:  Normocephalic, atraumatic, EOMI, no scleral icterus or pallor; no conjunctival hemmohage;  nasal and oral mucous are moist and without evidence of lesions. . Neck supple, no bruits. Lymph Nodes:   no cervical, axillary or inguinal adenopathy   Lungs:   non-labored, decreased breath sounds bilaterally, no rales or wheezing appreciated   Heart:  RRR, s1 and s2; no edema   Abdomen:  soft, non-distended, active bowel sounds, no hepatomegaly, no splenomegaly. Non-tender   Genitourinary:  deferred   Extremities:   no clubbing, cyanosis; no joint effusions or swelling; Full ROM of all large joints to the upper and lower extremities; muscle mass appropriate for age   Neurologic:  No gross focal sensory abnormalities; 5/5 muscle strength to upper and lower extremities. Speech appropriate.  Cranial nerves intact                        Skin:  No rash or ulcers noted   Back:  no spinal or paraspinal muscle tenderness or rigidity, no CVA tenderness     Psychiatric:  No suicidal or homicidal ideations, appropriate mood and affect         Labs: Results:   Chemistry Recent Labs     05/08/22  0539 05/07/22  0030   * 267*    137   K 4.4 4.5    106   CO2 25 23   BUN 29* 35*   CREA 1.00 1.25   CA 8.5 7.9*   AGAP 6 8   BUCR 29* 28*      CBC w/Diff Recent Labs     05/08/22  0539 05/07/22  0030   WBC 10.7 12.9   RBC 4.53 4.22   HGB 12.4 11.9*   HCT 40.1 37.4    274      Microbiology Recent Labs     05/07/22  0547   CULT NO GROWTH 2 DAYS          RADIOLOGY:    All available imaging studies/reports in [facility] care for this admission were reviewed      Disclaimer: Sections of this note are dictated utilizing voice recognition software, which may have resulted in some phonetic based errors in grammar and contents. Even though attempts were made to correct all the mistakes, some may have been missed, and remained in the body of the document. If questions arise, please contact our department.     Dr. Anai Duran, Infectious Disease Specialist  [phone]  May 9, 2022  10:51 AM

## 2022-05-09 NOTE — PROGRESS NOTES
Bedside and Verbal shift change report given to Chrissy Francisco (oncoming nurse) by Harpal Valero RN (offgoing nurse). Report included the following information SBAR, Kardex, Intake/Output and Cardiac Rhythm Sinus Gregory Cervantes.

## 2022-05-09 NOTE — DISCHARGE SUMMARY
[facility]    Discharge Summary    Patient: Caitlin Valdivia MRN: [id number]  CSN: [id number]    YOB: 1935  Age: 82 y.o. Sex: female    DOA: 5/5/2022 LOS:  LOS: 3 days   Discharge Date:      Admission Diagnoses: Pyelonephritis [N12]  Sepsis ([facility] [A41.9]    Discharge Diagnoses:    Hospital Problems  Date Reviewed: 11/14/2019          Codes Class Noted POA    E coli bacteremia ICD-10-CM: R78.81, B96.20  ICD-9-CM: 790.7, 041.49  5/8/2022 Yes        * (Principal) Sepsis ([facility] ICD-10-CM: A41.9  ICD-9-CM: 038.9, 995.91  5/6/2022 Yes        Pyelonephritis ICD-10-CM: N12  ICD-9-CM: 590.80  5/5/2022 Yes        Acute on chronic respiratory failure with hypoxia ([facility] ICD-10-CM: J96.21  ICD-9-CM: 518.84, 799.02  Unknown Yes        Chronic obstructive pulmonary disease with acute exacerbation ([facility] ICD-10-CM: J44.1  ICD-9-CM: 491.21  2/22/2021 Yes        Essential hypertension with goal blood pressure less than 140/90 ICD-10-CM: I10  ICD-9-CM: 401.9  7/1/2016 Yes    Overview Signed 7/1/2016  1:23 PM by Maci Looney MD     controlled                   Discharge Condition: Stable    Discharge To: Home    Consults: Infectious Disease    HPI: Per admitting provider, \"Ms. Matt Burdick is a very pleasant 79-year-old female with a past medical history of hypertension, CHF, COPD on 4 L of home O2 who is presenting with dysuria and frequency. She started develop fevers and chills a few days ago and she had been seen in her PCPs office was diagnosed with a urinary tract infection. She had initially started on Macrobid of which she is taken about 2 doses. She is coming back in to the ER today with complaints of unchanged dysuria and frequency. Additionally she has now developed some right-sided low back pain. She denies any blood in her stools. She has been having tremors and shakes since at least Tuesday. She tells me these are improved with Tylenol as well as Xanax. In the ER she had a T-max of 99.0, pulse was 89 BP was 135/92 and her O2 sats were 93% on 4 L.  UA revealed positive nitrites, moderate leukocyte esterase, 11-20 WBCs and few bacteria. Blood and urine cultures were sent and are currently pending. She was started on levofloxacin and cefepime. A CT scan of the abdomen and pelvis was performed which shows very subtle hypoenhancement in the right renal cortex concerning for pyelonephritis. Chest x-ray done in the ER shows interstitial pneumonitis not significantly changed when compared to prior imaging.      Upon presentation of the floor the patient had gotten up to go to the bathroom and was wearing her oxygen. When she returned back to bed vital signs were obtained by nursing staff and it was noted that she had O2 sats in the low 80s and upper 70s. She was restarted on her oxygen and had a nonrebreather placed. Ultimately a rapid response was called for hypoxia. SBP noted ot be in the 180's with sinus tachycardia on EKG.    In regards to her hypoxia, the patient has a hx of severe  COPD/emphysema the patient is followed as an outpatient by pulmonology. She has 4 L of O2 at home and her baseline O2 sats ranged between 88 and 91%. She has a 30-pack-year history of smoking. She reports a chronic dry cough. She tells me she is never quite recovered since her last hospitalization 6 months ago. She does follow with Dr. Pallavi De Los Santos with [facility] Course:     Sepsis 2/2 pyelonephritis, E coli bacteremia - due to failed outpatient treatment on UTI with macrobid. - ID was consulted. Will continue 500mg PO levaquin through 5/18 per their recommendations. - 1/2 sets of BCx from 5/5 +E coli (res to amp, unasyn)  - repeat BCx 5/7 remains negative  - UCx negative - likely false negative per ID     Acute on chronic resp failure with hypoxia - due to COPD with AE. Was previously using 4L O2 at home.  Patient required HFNC and was started on IV steroids, duonebs, pulmicort, brovana. She showed improvement, and is now back to baseline O2 saturations (88-91%) on 5L NC. Patient stating she feels back to normal and wanting to go home.     HTN  - home medications continued      Patient stating that she doesn't think she needs home health, but wouldn't mind having evaluations at home. She stated she absolutely would not go to a SNF for rehab. Home health ordered. Physical Exam:  General appearance: alert, cooperative, no distress, appears stated age  Head: Normocephalic, without obvious abnormality, atraumatic  Lungs: clear to auscultation bilaterally, no resp distress  Heart: regular rate and rhythm, S1, S2 normal, no murmur, click, rub or gallop  Extremities: no cyanosis or edema  Skin: Skin color, texture normal. No rashes or lesions  PSY: Mood and affect normal, appropriately behaved    Significant Diagnostic Studies:     Recent Results (from the past 24 hour(s))   CBC W/O DIFF    Collection Time: 05/09/22  9:39 AM   Result Value Ref Range    WBC 14.2 (H) 4.6 - 13.2 K/uL    RBC 4.67 4.20 - 5.30 M/uL    HGB 13.2 12.0 - 16.0 g/dL    HCT 41.7 35.0 - 45.0 %    MCV 89.3 78.0 - 100.0 FL    MCH 28.3 24.0 - 34.0 PG    MCHC 31.7 31.0 - 37.0 g/dL    RDW 14.3 11.6 - 14.5 %    PLATELET 660 459 - 309 K/uL    MPV 9.8 9.2 - 11.8 FL    NRBC 0.0 0  WBC    ABSOLUTE NRBC 0.00 0.00 - 0.01 K/uL       CT ABD PELV W CONT    Result Date: 5/5/2022  CT of abdomen and pelvis with contrast INDICATION: flank pain, LQ abdominal pain COMPARISON: 10/27/21. TECHNIQUE: 5 mm helical scan to the abdomen and pelvis is obtained  from the diaphragm to the symphysis pubis after uneventful nonionic IV contrast administration.  All CT scans at this facility performed using dose optimization techniques as appreciated to a performed exam, to include automated exposure control, adjustment of the mA and or KU according to patient size (including appropriate matching for site specific examination), or use of iterative reconstruction technique. FINDINGS: Lung Bases: Moderate interstitial reticular opacities and emphysematous changes appear unchanged. Liver: Normal. Gallbladder: Normal. Biliary System: No ductal dilatation. Spleen: Normal. Pancreas: Normal. Bowel: Small hiatal hernia. The small and large bowel are nondilated. Normal appendix. Numerous diverticula throughout the colon, more pronounced in sigmoid colon. No acute inflammation. Adrenal Glands: Normal. Kidneys: 1.0 x 1.2 cm cortical cyst at the posterior left kidney present. In the lateral inferior aspect of right renal cortex, there is subtle hypoenhancement with ill-defined border noted and roughly measures 2.2 x 3.1 cm. Lower genitourinary system: The bladder is moderately distended. The uterus appears atrophic. No adnexal mass. Peritoneum/Retroperitoneum: No adenopathy. Vasculature: Unremarkable for age. Other:  No free fluid. CT OSSEOUS STRUCTURES:   Unremarkable for age. 1.  Subtle ill-defined area of cortical hypoenhancement at lateral inferior right renal cortex, concerning focal pyelonephritis rather than discrete mass. Recommend clinical correlation and follow-up CT for interval resolution. 2.  Small left renal cyst. 3.  Diverticulosis coli. Thank you for this referral.     XR CHEST PORT    Result Date: 5/5/2022  EXAM: Chest Radiograph INDICATION:  meets SIRS criteria TECHNIQUE: AP view of the chest COMPARISON: 12/7/2021, 10/24/2021, 10/21/2021 FINDINGS: No pneumothorax identified. The lungs are hyperinflated. Chronic interstitial lung changes are noted bilaterally. However, there appears to be increased interstitial thickening in the mid to lower lung fields. No effusions identified. The cardiac silhouette is enlarged. This is unchanged. The pulmonary vasculature is unremarkable. The osseous structures are unremarkable. 1.  Findings concerning for interstitial pneumonitis.        Procedures: None    Discharge Medications: Current Discharge Medication List      START taking these medications    Details   predniSONE (STERAPRED) 5 mg dose pack See administration instruction per 5mg dose pack  Qty: 21 Tablet, Refills: 0      levoFLOXacin (Levaquin) 500 mg tablet Take 1 Tablet by mouth daily for 10 days. Qty: 10 Tablet, Refills: 0         CONTINUE these medications which have NOT CHANGED    Details   furosemide (Lasix) 40 mg tablet Take 40 mg by mouth daily. albuterol (Proventil HFA) 90 mcg/actuation inhaler Take 2 Puffs by inhalation every four (4) hours as needed for Wheezing, Shortness of Breath or Respiratory Distress. Qty: 1 Each, Refills: 0      budesonide-glycopyr-formoterol (Breztri Aerosphere) 160-9-4.8 mcg/actuation HFAA Take  by inhalation. OXYGEN-AIR DELIVERY SYSTEMS 5 L by IntraNASal route continuous. famotidine (PEPCID) 20 mg tablet Take 1 Tab by mouth daily. Qty: 30 Tab, Refills: 0      cholecalciferol (VITAMIN D3) (1000 Units /25 mcg) tablet Take 2 Tabs by mouth daily. Qty: 60 Tab, Refills: 0      atorvastatin (LIPITOR) 20 mg tablet Take 20 mg by mouth daily. losartan (COZAAR) 50 mg tablet Take  by mouth daily. 75 mg in a.m      diclofenac (VOLTAREN) 1 % gel Apply  to affected area four (4) times daily. Qty: 100 g, Refills: 2      Oxygen       albuterol (PROVENTIL VENTOLIN) 2.5 mg /3 mL (0.083 %) nebulizer solution 3 mL by Nebulization route every four (4) hours as needed for Wheezing or Shortness of Breath. Qty: 48 Each, Refills: 0      acetaminophen (TYLENOL EXTRA STRENGTH) 500 mg tablet Take 500 mg by mouth every six (6) hours as needed for Pain.      metoprolol (LOPRESSOR) 25 mg tablet Take 25 mg by mouth two (2) times a day. NIFEdipine ER (ADALAT CC) 60 mg ER tablet Take 60 mg by mouth daily. Associated Diagnoses: Occlusion and stenosis of carotid artery, left; Pre-op testing      alprazolam (XANAX) 0.5 mg tablet Take 0.5 mg by mouth nightly.     Associated Diagnoses: Occlusion and stenosis of carotid artery, left; Pre-op testing      aspirin delayed-release 81 mg tablet Take 81 mg by mouth daily. Associated Diagnoses: Occlusion and stenosis of carotid artery, left; Pre-op testing      nystatin (MYCOSTATIN) 100,000 unit/mL suspension Take 5 mL by mouth two (2) times a day. swish and spit  Qty: 60 mL, Refills: 2      COQ10, UBIQUINOL, PO Take 1 Cap by mouth daily.          STOP taking these medications       predniSONE (DELTASONE) 10 mg tablet Comments:   Reason for Stopping:               Activity: Activity as tolerated    Diet: Cardiac Diet    Wound Care: None needed    Follow-up: 1 week with PCP    Discharge time: >30 minutes spent coordinating this discharge    Gayatri Griffith PA-C  [address] [facility]  Office:  [phone]  Pager: [phone]      5/9/2022, 10:36 AM

## 2022-05-09 NOTE — DISCHARGE INSTRUCTIONS
Patient Education        Home Blood Pressure Test: About This Test  What is it? A home blood pressure test allows you to keep track of your blood pressure at home. Blood pressure is a measure of the force of blood against the walls of your arteries. Blood pressure readings include two numbers, such as 130/80 (say \"130 over 80\"). The first number is the systolic pressure. The second number is the diastolic pressure. Why is this test done? You may do this test at home to:  · Find out if you have high blood pressure. · Track your blood pressure if you have high blood pressure. · Track how well medicine is working to reduce high blood pressure. · Check how lifestyle changes, such as weight loss and exercise, are affecting blood pressure. How do you prepare for the test?  For at least 30 minutes before you take your blood pressure, don't exercise, drink caffeine, or smoke. Empty your bladder before the test. Sit quietly with your back straight and both feet on the floor for at least 5 minutes. This helps you take your blood pressure while you feel comfortable and relaxed. How is the test done? · If your doctor recommends it, take your blood pressure twice a day. Take it in the morning and evening. · Sit with your arm slightly bent and resting on a table so that your upper arm is at the same level as your heart. · Use the same arm each time you take your blood pressure. · Place the blood pressure cuff on the bare skin of your upper arm. You may have to roll up your sleeve, remove your arm from the sleeve, or take your shirt off. · Wrap the blood pressure cuff around your upper arm so that the lower edge of the cuff is about 1 inch above the bend of your elbow. · Do not move, talk, or text while you take your blood pressure. Follow the instructions that came with your blood pressure monitor. They might be different from the following. · Press the on/off button on the automatic monitor.  Then you may need to wait until the screen says the monitor is ready. · Press the start button. The cuff will inflate and deflate by itself. · Your blood pressure numbers will appear on the screen. · Wait one minute and take your blood pressure again. · If your monitor does not automatically save your numbers, write them in your log book, along with the date and time. Follow-up care is a key part of your treatment and safety. Be sure to make and go to all appointments, and call your doctor if you are having problems. It's also a good idea to keep a list of the medicines you take. Where can you learn more? Go to http://www.still.com/  Enter C427 in the search box to learn more about \"Home Blood Pressure Test: About This Test.\"  Current as of: January 10, 2022               Content Version: 13.2  © 2006-2022 [employer]. Care instructions adapted under license by [employer] (which disclaims liability or warranty for this information). If you have questions about a medical condition or this instruction, always ask your healthcare professional. [place] [address] any warranty or liability for your use of this information. Patient Education        Chronic Obstructive Pulmonary Disease (COPD): Care Instructions  Overview     Chronic obstructive pulmonary disease (COPD) is a lung disease that makes it hard to breathe. With COPD, the airways that lead to the lungs are narrowed, and the tiny air sacs in the lungs are damaged and lose their stretch. People with COPD have decreased airflow in and out of the lungs, which makes it hard to breathe. The airways also can get clogged with thick mucus. Cigarette smoking is a major cause of COPD. Although there is no cure for COPD, you can slow its progress. Following your treatment plan and taking care of yourself can help you feel better and live longer. Follow-up care is a key part of your treatment and safety.  Be sure to make and go to all appointments, and call your doctor if you are having problems. It's also a good idea to know your test results and keep a list of the medicines you take. How can you care for yourself at home? Staying healthy    · Do not smoke. This is the most important step you can take to prevent more damage to your lungs. If you need help quitting, talk to your doctor about stop-smoking programs and medicines. These can increase your chances of quitting for good.     · Avoid colds and other infections. Get the pneumococcal and whooping cough (pertussis) vaccines. If you have had these vaccines before, ask your doctor if you need another dose. Get the flu vaccine every fall. If you must be around people with colds or the flu, wash your hands often.     · Avoid secondhand smoke and air pollution. Try to stay inside with your windows closed when air pollution is bad. Medicines and oxygen therapy    · Take your medicines exactly as prescribed. Call your doctor if you think you are having a problem with your medicine. You may be taking medicines such as:  ? Bronchodilators. These help open your airways and make breathing easier. They are either short-acting (work for 4 to 9 hours) or long-acting (work for 12 to 24 hours). You inhale most bronchodilators, so they start to act quickly. Always carry your quick-relief inhaler with you in case you need it. ? Corticosteroids (prednisone, budesonide). These reduce airway inflammation. They come in inhaled or pill form.     · Ask your doctor or pharmacist if you are using each type of inhaler correctly. With correct use, the medicine is more likely to get to your lungs.     · See if a spacer is right for you. A spacer may also help you get more inhaled medicine to your lungs.  If you use one, ask how to use it properly.     · Do not take any vitamins, over-the-counter medicine, or herbal products without talking to your doctor first.     · If your doctor prescribed antibiotics, take them as directed. Do not stop taking them just because you feel better. You need to take the full course of antibiotics.     · If you use oxygen therapy, use the flow rate your doctor has recommended. Don't change it without talking to your doctor first. Oxygen therapy boosts the amount of oxygen in your blood and helps you breathe easier. Activity    · Get regular exercise. Walking is an easy way to get exercise. Start out slowly, and walk a little more each day.     · Pay attention to your breathing. You are exercising too hard if you can't talk while you exercise.     · Take short rest breaks when doing household chores and other activities.     · Learn breathing methods--such as breathing through pursed lips--to help you become less short of breath.     · If your doctor has not set you up with a pulmonary rehabilitation program, ask if rehab is right for you. Rehab includes exercise programs, education about your disease and how to manage it, help with diet and other changes, and emotional support. Diet    · Eat regular, healthy meals.     · Use bronchodilators about 1 hour before you eat to make it easier to eat.     · Eat several smaller, frequent meals to prevent getting too full. A full stomach can push on the muscle that helps you breathe (your diaphragm) and make it harder to breathe.     · Drink beverages at the end of the meal.     · Avoid foods that are hard to chew.     · Eat foods that contain protein so you don't lose muscle mass.     · Talk with your doctor if you gain too much weight or if you lose weight without trying. Mental health    · Talk to your family, friends, or a therapist about your feelings. Some people feel frightened, angry, hopeless, helpless, and even guilty. Talking openly about feelings may help you cope. If these feelings last, talk to your doctor. When should you call for help? Call 911 anytime you think you may need emergency care.  For example, call if:    · You have severe trouble breathing.     · You are having chest pain that is different or worse than usual.   Call your doctor now or seek immediate medical care if:    · You have new or worse trouble breathing.     · You cough up blood.     · You have a fever.     · You have feelings of anxiety or depression. Watch closely for changes in your health, and be sure to contact your doctor if:    · You cough more deeply or more often, especially if you notice more mucus or a change in the color of your mucus.     · You have new or worse swelling in your legs or belly.     · You are not getting better as expected. Where can you learn more? Go to http://www.gray.com/  Enter D537 in the search box to learn more about \"Chronic Obstructive Pulmonary Disease (COPD): Care Instructions. \"  Current as of: July 6, 2021               Content Version: 13.2  © 2006-2022 [employer]. Care instructions adapted under license by [facility] (which disclaims liability or warranty for this information). If you have questions about a medical condition or this instruction, always ask your healthcare professional. [place] [address] any warranty or liability for your use of this information. Patient Education        E. Coli Infection in Children: Care Instructions  Overview     E. coli is the name of a germ, or bacterium, that can live in your child's stomach and intestines. Some types of E. coli can cause illness and symptoms, such as bloody diarrhea and cramps. Symptoms of E. coli infection usually end in about 1 week with no further problems. But some children have severe blood and kidney problems. People in the [place] most often get an E. coli infection from eating meat that has been contaminated with E. coli.  Your child can also get the infection from eating raw fruits and vegetables or dairy products that are contaminated with the bacteria. And your child can get it from others who are infected. Follow-up care is a key part of your child's treatment and safety. Be sure to make and go to all appointments, and call your doctor if your child is having problems. It's also a good idea to know your child's test results and keep a list of the medicines your child takes. How can you care for your child at home? · E. coli usually goes away on its own. Your child usually doesn't need antibiotics. · Do not use over-the-counter antidiarrheal medicine if your child has diarrhea. These products include Imodium or Maalox Anti-Diarrheal.  · Start to offer small amounts of food when your child feels like eating again. · Give your child lots of fluids a little at a time. This is very important if your child is vomiting or has diarrhea. Give your child sips of water or drinks such as Pedialyte or Infalyte. These drinks contain a mix of salt, sugar, and minerals. You can buy them at drugstores or grocery stores. Give these drinks as long as your child is throwing up or has diarrhea. Do not use them as the only source of liquids or food for more than 12 to 24 hours. To prevent  E. coli  infection   · Never eat raw or undercooked ground beef or pork. Cook ground meat to a temperature of at least 160° F. Always use a meat thermometer. Ground beef should be cooked until all pink color is gone. · Cut open restaurant and home-cooked hamburgers to make sure that they have been completely cooked. The juices should be clear or yellowish, with no trace of pink. · When preparing food, wash your hands often with hot, soapy water, especially after handling raw meat. · Always wash cooking tools, cutting boards, dishes, countertops, and utensils with hot, soapy water right after they have come into contact with raw meat. Do not put cooked meat back onto a plate that has held raw meat unless you have thoroughly washed the plate.   · Use separate cutting boards for raw meat and for other food items. · Keep raw meat, poultry, and seafood separate from vegetables, fruits, breads, and other foods that have already been prepared for eating. · Use only pasteurized milk, dairy, and juice products. Check product labels for the word \"pasteurized. \" Juice made from concentrate is the same as pasteurized. · Wash raw fruits and vegetables under running water before eating them. To prevent spreading  E. coli   · Be sure your child washes their hands often, and always washes them after bowel movements. If your home has more than one bathroom, have your child use one bathroom while your child is sick and ask the rest of your family to use the other bathroom. · Dispose of soiled diapers and stools carefully. · Keep your child from contact with other children during the infection. Don't let your child go swimming. When should you call for help? Call 911 anytime you think your child may need emergency care. For example, call if:    · Your child passes out (loses consciousness). Call your doctor now or seek immediate medical care if:    · Your child has new or worse belly pain.     · Your child has a new or higher fever.     · Your child is dizzy or lightheaded, or feels about to faint.     · Your child has symptoms of dehydration, such as:  ? Dry eyes and a dry mouth. ? Passing only a little urine. ? Feeling thirstier than normal.     · Your child cannot keep down medicine or fluids.     · Your child has new or more blood in stools.     · Your child has new or worse vomiting or diarrhea.     · Your child has new swelling.     · Your child is very pale.     · Your child has new bruises or blood spots under their skin. Watch closely for changes in your child's health, and be sure to contact your doctor if:    · Your child does not get better as expected. Where can you learn more?   Go to http://www.gray.com/  Enter X186 in the search box to learn more about \"E. Coli Infection in Children: Care Instructions. \"  Current as of: July 1, 2021               Content Version: 13.2  © 2006-2022 [employer]. Care instructions adapted under license by [employer] (which disclaims liability or warranty for this information). If you have questions about a medical condition or this instruction, always ask your healthcare professional. [facility] [address] any warranty or liability for your use of this information. Patient Education        High Blood Pressure: Care Instructions  Overview     It's normal for blood pressure to go up and down throughout the day. But if it stays up, you have high blood pressure. Another name for high blood pressure is hypertension. Despite what a lot of people think, high blood pressure usually doesn't cause headaches or make you feel dizzy or lightheaded. It usually has no symptoms. But it does increase your risk of stroke, heart attack, and other problems. You and your doctor will talk about your risks of these problems based on your blood pressure. Your doctor will give you a goal for your blood pressure. Your goal will be based on your health and your age. Lifestyle changes, such as eating healthy and being active, are always important to help lower blood pressure. You might also take medicine to reach your blood pressure goal.  Follow-up care is a key part of your treatment and safety. Be sure to make and go to all appointments, and call your doctor if you are having problems. It's also a good idea to know your test results and keep a list of the medicines you take. How can you care for yourself at home? Medical treatment  · If you stop taking your medicine, your blood pressure will go back up. You may take one or more types of medicine to lower your blood pressure. Be safe with medicines. Take your medicine exactly as prescribed.  Call your doctor if you think you are having a problem with your medicine. · Talk to your doctor before you start taking aspirin every day. Aspirin can help certain people lower their risk of a heart attack or stroke. But taking aspirin isn't right for everyone, because it can cause serious bleeding. · See your doctor regularly. You may need to see the doctor more often at first or until your blood pressure comes down. · If you are taking blood pressure medicine, talk to your doctor before you take decongestants or anti-inflammatory medicine, such as ibuprofen. Some of these medicines can raise blood pressure. · Learn how to check your blood pressure at home. Lifestyle changes  · Stay at a healthy weight. This is especially important if you put on weight around the waist. Losing even 10 pounds can help you lower your blood pressure. · If your doctor recommends it, get more exercise. Walking is a good choice. Bit by bit, increase the amount you walk every day. Try for at least 30 minutes on most days of the week. You also may want to swim, bike, or do other activities. · Avoid or limit alcohol. Talk to your doctor about whether you can drink any alcohol. · Try to limit how much sodium you eat to less than 2,300 milligrams (mg) a day. Your doctor may ask you to try to eat less than 1,500 mg a day. · Eat plenty of fruits (such as bananas and oranges), vegetables, legumes, whole grains, and low-fat dairy products. · Lower the amount of saturated fat in your diet. Saturated fat is found in animal products such as milk, cheese, and meat. Limiting these foods may help you lose weight and also lower your risk for heart disease. · Do not smoke. Smoking increases your risk for heart attack and stroke. If you need help quitting, talk to your doctor about stop-smoking programs and medicines. These can increase your chances of quitting for good. When should you call for help? Call 911  anytime you think you may need emergency care.  This may mean having symptoms that suggest that your blood pressure is causing a serious heart or blood vessel problem. Your blood pressure may be over 180/120. For example, call 911 if:    · You have symptoms of a heart attack. These may include:  ? Chest pain or pressure, or a strange feeling in the chest.  ? Sweating. ? Shortness of breath. ? Nausea or vomiting. ? Pain, pressure, or a strange feeling in the back, neck, jaw, or upper belly or in one or both shoulders or arms. ? Lightheadedness or sudden weakness. ? A fast or irregular heartbeat.     · You have symptoms of a stroke. These may include:  ? Sudden numbness, tingling, weakness, or loss of movement in your face, arm, or leg, especially on only one side of your body. ? Sudden vision changes. ? Sudden trouble speaking. ? Sudden confusion or trouble understanding simple statements. ? Sudden problems with walking or balance. ? A sudden, severe headache that is different from past headaches.     · You have severe back or belly pain. Do not wait until your blood pressure comes down on its own. Get help right away. Call your doctor now or seek immediate care if:    · Your blood pressure is much higher than normal (such as 180/120 or higher), but you don't have symptoms.     · You think high blood pressure is causing symptoms, such as:  ? Severe headache.  ? Blurry vision. Watch closely for changes in your health, and be sure to contact your doctor if:    · Your blood pressure measures higher than your doctor recommends at least 2 times. That means the top number is higher or the bottom number is higher, or both.     · You think you may be having side effects from your blood pressure medicine. Where can you learn more? Go to http://www.gray.[employer]/  Enter [id number] in the search box to learn more about \"High Blood Pressure: Care Instructions. \"  Current as of: January 10, 2022               Content Version: 13.2  © 5332-6476 Healthwise, [facility].    Care instructions adapted under license by [employer] (which disclaims liability or warranty for this information). If you have questions about a medical condition or this instruction, always ask your healthcare professional. [address] any warranty or liability for your use of this information. Patient Education        Kidney Infection: Care Instructions  Your Care Instructions     A kidney infection (pyelonephritis) is a type of urinary tract infection, or UTI. Most UTIs are bladder infections. Kidney infections tend to make people much sicker than bladder infections do. A kidney infection is also more serious because it can cause lasting damage if it is not treated quickly. Follow-up care is a key part of your treatment and safety. Be sure to make and go to all appointments, and call your doctor if you are having problems. It's also a good idea to know your test results and keep a list of the medicines you take. How can you care for yourself at home? · Take your antibiotics as directed. Do not stop taking them just because you feel better. You need to take the full course of antibiotics. · Drink plenty of water. This may help wash out bacteria that are causing the infection. If you have kidney, heart, or liver disease and have to limit fluids, talk with your doctor before you increase the amount of fluids you drink. · Urinate often. Try to empty your bladder each time. · To relieve pain, take a hot shower or lay a heating pad (set on low) over your lower belly. Never go to sleep with a heating pad in place. Put a thin cloth between the heating pad and your skin. To help prevent kidney infections  · Drink plenty of water each day. This helps you urinate often, which clears bacteria from your system. If you have kidney, heart, or liver disease and have to limit fluids, talk with your doctor before you increase the amount of fluids you drink.   · Urinate when you have the urge. Do not hold your urine for a long time. Urinate before you go to sleep. · If you have symptoms of a bladder infection, such as burning when you urinate or having to urinate often, call your doctor so you can treat the problem before it gets worse. If you do not treat a bladder infection quickly, it can spread to the kidney. · Men should keep the tip of the penis clean. If you are a woman, keep these ideas in mind:  · Urinate right after you have sex. · Change sanitary pads often. Avoid douches, feminine hygiene sprays, and other feminine hygiene products that have deodorants. · After going to the bathroom, wipe from front to back. When should you call for help? Call your doctor now or seek immediate medical care if:    · You have symptoms that a kidney infection is getting worse. These may include:  ? Pain or burning when you urinate. ? A frequent need to urinate without being able to pass much urine. ? Pain in the flank, which is just below the rib cage and above the waist on either side of the back. ? Blood in the urine. ? A fever.     · You are vomiting or nauseated. Watch closely for changes in your health, and be sure to contact your doctor if:    · You do not get better as expected. Where can you learn more? Go to http://www.gray.com/  Enter P833 in the search box to learn more about \"Kidney Infection: Care Instructions. \"  Current as of: September 8, 2021               Content Version: 13.2  © 2006-2022 [employer]. Care instructions adapted under license by [employer] (which disclaims liability or warranty for this information). If you have questions about a medical condition or this instruction, always ask your healthcare professional. [place] [address] any warranty or liability for your use of this information.          Patient Education        Sepsis: Care Instructions  Overview     Sepsis is an intense reaction to an infection. It can cause damage to the body and lead to dangerously low blood pressure. You may have inflammation across large areas of your body. It can damage tissue and even go deep into your organs. Infections that can lead to sepsis include:  · A skin infection such as from a cut. · A lung infection like pneumonia. · A kidney infection. · A gut infection such as E. coli. Sepsis is treated with antibiotics. Your doctor will try to find the infection that led to sepsis. Pérez Wilfrido also get fluids through a vein (IV). Machines will track your vital signs, including temperature, blood pressure, breathing rate, and pulse rate. The physical and mental effects of sepsis may not be seen for several weeks after treatment. And they may last long after the infection is gone. Physical problems may include:  · Feeling weak and tired. · Feeling out of breath. · Aches and pains. · Problems with getting around. · Trouble falling asleep or staying asleep. · Dry and itchy skin, brittle nails, and hair loss. Some of these effects can lead to problems with your organs or your feet, legs, hands, or arms. Sepsis can also affect your mind and emotions. Problems may include:  · Self-doubt. · Anxiety. · Nightmares. · Depression and mood problems. · Wanting to avoid other people. · Confusion. · Flashbacks and bad memories of your illness. It's important to care for yourself and try to avoid infections. This may lower your risk of getting sepsis again. Follow-up care is a key part of your treatment and safety. Be sure to make and go to all appointments, and call your doctor if you are having problems. It's also a good idea to know your test results and keep a list of the medicines you take. How can you care for yourself at home? · Be safe with medicines. Take your medicines exactly as prescribed. Call your doctor if you think you are having a problem with your medicine.   · If your doctor prescribed antibiotics, take them as directed. Do not stop taking them just because you feel better. You need to take the full course of antibiotics. · Help prevent infections that could again lead to sepsis. ? Try to avoid colds and flu. If you must be around people who have a cold or the flu, wash your hands often. And get a flu vaccine every year. ? Ask your doctor if you need a pneumococcal vaccine (to prevent pneumonia, meningitis, and other infections). If you have had one before, ask your doctor if you need another dose. ? Clean any wounds or scrapes. · Do not smoke or use other tobacco products. When you quit smoking, you are less likely to get a cold, the flu, bronchitis, and pneumonia. If you need help quitting, talk to your doctor about stop-smoking programs and medicines. These can increase your chances of quitting for good. · Drink plenty of fluids to prevent dehydration. Choose water and other clear liquids until you feel better. If you have kidney, heart, or liver disease and have to limit fluids, talk with your doctor before you increase the amount of fluids you drink. · Eat a healthy diet. Include fruits, vegetables, and whole grains in your diet every day. · If your doctor recommends it, try doing some physical activity. Walking is a good choice. Bit by bit, increase the amount you walk every day. · Talk with your family and friends about your challenges. Ask for help if you need it. · Keep a journal. Writing down your thoughts and feelings can help reduce your stress. · Ask family members to fill in gaps in your memory. · Set small goals for yourself that you can reach. Reward yourself for success. When should you call for help? Call 911  anytime you think you may need emergency care. For example, call if:    · You passed out (lost consciousness). Call your doctor now or seek immediate medical care if:    · You have symptoms such as:  ? Shortness of breath. ? Feeling very sick. ? Severe pain. ?  A fast heart rate. ? Cool, pale, or clammy skin. ? Feeling confused. ? Feeling very sleepy, or you are hard to wake up.     · You are dizzy or lightheaded, or you feel like you may faint.     · You have a fever or chills. Watch closely for changes in your health, and be sure to contact your doctor if:    · You do not get better as expected. Where can you learn more? Go to http://www.gray.com/  Enter T383 in the search box to learn more about \"Sepsis: Care Instructions. \"  Current as of: July 1, 2021               Content Version: 13.2  © 3365-7726 [employer]. Care instructions adapted under license by [employer] (which disclaims liability or warranty for this information). If you have questions about a medical condition or this instruction, always ask your healthcare professional. [address] any warranty or liability for your use of this information.

## 2022-05-10 ENCOUNTER — HOME CARE VISIT (OUTPATIENT)
Dept: HOME HEALTH SERVICES | Facility: HOME HEALTH | Age: 87
End: 2022-05-10

## 2022-05-10 ENCOUNTER — HOME CARE VISIT (OUTPATIENT)
Dept: SCHEDULING | Facility: HOME HEALTH | Age: 87
End: 2022-05-10
Payer: MEDICARE

## 2022-05-10 VITALS
HEART RATE: 80 BPM | OXYGEN SATURATION: 62 % | TEMPERATURE: 98.3 F | SYSTOLIC BLOOD PRESSURE: 138 MMHG | RESPIRATION RATE: 18 BRPM | DIASTOLIC BLOOD PRESSURE: 58 MMHG

## 2022-05-10 VITALS
TEMPERATURE: 98.3 F | SYSTOLIC BLOOD PRESSURE: 138 MMHG | OXYGEN SATURATION: 87 % | DIASTOLIC BLOOD PRESSURE: 58 MMHG | RESPIRATION RATE: 16 BRPM | HEART RATE: 67 BPM

## 2022-05-10 PROCEDURE — G0151 HHCP-SERV OF PT,EA 15 MIN: HCPCS

## 2022-05-10 PROCEDURE — 3331090002 HH PPS REVENUE DEBIT

## 2022-05-10 PROCEDURE — 3331090001 HH PPS REVENUE CREDIT

## 2022-05-10 PROCEDURE — 400013 HH SOC

## 2022-05-10 PROCEDURE — G0299 HHS/HOSPICE OF RN EA 15 MIN: HCPCS

## 2022-05-10 PROCEDURE — 400018 HH-NO PAY CLAIM PROCEDURE

## 2022-05-10 NOTE — Clinical Note
Therapy Functional Score Assessment  Question                                  Score   Grooming 1                          Upper Dressing  1              Lower Dressing  2              Bathing 5                            Toilet Transfer  1             Transfer  2                       Ambulation 3                   Dyspnea  3                           Pain Interfering with activity 3   Est number therapy visits 5

## 2022-05-10 NOTE — HOME HEALTH
Evaluation Summary: Ms Venkata Giordano is a 82year old female being admitted to home health for PT/OT/SN services for hospital admission for COPD/CHF, and UTI. Pt has declined the need for OT services at this time. PT to call NP [facility] office to notify of DC. The patient's caregiver/representative NOT present, & able and willing to provide care intermittantly as needed (son)    Patient participated in goal setting and care planning and are agreeable to the care plan. Discharge planning/training was initiated for independence and return to PLOF  PMH: Chronic lung disease    Chronic obstructive pulmonary disease ([facility] [address]    Heart failure ([facility] [address]    Hypertension     SUBJECTIVE:  \"I have been doing ok. Don't look at my O2 I know my numbers are not good, I just walked. \"   Pt reports 6/10 pain today and in the past 24 hours. Pt denies falls. REQUIRES CAREGIVER ASSISTANCE FOR: son or friend assists with meals, ADLs, transportation, medications, ambulation outside of home   PLOF:  pt lives alone in 1 level home with multiple levels inside with 3 steps in side and 4 steps outside to enter home. Pt was on 3.5 LO2 PTA. Pt was indep with all functional mobility with use of cane and on O2. Pt was indep with ADLs including showers with shower chair, and med managment, home tasks and driving. Pt has a son that is local and supportive. DME: , cane, commode, bath chair, oxygen  MEDICATIONS REVIEWED AND UPDATED: Medications reconciled and all medications are available in the home this visit. The following education was provided regarding high risk medications,  medication interactions, and look a like medications: Continue medication as prescribed by MD. Medications are effective at this time. NEXT MD APPT:  TBD  ROM:  UE and LE WNL  STRENGTH:  see strength section for details. WOUNDS: none reported or observed.  see nursing notes  BED MOBILITY: sit <> sup indep     TRANSFERS: sit to stand from chair,  bed and toilet with SBA but cues for proper hand placement for safety and proper technique. 5 STS 27 sec   GAIT: pt ambulated 2 MWT - 132' with cane and 4 LO2 - O2 sats dropped to 58% immediately and between 60-70% within a minute and within 3-4 minutes up to 88%     STAIRS: NT  BALANCE: fair + with cane  PATIENT EDUCATION PROVIDED THIS VISIT: safety for transfers and hand placement,  walking and cues for pacing, energy conservation and  deep breathing/PLB,  signs and symptoms of infection,  clearing obstacles and clear pathways to prevent falls as well as proper lighting at night time   HEP consisting of:  1. Walking every 1-2  hour during the day with cane   patient verbalized understanding. Patient Level of understanding of education provided: pt able to return demo PLB and return to levels >88% after >3 min of recovery   Patient response to treatment: Pt reported no increase in knee pain throughout treatment. CONTINUED NEED FOR THE FOLLOWING SKILLS: HH PT is medically necessary toaddress pain,  decreased strength and endurance, decreased independence with functional transfers, impaired gait, impaired stair negotiation, and impaired balance in order to improve functional independence, quality of life, return to PLOF, and reduce the risk for falls.   PLAN: 2w2, 1w1   DISCHARGE PLANNING DISCUSSED: Discharge to self and family under MD supervision once all goals have been met or patient has reached max potential.

## 2022-05-10 NOTE — HOME HEALTH
pts has some pain in Abdirashid ankles   pt has oxygen paremeters 88-92%  pt needs to set appt with vascular md for neuropathy pain in BLE ankles. Skilled services/Home bound verification: SN Evaluation and Treat, Medication and Disease Management,  Skilled Reason for admission/summary of clinical condition: COPD AND CHF  This patient is homebound for the following reasons Requires considerable and taxing effort to leave the home , Requires the assistance of 1 or more persons to leave the home  and Only leaves the home for medical reasons or [employer] services and are infrequent and of short duration for other reasons . Caregiver: relative. patients cg is son and he is available as needed or assistance with IADL's, ADL's, meal prep, medication management, and taking patient to all doctors appointments. Medications reconciled and all medications are available in the home this visit. The following education was provided regarding medications, medication interactions, and look alike medications (specify): oxygen at 4L, albuterol, prednisone   Medications  are effective at this time. High risk medication teaching regarding anticoagulants, hyperglycemic agents or opiod narcotics performed (specify) n/a    no discrepancies/medication interactions noted    Home health supplies by type and quantity ordered/delivered this visit include: n/a  pt educated on s/s of CHF exacerbation: sob when lying down or exertion, fatigue and weakness, swelling/ edema in your legs, ankles and feet, rapid or irregular heart beat, reduced ability to exercise, and persistent cough or wheezing with white/pink or blood-tinged phlegm. Notify Md if any occur. Instruct patient/caregiver in congestive heart failure disease process.  Instruct patient/caregiver in what the specific cause of the heart failure is (CAD, untreated high blood pressure, faulty heart valves, cardiomyopathy, under/over active thyroid, diabetes or kidney disease.) Instruct pt. to recognize symptoms of CHF exacerbation: edema, orthopnea, weight gain, and dyspnea. Teach patient to elevate extremities above the level of the heart, to avoid crossing of the legs, and on the proper application of ANA LAURA hose. Instruct on diuretic therapy. Instruct patient/caregiver in deep-breathing and coughing exercises, pursed-lip and abdominal breathing, and positioning for optimal breathing pattern. patient instructed to be very careful when ambulating around oxygen tubing to prevent any falls from occurring. patient encouraged to monitor for increase in pain and to continue with current pain management and to notify staff/md if pain becomes excrutiating/intolerable. patient instructed to perform sob hep 4-5 x daily and prn for sob, to promote lung expansion. THE PATIENT AND CG WERE RECOMMENDED TO CALL PCP OR TAKE PT TO THE ER WITH ANY FEVER 101 OR ABOVE, CHILLS, SOB, CHEST PAIN, SEVERE HEADACHE, PROFUSE VOMITING AND/OR DIARRHEA, ACUTE PAIN OR ANY OTHER ACUTE CHANGE      Patient education provided this visit to include: Oxygen education provided as follows:    -Causes of fire: YES  -Do not smoke near oxygen: YES  -Obtain smoke detector: YES  -Change smoke detector battery semiannually (with daylight saving time change): YES  -Proper storage of oxygen: YES  -Fire risks for neighboring residences and buildings: YES         Patient/caregiver degree of understanding:good    Patient level of understanding of education provided: patient has a good understanding of education at this time    Patient response to procedure performed: patient tolerated procedure with no signs of discomfort, grimacing or pain, no complications or concerns noted. Home exercise program/Homework provided: per PT    Pt/Caregiver instructed on plan of care and are agreeable to plan of care at this time.       Physician Leonid Cason NP notified of patient admission to home health and plan of care including anticipated frequency of 1W4, 2PRN and treatments/interventions/modalities of medication and disease management    Discharge planning discussed with patient and caregiver. Discharge planning as follows:       Pt/Caregiver did verbalize understanding of discharge planning. Next MD appointment Dentist appt next thurs. Vascular MD appt TBD.   Patient/caregiver encouraged/instructed to keep appointment as lack of follow through with physician appointment could result in discontinuation of home care services for non-co

## 2022-05-10 NOTE — Clinical Note
Chau Dukes you will be seeing this pt. Please set her up with HEP for COPD/CHF . Focus on pacing, energy conservation, and PLB. She drops  A LOT on 4 LO2.  (baseline is 3.5 LO2)  Her parameters are 88%. So if she can recover in a good amount of time MD will be satisfied. Pt monitors her O2 and is very high level but just needs a few visits to get back on track.   Thanks     Alessio Silva

## 2022-05-11 ENCOUNTER — HOME CARE VISIT (OUTPATIENT)
Dept: HOME HEALTH SERVICES | Facility: HOME HEALTH | Age: 87
End: 2022-05-11
Payer: MEDICARE

## 2022-05-11 LAB
BACTERIA SPEC CULT: NORMAL
SERVICE CMNT-IMP: NORMAL

## 2022-05-11 PROCEDURE — 3331090002 HH PPS REVENUE DEBIT

## 2022-05-11 PROCEDURE — 3331090001 HH PPS REVENUE CREDIT

## 2022-05-11 NOTE — PROGRESS NOTES
Physician Progress Note      Echo Pratt  [facility] #:                  [id number]  :                       1935  ADMIT DATE:       2022 12:21 PM  [address] [place] DATE:        2022 2:45 PM  RESPONDING  PROVIDER #:        Mora OWUSU MD          QUERY TEXT:    Pt admitted with SIRS and pyelonephritis and has CHF documented. If possible, please document in progress notes and discharge summary further specificity regarding the type and acuity of CHF:    The medical record reflects the following:  Risk Factors: PMH:  CHF  Clinical Indicators: H&P:  CHF:  CXR with possible congestion  10/27/21  ECHO:  LV: Estimated LVEF is 60 - 65%. Visually measured ejection fraction. Normal cavity size, wall thickness and systolic function (ejection fraction normal). Wall motion: normal.  Treatment:  Lasix; Lopressor    Thank you,  Keren Marshall RN/LUIS [email or web address] [email or web address]  Options provided:  -- Acute on Chronic Systolic CHF/HFrEF  -- Acute on Chronic Diastolic CHF/HFpEF  -- Acute on Chronic Systolic and Diastolic CHF  -- Chronic Systolic CHF/HFrEF  -- Chronic Diastolic CHF/HFpEF  -- Chronic Systolic and Diastolic CHF  -- Other - I will add my own diagnosis  -- Disagree - Not applicable / Not valid  -- Disagree - Clinically unable to determine / Unknown  -- Refer to Clinical Documentation Reviewer    PROVIDER RESPONSE TEXT:    This patient is in acute on chronic diastolic CHF/HFpEF.     Query created by: Rubens Mcgregor on 2022 12:00 PM      Electronically signed by:  Demetrice He MD 2022 12:22 PM

## 2022-05-12 PROCEDURE — 3331090001 HH PPS REVENUE CREDIT

## 2022-05-12 PROCEDURE — 3331090002 HH PPS REVENUE DEBIT

## 2022-05-13 ENCOUNTER — HOME CARE VISIT (OUTPATIENT)
Dept: SCHEDULING | Facility: HOME HEALTH | Age: 87
End: 2022-05-13
Payer: MEDICARE

## 2022-05-13 VITALS
OXYGEN SATURATION: 92 % | TEMPERATURE: 97.3 F | DIASTOLIC BLOOD PRESSURE: 58 MMHG | SYSTOLIC BLOOD PRESSURE: 123 MMHG | HEART RATE: 63 BPM

## 2022-05-13 LAB
BACTERIA SPEC CULT: NORMAL
SERVICE CMNT-IMP: NORMAL

## 2022-05-13 PROCEDURE — 3331090001 HH PPS REVENUE CREDIT

## 2022-05-13 PROCEDURE — 3331090002 HH PPS REVENUE DEBIT

## 2022-05-13 PROCEDURE — G0157 HHC PT ASSISTANT EA 15: HCPCS

## 2022-05-13 NOTE — HOME HEALTH
SUBJECTIVE: Pt/daughter reports she was able to walk out to the car using the walker. She has not had to use her w/c in the last few days. CAREGIVER INVOLVEMENT/ASSISTANCE NEEDED FOR: Patient resides alone, however, has family/friends nearby who assist with ADL's and transportation as needed. HOME HEALTH SUPPLIES BY TYPE AND QUANTITY ORDERED/DELIVERED THIS VISIT INCLUDE: none   OBJECTIVE: See interventions. PATIENT RESPONSE TO TREATMENT: Patient required 2x ~1 minute rest breaks during activities due to fatigue/SOB. Spo2< to 78% post ambulation, however, quickly increased to 95% in less than 30 seconds with oxygen in use. PATIENT LEVEL OF UNDERSTANDING OF EDUCATION PROVIDED: Educated in use of AD at all times and safety with transfers and ambulation. Instructed pt to ambulate every 1-2 hours. Educated in repositioning frequently to reduce risk for pressure sores. Patient educated in fall prevention techniques. Instructed in safety with oxygen cord management. ASSESSMENT OF PROGRESS TOWARD GOALS: Patient is progressing towards goals. Patient ambulated throughout home with cane ~75 feet with SBA due to weakness and instability with gait. Verbal cues for oxygen cord management and energy conservation techniques. Patient required ~1 minute rest breaks post ambulation due to fatigue/SOB. Patient transferred sit<->stand with supervision due to weakness and unsteadiness with initial standing. CONTINUED NEED FOR THE FOLLOWING SKILLS: Continuation of PT to further improve functional mobility and strength of bilateral LE's. PLAN FOR NEXT VISIT: Attempt stair training next visit. THE FOLLOWING DISCHARGE PLANNING WAS DISCUSSED WITH THE PATIENT/CAREGIVER: D/C from HHPT when goal are met or max potential benefit achieved.

## 2022-05-14 PROCEDURE — 3331090001 HH PPS REVENUE CREDIT

## 2022-05-14 PROCEDURE — 3331090002 HH PPS REVENUE DEBIT

## 2022-05-15 PROCEDURE — 3331090002 HH PPS REVENUE DEBIT

## 2022-05-15 PROCEDURE — 3331090001 HH PPS REVENUE CREDIT

## 2022-05-16 ENCOUNTER — HOME CARE VISIT (OUTPATIENT)
Dept: SCHEDULING | Facility: HOME HEALTH | Age: 87
End: 2022-05-16
Payer: MEDICARE

## 2022-05-16 VITALS
DIASTOLIC BLOOD PRESSURE: 61 MMHG | OXYGEN SATURATION: 85 % | SYSTOLIC BLOOD PRESSURE: 128 MMHG | RESPIRATION RATE: 18 BRPM | HEART RATE: 65 BPM

## 2022-05-16 PROCEDURE — G0157 HHC PT ASSISTANT EA 15: HCPCS

## 2022-05-16 PROCEDURE — 3331090002 HH PPS REVENUE DEBIT

## 2022-05-16 PROCEDURE — 3331090001 HH PPS REVENUE CREDIT

## 2022-05-17 ENCOUNTER — HOME CARE VISIT (OUTPATIENT)
Dept: HOME HEALTH SERVICES | Facility: HOME HEALTH | Age: 87
End: 2022-05-17
Payer: MEDICARE

## 2022-05-17 ENCOUNTER — OFFICE VISIT (OUTPATIENT)
Dept: ORTHOPEDIC SURGERY | Age: 87
End: 2022-05-17
Payer: MEDICARE

## 2022-05-17 VITALS — OXYGEN SATURATION: 84 % | TEMPERATURE: 97.3 F | HEART RATE: 71 BPM

## 2022-05-17 DIAGNOSIS — M25.651 DECREASED RANGE OF RIGHT HIP MOVEMENT: ICD-10-CM

## 2022-05-17 DIAGNOSIS — M70.61 TROCHANTERIC BURSITIS OF RIGHT HIP: ICD-10-CM

## 2022-05-17 DIAGNOSIS — M16.11 ARTHRITIS OF RIGHT HIP: ICD-10-CM

## 2022-05-17 DIAGNOSIS — M25.551 RIGHT HIP PAIN: Primary | ICD-10-CM

## 2022-05-17 PROCEDURE — 20610 DRAIN/INJ JOINT/BURSA W/O US: CPT | Performed by: PHYSICIAN ASSISTANT

## 2022-05-17 PROCEDURE — 3331090001 HH PPS REVENUE CREDIT

## 2022-05-17 PROCEDURE — 72170 X-RAY EXAM OF PELVIS: CPT | Performed by: PHYSICIAN ASSISTANT

## 2022-05-17 PROCEDURE — 3331090002 HH PPS REVENUE DEBIT

## 2022-05-17 RX ORDER — TRIAMCINOLONE ACETONIDE 40 MG/ML
40 INJECTION, SUSPENSION INTRA-ARTICULAR; INTRAMUSCULAR ONCE
Status: COMPLETED | OUTPATIENT
Start: 2022-05-17 | End: 2022-05-17

## 2022-05-17 RX ADMIN — TRIAMCINOLONE ACETONIDE 40 MG: 40 INJECTION, SUSPENSION INTRA-ARTICULAR; INTRAMUSCULAR at 10:27

## 2022-05-17 NOTE — HOME HEALTH
SUBJECTIVE: Pt notes she has been increasing her walking distance throughout her house and working on pacing more. CAREGIVER INVOLVEMENT/ASSISTANCE NEEDED FOR: Patient resides alone, however, has family/friends nearby who assist with ADL's and transportation as needed. HOME HEALTH SUPPLIES BY TYPE AND QUANTITY ORDERED/DELIVERED THIS VISIT INCLUDE: none   OBJECTIVE: See interventions. PATIENT RESPONSE TO TREATMENT: Patient required 2x ~1 minute rest breaks during activities due to decrease in Spo2 to 78% post ambulation, however, quickly increased to 95% in < 30 seconds with oxygen in use. PATIENT LEVEL OF UNDERSTANDING OF EDUCATION PROVIDED: Educated in use of AD at all times and safety with transfers and ambulation. Instructed pt to ambulate every 1-2 hours. Educated in repositioning frequently to reduce risk for pressure sores. Patient educated in fall prevention techniques. Instructed in safety with oxygen cord management. ASSESSMENT OF PROGRESS TOWARD GOALS: Patient is progressing towards goals. Patient ambulated on indoor/outdoor surfaces 2x ~100 feet with supervision due to weakness. Verbal cues for oxygen cord management and energy conservation techniques. Patient required ~1 minute rest breaks post ambulation due to fatigue/SOB. Patient transferred sit<->stand mod I with use of bilateral UE's. Patient navigated up/down 4 steps to enter/exit home with use of railing and cane with supervision due to weakness. Verbal cues for proper technique. CONTINUED NEED FOR THE FOLLOWING SKILLS: Continuation of PT to further improve functional mobility and strength of bilateral LE's. PLAN FOR NEXT VISIT: Increase gait training next visit. THE FOLLOWING DISCHARGE PLANNING WAS DISCUSSED WITH THE PATIENT/CAREGIVER: D/C from HHPT when goal are met or max potential benefit achieved.

## 2022-05-17 NOTE — PROGRESS NOTES
Patient: Bhumika Rehman                MRN: [id number]       SSN: [id number]  YOB: 1935        AGE: 82 y.o. SEX: female          PCP: Rubin Card NP  05/17/22    Chief Complaint   Patient presents with    Hip Pain     raymond       HISTORY:  Bhumika Rehman is a 82 y.o. female presents to the office with acquired bilateral hip pain right greater than left. She is oxygen dependent COPD sufferer. She has been treated in the past many years ago with cortisone to the trochanteric bursal regions. Right hip pain prevents her from laying on her right side also and challenges her when she stands from a sitting position or sits from a standing position. Tylenol and Motrin have been unsuccessful for symptom management per 's recommended dosing.       Pain Assessment  5/17/2022   Location of Pain Hip   Location Modifiers Left;Right   Severity of Pain 8   Quality of Pain Aching   Duration of Pain Persistent   Frequency of Pain Constant   Aggravating Factors Walking;Standing;Bending;Squatting   Limiting Behavior Yes   Relieving Factors NSAID   Result of Injury No           Lab Results   Component Value Date/Time    Hemoglobin A1c 6.1 (H) 10/25/2021 04:15 AM     Weight Metrics 5/9/2022 11/3/2021 10/21/2021 10/11/2021 4/15/2021 2/24/2021 1/4/2021   Weight 116 lb 12.8 oz 118 lb 1.6 oz 120 lb 121 lb 123 lb 3.2 oz 120 lb 120 lb   BMI 20.69 kg/m2 20.92 kg/m2 21.26 kg/m2 21.43 kg/m2 21.82 kg/m2 21.26 kg/m2 21.26 kg/m2            Problem List Items Addressed This Visit     None      Visit Diagnoses     Right hip pain    -  Primary    Relevant Medications    triamcinolone acetonide (KENALOG-40) 40 mg/mL injection 40 mg (Completed) (Start on 5/17/2022 11:00 AM)    Other Relevant Orders    POC XRAY, PELVIS; 1 OR 2 VIEWS (Completed)    DRAIN/INJECT LARGE JOINT/BURSA    Trochanteric bursitis of right hip        Relevant Medications    triamcinolone acetonide (KENALOG-40) 40 mg/mL injection 40 mg (Completed) (Start on 5/17/2022 11:00 AM)    Other Relevant Orders    DRAIN/INJECT LARGE JOINT/BURSA    Arthritis of right hip        Relevant Medications    triamcinolone acetonide (KENALOG-40) 40 mg/mL injection 40 mg (Completed) (Start on 5/17/2022 11:00 AM)    Other Relevant Orders    DRAIN/INJECT LARGE JOINT/BURSA    Decreased range of right hip movement        Relevant Medications    triamcinolone acetonide (KENALOG-40) 40 mg/mL injection 40 mg (Completed) (Start on 5/17/2022 11:00 AM)    Other Relevant Orders    DRAIN/INJECT LARGE JOINT/BURSA          PAST MEDICAL HISTORY:   Past Medical History:   Diagnosis Date    Chronic lung disease     [facility] disease [facility]     Heart failure ([facility]     Hypertension        PAST SURGICAL HISTORY:   Past Surgical History:   Procedure Laterality Date    HX ORTHOPAEDIC      spinal sx 5/6    HX OTHER SURGICAL      Carotid artery    VASCULAR SURGERY PROCEDURE UNLIST      L CEA 10/2014       ALLERGIES: No Known Allergies     CURRENT MEDICATIONS:  A list of medications prior to the time of admission include:  Prior to Admission medications    Medication Sig Start Date End Date Taking? Authorizing Provider   budesonide-glycopyr-formoterol (Krysta [place]) 160-9-4.8 mcg/actuation HFAA Take 2 Inhalation by mouth two (2) times a day. Yes Provider, Historical   levoFLOXacin (Levaquin) 500 mg tablet Take 1 Tablet by mouth daily for 10 days. 5/9/22 5/19/22 Yes Ashley Daniels PA   furosemide (Lasix) 40 mg tablet Take 40 mg by mouth daily. Yes Provider, Historical   albuterol (Proventil HFA) 90 mcg/actuation inhaler Take 2 Puffs by inhalation every four (4) hours as needed for Wheezing, Shortness of Breath or Respiratory Distress. 10/21/21  Yes Santos Laureano MD   budesonide-glycopyr-formoterol (Breztri Aerosphere) 160-9-4.8 mcg/actuation HFAA Take  by inhalation.    Yes Provider, Historical OXYGEN-AIR DELIVERY SYSTEMS 4 L by IntraNASal route continuous. Yes Provider, Historical   atorvastatin (LIPITOR) 20 mg tablet Take 20 mg by mouth daily. Yes Provider, Historical   losartan (COZAAR) 50 mg tablet Take  by mouth daily. 75 mg in a.m   Yes Provider, Historical   Oxygen    Yes Provider, Historical   albuterol (PROVENTIL VENTOLIN) 2.5 mg /3 mL (0.083 %) nebulizer solution 3 mL by Nebulization route every four (4) hours as needed for Wheezing or Shortness of Breath. 6/4/16  Yes Dimitri Manzanares MD   acetaminophen (TYLENOL EXTRA STRENGTH) 500 mg tablet Take 500 mg by mouth every six (6) hours as needed for Pain. Yes Provider, Historical   metoprolol (LOPRESSOR) 25 mg tablet Take 25 mg by mouth two (2) times a day. Yes Provider, Historical   NIFEdipine ER (ADALAT CC) 60 mg ER tablet Take 60 mg by mouth daily. Yes Provider, Historical   alprazolam (XANAX) 0.5 mg tablet Take 0.5 mg by mouth nightly. Yes Provider, Historical   aspirin delayed-release 81 mg tablet Take 81 mg by mouth daily. Yes Provider, Historical   ipratropium-albuteroL (COMBIVENT RESPIMAT)  mcg/actuation inhaler Take 1 Puff by inhalation four (4) times daily. Provider, Historical   carboxymethylcellulose sodium (Refresh Tears) 0.5 % drop ophthalmic solution Apply 1 Drop to eye two (2) times a day. Provider, Historical   sodium chloride (Nasal Moisturizing) 0.65 % nasal squeeze bottle 1 Drop by Both Nostrils route as needed for Congestion or Nasal Dryness. Provider, Historical   predniSONE (STERAPRED) 5 mg dose pack See administration instruction per 5mg dose pack  Patient taking differently: Take 5 mg by mouth daily. take 6 tablets by mouth on day 1, take 5 tablets on day 2, take 4 tablets on day 3, take 3 tablets on day 4, 2 tablets on day 5, and 1 tablet on day 6 5/9/22   Melvi Daniels PA   nystatin (MYCOSTATIN) 100,000 unit/mL suspension Take 5 mL by mouth two (2) times a day.  swish and spit 11/4/21 González Robledo MD   famotidine (PEPCID) 20 mg tablet Take 1 Tab by mouth daily. 20   Kendall Roberto NP   cholecalciferol (VITAMIN D3) (1000 Units /25 mcg) tablet Take 2 Tabs by mouth daily. 20   Kendall Roberto NP   COQ10, UBIQUINOL, PO Take 1 Cap by mouth daily. Patient not taking: Reported on 2022    Provider, Historical   diclofenac (VOLTAREN) 1 % gel Apply  to affected area four (4) times daily. 17   Ray Cartagena PA-C       FAMILY HISTORY:   Family History   Problem Relation Age of Onset    Heart Disease Mother     Hypertension Mother     Heart Attack Father     Hypertension Father        SOCIAL HISTORY:   Social History     Socioeconomic History    Marital status:    Tobacco Use    Smoking status: Former Smoker     Packs/day: 1.50     Years: 30.00     Pack years: 45.00     Types: Cigarettes     Quit date: 1987     Years since quittin.7    Smokeless tobacco: Never Used   Substance and Sexual Activity    Alcohol use: No     Alcohol/week: 0.0 standard drinks    Drug use: No    Sexual activity: Never       ROS:No CP, No SOB, No fever/chills nor night sweats. No headaches, vision abnormalities to include double and or loss of vision. No dizziness. No hearing abnormalities. No Chest Pain nor Shortness of breath. Pt denies h/o spinal surgery, injections, or PT/chiropractor. Patient has attempted self treatment with less than adequate relief on oral and topical analgesic / anti inflammatory medications . Pt denies change in bowel or bladder habits. No saddle paresthesia / anesthesia. Pt denies fever, unplanned weight loss / weight gains, and no skin changes. Musculoskeletal pain per HPI. Pain is exacerbated positionally. PHYSICAL EXAM:    Visit Vitals  Pulse 71   Temp 97.3 °F (36.3 °C) (Temporal)   SpO2 (!) 84% Comment: patient states she has real bad COPD       Constitutional: Appears well-developed and well-nourished.  No distress. Sitting comfortably in the exam room, interacting with conversation with pleasant affect. Gait appears steady and patient exhibits no evidence of ataxia. Patient is able to ambulate with caution. No focal neurological deficit noted. No facial droop, slurred speech, or evidence of altered mentation noted on exam.   Skin: Skin over the head, neck, bilateral limbs, and trunk is warm and dry. No rash or erythema noted. Cranial Nerves II-XII grossly intact  HENT: NC/AT. Normal symmetry, bulk and tone of facial and neck musculature. Trachea midline. No discernible thyromegaly or masses. No involuntary movements. Lymphatic: No preauricular, submandibuar, anterior or posterior cervical lymphadenopathy. Psychiatric: The patient is awake, alert, and oriented to person, place and time. Behavior is normal. Thought content normal.   Cardiovascular: No clubbing, cyanosis. No edema bilateral lower extremities. Pulmonary: No tripoding nor accessory muscle recruitment. Breathing normally, no distress, no audible wheezing. Patient has a portable oxygen producing device with oxygen on at 3 L/min via nasal cannula. Distal cap refill intact at 2/2 Abdirashid UE / LE. Neuro intact Abdirashid UE/LE to noxious stimuli        Ortho Specific exam:    With patient laying left recumbent right hip draped appropriately to reveal skin is him joined by [facility] LPN as my chaperone over the proximal lateral to distal lateral thigh skin intact with no evidence of skin breakdown or infection. No warmth, erythema, edema, or ecchymosis. No effusions noted. Moderate tenderness to deep palpation over the greater trochanteric region with pain radiating proximal 4 cm and distal 10 cm along the IT band. Passive internal and external rotation of the right hip noted at 12 and 15 degrees with pain reproduced over the greater trochanteric region. Hip flexor strength noted weaker on the right when compared to the left.     [address] 5/17/2022 space AP pelvis reveals bilateral inferior and superior rim acetabular spurring with narrowing seen in the femoral acetabular joint space bilaterally. No soft tissue ossifications. No lytic or blastic lesions. No fracture deformities. IMPRESSION:      ICD-10-CM ICD-9-CM    1. Right hip pain  M25.551 719.45 POC XRAY, PELVIS; 1 OR 2 VIEWS      triamcinolone acetonide (KENALOG-40) 40 mg/mL injection 40 mg      DRAIN/INJECT LARGE JOINT/BURSA      CANCELED: AMB POC X-RAY RADEX HIP UNI WITH PELVIS 2-3 VIEWS   2. Trochanteric bursitis of right hip  M70.61 726.5 triamcinolone acetonide (KENALOG-40) 40 mg/mL injection 40 mg      DRAIN/INJECT LARGE JOINT/BURSA   3. Arthritis of right hip  M16.11 716.95 triamcinolone acetonide (KENALOG-40) 40 mg/mL injection 40 mg      DRAIN/INJECT LARGE JOINT/BURSA   4. Decreased range of right hip movement  M25.651 719.55 triamcinolone acetonide (KENALOG-40) 40 mg/mL injection 40 mg      DRAIN/INJECT LARGE JOINT/BURSA        PLAN: Today we discussed alternatives care to include but not limited to a low-dose cortisone injection to her right hip trochanteric bursal region. Patient agreed. Patient has similar symptoms on the left side associated with her hip. Consideration for treating her left hip if she were to remain symptomatic or worsens within the next several weeks. Today all of her questions answered to her satisfaction. X-rays copies provided questions answered. Procedural: Using sterile technique and verbal and written consent were obtained appropriate timeout formed patient laying left recumbent with right hip draped appropriately to reveal skin using the appropriate technique identifying the point of maximal tenderness consistent with the greater trochanteric region 1 cc of Kenalog at 40 mg/mL mixed with 7 mils of Sensorcaine 0.75% injected. There were no complications. Patient tolerated procedure well.     Chart reviewed for the following:   Teodoro Cartagena PA-C, have reviewed the History, Physical and updated the Allergic reactions for [address] performed immediately prior to start of procedure:   I, Asim Cartagena PA-C, have performed the following reviews on [facility] [place] prior to the start of the procedure:            * Patient was identified by name and date of birth   * Agreement on procedure being performed was verified  * Risks and Benefits explained to the patient  * Procedure site verified and marked as necessary  * Patient was positioned for comfort  * Consent was signed and verified             Date of procedure: 05/17/22    Time: 10:39 AM    Procedure performed by:  Angeli Mendez PA-C    Provider assisted by: None     Patient assisted by: self    How tolerated by patient: tolerated the procedure well with no complications    Comments: none             Additionally today we discussed the diagnosis of obesity and the importance of weight management for both cardiovascular health. The patient was recommended to decrease carbohydrate and sugar intake. Patient recommended a formal dietary consult which they will consider and return a call to our office. In light of the patient's osteoarthritic findings I am making a recommendation for aerobic exercise to include but not limited to stationary bicycle, elliptical, therapeutic walking with good shoes and or swimming. Patient should avoid any running or jumping. If using the treadmill then recommendation for no elevation and no running or jogging. Care plan outlined and precautions discussed. Results were reviewed with the patient. All medications were reviewed with the patient. All of pt's questions and concerns were addressed. Alarm symptoms and return precautions associated with chief complaint and evaluation were reviewed with the patient in detail. The patient demonstrated adequate understanding. The patient expresses willing compliance with the treatment plan. Special note: Medication management discussed in detail all patient's questions answered to their satisfaction. No Narcotic indicated today. Patient given pain medication for short term acute pain relief. Goal is to treat patient according to above plan and to ultimately have patient off all pain medications once appropriate. If chronic pain management is required beyond what is expected for current orthopedic problem, will refer patient to pain management.  was reviewed and will be reviewed with every medication refill request.         Patient provided a reminder for a \"due or due soon\" health maintenance. I have asked the patient to schedule an appointment with their primary care provider for follow-up on general health maintenance concerns. Today all the patient's questions were answered to their satisfaction. Copies of x-rays reviewed if obtained this visit, and provided to patient. Dictation disclaimer:  Please note that this dictation was completed with [employer], the computer voice recognition software. Quite often unanticipated grammatical, syntax, homophones, and other interpretive errors are inadvertently transcribed by the computer software. Please disregard these errors. Please excuse any errors that have escaped final proofreading. Arleen BLACKMAN, APC, MPAS, PA-C  [facility]

## 2022-05-18 ENCOUNTER — HOME CARE VISIT (OUTPATIENT)
Dept: SCHEDULING | Facility: HOME HEALTH | Age: 87
End: 2022-05-18
Payer: MEDICARE

## 2022-05-18 ENCOUNTER — HOME CARE VISIT (OUTPATIENT)
Dept: HOME HEALTH SERVICES | Facility: HOME HEALTH | Age: 87
End: 2022-05-18
Payer: MEDICARE

## 2022-05-18 VITALS
OXYGEN SATURATION: 93 % | HEART RATE: 67 BPM | TEMPERATURE: 97.3 F | DIASTOLIC BLOOD PRESSURE: 62 MMHG | SYSTOLIC BLOOD PRESSURE: 122 MMHG

## 2022-05-18 VITALS
TEMPERATURE: 97.3 F | SYSTOLIC BLOOD PRESSURE: 122 MMHG | DIASTOLIC BLOOD PRESSURE: 62 MMHG | HEART RATE: 67 BPM | RESPIRATION RATE: 18 BRPM | OXYGEN SATURATION: 93 %

## 2022-05-18 PROCEDURE — G0157 HHC PT ASSISTANT EA 15: HCPCS

## 2022-05-18 PROCEDURE — G0152 HHCP-SERV OF OT,EA 15 MIN: HCPCS

## 2022-05-18 PROCEDURE — 3331090001 HH PPS REVENUE CREDIT

## 2022-05-18 PROCEDURE — 3331090002 HH PPS REVENUE DEBIT

## 2022-05-18 NOTE — HOME HEALTH
Caregiver involvement: Rodri Montana (son) lives with pt and provides daily emotional support. Medications reviewed and all medications are available in the home this visit. The following education was provided regarding medications, medication interactions, and look alike medications (specify): Continue as directed by MD.    Medications  are effective at this time. Patient education provided this visit: Educated pt on use of energy conservation strategies such as sitting during functional tasks, taking frequent rest breaks and keeping frequently used items within reach. Sharps education provided:  na    Patient level of understanding of education provided: see ADL note    Skilled Care Performed this visit: Completed OT evaluation and assessment for safety with ADL and mobility. Patient response to procedure performed:  Ms. Silvino Corbin had a positive response to therapy despite having decreased O2 of 84% with exertion. She displayed no SOB and was able to return O2 >92% on 3LO2 once seated and use of pursed lip breathing after 2 minutes. Patient's Progress towards personal goals: Ms. Silvino Corbin has excellent rehab potential.  She has returned to independence with ADL and functional mobility and transfers in the home. Home exercise program: na    Continued need for the following skills: agency discharge    Discharge Plans:  1w1 DC OT. Pt has returned to her PLOF with daily tasks and is requesting agency discharge. MD office to be notified. List of Comorbidities:   Chronic lung disease   Chronic obstructive pulmonary disease ([address]   Heart failure ([address]   H ypertension                 Inpatient Notes         Assessment:  Recurrent UTI with possilbe pyelonephritis: Failed outpatient Macrobid. CT scan with possible right-sided pyelonephritis.   SIRS  Acute on chronic hypoxic respiratory failure: RRT called as soon as patient had arrived due to hypoxia; CXR with pneumonitis vs vascular congestion  Severe COPD requiring 4 L oxygen at home- Admitted recently with RSV  Hypertension  CHF: CXR with possible congestion   Plan:  Continue to titrated to maintain O2 sats 88 to 92% please avoid over oxygenation and hypercapnia. Start Solu-Medrol 60 mg IV every  Continue duo nebs,Pulmicort, Brovana  F/u blood cx  F/u urine cx. Prn hydralazine for SBP >160  Resume home medications. D/c cefepime and levofloxacin. Change to Cefriaxone + Azithromycin. For both UTI/Pyelo and exacerbation of COPD. Will give Lasix 20mg x 1 dose    Buck Fernandez D.O. Internal Medicine and Infectious Diseases    Subjective:  Patient is a 82 y. o.female who is being evaluated for UTI. Ms. Ciarra Paul is a very pleasant 53-year-old female with a past medical history of hypertension, CHF, COPD on 4 L of home O2 who is presenting with dysuria and frequency. She started develop fevers and chills a few days ago and she had been seen in her PCPs office was diagnosed with a urinary tract infection . She had initially started on Macrobid of which she is taken about 2 doses. She is coming back in to the ER today with complaints of unchanged dysuria and frequency. Additionally she has now developed some right-sided low back pain. She denies any blood in her stools. She has been having tremors and shakes since at least Tuesday. She tells me these are improved with Tylenol as well as Xanax. In the ER she had a T-max o f 99.0, pulse was 89 BP was 135/92 and her O2 sats were 93% on 4 L. UA revealed positive nitrites, moderate leukocyte esterase, 11-20 WBCs and few bacteria. Blood and urine cultures were sent and are currently pending. She was started on levofloxacin and cefepime. A CT scan of the abdomen and pelvis was performed which shows very subtle hypoenhancement in the right renal cortex concerning for pyelonephritis. Chest x-ray d one in the ER shows interstitial pneumonitis not significantly changed when compared to prior imaging.    Upon presentation of the floor the patient had gotten up to go to the bathroom and was wearing her oxygen. When she returned back to bed vital signs were obtained by nursing staff and it was noted that she had O2 sats in the low 80s and upper 70s. She was restarted on her oxygen and had a nonrebreather placed. Ultimat ely a rapid response was called for hypoxia. SBP noted ot be in the 180's with sinus tachycardia on EKG. In regards to her hypoxia, the patient has a hx of severe COPD/emphysema the patient is followed as an outpatient by pulmonology. She has 4 L of O2 at home and her baseline O2 sats ranged between 88 and 91%. She has a 30-pack-year history of smoking. She reports a chronic dry cough. She tells me she is never quite recovered since her last hospitalization 6 months ago.  She does follow with Dr. Magdy Gonzalez with [facility].                DME         [address] [place], [place]  Oxygen concentrator  Portable oxygen

## 2022-05-18 NOTE — Clinical Note
Occupational Therapy Discharge - Ms. Brewer was seen for a skilled OT evaluation today and is requesting an agency discharge. She is ambulating in her home with use of a SC. She is on 3LO2 and is able to independently manage her O2 tubing. She reports that she continues to perform her own ADL and IADL daily. Ms. Denisa Sheppard states that she took her own shower prior to therapy visit today. She demonstrated ability to safely step in and out of the walk in shower and sit <> stand from her shower seat independently. She is able to perform sit <> stand from the standard toilet without assistance. Unfortunately, her O2 drops to 74% on 3LO2 with exertion such as walking distance of her home. She is asymptomatic and does not appear SOB and is able to talk in full sentences. She has a pulse ox and she has been advised to use it to check her O2 regularly. Advised her that goal is to be between 88% and 92% per MD note. Educated pt on use of energy conservation strategies such as sitting during functional tasks, taking frequent rest breaks and keeping frequently used items within reach. Ms. Denisa Sheppard states that she is agreeable to consider sitting during ADL and IADL tasks. She has been instructed to wear her O2 in the shower. Education provided to stay clear of open flames such as her gas fireplace. She has an electric stove and denies use of candles. Ms. Denisa Sheppard is agreeable to not use the gas fireplace. No further skilled OT is indicated at this time.

## 2022-05-19 NOTE — HOME HEALTH
SUBJECTIVE: Pt notes she is ready for D/C from PT today because she wants to be able to and not feeling tied down. She understands her HEP and education therapist has provided her. CAREGIVER INVOLVEMENT/ASSISTANCE NEEDED FOR: Patient resides alone, however, has family/friends nearby who assist with ADL's and transportation as needed. HOME HEALTH SUPPLIES BY TYPE AND QUANTITY ORDERED/DELIVERED THIS VISIT INCLUDE: none   OBJECTIVE: See interventions. PATIENT RESPONSE TO TREATMENT: Patient required 2x ~1 minute rest breaks during activities due to decrease in Spo2 to 78% post ambulation, however, quickly increased to 95% in < 30 seconds with 3L of oxygen. PATIENT LEVEL OF UNDERSTANDING OF EDUCATION PROVIDED: Educated in use of AD at all times and safety with transfers and ambulation. Instructed pt to ambulate every 1-2 hours. Educated in repositioning frequently to reduce risk for pressure sores. Patient educated in fall prevention techniques. Instructed in safety with oxygen use and oxygen cord management. ASSESSMENT OF PROGRESS TOWARD GOALS: Patient has progressed well towards goals. Patient ambulated ~2 minutes with use of cane, mod I wearing 3L of oxygen. Patient Spo2 <77% post ambulation, however, ~1 minute increase to 92%. Patient transferred sit<->stand mod I with use of UE's. Patient is independent with bed mobility. Patient is independent with HEP. Patient performed standing thera ex and decreased to 85%.    CONTINUED NEED FOR THE FOLLOWING SKILLS: none  PLAN FOR NEXT VISIT: D/C  THE FOLLOWING DISCHARGE PLANNING WAS DISCUSSED WITH THE PATIENT/CAREGIVER: D/C from HHPT today per pt request.

## 2022-05-24 ENCOUNTER — OFFICE VISIT (OUTPATIENT)
Dept: ORTHOPEDIC SURGERY | Age: 87
End: 2022-05-24
Payer: MEDICARE

## 2022-05-24 VITALS
WEIGHT: 116 LBS | TEMPERATURE: 97.3 F | HEART RATE: 66 BPM | BODY MASS INDEX: 20.55 KG/M2 | OXYGEN SATURATION: 90 % | HEIGHT: 63 IN

## 2022-05-24 DIAGNOSIS — M25.552 LEFT HIP PAIN: Primary | ICD-10-CM

## 2022-05-24 PROCEDURE — G8420 CALC BMI NORM PARAMETERS: HCPCS | Performed by: PHYSICIAN ASSISTANT

## 2022-05-24 PROCEDURE — G8427 DOCREV CUR MEDS BY ELIG CLIN: HCPCS | Performed by: PHYSICIAN ASSISTANT

## 2022-05-24 PROCEDURE — 1101F PT FALLS ASSESS-DOCD LE1/YR: CPT | Performed by: PHYSICIAN ASSISTANT

## 2022-05-24 PROCEDURE — G8536 NO DOC ELDER MAL SCRN: HCPCS | Performed by: PHYSICIAN ASSISTANT

## 2022-05-24 PROCEDURE — 1090F PRES/ABSN URINE INCON ASSESS: CPT | Performed by: PHYSICIAN ASSISTANT

## 2022-05-24 PROCEDURE — 20610 DRAIN/INJ JOINT/BURSA W/O US: CPT | Performed by: PHYSICIAN ASSISTANT

## 2022-05-24 PROCEDURE — G8756 NO BP MEASURE DOC: HCPCS | Performed by: PHYSICIAN ASSISTANT

## 2022-05-24 PROCEDURE — G8400 PT W/DXA NO RESULTS DOC: HCPCS | Performed by: PHYSICIAN ASSISTANT

## 2022-05-24 PROCEDURE — 1111F DSCHRG MED/CURRENT MED MERGE: CPT | Performed by: PHYSICIAN ASSISTANT

## 2022-05-24 PROCEDURE — 99213 OFFICE O/P EST LOW 20 MIN: CPT | Performed by: PHYSICIAN ASSISTANT

## 2022-05-24 PROCEDURE — G8432 DEP SCR NOT DOC, RNG: HCPCS | Performed by: PHYSICIAN ASSISTANT

## 2022-05-24 RX ORDER — TRIAMCINOLONE ACETONIDE 40 MG/ML
40 INJECTION, SUSPENSION INTRA-ARTICULAR; INTRAMUSCULAR ONCE
Status: COMPLETED | OUTPATIENT
Start: 2022-05-24 | End: 2022-05-24

## 2022-05-24 RX ADMIN — TRIAMCINOLONE ACETONIDE 40 MG: 40 INJECTION, SUSPENSION INTRA-ARTICULAR; INTRAMUSCULAR at 12:51

## 2022-05-24 NOTE — PROGRESS NOTES
Patient: Jagdish Ramirez                MRN: [id number]       SSN: [id number]  YOB: 1935        AGE: 82 y.o. SEX: female          PCP: Myron Colby NP  05/24/22 5/24/2022: Patient returns the office for follow-up regarding her right hip which was recently injected for trochanteric bursitis symptoms. She is doing very well with her right hip today. She has however left hip pain and would like a similar cortisone injection. She has not started physical therapy outpatient to date. Chief Complaint   Patient presents with    Hip Pain     raymond       HISTORY:  Jagdish Ramirez is a 82 y.o. female presents to the office with acquired bilateral hip pain right greater than left. She is oxygen dependent COPD sufferer. She has been treated in the past many years ago with cortisone to the trochanteric bursal regions. Right hip pain prevents her from laying on her right side also and challenges her when she stands from a sitting position or sits from a standing position. Tylenol and Motrin have been unsuccessful for symptom management per 's recommended dosing.       Pain Assessment  5/24/2022   Location of Pain Hip   Location Modifiers Right;Left   Severity of Pain 0   Quality of Pain -   Duration of Pain -   Frequency of Pain -   Aggravating Factors -   Limiting Behavior -   Relieving Factors -   Result of Injury -           Lab Results   Component Value Date/Time    Hemoglobin A1c 6.1 (H) 10/25/2021 04:15 AM     Weight Metrics 5/24/2022 5/9/2022 11/3/2021 10/21/2021 10/11/2021 4/15/2021 2/24/2021   Weight 116 lb 116 lb 12.8 oz 118 lb 1.6 oz 120 lb 121 lb 123 lb 3.2 oz 120 lb   BMI 20.55 kg/m2 20.69 kg/m2 20.92 kg/m2 21.26 kg/m2 21.43 kg/m2 21.82 kg/m2 21.26 kg/m2            Problem List Items Addressed This Visit     None      Visit Diagnoses     Left hip pain    -  Primary    Relevant Medications    triamcinolone acetonide (KENALOG-40) 40 mg/mL injection 40 mg (Start on 5/24/2022  1:00 PM)    Other Relevant Orders    DRAIN/INJECT LARGE JOINT/BURSA          PAST MEDICAL HISTORY:   Past Medical History:   Diagnosis Date    Chronic lung disease     [facility] [address]     Heart failure [facility] [address]     Hypertension        PAST SURGICAL HISTORY:   Past Surgical History:   Procedure Laterality Date    HX ORTHOPAEDIC      spinal sx 5/6    HX OTHER SURGICAL      Carotid artery    VASCULAR SURGERY PROCEDURE UNLIST      L CEA 10/2014       ALLERGIES: No Known Allergies     CURRENT MEDICATIONS:  A list of medications prior to the time of admission include:  Prior to Admission medications    Medication Sig Start Date End Date Taking? Authorizing Provider   budesonide-glycopyr-formoterol (Keegan Courts) 160-9-4.8 mcg/actuation HFAA Take 2 Inhalation by mouth two (2) times a day. Provider, Historical   ipratropium-albuteroL (COMBIVENT RESPIMAT)  mcg/actuation inhaler Take 1 Puff by inhalation four (4) times daily. Provider, Historical   carboxymethylcellulose sodium (Refresh Tears) 0.5 % drop ophthalmic solution Apply 1 Drop to eye two (2) times a day. apply to both eyes     Provider, Historical   sodium chloride (Nasal Moisturizing) 0.65 % nasal squeeze bottle 1 Drop by Both Nostrils route as needed for Congestion or Nasal Dryness. as needed daily    Provider, Historical   predniSONE (STERAPRED) 5 mg dose pack See administration instruction per 5mg dose pack  Patient taking differently: Take 5 mg by mouth daily. take 6 tablets by mouth on day 1, take 5 tablets on day 2, take 4 tablets on day 3, take 3 tablets on day 4, 2 tablets on day 5, and 1 tablet on day 6 5/9/22   Matilda Daniels PA   nystatin (MYCOSTATIN) 100,000 unit/mL suspension Take 5 mL by mouth two (2) times a day. swish and spit 11/4/21   Junior Jo MD   furosemide (Lasix) 40 mg tablet Take 40 mg by mouth daily. Provider, Historical   albuterol (Proventil HFA) 90 mcg/actuation inhaler Take 2 Puffs by inhalation every four (4) hours as needed for Wheezing, Shortness of Breath or Respiratory Distress. 10/21/21   Danielle Munoz MD   budesonide-glycopyr-formoterol (Breztri Aerosphere) 160-9-4.8 mcg/actuation HFAA Take  by inhalation. Provider, Historical   OXYGEN-AIR DELIVERY SYSTEMS 4 L by IntraNASal route continuous. Provider, Historical   famotidine (PEPCID) 20 mg tablet Take 1 Tab by mouth daily. 12/20/20   Veena Roberto NP   cholecalciferol (VITAMIN D3) (1000 Units /25 mcg) tablet Take 2 Tabs by mouth daily. 12/20/20   Veena Roberto NP   atorvastatin (LIPITOR) 20 mg tablet Take 20 mg by mouth daily. Provider, Historical   COQ10, UBIQUINOL, PO Take 1 Cap by mouth daily. Patient not taking: Reported on 5/5/2022    Provider, Historical   losartan (COZAAR) 50 mg tablet Take  by mouth daily. 75 mg in a.m    Provider, Historical   diclofenac (VOLTAREN) 1 % gel Apply  to affected area four (4) times daily. Patient taking differently: Apply 2 g to affected area four (4) times daily. apply a thin layer  12/20/17   Denise Cartagena PA-C   Oxygen     Provider, Historical   albuterol (PROVENTIL VENTOLIN) 2.5 mg /3 mL (0.083 %) nebulizer solution 3 mL by Nebulization route every four (4) hours as needed for Wheezing or Shortness of Breath. 6/4/16   Abdulaziz Monsivais MD   acetaminophen (TYLENOL EXTRA STRENGTH) 500 mg tablet Take 500 mg by mouth every six (6) hours as needed for Pain. Provider, Historical   metoprolol (LOPRESSOR) 25 mg tablet Take 25 mg by mouth two (2) times a day. Provider, Historical   NIFEdipine ER (ADALAT CC) 60 mg ER tablet Take 60 mg by mouth daily. Provider, Historical   alprazolam (XANAX) 0.5 mg tablet Take 0.5 mg by mouth nightly. Provider, Historical   aspirin delayed-release 81 mg tablet Take 81 mg by mouth daily.     Provider, Historical       FAMILY HISTORY: Family History   Problem Relation Age of Onset    Heart Disease Mother     Hypertension Mother     Heart Attack Father     Hypertension Father        SOCIAL HISTORY:   Social History     Socioeconomic History    Marital status:    Tobacco Use    Smoking status: Former Smoker     Packs/day: 1.50     Years: 30.00     Pack years: 45.00     Types: Cigarettes     Quit date: 1987     Years since quittin.7    Smokeless tobacco: Never Used   Substance and Sexual Activity    Alcohol use: No     Alcohol/week: 0.0 standard drinks    Drug use: No    Sexual activity: Never       ROS:No CP, positive SOB which is chronic patient on oxygen generator, No fever/chills nor night sweats. No headaches, vision abnormalities to include double and or loss of vision. No dizziness. No hearing abnormalities. No Chest Pain nor Shortness of breath. Pt denies h/o spinal surgery, injections, or PT/chiropractor. Patient has attempted self treatment with less than adequate relief on oral and topical analgesic / anti inflammatory medications . Pt denies change in bowel or bladder habits. No saddle paresthesia / anesthesia. Pt denies fever, unplanned weight loss / weight gains, and no skin changes. Musculoskeletal pain per HPI. Pain is exacerbated positionally. PHYSICAL EXAM:    Visit Vitals  Pulse 66   Temp 97.3 °F (36.3 °C) (Temporal)   Ht 5' 3\" (1.6 m)   Wt 116 lb (52.6 kg)   SpO2 90%   BMI 20.55 kg/m²       Constitutional: Appears well-developed and well-nourished. No distress. Sitting comfortably in the exam room, interacting with conversation with pleasant affect. Gait appears steady and patient exhibits no evidence of ataxia. Patient is able to ambulate with caution. No focal neurological deficit noted. No facial droop, slurred speech, or evidence of altered mentation noted on exam.   Skin: Skin over the head, neck, bilateral limbs, and trunk is warm and dry. No rash or erythema noted.    Cranial Nerves II-XII grossly intact  HENT: NC/AT. Normal symmetry, bulk and tone of facial and neck musculature. Trachea midline. No discernible thyromegaly or masses. No involuntary movements. Lymphatic: No preauricular, submandibuar, anterior or posterior cervical lymphadenopathy. Psychiatric: The patient is awake, alert, and oriented to person, place and time. Behavior is normal. Thought content normal.   Cardiovascular: No clubbing, cyanosis. No edema bilateral lower extremities. Pulmonary: No tripoding nor accessory muscle recruitment. Breathing normally, no distress, no audible wheezing. Patient has a portable oxygen producing device with oxygen on at 3 L/min via nasal cannula. Distal cap refill intact at 2/2 Abdirashid UE / LE. Neuro intact Abdirashid UE/LE to noxious stimuli        Ortho Specific exam:    With patient laying left recumbent left hip draped appropriately to reveal skin is him joined by [employer] as my chaperone over the proximal lateral to distal lateral thigh skin intact with no evidence of skin breakdown or infection. No warmth, erythema, edema, or ecchymosis. No effusions noted. Moderate tenderness to deep palpation over the greater trochanteric region with pain radiating proximal 4 cm and distal 10 cm along the IT band. Passive internal and external rotation of the left hip noted at 12 and 15 degrees with pain reproduced over the greater trochanteric region. Hip flexor strength noted weaker on the right when compared to the left. [facility] reveals bilateral inferior and superior rim acetabular spurring with narrowing seen in the femoral acetabular joint space bilaterally. No soft tissue ossifications. No lytic or blastic lesions. No fracture deformities. IMPRESSION:      ICD-10-CM ICD-9-CM    1.  Left hip pain  M25.552 719.45 DRAIN/INJECT LARGE JOINT/BURSA      triamcinolone acetonide (KENALOG-40) 40 mg/mL injection 40 mg 2.  IT band syndrome  3. Trochanteric bursitis left hip  4. Decreased range of motion left hip    PLAN: Today we discussed alternatives care to include but not limited to a low-dose cortisone injection to her left hip trochanteric bursal region. Patient agreed. Patient has similar symptoms on the left side associated with her hip. Procedural: Using sterile technique and verbal and written consent were obtained appropriate timeout formed patient laying right recumbent with left hip draped appropriately to reveal skin using the appropriate technique identifying the point of maximal tenderness consistent with the greater trochanteric region 1 cc of Kenalog at 40 mg/mL mixed with 7 mils of Sensorcaine 0.75% injected. There were no complications. Patient tolerated procedure well. Chart reviewed for the following:   Saba Cartagena PA-C, have reviewed the History, Physical and updated the Allergic reactions for [address] [place] [address] performed immediately prior to start of procedure:   IAndre PA-C, have performed the following reviews on [facility] [place] prior to the start of the procedure:            * Patient was identified by name and date of birth   * Agreement on procedure being performed was verified  * Risks and Benefits explained to the patient  * Procedure site verified and marked as necessary  * Patient was positioned for comfort  * Consent was signed and verified             Date of procedure: 05/24/22    Time: 10:39 AM    Procedure performed by:  Jodi Veliz PA-C    Provider assisted by: None     Patient assisted by: self    How tolerated by patient: tolerated the procedure well with no complications    Comments: none             Additionally today we discussed the diagnosis of obesity and the importance of weight management for both cardiovascular health. The patient was recommended to decrease carbohydrate and sugar intake.   Patient recommended a formal dietary consult which they will consider and return a call to our office. In light of the patient's osteoarthritic findings I am making a recommendation for aerobic exercise to include but not limited to stationary bicycle, elliptical, therapeutic walking with good shoes and or swimming. Patient should avoid any running or jumping. If using the treadmill then recommendation for no elevation and no running or jogging. Care plan outlined and precautions discussed. Results were reviewed with the patient. All medications were reviewed with the patient. All of pt's questions and concerns were addressed. Alarm symptoms and return precautions associated with chief complaint and evaluation were reviewed with the patient in detail. The patient demonstrated adequate understanding. The patient expresses willing compliance with the treatment plan. Special note: Medication management discussed in detail all patient's questions answered to their satisfaction. No Narcotic indicated today. Patient given pain medication for short term acute pain relief. Goal is to treat patient according to above plan and to ultimately have patient off all pain medications once appropriate. If chronic pain management is required beyond what is expected for current orthopedic problem, will refer patient to pain management.  was reviewed and will be reviewed with every medication refill request.         Patient provided a reminder for a \"due or due soon\" health maintenance. I have asked the patient to schedule an appointment with their primary care provider for follow-up on general health maintenance concerns. Today all the patient's questions were answered to their satisfaction. Copies of x-rays reviewed if obtained this visit, and provided to patient. Dictation disclaimer:  Please note that this dictation was completed with [employer], the [employer] voice recognition software.   Quite often unanticipated grammatical, syntax, homophones, and other interpretive errors are inadvertently transcribed by the computer software. Please disregard these errors. Please excuse any errors that have escaped final proofreading. Mague BLACKMAN, APC, MPAS, PA-C  [facility]

## 2022-07-05 ENCOUNTER — HOSPITAL ENCOUNTER (OUTPATIENT)
Dept: GENERAL RADIOLOGY | Age: 87
Discharge: HOME | End: 2022-07-05
Payer: MEDICARE

## 2022-07-05 DIAGNOSIS — J44.9 COPD (CHRONIC OBSTRUCTIVE PULMONARY DISEASE) (HCC): ICD-10-CM

## 2022-07-05 PROCEDURE — 71046 X-RAY EXAM CHEST 2 VIEWS: CPT

## 2022-07-07 ENCOUNTER — APPOINTMENT (OUTPATIENT)
Dept: VASCULAR SURGERY | Age: 87
End: 2022-07-07
Attending: EMERGENCY MEDICINE
Payer: MEDICARE

## 2022-07-07 ENCOUNTER — HOSPITAL ENCOUNTER (EMERGENCY)
Age: 87
Discharge: HOME | End: 2022-07-07
Attending: EMERGENCY MEDICINE
Payer: MEDICARE

## 2022-07-07 ENCOUNTER — APPOINTMENT (OUTPATIENT)
Dept: CT IMAGING | Age: 87
End: 2022-07-07
Attending: EMERGENCY MEDICINE
Payer: MEDICARE

## 2022-07-07 VITALS
OXYGEN SATURATION: 90 % | HEART RATE: 86 BPM | WEIGHT: 112 LBS | DIASTOLIC BLOOD PRESSURE: 66 MMHG | SYSTOLIC BLOOD PRESSURE: 171 MMHG | HEIGHT: 63 IN | RESPIRATION RATE: 17 BRPM | TEMPERATURE: 98.1 F | BODY MASS INDEX: 19.84 KG/M2

## 2022-07-07 DIAGNOSIS — R60.9 PERIPHERAL EDEMA: Primary | ICD-10-CM

## 2022-07-07 DIAGNOSIS — J96.11 CHRONIC RESPIRATORY FAILURE WITH HYPOXIA, ON HOME OXYGEN THERAPY (HCC): ICD-10-CM

## 2022-07-07 DIAGNOSIS — J44.1 COPD EXACERBATION (HCC): ICD-10-CM

## 2022-07-07 DIAGNOSIS — Z99.81 CHRONIC RESPIRATORY FAILURE WITH HYPOXIA, ON HOME OXYGEN THERAPY (HCC): ICD-10-CM

## 2022-07-07 LAB
ALBUMIN SERPL-MCNC: 4 G/DL (ref 3.4–5)
ALBUMIN/GLOB SERPL: 1.3 {RATIO} (ref 0.8–1.7)
ALP SERPL-CCNC: 54 U/L (ref 45–117)
ALT SERPL-CCNC: 37 U/L (ref 13–56)
ANION GAP SERPL CALC-SCNC: 7 MMOL/L (ref 3–18)
APPEARANCE UR: CLEAR
APTT PPP: 27.7 SEC (ref 23–36.4)
AST SERPL-CCNC: 18 U/L (ref 10–38)
ATRIAL RATE: 77 BPM
BASOPHILS # BLD: 0 K/UL (ref 0–0.1)
BASOPHILS NFR BLD: 0 % (ref 0–2)
BILIRUB SERPL-MCNC: 0.5 MG/DL (ref 0.2–1)
BILIRUB UR QL: NEGATIVE
BNP SERPL-MCNC: 1584 PG/ML (ref 0–1800)
BUN SERPL-MCNC: 19 MG/DL (ref 7–18)
BUN/CREAT SERPL: 20 (ref 12–20)
CALCIUM SERPL-MCNC: 9.4 MG/DL (ref 8.5–10.1)
CALCULATED P AXIS, ECG09: 117 DEGREES
CALCULATED R AXIS, ECG10: 149 DEGREES
CALCULATED T AXIS, ECG11: 102 DEGREES
CHLORIDE SERPL-SCNC: 105 MMOL/L (ref 100–111)
CO2 SERPL-SCNC: 28 MMOL/L (ref 21–32)
COLOR UR: YELLOW
CREAT SERPL-MCNC: 0.94 MG/DL (ref 0.6–1.3)
DIAGNOSIS, 93000: NORMAL
DIFFERENTIAL METHOD BLD: ABNORMAL
EOSINOPHIL # BLD: 0.1 K/UL (ref 0–0.4)
EOSINOPHIL NFR BLD: 1 % (ref 0–5)
ERYTHROCYTE [DISTWIDTH] IN BLOOD BY AUTOMATED COUNT: 16.7 % (ref 11.6–14.5)
GLOBULIN SER CALC-MCNC: 3.2 G/DL (ref 2–4)
GLUCOSE SERPL-MCNC: 112 MG/DL (ref 74–99)
GLUCOSE UR STRIP.AUTO-MCNC: NEGATIVE MG/DL
HCT VFR BLD AUTO: 42.9 % (ref 35–45)
HGB BLD-MCNC: 14.3 G/DL (ref 12–16)
HGB UR QL STRIP: NEGATIVE
IMM GRANULOCYTES # BLD AUTO: 0 K/UL
IMM GRANULOCYTES NFR BLD AUTO: 0 %
INR PPP: 0.9 (ref 0.8–1.2)
KETONES UR QL STRIP.AUTO: NEGATIVE MG/DL
LACTATE BLD-SCNC: 2.27 MMOL/L (ref 0.4–2)
LACTATE BLD-SCNC: 2.47 MMOL/L (ref 0.4–2)
LEUKOCYTE ESTERASE UR QL STRIP.AUTO: NEGATIVE
LYMPHOCYTES # BLD: 3.1 K/UL (ref 0.9–3.6)
LYMPHOCYTES NFR BLD: 21 % (ref 21–52)
MAGNESIUM SERPL-MCNC: 1.9 MG/DL (ref 1.6–2.6)
MCH RBC QN AUTO: 30 PG (ref 24–34)
MCHC RBC AUTO-ENTMCNC: 33.3 G/DL (ref 31–37)
MCV RBC AUTO: 90.1 FL (ref 78–100)
MONOCYTES # BLD: 1.6 K/UL (ref 0.05–1.2)
MONOCYTES NFR BLD: 11 % (ref 3–10)
NEUTS SEG # BLD: 9.8 K/UL (ref 1.8–8)
NEUTS SEG NFR BLD: 67 % (ref 40–73)
NITRITE UR QL STRIP.AUTO: NEGATIVE
NRBC # BLD: 0 K/UL (ref 0–0.01)
NRBC BLD-RTO: 0 PER 100 WBC
P-R INTERVAL, ECG05: 132 MS
PH UR STRIP: 6 [PH] (ref 5–8)
PLATELET # BLD AUTO: 380 K/UL (ref 135–420)
PLATELET COMMENTS,PCOM: ABNORMAL
PMV BLD AUTO: 10.2 FL (ref 9.2–11.8)
POTASSIUM SERPL-SCNC: 3.6 MMOL/L (ref 3.5–5.5)
PROT SERPL-MCNC: 7.2 G/DL (ref 6.4–8.2)
PROT UR STRIP-MCNC: NEGATIVE MG/DL
PROTHROMBIN TIME: 12.5 SEC (ref 11.5–15.2)
Q-T INTERVAL, ECG07: 372 MS
QRS DURATION, ECG06: 78 MS
QTC CALCULATION (BEZET), ECG08: 420 MS
RBC # BLD AUTO: 4.76 M/UL (ref 4.2–5.3)
RBC MORPH BLD: ABNORMAL
SODIUM SERPL-SCNC: 140 MMOL/L (ref 136–145)
SP GR UR REFRACTOMETRY: 1.01 (ref 1–1.03)
TROPONIN-HIGH SENSITIVITY: 11 NG/L (ref 0–54)
UROBILINOGEN UR QL STRIP.AUTO: 0.2 EU/DL (ref 0.2–1)
VENTRICULAR RATE, ECG03: 77 BPM
WBC # BLD AUTO: 14.6 K/UL (ref 4.6–13.2)

## 2022-07-07 PROCEDURE — 85730 THROMBOPLASTIN TIME PARTIAL: CPT

## 2022-07-07 PROCEDURE — 99285 EMERGENCY DEPT VISIT HI MDM: CPT

## 2022-07-07 PROCEDURE — 83735 ASSAY OF MAGNESIUM: CPT

## 2022-07-07 PROCEDURE — 84484 ASSAY OF TROPONIN QUANT: CPT

## 2022-07-07 PROCEDURE — 74011250637 HC RX REV CODE- 250/637: Performed by: EMERGENCY MEDICINE

## 2022-07-07 PROCEDURE — 83605 ASSAY OF LACTIC ACID: CPT

## 2022-07-07 PROCEDURE — 74011000250 HC RX REV CODE- 250: Performed by: EMERGENCY MEDICINE

## 2022-07-07 PROCEDURE — 94640 AIRWAY INHALATION TREATMENT: CPT

## 2022-07-07 PROCEDURE — 74011000636 HC RX REV CODE- 636: Performed by: EMERGENCY MEDICINE

## 2022-07-07 PROCEDURE — A9270 NON-COVERED ITEM OR SERVICE: HCPCS | Performed by: EMERGENCY MEDICINE

## 2022-07-07 PROCEDURE — 80053 COMPREHEN METABOLIC PANEL: CPT

## 2022-07-07 PROCEDURE — 81003 URINALYSIS AUTO W/O SCOPE: CPT

## 2022-07-07 PROCEDURE — 85025 COMPLETE CBC W/AUTO DIFF WBC: CPT

## 2022-07-07 PROCEDURE — 96374 THER/PROPH/DIAG INJ IV PUSH: CPT

## 2022-07-07 PROCEDURE — 85610 PROTHROMBIN TIME: CPT

## 2022-07-07 PROCEDURE — 71275 CT ANGIOGRAPHY CHEST: CPT

## 2022-07-07 PROCEDURE — 93005 ELECTROCARDIOGRAM TRACING: CPT

## 2022-07-07 PROCEDURE — 87040 BLOOD CULTURE FOR BACTERIA: CPT

## 2022-07-07 PROCEDURE — 74011636637 HC RX REV CODE- 636/637: Performed by: EMERGENCY MEDICINE

## 2022-07-07 PROCEDURE — 74011250636 HC RX REV CODE- 250/636: Performed by: EMERGENCY MEDICINE

## 2022-07-07 PROCEDURE — 83880 ASSAY OF NATRIURETIC PEPTIDE: CPT

## 2022-07-07 PROCEDURE — 93970 EXTREMITY STUDY: CPT

## 2022-07-07 RX ORDER — BISMUTH SUBSALICYLATE 262 MG
1 TABLET,CHEWABLE ORAL DAILY
COMMUNITY

## 2022-07-07 RX ORDER — DOXYCYCLINE HYCLATE 100 MG
100 TABLET ORAL 2 TIMES DAILY
Qty: 14 TABLET | Refills: 0 | Status: SHIPPED | OUTPATIENT
Start: 2022-07-07 | End: 2022-07-14

## 2022-07-07 RX ORDER — FUROSEMIDE 80 MG/1
80 TABLET ORAL DAILY
Qty: 5 TABLET | Refills: 0 | Status: SHIPPED | OUTPATIENT
Start: 2022-07-07 | End: 2022-07-12

## 2022-07-07 RX ORDER — PREDNISONE 20 MG/1
40 TABLET ORAL DAILY
Qty: 12 TABLET | Refills: 0 | Status: SHIPPED | OUTPATIENT
Start: 2022-07-07 | End: 2022-07-13

## 2022-07-07 RX ORDER — POTASSIUM CHLORIDE 1.5 G/1.77G
20 POWDER, FOR SOLUTION ORAL DAILY
Qty: 5 PACKET | Refills: 0 | Status: SHIPPED | OUTPATIENT
Start: 2022-07-07 | End: 2022-07-12

## 2022-07-07 RX ORDER — PREDNISONE 20 MG/1
60 TABLET ORAL
Status: COMPLETED | OUTPATIENT
Start: 2022-07-07 | End: 2022-07-07

## 2022-07-07 RX ORDER — FUROSEMIDE 10 MG/ML
40 INJECTION INTRAMUSCULAR; INTRAVENOUS ONCE
Status: COMPLETED | OUTPATIENT
Start: 2022-07-07 | End: 2022-07-07

## 2022-07-07 RX ORDER — ACETAMINOPHEN 325 MG/1
650 TABLET ORAL ONCE
Status: COMPLETED | OUTPATIENT
Start: 2022-07-07 | End: 2022-07-07

## 2022-07-07 RX ORDER — IPRATROPIUM BROMIDE AND ALBUTEROL SULFATE 2.5; .5 MG/3ML; MG/3ML
6 SOLUTION RESPIRATORY (INHALATION)
Status: COMPLETED | OUTPATIENT
Start: 2022-07-07 | End: 2022-07-07

## 2022-07-07 RX ADMIN — PREDNISONE 60 MG: 20 TABLET ORAL at 14:59

## 2022-07-07 RX ADMIN — FUROSEMIDE 40 MG: 10 INJECTION, SOLUTION INTRAMUSCULAR; INTRAVENOUS at 13:46

## 2022-07-07 RX ADMIN — IOPAMIDOL 80 ML: 755 INJECTION, SOLUTION INTRAVENOUS at 12:49

## 2022-07-07 RX ADMIN — IPRATROPIUM BROMIDE AND ALBUTEROL SULFATE 6 ML: .5; 2.5 SOLUTION RESPIRATORY (INHALATION) at 11:22

## 2022-07-07 RX ADMIN — ACETAMINOPHEN 650 MG: 325 TABLET ORAL at 11:09

## 2022-07-07 NOTE — ED NOTES
Blood cultures x 2 drawn.  1st set from [facility], 2ND set from [facility]

## 2022-07-07 NOTE — ED PROVIDER NOTES
EMERGENCY DEPARTMENT HISTORY AND PHYSICAL EXAM      Date: 7/7/2022  Patient Name: Alisson Cha      History of Presenting Illness     Chief Complaint   Patient presents with    Leg Swelling       History Provided By: Patient    Location/Duration/Severity/Modifying factors   Patient is an 28-year-old female who presents to the emergency department with a chief complaint of lower extremity swelling and shortness of breath. Patient has a history of advanced lung disease, she is followed by pulmonologist.  She typically wears 3 to 3.5 L all the time at home. She reports lately she has been turning up to 4 L or 5 L because she feels persistently short of breath. She says that the leg swelling has been going on for 1 to 1.5 weeks. It has been constant and it is painful. She denies experiencing any chest pain she is intermittent coughing. She takes prednisone 2.5 mg all the time and every day. She denies any fevers or chills. She reports that she is seeing her PCP for assessment of this but she felt too \"sick\" to get there this morning. She is followed by vascular surgery for carotid artery stenosis and had an endarterectomy in the past.  She rates the pain in the legs is around a 5 or 6. She has been increasing her daily Lasix from 40 mg once a day to 40 mg twice per day for the past several days. She was concerned about doing this as it might harm her kidneys. There are no other complaints, changes, or physical findings at this time. PCP: Charles CHAO NP    Current Outpatient Medications   Medication Sig Dispense Refill    multivitamin (ONE A DAY) tablet Take 1 Tablet by mouth daily.  furosemide (LASIX) 80 mg tablet Take 1 Tablet by mouth daily for 5 days. 5 Tablet 0    doxycycline (VIBRA-TABS) 100 mg tablet Take 1 Tablet by mouth two (2) times a day for 7 days. 14 Tablet 0    predniSONE (DELTASONE) 20 mg tablet Take 2 Tablets by mouth daily for 6 days.  With Breakfast 12 Tablet 0  potassium chloride (KLOR-CON) 20 mEq pack Take 1 Packet by mouth daily for 5 days. 5 Packet 0    budesonide-glycopyr-formoterol (Breztri Aerosphere) 160-9-4.8 mcg/actuation HFAA Take 2 Inhalation by mouth two (2) times a day.  carboxymethylcellulose sodium (Refresh Tears) 0.5 % drop ophthalmic solution Apply 1 Drop to eye two (2) times a day. apply to both eyes       sodium chloride (Nasal Moisturizing) 0.65 % nasal squeeze bottle 1 Drop by Both Nostrils route as needed for Congestion or Nasal Dryness. as needed daily      furosemide (Lasix) 40 mg tablet Take 40 mg by mouth daily.  albuterol (Proventil HFA) 90 mcg/actuation inhaler Take 2 Puffs by inhalation every four (4) hours as needed for Wheezing, Shortness of Breath or Respiratory Distress. 1 Each 0    budesonide-glycopyr-formoterol (Breztri Aerosphere) 160-9-4.8 mcg/actuation HFAA Take  by inhalation.  OXYGEN-AIR DELIVERY SYSTEMS 4 L by IntraNASal route continuous.  cholecalciferol (VITAMIN D3) (1000 Units /25 mcg) tablet Take 2 Tabs by mouth daily. 60 Tab 0    atorvastatin (LIPITOR) 20 mg tablet Take 20 mg by mouth daily.  losartan (COZAAR) 50 mg tablet Take  by mouth daily. 75 mg in a.m      diclofenac (VOLTAREN) 1 % gel Apply  to affected area four (4) times daily. (Patient taking differently: Apply 2 g to affected area four (4) times daily. apply a thin layer ) 100 g 2    Oxygen       albuterol (PROVENTIL VENTOLIN) 2.5 mg /3 mL (0.083 %) nebulizer solution 3 mL by Nebulization route every four (4) hours as needed for Wheezing or Shortness of Breath. 48 Each 0    acetaminophen (TYLENOL EXTRA STRENGTH) 500 mg tablet Take 500 mg by mouth every six (6) hours as needed for Pain.  metoprolol (LOPRESSOR) 25 mg tablet Take 25 mg by mouth two (2) times a day.  NIFEdipine ER (ADALAT CC) 60 mg ER tablet Take 60 mg by mouth daily.  alprazolam (XANAX) 0.5 mg tablet Take 0.5 mg by mouth nightly.       ipratropium-albuteroL (COMBIVENT RESPIMAT)  mcg/actuation inhaler Take 1 Puff by inhalation four (4) times daily. (Patient not taking: Reported on 2022)      nystatin (MYCOSTATIN) 100,000 unit/mL suspension Take 5 mL by mouth two (2) times a day. swish and spit (Patient not taking: Reported on 2022) 60 mL 2    famotidine (PEPCID) 20 mg tablet Take 1 Tab by mouth daily. (Patient not taking: Reported on 2022) 30 Tab 0    COQ10, UBIQUINOL, PO Take 1 Cap by mouth daily. (Patient not taking: Reported on 2022)      aspirin delayed-release 81 mg tablet Take 81 mg by mouth daily. (Patient not taking: Reported on 2022)         Past History     Past Medical History:  Past Medical History:   Diagnosis Date    Arthritis     Chronic lung disease     Chronic obstructive pulmonary disease ([address]     Heart failure ([facility]     Hypertension        Past Surgical History:  Past Surgical History:   Procedure Laterality Date    HX ORTHOPAEDIC      spinal sx     HX OTHER SURGICAL      Carotid artery    VASCULAR SURGERY PROCEDURE UNLIST      L CEA 10/2014       Family History:  Family History   Problem Relation Age of Onset    Heart Disease Mother     Hypertension Mother     Heart Attack Father     Hypertension Father        Social History:  Social History     Tobacco Use    Smoking status: Former Smoker     Packs/day: 1.50     Years: 30.00     Pack years: 45.00     Types: Cigarettes     Quit date: 1987     Years since quittin.8    Smokeless tobacco: Never Used   Vaping Use    Vaping Use: Former   Substance Use Topics    Alcohol use: No     Alcohol/week: 0.0 standard drinks    Drug use: No       Allergies:  No Known Allergies      Review of Systems     Review of Systems   Constitutional: Negative for chills and fever. HENT: Negative for congestion, rhinorrhea, sinus pressure and sneezing. Eyes: Negative for visual disturbance.    Respiratory: Positive for cough, shortness of breath and wheezing. Cardiovascular: Positive for leg swelling. Negative for chest pain. Gastrointestinal: Negative for abdominal pain, diarrhea, nausea and vomiting. Genitourinary: Negative for dysuria, frequency and urgency. Musculoskeletal: Negative for back pain and neck pain. Skin: Negative for rash. Neurological: Negative for syncope, numbness and headaches. Physical Exam     Physical Exam  Vitals and nursing note reviewed. Constitutional:       General: She is not in acute distress. Appearance: Normal appearance. She is not ill-appearing or toxic-appearing. Comments: Appears older than stated age, she is mildly tachypneic no impending respiratory failure   HENT:      Head: Normocephalic and atraumatic. Right Ear: External ear normal.      Left Ear: External ear normal.      Nose: Nose normal. No congestion or rhinorrhea. Mouth/Throat:      Mouth: Mucous membranes are moist.      Pharynx: Oropharynx is clear. No oropharyngeal exudate or posterior oropharyngeal erythema. Eyes:      Conjunctiva/sclera: Conjunctivae normal.      Pupils: Pupils are equal, round, and reactive to light. Cardiovascular:      Rate and Rhythm: Normal rate and regular rhythm. Pulses: Normal pulses. Heart sounds: Normal heart sounds. No murmur heard. Pulmonary:      Effort: Pulmonary effort is normal.      Breath sounds: Wheezing (Mild bibasilar wheezes) present. No rhonchi or rales. Comments: Resting tachypnea but not in any respiratory distress. Abdominal:      General: Abdomen is flat. Tenderness: There is no abdominal tenderness. There is no guarding or rebound. Musculoskeletal:         General: No swelling or tenderness. Normal range of motion. Cervical back: Normal range of motion and neck supple. Right lower leg: Edema present. Left lower leg: Edema present. Comments: 2+ localized edema to the legs. Mild erythema generally.    Skin: General: Skin is warm and dry. Capillary Refill: Capillary refill takes less than 2 seconds. Findings: No rash. Neurological:      General: No focal deficit present. Mental Status: She is alert. Cranial Nerves: No cranial nerve deficit. Sensory: No sensory deficit. Motor: No weakness.          Lab and Diagnostic Study Results     Labs -  Recent Results (from the past 24 hour(s))   EKG, 12 LEAD, INITIAL    Collection Time: 07/07/22 10:46 AM   Result Value Ref Range    Ventricular Rate 77 BPM    Atrial Rate 77 BPM    P-R Interval 132 ms    QRS Duration 78 ms    Q-T Interval 372 ms    QTC Calculation (Bezet) 420 ms    Calculated P Axis 117 degrees    Calculated R Axis 149 degrees    Calculated T Axis 102 degrees    Diagnosis       Normal sinus rhythm  Septal infarct (cited on or before 05-MAY-2022)  Lateral infarct (cited on or before 05-MAY-2022)  Possible Inferior infarct , age undetermined  Abnormal ECG    Confirmed by Irene Muñoz MD, Shayna Rizvi ([id number]) on 7/7/2022 12:42:46 PM     POC LACTIC ACID    Collection Time: 07/07/22 10:51 AM   Result Value Ref Range    Lactic Acid (POC) 2.27 (HH) 0.40 - 2.00 mmol/L   URINALYSIS W/ RFLX MICROSCOPIC    Collection Time: 07/07/22 11:00 AM   Result Value Ref Range    Color YELLOW      Appearance CLEAR      Specific gravity 1.008 1.005 - 1.030      pH (UA) 6.0 5.0 - 8.0      Protein Negative NEG mg/dL    Glucose Negative NEG mg/dL    Ketone Negative NEG mg/dL    Bilirubin Negative NEG      Blood Negative NEG      Urobilinogen 0.2 0.2 - 1.0 EU/dL    Nitrites Negative NEG      Leukocyte Esterase Negative NEG     CBC WITH AUTOMATED DIFF    Collection Time: 07/07/22 11:00 AM   Result Value Ref Range    WBC 14.6 (H) 4.6 - 13.2 K/uL    RBC 4.76 4.20 - 5.30 M/uL    HGB 14.3 12.0 - 16.0 g/dL    HCT 42.9 35.0 - 45.0 %    MCV 90.1 78.0 - 100.0 FL    MCH 30.0 24.0 - 34.0 PG    MCHC 33.3 31.0 - 37.0 g/dL    RDW 16.7 (H) 11.6 - 14.5 %    PLATELET 732 762 - 452 K/uL    MPV 10.2 9.2 - 11.8 FL    NRBC 0.0 0  WBC    ABSOLUTE NRBC 0.00 0.00 - 0.01 K/uL    NEUTROPHILS 67 40 - 73 %    LYMPHOCYTES 21 21 - 52 %    MONOCYTES 11 (H) 3 - 10 %    EOSINOPHILS 1 0 - 5 %    BASOPHILS 0 0 - 2 %    IMMATURE GRANULOCYTES 0 %    ABS. NEUTROPHILS 9.8 (H) 1.8 - 8.0 K/UL    ABS. LYMPHOCYTES 3.1 0.9 - 3.6 K/UL    ABS. MONOCYTES 1.6 (H) 0.05 - 1.2 K/UL    ABS. EOSINOPHILS 0.1 0.0 - 0.4 K/UL    ABS. BASOPHILS 0.0 0.0 - 0.1 K/UL    ABS. IMM. GRANS. 0.0 K/UL    DF MANUAL      PLATELET COMMENTS ADEQUATE PLATELETS      RBC COMMENTS ANISOCYTOSIS  1+       METABOLIC PANEL, COMPREHENSIVE    Collection Time: 07/07/22 11:00 AM   Result Value Ref Range    Sodium 140 136 - 145 mmol/L    Potassium 3.6 3.5 - 5.5 mmol/L    Chloride 105 100 - 111 mmol/L    CO2 28 21 - 32 mmol/L    Anion gap 7 3.0 - 18 mmol/L    Glucose 112 (H) 74 - 99 mg/dL    BUN 19 (H) 7.0 - 18 MG/DL    Creatinine 0.94 0.6 - 1.3 MG/DL    BUN/Creatinine ratio 20 12 - 20      GFR est AA >60 >60 ml/min/1.73m2    GFR est non-AA 57 (L) >60 ml/min/1.73m2    Calcium 9.4 8.5 - 10.1 MG/DL    Bilirubin, total 0.5 0.2 - 1.0 MG/DL    ALT (SGPT) 37 13 - 56 U/L    AST (SGOT) 18 10 - 38 U/L    Alk.  phosphatase 54 45 - 117 U/L    Protein, total 7.2 6.4 - 8.2 g/dL    Albumin 4.0 3.4 - 5.0 g/dL    Globulin 3.2 2.0 - 4.0 g/dL    A-G Ratio 1.3 0.8 - 1.7     NT-PRO BNP    Collection Time: 07/07/22 11:00 AM   Result Value Ref Range    NT pro-BNP 1,584 0 - 1,800 PG/ML   TROPONIN-HIGH SENSITIVITY    Collection Time: 07/07/22 11:00 AM   Result Value Ref Range    Troponin-High Sensitivity 11 0 - 54 ng/L   MAGNESIUM    Collection Time: 07/07/22 11:00 AM   Result Value Ref Range    Magnesium 1.9 1.6 - 2.6 mg/dL   PROTHROMBIN TIME + INR    Collection Time: 07/07/22 11:00 AM   Result Value Ref Range    Prothrombin time 12.5 11.5 - 15.2 sec    INR 0.9 0.8 - 1.2     PTT    Collection Time: 07/07/22 11:00 AM   Result Value Ref Range    aPTT 27.7 23.0 - 36.4 SEC   POC LACTIC ACID Collection Time: 07/07/22 12:28 PM   Result Value Ref Range    Lactic Acid (POC) 2.47 (HH) 0.40 - 2.00 mmol/L         Radiologic Studies -   CTA CHEST W OR W WO CONT   Final Result   1. No convincing CT evidence of pulmonary embolism. 2. Mild pulmonary arterial dilatation which suggestive of pulmonary artery   dilatation. 3. Moderate COPD. No acute pulmonary finding. 4. 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 Recommend thyroid   ultrasound for better evaluation. Thank you for your referral.         DUPLEX LOWER EXT VENOUS BILAT    (Results Pending)         Medical Decision Making and ED Course   - I am the first and primary provider for this patient AND AM THE PRIMARY PROVIDER OF RECORD. - I reviewed the vital signs, available nursing notes, past medical history, past surgical history, family history and social history. - Initial assessment performed. The patients presenting problems have been discussed, and the staff are in agreement with the care plan formulated and outlined with them. I have encouraged them to ask questions as they arise throughout their visit. Vital Signs-Reviewed the patient's vital signs. Patient Vitals for the past 12 hrs:   Temp Pulse Resp BP SpO2   07/07/22 1424 -- -- -- -- 90 %   07/07/22 1400 -- 86 -- (!) 171/66 (!) 85 %   07/07/22 1344 -- 85 -- (!) 160/58 (!) 84 %   07/07/22 1112 -- -- -- -- 92 %   07/07/22 1054 -- -- -- -- 90 %   07/07/22 1053 98.1 °F (36.7 °C) 76 17 (!) 150/60 91 %   07/07/22 1049 98.1 °F (36.7 °C) 73 20 (!) 150/60 (!) 80 %       EKG interpretation: See ED course for my interpretation of EKG(s).       Records Reviewed: Nursing Notes, Old Medical Records, Previous Radiology Studies and Previous Laboratory Studies        ED Course:       ED Course as of 07/07/22 1557   Thu Jul 07, 2022   1232 Despite having Lactic >2, a standard fluid resuscitation of 30 mL/kg would harm the patient because the patient has signs of volume overload. [ALLISON]   1238 EKG interpretation normal sinus rhythm rate of 77 bpm, normal axis and normal intervals with Q waves lead II possible arm lead reversal.  There is no ST elevation or ST depression. Normal sinus rhythm no acute ischemic changes [ALLISON]   2072 Patient was reassessed. Discussed results with her, including lactic acid which is elevated and her white blood cell count was elevated (although looks to be somewhat chronic). I suggested admission to her as she meets admission criteria given her increased oxygen requirement above her baseline although she is compensating well with 4 L as opposed to 3 L. Patient is adamant that she does not want to be admitted or stay in the hospital.  She reports that \"I can manage my oxygen at home\". She is very pleasant and polite however very firm that she wants to go home. Discussed with her that certainly she can go home however she needs to be extra cautious and if she feels like she is getting worse in the meantime she needs to return. We will prescribe her increased dose of Lasix in the meantime for some peripheral edema, start prednisone and doxycycline for COPD exacerbation. I cautioned her to return immediately if she experiences any worsening in her general condition in the meantime, specifically for increased work of breathing, vomiting, high fever or any changes in the meantime. [ALLISON]      ED Course User Index  [ALLISON] Kai Palacio,          Provider Notes (Medical Decision Making):     MDM  Number of Diagnoses or Management Options  Chronic respiratory failure with hypoxia, on home oxygen therapy (HCC)  COPD exacerbation (HCC)  Peripheral edema  Diagnosis management comments: Patient is a pleasant elderly 79-year-old female presenting with lower extremity swelling and shortness of breath. She says the main complaint is lower extremity swelling.   Reports history of CHF although her last echo showed an EF of 60 to 65% with findings of pulmonary hypertension. DDx: CHF, renal failure, deep venous thrombosis, PE, COPD exacerbation, pneumonia, pneumothorax, pleural effusion, etc.      Will start with basic labs, her initial lactic acid is minimally elevated outside of the normal range. Her white blood cell count is also elevated however this has been the case in the past as well. Patient reassessed. She really wants to go home. We will give her 40 mg IV Lasix. Venous duplex is negative for any clot. CTA negative for any PE or pneumonia either. She reports she is mainly concerned about her legs and she feels that her breathing is controlled decently well by her. She is without a fever or difficulty breathing. I suggested admission to the patient given she meets criteria given she is requiring more than her baseline oxygen (4 L instead of 3-3.5). Patient is pleasantly but adamantly opposed to admission and wants to go home. I can prescribe her a slightly higher dose of Lasix for a few days with potassium chloride to help with the edema she does not seem to have signs of pulmonary edema however. She is post to see vascular surgery next week for her carotid arteries however she says she will also talk to them about her leg swelling. Patient advised to return immediately if she is worsening in any way in the meantime. I discussed with her that her respiratory status is fairly tenuous and that she expressed understanding. Will prescribe her prednisone and doxycycline for COPD exacerbation. Cautioned to return absolutely if worsening in the meantime.           ------------------------------------------------------------------------------------------------------------        Consultations:       Consultations: - NONE        Procedures and Critical Care       Performed by: Brittney Hill DO    Procedures             CRITICAL CARE NOTE :  11:13 AM  CRITICAL CARE TIME: None    Brittney Hill DO        Disposition         Diagnosis:   1.  Peripheral edema    2. COPD exacerbation ([address]    3. Chronic respiratory failure with hypoxia, on home oxygen therapy [facility])          Disposition: Discharge    Follow-up Information     Follow up With Specialties Details Why [address], NP Nurse Practitioner Call today  [address]  [phone]      Kristyn Webber Physician Assistant Call today  [address]  [phone] [address] [facility] EMERGENCY DEPT Emergency Medicine  As needed, If symptoms worsen; or [facility] Emergency Department [address] [place] [address]  [phone]          Discharge Medication List as of 7/7/2022  2:56 PM      START taking these medications    Details   !! furosemide (LASIX) 80 mg tablet Take 1 Tablet by mouth daily for 5 days. , Normal, Disp-5 Tablet, R-0      doxycycline (VIBRA-TABS) 100 mg tablet Take 1 Tablet by mouth two (2) times a day for 7 days. , Normal, Disp-14 Tablet, R-0      predniSONE (DELTASONE) 20 mg tablet Take 2 Tablets by mouth daily for 6 days. With Breakfast, Normal, Disp-12 Tablet, R-0      potassium chloride (KLOR-CON) 20 mEq pack Take 1 Packet by mouth daily for 5 days. , Normal, Disp-5 Packet, R-0       !! - Potential duplicate medications found. Please discuss with provider. CONTINUE these medications which have NOT CHANGED    Details   multivitamin (ONE A DAY) tablet Take 1 Tablet by mouth daily. , Historical Med      !! budesonide-glycopyr-formoterol (Breztri Aerosphere) 160-9-4.8 mcg/actuation HFAA Take 2 Inhalation by mouth two (2) times a day., Historical Med      carboxymethylcellulose sodium (Refresh Tears) 0.5 % drop ophthalmic solution Apply 1 Drop to eye two (2) times a day. apply to both eyes , Historical Med      sodium chloride (Nasal Moisturizing) 0.65 % nasal squeeze bottle 1 Drop by Both Nostrils route as needed for Congestion or Nasal Dryness.  as needed daily, Historical Med      !! furosemide (Lasix) 40 mg tablet Take 40 mg by mouth daily. , Historical Med      albuterol (Proventil HFA) 90 mcg/actuation inhaler Take 2 Puffs by inhalation every four (4) hours as needed for Wheezing, Shortness of Breath or Respiratory Distress. , Print, Disp-1 Each, R-0      !! budesonide-glycopyr-formoterol (Breztri Aerosphere) 160-9-4.8 mcg/actuation HFAA Take  by inhalation. , Historical Med      OXYGEN-AIR DELIVERY SYSTEMS 4 L by IntraNASal route continuous. , Historical Med      cholecalciferol (VITAMIN D3) (1000 Units /25 mcg) tablet Take 2 Tabs by mouth daily. , Normal, Disp-60 Tab, R-0      atorvastatin (LIPITOR) 20 mg tablet Take 20 mg by mouth daily. , Historical Med      losartan (COZAAR) 50 mg tablet Take  by mouth daily. 75 mg in a.m, Historical Med      diclofenac (VOLTAREN) 1 % gel Apply  to affected area four (4) times daily. , Print, Disp-100 g, R-2      Oxygen Historical Med      albuterol (PROVENTIL VENTOLIN) 2.5 mg /3 mL (0.083 %) nebulizer solution 3 mL by Nebulization route every four (4) hours as needed for Wheezing or Shortness of Breath., Print, Disp-48 Each, R-0      acetaminophen (TYLENOL EXTRA STRENGTH) 500 mg tablet Take 500 mg by mouth every six (6) hours as needed for Pain., Historical Med      metoprolol (LOPRESSOR) 25 mg tablet Take 25 mg by mouth two (2) times a day., Historical Med      NIFEdipine ER (ADALAT CC) 60 mg ER tablet Take 60 mg by mouth daily. , Historical Med      alprazolam (XANAX) 0.5 mg tablet Take 0.5 mg by mouth nightly., Historical Med      ipratropium-albuteroL (COMBIVENT RESPIMAT)  mcg/actuation inhaler Take 1 Puff by inhalation four (4) times daily. , Historical Med      nystatin (MYCOSTATIN) 100,000 unit/mL suspension Take 5 mL by mouth two (2) times a day. swish and spit, Normal, Disp-60 mL, R-2      famotidine (PEPCID) 20 mg tablet Take 1 Tab by mouth daily. , Normal, Disp-30 Tab, R-0      COQ10, UBIQUINOL, PO Take 1 Cap by mouth daily. , Historical Med aspirin delayed-release 81 mg tablet Take 81 mg by mouth daily. , Historical Med       !! - Potential duplicate medications found. Please discuss with provider. Diagnosis     Clinical Impression:   1. Peripheral edema    2. COPD exacerbation ([address]    3. Chronic respiratory failure with hypoxia, on home oxygen therapy [facility])        Attestations:    Giles Flores, DO    Please note that this dictation was completed with [employer], the computer voice recognition software. Quite often unanticipated grammatical, syntax, homophones, and other interpretive errors are inadvertently transcribed by the computer software. Please disregard these errors. Please excuse any errors that have escaped final proofreading. Thank you.

## 2022-07-07 NOTE — ED TRIAGE NOTES
C/o bilat leg swelling and pain for several days. Pt reports the swelling has increased and is causing her discomfort. Hx of COPD and also reports her shortness of breath.  Currently denies chest pain

## 2022-07-07 NOTE — DISCHARGE INSTRUCTIONS
1.  I have prescribed you 5 days of a higher dose of the Lasix. You should take this instead of your current Lasix as opposed to taking it with your current Lasix so you should only be getting 80 mg once a day of the furosemide. This is only a short-term course of about 5 days. I have also prescribed you prednisone and doxycycline which should help with your breathing. It was suggested that you stay in the hospital, however if you are worsening in any way please do not hesitate to return or seek medical assistance at any time. Follow-up with your vascular specialist for further assessment on a nonemergent basis. I have also prescribed you potassium as the Lasix can cause your potassium to drop. You should take this daily while you are taking a higher dose of the Lasix. It has been a pleasure caring for you today. Return to the ER for any worsening in your condition at any time.

## 2022-07-13 LAB
BACTERIA SPEC CULT: NORMAL
BACTERIA SPEC CULT: NORMAL
SERVICE CMNT-IMP: NORMAL
SERVICE CMNT-IMP: NORMAL

## 2022-07-15 ENCOUNTER — OFFICE VISIT (OUTPATIENT)
Dept: VASCULAR SURGERY | Age: 87
End: 2022-07-15

## 2022-07-15 VITALS
DIASTOLIC BLOOD PRESSURE: 62 MMHG | OXYGEN SATURATION: 97 % | HEART RATE: 66 BPM | RESPIRATION RATE: 20 BRPM | SYSTOLIC BLOOD PRESSURE: 118 MMHG

## 2022-07-15 DIAGNOSIS — I65.23 CAROTID STENOSIS, ASYMPTOMATIC, BILATERAL: Primary | ICD-10-CM

## 2022-07-15 DIAGNOSIS — Z98.890 STATUS POST CAROTID ENDARTERECTOMY: ICD-10-CM

## 2022-07-15 DIAGNOSIS — I87.2 VENOUS (PERIPHERAL) INSUFFICIENCY: ICD-10-CM

## 2022-07-15 PROCEDURE — G8432 DEP SCR NOT DOC, RNG: HCPCS | Performed by: PHYSICIAN ASSISTANT

## 2022-07-15 PROCEDURE — G8420 CALC BMI NORM PARAMETERS: HCPCS | Performed by: PHYSICIAN ASSISTANT

## 2022-07-15 PROCEDURE — 1123F ACP DISCUSS/DSCN MKR DOCD: CPT | Performed by: PHYSICIAN ASSISTANT

## 2022-07-15 PROCEDURE — G8752 SYS BP LESS 140: HCPCS | Performed by: PHYSICIAN ASSISTANT

## 2022-07-15 PROCEDURE — G8400 PT W/DXA NO RESULTS DOC: HCPCS | Performed by: PHYSICIAN ASSISTANT

## 2022-07-15 PROCEDURE — 99214 OFFICE O/P EST MOD 30 MIN: CPT | Performed by: PHYSICIAN ASSISTANT

## 2022-07-15 PROCEDURE — G8427 DOCREV CUR MEDS BY ELIG CLIN: HCPCS | Performed by: PHYSICIAN ASSISTANT

## 2022-07-15 PROCEDURE — G8754 DIAS BP LESS 90: HCPCS | Performed by: PHYSICIAN ASSISTANT

## 2022-07-15 PROCEDURE — G8536 NO DOC ELDER MAL SCRN: HCPCS | Performed by: PHYSICIAN ASSISTANT

## 2022-07-15 PROCEDURE — 1090F PRES/ABSN URINE INCON ASSESS: CPT | Performed by: PHYSICIAN ASSISTANT

## 2022-07-15 PROCEDURE — 1101F PT FALLS ASSESS-DOCD LE1/YR: CPT | Performed by: PHYSICIAN ASSISTANT

## 2022-07-15 RX ORDER — PREDNISONE 2.5 MG/1
TABLET ORAL
COMMUNITY

## 2022-07-15 NOTE — PROGRESS NOTES
[address]    Chief Complaint   Patient presents with    Carotid Artery Stenosis       History and Physical    Ms Juan Cody is a pleasant 82-year-old female who presents today for follow-up and reevaluation for known carotid stenosis disease. Patient is here just over 6 years s/p L CEA and this is a surveillance visit. Patient continues to deny any signs or symptoms of TIA or stroke. Denies any recent memory loss, last for searching for words, unilateral or bilateral weaknesses. A few years ago she became dependent on O2 supplementation. He continues with supplementation without any issues. She has done well with adjusting to full time oxygen. She had still been active and busy until the pandemic limited what she could do! She had been having issues with lower extremity edema and did get compression stockings, which she wears regularly, and she says this does help. Unfortunately patient was recently in the hospital for right lower extremity swelling and cellulitis. Patient states she was started on antibiotic, and her redness and irritation has dissipated but the swelling remains. She does report being compliant with wearing her compression stockings, and also states that she elevates her legs by sitting in a recliner. Denies elevating her legs above the level of her heart. Patient continues to ambulate daily, short distances, without any leg pain or discomfort. She denies any leg pain at rest.  Patient denies having any issues healing any cuts or abrasions in her lower extremities in the past.  Patient continues to take her daily meds as recommended including aspirin and statin medication.   Patient also states that she is currently on the steroid for the increased swelling, and has just finished her antibiotic therapy.       Past Medical History:   Diagnosis Date    Arthritis     Chronic lung disease     Chronic obstructive pulmonary disease ([address]     Heart failure ([facility] [address]     Hypertension Patient Active Problem List   Diagnosis Code    Occlusion and stenosis of carotid artery I65.29    Carotid stenosis I65.29    BURGESS (dyspnea on exertion) R06.00    Hyperlipidemia E78.5    Essential hypertension with goal blood pressure less than 140/90 I10    Chronic diastolic congestive heart failure (HCC) I50.32    Chronic obstructive pulmonary disease (HCC) J44.9    Pulmonary HTN ([facility]) I27.20    CAP (community acquired pneumonia) J18.9    Suspected COVID-19 virus infection Z20.822    Chronic obstructive pulmonary disease with acute exacerbation ([facility] [address] J44.1    Encounter for palliative care Z51.5    Goals of care, counseling/discussion Z71.89    Acute on chronic respiratory failure with hypoxia ([facility] [address] J96.21    Debility R53.81    COVID-19 U07.1    Respiratory failure ([facility]) J96.90    Acute bronchitis with chronic obstructive pulmonary disease (COPD) ([facility]) J44.0, J20.9    Pyelonephritis N12    Sepsis ([facility] [address] A41.9    E coli bacteremia R78.81, B96.20     Past Surgical History:   Procedure Laterality Date    HX ORTHOPAEDIC      spinal sx 5/6    HX OTHER SURGICAL      Carotid artery    VASCULAR SURGERY PROCEDURE UNLIST      L CEA 10/2014     Current Outpatient Medications   Medication Sig Dispense Refill    predniSONE (DELTASONE) 2.5 mg tablet Take  by mouth daily (with breakfast).  multivitamin (ONE A DAY) tablet Take 1 Tablet by mouth daily.  budesonide-glycopyr-formoterol (Breztri Aerosphere) 160-9-4.8 mcg/actuation HFAA Take 2 Inhalation by mouth two (2) times a day.  sodium chloride (Nasal Moisturizing) 0.65 % nasal squeeze bottle 1 Drop by Both Nostrils route as needed for Congestion or Nasal Dryness. as needed daily      furosemide (Lasix) 40 mg tablet Take 40 mg by mouth daily.  albuterol (Proventil HFA) 90 mcg/actuation inhaler Take 2 Puffs by inhalation every four (4) hours as needed for Wheezing, Shortness of Breath or Respiratory Distress.  1 Each 0    budesonide-glycopyr-formoterol (Breztri Aerosphere) 160-9-4.8 mcg/actuation HFAA Take  by inhalation.  OXYGEN-AIR DELIVERY SYSTEMS 4 L by IntraNASal route continuous.  atorvastatin (LIPITOR) 20 mg tablet Take 20 mg by mouth daily.  losartan (COZAAR) 50 mg tablet Take  by mouth daily. 75 mg in a.m      Oxygen       albuterol (PROVENTIL VENTOLIN) 2.5 mg /3 mL (0.083 %) nebulizer solution 3 mL by Nebulization route every four (4) hours as needed for Wheezing or Shortness of Breath. 48 Each 0    acetaminophen (TYLENOL EXTRA STRENGTH) 500 mg tablet Take 500 mg by mouth every six (6) hours as needed for Pain.  metoprolol (LOPRESSOR) 25 mg tablet Take 25 mg by mouth two (2) times a day.  NIFEdipine ER (ADALAT CC) 60 mg ER tablet Take 60 mg by mouth daily.  alprazolam (XANAX) 0.5 mg tablet Take 0.5 mg by mouth nightly.  aspirin delayed-release 81 mg tablet Take 81 mg by mouth daily.  ipratropium-albuteroL (COMBIVENT RESPIMAT)  mcg/actuation inhaler Take 1 Puff by inhalation four (4) times daily. (Patient not taking: Reported on 7/7/2022)      carboxymethylcellulose sodium (Refresh Tears) 0.5 % drop ophthalmic solution Apply 1 Drop to eye two (2) times a day. apply to both eyes  (Patient not taking: Reported on 7/15/2022)      nystatin (MYCOSTATIN) 100,000 unit/mL suspension Take 5 mL by mouth two (2) times a day. swish and spit (Patient not taking: Reported on 7/7/2022) 60 mL 2    famotidine (PEPCID) 20 mg tablet Take 1 Tab by mouth daily. (Patient not taking: Reported on 7/7/2022) 30 Tab 0    cholecalciferol (VITAMIN D3) (1000 Units /25 mcg) tablet Take 2 Tabs by mouth daily. (Patient not taking: Reported on 7/15/2022) 60 Tab 0    COQ10, UBIQUINOL, PO Take 1 Cap by mouth daily. (Patient not taking: Reported on 5/5/2022)      diclofenac (VOLTAREN) 1 % gel Apply  to affected area four (4) times daily.  (Patient not taking: Reported on 7/15/2022) 100 g 2     No Known Allergies      Physical   Visit Vitals  /62 (BP 1 Location: Left upper arm, BP Patient Position: Sitting, BP Cuff Size: Adult)   Pulse 66   Resp 20   SpO2 97%     General:  Alert, cooperative, no distress. On Oxygen via nasal cannula. Ambulated into the room albeit slowly but with a steady gait. Head:  Normocephalic, without obvious abnormality, atraumatic. Eyes:     Conjunctivae/corneas clear. Pupils equal, round, reactive to light. Extraocular movements intact. Neck:         L CEA scar   Lungs:   No current increased respiratory effort, even in spite of removing her oxygen so she could wear her mask   Extremities: Extremities normal, atraumatic, but about 1+ edema bilaterally. Right greater than left. Chronic hemosiderin staining noted bilaterally. Multiple spider veins noted on both ankles left greater than right. Nontender to palpation. Multiple varicosities noted both lower extremities. Pulses: Difficult to palpate due to edema but no signs of ischemia   Skin: Skin color, texture, turgor normal. No rashes or lesions       Vascular studies:  Dx: Status post carotid endarterectomy [Z98.890 (ICD-10-CM)]; Bilateral carotid artery stenosis [I65.23 (ICD-10-CM)]       Procedure Technique    Duplex images were obtained using 2-D gray scale, color flow, and spectral Doppler analysis. Important Information    THIS IS A PRELIMINARY RESULT      Vitals    Weight Height BSA (calculated - sq m) BP Pulse (Heart Rate)            Interpretation Summary    · Mild plaquing in the range of <50% of the right internal carotid artery. · Patent left carotid endarterectomy with mild plaquing in the range of <50%. · Patent subclavian arteries bilaterally with biphasic signals. · Antegrade vertebral arteries bilaterally. · When compared with previous exam dated 10/22/20, there is no significant change. Cerebrovascular Findings      Right Carotid    The right CCA is patent.  There is mild stenosis in the right ICA (<50%). The right ICA has calcific plaque. The right ECA is patent. The right vertebral is antegrade. The right subclavian is normal with biphasic waveforms. Left Carotid    Patient is s/p carotid endarterectomy. The left CCA is patent. There is mild stenosis in the left ICA (<50%). The left ICA has heterogeneous plaque. The left ECA is patent. The left vertebral is antegrade. The left subclavian is normal with biphasic waveforms. Carotid Measurements     Right PSV Right EDV Left PSV Left EDV   CCA Prox 75.5 cm/s       8.4 cm/s       61.3 cm/s       8.4 cm/s         CCA Dist 65.1 cm/s       11.2 cm/s       54.7 cm/s       6.5 cm/s         ICA Prox 102 cm/s       14.4 cm/s       34.8 cm/s       10.6 cm/s         ICA Mid 105.7 cm/s       18.1 cm/s       77.3 cm/s       14 cm/s         ICA Dist 107.5 cm/s       23.5 cm/s       65.2 cm/s       11.1 cm/s         ICA/CCA Ratio 1.7 no units        1.4 no units          .2 cm/s       4.6 cm/s       81.2 cm/s       0 cm/s         Vertebral 78.3 cm/s       14.4 cm/s       52.4 cm/s       7.6 cm/s         Subclavian Prox 118.3 cm/s       10.8 cm/s       149.4 cm/s       5.7 cm/s                                     Procedure Staff    Technologist/Clinician: Isiah Rivera  Supporting Staff: None  Performing Physician/Midlevel: None     Exam Completion Date/Time:          Impression/Plan:     ICD-10-CM ICD-9-CM    1. Carotid stenosis, asymptomatic, bilateral  I65.23 433.10      433.30    2. Status post carotid endarterectomy  Z98.890 V45.89    3. Venous (peripheral) insufficiency  I87.2 459.81      Orders Placed This Encounter    predniSONE (DELTASONE) 2.5 mg tablet   Patient instructed that given above findings with recent imaging we will continue with observation and surveillance. Continue yearly surveillance, repeat bilateral carotid duplex imaging on next visit, based on stable results.    Long discussion with patient about venous insufficiency and treatment for such disease. Patient had recent imaging which showed no evidence of DVT. Patient instructed on the importance of compression elevation in the face of venous insufficiency. Patient educated on proper elevation, elevation above the level of her heart. Patient instructed that she must lay flat with 2-3 pillows under her legs to get her legs 20 to 30 inches above the level of her heart for proper elevation. Patient willing to try. Patient encouraged to continue with compression 20 to 30 mmHg and be compliant with this usage. Patient agreeable. Brief discussion was held with patient about chronic venous disease and ablation therapy for venous reflux. Patient instructed if she was to be compliant with compression elevation I will bring her back in 3 months and perform bilateral lower extremity venous reflux imaging to evaluate for reflux disease. Upon next visit we can discuss whether the patient is a candidate for ablation therapy. In the meantime patient encouraged to continue to make better lifestyle choices, better nutritional decline, and increasing her daily activity as tolerated. As stated above we will continue with observation surveillance for carotid stenosis disease and continue with acute work-up for venous insufficiency. Patient states she understands, her daughter is present at this visit and they both state that they are willing to be compliant with above recommendations and proceed as planned. We will discuss details with my attending. Follow-up and Dispositions    · Return in about 3 months (around 10/15/2022). Mary Harris PA-C    Portions of this note have been entered using voice recognition software.

## 2022-07-15 NOTE — PATIENT INSTRUCTIONS
Peripheral Arterial Disease (PAD): Care Instructions  Overview  Peripheral arterial disease (PAD) occurs when the blood vessels (arteries) that supply blood to the legs, belly, pelvis, arms, or neck get narrow or blocked. This reduces blood flow to that area. The legs are affected most often. PAD is often caused by fatty buildup (plaque) in the arteries. This buildup is also called \"hardening\" of the arteries. Your risk of PAD increases if you smoke or have high cholesterol, high blood pressure, diabetes, or a family history of PAD. Many people don't have symptoms. If you do have symptoms, you may have weak or tired legs, difficulty walking or balancing, or pain. If you have pain, you might feel a tight, aching, or squeezing pain in the calf, foot, thigh, or buttock that occurs during exercise. The pain usually gets worse during exercise and goes away when you rest. If PAD gets worse, you may have symptoms of poor blood flow, such as leg pain when you rest.  Medicines and lifestyle changes may help your symptoms and lower your risk of heart attack and stroke. In some cases, surgery or other treatment is needed. It is important that you follow up with your doctor. Follow-up care is a key part of your treatment and safety. Be sure to make and go to all appointments, and call your doctor if you are having problems. It's also a good idea to know your test results and keep a list of the medicines you take. How can you care for yourself at home? · Do not smoke. Smoking can make PAD worse. If you need help quitting, talk to your doctor about stop-smoking programs and medicines. These can increase your chances of quitting for good. · Take your medicines exactly as prescribed. Call your doctor if you think you are having a problem with your medicine. · If you take a blood thinner, such as aspirin, be sure to get instructions about how to take your medicine safely.  Blood thinners can cause serious bleeding problems. · Ask your doctor if a cardiac rehab program is right for you. Cardiac rehab can help you make lifestyle changes. In cardiac rehab, a team of health professionals provides education and support to help you make new, healthy habits. · Eat heart-healthy foods such as fruits, vegetables, whole grains, fish, lean meats, and low-fat or nonfat dairy foods. Limit sodium, sugar, and alcohol. · If your doctor recommends it, get more exercise. Walking is a good choice. Bit by bit, increase the amount you walk every day. Try for at least 30 minutes on most days of the week. If you have symptoms when you exercise, ask your doctor about a special exercise program that may help relieve your symptoms. · Stay at a healthy weight. Lose weight if you need to. · Take good care of your feet. ? Treat cuts and scrapes on your legs right away. Poor blood flow prevents (or slows) quick healing of even small cuts or scrapes. This is even more important if you have diabetes. ? Avoid shoes that are too tight or that rub your feet. Shoes should be comfortable and fit well. ? Avoid socks or stockings that are tight enough to leave elastic-band marks on your legs. Tight socks can make circulation problems worse. ? Keep your feet clean and moisturized to prevent drying and cracking. Place cotton or lamb's wool between your toes to prevent rubbing and to absorb moisture. ? If you have a sore on your leg or foot, keep it dry and cover it with a nonstick bandage until you see your doctor. When should you call for help? Call 911 anytime you think you may need emergency care. For example, call if:    · You have symptoms of a heart attack. These may include:  ? Chest pain or pressure, or a strange feeling in the chest.  ? Sweating. ? Shortness of breath. ? Nausea or vomiting. ? Pain, pressure, or a strange feeling in the back, neck, jaw, or upper belly or in one or both shoulders or arms.   ? Lightheadedness or sudden weakness. ? A fast or irregular heartbeat. After you call 911, the  may tell you to chew 1 adult-strength or 2 to 4 low-dose aspirin. Wait for an ambulance. Do not try to drive yourself.    · You have sudden, severe leg pain, and your leg is cool and pale.     · You have symptoms of a stroke. These may include:  ? Sudden numbness, tingling, weakness, or loss of movement in your face, arm, or leg, especially on only one side of your body. ? Sudden vision changes. ? Sudden trouble speaking. ? Sudden confusion or trouble understanding simple statements. ? Sudden problems with walking or balance. ? A sudden, severe headache that is different from past headaches. Call your doctor now or seek immediate medical care if:    · You have leg pain that does not go away even if you rest.     · Your leg pain changes or gets worse. For example, if you have more pain with normal activity or the same pain with decreased activity, you should call.     · You have cold or numb feet or toes.     · You have leg or foot sores that are slow to heal.     · The skin on your legs or feet changes color.     · You have an open sore on your leg or foot that is infected. Signs of infection include:  ? Increased pain, swelling, warmth, or redness. ? Red streaks leading from the sore. ? Pus draining from the sore. ? A fever. Watch closely for changes in your health, and be sure to contact your doctor if you have any problems. Where can you learn more? Go to http://www.gray.com/  Enter H735 in the search box to learn more about \"Peripheral Arterial Disease (PAD): Care Instructions. \"  Current as of: January 10, 2022               Content Version: 13.2  © 0728-3455 [employer]. Care instructions adapted under license by [employer] (which disclaims liability or warranty for this information).  If you have questions about a medical condition or this instruction, always ask your healthcare professional. [address] any warranty or liability for your use of this information. Learning About Venous Insufficiency  What is it? Venous insufficiency is a problem with the flow of blood from the veins of the legs back to the heart. It's also called chronic venous insufficiency or chronic venous stasis. Your veins bring blood back to the heart after it flows through your body. Veins have valves that keep the blood moving in one direction--toward the heart. But with venous insufficiency, the veins of the legs might not work as they should. This can allow blood to leak backward. Fluid can pool in the legs. This can lead to problems that include varicose veins. What causes it? Venous insufficiency is sometimes caused by deep vein thrombosis and high blood pressure inside leg veins. You are more likely to have venous insufficiency if you:  · Are older. · Are female. · Are overweight. · Don't get enough physical activity. · Smoke. · Have a family history of varicose veins. What are the symptoms? Symptoms of venous insufficiency affect the legs. They may include:  · Swelling, often in the ankles. · A rash. · Varicose veins. · Itching. · Cramping. · Skin sores (ulcers). · Aching or a feeling of heaviness. · Changes in skin color. How is it diagnosed? Your doctor can diagnose venous insufficiency by examining your legs and by using a type of ultrasound test (duplex Doppler) to find out how well blood is flowing in your legs. How is it treated? To reduce swelling and relieve pain caused by venous insufficiency, you can wear compression stockings. They are tighter at the ankles than at the top of the legs. They also can help venous skin ulcers heal. But there are different types of stockings, and they need to fit right. So your doctor will recommend what you need. You also can try to:  · Get more exercise, especially walking.  It can increase blood flow.  · Avoid standing still or sitting for a long time, which can make the fluid pool in your legs. · Try not to sit with your legs crossed at the knee. · Keep your legs raised above your heart when you're lying down. This reduces swelling. If these treatments don't work, you may need medicine or a procedure to help relieve symptoms. Procedures might be done to close the vein, to remove the vein, or to improve blood flow. Follow-up care is a key part of your treatment and safety. Be sure to make and go to all appointments, and call your doctor if you are having problems. It's also a good idea to know your test results and keep a list of the medicines you take. Current as of: July 6, 2021               Content Version: 13.2  © 2006-2022 [employer]. Care instructions adapted under license by [employer] (which disclaims liability or warranty for this information). If you have questions about a medical condition or this instruction, always ask your healthcare professional. [place] [address] any warranty or liability for your use of this information. Carotid Stenosis: Care Instructions  Overview     Carotid stenosis is narrowing of one or both of the carotid arteries. These arteries take blood from the heart to the brain. There is one on each side of the neck. Carotid artery stenosis is caused by a process called hardening of the arteries, or atherosclerosis. A substance called plaque builds up inside the carotid arteries. Things that can lead to plaque include diabetes, high blood pressure, high cholesterol, and smoking. This plaque may limit blood flow to your brain. If plaque breaks open, it may form a blood clot. Or pieces of the plaque may break off. A piece of plaque or a blood clot could move to the brain, causing a stroke or transient ischemic attack (TIA). The goal of treatment is to lower your risk of having a stroke or TIA.  You can lower your risk by having a heart-healthy lifestyle and taking medicine. Sometimes a surgery or procedure is also done. Follow-up care is a key part of your treatment and safety. Be sure to make and go to all appointments, and call your doctor if you are having problems. It's also a good idea to know your test results and keep a list of the medicines you take. How can you care for yourself at home? · Take your medicines exactly as prescribed. Call your doctor if you think you are having a problem with your medicine. You may take medicine to lower your blood pressure, to lower your cholesterol, or to prevent blood clots. · If you take a blood thinner, such as aspirin, be sure to get instructions about how to take your medicine safely. Blood thinners can cause serious bleeding problems. · Do not smoke. People who smoke have a higher chance of stroke than those who quit. If you need help quitting, talk to your doctor about stop-smoking programs and medicines. These can increase your chances of quitting for good. · Eat heart-healthy foods. These foods include vegetables, fruits, nuts, beans, lean meat, fish, and whole grains. You limit things that are not so good for your heart, like sodium, alcohol, and sugar. · Stay at a healthy weight. Lose weight if you need to. · Be active. Ask your doctor what type and level of exercise is safe for you. · Limit alcohol to 2 drinks a day for men and 1 drink a day for women. Too much alcohol can cause health problems. · Manage other health problems such as diabetes, high blood pressure, and high cholesterol. If you think you may have a problem with alcohol or drug use, talk to your doctor. · Avoid colds and flu. Get the flu vaccine every year. When should you call for help? Call 911 anytime you think you may need emergency care. For example, call if:    · You passed out (lost consciousness).     · You have symptoms of a stroke.  These may include:  ? Sudden numbness, tingling, weakness, or loss of movement in your face, arm, or leg, especially on only one side of your body. ? Sudden vision changes. ? Sudden trouble speaking. ? Sudden confusion or trouble understanding simple statements. ? Sudden problems with walking or balance. ? A sudden, severe headache that is different from past headaches. Call your doctor now or seek immediate medical care if:    · You are dizzy or lightheaded, or you feel like you may faint. Watch closely for changes in your health, and be sure to contact your doctor if you have any problems. Where can you learn more? Go to http://www.gray.com/  Enter Y756 in the search box to learn more about \"Carotid Stenosis: Care Instructions. \"  Current as of: January 10, 2022               Content Version: 13.2  © 2006-2022 [employer]. Care instructions adapted under license by [employer] (which disclaims liability or warranty for this information). If you have questions about a medical condition or this instruction, always ask your healthcare professional. [place] [address] any warranty or liability for your use of this information.

## 2022-07-15 NOTE — PROGRESS NOTES
1. Have you been to an emergency room or urgent care clinic since your last visit? Yes (multiple)    Hospitalized since your last visit? If yes, where, when, and reason for visit? Yes; 12/2020 , [facility] for pneumonia, 02/2021 , [facility] for COPD, 10/2021 , [facility] for COPD, and 05/2022, [facility] for sepsis    2. Have you seen or consulted any other health care providers outside of the [facility] since your last visit including any procedures, health maintenance items. If yes, where, when and reason for visit?  Yes; cardiology, and pulmonology          3 most recent [address] [place] [address],[address] Floor 7/15/2022   PHQ Not Done -   Little interest or pleasure in doing things Not at all   Feeling down, depressed, irritable, or hopeless Not at all   Total Score PHQ 2 0   Trouble falling or staying asleep, or sleeping too much -   Feeling tired or having little energy -   Poor appetite, weight loss, or overeating -   Feeling bad about yourself - or that you are a failure or have let yourself or your family down -   Trouble concentrating on things such as school, work, reading, or watching TV -   Moving or speaking so slowly that other people could have noticed; or the opposite being so fidgety that others notice -   Thoughts of being better off dead, or hurting yourself in some way -   PHQ 9 Score -   How difficult have these problems made it for you to do your work, take care of your home and get along with others -

## 2022-08-01 ENCOUNTER — OFFICE VISIT (OUTPATIENT)
Dept: CARDIOLOGY CLINIC | Age: 87
End: 2022-08-01
Payer: MEDICARE

## 2022-08-01 VITALS
SYSTOLIC BLOOD PRESSURE: 140 MMHG | BODY MASS INDEX: 19.67 KG/M2 | WEIGHT: 111 LBS | HEIGHT: 63 IN | HEART RATE: 77 BPM | OXYGEN SATURATION: 88 % | DIASTOLIC BLOOD PRESSURE: 53 MMHG

## 2022-08-01 DIAGNOSIS — I50.32 CHRONIC DIASTOLIC CONGESTIVE HEART FAILURE (HCC): ICD-10-CM

## 2022-08-01 DIAGNOSIS — I25.119 ATHEROSCLEROSIS OF NATIVE CORONARY ARTERY OF NATIVE HEART WITH ANGINA PECTORIS (HCC): Primary | ICD-10-CM

## 2022-08-01 DIAGNOSIS — J44.9 CHRONIC OBSTRUCTIVE PULMONARY DISEASE, UNSPECIFIED COPD TYPE (HCC): ICD-10-CM

## 2022-08-01 DIAGNOSIS — I27.20 PULMONARY HTN (HCC): ICD-10-CM

## 2022-08-01 DIAGNOSIS — I10 ESSENTIAL HYPERTENSION WITH GOAL BLOOD PRESSURE LESS THAN 140/90: ICD-10-CM

## 2022-08-01 PROCEDURE — G8754 DIAS BP LESS 90: HCPCS | Performed by: INTERNAL MEDICINE

## 2022-08-01 PROCEDURE — G8427 DOCREV CUR MEDS BY ELIG CLIN: HCPCS | Performed by: INTERNAL MEDICINE

## 2022-08-01 PROCEDURE — 1101F PT FALLS ASSESS-DOCD LE1/YR: CPT | Performed by: INTERNAL MEDICINE

## 2022-08-01 PROCEDURE — G8420 CALC BMI NORM PARAMETERS: HCPCS | Performed by: INTERNAL MEDICINE

## 2022-08-01 PROCEDURE — 99214 OFFICE O/P EST MOD 30 MIN: CPT | Performed by: INTERNAL MEDICINE

## 2022-08-01 PROCEDURE — G8400 PT W/DXA NO RESULTS DOC: HCPCS | Performed by: INTERNAL MEDICINE

## 2022-08-01 PROCEDURE — 1090F PRES/ABSN URINE INCON ASSESS: CPT | Performed by: INTERNAL MEDICINE

## 2022-08-01 PROCEDURE — G8536 NO DOC ELDER MAL SCRN: HCPCS | Performed by: INTERNAL MEDICINE

## 2022-08-01 PROCEDURE — 1123F ACP DISCUSS/DSCN MKR DOCD: CPT | Performed by: INTERNAL MEDICINE

## 2022-08-01 PROCEDURE — G8432 DEP SCR NOT DOC, RNG: HCPCS | Performed by: INTERNAL MEDICINE

## 2022-08-01 PROCEDURE — G8753 SYS BP > OR = 140: HCPCS | Performed by: INTERNAL MEDICINE

## 2022-08-01 RX ORDER — FOLIC ACID 1 MG/1
TABLET ORAL DAILY
COMMUNITY

## 2022-08-01 NOTE — PROGRESS NOTES
1. Have you been to the ER, urgent care clinic since your last visit? Hospitalized since your last visit? Yes When: 7/2022 Where: [facility] Reason for visit: edema    2. Have you seen or consulted any other health care providers outside of the [address] [place] [address] since your last visit? Include any pap smears or colon screening.       No

## 2022-08-01 NOTE — PROGRESS NOTES
HISTORY OF PRESENT ILLNESS  Blanche Garcia is a 82 y.o. female. Patient with chf,copd,htn,pulm htn  4/2021  Patient seen today for follow-up. Since last visit had multiple admissions first 1 in 12/2020 with COVID-19 infection. Subsequently into 2021 with COPD exacerbation. She is recovering slowly remains on home oxygen. Shortness of breath is stable. Denies any chest pain  8/2022  Patient seen today for follow-up since last evaluation was in hospital in May 2022 with UTI. Subsequently was in ER last month with increased edema. Continues to have increase edema right now. She is short of breath on oxygen and remains on home oxygen    Follow-up  Associated symptoms include shortness of breath. Pertinent negatives include no chest pain, no abdominal pain and no headaches. CHF  The history is provided by the Patient. This is a chronic problem. The problem occurs constantly. The problem has not changed since onset. Associated symptoms include shortness of breath. Pertinent negatives include no chest pain, no abdominal pain and no headaches. The symptoms are aggravated by exertion. Nothing relieves the symptoms. Hypertension  Associated symptoms include shortness of breath. Pertinent negatives include no chest pain, no abdominal pain and no headaches. Shortness of Breath  The history is provided by the Patient. This is a recurrent problem. The problem occurs frequently. The problem has not changed since onset. Associated symptoms include leg swelling. Pertinent negatives include no fever, no headaches, no cough, no sputum production, no hemoptysis, no wheezing, no PND, no orthopnea, no chest pain, no vomiting, no abdominal pain, no rash and no claudication. Precipitated by: activity. Review of Systems   Constitutional:  Negative for chills and fever. HENT:  Negative for nosebleeds. Eyes:  Negative for blurred vision and double vision. Respiratory:  Positive for shortness of breath.  Negative for cough, hemoptysis, sputum production and wheezing. Cardiovascular:  Positive for leg swelling. Negative for chest pain, palpitations, orthopnea, claudication and PND. Gastrointestinal:  Negative for abdominal pain, heartburn, nausea and vomiting. Musculoskeletal:  Negative for myalgias. Skin:  Negative for rash. Neurological:  Negative for dizziness, weakness and headaches. Endo/Heme/Allergies:  Does not bruise/bleed easily. Family History   Problem Relation Age of Onset    Heart Disease Mother     Hypertension Mother     Heart Attack Father     Hypertension Father        Past Medical History:   Diagnosis Date    Arthritis     Chronic lung disease     Chronic obstructive pulmonary disease ([facility]     Heart failure ([facility] [facility]     Hypertension        Past Surgical History:   Procedure Laterality Date    HX ORTHOPAEDIC      spinal sx     HX OTHER SURGICAL      Carotid artery    VASCULAR SURGERY PROCEDURE UNLIST      L CEA 10/2014       Social History     Tobacco Use    Smoking status: Former     Packs/day: 1.50     Years: 30.00     Pack years: 45.00     Types: Cigarettes     Quit date: 1987     Years since quittin.9    Smokeless tobacco: Never   Substance Use Topics    Alcohol use: No     Alcohol/week: 0.0 standard drinks       No Known Allergies        Visit Vitals  BP (!) 140/53   Pulse 77   Ht 5' 3\" (1.6 m)   Wt 50.3 kg (111 lb)   SpO2 (!) 88%   BMI 19.66 kg/m²         Physical Exam  Constitutional:       Appearance: She is well-developed. HENT:      Head: Normocephalic and atraumatic. Eyes:      Conjunctiva/sclera: Conjunctivae normal.   Neck:      Thyroid: No thyromegaly. Vascular: No JVD. Trachea: No tracheal deviation. Cardiovascular:      Rate and Rhythm: Normal rate and regular rhythm. Chest Wall: PMI is not displaced. Pulses: No decreased pulses. Heart sounds: Murmur heard. Holosystolic murmur is present at the lower left sternal border. No gallop.  No S3 sounds. Pulmonary:      Effort: No respiratory distress. Breath sounds: No wheezing or rales. Chest:      Chest wall: No tenderness. Abdominal:      Palpations: Abdomen is soft. Tenderness: There is no abdominal tenderness. Musculoskeletal:      Cervical back: Neck supple. Skin:     General: Skin is warm. Neurological:      Mental Status: She is alert and oriented to person, place, and time. Ms. Kiara Bundy has a reminder for a \"due or due soon\" health maintenance. I have asked that she contact her primary care provider for follow-up on this health maintenance. I have personally reviewed patient's records available from hospital and other providers and incorporated findings in patient care. 6/2016-[facility]    SUMMARY:6/2016-echo  Procedure information: Image quality was adequate. Left ventricle: Systolic function was normal. Ejection fraction was  estimated to be 60 %. No obvious wall motion abnormalities identified in  the views obtained. Wall thickness was mildly increased. Features were  consistent with a pseudonormal left ventricular filling pattern, with  concomitant abnormal relaxation and increased filling pressure (grade 2  diastolic dysfunction). Right ventricle: Systolic pressure was markedly increased. Estimated peak  pressure was 74 mmHg. Left atrium: The atrium was mildly to moderately dilated. Inferior vena cava, hepatic veins: The inferior vena cava was mildly  Dilated. SUMMARY:6/2017-echo  Left ventricle: Systolic function was normal by visual assessment. Ejection fraction was estimated to be 60 %. There was possible hypokinesis  of the basal-mid anteroseptal, basal-mid inferoseptal, apical inferior,  and apical septal wall(s). Wall thickness was mildly increased. Doppler  parameters were consistent with abnormal left ventricular relaxation  (grade 1 diastolic dysfunction). Right ventricle:  The size was at the upper limits of normal. Systolic  pressure was moderately increased. Estimated peak pressure was 60 mmHg. Right atrium: The atrium was mildly dilated. I Have personally reviewed recent relevant labs available and discussed with patient  1/2018 5/2018  Left Leg:-  Deep venous thrombosis:           No  Superficial venous thrombosis:    No  Deep venous insufficiency:        Not examined  Superficial venous insufficiency: Not examined   Interpretation Summary 9/2020    LV: Estimated LVEF is 65 - 70%. Normal cavity size, wall thickness and systolic function (ejection fraction normal). Wall motion: normal. Mild (grade 1) left ventricular diastolic dysfunction. LA: Left Atrium volume index is 30.81 mL/m2. MV: Mild mitral valve regurgitation is present. TV: Mild tricuspid valve regurgitation is present. PV: Mild pulmonic valve regurgitation is present. PA: Moderate pulmonary hypertension. Pulmonary arterial systolic pressure is 66 mmHg. IMPRESSION 10/2020-CT chest  Impression:     Advanced emphysema. No acute pulmonary process. Pleural parenchymal scarring in  the lung apices but no suspicious pulmonary nodule. Coronary artery disease and atherosclerosis. Interpretation Summary 2/2021    LV: Estimated LVEF is 55 - 60%. Visually measured ejection fraction. Normal cavity size, wall thickness and systolic function (ejection fraction normal). Wall motion: normal.  COVID r/o  RV: Mildly dilated right ventricle. RA: Mildly dilated right atrium. PA: Severe pulmonary hypertension. Pulmonary arterial systolic pressure is 73 mmHg. IVC: Moderately elevated central venous pressure (8 mmHg); IVC diameter is larger than 21 mm and collapses more than 50% with respiration. Assessment         ICD-10-CM ICD-9-CM    1. Atherosclerosis of native coronary artery of native heart with angina pectoris ([facility]  I25.119 414.01      413.9       2. Chronic diastolic congestive heart failure (HCC)  I50.32 428.32      428.0       3.  Pulmonary HTN (HCC)  I27.20 416.8       4. Essential hypertension with goal blood pressure less than 140/90  I10 401.9       5. Chronic obstructive pulmonary disease, unspecified COPD type [facility])  J44.9 496         12/2017  Discussed option of right heart cath-risk benefit discussed-patient willing to proceed  Followed by Dr Crystal Kuo    1/2018  Patient postponed rt heart cath-she will get it done in sping  moderate  ?? group 3  evaluate for group 1   5/2018  Does not want rt heart cath  11/2018  Cardiac status stable. Shortness of breath class III which is multifactorial.  Blood pressure elevated today but usually normal at home. Patient will continue to monitor. Salt restriction  7/2020  Cardiac status stable. Recent recovering from URI type symptom. COVID-19 test negative. Continue treatment. Follow-up echo for pulmonary hypertension. Remains on home oxygen. Had some leg cramps and simvastatin has been switched to atorvastatin by PCP. Recent labs unremarkable. LDL controlled. Advised to use CoQ10  10 2020  Cardiac status stable. Short of breath multifactorial.  CT scan reviewed that shows coronary atherosclerosis. Continue medical management. 4/2021  Recovering slowly admissions for COVID-19 in December 2020 and exacerbation of COPD and to 2021. Echo with normal ejection fraction increase PA pressure to 73 multifactorial mostly group 3  10/2021  Intermittent shortness of breath with COPD. Takes prednisone as needed. Blood pressure elevated today. We will continue to monitor usually better. Pulmonary hypertension likely group 3 followed by pulmonary  8/2022  Increase shortness of breath and edema. Possibly from pulmonary hypertension also on nifedipine. There are no discontinued medications. No orders of the defined types were placed in this encounter.

## 2022-09-15 ENCOUNTER — OFFICE VISIT (OUTPATIENT)
Dept: CARDIOLOGY CLINIC | Age: 87
End: 2022-09-15
Payer: MEDICARE

## 2022-09-15 VITALS
HEIGHT: 63 IN | DIASTOLIC BLOOD PRESSURE: 43 MMHG | BODY MASS INDEX: 19.67 KG/M2 | HEART RATE: 76 BPM | SYSTOLIC BLOOD PRESSURE: 129 MMHG | WEIGHT: 111 LBS

## 2022-09-15 DIAGNOSIS — E78.5 HYPERLIPIDEMIA, UNSPECIFIED HYPERLIPIDEMIA TYPE: ICD-10-CM

## 2022-09-15 DIAGNOSIS — J44.9 CHRONIC OBSTRUCTIVE PULMONARY DISEASE, UNSPECIFIED COPD TYPE (HCC): ICD-10-CM

## 2022-09-15 DIAGNOSIS — I50.32 CHRONIC DIASTOLIC CONGESTIVE HEART FAILURE (HCC): ICD-10-CM

## 2022-09-15 DIAGNOSIS — I27.20 PULMONARY HTN (HCC): ICD-10-CM

## 2022-09-15 DIAGNOSIS — I25.119 ATHEROSCLEROSIS OF NATIVE CORONARY ARTERY OF NATIVE HEART WITH ANGINA PECTORIS (HCC): Primary | ICD-10-CM

## 2022-09-15 DIAGNOSIS — I10 ESSENTIAL HYPERTENSION WITH GOAL BLOOD PRESSURE LESS THAN 140/90: ICD-10-CM

## 2022-09-15 PROCEDURE — G8420 CALC BMI NORM PARAMETERS: HCPCS | Performed by: NURSE PRACTITIONER

## 2022-09-15 PROCEDURE — G8752 SYS BP LESS 140: HCPCS | Performed by: NURSE PRACTITIONER

## 2022-09-15 PROCEDURE — G8754 DIAS BP LESS 90: HCPCS | Performed by: NURSE PRACTITIONER

## 2022-09-15 PROCEDURE — G8427 DOCREV CUR MEDS BY ELIG CLIN: HCPCS | Performed by: NURSE PRACTITIONER

## 2022-09-15 PROCEDURE — G8536 NO DOC ELDER MAL SCRN: HCPCS | Performed by: NURSE PRACTITIONER

## 2022-09-15 PROCEDURE — G8432 DEP SCR NOT DOC, RNG: HCPCS | Performed by: NURSE PRACTITIONER

## 2022-09-15 PROCEDURE — 1101F PT FALLS ASSESS-DOCD LE1/YR: CPT | Performed by: NURSE PRACTITIONER

## 2022-09-15 PROCEDURE — 1123F ACP DISCUSS/DSCN MKR DOCD: CPT | Performed by: NURSE PRACTITIONER

## 2022-09-15 PROCEDURE — 1090F PRES/ABSN URINE INCON ASSESS: CPT | Performed by: NURSE PRACTITIONER

## 2022-09-15 PROCEDURE — 99214 OFFICE O/P EST MOD 30 MIN: CPT | Performed by: NURSE PRACTITIONER

## 2022-09-15 PROCEDURE — G8400 PT W/DXA NO RESULTS DOC: HCPCS | Performed by: NURSE PRACTITIONER

## 2022-09-15 NOTE — PROGRESS NOTES
1. Have you been to the ER, urgent care clinic since your last visit? Hospitalized since your last visit? No    2. Have you seen or consulted any other health care providers outside of the [address] [place] [address] since your last visit? Include any pap smears or colon screening.       No

## 2022-09-15 NOTE — PROGRESS NOTES
HISTORY OF PRESENT ILLNESS  Blanche Burroughs is a 82 y.o. female. Patient with chf,copd,htn,pulm htn  4/2021  Patient seen today for follow-up. Since last visit had multiple admissions first 1 in 12/2020 with COVID-19 infection. Subsequently into 2021 with COPD exacerbation. She is recovering slowly remains on home oxygen. Shortness of breath is stable. Denies any chest pain  8/2022  Patient seen today for follow-up since last evaluation was in hospital in May 2022 with UTI. Subsequently was in ER last month with increased edema. Continues to have increase edema right now. She is short of breath on oxygen and remains on home oxygen  9/2022  Patient with h/o pulmonary hypertension seen in follow up for testing results. She denies edema, c/o Right hip / leg pain. She denies chest pain or palpitations. Follow-up  The history is provided by the Patient and medical records. Associated symptoms include shortness of breath. Pertinent negatives include no chest pain, no abdominal pain and no headaches. CHF  The history is provided by the Patient. This is a chronic problem. The problem occurs constantly. The problem has not changed since onset. Associated symptoms include shortness of breath. Pertinent negatives include no chest pain, no abdominal pain and no headaches. The symptoms are aggravated by exertion. Nothing relieves the symptoms. Hypertension  Associated symptoms include shortness of breath. Pertinent negatives include no chest pain, no abdominal pain and no headaches. Shortness of Breath  The history is provided by the Patient. This is a recurrent problem. The problem occurs frequently. The problem has not changed since onset. Pertinent negatives include no fever, no headaches, no cough, no sputum production, no hemoptysis, no wheezing, no PND, no orthopnea, no chest pain, no vomiting, no abdominal pain, no rash, no leg swelling and no claudication. Precipitated by: activity.      Review of Systems   Constitutional:  Negative for chills and fever. HENT:  Negative for nosebleeds. Eyes:  Negative for blurred vision and double vision. Respiratory:  Positive for shortness of breath. Negative for cough, hemoptysis, sputum production and wheezing. Cardiovascular:  Negative for chest pain, palpitations, orthopnea, claudication, leg swelling and PND. Gastrointestinal:  Negative for abdominal pain, heartburn, nausea and vomiting. Musculoskeletal:  Negative for myalgias. Skin:  Negative for rash. Neurological:  Negative for dizziness, weakness and headaches. Endo/Heme/Allergies:  Does not bruise/bleed easily. Family History   Problem Relation Age of Onset    Heart Disease Mother     Hypertension Mother     Heart Attack Father     Hypertension Father        Past Medical History:   Diagnosis Date    Arthritis     Chronic lung disease     Chronic obstructive pulmonary disease ([facility] [address] [facility]     Heart failure ([facility] [address]     Hypertension        Past Surgical History:   Procedure Laterality Date    HX ORTHOPAEDIC      spinal sx /    HX OTHER SURGICAL      Carotid artery    VASCULAR SURGERY PROCEDURE UNLIST      L CEA 10/2014       Social History     Tobacco Use    Smoking status: Former     Packs/day: 1.50     Years: 30.00     Pack years: 45.00     Types: Cigarettes     Quit date: 1987     Years since quittin.0    Smokeless tobacco: Never   Substance Use Topics    Alcohol use: No     Alcohol/week: 0.0 standard drinks       No Known Allergies        Visit Vitals  BP (!) 129/43   Pulse 76   Ht 5' 3\" (1.6 m)   Wt 50.3 kg (111 lb)   BMI 19.66 kg/m²           Physical Exam  Vitals and nursing note reviewed. Constitutional:       Appearance: She is well-developed. HENT:      Head: Normocephalic and atraumatic. Eyes:      Conjunctiva/sclera: Conjunctivae normal.   Neck:      Thyroid: No thyromegaly. Vascular: No JVD. Trachea: No tracheal deviation.    Cardiovascular:      Rate and Rhythm: Normal rate and regular rhythm. Chest Wall: PMI is not displaced. Pulses: No decreased pulses. Heart sounds: Murmur heard. Holosystolic murmur is present at the lower left sternal border. No gallop. No S3 sounds. Pulmonary:      Effort: No respiratory distress. Breath sounds: No wheezing or rales. Chest:      Chest wall: No tenderness. Abdominal:      Palpations: Abdomen is soft. Tenderness: There is no abdominal tenderness. Musculoskeletal:      Cervical back: Neck supple. Right lower leg: No edema. Left lower leg: No edema. Skin:     General: Skin is warm. Neurological:      Mental Status: She is alert and oriented to person, place, and time. Ms. Prasanna Gonzalez has a reminder for a \"due or due soon\" health maintenance. I have asked that she contact her primary care provider for follow-up on this health maintenance. I have personally reviewed patient's records available from hospital and other providers and incorporated findings in patient care. 6/2016-[facility]    SUMMARY:6/2016-echo  Procedure information: Image quality was adequate. Left ventricle: Systolic function was normal. Ejection fraction was  estimated to be 60 %. No obvious wall motion abnormalities identified in  the views obtained. Wall thickness was mildly increased. Features were  consistent with a pseudonormal left ventricular filling pattern, with  concomitant abnormal relaxation and increased filling pressure (grade 2  diastolic dysfunction). Right ventricle: Systolic pressure was markedly increased. Estimated peak  pressure was 74 mmHg. Left atrium: The atrium was mildly to moderately dilated. Inferior vena cava, hepatic veins: The inferior vena cava was mildly  Dilated. SUMMARY:6/2017-echo  Left ventricle: Systolic function was normal by visual assessment. Ejection fraction was estimated to be 60 %.  There was possible hypokinesis  of the basal-mid anteroseptal, basal-mid inferoseptal, apical inferior,  and apical septal wall(s). Wall thickness was mildly increased. Doppler  parameters were consistent with abnormal left ventricular relaxation  (grade 1 diastolic dysfunction). Right ventricle: The size was at the upper limits of normal. Systolic  pressure was moderately increased. Estimated peak pressure was 60 mmHg. Right atrium: The atrium was mildly dilated. I Have personally reviewed recent relevant labs available and discussed with patient  1/2018 5/2018  Left Leg:-  Deep venous thrombosis:           No  Superficial venous thrombosis:    No  Deep venous insufficiency:        Not examined  Superficial venous insufficiency: Not examined   Interpretation Summary 9/2020    LV: Estimated LVEF is 65 - 70%. Normal cavity size, wall thickness and systolic function (ejection fraction normal). Wall motion: normal. Mild (grade 1) left ventricular diastolic dysfunction. LA: Left Atrium volume index is 30.81 mL/m2. MV: Mild mitral valve regurgitation is present. TV: Mild tricuspid valve regurgitation is present. PV: Mild pulmonic valve regurgitation is present. PA: Moderate pulmonary hypertension. Pulmonary arterial systolic pressure is 66 mmHg. IMPRESSION 10/2020-CT chest  Impression:     Advanced emphysema. No acute pulmonary process. Pleural parenchymal scarring in  the lung apices but no suspicious pulmonary nodule. Coronary artery disease and atherosclerosis. Interpretation Summary 2/2021    LV: Estimated LVEF is 55 - 60%. Visually measured ejection fraction. Normal cavity size, wall thickness and systolic function (ejection fraction normal). Wall motion: normal.  COVID r/o  RV: Mildly dilated right ventricle. RA: Mildly dilated right atrium. PA: Severe pulmonary hypertension. Pulmonary arterial systolic pressure is 73 mmHg.   IVC: Moderately elevated central venous pressure (8 mmHg); IVC diameter is larger than 21 mm and collapses more than 50% with respiration. Echo   8/2022  Interpretation Summary    Left Ventricle: Normal left ventricular systolic function with a visually estimated EF of 55%. Left ventricle size is normal. Normal wall thickness. See diagram for wall motion findings. Abnormal diastolic function. Left Atrium: Left atrium is moderately dilated. LA Vol Index A/L is 42 mL/m2. Right Atrium: Right atrium is dilated. Pulmonary Arteries: Severe pulmonary hypertension present. The estimated PASP is 85 mmHg. Comparison Study Information  Prior Study  There is a prior study available for comparison. Prior study date: 10/27/2021. As compared to the previous study, there are significant changes. WMA now present       Assessment         ICD-10-CM ICD-9-CM    1. Atherosclerosis of native coronary artery of native heart with angina pectoris ([facility] [address] [address]  I25.119 414.01      413.9       2. Chronic diastolic congestive heart failure (HCC)  I50.32 428.32      428.0       3. Pulmonary HTN (HCC)  I27.20 416.8       4. Essential hypertension with goal blood pressure less than 140/90  I10 401.9       5. Hyperlipidemia, unspecified hyperlipidemia type  E78.5 272.4       6. Chronic obstructive pulmonary disease, unspecified COPD type [facility])  J44.9 496         12/2017  Discussed option of right heart cath-risk benefit discussed-patient willing to proceed  Followed by Dr Nayeli Lynch    1/2018  Patient postponed rt heart cath-she will get it done in sping  moderate  ?? group 3  evaluate for group 1   5/2018  Does not want rt heart cath  11/2018  Cardiac status stable. Shortness of breath class III which is multifactorial.  Blood pressure elevated today but usually normal at home. Patient will continue to monitor. Salt restriction  7/2020  Cardiac status stable. Recent recovering from URI type symptom. COVID-19 test negative. Continue treatment. Follow-up echo for pulmonary hypertension. Remains on home oxygen.   Had some leg cramps and simvastatin has been switched to atorvastatin by PCP. Recent labs unremarkable. LDL controlled. Advised to use CoQ10  10 2020  Cardiac status stable. Short of breath multifactorial.  CT scan reviewed that shows coronary atherosclerosis. Continue medical management. 4/2021  Recovering slowly admissions for COVID-19 in December 2020 and exacerbation of COPD and to 2021. Echo with normal ejection fraction increase PA pressure to 73 multifactorial mostly group 3  10/2021  Intermittent shortness of breath with COPD. Takes prednisone as needed. Blood pressure elevated today. We will continue to monitor usually better. Pulmonary hypertension likely group 3 followed by pulmonary  8/2022  Increase shortness of breath and edema. Possibly from pulmonary hypertension also on nifedipine.  9/2022  Chronic shortness of breath remains unchanged. She continues 3 L nasal cannula. Echocardiogram reviewed and discussed with patient. EF 55% with apical WMA. Severe pulmonary hypertension noted. Patient denies chest pain, reports bilateral lower extremity edema has resolved. She continues to wear compression stockings. Her blood pressure is controlled we will continue current medications and monitor. Medications Discontinued During This Encounter   Medication Reason    budesonide-glycopyr-formoterol ([employer]) 160-9-4.8 mcg/actuation HFAA DUPLICATE ORDER    OXYGEN-AIR DELIVERY SYSTEMS DUPLICATE ORDER       No orders of the defined types were placed in this encounter. Follow-up and Dispositions    Return in about 6 months (around 3/15/2023) for Follow up with Dr. Prachi Calderón.

## 2022-10-18 DIAGNOSIS — I87.2 VENOUS (PERIPHERAL) INSUFFICIENCY: ICD-10-CM

## 2022-10-18 DIAGNOSIS — I87.2 VENOUS (PERIPHERAL) INSUFFICIENCY: Primary | ICD-10-CM

## 2022-10-18 NOTE — PROGRESS NOTES
Order for bilateral reflux placed per verbal order from [facility], [address]

## 2022-10-24 LAB
LEFT FV RFX: 1.4 S
LEFT GSV AT KNEE DIAM: 0.24 CM
LEFT GSV BK MID DIAM: 0.27 CM
LEFT GSV BK PROX DIAM: 0.22 CM
LEFT GSV JUNC DIAM: 0.67 CM
LEFT GSV THIGH DIST DIAM: 0.34 CM
LEFT GSV THIGH MID DIAM: 0.44 CM
LEFT GSV THIGH PROX DIAM: 0.4 CM
LEFT SSV DIST DIAM: 0.25 CM
LEFT SSV DIST RFX: 1 S
LEFT SSV JUNCTION DIAM: 0.32 CM
LEFT SSV MID DIAM: 0.23 CM
LEFT SSV PROX DIAM: 0.4 CM
LEFT SSV PROX RFX: 0.8 S
RIGHT GSV AT KNEE DIAM: 0.24 CM
RIGHT GSV BK DIST DIAM: 0.23 CM
RIGHT GSV BK MID DIAM: 0.25 CM
RIGHT GSV BK PROX DIAM: 0.28 CM
RIGHT GSV JUNC DIAM: 0.58 CM
RIGHT GSV THIGH DIST DIAM: 0.17 CM
RIGHT GSV THIGH MID DIAM: 0.33 CM
RIGHT GSV THIGH PROX DIAM: 0.37 CM
RIGHT SSV DIST DIAM: 0.21 CM
RIGHT SSV MID DIAM: 0.24 CM
RIGHT SSV PROX DIAM: 0.11 CM

## 2022-10-25 NOTE — PROGRESS NOTES
[facility]    No chief complaint on file. History and Physical    Blanche Alvarez is a 82 y.o. F with PMH significant for chronic diastolic CHF, pulmonary HTN, COPD on home O2, HTN, carotid stenosis s/p L CEA in 2014. she returns today for BLE edema. she describes worsening BLE swelling, recently hospitalized with cellulitis requiring IV abx. She is wearing compression and tries to elevate legs whenever possible. She endorses pain in the ankles and tingling in the bottom of her feet. Onset of symptoms of tingling was several years ago and has been worsening over the past several months. She endorses a painful right hip for which she is receiving injections. Associated symptoms:   [x] edema  [] varicose veins  [] heaviness/aching  [] fatigue  [x] pain    Aggravating factors include none. Relieving factors include compression, topical cream. .     Patient   [x] has   [] has not   been wearing compression stockings. Wears compression daily. Now wearing 20-30mmHg. Relevant history:   [x] female gender  [] history of DVT/PE  [] history of vein procedure      The most recent  imaging studies were reviewed and discussed with the patient. This shows:     1) AVINASH: 0.7 on the right, consistent with moderate arterial disease. Normal AVINASH on the left. 2) Venous reflux: isolated, very mild reflux in the distal left small saphenous vein. No other superficial venous insufficiency identified. There is mild deep venous insufficiency on the left as well. There is pulsatile flow noted throughout the superficial and deep venous systems, suggestive of increased central venous pressure. Subsequent review of most recent echo performed on 8/25/2022 shows severe pulmonary HTN with estimated PASP of 85mmHg. This likely explains pulsatile flow in the lower extremity venous system. Echo:     Interpretation Summary         Left Ventricle: Normal left ventricular systolic function with a visually estimated EF of 55%. Left ventricle size is normal. Normal wall thickness. See diagram for wall motion findings. Abnormal diastolic function. Left Atrium: Left atrium is moderately dilated. LA Vol Index A/L is 42 mL/m2. Right Atrium: Right atrium is dilated. Pulmonary Arteries: Severe pulmonary hypertension present. The estimated PASP is 85 mmHg. Comparison Study Information    Prior Study    There is a prior study available for comparison. Prior study date: 10/27/2021. As compared to the previous study, there are significant changes. WMA now present     Echo Findings    Left Ventricle Normal left ventricular systolic function with a visually estimated EF of 55%. Left ventricle size is normal. Normal wall thickness. See diagram for wall motion findings. Abnormal diastolic function. Left Atrium Left atrium is moderately dilated. LA Vol Index A/L is 42 mL/m2. Right Ventricle Right ventricle size is normal. Normal wall thickness. Normal systolic function. Right Atrium Right atrium is dilated. Aortic Valve Valve structure is normal. No regurgitation. No stenosis. Mitral Valve Valve structure is normal. Trace regurgitation. No stenosis noted. Tricuspid Valve Valve structure is normal. Mild regurgitation. No stenosis noted. Pulmonic Valve Valve structure is normal. Trace regurgitation. No stenosis noted. Pulmonary Artery Severe pulmonary hypertension present. The estimated PASP is 85 mmHg. Aorta Normal sized aortic root. IVC/Hepatic Veins IVC diameter is greater than 21 mm and decreases greater than 50% during inspiration; therefore the estimated right atrial pressure is intermediate (~8 mmHg). Pericardium No pericardial effusion. AVINASH:     Interpretation Summary    Moderate right lower extremity arterial disease at rest.  Normal left lower extremity perfusion with normal ankle brachial indices at rest.  Right AVINASH is 0.70, left AVINASH is 1.06.      Lower Extremity Arterial Findings    AVINASH    The right resting AVINASH is moderately decreased. The left resting AVINASH is normal. The right anterior tibial artery has monophasic waveforms. The right posterior tibial artery has biphasic waveforms. The left anterior tibial artery and posterior tibial artery has triphasic waveforms. Upper Arterial Pressure Measurements     Right Left   Brachial  mmHg        172 mmHg            Lower Arterial Pressure Measurements     Right Left   Post Tibial  mmHg        182 mmHg          Dorsalis Pedis  mmHg        176 mmHg          AVINASH 0.7        1.06              Venous insufficiency:     Interpretation Summary    No evidence of deep venous thrombosis in the lower extremity veins bilaterally. Deep venous reflux in the left femoral vein. Superficial venous reflux in the left small saphenous vein. Patent GSV's bilaterally and right SSV without evidence of reflux. Pulsatile flow throughout suggestive of increased central venous pressure. Reflux difficult to determine. Lower Extremity Venous Findings    Right Lower Venous    No evidence of deep vein thrombosis in the common femoral, profunda femoral, femoral, popliteal, posterior tibial, and peroneal veins. The veins were imaged in the transverse and longitudinal planes. The vessels showed normal color filling and compressibility. Doppler interrogation showed phasic and spontaneous flow. The right posterior tibial artery has monophasic waveforms. Reflux study patient position: reverse Trendelenburg. Right great saphenous vein is competent. Right small saphenous vein is competent. Left Lower Venous    No evidence of deep vein thrombosis in the common femoral, profunda femoral, femoral, popliteal, posterior tibial, and peroneal veins. The veins were imaged in the transverse and longitudinal planes. The vessels showed normal color filling and compressibility. Doppler interrogation showed phasic and spontaneous flow.        The left posterior tibial artery has biphasic waveforms. Reflux study patient position: reverse Trendelenburg. Deep venous insufficiency noted in the left femoral vein. (Reflux of >1.0 second.) Left great saphenous vein is competent. Left small saphenous vein displays venous insufficiency. Patent SSV with echogenic walls in the mid segment suggestive of a chronic non occlusive process.      Right Lower Extremity Venous Measurements     Vein Diam   GSV Junction 0.58 cm          GSV Thigh Prox 0.37 cm          GSV Thigh Mid 0.33 cm          GSV Thigh Dist 0.17 cm          GSV At Knee 0.24 cm          GSV Below Knee Prox 0.28 cm          GSV Below Knee Mid 0.25 cm          GSV Below Knee Dist 0.23 cm          SSV Prox 0.11 cm          SSV Mid 0.24 cm          SSV Dist 0.21 cm            Left Lower Extremity Venous Measurements     Vein Diam Reflux Time   GSV Junction 0.67 cm              GSV Thigh Prox 0.4 cm              GSV Thigh Mid 0.44 cm              GSV Thigh Dist 0.34 cm              GSV At Knee 0.24 cm              GSV Below Knee Prox 0.22 cm              GSV Below Knee Mid 0.27 cm              SSV Junction 0.32 cm              SSV Prox 0.4 cm        0.8 s          SSV Mid 0.23 cm              SSV Dist 0.25 cm        1 s          FV  1.4 s            Past Medical History:   Diagnosis Date    Arthritis     Chronic lung disease     Chronic obstructive pulmonary disease (HCC)     Edema due to congestive heart failure ([address] 11/1/2022    Heart failure ([address]     Hypertension      Past Surgical History:   Procedure Laterality Date    HX ORTHOPAEDIC      spinal sx 5/6    HX OTHER SURGICAL      Carotid artery    VASCULAR SURGERY PROCEDURE UNLIST      L CEA 10/2014     Patient Active Problem List   Diagnosis Code    Occlusion and stenosis of carotid artery I65.29    Carotid stenosis I65.29    BURGESS (dyspnea on exertion) R06.09    Hyperlipidemia E78.5    Essential hypertension with goal blood pressure less than 140/90 I10 Chronic diastolic congestive heart failure (HCC) I50.32    Chronic obstructive pulmonary disease ([facility]) J44.9    Pulmonary HTN ([facility]) I27.20    CAP (community acquired pneumonia) J18.9    Suspected COVID-19 virus infection Z20.822    Chronic obstructive pulmonary disease with acute exacerbation ([facility]) J44.1    Encounter for palliative care Z51.5    Goals of care, counseling/discussion Z71.89    Acute on chronic respiratory failure with hypoxia ([facility] J96.21    Debility R53.81    COVID-19 U07.1    Respiratory failure ([facility]) J96.90    Acute bronchitis with chronic obstructive pulmonary disease (COPD) ([facility]) J44.0, J20.9    Pyelonephritis N12    Sepsis ([facility] A41.9    E coli bacteremia R78.81, B96.20    Edema due to congestive heart failure ([facility]) I50.9     Current Outpatient Medications   Medication Sig Dispense Refill    folic acid (FOLVITE) 1 mg tablet Take  by mouth daily. predniSONE (DELTASONE) 2.5 mg tablet Take  by mouth daily (with breakfast). multivitamin (ONE A DAY) tablet Take 1 Tablet by mouth daily. carboxymethylcellulose sodium (CELLUVISC) 0.5 % drop ophthalmic solution Apply 1 Drop to eye two (2) times a day. apply to both eyes      sodium chloride (Nasal Moisturizing) 0.65 % nasal squeeze bottle 1 Drop by Both Nostrils route as needed for Congestion or Nasal Dryness. as needed daily      furosemide (LASIX) 40 mg tablet Take 40 mg by mouth daily. albuterol (Proventil HFA) 90 mcg/actuation inhaler Take 2 Puffs by inhalation every four (4) hours as needed for Wheezing, Shortness of Breath or Respiratory Distress. 1 Each 0    budesonide-glycopyr-formoterol (Breztri Aerosphere) 160-9-4.8 mcg/actuation HFAA Take  by inhalation. atorvastatin (LIPITOR) 20 mg tablet Take 20 mg by mouth daily. losartan (COZAAR) 50 mg tablet Take  by mouth daily.  75 mg in a.m      Oxygen       albuterol (PROVENTIL VENTOLIN) 2.5 mg /3 mL (0.083 %) nebulizer solution 3 mL by Nebulization route every four (4) hours as needed for Wheezing or Shortness of Breath. 48 Each 0    acetaminophen (TYLENOL) 500 mg tablet Take 500 mg by mouth every six (6) hours as needed for Pain.      metoprolol (LOPRESSOR) 25 mg tablet Take 25 mg by mouth two (2) times a day. NIFEdipine ER (ADALAT CC) 60 mg ER tablet Take 60 mg by mouth daily. ALPRAZolam (XANAX) 0.5 mg tablet Take 0.5 mg by mouth nightly. aspirin delayed-release 81 mg tablet Take 81 mg by mouth as needed. No Known Allergies  Social History     Socioeconomic History    Marital status:      Spouse name: Not on file    Number of children: Not on file    Years of education: Not on file    Highest education level: Not on file   Occupational History    Not on file   Tobacco Use    Smoking status: Former     Packs/day: 1.50     Years: 30.00     Pack years: 45.00     Types: Cigarettes     Quit date: 1987     Years since quittin.1    Smokeless tobacco: Never   Vaping Use    Vaping Use: Former   Substance and Sexual Activity    Alcohol use: No     Alcohol/week: 0.0 standard drinks    Drug use: No    Sexual activity: Not Currently   Other Topics Concern    Not on file   Social History Narrative    Not on file     Social Determinants of Health     Financial Resource Strain: Not on file   Food Insecurity: Not on file   Transportation Needs: Not on file   Physical Activity: Not on file   Stress: Not on file   Social Connections: Not on file   Intimate Partner Violence: Not on file   Housing Stability: Not on file      Family History   Problem Relation Age of Onset    Heart Disease Mother     Hypertension Mother     Heart Attack Father     Hypertension Father        Physical Exam:    Visit Vitals  BP (!) 130/58   Pulse 89   Ht 5' 3\" (1.6 m)   Wt 110 lb (49.9 kg)   SpO2 (!) 68%   BMI 19.49 kg/m²        Constitutional:  Patient is well developed, well nourished, and not distressed. HEENT: atraumatic, normocephalic, wearing a mask.    Eyes:   Cunjunctivae clear, no scleral icterus  Cardiovascular:  Normal rate, regular rhythm  Pulmonary/Chest: Increased effort, on oxygen   Extremities: Normal range of motion. BLE pitting edema to the mid calf. Tender to palpation around both ankles. Digits no cyanosis or clubbing  Neurological:  she  is alert and oriented x3    Psych: Appropriate mood and affect. Skin:  Skin is warm and dry. No rash noted. No erythema. No ulcers. Impression and Plan:  Kb Randhawa is a 82 y.o. female with extensive cardiac history including chronic diastolic CHF and severe pulmonary HTN, presented for BLE edema. Pulmonary HTN, severe. Dilation of RA and PA pressure of 85mmHg. Likely explanation of pulsatile venous flow and most likely responsible for BLE edema. Venous insufficiency study with extremely mild reflux in the left small saphenous vein as well as reflux in the deep system. Pulsatile flow throughout suggests increased central venous pressure. No indication for vascular intervention. Edema is secondary to cardiac status. Bilateral foot pain and tingling. Symptoms appear consistent with neuropathy. Suggested to patient to follow up with her primary care NP to further work up this condition. RLE with moderate arterial insufficiency. This remains asymptomatic as patient denies claudication, rest pain or ulceration. Discussed with patient that she is already on appropriate risk factor modification, namely aspirin and atorvastatin. Explained that there is no indication for intervention given absence of symptoms as well as her extensive comorbidities. - will repeat AVINASH in 6 months. If remains stable, this can be monitored clinically by symptom assessment. History of carotid endarterectomy, Left. Remains asymptomatic. Last carotid duplex in July 2022 shows <50% stenosis bilaterally and has not worsened since 2016. Given stable nature of disease and patient's age, would stop surveillance duplex.        Follow up in 6 months with our PA    We reviewed the plan with the patient and the patient understands. Follow-up and Dispositions    Return in about 6 months (around 4/27/2023). Xu Rubio MD    PLEASE NOTE:  This document has been produced using voice recognition software. Unrecognized errors in transcription may be present.

## 2022-10-26 NOTE — PROGRESS NOTES
Problem: Self Care Deficits Care Plan (Adult)  Goal: *Acute Goals and Plan of Care (Insert Text)  Outcome: Resolved/Met  OCCUPATIONAL THERAPY EVALUATION/DISCHARGE    Patient: Negar Murry (15 y.o. female)  Date: 2/24/2021  Primary Diagnosis: COPD exacerbation ([facility] [J44.1]       Precautions: Falls; Aspiration     PLOF: Pt reports she  was (I) with basic self-care/ADLs and functional mobility without AD PTA. Pt has a shower chair at home. ASSESSMENT AND RECOMMENDATIONS:   Upon entering room, pt seated EOB, alert, and agreeable to therapy session. Based on the objective data described below, the patient presents she is Mod(I) with basic self-care/ADLs in sitting and performs functional standing ADLs/toilet transfers at a SBA/CGA level. BUE AROM WFL. Pt able to doff/don B socks independently, without difficulty. Pt on 3L O2 via NC; SpO2 initially at 87-88%, however improves to >90% after education on pursed lip breathing. Pt declining use of BSC with pt wanting to go to the bathroom instead. Pt performs toilet transfers CGA without AD, however seen holding onto walls/towel rack during transfer. SpO2 with pt sitting on toilet at 79%; provided vc's to perform PLB with SpO2 improving to >90% after 2 mins. Pt performs grooming at sink level ~2 mins with SBA, presenting with F+ bal. Pt given RW to trial during return back to bed, with pt demo improvement in standing balance. SpO2 when EOB at 75%, but improves to 93-94% after 1.5 minutes and cues for PLB. Will defer to PT for further functional balance/mobility tasks. Pt educated on energy conservation, self-pacing, and to call for assistance of nursing staff during transfers for safety with pt demo good understanding. Pt reports she has a supportive daughter to provide assist PRN. As pt presents she is at/close to her baseline, skilled occupational therapy is not indicated at this time, therefore OT to d/c pt from caseload.  At the end of the session, pt resting normal appearance , no deformities , trachea midline sitting EOB, call bell in reach, with all needs met. Discharge Recommendations: Home Health to progress to Outpatient Cardiopulmonary when approrpriate  Further Equipment Recommendations for Discharge: N/A; Pt has a shower chair to conserve energy and decrease the risk of falls     SUBJECTIVE:   Patient stated I'm feeling 95% better\"    OBJECTIVE DATA SUMMARY:     Past Medical History:   Diagnosis Date    Chronic lung disease     [facility]     Heart failure ([facility]     Hypertension      Past Surgical History:   Procedure Laterality Date    HX ORTHOPAEDIC      spinal sx 5/6    HX OTHER SURGICAL      Carotid artery    VASCULAR SURGERY PROCEDURE UNLIST      L CEA 10/2014     Barriers to Learning/Limitations: None  Compensate with: visual, verbal, tactile, kinesthetic cues/model    Home Situation:   Home Situation  Home Environment: Private residence  # Steps to Enter: 3  One/Two Story Residence: One story  Living Alone: No  Support Systems: Family member(s)  Patient Expects to be Discharged to[de-identified] Private residence  Current DME Used/Available at Home: Oxygen, portable  []     Right hand dominant   []     Left hand dominant    Cognitive/Behavioral Status:  Neurologic State: Alert  Orientation Level: Oriented X4  Cognition: Follows commands  Safety/Judgement: Fall prevention    Skin: Visible skin appeared intact  Edema: None noted      Coordination: BUE  Coordination: Within functional limits  Fine Motor Skills-Upper: Left Intact; Right Intact    Gross Motor Skills-Upper: Left Intact; Right Intact    Balance:  Sitting: Intact  Standing: Impaired; Without support  Standing - Static: Good  Standing - Dynamic : Fair    Strength: BUE  Strength: Generally decreased, functional    Tone & Sensation: BUE  Tone: Normal  Sensation: Intact    Range of Motion: BUE  AROM: Within functional limits      Functional Mobility and Transfers for ADLs:  Bed Mobility:  Supine to Sit: (Pt sitting EOB upon arrival)  Transfers:  Sit to Stand: Contact guard assistance  Stand to Sit: Contact guard assistance    Toilet Transfer : Contact guard assistance    Bathroom Mobility: Contact guard assistance    ADL Assessment:  Feeding: Modified independent    Oral Facial Hygiene/Grooming: Stand-by assistance (standing at sink)    Bathing: Contact guard assistance (for shower transfers); pt able to complete bathing tasks in sitting with setup)    Upper Body Dressing: Modified independent    Lower Body Dressing: Contact guard assistance(Decreased standing balance to don LB clothing over hips)    Toileting: Contact guard assistance      ADL Intervention  Grooming  Grooming Assistance: Stand-by assistance  Position Performed: Standing(at sink ~2 mins)  Washing Hands: Stand-by assistance    Lower Body Dressing Assistance  Socks: Independent  Position Performed: Seated edge of bed    Toileting  Toileting Assistance: Contact guard assistance(for transfer. No LOB noted during transfer, however pt seen holding onto walls and towel rack during transfer)  Bladder Hygiene: Set-up. Cognitive Retraining  Safety/Judgement: Fall prevention      Pain:  Pain level pre-treatment: 0/10   Pain level post-treatment: 0/10   Pain Intervention(s): Medication (see MAR); Rest, Ice, Repositioning  Response to intervention: Nurse notified, See doc flow    Activity Tolerance:   Fair    Please refer to the flowsheet for vital signs taken during this treatment. After treatment:   []  Patient left in no apparent distress sitting up in chair  [x]  Patient left in no apparent distress sitting edge of bed  [x]  Call bell left within reach  [x]  Nursing notified  []  Caregiver present  []  Bed alarm activated    COMMUNICATION/EDUCATION:   [x]      Role of Occupational Therapy in the acute care setting  [x]      Home safety education was provided and the patient/caregiver indicated understanding.   [x]      Patient/family have participated as able and agree with findings and recommendations. []      Patient is unable to participate in plan of care at this time. Thank you for this referral.  Cassandra Mejia MS, OTR/L  Time Calculation: 34 mins      Eval Complexity: History: MEDIUM Complexity : Expanded review of history including physical, cognitive and psychosocial  history ; Examination: LOW Complexity : 1-3 performance deficits relating to physical, cognitive , or psychosocial skils that result in activity limitations and / or participation restrictions ;    Decision Making:LOW Complexity : No comorbidities that affect functional and no verbal or physical assistance needed to complete eval tasks

## 2022-10-27 ENCOUNTER — OFFICE VISIT (OUTPATIENT)
Dept: VASCULAR SURGERY | Age: 87
End: 2022-10-27
Payer: MEDICARE

## 2022-10-27 VITALS
HEIGHT: 63 IN | DIASTOLIC BLOOD PRESSURE: 58 MMHG | SYSTOLIC BLOOD PRESSURE: 130 MMHG | WEIGHT: 110 LBS | OXYGEN SATURATION: 68 % | HEART RATE: 89 BPM | BODY MASS INDEX: 19.49 KG/M2

## 2022-10-27 DIAGNOSIS — I27.20 PULMONARY HTN (HCC): ICD-10-CM

## 2022-10-27 DIAGNOSIS — I73.9 PAD (PERIPHERAL ARTERY DISEASE) (HCC): Primary | ICD-10-CM

## 2022-10-27 DIAGNOSIS — I50.32 CHRONIC DIASTOLIC CONGESTIVE HEART FAILURE (HCC): ICD-10-CM

## 2022-10-27 DIAGNOSIS — I73.9 PAD (PERIPHERAL ARTERY DISEASE) (HCC): ICD-10-CM

## 2022-10-27 PROCEDURE — G8432 DEP SCR NOT DOC, RNG: HCPCS | Performed by: SURGERY

## 2022-10-27 PROCEDURE — 3078F DIAST BP <80 MM HG: CPT | Performed by: SURGERY

## 2022-10-27 PROCEDURE — G8427 DOCREV CUR MEDS BY ELIG CLIN: HCPCS | Performed by: SURGERY

## 2022-10-27 PROCEDURE — G8400 PT W/DXA NO RESULTS DOC: HCPCS | Performed by: SURGERY

## 2022-10-27 PROCEDURE — G8420 CALC BMI NORM PARAMETERS: HCPCS | Performed by: SURGERY

## 2022-10-27 PROCEDURE — 99214 OFFICE O/P EST MOD 30 MIN: CPT | Performed by: SURGERY

## 2022-10-27 PROCEDURE — 1101F PT FALLS ASSESS-DOCD LE1/YR: CPT | Performed by: SURGERY

## 2022-10-27 PROCEDURE — G8536 NO DOC ELDER MAL SCRN: HCPCS | Performed by: SURGERY

## 2022-10-27 PROCEDURE — G8752 SYS BP LESS 140: HCPCS | Performed by: SURGERY

## 2022-10-27 PROCEDURE — 1123F ACP DISCUSS/DSCN MKR DOCD: CPT | Performed by: SURGERY

## 2022-10-27 PROCEDURE — 1090F PRES/ABSN URINE INCON ASSESS: CPT | Performed by: SURGERY

## 2022-10-27 PROCEDURE — G8754 DIAS BP LESS 90: HCPCS | Performed by: SURGERY

## 2022-10-27 PROCEDURE — 3074F SYST BP LT 130 MM HG: CPT | Performed by: SURGERY

## 2022-10-27 NOTE — PROGRESS NOTES
1. Have you been to the ER, urgent care clinic since your last visit? NO  Hospitalized since your last visit? No    2. Have you seen or consulted any other health care providers outside of the [address] [place] [address] since your last visit? Include any pap smears or colon screening.  No

## 2022-11-01 PROBLEM — I50.9 EDEMA DUE TO CONGESTIVE HEART FAILURE (HCC): Status: ACTIVE | Noted: 2022-11-01

## 2022-11-01 PROBLEM — I73.9 PAD (PERIPHERAL ARTERY DISEASE) (HCC): Status: ACTIVE | Noted: 2022-11-01

## 2022-12-05 ENCOUNTER — APPOINTMENT (OUTPATIENT)
Dept: GENERAL RADIOLOGY | Age: 87
End: 2022-12-05
Attending: EMERGENCY MEDICINE
Payer: MEDICARE

## 2022-12-05 ENCOUNTER — HOSPITAL ENCOUNTER (EMERGENCY)
Age: 87
Discharge: HOME | End: 2022-12-05
Attending: EMERGENCY MEDICINE
Payer: MEDICARE

## 2022-12-05 VITALS
RESPIRATION RATE: 20 BRPM | HEIGHT: 63 IN | DIASTOLIC BLOOD PRESSURE: 53 MMHG | WEIGHT: 107 LBS | SYSTOLIC BLOOD PRESSURE: 143 MMHG | BODY MASS INDEX: 18.96 KG/M2 | TEMPERATURE: 98.3 F | HEART RATE: 89 BPM | OXYGEN SATURATION: 95 %

## 2022-12-05 DIAGNOSIS — J44.1 COPD EXACERBATION (HCC): Primary | ICD-10-CM

## 2022-12-05 LAB
ALBUMIN SERPL-MCNC: 3.3 G/DL (ref 3.4–5)
ALBUMIN/GLOB SERPL: 1.1 {RATIO} (ref 0.8–1.7)
ALP SERPL-CCNC: 62 U/L (ref 45–117)
ALT SERPL-CCNC: 22 U/L (ref 13–56)
ANION GAP SERPL CALC-SCNC: 6 MMOL/L (ref 3–18)
AST SERPL-CCNC: 13 U/L (ref 10–38)
ATRIAL RATE: 82 BPM
BASOPHILS # BLD: 0 K/UL (ref 0–0.1)
BASOPHILS NFR BLD: 0 % (ref 0–2)
BILIRUB SERPL-MCNC: 0.5 MG/DL (ref 0.2–1)
BUN SERPL-MCNC: 16 MG/DL (ref 7–18)
BUN/CREAT SERPL: 19 (ref 12–20)
CALCIUM SERPL-MCNC: 8.9 MG/DL (ref 8.5–10.1)
CALCULATED P AXIS, ECG09: 61 DEGREES
CALCULATED R AXIS, ECG10: 24 DEGREES
CALCULATED T AXIS, ECG11: 84 DEGREES
CHLORIDE SERPL-SCNC: 109 MMOL/L (ref 100–111)
CO2 SERPL-SCNC: 26 MMOL/L (ref 21–32)
COVID-19 RAPID TEST, COVR: NOT DETECTED
CREAT SERPL-MCNC: 0.85 MG/DL (ref 0.6–1.3)
DIAGNOSIS, 93000: NORMAL
DIFFERENTIAL METHOD BLD: ABNORMAL
EOSINOPHIL # BLD: 0 K/UL (ref 0–0.4)
EOSINOPHIL NFR BLD: 0 % (ref 0–5)
ERYTHROCYTE [DISTWIDTH] IN BLOOD BY AUTOMATED COUNT: 15.3 % (ref 11.6–14.5)
FLUAV AG NPH QL IA: NEGATIVE
FLUBV AG NOSE QL IA: NEGATIVE
GLOBULIN SER CALC-MCNC: 3.1 G/DL (ref 2–4)
GLUCOSE SERPL-MCNC: 122 MG/DL (ref 74–99)
HCT VFR BLD AUTO: 39.4 % (ref 35–45)
HGB BLD-MCNC: 12.2 G/DL (ref 12–16)
IMM GRANULOCYTES # BLD AUTO: 0 K/UL
IMM GRANULOCYTES NFR BLD AUTO: 0 %
LYMPHOCYTES # BLD: 1.4 K/UL (ref 0.9–3.6)
LYMPHOCYTES NFR BLD: 7 % (ref 21–52)
MCH RBC QN AUTO: 26.6 PG (ref 24–34)
MCHC RBC AUTO-ENTMCNC: 31 G/DL (ref 31–37)
MCV RBC AUTO: 85.8 FL (ref 78–100)
MONOCYTES # BLD: 3.3 K/UL (ref 0.05–1.2)
MONOCYTES NFR BLD: 17 % (ref 3–10)
NEUTS SEG # BLD: 14.9 K/UL (ref 1.8–8)
NEUTS SEG NFR BLD: 76 % (ref 40–73)
NRBC # BLD: 0 K/UL (ref 0–0.01)
NRBC BLD-RTO: 0 PER 100 WBC
P-R INTERVAL, ECG05: 136 MS
PLATELET # BLD AUTO: 303 K/UL (ref 135–420)
PLATELET COMMENTS,PCOM: ABNORMAL
PMV BLD AUTO: 10.1 FL (ref 9.2–11.8)
POTASSIUM SERPL-SCNC: 3.8 MMOL/L (ref 3.5–5.5)
PROT SERPL-MCNC: 6.4 G/DL (ref 6.4–8.2)
Q-T INTERVAL, ECG07: 362 MS
QRS DURATION, ECG06: 78 MS
QTC CALCULATION (BEZET), ECG08: 422 MS
RBC # BLD AUTO: 4.59 M/UL (ref 4.2–5.3)
RBC MORPH BLD: ABNORMAL
SODIUM SERPL-SCNC: 141 MMOL/L (ref 136–145)
SOURCE, COVRS: NORMAL
VENTRICULAR RATE, ECG03: 82 BPM
WBC # BLD AUTO: 19.6 K/UL (ref 4.6–13.2)

## 2022-12-05 PROCEDURE — 85025 COMPLETE CBC W/AUTO DIFF WBC: CPT

## 2022-12-05 PROCEDURE — 99285 EMERGENCY DEPT VISIT HI MDM: CPT

## 2022-12-05 PROCEDURE — 96375 TX/PRO/DX INJ NEW DRUG ADDON: CPT

## 2022-12-05 PROCEDURE — 93005 ELECTROCARDIOGRAM TRACING: CPT

## 2022-12-05 PROCEDURE — 94640 AIRWAY INHALATION TREATMENT: CPT

## 2022-12-05 PROCEDURE — 96374 THER/PROPH/DIAG INJ IV PUSH: CPT

## 2022-12-05 PROCEDURE — 80053 COMPREHEN METABOLIC PANEL: CPT

## 2022-12-05 PROCEDURE — 74011250636 HC RX REV CODE- 250/636: Performed by: EMERGENCY MEDICINE

## 2022-12-05 PROCEDURE — 71046 X-RAY EXAM CHEST 2 VIEWS: CPT

## 2022-12-05 PROCEDURE — 87804 INFLUENZA ASSAY W/OPTIC: CPT

## 2022-12-05 PROCEDURE — 87635 SARS-COV-2 COVID-19 AMP PRB: CPT

## 2022-12-05 PROCEDURE — 74011000250 HC RX REV CODE- 250: Performed by: EMERGENCY MEDICINE

## 2022-12-05 RX ORDER — CEFUROXIME AXETIL 500 MG/1
500 TABLET ORAL 2 TIMES DAILY
Qty: 14 TABLET | Refills: 0 | Status: SHIPPED | OUTPATIENT
Start: 2022-12-05

## 2022-12-05 RX ORDER — IPRATROPIUM BROMIDE AND ALBUTEROL SULFATE 2.5; .5 MG/3ML; MG/3ML
3 SOLUTION RESPIRATORY (INHALATION)
Status: COMPLETED | OUTPATIENT
Start: 2022-12-05 | End: 2022-12-05

## 2022-12-05 RX ORDER — AZITHROMYCIN 250 MG/1
TABLET, FILM COATED ORAL
Qty: 5 TABLET | Refills: 0 | Status: SHIPPED | OUTPATIENT
Start: 2022-12-05 | End: 2022-12-10

## 2022-12-05 RX ORDER — PREDNISONE 50 MG/1
50 TABLET ORAL DAILY
Qty: 5 TABLET | Refills: 0 | Status: SHIPPED | OUTPATIENT
Start: 2022-12-05 | End: 2022-12-08

## 2022-12-05 RX ADMIN — CEFTRIAXONE 1 G: 1 INJECTION, POWDER, FOR SOLUTION INTRAMUSCULAR; INTRAVENOUS at 12:25

## 2022-12-05 RX ADMIN — METHYLPREDNISOLONE SODIUM SUCCINATE 125 MG: 125 INJECTION, POWDER, FOR SOLUTION INTRAMUSCULAR; INTRAVENOUS at 10:46

## 2022-12-05 RX ADMIN — IPRATROPIUM BROMIDE AND ALBUTEROL SULFATE 3 ML: .5; 2.5 SOLUTION RESPIRATORY (INHALATION) at 10:47

## 2022-12-05 NOTE — ED TRIAGE NOTES
Pt presents with complaints of flu like illness since Friday. Pt wears 3-3.5L of O2 at home at all times related to COPD. Was 76% on 3L when arrived, now on 5L up to 88-92%. Pt denies increased SOB.

## 2022-12-05 NOTE — ED PROVIDER NOTES
EMERGENCY DEPARTMENT HISTORY AND PHYSICAL EXAM      Date: 12/5/2022  Patient Name: Remy Melchor      History of Presenting Illness     Chief Complaint   Patient presents with    Flu Like Symptoms       Location/Duration/Severity/Modifying factors   Chief Complaint   Patient presents with    Flu Like Symptoms       HPI:  Remy Melchor is a 82 y.o. female with history as listed presents with a concern of urgency of breath and COPD exacerbation. Patient has had ongoing issues for the last 3 days and was seen by her doctor who advised her to come and get a chest x-ray. Location: Chest.  Having cough and increased shortness of breath. Increased home oxygen use. Duration: 3 days  Quality: Cannot specify  Severity: Moderate  Modifying Factors: Improved with breathing treatments. There are no other complaints, changes, or physical findings at this time. PCP: Rosa CHAO NP    Current Outpatient Medications   Medication Sig Dispense Refill    cefUROXime (CEFTIN) 500 mg tablet Take 1 Tablet by mouth two (2) times a day. 14 Tablet 0    azithromycin (Zithromax Z-Rick) 250 mg tablet UAD 5 Tablet 0    predniSONE (DELTASONE) 50 mg tablet Take 1 Tablet by mouth daily for 3 days. 5 Tablet 0    folic acid (FOLVITE) 1 mg tablet Take  by mouth daily. multivitamin (ONE A DAY) tablet Take 1 Tablet by mouth daily. carboxymethylcellulose sodium (CELLUVISC) 0.5 % drop ophthalmic solution Apply 1 Drop to eye two (2) times a day. apply to both eyes      sodium chloride (Nasal Moisturizing) 0.65 % nasal squeeze bottle 1 Drop by Both Nostrils route as needed for Congestion or Nasal Dryness. as needed daily      furosemide (LASIX) 40 mg tablet Take 40 mg by mouth daily. albuterol (Proventil HFA) 90 mcg/actuation inhaler Take 2 Puffs by inhalation every four (4) hours as needed for Wheezing, Shortness of Breath or Respiratory Distress.  1 Each 0    budesonide-glycopyr-formoterol (Breztri Aerosphere) 160-9-4.8 mcg/actuation HFAA Take  by inhalation. atorvastatin (LIPITOR) 20 mg tablet Take 20 mg by mouth daily. losartan (COZAAR) 50 mg tablet Take  by mouth daily. 75 mg in a.m      Oxygen       albuterol (PROVENTIL VENTOLIN) 2.5 mg /3 mL (0.083 %) nebulizer solution 3 mL by Nebulization route every four (4) hours as needed for Wheezing or Shortness of Breath. 48 Each 0    acetaminophen (TYLENOL) 500 mg tablet Take 500 mg by mouth every six (6) hours as needed for Pain.      metoprolol (LOPRESSOR) 25 mg tablet Take 25 mg by mouth two (2) times a day. NIFEdipine ER (ADALAT CC) 60 mg ER tablet Take 60 mg by mouth daily. ALPRAZolam (XANAX) 0.5 mg tablet Take 0.5 mg by mouth nightly. aspirin delayed-release 81 mg tablet Take 81 mg by mouth as needed. Past History     Past Medical History:  Past Medical History:   Diagnosis Date    Arthritis     Chronic lung disease     Chronic obstructive pulmonary disease ([facility] [address]     Edema due to congestive heart failure ([facility] [address] 2022    Heart failure (HCC)     Hypertension        Past Surgical History:  Past Surgical History:   Procedure Laterality Date    HX ORTHOPAEDIC      spinal sx     HX OTHER SURGICAL      Carotid artery    VASCULAR SURGERY PROCEDURE UNLIST      L CEA 10/2014       Family History:  Family History   Problem Relation Age of Onset    Heart Disease Mother     Hypertension Mother     Heart Attack Father     Hypertension Father        Social History:  Social History     Tobacco Use    Smoking status: Former     Packs/day: 1.50     Years: 30.00     Pack years: 45.00     Types: Cigarettes     Quit date: 1987     Years since quittin.2    Smokeless tobacco: Never   Vaping Use    Vaping Use: Former   Substance Use Topics    Alcohol use: No     Alcohol/week: 0.0 standard drinks    Drug use: No       Allergies:  No Known Allergies      Review of Systems     Review of Systems   Constitutional:  Positive for fatigue.  Negative for fever.   HENT:  Negative for sore throat and trouble swallowing. Eyes:  Negative for photophobia and visual disturbance. Respiratory:  Positive for cough and shortness of breath. Cardiovascular:  Negative for chest pain and palpitations. Gastrointestinal:  Negative for abdominal pain and diarrhea. Genitourinary:  Negative for dysuria and flank pain. Musculoskeletal:  Negative for neck pain and neck stiffness. Skin:  Negative for pallor and rash. Neurological:  Negative for weakness and numbness. Physical Exam     Physical Exam  Constitutional:       General: She is not in acute distress. Appearance: Normal appearance. She is not ill-appearing. HENT:      Head: Normocephalic and atraumatic. Right Ear: External ear normal.      Left Ear: External ear normal.      Nose: No congestion or rhinorrhea. Mouth/Throat:      Pharynx: No oropharyngeal exudate or posterior oropharyngeal erythema. Cardiovascular:      Rate and Rhythm: Normal rate and regular rhythm. Pulmonary:      Effort: Pulmonary effort is normal.      Breath sounds: Wheezing (Of the bilateral lung fields.) present. Abdominal:      General: Abdomen is flat. Palpations: Abdomen is soft. Musculoskeletal:         General: No swelling or tenderness. Normal range of motion. Cervical back: Normal range of motion and neck supple. Skin:     Coloration: Skin is not pale. Neurological:      General: No focal deficit present. Mental Status: She is alert. Mental status is at baseline.        Lab and Diagnostic Study Results     Labs -  Recent Results (from the past 24 hour(s))   EKG, 12 LEAD, INITIAL    Collection Time: 12/05/22 10:43 AM   Result Value Ref Range    Ventricular Rate 82 BPM    Atrial Rate 82 BPM    P-R Interval 136 ms    QRS Duration 78 ms    Q-T Interval 362 ms    QTC Calculation (Bezet) 422 ms    Calculated P Axis 61 degrees    Calculated R Axis 24 degrees    Calculated T Axis 84 degrees Diagnosis       Normal sinus rhythm  Possible Left atrial enlargement  Nonspecific ST and T wave abnormality  Abnormal ECG  When compared with ECG of 07-JUL-2022 10:46,  QRS axis shifted left  Criteria for Lateral infarct are no longer present  Borderline criteria for Inferior infarct are no longer present  Non-specific change in ST segment in Lateral leads     CBC WITH AUTOMATED DIFF    Collection Time: 12/05/22 10:45 AM   Result Value Ref Range    WBC 19.6 (H) 4.6 - 13.2 K/uL    RBC 4.59 4.20 - 5.30 M/uL    HGB 12.2 12.0 - 16.0 g/dL    HCT 39.4 35.0 - 45.0 %    MCV 85.8 78.0 - 100.0 FL    MCH 26.6 24.0 - 34.0 PG    MCHC 31.0 31.0 - 37.0 g/dL    RDW 15.3 (H) 11.6 - 14.5 %    PLATELET 870 768 - 536 K/uL    MPV 10.1 9.2 - 11.8 FL    NRBC 0.0 0  WBC    ABSOLUTE NRBC 0.00 0.00 - 0.01 K/uL    NEUTROPHILS 76 (H) 40 - 73 %    LYMPHOCYTES 7 (L) 21 - 52 %    MONOCYTES 17 (H) 3 - 10 %    EOSINOPHILS 0 0 - 5 %    BASOPHILS 0 0 - 2 %    IMMATURE GRANULOCYTES 0 %    ABS. NEUTROPHILS 14.9 (H) 1.8 - 8.0 K/UL    ABS. LYMPHOCYTES 1.4 0.9 - 3.6 K/UL    ABS. MONOCYTES 3.3 (H) 0.05 - 1.2 K/UL    ABS. EOSINOPHILS 0.0 0.0 - 0.4 K/UL    ABS. BASOPHILS 0.0 0.0 - 0.1 K/UL    ABS. IMM. GRANS. 0.0 K/UL    DF MANUAL      PLATELET COMMENTS ADEQUATE PLATELETS      RBC COMMENTS NORMOCYTIC, NORMOCHROMIC     INFLUENZA A & B AG (RAPID TEST)    Collection Time: 12/05/22 10:55 AM   Result Value Ref Range    Influenza A Antigen Negative NEG      Influenza B Antigen Negative NEG           Radiologic Studies -   XR CHEST PA LAT   Final Result    IMPRESSION:      1. Sequela of mild to moderate congestion.  Follow-up with plain imaging of the   chest.            Procedures and Critical Care       Performed by: Omero Poe MD    EKG    Date/Time: 12/5/2022 12:32 PM  Performed by: Sarah Sow MD  Authorized by: Sarah Sow MD     Previous ECG:     Previous ECG:  Compared to current    Similarity:  No change  Interpretation: Interpretation: normal    Rate:     ECG rate assessment: normal    Rhythm:     Rhythm: sinus rhythm    Ectopy:     Ectopy: none    QRS:     QRS axis:  Normal    QRS intervals:  Normal    QRS conduction: normal    ST segments:     ST segments:  Normal  T waves:     T waves: non-specific    Q waves:     Abnormal Q-waves: not present           Abhay Salmeron MD    Medical Decision Making and ED Course   - I am the first and primary provider for this patient AND AM THE PRIMARY PROVIDER OF RECORD. - I reviewed the vital signs, available nursing notes, past medical history, past surgical history, family history and social history. - Initial assessment performed. The patients presenting problems have been discussed, and the staff are in agreement with the care plan formulated and outlined with them. I have encouraged them to ask questions as they arise throughout their visit. Vital Signs-Reviewed the patient's vital signs. Patient Vitals for the past 12 hrs:   Temp Pulse Resp BP SpO2   12/05/22 1228 -- 89 20 -- 95 %   12/05/22 1047 -- -- -- -- 94 %   12/05/22 1015 98.3 °F (36.8 °C) -- -- -- --   12/05/22 1000 -- (!) 103 30 (!) 143/53 (!) 88 %         Provider Notes (Medical Decision Making): MDM     Assessment/Differential Diagnosis:  Consider Asthma, Bronchitis, Pneumonia, PE, PTX, COPD, URI, CHF, ACS        ED Course/Medical Decision Making:    Pt is a 82 y.o. female who is concerning for SOB in the context of risk factors and history. Will check labs, EKG, CXR, reassess. suspect COPD exacerbation. I think admission would be the most prudent course for this patient. Spoke with    about patient, agrees to admit, assistance in patient's care greatly appreciated. ED Course:       ED Course as of 12/05/22 Darryl Hernandez 43 Dec 05, 2022   1224 Patient now significantly improved and feels better. Desires to go home and does not desire admission.   I have discussed return precautions and I have answered their questions. [RS]      ED Course User Index  [RS] Katja Causey MD   12:31 PM  Able to wean down the patient's oxygen to her home oxygen setting of 3.5 L. Suitable for discharge at this point in time. Considered but doubt CHF, AMI.  ------------------------------------------------------------------------------------------------------------        Consultations:       Consultations:       Disposition         Discharged      Diagnosis     Clinical Impression:   1.  COPD exacerbation ([facility] [address]        Attestations:    Ajay Bueno MD

## 2023-01-05 ENCOUNTER — HOSPITAL ENCOUNTER (OUTPATIENT)
Dept: LAB | Age: 88
Discharge: HOME | End: 2023-01-05
Payer: MEDICARE

## 2023-01-05 LAB
BASOPHILS # BLD: 0.1 K/UL (ref 0–0.1)
BASOPHILS NFR BLD: 0 % (ref 0–2)
DIFFERENTIAL METHOD BLD: ABNORMAL
EOSINOPHIL # BLD: 0.1 K/UL (ref 0–0.4)
EOSINOPHIL NFR BLD: 1 % (ref 0–5)
ERYTHROCYTE [DISTWIDTH] IN BLOOD BY AUTOMATED COUNT: 17.2 % (ref 11.6–14.5)
HCT VFR BLD AUTO: 42.3 % (ref 35–45)
HGB BLD-MCNC: 13 G/DL (ref 12–16)
IMM GRANULOCYTES # BLD AUTO: 0.1 K/UL (ref 0–0.04)
IMM GRANULOCYTES NFR BLD AUTO: 1 % (ref 0–0.5)
LYMPHOCYTES # BLD: 1.5 K/UL (ref 0.9–3.6)
LYMPHOCYTES NFR BLD: 12 % (ref 21–52)
MCH RBC QN AUTO: 27.3 PG (ref 24–34)
MCHC RBC AUTO-ENTMCNC: 30.7 G/DL (ref 31–37)
MCV RBC AUTO: 88.7 FL (ref 78–100)
MONOCYTES # BLD: 0.8 K/UL (ref 0.05–1.2)
MONOCYTES NFR BLD: 7 % (ref 3–10)
NEUTS SEG # BLD: 9.6 K/UL (ref 1.8–8)
NEUTS SEG NFR BLD: 79 % (ref 40–73)
NRBC # BLD: 0 K/UL (ref 0–0.01)
NRBC BLD-RTO: 0 PER 100 WBC
PLATELET # BLD AUTO: 313 K/UL (ref 135–420)
PMV BLD AUTO: 10.7 FL (ref 9.2–11.8)
RBC # BLD AUTO: 4.77 M/UL (ref 4.2–5.3)
WBC # BLD AUTO: 12.1 K/UL (ref 4.6–13.2)

## 2023-01-05 PROCEDURE — 36415 COLL VENOUS BLD VENIPUNCTURE: CPT

## 2023-01-05 PROCEDURE — 85025 COMPLETE CBC W/AUTO DIFF WBC: CPT

## 2023-03-07 ENCOUNTER — APPOINTMENT (OUTPATIENT)
Facility: HOSPITAL | Age: 88
DRG: 291 | End: 2023-03-07
Payer: MEDICARE

## 2023-03-07 ENCOUNTER — HOSPITAL ENCOUNTER (INPATIENT)
Facility: HOSPITAL | Age: 88
LOS: 3 days | Discharge: HOME HEALTH | DRG: 291 | End: 2023-03-10
Attending: EMERGENCY MEDICINE | Admitting: INTERNAL MEDICINE
Payer: MEDICARE

## 2023-03-07 DIAGNOSIS — R09.02 HYPOXIA: Primary | ICD-10-CM

## 2023-03-07 DIAGNOSIS — G25.81 RESTLESS LEGS SYNDROME (RLS): ICD-10-CM

## 2023-03-07 DIAGNOSIS — I50.9 CONGESTIVE HEART FAILURE, UNSPECIFIED HF CHRONICITY, UNSPECIFIED HEART FAILURE TYPE (HCC): ICD-10-CM

## 2023-03-07 PROBLEM — I50.33 ACUTE ON CHRONIC HEART FAILURE WITH PRESERVED EJECTION FRACTION (HFPEF) (HCC): Status: ACTIVE | Noted: 2023-03-07

## 2023-03-07 PROBLEM — J96.01 ACUTE RESPIRATORY FAILURE WITH HYPOXIA (HCC): Status: ACTIVE | Noted: 2021-10-24

## 2023-03-07 LAB
ALBUMIN SERPL-MCNC: 3.8 G/DL (ref 3.4–5)
ALBUMIN/GLOB SERPL: 1.1 (ref 0.8–1.7)
ALP SERPL-CCNC: 51 U/L (ref 45–117)
ALT SERPL-CCNC: 38 U/L (ref 13–56)
ANION GAP SERPL CALC-SCNC: 6 MMOL/L (ref 3–18)
AST SERPL-CCNC: 39 U/L (ref 10–38)
BASE EXCESS BLD CALC-SCNC: 1.2 MMOL/L
BASOPHILS # BLD: 0 K/UL (ref 0–0.1)
BASOPHILS NFR BLD: 0 % (ref 0–2)
BILIRUB SERPL-MCNC: 0.6 MG/DL (ref 0.2–1)
BUN SERPL-MCNC: 22 MG/DL (ref 7–18)
BUN/CREAT SERPL: 21 (ref 12–20)
CALCIUM SERPL-MCNC: 8.6 MG/DL (ref 8.5–10.1)
CHLORIDE SERPL-SCNC: 108 MMOL/L (ref 100–111)
CO2 SERPL-SCNC: 26 MMOL/L (ref 21–32)
CREAT SERPL-MCNC: 1.06 MG/DL (ref 0.6–1.3)
DIFFERENTIAL METHOD BLD: ABNORMAL
EKG ATRIAL RATE: 75 BPM
EKG DIAGNOSIS: NORMAL
EKG P AXIS: 92 DEGREES
EKG P-R INTERVAL: 136 MS
EKG Q-T INTERVAL: 400 MS
EKG QRS DURATION: 72 MS
EKG QTC CALCULATION (BAZETT): 446 MS
EKG R AXIS: 39 DEGREES
EKG T AXIS: 94 DEGREES
EKG VENTRICULAR RATE: 75 BPM
EOSINOPHIL # BLD: 0 K/UL (ref 0–0.4)
EOSINOPHIL NFR BLD: 0 % (ref 0–5)
ERYTHROCYTE [DISTWIDTH] IN BLOOD BY AUTOMATED COUNT: 14.6 % (ref 11.6–14.5)
GLOBULIN SER CALC-MCNC: 3.5 G/DL (ref 2–4)
GLUCOSE SERPL-MCNC: 108 MG/DL (ref 74–99)
HCO3 BLD-SCNC: 24.1 MMOL/L (ref 22–26)
HCT VFR BLD AUTO: 40.5 % (ref 35–45)
HGB BLD-MCNC: 12.7 G/DL (ref 12–16)
IMM GRANULOCYTES # BLD AUTO: 0 K/UL
IMM GRANULOCYTES NFR BLD AUTO: 0 %
LYMPHOCYTES # BLD: 1.5 K/UL (ref 0.9–3.6)
LYMPHOCYTES NFR BLD: 10 % (ref 21–52)
MAGNESIUM SERPL-MCNC: 2.1 MG/DL (ref 1.6–2.6)
MCH RBC QN AUTO: 27 PG (ref 24–34)
MCHC RBC AUTO-ENTMCNC: 31.4 G/DL (ref 31–37)
MCV RBC AUTO: 86 FL (ref 78–100)
MONOCYTES # BLD: 1.4 K/UL (ref 0.05–1.2)
MONOCYTES NFR BLD: 9 % (ref 3–10)
NEUTS BAND NFR BLD MANUAL: 2 % (ref 0–5)
NEUTS SEG # BLD: 12.4 K/UL (ref 1.8–8)
NEUTS SEG NFR BLD: 79 % (ref 40–73)
NRBC # BLD: 0 K/UL (ref 0–0.01)
NRBC BLD-RTO: 0 PER 100 WBC
NT PRO BNP: 2168 PG/ML (ref 0–1800)
PCO2 BLD: 32.3 MMHG (ref 35–45)
PH BLD: 7.48 (ref 7.35–7.45)
PLATELET # BLD AUTO: 291 K/UL (ref 135–420)
PLATELET COMMENT: ABNORMAL
PMV BLD AUTO: 9.7 FL (ref 9.2–11.8)
PO2 BLD: 21 MMHG (ref 80–100)
POTASSIUM SERPL-SCNC: 4 MMOL/L (ref 3.5–5.5)
PROT SERPL-MCNC: 7.3 G/DL (ref 6.4–8.2)
RBC # BLD AUTO: 4.71 M/UL (ref 4.2–5.3)
RBC MORPH BLD: ABNORMAL
SAO2 % BLD: 40.6 % (ref 92–97)
SERVICE CMNT-IMP: ABNORMAL
SODIUM SERPL-SCNC: 140 MMOL/L (ref 136–145)
SPECIMEN TYPE: ABNORMAL
TROPONIN I SERPL HS-MCNC: 15 NG/L (ref 0–54)
TSH SERPL DL<=0.05 MIU/L-ACNC: 2.93 UIU/ML (ref 0.36–3.74)
WBC # BLD AUTO: 15.3 K/UL (ref 4.6–13.2)

## 2023-03-07 PROCEDURE — 6360000004 HC RX CONTRAST MEDICATION: Performed by: EMERGENCY MEDICINE

## 2023-03-07 PROCEDURE — 85025 COMPLETE CBC W/AUTO DIFF WBC: CPT

## 2023-03-07 PROCEDURE — 82803 BLOOD GASES ANY COMBINATION: CPT

## 2023-03-07 PROCEDURE — 93005 ELECTROCARDIOGRAM TRACING: CPT | Performed by: EMERGENCY MEDICINE

## 2023-03-07 PROCEDURE — 2700000000 HC OXYGEN THERAPY PER DAY

## 2023-03-07 PROCEDURE — 6370000000 HC RX 637 (ALT 250 FOR IP)

## 2023-03-07 PROCEDURE — 6360000002 HC RX W HCPCS: Performed by: INTERNAL MEDICINE

## 2023-03-07 PROCEDURE — 96375 TX/PRO/DX INJ NEW DRUG ADDON: CPT

## 2023-03-07 PROCEDURE — 84443 ASSAY THYROID STIM HORMONE: CPT

## 2023-03-07 PROCEDURE — 6360000002 HC RX W HCPCS

## 2023-03-07 PROCEDURE — 84484 ASSAY OF TROPONIN QUANT: CPT

## 2023-03-07 PROCEDURE — 71275 CT ANGIOGRAPHY CHEST: CPT

## 2023-03-07 PROCEDURE — 1100000003 HC PRIVATE W/ TELEMETRY

## 2023-03-07 PROCEDURE — 99285 EMERGENCY DEPT VISIT HI MDM: CPT

## 2023-03-07 PROCEDURE — 80053 COMPREHEN METABOLIC PANEL: CPT

## 2023-03-07 PROCEDURE — 96365 THER/PROPH/DIAG IV INF INIT: CPT

## 2023-03-07 PROCEDURE — 99223 1ST HOSP IP/OBS HIGH 75: CPT

## 2023-03-07 PROCEDURE — 6370000000 HC RX 637 (ALT 250 FOR IP): Performed by: EMERGENCY MEDICINE

## 2023-03-07 PROCEDURE — 94761 N-INVAS EAR/PLS OXIMETRY MLT: CPT

## 2023-03-07 PROCEDURE — 96366 THER/PROPH/DIAG IV INF ADDON: CPT

## 2023-03-07 PROCEDURE — 2580000003 HC RX 258

## 2023-03-07 PROCEDURE — 71045 X-RAY EXAM CHEST 1 VIEW: CPT

## 2023-03-07 PROCEDURE — 83880 ASSAY OF NATRIURETIC PEPTIDE: CPT

## 2023-03-07 PROCEDURE — 6360000002 HC RX W HCPCS: Performed by: EMERGENCY MEDICINE

## 2023-03-07 PROCEDURE — 83735 ASSAY OF MAGNESIUM: CPT

## 2023-03-07 RX ORDER — POLYETHYLENE GLYCOL 3350 17 G/17G
17 POWDER, FOR SOLUTION ORAL DAILY PRN
Status: DISCONTINUED | OUTPATIENT
Start: 2023-03-07 | End: 2023-03-10 | Stop reason: HOSPADM

## 2023-03-07 RX ORDER — ASPIRIN 81 MG/1
81 TABLET ORAL DAILY
Status: DISCONTINUED | OUTPATIENT
Start: 2023-03-08 | End: 2023-03-10 | Stop reason: HOSPADM

## 2023-03-07 RX ORDER — NIFEDIPINE 30 MG/1
60 TABLET, EXTENDED RELEASE ORAL DAILY
Status: DISCONTINUED | OUTPATIENT
Start: 2023-03-07 | End: 2023-03-08

## 2023-03-07 RX ORDER — FUROSEMIDE 10 MG/ML
20 INJECTION INTRAMUSCULAR; INTRAVENOUS 2 TIMES DAILY
Status: DISCONTINUED | OUTPATIENT
Start: 2023-03-08 | End: 2023-03-10 | Stop reason: HOSPADM

## 2023-03-07 RX ORDER — FOLIC ACID 1 MG/1
TABLET ORAL
COMMUNITY
Start: 2022-12-27 | End: 2023-03-07

## 2023-03-07 RX ORDER — BUDESONIDE 1 MG/2ML
1 INHALANT ORAL 2 TIMES DAILY
Status: DISCONTINUED | OUTPATIENT
Start: 2023-03-07 | End: 2023-03-10 | Stop reason: HOSPADM

## 2023-03-07 RX ORDER — FOLIC ACID 1 MG/1
1 TABLET ORAL DAILY
Status: DISCONTINUED | OUTPATIENT
Start: 2023-03-08 | End: 2023-03-10 | Stop reason: HOSPADM

## 2023-03-07 RX ORDER — DEXAMETHASONE SODIUM PHOSPHATE 4 MG/ML
10 INJECTION, SOLUTION INTRA-ARTICULAR; INTRALESIONAL; INTRAMUSCULAR; INTRAVENOUS; SOFT TISSUE ONCE
Status: COMPLETED | OUTPATIENT
Start: 2023-03-07 | End: 2023-03-07

## 2023-03-07 RX ORDER — IPRATROPIUM BROMIDE AND ALBUTEROL SULFATE 2.5; .5 MG/3ML; MG/3ML
1 SOLUTION RESPIRATORY (INHALATION)
Status: COMPLETED | OUTPATIENT
Start: 2023-03-07 | End: 2023-03-07

## 2023-03-07 RX ORDER — NIFEDIPINE 60 MG/1
60 TABLET, FILM COATED, EXTENDED RELEASE ORAL DAILY
Status: DISCONTINUED | OUTPATIENT
Start: 2023-03-07 | End: 2023-03-07

## 2023-03-07 RX ORDER — LORAZEPAM 2 MG/ML
0.5 INJECTION INTRAMUSCULAR EVERY 6 HOURS PRN
Status: DISCONTINUED | OUTPATIENT
Start: 2023-03-07 | End: 2023-03-09

## 2023-03-07 RX ORDER — SODIUM CHLORIDE 0.9 % (FLUSH) 0.9 %
5-40 SYRINGE (ML) INJECTION EVERY 12 HOURS SCHEDULED
Status: DISCONTINUED | OUTPATIENT
Start: 2023-03-07 | End: 2023-03-10 | Stop reason: HOSPADM

## 2023-03-07 RX ORDER — ONDANSETRON 4 MG/1
4 TABLET, ORALLY DISINTEGRATING ORAL EVERY 8 HOURS PRN
Status: DISCONTINUED | OUTPATIENT
Start: 2023-03-07 | End: 2023-03-10 | Stop reason: HOSPADM

## 2023-03-07 RX ORDER — METHYLPREDNISOLONE SODIUM SUCCINATE 40 MG/ML
40 INJECTION, POWDER, LYOPHILIZED, FOR SOLUTION INTRAMUSCULAR; INTRAVENOUS EVERY 8 HOURS
Status: DISCONTINUED | OUTPATIENT
Start: 2023-03-07 | End: 2023-03-09

## 2023-03-07 RX ORDER — ACETAMINOPHEN 650 MG/1
650 SUPPOSITORY RECTAL EVERY 6 HOURS PRN
Status: DISCONTINUED | OUTPATIENT
Start: 2023-03-07 | End: 2023-03-10 | Stop reason: HOSPADM

## 2023-03-07 RX ORDER — IPRATROPIUM BROMIDE AND ALBUTEROL SULFATE 2.5; .5 MG/3ML; MG/3ML
1 SOLUTION RESPIRATORY (INHALATION)
Status: DISPENSED | OUTPATIENT
Start: 2023-03-07 | End: 2023-03-09

## 2023-03-07 RX ORDER — SODIUM CHLORIDE 0.9 % (FLUSH) 0.9 %
5-40 SYRINGE (ML) INJECTION PRN
Status: DISCONTINUED | OUTPATIENT
Start: 2023-03-07 | End: 2023-03-10 | Stop reason: HOSPADM

## 2023-03-07 RX ORDER — FAMOTIDINE 20 MG/1
20 TABLET, FILM COATED ORAL DAILY
Status: DISCONTINUED | OUTPATIENT
Start: 2023-03-07 | End: 2023-03-10 | Stop reason: HOSPADM

## 2023-03-07 RX ORDER — ATORVASTATIN CALCIUM 20 MG/1
20 TABLET, FILM COATED ORAL DAILY
Status: DISCONTINUED | OUTPATIENT
Start: 2023-03-07 | End: 2023-03-10 | Stop reason: HOSPADM

## 2023-03-07 RX ORDER — GABAPENTIN 100 MG/1
100 CAPSULE ORAL NIGHTLY
COMMUNITY

## 2023-03-07 RX ORDER — ARFORMOTEROL TARTRATE 15 UG/2ML
15 SOLUTION RESPIRATORY (INHALATION) 2 TIMES DAILY
Status: DISCONTINUED | OUTPATIENT
Start: 2023-03-07 | End: 2023-03-10 | Stop reason: HOSPADM

## 2023-03-07 RX ORDER — MAGNESIUM SULFATE IN WATER 40 MG/ML
2000 INJECTION, SOLUTION INTRAVENOUS
Status: COMPLETED | OUTPATIENT
Start: 2023-03-07 | End: 2023-03-07

## 2023-03-07 RX ORDER — GABAPENTIN 100 MG/1
100 CAPSULE ORAL NIGHTLY
Status: DISCONTINUED | OUTPATIENT
Start: 2023-03-07 | End: 2023-03-10 | Stop reason: HOSPADM

## 2023-03-07 RX ORDER — ALBUTEROL SULFATE 2.5 MG/3ML
10 SOLUTION RESPIRATORY (INHALATION) CONTINUOUS
Status: DISCONTINUED | OUTPATIENT
Start: 2023-03-07 | End: 2023-03-07

## 2023-03-07 RX ORDER — PREDNISONE 2.5 MG
2.5 TABLET ORAL DAILY
Status: ON HOLD | COMMUNITY
Start: 2021-12-15 | End: 2023-03-10 | Stop reason: HOSPADM

## 2023-03-07 RX ORDER — LOSARTAN POTASSIUM 50 MG/1
50 TABLET ORAL DAILY
Status: DISCONTINUED | OUTPATIENT
Start: 2023-03-08 | End: 2023-03-10 | Stop reason: HOSPADM

## 2023-03-07 RX ORDER — ACETAMINOPHEN 325 MG/1
650 TABLET ORAL EVERY 6 HOURS PRN
Status: DISCONTINUED | OUTPATIENT
Start: 2023-03-07 | End: 2023-03-10 | Stop reason: HOSPADM

## 2023-03-07 RX ORDER — ONDANSETRON 2 MG/ML
4 INJECTION INTRAMUSCULAR; INTRAVENOUS EVERY 6 HOURS PRN
Status: DISCONTINUED | OUTPATIENT
Start: 2023-03-07 | End: 2023-03-10 | Stop reason: HOSPADM

## 2023-03-07 RX ORDER — FUROSEMIDE 10 MG/ML
40 INJECTION INTRAMUSCULAR; INTRAVENOUS
Status: COMPLETED | OUTPATIENT
Start: 2023-03-07 | End: 2023-03-07

## 2023-03-07 RX ORDER — ENOXAPARIN SODIUM 100 MG/ML
30 INJECTION SUBCUTANEOUS DAILY
Status: DISCONTINUED | OUTPATIENT
Start: 2023-03-07 | End: 2023-03-10 | Stop reason: HOSPADM

## 2023-03-07 RX ORDER — ALBUTEROL SULFATE 2.5 MG/3ML
SOLUTION RESPIRATORY (INHALATION)
COMMUNITY
Start: 2022-12-27 | End: 2023-12-26

## 2023-03-07 RX ADMIN — ENOXAPARIN SODIUM 30 MG: 100 INJECTION SUBCUTANEOUS at 20:02

## 2023-03-07 RX ADMIN — DEXAMETHASONE SODIUM PHOSPHATE 10 MG: 4 INJECTION INTRA-ARTICULAR; INTRALESIONAL; INTRAMUSCULAR; INTRAVENOUS; SOFT TISSUE at 10:37

## 2023-03-07 RX ADMIN — METHYLPREDNISOLONE SODIUM SUCCINATE 40 MG: 40 INJECTION, POWDER, FOR SOLUTION INTRAMUSCULAR; INTRAVENOUS at 20:02

## 2023-03-07 RX ADMIN — SODIUM CHLORIDE, PRESERVATIVE FREE 10 ML: 5 INJECTION INTRAVENOUS at 20:04

## 2023-03-07 RX ADMIN — IOPAMIDOL 80 ML: 755 INJECTION, SOLUTION INTRAVENOUS at 12:09

## 2023-03-07 RX ADMIN — ATORVASTATIN CALCIUM 20 MG: 20 TABLET, FILM COATED ORAL at 20:02

## 2023-03-07 RX ADMIN — ACETAMINOPHEN 650 MG: 325 TABLET, FILM COATED ORAL at 22:52

## 2023-03-07 RX ADMIN — LORAZEPAM 0.5 MG: 2 INJECTION INTRAMUSCULAR; INTRAVENOUS at 22:52

## 2023-03-07 RX ADMIN — MAGNESIUM SULFATE HEPTAHYDRATE 2000 MG: 40 INJECTION, SOLUTION INTRAVENOUS at 10:37

## 2023-03-07 RX ADMIN — FUROSEMIDE 40 MG: 10 INJECTION, SOLUTION INTRAMUSCULAR; INTRAVENOUS at 13:24

## 2023-03-07 RX ADMIN — NIFEDIPINE 60 MG: 30 TABLET, FILM COATED, EXTENDED RELEASE ORAL at 20:02

## 2023-03-07 RX ADMIN — ALBUTEROL SULFATE 10 MG/HR: 2.5 SOLUTION RESPIRATORY (INHALATION) at 10:52

## 2023-03-07 RX ADMIN — GABAPENTIN 100 MG: 100 CAPSULE ORAL at 20:02

## 2023-03-07 RX ADMIN — IPRATROPIUM BROMIDE AND ALBUTEROL SULFATE 1 AMPULE: 2.5; .5 SOLUTION RESPIRATORY (INHALATION) at 10:37

## 2023-03-07 RX ADMIN — METOPROLOL TARTRATE 25 MG: 25 TABLET, FILM COATED ORAL at 20:02

## 2023-03-07 RX ADMIN — FAMOTIDINE 20 MG: 20 TABLET ORAL at 20:02

## 2023-03-07 ASSESSMENT — ENCOUNTER SYMPTOMS
COLOR CHANGE: 1
SHORTNESS OF BREATH: 1

## 2023-03-07 ASSESSMENT — PAIN SCALES - GENERAL: PAINLEVEL_OUTOF10: 0

## 2023-03-07 ASSESSMENT — PAIN - FUNCTIONAL ASSESSMENT: PAIN_FUNCTIONAL_ASSESSMENT: NONE - DENIES PAIN

## 2023-03-07 NOTE — ED NOTES
[facility] at bedside preparing patient for transport to [facility] . All patient belongings transported with patient at this time.      Lakshmi Melendez RN  03/07/23 [id number]

## 2023-03-07 NOTE — ED NOTES
Report called to [facility]. Pt transferred to [facility] . Pt transferred with NRB 92% O2 via non-rebreather face mask and cardiac monitoring. Pt transported byvia cart/stretcher  Family updated and verbalize understanding. Physicians on case updated Yes. Patient transported with all of her belongings at this time.      David Cruz RN  03/07/23 [id number]

## 2023-03-07 NOTE — ED PROVIDER NOTES
[facility] EMERGENCY DEPT  EMERGENCY DEPARTMENT ENCOUNTER      Pt Name: Rachel Acuna  MRN: [id number]  [place] 1935  Date of evaluation: 3/7/2023  Provider: MD Emma Marie       Chief Complaint   Patient presents with    Shortness of Breath    Palpitations     Onset Sat night then continued Sun morning. HISTORY OF PRESENT ILLNESS   (Location/Symptom, Timing/Onset, Context/Setting, Quality, Duration, Modifying Factors, Severity)  Note limiting factors. Rachel Acuna is a 82 y.o. female who presents to the emergency department      82-year-old female past medical history of COPD heart failure and hypertension presents the emergency department with shortness of breath. Patient states her power went out on Saturday and when it came back on she had to turn her oxygen up. Since then she has been having intermittent shortness of breath. She is describing in his palpitations and denying shortness of breath during interview. Her sister was going to bring her into her doctor's appointment today. Her sister is a retired nurse she noticed that she was visibly short of breath and brought her to the emergency department. Patient has no chest pain no diarrhea no vaginal bleeding no rectal bleeding no black stool no vomiting blood no other issues expressed. The history is provided by the patient and a relative. A  was used. Nursing Notes were reviewed. REVIEW OF SYSTEMS    (2-9 systems for level 4, 10 or more for level 5)     Review of Systems   Constitutional: Negative. Respiratory:  Positive for shortness of breath. Cardiovascular:  Positive for palpitations. Genitourinary: Negative. Neurological: Negative. All other systems reviewed and are negative. Except as noted above the remainder of the review of systems was reviewed and negative.        PAST MEDICAL HISTORY     Past Medical History:   Diagnosis Date    Arthritis     Chronic lung disease     Chronic obstructive pulmonary disease (HCC)     Edema due to congestive heart failure ([facility] [address] 11/1/2022    Heart failure (HCC)     Hypertension          SURGICAL HISTORY       Past Surgical History:   Procedure Laterality Date    ORTHOPEDIC SURGERY      spinal sx 5/6    OTHER SURGICAL HISTORY      Carotid artery    VASCULAR SURGERY      L CEA 10/2014         CURRENT MEDICATIONS       Previous Medications    ACETAMINOPHEN (TYLENOL) 500 MG TABLET    Take 500 mg by mouth every 6 hours as needed    ALBUTEROL (PROVENTIL) (2.5 MG/3ML) 0.083% NEBULIZER SOLUTION    Inhale 2.5 mg into the lungs every 4 hours as needed    ALBUTEROL (PROVENTIL) (2.5 MG/3ML) 0.083% NEBULIZER SOLUTION      See dose instructions in comments, # 360 mL, 6 total refill(s), Acute, DANIEL [Federal Rx: #360 last filled 02/02/23]    ALBUTEROL SULFATE HFA (PROVENTIL;VENTOLIN;PROAIR) 108 (90 BASE) MCG/ACT INHALER    Inhale 2 puffs into the lungs every 4 hours as needed    ALPRAZOLAM (XANAX) 0.5 MG TABLET    Take 0.5 mg by mouth. ASPIRIN 81 MG EC TABLET    Take 81 mg by mouth as needed    ATORVASTATIN (LIPITOR) 20 MG TABLET    Take 20 mg by mouth daily    BUDESON-GLYCOPYRROL-FORMOTEROL (BREZTRI AEROSPHERE) 160-9-4.8 MCG/ACT AERO    Inhale into the lungs    CARBOXYMETHYLCELLULOSE (REFRESH PLUS) 0.5 % SOLN OPHTHALMIC SOLUTION    Apply 1 drop to eye 2 times daily    CEFUROXIME (CEFTIN) 500 MG TABLET    Take 500 mg by mouth 2 times daily    COVID-19 MRNA VACC, MODERNA, 100 MCG/0.5ML SUSP INJECTION        FLUOCINONIDE (LIDEX) 0.05 % CREAM    Apply 0.05 % topically    FOLIC ACID (FOLVITE) 1 MG TABLET    Take by mouth daily    FOLIC ACID (FOLVITE) 1 MG TABLET      TAKE 1 TABLET BY MOUTH DAILY, # 90 EA, 1 total refill(s), Acute, DANIEL [Federal Rx: #90 last filled 12/28/22]    FUROSEMIDE (LASIX) 40 MG TABLET    Take 40 mg by mouth daily    GABAPENTIN (NEURONTIN) 100 MG CAPSULE    Take 100 mg by mouth nightly.     LOSARTAN (COZAAR) 50 MG TABLET    Take by mouth daily    METOPROLOL TARTRATE (LOPRESSOR) 25 MG TABLET    Take 25 mg by mouth 2 times daily    MULTIPLE VITAMINS-MINERALS (MULTIVITAMIN ADULTS 50+ PO)    Take 1 tablet by mouth daily    NIFEDIPINE (ADALAT CC) 60 MG EXTENDED RELEASE TABLET    Take 60 mg by mouth daily    OXYGEN    E clarify this medication. Outside name: Oxygen    PREDNISONE (DELTASONE) 2.5 MG TABLET    Take 2.5 mg by mouth daily    SODIUM CHLORIDE (OCEAN) 0.65 % NASAL SPRAY    1 drop by Nasal route as needed       ALLERGIES     Patient has no known allergies. FAMILY HISTORY       Family History   Problem Relation Age of Onset    Heart Attack Father     Hypertension Father     Hypertension Mother     Heart Disease Mother           SOCIAL HISTORY       Social History     Socioeconomic History    Marital status:      Spouse name: None    Number of children: None    Years of education: None    Highest education level: None   Tobacco Use    Smoking status: Former     Packs/day: 1.50     Types: Cigarettes     Quit date: 1987     Years since quittin.5    Smokeless tobacco: Never   Vaping Use    Vaping Use: Never used   Substance and Sexual Activity    Alcohol use: No     Alcohol/week: 0.0 standard drinks    Drug use: No    Sexual activity: Not Currently       SCREENINGS         Jackson Coma Scale  Eye Opening: Spontaneous  Best Verbal Response: Incomprehensible speech  Best Motor Response: Obeys commands  Natasha Coma Scale Score: 12                     CIWA Assessment  BP: (!) 148/51  Heart Rate: 77                 PHYSICAL EXAM    (up to 7 for level 4, 8 or more for level 5)     ED Triage Vitals   BP Temp Temp Source Heart Rate Resp SpO2 Height Weight   23 1022 23 1020 23 1020 23 1020 23 1020 23 1018 23 1028 23 1028   (!) 147/50 97.9 °F (36.6 °C) Oral 79 18 (!) 60 % 5' 1\" (1.549 m) 105 lb (47.6 kg)       Physical Exam  Vitals and nursing note reviewed.    Constitutional: General: She is in acute distress. Appearance: She is well-developed. She is not ill-appearing, toxic-appearing or diaphoretic. Interventions: She is not intubated. HENT:      Head: Normocephalic and atraumatic. Mouth/Throat:      Pharynx: No pharyngeal swelling or oropharyngeal exudate. Eyes:      Extraocular Movements: Extraocular movements intact. Cardiovascular:      Rate and Rhythm: Normal rate and regular rhythm. Pulmonary:      Effort: Pulmonary effort is normal. No tachypnea, bradypnea, accessory muscle usage or respiratory distress. She is not intubated. Breath sounds: No stridor. Examination of the right-upper field reveals rales. Examination of the left-upper field reveals rales. Examination of the right-middle field reveals rales. Examination of the left-middle field reveals rales. Examination of the right-lower field reveals rales. Examination of the left-lower field reveals rales. Rales present. No decreased breath sounds, wheezing or rhonchi. Chest:      Chest wall: No mass, deformity, tenderness, crepitus or edema. There is no dullness to percussion. Abdominal:      Palpations: Abdomen is soft. There is no hepatomegaly, splenomegaly or mass. Tenderness: There is no abdominal tenderness. There is no guarding or rebound. Musculoskeletal:         General: Normal range of motion. Cervical back: Neck supple. Right lower leg: No tenderness. No edema. Left lower leg: No tenderness. No edema. Skin:     General: Skin is warm. Capillary Refill: Capillary refill takes less than 2 seconds. Coloration: Skin is not cyanotic or pale. Findings: No ecchymosis, erythema or rash. Nails: There is no clubbing. Neurological:      General: No focal deficit present. Mental Status: She is alert and oriented to person, place, and time.    Psychiatric:      Comments: Anxious       DIAGNOSTIC RESULTS     EKG: All EKG's are interpreted by the Emergency Department Physician who either signs or Co-signs this chart in the absence of a cardiologist.        RADIOLOGY:   Non-plain film images such as CT, Ultrasound and MRI are read by the radiologist. Plain radiographic images are visualized and preliminarily interpreted by the emergency physician with the below findings:        Interpretation per the Radiologist below, if available at the time of this note:    CTA [address]   Final Result   1. No evidence for pulmonary emboli. 2.  Likely heart failure with biatrial cardiac chamber enlargement, new small   bilateral pleural effusions and interstitial edema. 3.  Severe emphysema. 4.  Main pulmonary artery enlargement as may be seen in pulmonary arterial   hypertension. 5.  Unchanged left thyroid nodule. There is likely little clinical benefit of   follow-up given patient's age. 6.  Moderate atherosclerosis with nodular noncalcified regions of mural plaque   ascending aorta which increases risk of embolic events. 7.  Mild anasarca. XR CHEST PORTABLE   Final Result   Diffuse interstitial and alveolar opacities, likely moderate pulmonary edema. Component of interstitial lung disease not excluded. Small bilateral pleural effusions.             ED BEDSIDE ULTRASOUND:   Performed by ED Physician - none    LABS:  Labs Reviewed   CBC WITH AUTO DIFFERENTIAL - Abnormal; Notable for the following components:       Result Value    WBC 15.3 (*)     RDW 14.6 (*)     Seg Neutrophils 79 (*)     Lymphocytes 10 (*)     Segs Absolute 12.4 (*)     Absolute Mono # 1.4 (*)     All other components within normal limits   COMPREHENSIVE METABOLIC PANEL - Abnormal; Notable for the following components:    Glucose 108 (*)     BUN 22 (*)     Bun/Cre Ratio 21 (*)     Est, Glom Filt Rate 51 (*)     AST 39 (*)     All other components within normal limits   BRAIN NATRIURETIC PEPTIDE - Abnormal; Notable for the following components:    NT Pro-BNP 2,168 (*) All other components within normal limits   POC G3: BLOOD GASES INCLUDES CALC. TCO2, HCO3, BASE EXCESS, SO2 - Abnormal; Notable for the following components:    pH, Arterial, POC 7.48 (*)     pCO2, Arterial, POC 32.3 (*)     pO2, Arterial, POC 21 (*)     SO2c, Arterial, POC 40.6 (*)     All other components within normal limits   MAGNESIUM   TROPONIN   TSH       All other labs were within normal range or not returned as of this dictation. EMERGENCY DEPARTMENT COURSE and DIFFERENTIAL DIAGNOSIS/MDM:   Vitals:    Vitals:    03/07/23 1030 03/07/23 1045 03/07/23 1100 03/07/23 1300   BP: 112/61 (!) 118/41 117/65 (!) 148/51   Pulse: 71 69 71 77   Resp: 15 19 18 17   Temp:       TempSrc:       SpO2: 92% (!) 89% 91% 90%   Weight:       Height:               Medical Decision Making  EKG normal sinus rhythm normal axis with motion artifact no ST elevation  Patient was put on high flow she is not tachypneic just hypoxic. Oxygen sats are now appropriate. Discussed case with Dr. Analisa Uriarte who wants the patient to go to stepdown. Troponin is unremarkable BNP is elevated CTA of the chest shows heart failure. Patient was given IV Lasix 40 mg. Will transfer to [facility] for further work-up. Patient was also given IV Decadron IV magnesium and breathing treatments. Amount and/or Complexity of Data Reviewed  Labs: ordered. Decision-making details documented in ED Course. Radiology: ordered and independent interpretation performed. Decision-making details documented in ED Course. ECG/medicine tests: ordered and independent interpretation performed. Decision-making details documented in ED Course. Risk  Prescription drug management. REASSESSMENT          CRITICAL CARE TIME   Total Critical Care time was  minutes, excluding separately reportable procedures. There was a high probability of clinically significant/life threatening deterioration in the patient's condition which required my urgent intervention. CONSULTS:  None    PROCEDURES:  Unless otherwise noted below, none     Procedures        FINAL IMPRESSION      1. Hypoxia    2. Congestive heart failure, unspecified HF chronicity, unspecified heart failure type [facility])          DISPOSITION/PLAN   DISPOSITION Decision To Discharge 03/07/2023 01:48:49 PM      PATIENT REFERRED TO:  No follow-up provider specified. DISCHARGE MEDICATIONS:  New Prescriptions    No medications on file     Controlled Substances Monitoring:     No flowsheet data found. (Please note that portions of this note were completed with a voice recognition program.  Efforts were made to edit the dictations but occasionally words are mis-transcribed. )    Fransisco Stevens MD (electronically signed)  Attending Emergency Physician           Richard Petersen MD  03/07/23 1659

## 2023-03-07 NOTE — H&P
History and Physical          Subjective     HPI: Kostas Pond is a 82 y.o. female with a PMHx of COPD, CHF, HTN who presented to [facility] from [facility] for further management. Patient states she has been having intermittent shortness of breath since Saturday. She states that the lights flickered on and off at night while she was sleeping and did not realize her oxygen was off all night until the morning. Patient normally wears 3 to 4 L of oxygen at all times. Patient also states that she was having palpitations when she woke up in the morning while her oxygen was off. As soon as she wear her oxygen palpitations resolved over time. Patient also has been feeling fatigued past couple days so she decided to come to the ER. Denies chest pain, fever, chills, nausea vomiting diarrhea, abdominal pain, headache, dizziness, cough, urinary symptoms. Denies recent travel. States she quit smoking in 1988. Denies alcohol use and illicit drug use. Currently upon my evaluation, patient is satting at 92% on high flow nasal cannula. States she feels much better after receiving IV Lasix and steroid at [facility]. At [facility], Tmax 98.2, Resp 14-24, HR 69-93, /61 - 154/51, Oxygen 60% charted. Patient placed on hiflow with last saturation charted 92%. CMP without significant abnormality. proBNP 2,168. Trop negative (15). TSH 2.93. WBC 15.3. ABG with hypoxemia. CTA chest with no evidence of PE , likelyl heart failure, new small bilateral pleural effusion, severe emphysema, unchanged left thyroid nodule. CXR with diffuse interstitial opacities, likely moderate pulmonary edema, small bilateral pleural effusions. EKG with NSR. Received Dexamethasone 10mg IV, Lasix 40mg IV, Duoneb and Mag Sulfate at [facility].      Home medications verified with the list patient provided  Code status discussed- DNR       PMHx:  Past Medical History:   Diagnosis Date    Arthritis     Chronic lung disease     Chronic obstructive pulmonary disease [facility])     Edema due to congestive heart failure ([facility] [address] 2022    Heart failure ([facility] [address]     Hypertension        PSurgHx:  Past Surgical History:   Procedure Laterality Date    ORTHOPEDIC SURGERY      spinal sx     OTHER SURGICAL HISTORY      Carotid artery    VASCULAR SURGERY      L CEA 10/2014       SocialHx:  Social History     Socioeconomic History    Marital status:      Spouse name: None    Number of children: None    Years of education: None    Highest education level: None   Tobacco Use    Smoking status: Former     Packs/day: 1.50     Types: Cigarettes     Quit date: 1987     Years since quittin.5    Smokeless tobacco: Never   Vaping Use    Vaping Use: Never used   Substance and Sexual Activity    Alcohol use: No     Alcohol/week: 0.0 standard drinks    Drug use: No    Sexual activity: Not Currently       FamilyHx:  Family History   Problem Relation Age of Onset    Heart Attack Father     Hypertension Father     Hypertension Mother     Heart Disease Mother        Home Medications:  Prior to Admission medications    Medication Sig Start Date End Date Taking? Authorizing Provider   albuterol (PROVENTIL) (2.5 MG/3ML) 0.083% nebulizer solution   See dose instructions in comments, # 360 mL, 6 total refill(s), Acute, DANIEL [Federal Rx: #360 last filled 23] 22 Yes Historical Provider, MD   fluocinonide (LIDEX) 0.05 % cream Apply 0.05 % topically 22 Yes Historical Provider, MD   predniSONE (DELTASONE) 2.5 MG tablet Take 2.5 mg by mouth daily 12/15/21  Yes Historical Provider, MD   gabapentin (NEURONTIN) 100 MG capsule Take 100 mg by mouth nightly. Yes Historical Provider, MD   Multiple Vitamins-Minerals (MULTIVITAMIN ADULTS 50+ PO) Take 1 tablet by mouth daily    Historical Provider, MD   OXYGEN E clarify this medication.  Outside name: Oxygen    Ar Automatic Reconciliation   acetaminophen (TYLENOL) 500 MG tablet Take 500 mg by mouth every 6 hours as needed    Ar Automatic Reconciliation   albuterol sulfate HFA (PROVENTIL;VENTOLIN;PROAIR) 108 (90 Base) MCG/ACT inhaler Inhale 2 puffs into the lungs every 4 hours as needed 10/21/21   Ar Automatic Reconciliation   ALPRAZolam (XANAX) 0.5 MG tablet Take 0.5 mg by mouth. Ar Automatic Reconciliation   aspirin 81 MG EC tablet Take 81 mg by mouth as needed    Ar Automatic Reconciliation   atorvastatin (LIPITOR) 20 MG tablet Take 20 mg by mouth daily    Ar Automatic Reconciliation   Budeson-Glycopyrrol-Formoterol (BREZTRI AEROSPHERE) 160-9-4.8 MCG/ACT AERO Inhale into the lungs    Ar Automatic Reconciliation   carboxymethylcellulose (REFRESH PLUS) 0.5 % SOLN ophthalmic solution Apply 1 drop to eye 2 times daily    Ar Automatic Reconciliation   folic acid (FOLVITE) 1 MG tablet Take by mouth daily    Ar Automatic Reconciliation   furosemide (LASIX) 40 MG tablet Take 40 mg by mouth daily    Ar Automatic Reconciliation   losartan (COZAAR) 50 MG tablet Take by mouth daily    Ar Automatic Reconciliation   metoprolol tartrate (LOPRESSOR) 25 MG tablet Take 25 mg by mouth 2 times daily    Ar Automatic Reconciliation   NIFEdipine (ADALAT CC) 60 MG extended release tablet Take 60 mg by mouth daily    Ar Automatic Reconciliation   sodium chloride (OCEAN) 0.65 % nasal spray 1 drop by Nasal route as needed    Ar Automatic Reconciliation       Allergies:  No Known Allergies     Review of Systems:  CONST: no fever or chills, + fatigue  Eyes: No change in vision, no itching or drainage  ENT: No earache, no tinnitus, no sore throat or sinus congestion. PULM: + shortness of breath, no cough or wheeze. CV: no pnd or orthopnea, no CP, + palpitations, + edema  GI: No abdominal pain, no nausea, no vomiting or diarrhea  : No urinary frequency, no urgency, no hesitancy or dysuria. MSK: No joint or muscle pain, no back pain, no neck pain, no recent trauma. INTEG: No rash, no itching, no lesions.    ENDO: No polyuria, no polydipsia, no heat or cold intolerance. HEME: No anemia or easy bruising or bleeding. NEURO: No headache, no dizziness, no seizures, no numbness, no tingling or weakness.    PSYCH: No anxiety, no depression      Objective     Physical Exam:  Visit Vitals  BP (!) 154/51   Pulse 85   Temp 98.2 °F (36.8 °C) (Oral)   Resp 24   Ht 5' 1\" (1.549 m)   Wt 105 lb (47.6 kg)   SpO2 92%   BMI 19.84 kg/m²       General: NAD, appears stated age, alert  Skin: warm, dry, no rashes  Eyes: PERRL, sclera is non-icteric  HENT: normocephalic/atraumatic, moist mucus membranes  Respiratory: bilateral upper lobes air entry tight with expiratory wheezing to bilateral lower lobes with no signs of respiratory distress  Cardiovascular: RRR, no m/r/g, no cyanosis + 2-3 peripheral edema of bilateral lower extremities  GI: soft, non-tender, normal bowel sounds  Neuro: moves all extremities, no focal deficits, normal speech  Psych: appropriate mood and affect, no visual or auditory hallucinations    Laboratory Studies:  Recent Results (from the past 24 hour(s))   EKG 12 Lead    Collection Time: 03/07/23 10:00 AM   Result Value Ref Range    Ventricular Rate 75 BPM    Atrial Rate 75 BPM    P-R Interval 136 ms    QRS Duration 72 ms    Q-T Interval 400 ms    QTc Calculation (Bazett) 446 ms    P Axis 92 degrees    R Axis 39 degrees    T Axis 94 degrees    Diagnosis Normal sinus rhythm    CBC with Auto Differential    Collection Time: 03/07/23 10:06 AM   Result Value Ref Range    WBC 15.3 (H) 4.6 - 13.2 K/uL    RBC 4.71 4.20 - 5.30 M/uL    Hemoglobin 12.7 12.0 - 16.0 g/dL    Hematocrit 40.5 35.0 - 45.0 %    MCV 86.0 78.0 - 100.0 FL    MCH 27.0 24.0 - 34.0 PG    MCHC 31.4 31.0 - 37.0 g/dL    RDW 14.6 (H) 11.6 - 14.5 %    Platelets 836 916 - 146 K/uL    MPV 9.7 9.2 - 11.8 FL    Nucleated RBCs 0.0 0  WBC    nRBC 0.00 0.00 - 0.01 K/uL    Seg Neutrophils 79 (H) 40 - 73 %    Band Neutrophils 2 0 - 5 %    Lymphocytes 10 (L) 21 - 52 %    Monocytes 9 3 - 10 %    Eosinophils % 0 0 - 5 %    Basophils 0 0 - 2 %    Immature Granulocytes 0 %    Segs Absolute 12.4 (H) 1.8 - 8.0 K/UL    Absolute Lymph # 1.5 0.9 - 3.6 K/UL    Absolute Mono # 1.4 (H) 0.05 - 1.2 K/UL    Absolute Eos # 0.0 0.0 - 0.4 K/UL    Basophils Absolute 0.0 0.0 - 0.1 K/UL    Absolute Immature Granulocyte 0.0 K/UL    Differential Type MANUAL      Platelet Comment ADEQUATE PLATELETS      RBC Comment ANISOCYTOSIS  1+        RBC Comment POIKILOCYTOSIS  1+        RBC Comment OVALOCYTES  1+       Magnesium    Collection Time: 03/07/23 10:06 AM   Result Value Ref Range    Magnesium 2.1 1.6 - 2.6 mg/dL   Troponin    Collection Time: 03/07/23 10:06 AM   Result Value Ref Range    Troponin, High Sensitivity 15 0 - 54 ng/L   TSH    Collection Time: 03/07/23 10:06 AM   Result Value Ref Range    TSH, 3RD GENERATION 2.93 0.36 - 3.74 uIU/mL   CMP    Collection Time: 03/07/23 10:06 AM   Result Value Ref Range    Sodium 140 136 - 145 mmol/L    Potassium 4.0 3.5 - 5.5 mmol/L    Chloride 108 100 - 111 mmol/L    CO2 26 21 - 32 mmol/L    Anion Gap 6 3.0 - 18 mmol/L    Glucose 108 (H) 74 - 99 mg/dL    BUN 22 (H) 7.0 - 18 MG/DL    Creatinine 1.06 0.6 - 1.3 MG/DL    Bun/Cre Ratio 21 (H) 12 - 20      Est, Glom Filt Rate 51 (L) >60 ml/min/1.73m2    Calcium 8.6 8.5 - 10.1 MG/DL    Total Bilirubin 0.6 0.2 - 1.0 MG/DL    ALT 38 13 - 56 U/L    AST 39 (H) 10 - 38 U/L    Alk Phosphatase 51 45 - 117 U/L    Total Protein 7.3 6.4 - 8.2 g/dL    Albumin 3.8 3.4 - 5.0 g/dL    Globulin 3.5 2.0 - 4.0 g/dL    Albumin/Globulin Ratio 1.1 0.8 - 1.7     Brain Natriuretic Peptide    Collection Time: 03/07/23 10:06 AM   Result Value Ref Range    NT Pro-BNP 2,168 (H) 0 - 1,800 PG/ML   POC G3: BLOOD GASES INCLUDES CALC.  TCO2, HCO3, BASE EXCESS, SO2    Collection Time: 03/07/23 10:10 AM   Result Value Ref Range    pH, Arterial, POC 7.48 (H) 7.35 - 7.45      pCO2, Arterial, POC 32.3 (L) 35.0 - 45.0 MMHG    pO2, Arterial, POC 21 (LL) 80 - 100 MMHG    HCO3, Mixed 24.1 22 - 26 MMOL/L SO2c, Arterial, POC 40.6 (L) 92 - 97 %    Base Excess 1.2 mmol/L    Specimen type: ARTERIAL      Performed by: Chetna Chong        Imaging Reviewed:  CTA CHEST W WO CONTRAST    Result Date: 3/7/2023  CTA CHEST PULMONARY EMBOLISM PROTOCOL  INDICATION: Hypoxia, shortness of breath. Question pulmonary embolism. TECHNIQUE: Thin collimation axial images obtained through the level of the pulmonary arteries with additional imaging through the chest following the uneventful administration of nonionic intravenous contrast.  Images reconstructed into three dimensional coronal and sagittal projections for complete evaluation of the tortuous and overlapping pulmonary vascular structures and to reduce patient radiation dose. All CT scans at this facility are performed using dose optimization technique as appropriate to a performed exam, to include automated exposure control, adjustment of the mA and/or kV according to patient size (including appropriate matching first site-specific examinations), or use of iterative reconstruction technique. COMPARISON: 7/7/2022. FINDINGS: No filling defects are appreciated within the main, left, right, lobar or visualized segmental pulmonary arteries to suggest embolism. Main pulmonary artery is 3.2 cm transverse diameter. Thyroid: Similar up to 3.1 cm left thyroid nodule. There is some mass effect on the trachea. Pericardium/ Heart: No significant effusion. Biatrial cardiac chamber enlargement. Aorta/ Vessels: No aneurysm or dissection. Moderate aortic atherosclerosis. There are nodular noncalcified regions of mural plaque ascending aorta. Lymph Nodes: Similar partially calcified 1.3 cm precarinal node, likely reactive. . Lungs: There is hilar edema. Severe emphysema. Increased interstitial thickening throughout the lung fields. Subsegmental atelectasis or scarring right upper lobe anteriorly. Pleura: New small bilateral pleural effusions. No pneumothorax.  Upper Abdomen: Bilateral adrenal thickening. Bones/soft tissues: Mild anasarca. Osteopenia. T7 bone island. 1.  No evidence for pulmonary emboli. 2.  Likely heart failure with biatrial cardiac chamber enlargement, new small bilateral pleural effusions and interstitial edema. 3.  Severe emphysema. 4.  Main pulmonary artery enlargement as may be seen in pulmonary arterial hypertension. 5.  Unchanged left thyroid nodule. There is likely little clinical benefit of follow-up given patient's age. 6.  Moderate atherosclerosis with nodular noncalcified regions of mural plaque ascending aorta which increases risk of embolic events. 7.  Mild anasarca. XR CHEST PORTABLE    Result Date: 3/7/2023  EXAM:  XR CHEST PORTABLE INDICATION:   cp COMPARISON: Chest radiograph December 5, 2022. FINDINGS: Diffuse interstitial prominence and patchy alveolar opacities. Pulmonary vascular congestion. Small bilateral pleural effusions. Normal heart size. No acute osseous findings. Osteopenia. Diffuse interstitial and alveolar opacities, likely moderate pulmonary edema. Component of interstitial lung disease not excluded. Small bilateral pleural effusions.           Assessment/Plan     Hospital Problems             Last Modified POA    * (Principal) Hypoxemia 3/7/2023 Yes    Acute on chronic heart failure with preserved ejection fraction (HFpEF) ([address] 3/7/2023 Yes    Restless legs syndrome (RLS) 3/7/2023 Yes    Hyperlipidemia 3/7/2023 Yes    Overview Signed 3/18/2022  7:18 PM by Martha Mari     on zocor  followed by pcp           Hypertension 3/7/2023 Yes    Overview Signed 5/8/2022  7:27 PM by Carol Madrigal MD     controlled           Acute respiratory failure with hypoxia ([address] 3/7/2023 Yes     Acute Hypoxic Respiratory Failure 2/2 COPD Exacerbation-   - Cont hiflow for now and transition to nasal canula as tolerated (wears 3-4L oxygen at baseline at all times)  - IV solu medrol 40mg q8h   - Scheduled bronchodilators   - Check RVP     Acute on Chronic HFpEF- last echo 8/25/22 with LVEF 55% , severe pulmonary HTN   - IV Lasix 20mg BID   - Echo  - Cardiac monitoring  - Strict I&O  - Low Na Diet  - Consult cardiology in the AM (Primary Cardiologist Dr [place])     HLD-  - Con't Lipitor     RLS-  - con't Neurontin      HTN-  - Con't losartan, nifedipine, metoprolol       PT/OT/IS/PPI       Anticipated Discharge: 2 days     DVT Prophylaxis:  [x]Lovenox  []Hep SQ  []SCDs  []Coumadin []DOAC  []On Heparin gtt     I have personally reviewed all pertinent labs, films and EKGs that have officially resulted. I reviewed available electronic documentation outlining the initial presentation as well as the emergency room physician's encounter.     Time spent reviewing records, independently interpreting results, obtaining history from patient or caregiver, performing physical exam, ordering tests and medications, communicating with specialists, documenting in the chart, and coordinating overall care is  >75 minutes     Hilary Sanz, FNP-C  [facility]  Office:  [phone]

## 2023-03-07 NOTE — ED NOTES
Patient placed on HI Flow oxygen at 25 Liters per minute and at 50% oxygen. Patient O2 sats have improved to 94% provider made aware.      Abdulaziz Alston RN  03/07/23 [id number]

## 2023-03-07 NOTE — ED NOTES
TRANSFER - OUT REPORT:    Verbal report given to [address] [place] Joselyn Joseph on [address]  being transferred to [facility] Room 358 for routine progression of patient care       Report consisted of patient's Situation, Background, Assessment and   Recommendations(SBAR). Information from the following report(s) Nurse Handoff Report, ED Encounter Summary and ED SBAR was reviewed with the receiving nurse. [place] Assessment: Presents to emergency department  because of falls (Syncope, seizure, or loss of consciousness): No, Age > 79: Yes, Altered Mental Status, Intoxication with alcohol or substance confusion (Disorientation, impaired judgment, poor safety awaremess, or inability to follow instructions): No, Impaired Mobility: Ambulates or transfers with assistive devices or assistance; Unable to ambulate or transer.: No, Nursing Judgement: No  Lines:   Peripheral IV 03/07/23 Left Antecubital (Active)   Site Assessment Clean, dry & intact 03/07/23 1006   Line Status Blood return noted;Specimen collected 03/07/23 [address] checked and tightened 03/07/23 1006   Phlebitis Assessment No symptoms 03/07/23 1006   Infiltration Assessment 0 03/07/23 1006   Dressing Status Clean, dry & intact 03/07/23 1006   Dressing Type Transparent 03/07/23 1006        Opportunity for questions and clarification was provided. Patient transported with:  Monitor and oxygen at this time on a NRB.          Vj Steward RN  03/07/23 1813

## 2023-03-08 ENCOUNTER — APPOINTMENT (OUTPATIENT)
Facility: HOSPITAL | Age: 88
DRG: 291 | End: 2023-03-08
Payer: MEDICARE

## 2023-03-08 LAB
ANION GAP SERPL CALC-SCNC: 8 MMOL/L (ref 3–18)
B PERT DNA SPEC QL NAA+PROBE: NOT DETECTED
BASOPHILS # BLD: 0 K/UL (ref 0–0.1)
BASOPHILS NFR BLD: 0 % (ref 0–2)
BORDETELLA PARAPERTUSSIS BY PCR: NOT DETECTED
BUN SERPL-MCNC: 26 MG/DL (ref 7–18)
BUN/CREAT SERPL: 23 (ref 12–20)
C PNEUM DNA SPEC QL NAA+PROBE: NOT DETECTED
CALCIUM SERPL-MCNC: 8.8 MG/DL (ref 8.5–10.1)
CHLORIDE SERPL-SCNC: 107 MMOL/L (ref 100–111)
CO2 SERPL-SCNC: 24 MMOL/L (ref 21–32)
CREAT SERPL-MCNC: 1.11 MG/DL (ref 0.6–1.3)
DIFFERENTIAL METHOD BLD: ABNORMAL
ECHO AO ROOT DIAM: 2.9 CM
ECHO AO ROOT INDEX: 2.01 CM/M2
ECHO AV AREA PEAK VELOCITY: 2.2 CM2
ECHO AV AREA VTI: 2 CM2
ECHO AV AREA/BSA PEAK VELOCITY: 1.5 CM2/M2
ECHO AV AREA/BSA VTI: 1.4 CM2/M2
ECHO AV MEAN GRADIENT: 6 MMHG
ECHO AV MEAN VELOCITY: 1.2 M/S
ECHO AV PEAK GRADIENT: 11 MMHG
ECHO AV PEAK VELOCITY: 1.7 M/S
ECHO AV VELOCITY RATIO: 0.82
ECHO AV VTI: 42.1 CM
ECHO BSA: 1.43 M2
ECHO BSA: 1.43 M2
ECHO EST RA PRESSURE: 8 MMHG
ECHO LA VOL 2C: 32 ML (ref 22–52)
ECHO LA VOL 4C: 31 ML (ref 22–52)
ECHO LA VOL BP: 31 ML (ref 22–52)
ECHO LA VOL/BSA BIPLANE: 22 ML/M2 (ref 16–34)
ECHO LA VOLUME AREA LENGTH: 33 ML
ECHO LA VOLUME INDEX A2C: 22 ML/M2 (ref 16–34)
ECHO LA VOLUME INDEX A4C: 22 ML/M2 (ref 16–34)
ECHO LA VOLUME INDEX AREA LENGTH: 23 ML/M2 (ref 16–34)
ECHO LV E' LATERAL VELOCITY: 6 CM/S
ECHO LV E' SEPTAL VELOCITY: 8 CM/S
ECHO LV FRACTIONAL SHORTENING: 64 % (ref 28–44)
ECHO LV INTERNAL DIMENSION DIASTOLE INDEX: 3.26 CM/M2
ECHO LV INTERNAL DIMENSION DIASTOLIC: 4.7 CM (ref 3.9–5.3)
ECHO LV INTERNAL DIMENSION SYSTOLIC INDEX: 1.18 CM/M2
ECHO LV INTERNAL DIMENSION SYSTOLIC: 1.7 CM
ECHO LV IVSD: 1 CM (ref 0.6–0.9)
ECHO LV MASS 2D: 175.8 G (ref 67–162)
ECHO LV MASS INDEX 2D: 122.1 G/M2 (ref 43–95)
ECHO LV POSTERIOR WALL DIASTOLIC: 1.1 CM (ref 0.6–0.9)
ECHO LV RELATIVE WALL THICKNESS RATIO: 0.47
ECHO LVOT AREA: 2.5 CM2
ECHO LVOT AV VTI INDEX: 0.76
ECHO LVOT DIAM: 1.8 CM
ECHO LVOT MEAN GRADIENT: 5 MMHG
ECHO LVOT PEAK GRADIENT: 7 MMHG
ECHO LVOT PEAK VELOCITY: 1.4 M/S
ECHO LVOT STROKE VOLUME INDEX: 56.7 ML/M2
ECHO LVOT SV: 81.6 ML
ECHO LVOT VTI: 32.1 CM
ECHO MV A VELOCITY: 1.06 M/S
ECHO MV E DECELERATION TIME (DT): 189 MS
ECHO MV E VELOCITY: 0.89 M/S
ECHO MV E/A RATIO: 0.84
ECHO MV E/E' LATERAL: 14.83
ECHO MV E/E' RATIO (AVERAGED): 12.98
ECHO MV E/E' SEPTAL: 11.13
ECHO RIGHT VENTRICULAR SYSTOLIC PRESSURE (RVSP): 94 MMHG
ECHO RV LONGITUDINAL DIMENSION: 3.4 CM
ECHO RV TAPSE: 2.2 CM (ref 1.7–?)
ECHO TV REGURGITANT MAX VELOCITY: 4.64 M/S
ECHO TV REGURGITANT PEAK GRADIENT: 86 MMHG
EOSINOPHIL # BLD: 0 K/UL (ref 0–0.4)
EOSINOPHIL NFR BLD: 0 % (ref 0–5)
ERYTHROCYTE [DISTWIDTH] IN BLOOD BY AUTOMATED COUNT: 14.7 % (ref 11.6–14.5)
FLUAV SUBTYP SPEC NAA+PROBE: NOT DETECTED
FLUBV RNA SPEC QL NAA+PROBE: NOT DETECTED
GLUCOSE SERPL-MCNC: 150 MG/DL (ref 74–99)
HADV DNA SPEC QL NAA+PROBE: NOT DETECTED
HCOV 229E RNA SPEC QL NAA+PROBE: NOT DETECTED
HCOV HKU1 RNA SPEC QL NAA+PROBE: NOT DETECTED
HCOV NL63 RNA SPEC QL NAA+PROBE: NOT DETECTED
HCOV OC43 RNA SPEC QL NAA+PROBE: NOT DETECTED
HCT VFR BLD AUTO: 40.7 % (ref 35–45)
HGB BLD-MCNC: 12.7 G/DL (ref 12–16)
HMPV RNA SPEC QL NAA+PROBE: NOT DETECTED
HPIV1 RNA SPEC QL NAA+PROBE: NOT DETECTED
HPIV2 RNA SPEC QL NAA+PROBE: NOT DETECTED
HPIV3 RNA SPEC QL NAA+PROBE: NOT DETECTED
HPIV4 RNA SPEC QL NAA+PROBE: NOT DETECTED
IMM GRANULOCYTES # BLD AUTO: 0 K/UL (ref 0–0.04)
IMM GRANULOCYTES NFR BLD AUTO: 0 % (ref 0–0.5)
LV EF: 58 %
LVEF MODALITY: ABNORMAL
LYMPHOCYTES # BLD: 0.5 K/UL (ref 0.9–3.6)
LYMPHOCYTES NFR BLD: 9 % (ref 21–52)
M PNEUMO DNA SPEC QL NAA+PROBE: NOT DETECTED
MAGNESIUM SERPL-MCNC: 2.7 MG/DL (ref 1.6–2.6)
MCH RBC QN AUTO: 27 PG (ref 24–34)
MCHC RBC AUTO-ENTMCNC: 31.2 G/DL (ref 31–37)
MCV RBC AUTO: 86.6 FL (ref 78–100)
MONOCYTES # BLD: 0.2 K/UL (ref 0.05–1.2)
MONOCYTES NFR BLD: 3 % (ref 3–10)
NEUTS SEG # BLD: 4.7 K/UL (ref 1.8–8)
NEUTS SEG NFR BLD: 87 % (ref 40–73)
NRBC # BLD: 0 K/UL (ref 0–0.01)
NRBC BLD-RTO: 0 PER 100 WBC
NT PRO BNP: 2288 PG/ML (ref 0–1800)
PLATELET # BLD AUTO: 259 K/UL (ref 135–420)
PMV BLD AUTO: 9.9 FL (ref 9.2–11.8)
POTASSIUM SERPL-SCNC: 4 MMOL/L (ref 3.5–5.5)
PROCALCITONIN SERPL-MCNC: <0.05 NG/ML
RBC # BLD AUTO: 4.7 M/UL (ref 4.2–5.3)
RSV RNA SPEC QL NAA+PROBE: NOT DETECTED
RV+EV RNA SPEC QL NAA+PROBE: NOT DETECTED
SARS-COV-2 RNA RESP QL NAA+PROBE: NOT DETECTED
SODIUM SERPL-SCNC: 139 MMOL/L (ref 136–145)
WBC # BLD AUTO: 5.4 K/UL (ref 4.6–13.2)

## 2023-03-08 PROCEDURE — 6370000000 HC RX 637 (ALT 250 FOR IP)

## 2023-03-08 PROCEDURE — 97535 SELF CARE MNGMENT TRAINING: CPT

## 2023-03-08 PROCEDURE — 83880 ASSAY OF NATRIURETIC PEPTIDE: CPT

## 2023-03-08 PROCEDURE — 2580000003 HC RX 258

## 2023-03-08 PROCEDURE — 93970 EXTREMITY STUDY: CPT

## 2023-03-08 PROCEDURE — 1100000003 HC PRIVATE W/ TELEMETRY

## 2023-03-08 PROCEDURE — 85025 COMPLETE CBC W/AUTO DIFF WBC: CPT

## 2023-03-08 PROCEDURE — 36415 COLL VENOUS BLD VENIPUNCTURE: CPT

## 2023-03-08 PROCEDURE — 6360000002 HC RX W HCPCS: Performed by: INTERNAL MEDICINE

## 2023-03-08 PROCEDURE — 94667 MNPJ CHEST WALL 1ST: CPT

## 2023-03-08 PROCEDURE — 83735 ASSAY OF MAGNESIUM: CPT

## 2023-03-08 PROCEDURE — 6360000002 HC RX W HCPCS

## 2023-03-08 PROCEDURE — 97166 OT EVAL MOD COMPLEX 45 MIN: CPT

## 2023-03-08 PROCEDURE — 84145 PROCALCITONIN (PCT): CPT

## 2023-03-08 PROCEDURE — 93306 TTE W/DOPPLER COMPLETE: CPT

## 2023-03-08 PROCEDURE — 94669 MECHANICAL CHEST WALL OSCILL: CPT

## 2023-03-08 PROCEDURE — 80048 BASIC METABOLIC PNL TOTAL CA: CPT

## 2023-03-08 PROCEDURE — 6370000000 HC RX 637 (ALT 250 FOR IP): Performed by: FAMILY MEDICINE

## 2023-03-08 PROCEDURE — 99232 SBSQ HOSP IP/OBS MODERATE 35: CPT | Performed by: FAMILY MEDICINE

## 2023-03-08 PROCEDURE — 0202U NFCT DS 22 TRGT SARS-COV-2: CPT

## 2023-03-08 PROCEDURE — 94761 N-INVAS EAR/PLS OXIMETRY MLT: CPT

## 2023-03-08 PROCEDURE — 93306 TTE W/DOPPLER COMPLETE: CPT | Performed by: INTERNAL MEDICINE

## 2023-03-08 PROCEDURE — 2700000000 HC OXYGEN THERAPY PER DAY

## 2023-03-08 PROCEDURE — 97530 THERAPEUTIC ACTIVITIES: CPT

## 2023-03-08 PROCEDURE — 97162 PT EVAL MOD COMPLEX 30 MIN: CPT

## 2023-03-08 PROCEDURE — 94640 AIRWAY INHALATION TREATMENT: CPT

## 2023-03-08 RX ORDER — NIFEDIPINE 30 MG/1
60 TABLET, EXTENDED RELEASE ORAL
Status: DISCONTINUED | OUTPATIENT
Start: 2023-03-08 | End: 2023-03-10 | Stop reason: HOSPADM

## 2023-03-08 RX ADMIN — LORAZEPAM 0.5 MG: 2 INJECTION INTRAMUSCULAR; INTRAVENOUS at 20:14

## 2023-03-08 RX ADMIN — BUDESONIDE 1 MG: 1 SUSPENSION RESPIRATORY (INHALATION) at 07:40

## 2023-03-08 RX ADMIN — ASPIRIN 81 MG: 81 TABLET, COATED ORAL at 09:15

## 2023-03-08 RX ADMIN — GABAPENTIN 100 MG: 100 CAPSULE ORAL at 20:14

## 2023-03-08 RX ADMIN — METHYLPREDNISOLONE SODIUM SUCCINATE 40 MG: 40 INJECTION, POWDER, FOR SOLUTION INTRAMUSCULAR; INTRAVENOUS at 06:12

## 2023-03-08 RX ADMIN — ENOXAPARIN SODIUM 30 MG: 100 INJECTION SUBCUTANEOUS at 09:15

## 2023-03-08 RX ADMIN — IPRATROPIUM BROMIDE AND ALBUTEROL SULFATE 1 AMPULE: 2.5; .5 SOLUTION RESPIRATORY (INHALATION) at 16:39

## 2023-03-08 RX ADMIN — LOSARTAN POTASSIUM 50 MG: 50 TABLET, FILM COATED ORAL at 09:15

## 2023-03-08 RX ADMIN — IPRATROPIUM BROMIDE AND ALBUTEROL SULFATE 1 AMPULE: 2.5; .5 SOLUTION RESPIRATORY (INHALATION) at 20:06

## 2023-03-08 RX ADMIN — SODIUM CHLORIDE, PRESERVATIVE FREE 10 ML: 5 INJECTION INTRAVENOUS at 20:14

## 2023-03-08 RX ADMIN — IPRATROPIUM BROMIDE AND ALBUTEROL SULFATE 1 AMPULE: 2.5; .5 SOLUTION RESPIRATORY (INHALATION) at 07:39

## 2023-03-08 RX ADMIN — ATORVASTATIN CALCIUM 20 MG: 20 TABLET, FILM COATED ORAL at 09:15

## 2023-03-08 RX ADMIN — BUDESONIDE 1 MG: 1 SUSPENSION RESPIRATORY (INHALATION) at 20:06

## 2023-03-08 RX ADMIN — ARFORMOTEROL TARTRATE 15 MCG: 15 SOLUTION RESPIRATORY (INHALATION) at 07:39

## 2023-03-08 RX ADMIN — FOLIC ACID 1 MG: 1 TABLET ORAL at 09:15

## 2023-03-08 RX ADMIN — IPRATROPIUM BROMIDE AND ALBUTEROL SULFATE 1 AMPULE: 2.5; .5 SOLUTION RESPIRATORY (INHALATION) at 12:46

## 2023-03-08 RX ADMIN — METHYLPREDNISOLONE SODIUM SUCCINATE 40 MG: 40 INJECTION, POWDER, FOR SOLUTION INTRAMUSCULAR; INTRAVENOUS at 12:35

## 2023-03-08 RX ADMIN — SODIUM CHLORIDE, PRESERVATIVE FREE 10 ML: 5 INJECTION INTRAVENOUS at 09:18

## 2023-03-08 RX ADMIN — ARFORMOTEROL TARTRATE 15 MCG: 15 SOLUTION RESPIRATORY (INHALATION) at 20:02

## 2023-03-08 RX ADMIN — FAMOTIDINE 20 MG: 20 TABLET ORAL at 09:15

## 2023-03-08 RX ADMIN — NIFEDIPINE 60 MG: 30 TABLET, FILM COATED, EXTENDED RELEASE ORAL at 23:05

## 2023-03-08 RX ADMIN — FUROSEMIDE 20 MG: 10 INJECTION, SOLUTION INTRAMUSCULAR; INTRAVENOUS at 17:23

## 2023-03-08 RX ADMIN — FUROSEMIDE 20 MG: 10 INJECTION, SOLUTION INTRAMUSCULAR; INTRAVENOUS at 09:15

## 2023-03-08 RX ADMIN — METHYLPREDNISOLONE SODIUM SUCCINATE 40 MG: 40 INJECTION, POWDER, FOR SOLUTION INTRAMUSCULAR; INTRAVENOUS at 20:14

## 2023-03-08 RX ADMIN — METOPROLOL TARTRATE 25 MG: 25 TABLET, FILM COATED ORAL at 20:14

## 2023-03-08 ASSESSMENT — PAIN SCALES - GENERAL
PAINLEVEL_OUTOF10: 0

## 2023-03-08 NOTE — PLAN OF CARE
Problem: Occupational Therapy - Adult  Goal: By Discharge: Performs self-care activities at highest level of function for planned discharge setting. See evaluation for individualized goals. Description: Occupational Therapy Goals:  Initiated 3/8/2023 to be met within 7-10 days. 1.  Patient will perform grooming with modified independence. 2.  Patient will perform bathing with modified independence. 3.  Patient will perform upper body dressing and lower body dressing  with modified independence. 4.  Patient will perform toilet transfers with modified independence using RW with good balance. 5.  Patient will perform all aspects of toileting with modified independence. 6.  Patient will participate in upper extremity therapeutic exercise/activities with modified independence for 8-10 minutes to increase strength/endurance for ADLs. 7.  Patient will utilize energy conservation techniques during functional activities with min verbal cues. PLOF: pt reports she is Mod I with ADLs and functional mobility - usually furniture walks, has a rollator      Outcome: [facility]    Patient: Vandana Martell [facility]82 y.o. female)  Date: 3/8/2023  Primary Diagnosis: Hypoxemia [R09.02]  Hypoxia [R09.02]  Congestive heart failure, unspecified HF chronicity, unspecified heart failure type ([facility] [I50.9]       Precautions: Fall Risk,  ,  ,  ,  ,  ,  ,       ASSESSMENT :  Pt seen in conjunction with PT to maximize safety of patient and staff members. Bed mobility: CGA supine -> sit edge of bed. Bedside commode transfer: CGA using RW, vc's for safety awareness with RW to keep both hands on handles for balance, noted LOB with pt able to correct. Toileting: CGA. Functional mobility using RW with CGA from bedside commode -> recliner chair. Pt performed remainder of ADLs seated in recliner chair.  Pt sitting up in recliner chair, BLEs elevated, call bell within reach & pt verbalized understanding to utilize for assist e.g. functional transfers in order to prevent falls. Nursing notified that pt is requesting blow up seat cushion d/t discomfort in sacral/coccyx region. DEFICITS/IMPAIRMENTS:  Performance deficits / Impairments: Decreased functional mobility ; Decreased ADL status; Decreased strength;Decreased safe awareness;Decreased balance;Decreased endurance    Patient will benefit from skilled intervention to address the above impairments. Patient's rehabilitation potential/Prognosis: Good. Factors which may influence rehabilitation potential include:   []             None noted  []             Mental ability/status  [x]             Medical condition  []             Home/family situation and support systems  []             Safety awareness  []             Pain tolerance/management  []             Other:      PLAN :  Recommendations and Planned Interventions:   [x]               Self Care Training                  [x]      Therapeutic Activities  [x]               Functional Mobility Training   []      Cognitive Retraining  [x]               Therapeutic Exercises           [x]      Endurance Activities  [x]               Balance Training                    []      Neuromuscular Re-Education  []               Visual/Perceptual Training     [x]      Home Safety Training  [x]               Patient Education                   [x]      Family Training/Education  []               Other (comment):    Frequency/Duration: Patient will be followed by occupational therapy to address goals, 1-2 times per day/3-5 days per week to address goals. Further Equipment Recommendations for Discharge: rolling walker    Discharge Recommendations: 24 hour supervision or assist    AMPAC: Current research shows that an AM-PAC score of 18 or greater is associated with a discharge to the patient's home setting.   Based on an AM-PAC score of 19/24 and their current ADL deficits; it is recommended that the patient have 2-3 sessions per week of Occupational Therapy at d/c to increase the patient's independence. This [facility] score should be considered in conjunction with interdisciplinary team recommendations to determine the most appropriate discharge setting. Patient's social support, diagnosis, medical stability, and prior level of function should also be taken into consideration. SUBJECTIVE:   Patient stated, I don't use the rollator in the house.     OBJECTIVE DATA SUMMARY:     Past Medical History:   Diagnosis Date    Arthritis     Chronic lung disease     Chronic obstructive pulmonary disease ([facility]     Edema due to congestive heart failure ([facility] [address] [facility] 11/1/2022    Heart failure ([facility] [address]     Hypertension      Past Surgical History:   Procedure Laterality Date    ORTHOPEDIC SURGERY      spinal sx 5/6    OTHER SURGICAL HISTORY      Carotid artery    VASCULAR SURGERY      L CEA 10/2014       Home Situation:   Social/Functional History  Lives With: Alone  Type of Home: House  Home Layout: One level  Bathroom Shower/Tub: Tub/Shower unit  Bathroom Equipment: Shower chair  Bathroom Accessibility: Accessible  Home Equipment: Rollator  Has the patient had two or more falls in the past year or any fall with injury in the past year?: No  ADL Assistance: Independent  Ambulation Assistance: Independent  Transfer Assistance: Independent  []  Right hand dominant   []  Left hand dominant    Cognitive/Behavioral Status:  Orientation  Overall Orientation Status: Within Normal Limits  Orientation Level: Oriented X4  Cognition  Overall Cognitive Status: WNL    Skin: appears intact  Edema: none noted    Vision/Perceptual:    Vision  Vision: Impaired  Vision Exceptions: Wears glasses for distance        Coordination: BUE  Coordination: Generally decreased, functional     Balance:     Balance  Sitting: Intact  Standing: With support    Strength: BUE  Strength: Generally decreased, functional    Tone & Sensation: BUE  Tone: Normal       Range of Motion: BUE  AROM: Within functional limits     Functional Mobility and Transfers for ADLs:  Bed Mobility:     Bed Mobility Training  Bed Mobility Training: Yes  Supine to Sit: Stand-by assistance  Transfers:    Transfer Training  Transfer Training: Yes  Sit to Stand: Contact-guard assistance  Stand to Sit: Contact-guard assistance  Toilet Transfer: Contact-guard assistance    ADL Assessment:   Feeding: Modified independent   Grooming: Stand by assistance  UE Bathing: Stand by assistance  LE Bathing: Stand by assistance  UE Dressing: Stand by assistance  LE Dressing: Stand by assistance  Toileting: Contact guard assistance    Pain:  Pain level pre-treatment: 0/10   Pain level post-treatment: 0/10       Activity Tolerance:      Please refer to the flowsheet for vital signs taken during this treatment. After treatment:   [x] Patient left in no apparent distress sitting up in chair  [] Patient left in no apparent distress in bed  [x] Call bell left within reach, Nursing notified  [] Caregiver present  [] Bed alarm activated    COMMUNICATION/EDUCATION:   Patient Education  Education Given To: Patient  Education Provided: Role of Therapy;Plan of Care;ADL Adaptive Strategies;Transfer Training;Energy Conservation;Equipment; Fall Prevention Strategies  Education Method: Verbal  Barriers to Learning: None  Education Outcome: Verbalized understanding    Thank you for this referral.  Justus Zimmerman OT  Minutes: 45    Eval Complexity: Decision Making: CLAUDIO Gould 39 AM-PAC® Daily Activity Inpatient Short Form (6-Clicks)*    How much HELP from another person does the patient currently need    (If the patient hasn't done an activity recently, how much help from another person do you think he/she would need if he/she tried?)   Total (Total A or Dep)   A Lot  (Mod to Max A)   A Little (Sup or Min A)   None (Mod I to I)   Putting on and taking off regular lower body clothing? [] 1 [] 2 [x] 3 [] 4   2.  Bathing (including washing, rinsing,      drying)? [] 1 [] 2 [x] 3 [] 4   3. Toileting, which includes using toilet, bedpan or urinal?   [] 1 [] 2 [x] 3 [] 4   4. Putting on and taking off regular upper body clothing? [] 1 [] 2 [x] 3 [] 4   5. Taking care of personal grooming such as brushing teeth? [] 1 [] 2 [x] 3 [] 4   6. Eating meals?    [] 1 [] 2 [] 3 [x] 4 caffeine

## 2023-03-08 NOTE — PLAN OF CARE
Problem: Discharge Planning  Goal: Discharge to home or other facility with appropriate resources  Outcome: Progressing     Problem: ABCDS Injury Assessment  Goal: Absence of physical injury  Outcome: Progressing     Problem: Safety - Adult  Goal: Free from fall injury  Outcome: Progressing     Problem: Chronic Conditions and Co-morbidities  Goal: Patient's chronic conditions and co-morbidity symptoms are monitored and maintained or improved  Outcome: Progressing     Problem: Nutrition Deficit:  Goal: Optimize nutritional status  Outcome: Progressing     Problem: Pain  Goal: Verbalizes/displays adequate comfort level or baseline comfort level  Outcome: Progressing

## 2023-03-08 NOTE — PROGRESS NOTES
conducted an initial consultation and Spiritual Assessment for [facility], who is a 82 y.o.,female. Patient's Primary Language is: [place]. According to the patient's EMR Confucianism Affiliation is: Buddhism. The reason the Patient came to the hospital is:   Patient Active Problem List    Diagnosis Date Noted    Hypoxemia 03/07/2023    Acute on chronic heart failure with preserved ejection fraction (HFpEF) ([address] 03/07/2023    Restless legs syndrome (RLS) 03/07/2023    Edema due to congestive heart failure ([address] 11/01/2022    PAD (peripheral artery disease) ([address] 11/01/2022    E coli bacteremia 05/08/2022    Sepsis ([address] 05/06/2022    Pyelonephritis 05/05/2022    Acute bronchitis with chronic obstructive pulmonary disease (COPD) ([address] 10/24/2021    Acute respiratory failure with hypoxia ([address] 10/24/2021    COVID-19 04/15/2021    Acute on chronic respiratory failure with hypoxia ([address]     Debility     Encounter for palliative care     Goals of care, counseling/discussion     Chronic obstructive pulmonary disease with acute exacerbation ([address] 02/22/2021    CAP (community acquired pneumonia) 12/10/2020    Suspected COVID-19 virus infection 12/10/2020    Hyperlipidemia 07/01/2016    Chronic diastolic congestive heart failure ([address] 07/01/2016    Hypertension 07/01/2016    Chronic obstructive pulmonary disease ([address] 07/01/2016    Pulmonary HTN ([address] 07/01/2016    CABALLERO (dyspnea on exertion) 06/02/2016    Carotid stenosis 10/30/2014        The  provided the following Interventions:  Initiated a relationship of care and support. Explored issues of polo, belief, spirituality and Jain/ritual needs while hospitalized. Listened empathically. Provided information about Spiritual Care Services. Chart reviewed. The following outcomes where achieved:  Patient already has a scanned POST in the chart. See Adv Care Plan.    Patient expressed gratitude for 's visit. Assessment:  Patient does not have any Congregation/cultural needs that will affect patient's preferences in health care. There are no spiritual or Congregation issues which require intervention at this time. Plan:  Chaplains will continue to follow and will provide pastoral care on an as needed/requested basis.  recommends bedside caregivers page  on duty if patient shows signs of acute spiritual or emotional distress.     [address]   [phone]

## 2023-03-08 NOTE — CARE COORDINATION
03/08/23 1225   Service Assessment   Patient Orientation Alert and Oriented   Cognition Alert   History Provided By Patient   Primary Caregiver Self   Accompanied By/Relationship sister Cm Valencia Family Members   PCP Verified by CM Yes   Last Visit to PCP Within last 3 months   Prior Functional Level Independent in ADLs/IADLs   Current Functional Level Independent in ADLs/IADLs   Can patient return to prior living arrangement Yes   Ability to make needs known: Good   Family able to assist with home care needs: Yes   Financial Resources Medicare   Social/Functional History   Lives With Alone   Type of [address] One level   Home Access Stairs to enter with rails   Entrance Stairs - Number of Steps 3 steps   Entrance Stairs - Rails Both   [address] chair;Grab bars in [address]; [address] Help From Family   ADL Assistance Independent   Homemaking Assistance Independent   Ambulation Assistance Independent   Transfer Assistance Independent   Active  Yes   Mode of Transportation Car   Occupation Retired   Discharge Planning   Type of [address] [place] [address] Prior To Admission Oxygen Therapy  (patient states she uses oxygen [address] since 2016 with [address]   DME Ordered? No   Patient expects to be discharged to: House   One/Two Story Residence One story   Services At/After Discharge   Mode of Transport at Discharge Self  (pts sister Obinna Wong to transport pt home at time of discharge.)   Confirm Follow Up Transport Self     Case Management Assessment  Initial Evaluation    Date/Time of Evaluation: 3/8/2023 12:32 PM  Assessment Completed by: Hitesh Rodriguez RN    If patient is discharged prior to next notation, then this note serves as note for discharge by case management.     Patient Name: Griselda Valladares                   YOB: 1935  Diagnosis: Hypoxemia [R09.02]  Hypoxia [R09.02]  Congestive heart failure, unspecified HF chronicity, unspecified heart failure type ([facility] [I50.9]                   Date / Time: 3/7/2023  9:54 AM    Patient Admission Status: Inpatient   Readmission Risk (Low < 19, Mod (19-27), High > 27): Readmission Risk Score: 14    Current PCP: DAREN Luciano NP  PCP verified by CM? (P) Yes    Chart Reviewed: Yes      History Provided by: (P) Patient  Patient Orientation: (P) Alert and Oriented    Patient Cognition: (P) Alert    Hospitalization in the last 30 days (Readmission):  No    If yes, Readmission Assessment in  Navigator will be completed. Advance Directives:      Code Status: DNR   Patient's Primary Decision Maker is:        Discharge Planning:    Patient lives with: (P) Alone Type of Home: (P) House  Primary Care Giver: (P) Self  Patient Support Systems include: (P) Family Members   Current Financial resources: (P) Medicare  Current community resources:    Current services prior to admission: (P) Oxygen Therapy (patient states she uses oxygen [address] since 2016 with [address]            Current DME:              Type of Home Care services:  None    ADLS  Prior functional level: (P) Independent in ADLs/IADLs  Current functional level: (P) Independent in ADLs/IADLs    PT AM-PAC:   /24  OT AM-PAC:   /24    Family can provide assistance at DC: (P) Yes  Would you like Case Management to discuss the discharge plan with any other family members/significant others, and if so, who?     Plans to Return to Present Housing: (P) Yes  Other Identified Issues/Barriers to RETURNING to current housing: none  Potential Assistance needed at discharge: N/A            Potential DME:    Patient expects to discharge to: (P) [facility] for transportation at discharge: (P) Self    Financial    Payor: Huma Cui / Plan: MEDICARE PART A AND B / Product Type: *No Product type* /     Does insurance require precert for SNF: no    Potential assistance Purchasing Medications: No  Meds-to-Beds request:        [id number] [facility], [address] [facility] [address]  Phone: [phone] Fax: [phone]      Notes:    Factors facilitating achievement of predicted outcomes: Family support, Motivated, Cooperative, Pleasant, Sense of humor, Good insight into deficits, and Has needed Durable Medical Equipment at home    Barriers to discharge: none noted at this time    Additional Case Management Notes: none    The Plan for Transition of Care is related to the following treatment goals of Hypoxemia [R09.02]  Hypoxia [R09.02]  Congestive heart failure, unspecified HF chronicity, unspecified heart failure type ([address] [F31.1]    IF APPLICABLE: The Patient and/or patient representative Rosa and her family were provided with a choice of provider and agrees with the discharge plan. Freedom of choice list with basic dialogue that supports the patient's individualized plan of care/goals and shares the quality data associated with the providers was provided to:     Patient Representative Name:       The Patient and/or Patient Representative Agree with the Discharge Plan?       Marium Sanderson RN  Case Management Department  Ph: [phone]

## 2023-03-08 NOTE — PROGRESS NOTES
[facility] Hospitalist Group  Progress Note    Patient: Nydia Marroquin Age: 82 y.o. : 1935 MR#: [id number] SSN: [id number]  Date/Time: 3/8/2023     Subjective:     82 yoF with pertinent PMH of HFpEF and COPD presenting for acute on chronic hypoxemic respiratory failure. She is on 3-4L NC at baseline at home and notes that in the past couple days she has experienced increased shortness of breath without orthopnea. This morning she reports feeling markedly improved. She denies lightheadedness/dizziness, chest pain, abdominal pain. She reports persistent shortness of breath. Denies orthopnea or PND. Assessment/Plan:   82 yoF with acute on chronic hypoxemic respiratory failure with pertinent PMH of HFpEF and COPD with imaging notable for opacities consistent with pulmonary edema and stable EF on Echo but notably elevated PASP.     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 group 2 w/HFpEF; continue management of HFpEF and target euvolemia, continue COPD management  HFpEF: acute on chronic, euvolemic on exam  Hyperglycemia: Inpatient glucose goal 140-180; add basal Lantus and prandial lispro + CF if needed  HLD: Continue Lipitor  HTN: Continue home medications  RLS: Cont gabapentin         PAULETTERAFAEL SIFUENTES, DO  IM PGY-3  23      Case discussed with:  [x]Patient  [x]Family  []Nursing  []Case Management  DVT Prophylaxis:  [x]Lovenox  []Hep SQ  []SCDs  []Coumadin   []On Heparin gtt    Objective:   VS: BP (!) 134/54   Pulse 76   Temp 98 °F (36.7 °C) (Oral)   Resp 21   Ht 5' 1\" (1.549 m)   Wt 105 lb (47.6 kg)   SpO2 90%   BMI 19.84 kg/m²    Tmax/24hrs: Temp (24hrs), Av.3 °F (36.8 °C), Min:97.9 °F (36.6 °C), Max:98.7 °F (37.1 °C)  IOBRIEF  Intake/Output Summary (Last 24 hours) at 3/8/2023 1009  Last data filed at 3/8/2023 0704  Gross per 24 hour   Intake --   Output 1350 ml   Net -1350 ml       General:  Alert, cooperative, no acute distress    Pulmonary:  CTA Bilaterally. No Wheezing/Rhonchi/Rales. Cardiovascular: Regular rate and Rhythm. GI:  Soft, Non distended, Non tender. + Bowel sounds. Extremities:  No edema, cyanosis, clubbing. No calf tenderness. Neurologic: Alert and oriented X 3. No acute neuro deficits.     Medications:   Current Facility-Administered Medications   Medication Dose Route Frequency    aspirin EC tablet 81 mg  81 mg Oral Daily    atorvastatin (LIPITOR) tablet 20 mg  20 mg Oral Daily    folic acid (FOLVITE) tablet 1 mg  1 mg Oral Daily    gabapentin (NEURONTIN) capsule 100 mg  100 mg Oral Nightly    metoprolol tartrate (LOPRESSOR) tablet 25 mg  25 mg Oral BID    losartan (COZAAR) tablet 50 mg  50 mg Oral Daily    sodium chloride flush 0.9 % injection 5-40 mL  5-40 mL IntraVENous 2 times per day    sodium chloride flush 0.9 % injection 5-40 mL  5-40 mL IntraVENous PRN    ondansetron (ZOFRAN-ODT) disintegrating tablet 4 mg  4 mg Oral Q8H PRN    Or    ondansetron (ZOFRAN) injection 4 mg  4 mg IntraVENous Q6H PRN    polyethylene glycol (GLYCOLAX) packet 17 g  17 g Oral Daily PRN    famotidine (PEPCID) tablet 20 mg  20 mg Oral Daily    acetaminophen (TYLENOL) tablet 650 mg  650 mg Oral Q6H PRN    Or    acetaminophen (TYLENOL) suppository 650 mg  650 mg Rectal Q6H PRN    enoxaparin Sodium (LOVENOX) injection 30 mg  30 mg SubCUTAneous Daily    furosemide (LASIX) injection 20 mg  20 mg IntraVENous BID    methylPREDNISolone sodium (SOLU-MEDROL) injection 40 mg  40 mg IntraVENous Q8H    budesonide (PULMICORT) nebulizer suspension 1 mg  1 mg Nebulization BID    arformoterol tartrate (BROVANA) nebulizer solution 15 mcg  15 mcg Nebulization BID    ipratropium-albuterol (DUONEB) nebulizer solution 1 ampule  1 ampule Inhalation Q4H WA    NIFEdipine (PROCARDIA XL) extended release tablet 60 mg  60 mg Oral Daily    LORazepam (ATIVAN) injection 0.5 mg  0.5 mg IntraVENous Q6H PRN       Imaging:   Echo    Technical qualifiers: Color flow Doppler was performed and pulse wave and/or continuous wave Doppler was performed. Left Ventricle: Normal left ventricular systolic function with a visually estimated EF of 55 - 60%. Left ventricle size is normal. Increased wall thickness. Mild septal thickening. Mild posterior thickening. Normal wall motion. Abnormal diastolic function. Right Ventricle: Normal systolic function. TAPSE is normal. TAPSE is 2.2 cm. Pulmonary Arteries: Severe pulmonary hypertension present. The estimated RVSP is 94 mmHg.       Labs:    Recent Results (from the past 48 hour(s))   EKG 12 Lead    Collection Time: 03/07/23 10:00 AM   Result Value Ref Range    Ventricular Rate 75 BPM    Atrial Rate 75 BPM    P-R Interval 136 ms    QRS Duration 72 ms    Q-T Interval 400 ms    QTc Calculation (Bazett) 446 ms    P Axis 92 degrees    R Axis 39 degrees    T Axis 94 degrees    Diagnosis Normal sinus rhythm    CBC with Auto Differential    Collection Time: 03/07/23 10:06 AM   Result Value Ref Range    WBC 15.3 (H) 4.6 - 13.2 K/uL    RBC 4.71 4.20 - 5.30 M/uL    Hemoglobin 12.7 12.0 - 16.0 g/dL    Hematocrit 40.5 35.0 - 45.0 %    MCV 86.0 78.0 - 100.0 FL    MCH 27.0 24.0 - 34.0 PG    MCHC 31.4 31.0 - 37.0 g/dL    RDW 14.6 (H) 11.6 - 14.5 %    Platelets 575 430 - 331 K/uL    MPV 9.7 9.2 - 11.8 FL    Nucleated RBCs 0.0 0  WBC    nRBC 0.00 0.00 - 0.01 K/uL    Seg Neutrophils 79 (H) 40 - 73 %    Band Neutrophils 2 0 - 5 %    Lymphocytes 10 (L) 21 - 52 %    Monocytes 9 3 - 10 %    Eosinophils % 0 0 - 5 %    Basophils 0 0 - 2 %    Immature Granulocytes 0 %    Segs Absolute 12.4 (H) 1.8 - 8.0 K/UL    Absolute Lymph # 1.5 0.9 - 3.6 K/UL    Absolute Mono # 1.4 (H) 0.05 - 1.2 K/UL Absolute Eos # 0.0 0.0 - 0.4 K/UL    Basophils Absolute 0.0 0.0 - 0.1 K/UL    Absolute Immature Granulocyte 0.0 K/UL    Differential Type MANUAL      Platelet Comment ADEQUATE PLATELETS      RBC Comment ANISOCYTOSIS  1+        RBC Comment POIKILOCYTOSIS  1+        RBC Comment OVALOCYTES  1+       Magnesium    Collection Time: 03/07/23 10:06 AM   Result Value Ref Range    Magnesium 2.1 1.6 - 2.6 mg/dL   Troponin    Collection Time: 03/07/23 10:06 AM   Result Value Ref Range    Troponin, High Sensitivity 15 0 - 54 ng/L   TSH    Collection Time: 03/07/23 10:06 AM   Result Value Ref Range    TSH, 3RD GENERATION 2.93 0.36 - 3.74 uIU/mL   CMP    Collection Time: 03/07/23 10:06 AM   Result Value Ref Range    Sodium 140 136 - 145 mmol/L    Potassium 4.0 3.5 - 5.5 mmol/L    Chloride 108 100 - 111 mmol/L    CO2 26 21 - 32 mmol/L    Anion Gap 6 3.0 - 18 mmol/L    Glucose 108 (H) 74 - 99 mg/dL    BUN 22 (H) 7.0 - 18 MG/DL    Creatinine 1.06 0.6 - 1.3 MG/DL    Bun/Cre Ratio 21 (H) 12 - 20      Est, Glom Filt Rate 51 (L) >60 ml/min/1.73m2    Calcium 8.6 8.5 - 10.1 MG/DL    Total Bilirubin 0.6 0.2 - 1.0 MG/DL    ALT 38 13 - 56 U/L    AST 39 (H) 10 - 38 U/L    Alk Phosphatase 51 45 - 117 U/L    Total Protein 7.3 6.4 - 8.2 g/dL    Albumin 3.8 3.4 - 5.0 g/dL    Globulin 3.5 2.0 - 4.0 g/dL    Albumin/Globulin Ratio 1.1 0.8 - 1.7     Brain Natriuretic Peptide    Collection Time: 03/07/23 10:06 AM   Result Value Ref Range    NT Pro-BNP 2,168 (H) 0 - 1,800 PG/ML   POC G3: BLOOD GASES INCLUDES CALC.  TCO2, HCO3, BASE EXCESS, SO2    Collection Time: 03/07/23 10:10 AM   Result Value Ref Range    pH, Arterial, POC 7.48 (H) 7.35 - 7.45      pCO2, Arterial, POC 32.3 (L) 35.0 - 45.0 MMHG    pO2, Arterial, POC 21 (LL) 80 - 100 MMHG    HCO3, Mixed 24.1 22 - 26 MMOL/L    SO2c, Arterial, POC 40.6 (L) 92 - 97 %    Base Excess 1.2 mmol/L    Specimen type: ARTERIAL      Performed by: Vero Goode    Basic Metabolic Panel w/ Reflex to MG Collection Time: 03/08/23  1:41 AM   Result Value Ref Range    Sodium 139 136 - 145 mmol/L    Potassium 4.0 3.5 - 5.5 mmol/L    Chloride 107 100 - 111 mmol/L    CO2 24 21 - 32 mmol/L    Anion Gap 8 3.0 - 18 mmol/L    Glucose 150 (H) 74 - 99 mg/dL    BUN 26 (H) 7.0 - 18 MG/DL    Creatinine 1.11 0.6 - 1.3 MG/DL    Bun/Cre Ratio 23 (H) 12 - 20      Est, Glom Filt Rate 49 (L) >60 ml/min/1.73m2    Calcium 8.8 8.5 - 10.1 MG/DL   CBC with Auto Differential    Collection Time: 03/08/23  1:41 AM   Result Value Ref Range    WBC 5.4 4.6 - 13.2 K/uL    RBC 4.70 4.20 - 5.30 M/uL    Hemoglobin 12.7 12.0 - 16.0 g/dL    Hematocrit 40.7 35.0 - 45.0 %    MCV 86.6 78.0 - 100.0 FL    MCH 27.0 24.0 - 34.0 PG    MCHC 31.2 31.0 - 37.0 g/dL    RDW 14.7 (H) 11.6 - 14.5 %    Platelets 442 794 - 115 K/uL    MPV 9.9 9.2 - 11.8 FL    Nucleated RBCs 0.0 0  WBC    nRBC 0.00 0.00 - 0.01 K/uL    Seg Neutrophils 87 (H) 40 - 73 %    Lymphocytes 9 (L) 21 - 52 %    Monocytes 3 3 - 10 %    Eosinophils % 0 0 - 5 %    Basophils 0 0 - 2 %    Immature Granulocytes 0 0.0 - 0.5 %    Segs Absolute 4.7 1.8 - 8.0 K/UL    Absolute Lymph # 0.5 (L) 0.9 - 3.6 K/UL    Absolute Mono # 0.2 0.05 - 1.2 K/UL    Absolute Eos # 0.0 0.0 - 0.4 K/UL    Basophils Absolute 0.0 0.0 - 0.1 K/UL    Absolute Immature Granulocyte 0.0 0.00 - 0.04 K/UL    Differential Type AUTOMATED     Brain Natriuretic Peptide    Collection Time: 03/08/23  1:41 AM   Result Value Ref Range    NT Pro-BNP 2,288 (H) 0 - 1,800 PG/ML   Procalcitonin    Collection Time: 03/08/23  1:41 AM   Result Value Ref Range    Procalcitonin <0.05 ng/mL   Magnesium    Collection Time: 03/08/23  1:41 AM   Result Value Ref Range    Magnesium 2.7 (H) 1.6 - 2.6 mg/dL   Transthoracic echocardiogram (TTE) complete with contrast, bubble, strain, and 3D PRN    Collection Time: 03/08/23  9:29 AM   Result Value Ref Range    IVSd 1.0 (A) 0.6 - 0.9 cm    LVIDd 4.7 3.9 - 5.3 cm    LVIDs 1.7 cm    LVOT Diameter 1.8 cm    LVPWd 1.1 (A) 0.6 - 0.9 cm    LVOT Peak Gradient 7 mmHg    LVOT Mean Gradient 5 mmHg    LVOT SV 81.6 ml    LVOT Peak Velocity 1.4 m/s    LVOT VTI 32.1 cm    RV Longitudinal Dimension 3.4 cm    LA Volume A/L 33 mL    LA Volume 2C 32 22 - 52 mL    LA Volume 4C 31 22 - 52 mL    LA Volume BP 31 22 - 52 mL    AV Area by Peak Velocity 2.2 cm2    AV Area by VTI 2.0 cm2    AV Peak Gradient 11 mmHg    AV Mean Gradient 6 mmHg    AV Peak Velocity 1.7 m/s    AV Mean Velocity 1.2 m/s    AV VTI 42.1 cm    MV A Velocity 1.06 m/s    MV E Wave Deceleration Time 189.0 ms    MV E Velocity 0.89 m/s    LV E' Lateral Velocity 6 cm/s    LV E' Septal Velocity 8 cm/s    TAPSE 2.2 1.7 cm    TR Peak Gradient 86 mmHg    TR Max Velocity 4.64 m/s    Aortic Root 2.9 cm    Body Surface Area 1.43 m2    Fractional Shortening 2D 64 28 - 44 %    LVIDd Index 3.26 cm/m2    LVIDs Index 1.18 cm/m2    LV RWT Ratio 0.47     LV Mass 2D 175.8 (A) 67 - 162 g    LV Mass 2D Index 122.1 (A) 43 - 95 g/m2    MV E/A 0.84     E/E' Ratio (Averaged) 12.98     E/E' Lateral 14.83     E/E' Septal 11.13     LA Volume Index A/L 23 16 - 34 mL/m2    LVOT Stroke Volume Index 56.7 mL/m2    LVOT Area 2.5 cm2    Ao Root Index 2.01 cm/m2    AV Velocity Ratio 0.82     LVOT:AV VTI Index 0.76     JIMENA/BSA VTI 1.4 cm2/m2    JIMENA/BSA Peak Velocity 1.5 cm2/m2    LA Volume Index BP 22 16 - 34 ml/m2    LA Volume Index 2C 22 16 - 34 mL/m2    LA Volume Index 4C 22 16 - 34 mL/m2    Est. RA Pressure 8 mmHg    RVSP 94 mmHg   Vascular duplex lower extremity venous bilateral    Collection Time: 03/08/23  9:41 AM   Result Value Ref Range    Body Surface Area 1.43 m2       Signed By: Carla Porras DO     March 8, 2023        Disclaimer: Sections of this note are dictated using utilizing voice recognition software. Minor typographical errors may be present. If questions arise, please do not hesitate to contact me or call our department.

## 2023-03-08 NOTE — PROGRESS NOTES
0930: Pt states she takes BP meds at night because it is elevated at night. MD made aware. 1000: Resp virus panel sent to lab. Results negative. Droplet plus isolation discontinued. 1200: Pt assisted to chair.

## 2023-03-08 NOTE — PLAN OF CARE
Problem: Physical Therapy - Adult  Goal: By Discharge: Performs mobility at highest level of function for planned discharge setting. See evaluation for individualized goals. Description: 1. Patient will move from supine to sit and sit to supine , scoot up and down, and roll side to side in bed with modified independence. 2.  Patient will transfer from bed to chair and chair to bed with modified independence using the least restrictive device. 3.  Patient will perform sit to stand with modified independence. 4.  Patient will ambulate with modified independence for 150 feet with the least restrictive device. 5.  Patient will ascend/descend 3 stairs with 1 handrail(s) with supervision/set-up. 6.  Patient will perform BLE therex as prescribed. PLOF: pt lives alone in a house with 3 YOEL, uses a rollator     Outcome: Progressing     PHYSICAL THERAPY EVALUATION    Patient: Mckenzie Tolbert (82 y.o. female)  Date: 3/8/2023  Primary Diagnosis: Hypoxemia [R09.02]  Hypoxia [R09.02]  Congestive heart failure, unspecified HF chronicity, unspecified heart failure type ([facility] [address] [I50.9]       Precautions: Fall Risk  PLOF: see above      ASSESSMENT :  Pt would benefit from acute PT to maximize functional mobility and safety prior to discharge home. Pt on 20 L via HFNC. Needing cues throughout session for safety, CGA for steadying. Pt amb approx 12 ft to turn and sit in recliner to increase time OOB. All vitals WFL with activity this date. Left with OT and all needs met. Will continue to follow. DEFICITS/IMPAIRMENTS:    Body Structures, Functions, Activity Limitations Requiring Skilled Therapeutic Intervention: Decreased functional mobility ; Decreased strength;Decreased balance;Decreased endurance    Patient will benefit from skilled intervention to address the above impairments. Patient's rehabilitation potential/Therapy Prognosis: Good.   Factors which may influence rehabilitation potential include:   [] None noted  []         Mental ability/status  [x]         Medical condition  [x]         Home/family situation and support systems  []         Safety awareness  []         Pain tolerance/management  []         Other:      PLAN :  Recommendations and Planned Interventions:   [x]           Bed Mobility Training             [x]    Neuromuscular Re-Education  [x]           Transfer Training                   []    Orthotic/Prosthetic Training  [x]           Gait Training                          []    Modalities  [x]           Therapeutic Exercises           []    Edema Management/Control  [x]           Therapeutic Activities            [x]    Family Training/Education  [x]           Patient Education  []           Other (comment):    Frequency/Duration: Patient will be followed by physical therapy to address goals, 1-2 times per day/3-5 days per week to address goals. Further Equipment Recommendations for Discharge: rolling walker    Discharge Recommendations: Home with assist PRN    AMPAC: 18/24  Current research shows that an AM-PAC score of 18 (14 without stairs) or greater is associated with a discharge to the patient's home setting. Based on an AM-PAC score of 18/24 (or **/20 if omitting stairs) and their current functional mobility deficits, it is recommended that the patient have 2-3 sessions per week of Physical Therapy at d/c to increase the patient's independence. This AMPAC score should be considered in conjunction with interdisciplinary team recommendations to determine the most appropriate discharge setting. Patient's social support, diagnosis, medical stability, and prior level of function should also be taken into consideration. SUBJECTIVE:   Patient stated I have been here before.     OBJECTIVE DATA SUMMARY:     Past Medical History:   Diagnosis Date    Arthritis     Chronic lung disease     Chronic obstructive pulmonary disease ([facility] [address]     Edema due to congestive heart failure ([facility] [address] 11/1/2022 Heart failure ([facility] [address]     Hypertension      Past Surgical History:   Procedure Laterality Date    ORTHOPEDIC SURGERY      spinal sx 5/6    OTHER SURGICAL HISTORY      Carotid artery    VASCULAR SURGERY      L CEA 10/2014       Home Situation:  Social/Functional History  Lives With: Alone  Type of Home: House  Home Layout: One level  Home Access: Stairs to enter with rails  Entrance Stairs - Number of Steps: 3 steps  Entrance Stairs - Rails: Both  Bathroom Shower/Tub: Tub/Shower unit  Bathroom Equipment: Shower chair, Grab bars in shower  Bathroom Accessibility: Accessible  Home Equipment: Tanner Danas  Has the patient had two or more falls in the past year or any fall with injury in the past year?: No  Receives Help From: Family  ADL Assistance: Independent  Homemaking Assistance: Independent  Ambulation Assistance: Independent  Transfer Assistance: Independent  Active : Yes  Mode of Transportation: Car  Occupation: Retired  Critical Behavior:  Orientation  Overall Orientation Status: Within Normal Limits  Orientation Level: Oriented X4  Cognition  Overall Cognitive Status: WNL    Strength:    Strength: Within functional limits    Tone & Sensation:   Tone: Normal  Sensation: Intact    Range Of Motion:  AROM: Within functional limits       Functional Mobility:  Bed Mobility:     Bed Mobility Training  Bed Mobility Training: Yes  Supine to Sit: Stand-by assistance  Transfers:     Transfer Training  Transfer Training: Yes  Sit to Stand: Contact-guard assistance  Stand to Sit: Contact-guard assistance  Toilet Transfer: Contact-guard assistance  Balance:     Balance  Sitting: Intact  Standing: With support    Pain:  Pain level pre-treatment: 0/10   Pain level post-treatment: /10   Pain Intervention(s): Medication (see MAR);  Rest, Ice, Repositioning  Response to intervention: Nurse notified, See doc flow    Activity Tolerance:   Activity Tolerance: Patient tolerated evaluation without incident  Please refer to the flowsheet for vital signs taken during this treatment. After treatment:   [x]         Patient left in no apparent distress sitting up in chair  []         Patient left in no apparent distress in bed  [x]         Call bell left within reach  [x]         Nursing notified  []         Caregiver present  []         Bed alarm activated  []         SCDs applied    COMMUNICATION/EDUCATION:   Patient Education  Education Given To: Patient  Education Provided: Role of Therapy;Plan of Care  Education Method: Demonstration  Barriers to Learning: None  Education Outcome: Verbalized understanding;Demonstrated understanding    Thank you for this referral.  Malissa Greco, PT  Minutes: 18      Eval Complexity: Decision Making: CLAUDIO Gould 41 AM-PAC® Basic Mobility Inpatient Short Form (6-Clicks) Version 2    How much HELP from another person does the patient currently need    (If the patient hasn't done an activity recently, how much help from another person do you think he/she would need if he/she tried?)   Total (Total A or Dep)   A Lot  (Mod to Max A)   A Little (Sup or Min A)   None (Mod I to I)   Turning from your back to your side while in a flat bed without using bedrails? [] 1 [] 2 [x] 3 [] 4   2. Moving from lying on your back to sitting on the side of a flat bed without using bedrails? [] 1 [] 2 [x] 3 [] 4   3. Moving to and from a bed to a chair (including a wheelchair)? [] 1 [] 2 [x] 3 [] 4   4. Standing up from a chair using your arms (e.g., wheelchair, or bedside chair)? [] 1 [] 2 [x] 3 [] 4   5. Walking in hospital room? [] 1 [] 2 [x] 3 [] 4   6. Climbing 3-5 steps with a railing?+   [] 1 [] 2 [x] 3 [] 4   +If stair climbing cannot be assessed, skip item #6. Sum responses from items 1-5. Current research shows that an AM-PAC score of 18 (14 without stairs) or greater is associated with a discharge to the patient's home setting.  Based on an AM-PAC score of 18/24 (or **/20 if omitting stairs) and their current functional mobility deficits, it is recommended that the patient have 2-3 sessions per week of Physical Therapy at d/c to increase the patient's independence.

## 2023-03-08 NOTE — PROGRESS NOTES
Review of Systems   Skin:  Positive for color change. Dual Skin assessment completed with Vera Presley RN. Scattered bruising on RUE. Scattered bruising on LUE. 1+ pitting edema on LLE. Varicose veins on BLE    Dime sized bump on right side of back. Pt with blanchable redness to sacrum. Heart shaped mepliex applied.           Physical Exam

## 2023-03-08 NOTE — PROGRESS NOTES
Comprehensive Nutrition Assessment    Type and Reason for Visit:  Initial, Positive Nutrition Screen    Nutrition Recommendations/Plan:   Continue current diet as tolerated. Order Ensure Plus High Protein (provides 350 kcal, 20g protein) BID. Continue to monitor tolerance of PO, compliance of oral supplements, weight, labs, and plan of care during admission. Malnutrition Assessment:  Malnutrition Status: At risk for malnutrition (Comment) (advanced age, BMI, non-significant wt loss PTA) (03/08/23 1128)    Context:  Chronic Illness       Nutrition History and Allergies: PMHx: CHF, COPD, HTN. Wt hx: 116 lb (5/24/22), 110 lb (10/27/22), wt loss of 9.4% x 9.5 months; -4.5% x 4.5 months per EMR hx. Pt reports weighing ~120-130 lb in 2020 before COVID but has lost a lot of wt due to getting sick (COVID, RSV), COPD, and had teeth pulled. Currently weighs ~105 lb. Reports good intake PTA, normally eats 3 meals per day, one boost at night time. NKFA. Nutrition Assessment:    Admitted for shortness of breath. Positive nutrition screen noted, MST. Visited pt OOBTC w/ family at bedside. Reports liking the breakfast this AM, good intake, no decrease in appetite. Agreeable to try Ensure (no chocolate). Coupons given to pt for Ensure. Nutrition Related Findings:    Last BM (including prior to admit): 03/07/23. Pertinent Meds: lipitor, pulmicort, lovenox, pepcid, folic acid, lasix, gabapentin, solumedrol  Pertinent Labs: proBNP [address] Wound Type: None       Current Nutrition Intake & Therapies:    Average Meal Intake: %  Average Supplements Intake: None Ordered  ADULT DIET; Regular; Low Fat/Low Chol/High Fiber/JORGE; Low Sodium (2 gm)    Anthropometric Measures:  Height: 5' 1\" (154.9 cm)  Ideal Body Weight (IBW): 105 lbs (48 kg)    Admission Body Weight: 104 lb 15 oz (47.6 kg) (47.6 kg)  Current Body Weight: 104 lb 15 oz (47.6 kg), 99.9 % IBW.     Current BMI (kg/m2): 19.8  BMI Categories: Underweight (BMI less than 22) age over 72    Estimated Daily Nutrient Needs:  Energy Requirements Based On: Kcal/kg  Weight Used for Energy Requirements: Admission  Energy (kcal/day): 5058-7148 (25-30 kcal/kg)  Weight Used for Protein Requirements: Admission  Protein (g/day): 48-57 (1-1.2 g/day)  Method Used for Fluid Requirements: 1 ml/kcal  Fluid (ml/day): 4012-0428    Nutrition Diagnosis:   Underweight related to increase demand for energy/nutrients as evidenced by BMI, mild muscle loss, weight loss    Nutrition Interventions:   Food and/or Nutrient Delivery: Continue Current Diet, Start Oral Nutrition Supplement  Nutrition Education/Counseling: No recommendation at this time  Coordination of Nutrition Care: Continue to monitor while inpatient  Plan of Care discussed with: pt, family    Goals:     Goals: Meet at least 75% of estimated needs, by next RD assessment       Nutrition Monitoring and Evaluation:   Behavioral-Environmental Outcomes: None Identified  Food/Nutrient Intake Outcomes: Food and Nutrient Intake, Supplement Intake  Physical Signs/Symptoms Outcomes: Biochemical Data, Chewing or Swallowing, GI Status, Fluid Status or Edema, Hemodynamic Status, Meal Time Behavior, Weight    Discharge Planning:    Continue current diet     Jason Rajan [address], [address]   Contact: [phone]

## 2023-03-09 PROBLEM — I50.9 CONGESTIVE HEART FAILURE (HCC): Status: ACTIVE | Noted: 2022-11-01

## 2023-03-09 LAB
ANION GAP SERPL CALC-SCNC: 7 MMOL/L (ref 3–18)
BASOPHILS # BLD: 0 K/UL (ref 0–0.1)
BASOPHILS NFR BLD: 0 % (ref 0–2)
BUN SERPL-MCNC: 40 MG/DL (ref 7–18)
BUN/CREAT SERPL: 39 (ref 12–20)
CALCIUM SERPL-MCNC: 8.4 MG/DL (ref 8.5–10.1)
CHLORIDE SERPL-SCNC: 105 MMOL/L (ref 100–111)
CO2 SERPL-SCNC: 26 MMOL/L (ref 21–32)
CREAT SERPL-MCNC: 1.03 MG/DL (ref 0.6–1.3)
DIFFERENTIAL METHOD BLD: ABNORMAL
EOSINOPHIL # BLD: 0 K/UL (ref 0–0.4)
EOSINOPHIL NFR BLD: 0 % (ref 0–5)
ERYTHROCYTE [DISTWIDTH] IN BLOOD BY AUTOMATED COUNT: 14.6 % (ref 11.6–14.5)
GLUCOSE SERPL-MCNC: 166 MG/DL (ref 74–99)
HCT VFR BLD AUTO: 36.3 % (ref 35–45)
HGB BLD-MCNC: 11.5 G/DL (ref 12–16)
IMM GRANULOCYTES # BLD AUTO: 0 K/UL (ref 0–0.04)
IMM GRANULOCYTES NFR BLD AUTO: 1 % (ref 0–0.5)
LYMPHOCYTES # BLD: 0.3 K/UL (ref 0.9–3.6)
LYMPHOCYTES NFR BLD: 4 % (ref 21–52)
MCH RBC QN AUTO: 26.9 PG (ref 24–34)
MCHC RBC AUTO-ENTMCNC: 31.7 G/DL (ref 31–37)
MCV RBC AUTO: 85 FL (ref 78–100)
MONOCYTES # BLD: 0.5 K/UL (ref 0.05–1.2)
MONOCYTES NFR BLD: 6 % (ref 3–10)
NEUTS SEG # BLD: 6.8 K/UL (ref 1.8–8)
NEUTS SEG NFR BLD: 89 % (ref 40–73)
NRBC # BLD: 0 K/UL (ref 0–0.01)
NRBC BLD-RTO: 0 PER 100 WBC
PLATELET # BLD AUTO: 247 K/UL (ref 135–420)
PMV BLD AUTO: 10.4 FL (ref 9.2–11.8)
POTASSIUM SERPL-SCNC: 4 MMOL/L (ref 3.5–5.5)
RBC # BLD AUTO: 4.27 M/UL (ref 4.2–5.3)
SODIUM SERPL-SCNC: 138 MMOL/L (ref 136–145)
WBC # BLD AUTO: 7.7 K/UL (ref 4.6–13.2)

## 2023-03-09 PROCEDURE — 6360000002 HC RX W HCPCS

## 2023-03-09 PROCEDURE — 6370000000 HC RX 637 (ALT 250 FOR IP): Performed by: FAMILY MEDICINE

## 2023-03-09 PROCEDURE — 80048 BASIC METABOLIC PNL TOTAL CA: CPT

## 2023-03-09 PROCEDURE — 6370000000 HC RX 637 (ALT 250 FOR IP)

## 2023-03-09 PROCEDURE — 94669 MECHANICAL CHEST WALL OSCILL: CPT

## 2023-03-09 PROCEDURE — 36415 COLL VENOUS BLD VENIPUNCTURE: CPT

## 2023-03-09 PROCEDURE — 2700000000 HC OXYGEN THERAPY PER DAY

## 2023-03-09 PROCEDURE — 97530 THERAPEUTIC ACTIVITIES: CPT

## 2023-03-09 PROCEDURE — 98960 EDU&TRN PT SELF-MGMT NQHP 1: CPT

## 2023-03-09 PROCEDURE — 2580000003 HC RX 258

## 2023-03-09 PROCEDURE — 6370000000 HC RX 637 (ALT 250 FOR IP): Performed by: STUDENT IN AN ORGANIZED HEALTH CARE EDUCATION/TRAINING PROGRAM

## 2023-03-09 PROCEDURE — 97535 SELF CARE MNGMENT TRAINING: CPT

## 2023-03-09 PROCEDURE — 1100000003 HC PRIVATE W/ TELEMETRY

## 2023-03-09 PROCEDURE — 85025 COMPLETE CBC W/AUTO DIFF WBC: CPT

## 2023-03-09 PROCEDURE — 94640 AIRWAY INHALATION TREATMENT: CPT

## 2023-03-09 RX ORDER — PREDNISONE 20 MG/1
40 TABLET ORAL DAILY
Status: DISCONTINUED | OUTPATIENT
Start: 2023-03-09 | End: 2023-03-10 | Stop reason: HOSPADM

## 2023-03-09 RX ORDER — ALPRAZOLAM 0.5 MG/1
0.5 TABLET ORAL NIGHTLY PRN
Status: DISCONTINUED | OUTPATIENT
Start: 2023-03-09 | End: 2023-03-10 | Stop reason: HOSPADM

## 2023-03-09 RX ADMIN — BUDESONIDE 1 MG: 1 SUSPENSION RESPIRATORY (INHALATION) at 08:56

## 2023-03-09 RX ADMIN — ARFORMOTEROL TARTRATE 15 MCG: 15 SOLUTION RESPIRATORY (INHALATION) at 08:56

## 2023-03-09 RX ADMIN — FUROSEMIDE 20 MG: 10 INJECTION, SOLUTION INTRAMUSCULAR; INTRAVENOUS at 08:43

## 2023-03-09 RX ADMIN — FUROSEMIDE 20 MG: 10 INJECTION, SOLUTION INTRAMUSCULAR; INTRAVENOUS at 17:03

## 2023-03-09 RX ADMIN — FAMOTIDINE 20 MG: 20 TABLET ORAL at 08:43

## 2023-03-09 RX ADMIN — IPRATROPIUM BROMIDE AND ALBUTEROL SULFATE 1 AMPULE: 2.5; .5 SOLUTION RESPIRATORY (INHALATION) at 16:26

## 2023-03-09 RX ADMIN — IPRATROPIUM BROMIDE AND ALBUTEROL SULFATE 1 AMPULE: 2.5; .5 SOLUTION RESPIRATORY (INHALATION) at 08:56

## 2023-03-09 RX ADMIN — METHYLPREDNISOLONE SODIUM SUCCINATE 40 MG: 40 INJECTION, POWDER, FOR SOLUTION INTRAMUSCULAR; INTRAVENOUS at 06:30

## 2023-03-09 RX ADMIN — ASPIRIN 81 MG: 81 TABLET, COATED ORAL at 08:43

## 2023-03-09 RX ADMIN — IPRATROPIUM BROMIDE AND ALBUTEROL SULFATE 1 AMPULE: 2.5; .5 SOLUTION RESPIRATORY (INHALATION) at 12:17

## 2023-03-09 RX ADMIN — SODIUM CHLORIDE, PRESERVATIVE FREE 10 ML: 5 INJECTION INTRAVENOUS at 21:20

## 2023-03-09 RX ADMIN — METOPROLOL TARTRATE 25 MG: 25 TABLET, FILM COATED ORAL at 08:44

## 2023-03-09 RX ADMIN — NIFEDIPINE 60 MG: 30 TABLET, FILM COATED, EXTENDED RELEASE ORAL at 21:19

## 2023-03-09 RX ADMIN — PREDNISONE 40 MG: 20 TABLET ORAL at 12:35

## 2023-03-09 RX ADMIN — BUDESONIDE 1 MG: 1 SUSPENSION RESPIRATORY (INHALATION) at 20:44

## 2023-03-09 RX ADMIN — ARFORMOTEROL TARTRATE 15 MCG: 15 SOLUTION RESPIRATORY (INHALATION) at 20:45

## 2023-03-09 RX ADMIN — SODIUM CHLORIDE, PRESERVATIVE FREE 10 ML: 5 INJECTION INTRAVENOUS at 08:44

## 2023-03-09 RX ADMIN — ENOXAPARIN SODIUM 30 MG: 100 INJECTION SUBCUTANEOUS at 08:43

## 2023-03-09 RX ADMIN — LOSARTAN POTASSIUM 50 MG: 50 TABLET, FILM COATED ORAL at 08:43

## 2023-03-09 RX ADMIN — ATORVASTATIN CALCIUM 20 MG: 20 TABLET, FILM COATED ORAL at 08:43

## 2023-03-09 RX ADMIN — METOPROLOL TARTRATE 25 MG: 25 TABLET, FILM COATED ORAL at 21:19

## 2023-03-09 RX ADMIN — FOLIC ACID 1 MG: 1 TABLET ORAL at 08:43

## 2023-03-09 RX ADMIN — GABAPENTIN 100 MG: 100 CAPSULE ORAL at 21:19

## 2023-03-09 RX ADMIN — ALPRAZOLAM 0.5 MG: 0.5 TABLET ORAL at 21:19

## 2023-03-09 ASSESSMENT — PULMONARY FUNCTION TESTS
PEFR_L/MIN: 98
PEFR_L/MIN: 95

## 2023-03-09 NOTE — PROGRESS NOTES
[facility]ist [facility]  Progress Note    Patient: Renetta Martin Age: 82 y.o. : 1935 MR#: [id number] SSN: [id number]  Date/Time: 3/9/2023     Subjective:   82 yoF with pertinent PMH of HFpEF and COPD presenting for acute on chronic hypoxemic respiratory failure. She is on 3-4L NC at baseline at home and notes that in the past couple days she has experienced increased shortness of breath without orthopnea. NAEON    Patient feels markedly improved this morning and denies shortness of breath, cough, sputum production. She denies exertional dyspnea getting up to the commode. She reports feeling essentially back to her baseline. Assessment/Plan:   82 yoF with acute on chronic hypoxemic respiratory failure with pertinent PMH of HFpEF and COPD with imaging notable for opacities consistent with pulmonary edema and stable EF on Echo but notably elevated PASP.     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; continue management of HFpEF and target euvolemia, continue COPD management    #HFpEF: acute on chronic, euvolemic on exam    #Normocytic anemia: Slight downtrend, overall stable  -Outpatient f/u and evaluation    #Hyperglycemia: Inpatient glucose goal 140-180; add basal Lantus and prandial lispro + CF if needed    #HLD: Continue Lipitor    #HTN: Continue home medications    #RLS: Cont gabapentin    Anticipate DC home 3/10/23      Susanna Fraga DO  IM PGY-3  23      Case discussed with:  [x]Patient  []Family  [x]Nursing  []Case Management  DVT Prophylaxis:  [x]Lovenox  []Hep SQ  []SCDs  []Coumadin   []On Heparin gtt    Objective:   VS: BP (!) 116/45   Pulse 76   Temp 98.1 °F (36.7 °C) (Oral)   Resp 14   Ht 5' 1\" (1.549 m)   Wt 105 lb (47.6 kg)   SpO2 95%   BMI 19.84 kg/m²    Tmax/24hrs: Temp (24hrs), Av.1 °F (36.7 °C), Min:97.7 °F (36.5 °C), Max:98.6 °F (37 °C)  IOBRIEF  Intake/Output Summary (Last 24 hours) at 3/9/2023 1254  Last data filed at 3/9/2023 0540  Gross per 24 hour   Intake --   Output 500 ml   Net -500 ml       General:  Alert, cooperative, no acute distress    Pulmonary:  CTA Bilaterally. No Wheezing/Rhonchi/Rales. Cardiovascular: Regular rate and Rhythm. GI:  Soft, Non distended, Non tender. + Bowel sounds. Extremities:  No edema, cyanosis, clubbing. No calf tenderness. Neurologic: Alert and oriented X 3. No acute neuro deficits.       Medications:   Current Facility-Administered Medications   Medication Dose Route Frequency    NIFEdipine (PROCARDIA XL) extended release tablet 60 mg  60 mg Oral QHS    aspirin EC tablet 81 mg  81 mg Oral Daily    atorvastatin (LIPITOR) tablet 20 mg  20 mg Oral Daily    folic acid (FOLVITE) tablet 1 mg  1 mg Oral Daily    gabapentin (NEURONTIN) capsule 100 mg  100 mg Oral Nightly    metoprolol tartrate (LOPRESSOR) tablet 25 mg  25 mg Oral BID    losartan (COZAAR) tablet 50 mg  50 mg Oral Daily    sodium chloride flush 0.9 % injection 5-40 mL  5-40 mL IntraVENous 2 times per day    sodium chloride flush 0.9 % injection 5-40 mL  5-40 mL IntraVENous PRN    ondansetron (ZOFRAN-ODT) disintegrating tablet 4 mg  4 mg Oral Q8H PRN    Or    ondansetron (ZOFRAN) injection 4 mg  4 mg IntraVENous Q6H PRN    polyethylene glycol (GLYCOLAX) packet 17 g  17 g Oral Daily PRN    famotidine (PEPCID) tablet 20 mg  20 mg Oral Daily    acetaminophen (TYLENOL) tablet 650 mg  650 mg Oral Q6H PRN    Or    acetaminophen (TYLENOL) suppository 650 mg  650 mg Rectal Q6H PRN    enoxaparin Sodium (LOVENOX) injection 30 mg  30 mg SubCUTAneous Daily    furosemide (LASIX) injection 20 mg 20 mg IntraVENous BID    methylPREDNISolone sodium (SOLU-MEDROL) injection 40 mg  40 mg IntraVENous Q8H    budesonide (PULMICORT) nebulizer suspension 1 mg  1 mg Nebulization BID    arformoterol tartrate (BROVANA) nebulizer solution 15 mcg  15 mcg Nebulization BID    ipratropium-albuterol (DUONEB) nebulizer solution 1 ampule  1 ampule Inhalation Q4H WA    LORazepam (ATIVAN) injection 0.5 mg  0.5 mg IntraVENous Q6H PRN       Imaging:   LE PVL:    No evidence of deep vein thrombosis in the right lower extremity. Vessels demonstrate normal compressibility, color filling, and phasic and spontaneous flow. No evidence of deep vein thrombosis in the left lower extremity. Vessels demonstrate normal compressibility, color filling, and phasic and spontaneous flow.        Labs:    Recent Results (from the past 48 hour(s))   EKG 12 Lead    Collection Time: 03/07/23 10:00 AM   Result Value Ref Range    Ventricular Rate 75 BPM    Atrial Rate 75 BPM    P-R Interval 136 ms    QRS Duration 72 ms    Q-T Interval 400 ms    QTc Calculation (Bazett) 446 ms    P Axis 92 degrees    R Axis 39 degrees    T Axis 94 degrees    Diagnosis Normal sinus rhythm    CBC with Auto Differential    Collection Time: 03/07/23 10:06 AM   Result Value Ref Range    WBC 15.3 (H) 4.6 - 13.2 K/uL    RBC 4.71 4.20 - 5.30 M/uL    Hemoglobin 12.7 12.0 - 16.0 g/dL    Hematocrit 40.5 35.0 - 45.0 %    MCV 86.0 78.0 - 100.0 FL    MCH 27.0 24.0 - 34.0 PG    MCHC 31.4 31.0 - 37.0 g/dL    RDW 14.6 (H) 11.6 - 14.5 %    Platelets 993 842 - 853 K/uL    MPV 9.7 9.2 - 11.8 FL    Nucleated RBCs 0.0 0  WBC    nRBC 0.00 0.00 - 0.01 K/uL    Seg Neutrophils 79 (H) 40 - 73 %    Band Neutrophils 2 0 - 5 %    Lymphocytes 10 (L) 21 - 52 %    Monocytes 9 3 - 10 %    Eosinophils % 0 0 - 5 %    Basophils 0 0 - 2 %    Immature Granulocytes 0 %    Segs Absolute 12.4 (H) 1.8 - 8.0 K/UL    Absolute Lymph # 1.5 0.9 - 3.6 K/UL    Absolute Mono # 1.4 (H) 0.05 - 1.2 K/UL    Absolute Eos # 0.0 0.0 - 0.4 K/UL    Basophils Absolute 0.0 0.0 - 0.1 K/UL    Absolute Immature Granulocyte 0.0 K/UL    Differential Type MANUAL      Platelet Comment ADEQUATE PLATELETS      RBC Comment ANISOCYTOSIS  1+        RBC Comment POIKILOCYTOSIS  1+        RBC Comment OVALOCYTES  1+       Magnesium    Collection Time: 03/07/23 10:06 AM   Result Value Ref Range    Magnesium 2.1 1.6 - 2.6 mg/dL   Troponin    Collection Time: 03/07/23 10:06 AM   Result Value Ref Range    Troponin, High Sensitivity 15 0 - 54 ng/L   TSH    Collection Time: 03/07/23 10:06 AM   Result Value Ref Range    TSH, 3RD GENERATION 2.93 0.36 - 3.74 uIU/mL   CMP    Collection Time: 03/07/23 10:06 AM   Result Value Ref Range    Sodium 140 136 - 145 mmol/L    Potassium 4.0 3.5 - 5.5 mmol/L    Chloride 108 100 - 111 mmol/L    CO2 26 21 - 32 mmol/L    Anion Gap 6 3.0 - 18 mmol/L    Glucose 108 (H) 74 - 99 mg/dL    BUN 22 (H) 7.0 - 18 MG/DL    Creatinine 1.06 0.6 - 1.3 MG/DL    Bun/Cre Ratio 21 (H) 12 - 20      Est, Glom Filt Rate 51 (L) >60 ml/min/1.73m2    Calcium 8.6 8.5 - 10.1 MG/DL    Total Bilirubin 0.6 0.2 - 1.0 MG/DL    ALT 38 13 - 56 U/L    AST 39 (H) 10 - 38 U/L    Alk Phosphatase 51 45 - 117 U/L    Total Protein 7.3 6.4 - 8.2 g/dL    Albumin 3.8 3.4 - 5.0 g/dL    Globulin 3.5 2.0 - 4.0 g/dL    Albumin/Globulin Ratio 1.1 0.8 - 1.7     Brain Natriuretic Peptide    Collection Time: 03/07/23 10:06 AM   Result Value Ref Range    NT Pro-BNP 2,168 (H) 0 - 1,800 PG/ML   POC G3: BLOOD GASES INCLUDES CALC.  TCO2, HCO3, BASE EXCESS, SO2    Collection Time: 03/07/23 10:10 AM   Result Value Ref Range    pH, Arterial, POC 7.48 (H) 7.35 - 7.45      pCO2, Arterial, POC 32.3 (L) 35.0 - 45.0 MMHG    pO2, Arterial, POC 21 (LL) 80 - 100 MMHG    HCO3, Mixed 24.1 22 - 26 MMOL/L    SO2c, Arterial, POC 40.6 (L) 92 - 97 %    Base Excess 1.2 mmol/L    Specimen type: ARTERIAL      Performed by: Viralvalentin Peralta    Basic Metabolic Panel w/ Reflex to MG    Collection Time: 03/08/23  1:41 AM   Result Value Ref Range    Sodium 139 136 - 145 mmol/L    Potassium 4.0 3.5 - 5.5 mmol/L    Chloride 107 100 - 111 mmol/L    CO2 24 21 - 32 mmol/L    Anion Gap 8 3.0 - 18 mmol/L    Glucose 150 (H) 74 - 99 mg/dL    BUN 26 (H) 7.0 - 18 MG/DL    Creatinine 1.11 0.6 - 1.3 MG/DL    Bun/Cre Ratio 23 (H) 12 - 20      Est, Glom Filt Rate 49 (L) >60 ml/min/1.73m2    Calcium 8.8 8.5 - 10.1 MG/DL   CBC with Auto Differential    Collection Time: 03/08/23  1:41 AM   Result Value Ref Range    WBC 5.4 4.6 - 13.2 K/uL    RBC 4.70 4.20 - 5.30 M/uL    Hemoglobin 12.7 12.0 - 16.0 g/dL    Hematocrit 40.7 35.0 - 45.0 %    MCV 86.6 78.0 - 100.0 FL    MCH 27.0 24.0 - 34.0 PG    MCHC 31.2 31.0 - 37.0 g/dL    RDW 14.7 (H) 11.6 - 14.5 %    Platelets 565 864 - 404 K/uL    MPV 9.9 9.2 - 11.8 FL    Nucleated RBCs 0.0 0  WBC    nRBC 0.00 0.00 - 0.01 K/uL    Seg Neutrophils 87 (H) 40 - 73 %    Lymphocytes 9 (L) 21 - 52 %    Monocytes 3 3 - 10 %    Eosinophils % 0 0 - 5 %    Basophils 0 0 - 2 %    Immature Granulocytes 0 0.0 - 0.5 %    Segs Absolute 4.7 1.8 - 8.0 K/UL    Absolute Lymph # 0.5 (L) 0.9 - 3.6 K/UL    Absolute Mono # 0.2 0.05 - 1.2 K/UL    Absolute Eos # 0.0 0.0 - 0.4 K/UL    Basophils Absolute 0.0 0.0 - 0.1 K/UL    Absolute Immature Granulocyte 0.0 0.00 - 0.04 K/UL    Differential Type AUTOMATED     Brain Natriuretic Peptide    Collection Time: 03/08/23  1:41 AM   Result Value Ref Range    NT Pro-BNP 2,288 (H) 0 - 1,800 PG/ML   Procalcitonin    Collection Time: 03/08/23  1:41 AM   Result Value Ref Range    Procalcitonin <0.05 ng/mL   Magnesium    Collection Time: 03/08/23  1:41 AM   Result Value Ref Range    Magnesium 2.7 (H) 1.6 - 2.6 mg/dL   Transthoracic echocardiogram (TTE) complete with contrast, bubble, strain, and 3D PRN    Collection Time: 03/08/23  9:29 AM   Result Value Ref Range    IVSd 1.0 (A) 0.6 - 0.9 cm    LVIDd 4.7 3.9 - 5.3 cm    LVIDs 1.7 cm    LVOT Diameter 1.8 cm    LVPWd 1.1 (A) 0.6 - 0.9 cm    LVOT Peak Gradient 7 mmHg    LVOT Mean Gradient 5 mmHg    LVOT SV 81.6 ml    LVOT Peak Velocity 1.4 m/s    LVOT VTI 32.1 cm    RV Longitudinal Dimension 3.4 cm    LA Volume A/L 33 mL    LA Volume 2C 32 22 - 52 mL    LA Volume 4C 31 22 - 52 mL    LA Volume BP 31 22 - 52 mL    AV Area by Peak Velocity 2.2 cm2    AV Area by VTI 2.0 cm2    AV Peak Gradient 11 mmHg    AV Mean Gradient 6 mmHg    AV Peak Velocity 1.7 m/s    AV Mean Velocity 1.2 m/s    AV VTI 42.1 cm    MV A Velocity 1.06 m/s    MV E Wave Deceleration Time 189.0 ms    MV E Velocity 0.89 m/s    LV E' Lateral Velocity 6 cm/s    LV E' Septal Velocity 8 cm/s    TAPSE 2.2 1.7 cm    TR Peak Gradient 86 mmHg    TR Max Velocity 4.64 m/s    Aortic Root 2.9 cm    Body Surface Area 1.43 m2    Fractional Shortening 2D 64 28 - 44 %    LVIDd Index 3.26 cm/m2    LVIDs Index 1.18 cm/m2    LV RWT Ratio 0.47     LV Mass 2D 175.8 (A) 67 - 162 g    LV Mass 2D Index 122.1 (A) 43 - 95 g/m2    MV E/A 0.84     E/E' Ratio (Averaged) 12.98     E/E' Lateral 14.83     E/E' Septal 11.13     LA Volume Index A/L 23 16 - 34 mL/m2    LVOT Stroke Volume Index 56.7 mL/m2    LVOT Area 2.5 cm2    Ao Root Index 2.01 cm/m2    AV Velocity Ratio 0.82     LVOT:AV VTI Index 0.76     JIMENA/BSA VTI 1.4 cm2/m2    JIMENA/BSA Peak Velocity 1.5 cm2/m2    LA Volume Index BP 22 16 - 34 ml/m2    LA Volume Index 2C 22 16 - 34 mL/m2    LA Volume Index 4C 22 16 - 34 mL/m2    Est. RA Pressure 8 mmHg    RVSP 94 mmHg   Vascular duplex lower extremity venous bilateral    Collection Time: 03/08/23  9:41 AM   Result Value Ref Range    Body Surface Area 1.43 m2   Respiratory Panel, Molecular, with COVID-19 (Restricted: peds pts or suitable admitted adults)    Collection Time: 03/08/23  9:52 AM    Specimen: Nasopharyngeal   Result Value Ref Range    Adenovirus by PCR Not detected NOTD      Coronavirus 229E by PCR Not detected NOTD      Coronavirus HKU1 by PCR Not detected NOTD      Coronavirus NL63 by PCR Not detected NOTD      Coronavirus OC43 by PCR Not detected NOTD      SARS-CoV-2, PCR Not detected NOTD      Human Metapneumovirus by PCR Not detected NOTD      Rhinovirus Enterovirus PCR Not detected NOTD      Influenza A by PCR Not detected NOTD      Influenza B PCR Not detected NOTD      Parainfluenza 1 PCR Not detected NOTD      Parainfluenza 2 PCR Not detected NOTD      Parainfluenza 3 PCR Not detected NOTD      Parainfluenza 4 PCR Not detected NOTD      Respiratory Syncytial Virus by PCR Not detected NOTD      Bordetella parapertussis by PCR Not detected NOTD      Bordetella pertussis by PCR Not detected NOTD      Chlamydophila Pneumonia PCR Not detected NOTD      Mycoplasma pneumo by PCR Not detected NOTD     Basic Metabolic Panel w/ Reflex to MG    Collection Time: 03/09/23  1:30 AM   Result Value Ref Range    Sodium 138 136 - 145 mmol/L    Potassium 4.0 3.5 - 5.5 mmol/L    Chloride 105 100 - 111 mmol/L    CO2 26 21 - 32 mmol/L    Anion Gap 7 3.0 - 18 mmol/L    Glucose 166 (H) 74 - 99 mg/dL    BUN 40 (H) 7.0 - 18 MG/DL    Creatinine 1.03 0.6 - 1.3 MG/DL    Bun/Cre Ratio 39 (H) 12 - 20      Est, Glom Filt Rate 53 (L) >60 ml/min/1.73m2    Calcium 8.4 (L) 8.5 - 10.1 MG/DL   CBC with Auto Differential    Collection Time: 03/09/23  1:30 AM   Result Value Ref Range    WBC 7.7 4.6 - 13.2 K/uL    RBC 4.27 4.20 - 5.30 M/uL    Hemoglobin 11.5 (L) 12.0 - 16.0 g/dL    Hematocrit 36.3 35.0 - 45.0 %    MCV 85.0 78.0 - 100.0 FL    MCH 26.9 24.0 - 34.0 PG    MCHC 31.7 31.0 - 37.0 g/dL    RDW 14.6 (H) 11.6 - 14.5 %    Platelets 659 404 - 450 K/uL    MPV 10.4 9.2 - 11.8 FL    Nucleated RBCs 0.0 0  WBC    nRBC 0.00 0.00 - 0.01 K/uL    Seg Neutrophils 89 (H) 40 - 73 %    Lymphocytes 4 (L) 21 - 52 %    Monocytes 6 3 - 10 %    Eosinophils % 0 0 - 5 %    Basophils 0 0 - 2 %    Immature Granulocytes 1 (H) 0.0 - 0.5 %    Segs Absolute 6.8 1.8 - 8.0 K/UL    Absolute Lymph # 0.3 (L) 0.9 - 3.6 K/UL    Absolute Mono # 0.5 0.05 - 1.2 K/UL Absolute Eos # 0.0 0.0 - 0.4 K/UL    Basophils Absolute 0.0 0.0 - 0.1 K/UL    Absolute Immature Granulocyte 0.0 0.00 - 0.04 K/UL    Differential Type AUTOMATED         Signed By: Margo Aguilar DO     March 9, 2023      Disclaimer: Sections of this note are dictated using utilizing voice recognition software. Minor typographical errors may be present. If questions arise, please do not hesitate to contact me or call our department.

## 2023-03-09 NOTE — PROGRESS NOTES
03/08/23 2002   Oxygen Therapy/Pulse Ox   O2 Therapy Oxygen humidified   O2 Device Nasal cannula   O2 Flow Rate (L/min) 6 L/min   Heart Rate 76   Resp 20   SpO2 99 %

## 2023-03-09 NOTE — PLAN OF CARE
Problem: Occupational Therapy - Adult  Goal: By Discharge: Performs self-care activities at highest level of function for planned discharge setting. See evaluation for individualized goals. Description: Occupational Therapy Goals:  Initiated 3/8/2023 to be met within 7-10 days. 1.  Patient will perform grooming with modified independence. 2.  Patient will perform bathing with modified independence. 3.  Patient will perform upper body dressing and lower body dressing  with modified independence. 4.  Patient will perform toilet transfers with modified independence using RW with good balance. 5.  Patient will perform all aspects of toileting with modified independence. 6.  Patient will participate in upper extremity therapeutic exercise/activities with modified independence for 8-10 minutes to increase strength/endurance for ADLs. 7.  Patient will utilize energy conservation techniques during functional activities with min verbal cues. PLOF: pt reports she is Mod I with ADLs and functional mobility - usually furniture walks, has a rollator      Outcome: [address] TREATMENT    Patient: Radha Matos (82 y.o. female)  Date: 3/9/2023  Diagnosis: Hypoxemia [R09.02]  Hypoxia [R09.02]  Congestive heart failure, unspecified HF chronicity, unspecified heart failure type ([facility] [address] [I50.9] Hypoxemia      Precautions: Fall Risk,  PLOF:  pt reports she is Mod I with ADLs and functional mobility - usually furniture walks, has a rollator    Chart, occupational therapy assessment, plan of care, and goals were reviewed. ASSESSMENT:  Pt cleared by RN for OT tx at this time. Pt presented supine in bed upon entry, on 5L O2NC, and agreeable to work with OT. Pt came to EOB from supine SBA in prep for functional tasks.  Pt educated on mult energy conservation techniques including pacing and deep breathing to prevent SOB and fatigue, to  increase activity tolerance and safety w/ADLs and functional mobility, pt verbalized understanding. Pt demo LB dressing donning her socks SBA w/ leg cross technique. STS transfer CGA with RW. She maneuvered around room CGA/SBA with RW ~ 20 ft, simulating BR mobility. Pt required mod cueing for proper safety techniques. Pt returned to reclining chair at end of session with vitals WNL. She was left with B LE's elevated and all needs left within reach. RN made aware. Progression toward goals:  []          Improving appropriately and progressing toward goals  [x]          Improving slowly and progressing toward goals  []          Not making progress toward goals and plan of care will be adjusted     PLAN:  Patient continues to benefit from skilled intervention to address the above impairments. Continue treatment per established plan of care. Further Equipment Recommendations for Discharge: RW    Discharge Recommendations: Home with Home health OT    AMPAC:   Current research shows that an AM-PAC score of 18 or greater is associated with a discharge to the patient's home setting. Based on an AM-PAC score of 19/24 and their current ADL deficits; it is recommended that the patient have 2-3 sessions per week of Occupational Therapy at d/c to increase the patient's independence. This AMPAC score should be considered in conjunction with interdisciplinary team recommendations to determine the most appropriate discharge setting. Patient's social support, diagnosis, medical stability, and prior level of function should also be taken into consideration. SUBJECTIVE:   Patient stated, \"I am ready to go home. \"    OBJECTIVE DATA SUMMARY:   Cognitive/Behavioral Status:     Cognition  Overall Cognitive Status: WNL    Functional Mobility and Transfers for ADLs:   Bed Mobility:  Bed Mobility Training  Bed Mobility Training: Yes  Supine to Sit: Stand-by assistance  Scooting: Stand-by assistance   Transfers:  Transfer Training  Transfer Training: Yes  Sit to Stand: Contact-guard assistance  Stand to Sit: Stand-by assistance    Balance:  Balance  Sitting: Intact  Standing: With support    ADL Intervention:     LE Dressing: Stand by assistance (w/ leg cross method)       Pain:  Pain level pre-treatment: 0/10   Pain level post-treatment: 0/10    Activity Tolerance:    Activity Tolerance: Patient tolerated treatment well  Please refer to the flowsheet for vital signs taken during this treatment. After treatment:   [x]  Patient left in no apparent distress sitting up in chair  []  Patient left in no apparent distress in bed  [x]  Call bell left within reach  [x]  Nursing notified  []  Caregiver present  []  Bed alarm activated    COMMUNICATION/EDUCATION:   Patient Education  Education Given To: Patient  Education Provided: Role of Therapy;Transfer Training;Plan of Care;Energy Conservation; ADL Adaptive Strategies  Education Method: Verbal;Demonstration; Teach Back  Barriers to Learning: None  Education Outcome: Continued education needed;Verbalized understanding      Thank you for this referral.  JAEL Hatfield  Minutes: [id number] [facility] [address] AM-PAC® Daily Activity Inpatient Short Form (6-Clicks)    How much HELP from another person does the patient currently need    (If the patient hasn't done an activity recently, how much help from another person do you think he/she would need if he/she tried?)   Total (Total A or Dep)   A Lot  (Mod to Max A)   A Little (Sup or Min A)   None (Mod I to I)   Putting on and taking off regular lower body clothing? [] 1 [] 2 [x] 3 [] 4   2. Bathing (including washing, rinsing,      drying)? [] 1 [] 2 [x] 3 [] 4   3. Toileting, which includes using toilet, bedpan or urinal?   [] 1 [] 2 [x] 3 [] 4   4. Putting on and taking off regular upper body clothing? [] 1 [] 2 [x] 3 [] 4   5. Taking care of personal grooming such as brushing teeth? [] 1 [] 2 [x] 3 [] 4   6. Eating meals?    [] 1 [] 2 [] 3 [x] 4

## 2023-03-09 NOTE — PROGRESS NOTES
Bedside and Verbal shift change report given to [address] rn (oncoming nurse) by Tuan Craven (offgoing nurse). Report included the following information Nurse Handoff Report, Index, Intake/Output, MAR, and Cardiac Rhythm NSR .

## 2023-03-09 NOTE — PLAN OF CARE
Problem: Discharge Planning  Goal: Discharge to home or other facility with appropriate resources  3/8/2023 1958 by Ardith Apley, RN  Outcome: Progressing  3/8/2023 1138 by Jordan Whitlock RN  Outcome: Progressing     Problem: ABCDS Injury Assessment  Goal: Absence of physical injury  3/8/2023 1958 by Ardith Apley, RN  Outcome: Progressing  3/8/2023 1138 by Jordan Whitlock RN  Outcome: Progressing     Problem: Safety - Adult  Goal: Free from fall injury  3/8/2023 1958 by Ardith Apley, RN  Outcome: Progressing  3/8/2023 1138 by Jordan Whitlock RN  Outcome: Progressing     Problem: Chronic Conditions and Co-morbidities  Goal: Patient's chronic conditions and co-morbidity symptoms are monitored and maintained or improved  3/8/2023 1958 by Ardith Apley, RN  Outcome: Progressing  3/8/2023 1138 by Jordan Whitlock RN  Outcome: Progressing     Problem: Nutrition Deficit:  Goal: Optimize nutritional status  3/8/2023 1958 by Ardith Apley, RN  Outcome: Progressing  3/8/2023 1138 by Jordan Whitlock RN  Outcome: Progressing     Problem: Pain  Goal: Verbalizes/displays adequate comfort level or baseline comfort level  3/8/2023 1958 by Ardith Apley, RN  Outcome: Progressing  3/8/2023 1138 by Jordan Whitlock RN  Outcome: Progressing     Problem: Occupational Therapy - Adult  Goal: By Discharge: Performs self-care activities at highest level of function for planned discharge setting. See evaluation for individualized goals. Description: Occupational Therapy Goals:  Initiated 3/8/2023 to be met within 7-10 days. 1.  Patient will perform grooming with modified independence. 2.  Patient will perform bathing with modified independence. 3.  Patient will perform upper body dressing and lower body dressing  with modified independence. 4.  Patient will perform toilet transfers with modified independence using RW with good balance. 5.  Patient will perform all aspects of toileting with modified independence.   6. Patient will participate in upper extremity therapeutic exercise/activities with modified independence for 8-10 minutes to increase strength/endurance for ADLs. 7.  Patient will utilize energy conservation techniques during functional activities with min verbal cues. PLOF: pt reports she is Mod I with ADLs and functional mobility - usually furniture walks, has a rollator      3/8/2023 1213 by Massiel Martinez OT  Outcome: Progressing     Problem: Physical Therapy - Adult  Goal: By Discharge: Performs mobility at highest level of function for planned discharge setting. See evaluation for individualized goals. Description: 1. Patient will move from supine to sit and sit to supine , scoot up and down, and roll side to side in bed with modified independence. 2.  Patient will transfer from bed to chair and chair to bed with modified independence using the least restrictive device. 3.  Patient will perform sit to stand with modified independence. 4.  Patient will ambulate with modified independence for 150 feet with the least restrictive device. 5.  Patient will ascend/descend 3 stairs with 1 handrail(s) with supervision/set-up. 6.  Patient will perform BLE therex as prescribed.     PLOF: pt lives alone in a house with 3 YOEL, uses a rollator     3/8/2023 1436 by James Ro PT  Outcome: Progressing

## 2023-03-10 ENCOUNTER — HOME HEALTH ADMISSION (OUTPATIENT)
Age: 88
End: 2023-03-10
Payer: MEDICARE

## 2023-03-10 VITALS
HEART RATE: 83 BPM | DIASTOLIC BLOOD PRESSURE: 56 MMHG | OXYGEN SATURATION: 40 % | HEIGHT: 61 IN | BODY MASS INDEX: 19.83 KG/M2 | TEMPERATURE: 98.1 F | WEIGHT: 105 LBS | SYSTOLIC BLOOD PRESSURE: 134 MMHG | RESPIRATION RATE: 15 BRPM

## 2023-03-10 LAB
ANION GAP SERPL CALC-SCNC: 5 MMOL/L (ref 3–18)
BASOPHILS # BLD: 0 K/UL (ref 0–0.1)
BASOPHILS NFR BLD: 0 % (ref 0–2)
BUN SERPL-MCNC: 38 MG/DL (ref 7–18)
BUN/CREAT SERPL: 39 (ref 12–20)
CALCIUM SERPL-MCNC: 8.7 MG/DL (ref 8.5–10.1)
CHLORIDE SERPL-SCNC: 105 MMOL/L (ref 100–111)
CO2 SERPL-SCNC: 29 MMOL/L (ref 21–32)
CREAT SERPL-MCNC: 0.98 MG/DL (ref 0.6–1.3)
DIFFERENTIAL METHOD BLD: ABNORMAL
EOSINOPHIL # BLD: 0 K/UL (ref 0–0.4)
EOSINOPHIL NFR BLD: 0 % (ref 0–5)
ERYTHROCYTE [DISTWIDTH] IN BLOOD BY AUTOMATED COUNT: 14.6 % (ref 11.6–14.5)
GLUCOSE SERPL-MCNC: 146 MG/DL (ref 74–99)
HCT VFR BLD AUTO: 37 % (ref 35–45)
HGB BLD-MCNC: 11.5 G/DL (ref 12–16)
IMM GRANULOCYTES # BLD AUTO: 0.1 K/UL (ref 0–0.04)
IMM GRANULOCYTES NFR BLD AUTO: 1 % (ref 0–0.5)
LYMPHOCYTES # BLD: 0.5 K/UL (ref 0.9–3.6)
LYMPHOCYTES NFR BLD: 5 % (ref 21–52)
MCH RBC QN AUTO: 26.5 PG (ref 24–34)
MCHC RBC AUTO-ENTMCNC: 31.1 G/DL (ref 31–37)
MCV RBC AUTO: 85.3 FL (ref 78–100)
MONOCYTES # BLD: 1.2 K/UL (ref 0.05–1.2)
MONOCYTES NFR BLD: 12 % (ref 3–10)
NEUTS SEG # BLD: 8.2 K/UL (ref 1.8–8)
NEUTS SEG NFR BLD: 83 % (ref 40–73)
NRBC # BLD: 0 K/UL (ref 0–0.01)
NRBC BLD-RTO: 0 PER 100 WBC
PLATELET # BLD AUTO: 266 K/UL (ref 135–420)
PMV BLD AUTO: 10 FL (ref 9.2–11.8)
POTASSIUM SERPL-SCNC: 4.3 MMOL/L (ref 3.5–5.5)
RBC # BLD AUTO: 4.34 M/UL (ref 4.2–5.3)
SODIUM SERPL-SCNC: 139 MMOL/L (ref 136–145)
WBC # BLD AUTO: 9.9 K/UL (ref 4.6–13.2)

## 2023-03-10 PROCEDURE — 80048 BASIC METABOLIC PNL TOTAL CA: CPT

## 2023-03-10 PROCEDURE — 94640 AIRWAY INHALATION TREATMENT: CPT

## 2023-03-10 PROCEDURE — 94761 N-INVAS EAR/PLS OXIMETRY MLT: CPT

## 2023-03-10 PROCEDURE — 6360000002 HC RX W HCPCS

## 2023-03-10 PROCEDURE — 2580000003 HC RX 258

## 2023-03-10 PROCEDURE — 36415 COLL VENOUS BLD VENIPUNCTURE: CPT

## 2023-03-10 PROCEDURE — 2700000000 HC OXYGEN THERAPY PER DAY

## 2023-03-10 PROCEDURE — 6370000000 HC RX 637 (ALT 250 FOR IP)

## 2023-03-10 PROCEDURE — 97530 THERAPEUTIC ACTIVITIES: CPT

## 2023-03-10 PROCEDURE — 97535 SELF CARE MNGMENT TRAINING: CPT

## 2023-03-10 PROCEDURE — 85025 COMPLETE CBC W/AUTO DIFF WBC: CPT

## 2023-03-10 PROCEDURE — 6370000000 HC RX 637 (ALT 250 FOR IP): Performed by: STUDENT IN AN ORGANIZED HEALTH CARE EDUCATION/TRAINING PROGRAM

## 2023-03-10 RX ORDER — PREDNISONE 10 MG/1
TABLET ORAL
Qty: 26 TABLET | Refills: 0 | Status: SHIPPED | OUTPATIENT
Start: 2023-03-11 | End: 2023-03-20

## 2023-03-10 RX ADMIN — FOLIC ACID 1 MG: 1 TABLET ORAL at 10:25

## 2023-03-10 RX ADMIN — ENOXAPARIN SODIUM 30 MG: 100 INJECTION SUBCUTANEOUS at 10:25

## 2023-03-10 RX ADMIN — FUROSEMIDE 20 MG: 10 INJECTION, SOLUTION INTRAMUSCULAR; INTRAVENOUS at 10:23

## 2023-03-10 RX ADMIN — FAMOTIDINE 20 MG: 20 TABLET ORAL at 10:25

## 2023-03-10 RX ADMIN — PREDNISONE 40 MG: 20 TABLET ORAL at 10:24

## 2023-03-10 RX ADMIN — METOPROLOL TARTRATE 25 MG: 25 TABLET, FILM COATED ORAL at 10:23

## 2023-03-10 RX ADMIN — ASPIRIN 81 MG: 81 TABLET, COATED ORAL at 10:23

## 2023-03-10 RX ADMIN — SODIUM CHLORIDE, PRESERVATIVE FREE 10 ML: 5 INJECTION INTRAVENOUS at 10:26

## 2023-03-10 RX ADMIN — LOSARTAN POTASSIUM 50 MG: 50 TABLET, FILM COATED ORAL at 10:23

## 2023-03-10 RX ADMIN — ARFORMOTEROL TARTRATE 15 MCG: 15 SOLUTION RESPIRATORY (INHALATION) at 09:08

## 2023-03-10 RX ADMIN — BUDESONIDE 1 MG: 1 SUSPENSION RESPIRATORY (INHALATION) at 09:10

## 2023-03-10 RX ADMIN — ATORVASTATIN CALCIUM 20 MG: 20 TABLET, FILM COATED ORAL at 10:23

## 2023-03-10 RX ADMIN — ACETAMINOPHEN 650 MG: 325 TABLET, FILM COATED ORAL at 03:01

## 2023-03-10 ASSESSMENT — PAIN DESCRIPTION - DESCRIPTORS: DESCRIPTORS: ACHING

## 2023-03-10 ASSESSMENT — PAIN DESCRIPTION - LOCATION: LOCATION: HEAD

## 2023-03-10 ASSESSMENT — PAIN SCALES - GENERAL: PAINLEVEL_OUTOF10: 5

## 2023-03-10 NOTE — PROGRESS NOTES
O2 delivery    Patient on home O2    Not use HFNC for > 3 days     O2 to 4 lpm     Patient SPO2 96 % on 4lpm    Concerns ;     Up and Down SPO2 at rest . Believe to be patient finger positioning .

## 2023-03-10 NOTE — PROGRESS NOTES
1500: Pt fall risk. Attempted to put chair alarm on chair, pt removed and refused to have chair alarm. Stated she will not have that under her and will refuse to sit in chair if she has that on. Educated pt on fall risk, call bell, and to alert a nurse before getting up. Pt still refused. 1900: Pt assisted back to bed, alarm plaaced.

## 2023-03-10 NOTE — PLAN OF CARE
Patient cleared for discharge. To be discharged with home health. NAD. Reports that she wears oxygen at home and is in no distress.

## 2023-03-10 NOTE — PLAN OF CARE
Problem: Physical Therapy - Adult  Goal: By Discharge: Performs mobility at highest level of function for planned discharge setting. See evaluation for individualized goals. Description: 1. Patient will move from supine to sit and sit to supine , scoot up and down, and roll side to side in bed with modified independence. 2.  Patient will transfer from bed to chair and chair to bed with modified independence using the least restrictive device. 3.  Patient will perform sit to stand with modified independence. 4.  Patient will ambulate with modified independence for 150 feet with the least restrictive device. 5.  Patient will ascend/descend 3 stairs with 1 handrail(s) with supervision/set-up. 6.  Patient will perform BLE therex as prescribed. PLOF: pt lives alone in a house with 3 YOEL, uses a rollator     Outcome: Progressing     PHYSICAL THERAPY TREATMENT    Patient: Cyril Waldron (82 y.o. female)  Date: 3/10/2023  Diagnosis: Hypoxemia [R09.02]  Hypoxia [R09.02]  Congestive heart failure, unspecified HF chronicity, unspecified heart failure type ([facility] [address] [I50.9] Hypoxemia      Precautions: Fall Risk  PLOF: see above      ASSESSMENT:  Pt  seen finishing up on BSC, performing dynamic standing balance of pulling up pants and changing peripad with good balance and no LOB. Pt now on 4L via NC and continues to have mild SOB with activity but is able to take rest breaks as needed as well as control breathing with PLB. Pt amb around room with walker with supervision and safe management noted. Pt educated on energy conservation techniques when she gets home and verbalized understanding. Pt left sitting EOB eating lunch.      Progression toward goals:   [x]      Improving appropriately and progressing toward goals  []      Improving slowly and progressing toward goals  []      Not making progress toward goals and plan of care will be adjusted     PLAN:  Patient continues to benefit from skilled intervention to address the above impairments. Continue treatment per established plan of care. Further Equipment Recommendations for Discharge: rolling walker         AMPAC: 22/24  Current research shows that an AM-PAC score of 18 (14 without stairs) or greater is associated with a discharge to the patient's home setting. Based on an AM-PAC score of 22/24 (or **/20 if omitting stairs) and their current functional mobility deficits, it is recommended that the patient have 2-3 sessions per week of Physical Therapy at d/c to increase the patient's independence  This AMPAC score should be considered in conjunction with interdisciplinary team recommendations to determine the most appropriate discharge setting. Patient's social support, diagnosis, medical stability, and prior level of function should also be taken into consideration. SUBJECTIVE:   Patient stated, \"I think I am going home today. \"    OBJECTIVE DATA SUMMARY:   Critical Behavior:  Orientation  Overall Orientation Status: Within Normal Limits  Orientation Level: Oriented X4       Functional Mobility Training:  Bed Mobility:  Bed Mobility Training  Bed Mobility Training: Yes  Rolling: Modified independent  Supine to Sit: Modified independent  Sit to Supine: Modified independent  Scooting: Modified independent  Transfers:  Transfer Training  Transfer Training: Yes  Sit to Stand: Modified independent  Stand to Sit: Modified independent  Balance:  Balance  Sitting: Intact  Standing: With support    Ambulation/Gait Training:     Gait  Overall Level of Assistance: Supervision  Speed/Svetlana: Pace decreased (< 100 feet/min)  Gait Abnormalities: Decreased step clearance  Distance (ft): 40 Feet  Assistive Device: Walker    Pain:  Pain level pre-treatment: 0/10  Pain level post-treatment: 0/10   Pain Intervention(s): Rest, Ice, Repositioning   Response to intervention: Nurse notified    Activity Tolerance:   Activity Tolerance: Patient limited by endurance; Patient limited by fatigue  Please refer to the flowsheet for vital signs taken during this treatment. After treatment:   [] Patient left in no apparent distress sitting up in chair  [x] Patient left in no apparent distress in bed  [x] Call bell left within reach  [x] Nursing notified  [] Caregiver present  [] Bed alarm activated  [] SCDs applied      COMMUNICATION/EDUCATION:   Patient Education  Education Given To: Patient  Education Provided: Role of Therapy;Plan of Care  Education Method: Demonstration  Barriers to Learning: None  Education Outcome: Verbalized understanding;Demonstrated understanding      Melanie Colby, PT  Minutes: [address] AM-PAC® Basic Mobility Inpatient Short Form (6-Clicks) Version 2    How much HELP from another person does the patient currently need    (If the patient hasn't done an activity recently, how much help from another person do you think he/she would need if he/she tried?)   Total (Total A or Dep)   A Lot  (Mod to Max A)   A Little (Sup or Min A)   None (Mod I to I)   Turning from your back to your side while in a flat bed without using bedrails? [] 1 [] 2 [] 3 [x] 4   2. Moving from lying on your back to sitting on the side of a flat bed without using bedrails? [] 1 [] 2 [] 3 [x] 4   3. Moving to and from a bed to a chair (including a wheelchair)? [] 1 [] 2 [] 3 [x] 4   4. Standing up from a chair using your arms (e.g., wheelchair, or bedside chair)? [] 1 [] 2 [] 3 [x] 4   5. Walking in hospital room? [] 1 [] 2 [x] 3 [] 4   6. Climbing 3-5 steps with a railing?+   [] 1 [] 2 [x] 3 [] 4   +If stair climbing cannot be assessed, skip item #6. Sum responses from items 1-5. Current research shows that an AM-PAC score of 18 (14 without stairs) or greater is associated with a discharge to the patient's home setting.  Based on an AM-PAC score of 22/24 (or **/20 if omitting stairs) and their current functional mobility deficits, it is recommended that the patient have 2-3 sessions per week of Physical Therapy at d/c to increase the patient's independence.

## 2023-03-10 NOTE — PLAN OF CARE
Problem: Discharge Planning  Goal: Discharge to home or other facility with appropriate resources  Outcome: Progressing     Problem: ABCDS Injury Assessment  Goal: Absence of physical injury  Outcome: Progressing     Problem: Safety - Adult  Goal: Free from fall injury  Outcome: Progressing     Problem: Chronic Conditions and Co-morbidities  Goal: Patient's chronic conditions and co-morbidity symptoms are monitored and maintained or improved  Outcome: Progressing     Problem: Nutrition Deficit:  Goal: Optimize nutritional status  Outcome: Progressing     Problem: Pain  Goal: Verbalizes/displays adequate comfort level or baseline comfort level  Outcome: Progressing     Problem: Occupational Therapy - Adult  Goal: By Discharge: Performs self-care activities at highest level of function for planned discharge setting. See evaluation for individualized goals. Description: Occupational Therapy Goals:  Initiated 3/8/2023 to be met within 7-10 days. 1.  Patient will perform grooming with modified independence. 2.  Patient will perform bathing with modified independence. 3.  Patient will perform upper body dressing and lower body dressing  with modified independence. 4.  Patient will perform toilet transfers with modified independence using RW with good balance. 5.  Patient will perform all aspects of toileting with modified independence. 6.  Patient will participate in upper extremity therapeutic exercise/activities with modified independence for 8-10 minutes to increase strength/endurance for ADLs. 7.  Patient will utilize energy conservation techniques during functional activities with min verbal cues.     PLOF: pt reports she is Mod I with ADLs and functional mobility - usually furniture walks, has a rollator      3/9/2023 0958 by JAEL Diaz  Outcome: Progressing

## 2023-03-10 NOTE — PROGRESS NOTES
Family member at bedside. Voiced full understanding of discharge teaching and continued use of home 02. Home Health set up. Follow up appointment with PCP on 3/15/23 per patient Jackson Bridges NP).

## 2023-03-10 NOTE — HOME CARE
Received home health referral for [facility] for (SN, PT, OT). Discharge order noted for today. Spoke with patient in room with sister at bedside;  patient identifiers verified. Explained home health care services and routines. Demographics verified including insurance, phone and address confirmed. Patient has the following DME: has available O2 concentrator and order noted for rolling walker. Caregivers available resides by self;  has son living 1 mile away and other friends and family communicating daily and also available to assist.   Orders noted and arranged to be processed by central intake.        ---  Kim Rios LPN  [facility] - INPATIENT Liaison

## 2023-03-10 NOTE — PROGRESS NOTES
Noted home health order for pt at discharge. Patient agreeable to [facility] home health. Spoke with Kim whitfield with [address] and she states they can accept pt. Home health order placed in the queue. Noted order for DME rolling walker. Patient is declining this DME. She states she already has a walker at home.     [facility] RN CDCES  Case Management

## 2023-03-10 NOTE — PLAN OF CARE
Problem: Occupational Therapy - Adult  Goal: By Discharge: Performs self-care activities at highest level of function for planned discharge setting. See evaluation for individualized goals. Description: Occupational Therapy Goals:  Initiated 3/8/2023 to be met within 7-10 days. 1.  Patient will perform grooming with modified independence. 2.  Patient will perform bathing with modified independence. 3.  Patient will perform upper body dressing and lower body dressing  with modified independence. 4.  Patient will perform toilet transfers with modified independence using RW with good balance. 5.  Patient will perform all aspects of toileting with modified independence. 6.  Patient will participate in upper extremity therapeutic exercise/activities with modified independence for 8-10 minutes to increase strength/endurance for ADLs. 7.  Patient will utilize energy conservation techniques during functional activities with min verbal cues. PLOF: pt reports she is Mod I with ADLs and functional mobility - usually furniture walks, has a rollator      Outcome: [address] TREATMENT    Patient: Charlie Olmedo (82 y.o. female)  Date: 3/10/2023  Diagnosis: Hypoxemia [R09.02]  Hypoxia [R09.02]  Congestive heart failure, unspecified HF chronicity, unspecified heart failure type ([facility] [address] [I50.9] Hypoxemia      Precautions: Fall Risk,  ,  PLOF:  pt reports she is Mod I with ADLs and functional mobility - usually furniture walks, has a rollator    Chart, occupational therapy assessment, plan of care, and goals were reviewed. ASSESSMENT:  Pt presented sitting EOB upon entry, on 5L O2NC, and agreeable to work with OT. Pt engaged in oral care requiring S/U and demonstrated G dyn sitting balance. Pt with mild SOB and instructed to perform PLB for optimal oxygenation, SPO2 assessed at 92% on 5L. RT presented in room for breathing tx.  She was left with bed alarm active and all needs left within lemuel. RN made aware. Progression toward goals:  []          Improving appropriately and progressing toward goals  [x]          Improving slowly and progressing toward goals  []          Not making progress toward goals and plan of care will be adjusted     PLAN:  Patient continues to benefit from skilled intervention to address the above impairments. Continue treatment per established plan of care. Further Equipment Recommendations for Discharge: RW    Discharge Recommendations: Home with Home health OT    AMPAC: Current research shows that an AM-PAC score of 18 or greater is associated with a discharge to the patient's home setting. Based on an AM-PAC score of 19/24 and their current ADL deficits; it is recommended that the patient have 2-3 sessions per week of Occupational Therapy at d/c to increase the patient's independence. This AMPAC score should be considered in conjunction with interdisciplinary team recommendations to determine the most appropriate discharge setting. Patient's social support, diagnosis, medical stability, and prior level of function should also be taken into consideration. SUBJECTIVE:   Patient stated, \"I should be leaving today. \"    OBJECTIVE DATA SUMMARY:   Cognitive/Behavioral Status:  Orientation  Overall Orientation Status: Within Normal Limits  Orientation Level: Oriented X4       Functional Mobility and Transfers for ADLs:   Bed Mobility:  Bed Mobility Training  Bed Mobility Training: Yes  Rolling: Modified independent  Supine to Sit: Modified independent  Sit to Supine: Modified independent  Scooting: Modified independent   Transfers:  Transfer Training  Transfer Training: Yes  Sit to Stand: Modified independent  Stand to Sit: Modified independent    Balance:  Balance  Sitting: Intact  Standing: With support    ADL Intervention:     Grooming: Setup     Pain:  Pain level pre-treatment: 0/10   Pain level post-treatment: 0/10      Activity Tolerance:    Activity Tolerance: Patient limited by endurance  Please refer to the flowsheet for vital signs taken during this treatment. After treatment:   [x]  Patient left in no apparent distress sitting up EOB w/ RT  []  Patient left in no apparent distress in bed  [x]  Call bell left within reach  [x]  Nursing notified  []  Caregiver present  [x]  Bed alarm activated    COMMUNICATION/EDUCATION:   Patient Education  Education Given To: Patient  Education Provided: Role of Therapy;Transfer Training;Plan of Care;Energy Conservation; ADL Adaptive Strategies  Education Method: Demonstration;Verbal;Teach Back  Barriers to Learning: None  Education Outcome: Continued education needed;Verbalized understanding      Thank you for this referral.  JAEL Lane  Minutes: [address] [place] [address] AM-PAC® Daily Activity Inpatient Short Form (6-Clicks)    How much HELP from another person does the patient currently need    (If the patient hasn't done an activity recently, how much help from another person do you think he/she would need if he/she tried?)   Total (Total A or Dep)   A Lot  (Mod to Max A)   A Little (Sup or Min A)   None (Mod I to I)   Putting on and taking off regular lower body clothing? [] 1 [] 2 [x] 3 [] 4   2. Bathing (including washing, rinsing,      drying)? [] 1 [] 2 [x] 3 [] 4   3. Toileting, which includes using toilet, bedpan or urinal?   [] 1 [] 2 [x] 3 [] 4   4. Putting on and taking off regular upper body clothing? [] 1 [] 2 [x] 3 [] 4   5. Taking care of personal grooming such as brushing teeth? [] 1 [] 2 [x] 3 [] 4   6. Eating meals?    [] 1 [] 2 [] 3 [x] 4

## 2023-03-12 ENCOUNTER — HOME CARE VISIT (OUTPATIENT)
Age: 88
End: 2023-03-12

## 2023-03-12 PROCEDURE — 0221000100 HH NO PAY CLAIM PROCEDURE

## 2023-03-12 PROCEDURE — G0299 HHS/HOSPICE OF RN EA 15 MIN: HCPCS

## 2023-03-12 NOTE — CASE COMMUNICATION
Soc done on patient 3/12/23. Delightful nice lady, ambulating around without assistance, due her  decondition state,  SN/PT/OT all ordred.

## 2023-03-13 ENCOUNTER — HOME CARE VISIT (OUTPATIENT)
Age: 88
End: 2023-03-13

## 2023-03-13 VITALS
SYSTOLIC BLOOD PRESSURE: 124 MMHG | RESPIRATION RATE: 14 BRPM | OXYGEN SATURATION: 96 % | DIASTOLIC BLOOD PRESSURE: 74 MMHG | TEMPERATURE: 97.6 F | HEART RATE: 76 BPM

## 2023-03-13 LAB
EKG ATRIAL RATE: 75 BPM
EKG DIAGNOSIS: NORMAL
EKG P AXIS: 92 DEGREES
EKG P-R INTERVAL: 136 MS
EKG Q-T INTERVAL: 400 MS
EKG QRS DURATION: 72 MS
EKG QTC CALCULATION (BAZETT): 446 MS
EKG R AXIS: 39 DEGREES
EKG T AXIS: 94 DEGREES
EKG VENTRICULAR RATE: 75 BPM

## 2023-03-13 PROCEDURE — G0151 HHCP-SERV OF PT,EA 15 MIN: HCPCS

## 2023-03-13 PROCEDURE — 93010 ELECTROCARDIOGRAM REPORT: CPT | Performed by: INTERNAL MEDICINE

## 2023-03-13 ASSESSMENT — ENCOUNTER SYMPTOMS
COUGH: 1
DYSPNEA ACTIVITY LEVEL: AT REST
COUGH CHARACTERISTICS: MOIST
HEMOPTYSIS: 0
ORTHOPNEA: 1
PAIN LOCATION - PAIN QUALITY: DULL ACHE

## 2023-03-13 NOTE — Clinical Note
Patient is eval only for PT, no skill and is declining OT, no skill, as she is at baseline. SCheduling and MD notified.    Thanks  Dylon

## 2023-03-13 NOTE — HOME HEALTH
Skilled services/Home bound verification:  PATIENT AND CAREGIVER UNDERSTAND HOMEBOUND STATUS. Skilled Reason for admission/summary of clinical condition:   Acute on Chronic Hypoxemic Respiratory Failure. This patient is homebound for the following reasons Requires considerable and taxing effort to leave the home , Requires the assistance of 1 or more persons to leave the home  and Only leaves the home for medical reasons or Church services and are infrequent and of short duration for other reasons . Caregiver: Son Sunny Crawley is caregiver, he is available some days and most evenings, he .Assists with ADLs, Medication management, Transportation to appointments, Meal prep and assists with ambulation. Medications reconciled and all medications are available in the home this visit. The following education was provided regarding medications: All medications should be given the same time daily. Medications  are all in the home at this time. High risk medication teaching regarding CNS depressant, Xanax,  I discussed the risk of addiction and increase in respiratory depression. Home health supplies by type and quantity ordered/delivered this visit include: n/a    Patient education provided this visit to include:  Patient is a fall risk, I recommend supervision at all times, keeping walk ways/halls clear of clutter. I went over the dangers of 02 in the home, I told patient to be aware of tubing so to not trip over it. I also told her different ways to decrease the work of breathing, ie: Tripod position. Patient should weigh daily to be alert for water weight gain. Observe for s/s of edema increased SOB or cough. Patient level of understanding of education provided: [address] Education Provided:N/A  Patient response to procedure performed:  Patient was cooperative and engaged during [facility] interview.      Home exercise program/Homework provided: Continue to monitor B/P and O2 sat, Continue to weigh herself daily. Continue a heart Healthy diet. Watch out for high sodium foods, read labels. Observe for signs of infection. Monitor B/P, take meds as ordered and f/u with PCP. Pt/Caregiver instructed on plan of care and are agreeable to plan of care at this time. Nurse [address]  notified of patient admission to home health and plan of care including anticipated frequency of visits and treatments/interventions/modalities of SN/PT/OT    Discharge planning discussed with patient and caregiver. Discharge planning as follows: Will discharge when education is completed and patient is medically stable. Pt/Caregiver did verbalize understanding of discharge planning. Next MD appointment TBD  with [facility] NP  Patient/caregiver encouraged/instructed to keep appointment as lack of follow through with physician appointment could result in discontinuation of home care services for non-compliance.

## 2023-03-13 NOTE — Clinical Note
Thank you  ----- Message -----  From: Jeny Hunt, PT  Sent: 3/17/2023   4:50 PM EDT  To: Elissa Shi, RN, Markus Walker RN, *      Patient is eval only for PT, no skill and is declining OT, no skill, as she is at baseline. SCheduling and MD notified.    Thanks  Dylon

## 2023-03-14 VITALS
SYSTOLIC BLOOD PRESSURE: 110 MMHG | DIASTOLIC BLOOD PRESSURE: 50 MMHG | RESPIRATION RATE: 17 BRPM | OXYGEN SATURATION: 91 % | HEART RATE: 63 BPM | TEMPERATURE: 97.7 F

## 2023-03-14 ASSESSMENT — ENCOUNTER SYMPTOMS: DYSPNEA ACTIVITY LEVEL: AFTER AMBULATING LESS THAN 10 FT

## 2023-03-14 NOTE — HOME HEALTH
PMHx:  Arthritis      Chronic lung disease      Chronic obstructive pulmonary disease ([facility] [address]      Edema due to congestive heart failure ([facility] [address] 11/1/2022    Heart failure ([facility] [address]      Hypertension     SUBJECTIVE:Pt reports she was having increased SOB and called 911. She spent 3 days in the hospital before being discharged back home. She states she is doing well and feels she is at her baseline and does not think she needs any therapies. She has her exercise handouts posted on her fridge from the last time and reports compliance. LIVING SITUATION: Lives alone in a 1 story home with 4 steps to enter. Pts den is down 4 steps with a hand rail  REQUIRES CAREGIVER ASSISTANCE FOR: Pts family assists with groceries, household chores and tranportation  PLOF: Independent including driving. MEDICATIONS REVIEWED AND UPDATED: Medications reconciled, no changes noted  NEXT MD APPT: DEVYN  ROM:B LE's WFL  WOUNDS:NA  BED MOBILITY:Independent  TRANSFERS:Independent  GAIT: X150ft w/o AD. Gait characterized by slight increased JACKSON and good pacing to prevent increased SOB  STAIRS:Up/Down 4 steps into den area with 1 handrail independently with reciprocal gait  BALANCE: Pt scored 25/28 on Tinetti Balance Assessment placing patient at low risk for falls. PATIENT EDUCATION PROVIDED THIS VISIT: safety, HEP, walking, deep breathing, pacing techniques       HEP consisting of:  1. Walking every hour during the day    2. Sit to stand working up to 10 reps 3-5X/day  3. Deep breathing   Written instructions issued. Patient/caregiver verbalized understanding. PATIENT RESPONSE TO TREATMENT: No c/o increased pain or SOB throughout assessment  CONTINUED NEED FOR THE FOLLOWING SKILLS: No skill is needed at this time for HHPT or OT. Pt demonstrates good B LE strength, she is ambulating independently throughout her home w/o an AD and uses a rollator walker when walking out to her mailbox. Pts Tinetti is 25/28 placing pt at low risk for falls. Pt TUG is 17 w/o AD an no deviations noted. Pt able to maintain her SpO2 >88% on 4L O2 throughout assessment. Pt demonstrated independence with her HEP that was established the last time she was on home care services. No skill needed at this time. Pt in agreement and verbalized understanding. SN remains in the home. Notified office of no skill for PT/OT.  LM at [facility], NP office informing of such with call back contact info for any questions  PLAN: Eval only    DISCHARGE PLANNING DISCUSSED: Discharge to self and family under MD supervision once all goals have been met or patient has reached max potential.

## 2023-03-16 ENCOUNTER — HOME CARE VISIT (OUTPATIENT)
Age: 88
End: 2023-03-16

## 2023-03-17 ENCOUNTER — HOME CARE VISIT (OUTPATIENT)
Age: 88
End: 2023-03-17

## 2023-03-17 VITALS
WEIGHT: 106 LBS | OXYGEN SATURATION: 97 % | SYSTOLIC BLOOD PRESSURE: 138 MMHG | BODY MASS INDEX: 20.03 KG/M2 | DIASTOLIC BLOOD PRESSURE: 62 MMHG | TEMPERATURE: 97.8 F | RESPIRATION RATE: 16 BRPM | HEART RATE: 67 BPM

## 2023-03-17 PROCEDURE — G0300 HHS/HOSPICE OF LPN EA 15 MIN: HCPCS

## 2023-03-17 ASSESSMENT — ENCOUNTER SYMPTOMS: STOOL DESCRIPTION: SOFT FORMED

## 2023-03-17 NOTE — HOME HEALTH
Skilled reason for visit: CHF, COPD, Hypertension, Oxygen Therapy, Medication, Safety, Infection Prevention, Education and Management    Caregiver involvement: Patients sister will come over and assist with meals when she needs assistance. Patient states she is independent for the most part and has a son nearby that can come by and assist, however if she is feeling too bad she does not move around too much and she ambulates with her cane. Patient state she can prepare small meals for herself, and toilet and bathes herself. Medications reviewed and all medications ARE available in the home this visit. The following education was provided regarding medications:Lipitor-Patient/Caregiver was educated on this medication and the purpose of it. Patient/Caregiver verbalized understanding. MD notified of any discrepancies/look a-like medications/medication interactions:NA    Medications are EFFECTIVE at this time. Home health supplies by type and quantity ordered/delivered this visit include: NA    Patient education provided this visit:See interventions Tab. Sharps education provided: NA    Patient level of understanding of education provided:  Patient verbalized all understanding in interventions tab. Skilled Care Performed this visit: Disease, Medication, Safety, Infection Prevention, Education and Management    Patient response to procedure performed:  Patient tolerated vital assessment well and no facial grimaces or complaints of pain or discomfort. Patient was sitting at her dining room table answering question that this nurse was asking her. Agency Progress toward goals: Agency staff is progressing well with patient as patient verbalizes being able to better manage her disease processes with the education received from home care staff. Patient's Progress towards personal goals:  Patient has progressed well and was able to verbalize education that was taught on disease processes.     Home exercise program: Patient will continue to weigh herself daily and record readings in notebook, as well as report any weight gain of 2 lbs or > in 24 hours or 5 lbs or > in one week to MD office immediately. Continued need for the following skills: Nursing will continue to follow patient until education is understood and able to be verbalized by patient/caregiver. Plan for next visit:Continue to educate, assess weight, assess vitals, and review medications; Possibly set patient up for discharge providing patient shows no decline. Patient and/or caregiver notified and agrees to changes in the Plan of Care:NA    The following discharge planning was discussed with the pt/caregiver:  when patient reaches goals and medication is managed, and disease processes are understood patient agrees and understand that discharge will take place.

## 2023-03-21 ENCOUNTER — HOME CARE VISIT (OUTPATIENT)
Age: 88
End: 2023-03-21
Payer: MEDICARE

## 2023-03-21 VITALS
SYSTOLIC BLOOD PRESSURE: 118 MMHG | DIASTOLIC BLOOD PRESSURE: 52 MMHG | OXYGEN SATURATION: 91 % | HEART RATE: 75 BPM | TEMPERATURE: 97.6 F | RESPIRATION RATE: 16 BRPM

## 2023-03-21 PROCEDURE — G0300 HHS/HOSPICE OF LPN EA 15 MIN: HCPCS

## 2023-03-21 ASSESSMENT — ENCOUNTER SYMPTOMS: STOOL DESCRIPTION: SOFT FORMED

## 2023-03-21 NOTE — HOME HEALTH
exercise program: Patient will be sure to elevate her bilateral lowe extremities to help decrease edema as she has edema today and states she has riana up on her feet a lot yesterday and this morning and will elevate them all day today. Continued need for the following skills: Nursing will continue to follow patient until education is understood and able to be verbalized by patient/caregiver. Plan for next visit:Continue to educate, assess weight, assess vitals, and review medications; Possibly set patient up for discharge providing patient shows no decline. Patient and/or caregiver notified and agrees to changes in the Plan of Care:NA    The following discharge planning was discussed with the pt/caregiver:  when patient reaches goals and medication is managed, and disease processes are understood patient agrees and understand that discharge will take place.

## 2023-03-23 ENCOUNTER — HOME CARE VISIT (OUTPATIENT)
Age: 88
End: 2023-03-23
Payer: MEDICARE

## 2023-03-23 PROCEDURE — G0299 HHS/HOSPICE OF RN EA 15 MIN: HCPCS

## 2023-03-25 VITALS
TEMPERATURE: 98 F | RESPIRATION RATE: 18 BRPM | HEART RATE: 73 BPM | DIASTOLIC BLOOD PRESSURE: 60 MMHG | SYSTOLIC BLOOD PRESSURE: 120 MMHG | OXYGEN SATURATION: 90 %

## 2023-03-25 ASSESSMENT — ENCOUNTER SYMPTOMS: DYSPNEA ACTIVITY LEVEL: AT REST

## 2023-03-25 NOTE — HOME HEALTH
pt has 4/25 pcp and pulmonary md in june. patrice cardiologist 5/24    Skilled reason for visit: [facility] discharge visit    Caregiver involvement: patients cg is sister and she is available as needed or assistance with IADL's, ADL's, meal prep, medication management, and taking patient to all doctors appointments. Medications reviewed and all medications are available in the home this visit. The following education was provided regarding medications, medication interactions, and look alike medications (specify): oxygen 4L  Medications are effective at this time. Medications reconciled and all meds in home    Home health supplies by type and quantity ordered/delivered this visit include: PT [address]      Patient education provided this visit: see interventions    Patient level of understanding of education provided: pt is understanding of all procedures performed. Skilled Care Performed this visit: sn nursing assessment, pain, meds, vs    Patient response to procedure performed: patient tolerated procedure with no signs of discomfort, grimacing or pain, no complications or concerns noted.     Agency Progress toward goals: all goals met        Patient's Progress towards personal goals: all goals met      Home exercise program: continue home exercises program as developed by physical therapy     Continued need for the following skills: None     Plan for next visit: none    Patient and/or caregiver notified and agrees to changes in the Plan of Care YES      The following discharge planning was discussed with the pt/caregiver: Patient will be discharged today, per pt request.

## 2023-04-26 DIAGNOSIS — I73.9 PERIPHERAL VASCULAR DISEASE, UNSPECIFIED (HCC): Primary | ICD-10-CM

## 2023-04-28 ENCOUNTER — OFFICE VISIT (OUTPATIENT)
Age: 88
End: 2023-04-28
Payer: MEDICARE

## 2023-04-28 VITALS
HEART RATE: 66 BPM | SYSTOLIC BLOOD PRESSURE: 132 MMHG | OXYGEN SATURATION: 99 % | DIASTOLIC BLOOD PRESSURE: 82 MMHG | WEIGHT: 106.04 LBS | BODY MASS INDEX: 20.02 KG/M2 | HEIGHT: 61 IN

## 2023-04-28 DIAGNOSIS — I73.9 PERIPHERAL VASCULAR DISEASE, UNSPECIFIED (HCC): Primary | ICD-10-CM

## 2023-04-28 DIAGNOSIS — I87.2 VENOUS INSUFFICIENCY (CHRONIC) (PERIPHERAL): ICD-10-CM

## 2023-04-28 DIAGNOSIS — I89.0 LYMPHEDEMA: ICD-10-CM

## 2023-04-28 DIAGNOSIS — I65.23 OCCLUSION AND STENOSIS OF BILATERAL CAROTID ARTERIES: ICD-10-CM

## 2023-04-28 PROCEDURE — 1036F TOBACCO NON-USER: CPT | Performed by: PHYSICIAN ASSISTANT

## 2023-04-28 PROCEDURE — 1123F ACP DISCUSS/DSCN MKR DOCD: CPT | Performed by: PHYSICIAN ASSISTANT

## 2023-04-28 PROCEDURE — G8420 CALC BMI NORM PARAMETERS: HCPCS | Performed by: PHYSICIAN ASSISTANT

## 2023-04-28 PROCEDURE — G8427 DOCREV CUR MEDS BY ELIG CLIN: HCPCS | Performed by: PHYSICIAN ASSISTANT

## 2023-04-28 PROCEDURE — 3074F SYST BP LT 130 MM HG: CPT | Performed by: PHYSICIAN ASSISTANT

## 2023-04-28 PROCEDURE — G8400 PT W/DXA NO RESULTS DOC: HCPCS | Performed by: PHYSICIAN ASSISTANT

## 2023-04-28 PROCEDURE — 99214 OFFICE O/P EST MOD 30 MIN: CPT | Performed by: PHYSICIAN ASSISTANT

## 2023-04-28 PROCEDURE — 3078F DIAST BP <80 MM HG: CPT | Performed by: PHYSICIAN ASSISTANT

## 2023-04-28 PROCEDURE — 1090F PRES/ABSN URINE INCON ASSESS: CPT | Performed by: PHYSICIAN ASSISTANT

## 2023-04-28 ASSESSMENT — PATIENT HEALTH QUESTIONNAIRE - PHQ9
SUM OF ALL RESPONSES TO PHQ QUESTIONS 1-9: 0
2. FEELING DOWN, DEPRESSED OR HOPELESS: 0
SUM OF ALL RESPONSES TO PHQ9 QUESTIONS 1 & 2: 0
1. LITTLE INTEREST OR PLEASURE IN DOING THINGS: 0
SUM OF ALL RESPONSES TO PHQ QUESTIONS 1-9: 0

## 2023-05-01 NOTE — PROGRESS NOTES
[address]    Chief Complaint   Patient presents with    Carotid Artery Stenosis       History and Physical    Ms Kristen Steen is a pleasant 82-year-old female who presents today for follow-up and reevaluation for known carotid stenosis disease. She reports that she has done well since her last visit. Patient continues to deny any signs or symptoms of TIA or stroke. Denies any recent memory loss, last for searching for words, unilateral or bilateral weaknesses. A few years ago she became dependent on O2 supplementation. He continues with supplementation without any issues. She has done well with adjusting to full time oxygen. She had still been active and busy until the pandemic limited what she could do! She had been having issues with lower extremity edema and did get compression stockings, which she wears regularly, and she says this does help. Unfortunately patient was recently in the hospital for right lower extremity swelling and cellulitis. Patient states she was started on antibiotic, and her redness and irritation has dissipated but the swelling remains. She does report being compliant with wearing her compression stockings, and also states that she elevates her legs by sitting in a recliner. Denies elevating her legs above the level of her heart. Patient continues to ambulate daily, short distances, without any leg pain or discomfort. She denies any leg pain at rest.  Patient denies having any issues healing any cuts or abrasions in her lower extremities in the past.  Patient continues to take her daily meds as recommended including aspirin and statin medication. Patient reports that she continues to take her daily meds as recommended including aspirin, statin, and her water pill. Patient states that she also takes gabapentin for neuropathy, feels that this does help a lot.        Past Medical History:   Diagnosis Date    Arthritis     Chronic lung disease     Chronic obstructive pulmonary

## 2023-05-24 ENCOUNTER — OFFICE VISIT (OUTPATIENT)
Age: 88
End: 2023-05-24
Payer: MEDICARE

## 2023-05-24 VITALS
BODY MASS INDEX: 19.26 KG/M2 | OXYGEN SATURATION: 92 % | HEIGHT: 61 IN | SYSTOLIC BLOOD PRESSURE: 121 MMHG | HEART RATE: 69 BPM | WEIGHT: 102 LBS | DIASTOLIC BLOOD PRESSURE: 47 MMHG

## 2023-05-24 DIAGNOSIS — I10 ESSENTIAL (PRIMARY) HYPERTENSION: ICD-10-CM

## 2023-05-24 DIAGNOSIS — E78.5 HYPERLIPIDEMIA, UNSPECIFIED HYPERLIPIDEMIA TYPE: ICD-10-CM

## 2023-05-24 DIAGNOSIS — I27.20 PULMONARY HYPERTENSION, UNSPECIFIED (HCC): ICD-10-CM

## 2023-05-24 DIAGNOSIS — I50.32 CHRONIC DIASTOLIC (CONGESTIVE) HEART FAILURE (HCC): ICD-10-CM

## 2023-05-24 DIAGNOSIS — J44.9 CHRONIC OBSTRUCTIVE PULMONARY DISEASE, UNSPECIFIED COPD TYPE (HCC): ICD-10-CM

## 2023-05-24 DIAGNOSIS — I25.119 ATHEROSCLEROSIS OF NATIVE CORONARY ARTERY OF NATIVE HEART WITH ANGINA PECTORIS (HCC): Primary | ICD-10-CM

## 2023-05-24 PROCEDURE — 3023F SPIROM DOC REV: CPT | Performed by: INTERNAL MEDICINE

## 2023-05-24 PROCEDURE — 1036F TOBACCO NON-USER: CPT | Performed by: INTERNAL MEDICINE

## 2023-05-24 PROCEDURE — G8400 PT W/DXA NO RESULTS DOC: HCPCS | Performed by: INTERNAL MEDICINE

## 2023-05-24 PROCEDURE — 3074F SYST BP LT 130 MM HG: CPT | Performed by: INTERNAL MEDICINE

## 2023-05-24 PROCEDURE — G8427 DOCREV CUR MEDS BY ELIG CLIN: HCPCS | Performed by: INTERNAL MEDICINE

## 2023-05-24 PROCEDURE — 1123F ACP DISCUSS/DSCN MKR DOCD: CPT | Performed by: INTERNAL MEDICINE

## 2023-05-24 PROCEDURE — 3078F DIAST BP <80 MM HG: CPT | Performed by: INTERNAL MEDICINE

## 2023-05-24 PROCEDURE — 99214 OFFICE O/P EST MOD 30 MIN: CPT | Performed by: INTERNAL MEDICINE

## 2023-05-24 PROCEDURE — G8420 CALC BMI NORM PARAMETERS: HCPCS | Performed by: INTERNAL MEDICINE

## 2023-05-24 PROCEDURE — 1090F PRES/ABSN URINE INCON ASSESS: CPT | Performed by: INTERNAL MEDICINE

## 2023-05-24 RX ORDER — LANOLIN ALCOHOL/MO/W.PET/CERES
1000 CREAM (GRAM) TOPICAL DAILY
COMMUNITY

## 2023-05-24 NOTE — PROGRESS NOTES
1. Have you been to the ER, urgent care clinic since your last visit? Hospitalized since your last visit? Yes Where: [facility]    2. Have you seen or consulted any other health care providers outside of the [address] [place] [address] since your last visit? Include any pap smears or colon screening. No     3. Do you need any refills today?    no

## 2023-05-24 NOTE — PROGRESS NOTES
HISTORY OF PRESENT ILLNESS  Rosa Rehman is a 82 y.o. female. Patient with chf,copd,htn,pulm htn  4/2021  Patient seen today for follow-up. Since last visit had multiple admissions first 1 in 12/2020 with COVID-19 infection. Subsequently into 2021 with COPD exacerbation. She is recovering slowly remains on home oxygen. Shortness of breath is stable. Denies any chest pain  8/2022  Patient seen today for follow-up since last evaluation was in hospital in May 2022 with UTI. Subsequently was in ER last month with increased edema. Continues to have increase edema right now. She is short of breath on oxygen and remains on home oxygen  9/2022  Patient with h/o pulmonary hypertension seen in follow up for testing results. She denies edema, c/o Right hip / leg pain. She denies chest pain or palpitations. Follow-up  The history is provided by the Patient and medical records. Associated symptoms include shortness of breath. Pertinent negatives include no chest pain, no abdominal pain and no headaches. CHF  The history is provided by the Patient. This is a chronic problem. The problem occurs constantly. The problem has not changed since onset. Associated symptoms include shortness of breath. Pertinent negatives include no chest pain, no abdominal pain and no headaches. The symptoms are aggravated by exertion. Nothing relieves the symptoms. Hypertension  Associated symptoms include shortness of breath. Pertinent negatives include no chest pain, no abdominal pain and no headaches. Shortness of Breath  The history is provided by the Patient. This is a recurrent problem. The problem occurs frequently. The problem has not changed since onset. Pertinent negatives include no fever, no headaches, no cough, no sputum production, no hemoptysis, no wheezing, no PND, no orthopnea, no chest pain, no vomiting, no abdominal pain, no rash, no leg swelling and no claudication. Precipitated by: activity.      Review of

## 2023-06-05 ENCOUNTER — HOSPITAL ENCOUNTER (OUTPATIENT)
Facility: HOSPITAL | Age: 88
Discharge: HOME | End: 2023-06-08
Payer: MEDICARE

## 2023-06-05 DIAGNOSIS — J44.9 CHRONIC OBSTRUCTIVE PULMONARY DISEASE, UNSPECIFIED COPD TYPE (HCC): ICD-10-CM

## 2023-06-05 PROCEDURE — 71046 X-RAY EXAM CHEST 2 VIEWS: CPT

## 2023-08-23 ENCOUNTER — APPOINTMENT (OUTPATIENT)
Facility: HOSPITAL | Age: 88
End: 2023-08-23
Payer: MEDICARE

## 2023-08-23 ENCOUNTER — HOSPITAL ENCOUNTER (EMERGENCY)
Facility: HOSPITAL | Age: 88
Discharge: HOME | End: 2023-08-24
Attending: EMERGENCY MEDICINE
Payer: MEDICARE

## 2023-08-23 DIAGNOSIS — J44.1 COPD WITH ACUTE EXACERBATION (HCC): Primary | ICD-10-CM

## 2023-08-23 LAB
ALBUMIN SERPL-MCNC: 4 G/DL (ref 3.4–5)
ALBUMIN/GLOB SERPL: 1.1 (ref 0.8–1.7)
ALP SERPL-CCNC: 62 U/L (ref 45–117)
ALT SERPL-CCNC: 35 U/L (ref 13–56)
ANION GAP SERPL CALC-SCNC: 6 MMOL/L (ref 3–18)
AST SERPL-CCNC: 27 U/L (ref 10–38)
BASOPHILS # BLD: 0.1 K/UL (ref 0–0.1)
BASOPHILS NFR BLD: 1 % (ref 0–2)
BILIRUB SERPL-MCNC: 0.5 MG/DL (ref 0.2–1)
BUN SERPL-MCNC: 17 MG/DL (ref 7–18)
BUN/CREAT SERPL: 14 (ref 12–20)
CALCIUM SERPL-MCNC: 8.8 MG/DL (ref 8.5–10.1)
CHLORIDE SERPL-SCNC: 109 MMOL/L (ref 100–111)
CO2 SERPL-SCNC: 27 MMOL/L (ref 21–32)
CREAT SERPL-MCNC: 1.2 MG/DL (ref 0.6–1.3)
DIFFERENTIAL METHOD BLD: ABNORMAL
EOSINOPHIL # BLD: 0.3 K/UL (ref 0–0.4)
EOSINOPHIL NFR BLD: 3 % (ref 0–5)
ERYTHROCYTE [DISTWIDTH] IN BLOOD BY AUTOMATED COUNT: 18.1 % (ref 11.6–14.5)
GLOBULIN SER CALC-MCNC: 3.6 G/DL (ref 2–4)
GLUCOSE SERPL-MCNC: 136 MG/DL (ref 74–99)
HCT VFR BLD AUTO: 44.9 % (ref 35–45)
HGB BLD-MCNC: 14.2 G/DL (ref 12–16)
IMM GRANULOCYTES # BLD AUTO: 0 K/UL
IMM GRANULOCYTES NFR BLD AUTO: 0 %
LACTATE BLD-SCNC: 2.11 MMOL/L (ref 0.4–2)
LYMPHOCYTES # BLD: 2.7 K/UL (ref 0.9–3.6)
LYMPHOCYTES NFR BLD: 24 % (ref 21–52)
MCH RBC QN AUTO: 25.9 PG (ref 24–34)
MCHC RBC AUTO-ENTMCNC: 31.6 G/DL (ref 31–37)
MCV RBC AUTO: 81.9 FL (ref 78–100)
MONOCYTES # BLD: 1 K/UL (ref 0.05–1.2)
MONOCYTES NFR BLD: 9 % (ref 3–10)
NEUTS SEG # BLD: 7.2 K/UL (ref 1.8–8)
NEUTS SEG NFR BLD: 63 % (ref 40–73)
NRBC # BLD: 0 K/UL (ref 0–0.01)
NRBC BLD-RTO: 0 PER 100 WBC
NT PRO BNP: 1780 PG/ML (ref 0–1800)
PLATELET # BLD AUTO: 323 K/UL (ref 135–420)
PLATELET COMMENT: ABNORMAL
PMV BLD AUTO: 9.6 FL (ref 9.2–11.8)
POTASSIUM SERPL-SCNC: 3.7 MMOL/L (ref 3.5–5.5)
PROT SERPL-MCNC: 7.6 G/DL (ref 6.4–8.2)
RBC # BLD AUTO: 5.48 M/UL (ref 4.2–5.3)
RBC MORPH BLD: ABNORMAL
SODIUM SERPL-SCNC: 142 MMOL/L (ref 136–145)
TROPONIN I SERPL HS-MCNC: 18 NG/L (ref 0–54)
WBC # BLD AUTO: 11.3 K/UL (ref 4.6–13.2)

## 2023-08-23 PROCEDURE — 84484 ASSAY OF TROPONIN QUANT: CPT

## 2023-08-23 PROCEDURE — 80053 COMPREHEN METABOLIC PANEL: CPT

## 2023-08-23 PROCEDURE — 85025 COMPLETE CBC W/AUTO DIFF WBC: CPT

## 2023-08-23 PROCEDURE — 83880 ASSAY OF NATRIURETIC PEPTIDE: CPT

## 2023-08-23 PROCEDURE — 93005 ELECTROCARDIOGRAM TRACING: CPT | Performed by: EMERGENCY MEDICINE

## 2023-08-23 PROCEDURE — 83605 ASSAY OF LACTIC ACID: CPT

## 2023-08-23 PROCEDURE — 71045 X-RAY EXAM CHEST 1 VIEW: CPT

## 2023-08-23 PROCEDURE — 99285 EMERGENCY DEPT VISIT HI MDM: CPT

## 2023-08-23 ASSESSMENT — LIFESTYLE VARIABLES
HOW MANY STANDARD DRINKS CONTAINING ALCOHOL DO YOU HAVE ON A TYPICAL DAY: PATIENT DOES NOT DRINK
HOW OFTEN DO YOU HAVE A DRINK CONTAINING ALCOHOL: NEVER

## 2023-08-24 VITALS
TEMPERATURE: 97.6 F | RESPIRATION RATE: 23 BRPM | DIASTOLIC BLOOD PRESSURE: 54 MMHG | OXYGEN SATURATION: 87 % | SYSTOLIC BLOOD PRESSURE: 122 MMHG | HEART RATE: 82 BPM

## 2023-08-24 LAB
EKG ATRIAL RATE: 88 BPM
EKG DIAGNOSIS: NORMAL
EKG P AXIS: 64 DEGREES
EKG P-R INTERVAL: 136 MS
EKG Q-T INTERVAL: 366 MS
EKG QRS DURATION: 82 MS
EKG QTC CALCULATION (BAZETT): 442 MS
EKG R AXIS: 53 DEGREES
EKG T AXIS: 79 DEGREES
EKG VENTRICULAR RATE: 88 BPM

## 2023-08-24 PROCEDURE — 93010 ELECTROCARDIOGRAM REPORT: CPT | Performed by: INTERNAL MEDICINE

## 2023-08-24 PROCEDURE — 6360000002 HC RX W HCPCS: Performed by: EMERGENCY MEDICINE

## 2023-08-24 RX ORDER — PREDNISONE 50 MG/1
50 TABLET ORAL DAILY
Qty: 3 TABLET | Refills: 0 | Status: SHIPPED | OUTPATIENT
Start: 2023-08-24 | End: 2023-08-27

## 2023-08-24 RX ORDER — ALBUTEROL SULFATE 90 UG/1
2 AEROSOL, METERED RESPIRATORY (INHALATION) EVERY 6 HOURS PRN
Qty: 18 G | Refills: 3 | Status: SHIPPED | OUTPATIENT
Start: 2023-08-24

## 2023-08-24 RX ORDER — ALBUTEROL SULFATE 2.5 MG/3ML
2.5 SOLUTION RESPIRATORY (INHALATION)
Status: COMPLETED | OUTPATIENT
Start: 2023-08-24 | End: 2023-08-24

## 2023-08-24 RX ADMIN — ALBUTEROL SULFATE 2.5 MG: 2.5 SOLUTION RESPIRATORY (INHALATION) at 01:00

## 2023-08-24 NOTE — ED NOTES
Discharge instructions reviewed at this time. Patient verbalized understanding. Patient A&Ox4 and ambulatory at this time. Patient instructed on follow ups, medications and education and verbalized understanding. Updated vitals obtained and IV removed. Patient discharge complete.           Monica Martinez RN  08/24/23 [id number]

## 2023-08-24 NOTE — ED TRIAGE NOTES
Patient A/O x 4, presented to the ED with complaint of worsening SOB x today. Patient is on home O2 3L. O2 saturation at 74% on 3L. Patient denies chest pain, N/V, abdominal pain. Patient has hx of COPD.

## 2023-09-15 ENCOUNTER — APPOINTMENT (OUTPATIENT)
Facility: HOSPITAL | Age: 88
DRG: 189 | End: 2023-09-15
Payer: MEDICARE

## 2023-09-15 ENCOUNTER — HOSPITAL ENCOUNTER (EMERGENCY)
Facility: HOSPITAL | Age: 88
Discharge: HOME | DRG: 189 | End: 2023-09-16
Attending: EMERGENCY MEDICINE
Payer: MEDICARE

## 2023-09-15 DIAGNOSIS — J44.1 COPD EXACERBATION (HCC): Primary | ICD-10-CM

## 2023-09-15 LAB
ALBUMIN SERPL-MCNC: 3.6 G/DL (ref 3.4–5)
ALBUMIN/GLOB SERPL: 1 (ref 0.8–1.7)
ALP SERPL-CCNC: 55 U/L (ref 45–117)
ALT SERPL-CCNC: 40 U/L (ref 13–56)
ANION GAP SERPL CALC-SCNC: 6 MMOL/L (ref 3–18)
AST SERPL-CCNC: 20 U/L (ref 10–38)
BASOPHILS # BLD: 0 K/UL (ref 0–0.1)
BASOPHILS NFR BLD: 0 % (ref 0–2)
BILIRUB SERPL-MCNC: 0.7 MG/DL (ref 0.2–1)
BUN SERPL-MCNC: 20 MG/DL (ref 7–18)
BUN/CREAT SERPL: 18 (ref 12–20)
CALCIUM SERPL-MCNC: 8.8 MG/DL (ref 8.5–10.1)
CHLORIDE SERPL-SCNC: 105 MMOL/L (ref 100–111)
CO2 SERPL-SCNC: 29 MMOL/L (ref 21–32)
CREAT SERPL-MCNC: 1.13 MG/DL (ref 0.6–1.3)
DIFFERENTIAL METHOD BLD: ABNORMAL
EOSINOPHIL # BLD: 0.4 K/UL (ref 0–0.4)
EOSINOPHIL NFR BLD: 3 % (ref 0–5)
ERYTHROCYTE [DISTWIDTH] IN BLOOD BY AUTOMATED COUNT: 19.5 % (ref 11.6–14.5)
GLOBULIN SER CALC-MCNC: 3.5 G/DL (ref 2–4)
GLUCOSE SERPL-MCNC: 152 MG/DL (ref 74–99)
HCT VFR BLD AUTO: 44.7 % (ref 35–45)
HGB BLD-MCNC: 14.3 G/DL (ref 12–16)
IMM GRANULOCYTES # BLD AUTO: 0.1 K/UL (ref 0–0.04)
IMM GRANULOCYTES NFR BLD AUTO: 1 % (ref 0–0.5)
LYMPHOCYTES # BLD: 0.9 K/UL (ref 0.9–3.6)
LYMPHOCYTES NFR BLD: 6 % (ref 21–52)
MCH RBC QN AUTO: 26.5 PG (ref 24–34)
MCHC RBC AUTO-ENTMCNC: 32 G/DL (ref 31–37)
MCV RBC AUTO: 82.9 FL (ref 78–100)
MONOCYTES # BLD: 1.9 K/UL (ref 0.05–1.2)
MONOCYTES NFR BLD: 14 % (ref 3–10)
NEUTS SEG # BLD: 10.8 K/UL (ref 1.8–8)
NEUTS SEG NFR BLD: 76 % (ref 40–73)
NRBC # BLD: 0 K/UL (ref 0–0.01)
NRBC BLD-RTO: 0 PER 100 WBC
NT PRO BNP: 2246 PG/ML (ref 0–1800)
PLATELET # BLD AUTO: 232 K/UL (ref 135–420)
PMV BLD AUTO: 9.5 FL (ref 9.2–11.8)
POTASSIUM SERPL-SCNC: 4.1 MMOL/L (ref 3.5–5.5)
PROT SERPL-MCNC: 7.1 G/DL (ref 6.4–8.2)
RBC # BLD AUTO: 5.39 M/UL (ref 4.2–5.3)
SODIUM SERPL-SCNC: 140 MMOL/L (ref 136–145)
TROPONIN I SERPL HS-MCNC: 26 NG/L (ref 0–54)
WBC # BLD AUTO: 14.2 K/UL (ref 4.6–13.2)

## 2023-09-15 PROCEDURE — 80053 COMPREHEN METABOLIC PANEL: CPT

## 2023-09-15 PROCEDURE — 99285 EMERGENCY DEPT VISIT HI MDM: CPT

## 2023-09-15 PROCEDURE — 71045 X-RAY EXAM CHEST 1 VIEW: CPT

## 2023-09-15 PROCEDURE — 6360000002 HC RX W HCPCS: Performed by: EMERGENCY MEDICINE

## 2023-09-15 PROCEDURE — 83880 ASSAY OF NATRIURETIC PEPTIDE: CPT

## 2023-09-15 PROCEDURE — 93005 ELECTROCARDIOGRAM TRACING: CPT | Performed by: EMERGENCY MEDICINE

## 2023-09-15 PROCEDURE — 84484 ASSAY OF TROPONIN QUANT: CPT

## 2023-09-15 PROCEDURE — 85025 COMPLETE CBC W/AUTO DIFF WBC: CPT

## 2023-09-15 RX ORDER — ALBUTEROL SULFATE 2.5 MG/3ML
2.5 SOLUTION RESPIRATORY (INHALATION)
Status: COMPLETED | OUTPATIENT
Start: 2023-09-15 | End: 2023-09-15

## 2023-09-15 RX ADMIN — ALBUTEROL SULFATE 2.5 MG: 2.5 SOLUTION RESPIRATORY (INHALATION) at 21:46

## 2023-09-15 ASSESSMENT — PAIN - FUNCTIONAL ASSESSMENT: PAIN_FUNCTIONAL_ASSESSMENT: NONE - DENIES PAIN

## 2023-09-16 VITALS
WEIGHT: 100 LBS | OXYGEN SATURATION: 97 % | DIASTOLIC BLOOD PRESSURE: 58 MMHG | HEIGHT: 61 IN | HEART RATE: 96 BPM | TEMPERATURE: 98.2 F | BODY MASS INDEX: 18.88 KG/M2 | RESPIRATION RATE: 22 BRPM | SYSTOLIC BLOOD PRESSURE: 152 MMHG

## 2023-09-16 LAB
EKG ATRIAL RATE: 88 BPM
EKG DIAGNOSIS: NORMAL
EKG P AXIS: 70 DEGREES
EKG P-R INTERVAL: 128 MS
EKG Q-T INTERVAL: 346 MS
EKG QRS DURATION: 80 MS
EKG QTC CALCULATION (BAZETT): 418 MS
EKG R AXIS: 73 DEGREES
EKG T AXIS: 86 DEGREES
EKG VENTRICULAR RATE: 88 BPM

## 2023-09-16 PROCEDURE — 93010 ELECTROCARDIOGRAM REPORT: CPT | Performed by: INTERNAL MEDICINE

## 2023-09-16 NOTE — ED NOTES
Patient up for discharge. Discharge instructions reviewed with patient who verbalized understanding. Patient discharged to home. Patient discharged vi wheelchair to son. Valuables with patient.      Lillian Bhatt RN  09/16/23 [id number]

## 2023-09-16 NOTE — ED NOTES
Patient reports worsening shortness of breath that began yesterday. Arrives wearing 4L of oxygen via nasal cannula with an oxygen saturation of 73%. Placed patient on 10L of oxygen via nonrebreather. Oxygen saturation is now at 92%.      Harris Pride RN  09/15/23 3585

## 2023-09-17 ENCOUNTER — HOSPITAL ENCOUNTER (INPATIENT)
Facility: HOSPITAL | Age: 88
LOS: 4 days | Discharge: HOME | DRG: 189 | End: 2023-09-21
Attending: STUDENT IN AN ORGANIZED HEALTH CARE EDUCATION/TRAINING PROGRAM | Admitting: HOSPITALIST
Payer: MEDICARE

## 2023-09-17 ENCOUNTER — APPOINTMENT (OUTPATIENT)
Facility: HOSPITAL | Age: 88
DRG: 189 | End: 2023-09-17
Payer: MEDICARE

## 2023-09-17 DIAGNOSIS — J44.9 CHRONIC OBSTRUCTIVE PULMONARY DISEASE, UNSPECIFIED COPD TYPE (HCC): ICD-10-CM

## 2023-09-17 DIAGNOSIS — J96.01 ACUTE RESPIRATORY FAILURE WITH HYPOXIA (HCC): Primary | ICD-10-CM

## 2023-09-17 DIAGNOSIS — I27.20 PULMONARY HTN (HCC): ICD-10-CM

## 2023-09-17 DIAGNOSIS — I10 HYPERTENSION, UNSPECIFIED TYPE: ICD-10-CM

## 2023-09-17 LAB
ALBUMIN SERPL-MCNC: 3.3 G/DL (ref 3.4–5)
ALBUMIN/GLOB SERPL: 1.1 (ref 0.8–1.7)
ALP SERPL-CCNC: 58 U/L (ref 45–117)
ALT SERPL-CCNC: 34 U/L (ref 13–56)
ANION GAP BLD CALC-SCNC: ABNORMAL (ref 10–20)
ANION GAP BLD CALC-SCNC: ABNORMAL (ref 10–20)
ANION GAP SERPL CALC-SCNC: 8 MMOL/L (ref 3–18)
ARTERIAL PATENCY WRIST A: ABNORMAL
ARTERIAL PATENCY WRIST A: ABNORMAL
AST SERPL-CCNC: 19 U/L (ref 10–38)
BASE DEFICIT BLD-SCNC: 0.3 MMOL/L
BASE EXCESS BLD CALC-SCNC: 0.9 MMOL/L
BASE EXCESS BLD CALC-SCNC: 2 MMOL/L
BASOPHILS # BLD: 0 K/UL (ref 0–0.1)
BASOPHILS NFR BLD: 0 % (ref 0–2)
BDY SITE: ABNORMAL
BDY SITE: ABNORMAL
BILIRUB SERPL-MCNC: 0.8 MG/DL (ref 0.2–1)
BUN SERPL-MCNC: 18 MG/DL (ref 7–18)
BUN/CREAT SERPL: 18 (ref 12–20)
CA-I BLD-MCNC: 1.12 MMOL/L (ref 1.12–1.32)
CA-I BLD-MCNC: 1.15 MMOL/L (ref 1.12–1.32)
CALCIUM SERPL-MCNC: 8.4 MG/DL (ref 8.5–10.1)
CHLORIDE BLD-SCNC: 105 MMOL/L (ref 98–107)
CHLORIDE BLD-SCNC: 106 MMOL/L (ref 98–107)
CHLORIDE SERPL-SCNC: 105 MMOL/L (ref 100–111)
CO2 BLD-SCNC: 24 MMOL/L (ref 19–24)
CO2 BLD-SCNC: 26 MMOL/L (ref 19–24)
CO2 SERPL-SCNC: 27 MMOL/L (ref 21–32)
CREAT BLD-MCNC: 0.61 MG/DL (ref 0.6–1.3)
CREAT BLD-MCNC: 0.69 MG/DL (ref 0.6–1.3)
CREAT SERPL-MCNC: 1.02 MG/DL (ref 0.6–1.3)
DIFFERENTIAL METHOD BLD: ABNORMAL
EKG ATRIAL RATE: 89 BPM
EKG DIAGNOSIS: NORMAL
EKG P AXIS: 56 DEGREES
EKG P-R INTERVAL: 130 MS
EKG Q-T INTERVAL: 358 MS
EKG QRS DURATION: 78 MS
EKG QTC CALCULATION (BAZETT): 435 MS
EKG R AXIS: 21 DEGREES
EKG T AXIS: 95 DEGREES
EKG VENTRICULAR RATE: 89 BPM
EOSINOPHIL # BLD: 0.1 K/UL (ref 0–0.4)
EOSINOPHIL NFR BLD: 1 % (ref 0–5)
ERYTHROCYTE [DISTWIDTH] IN BLOOD BY AUTOMATED COUNT: 20.3 % (ref 11.6–14.5)
FIO2 ON VENT: 30 %
GAS FLOW.O2 O2 DELIVERY SYS: ABNORMAL
GAS FLOW.O2 O2 DELIVERY SYS: ABNORMAL
GLOBULIN SER CALC-MCNC: 3.1 G/DL (ref 2–4)
GLUCOSE BLD STRIP.AUTO-MCNC: 125 MG/DL (ref 70–110)
GLUCOSE BLD STRIP.AUTO-MCNC: 257 MG/DL (ref 70–110)
GLUCOSE BLD-MCNC: 100 MG/DL (ref 65–100)
GLUCOSE BLD-MCNC: 98 MG/DL (ref 65–100)
GLUCOSE SERPL-MCNC: 113 MG/DL (ref 74–99)
HCO3 BLD-SCNC: 22.7 MMOL/L (ref 22–26)
HCO3 BLD-SCNC: 24 MMOL/L (ref 22–26)
HCO3 BLD-SCNC: 25.8 MMOL/L (ref 22–26)
HCT VFR BLD AUTO: 46.9 % (ref 35–45)
HGB BLD-MCNC: 14.8 G/DL (ref 12–16)
IMM GRANULOCYTES # BLD AUTO: 0 K/UL (ref 0–0.04)
IMM GRANULOCYTES NFR BLD AUTO: 0 % (ref 0–0.5)
IPAP/PIP: 12
LACTATE BLD-SCNC: 0.58 MMOL/L (ref 0.4–2)
LACTATE BLD-SCNC: 0.82 MMOL/L (ref 0.4–2)
LACTATE BLD-SCNC: 1.86 MMOL/L (ref 0.4–2)
LYMPHOCYTES # BLD: 0.5 K/UL (ref 0.9–3.6)
LYMPHOCYTES NFR BLD: 4 % (ref 21–52)
MAGNESIUM SERPL-MCNC: 2.4 MG/DL (ref 1.6–2.6)
MCH RBC QN AUTO: 26 PG (ref 24–34)
MCHC RBC AUTO-ENTMCNC: 31.6 G/DL (ref 31–37)
MCV RBC AUTO: 82.4 FL (ref 78–100)
MONOCYTES # BLD: 1.2 K/UL (ref 0.05–1.2)
MONOCYTES NFR BLD: 9 % (ref 3–10)
NEUTS SEG # BLD: 11.9 K/UL (ref 1.8–8)
NEUTS SEG NFR BLD: 86 % (ref 40–73)
NRBC # BLD: 0 K/UL (ref 0–0.01)
NRBC BLD-RTO: 0 PER 100 WBC
NT PRO BNP: 2562 PG/ML (ref 0–1800)
O2/TOTAL GAS SETTING VFR VENT: 14 %
PCO2 BLD: 31.8 MMHG (ref 35–45)
PCO2 BLD: 33.2 MMHG (ref 35–45)
PCO2 BLDV: 37.2 MMHG (ref 41–51)
PEEP RESPIRATORY: 6
PH BLD: 7.46 (ref 7.35–7.45)
PH BLD: 7.47 (ref 7.35–7.45)
PH BLDV: 7.45 (ref 7.32–7.42)
PLATELET # BLD AUTO: 210 K/UL (ref 135–420)
PLATELET COMMENT: ABNORMAL
PMV BLD AUTO: 10 FL (ref 9.2–11.8)
PO2 BLD: 45 MMHG (ref 80–100)
PO2 BLD: 70 MMHG (ref 80–100)
PO2 BLDV: 25 MMHG (ref 25–40)
POTASSIUM BLD-SCNC: 3.7 MMOL/L (ref 3.5–5.1)
POTASSIUM BLD-SCNC: 3.9 MMOL/L (ref 3.5–5.1)
POTASSIUM SERPL-SCNC: 3.9 MMOL/L (ref 3.5–5.5)
PRESSURE SUPPORT SETTING VENT: 10
PROT SERPL-MCNC: 6.4 G/DL (ref 6.4–8.2)
RBC # BLD AUTO: 5.69 M/UL (ref 4.2–5.3)
RBC MORPH BLD: ABNORMAL
RESPIRATORY RATE, POC: 26 (ref 5–40)
SAO2 % BLD: 48 %
SAO2 % BLD: 84 %
SAO2 % BLD: 94.9 % (ref 92–97)
SERVICE CMNT-IMP: ABNORMAL
SODIUM BLD-SCNC: 137 MMOL/L (ref 136–145)
SODIUM BLD-SCNC: 141 MMOL/L (ref 136–145)
SODIUM SERPL-SCNC: 140 MMOL/L (ref 136–145)
SPECIMEN SITE: ABNORMAL
SPECIMEN SITE: ABNORMAL
SPECIMEN TYPE: ABNORMAL
TROPONIN I SERPL HS-MCNC: 19 NG/L (ref 0–54)
WBC # BLD AUTO: 13.7 K/UL (ref 4.6–13.2)

## 2023-09-17 PROCEDURE — 96366 THER/PROPH/DIAG IV INF ADDON: CPT

## 2023-09-17 PROCEDURE — 83735 ASSAY OF MAGNESIUM: CPT

## 2023-09-17 PROCEDURE — 82947 ASSAY GLUCOSE BLOOD QUANT: CPT

## 2023-09-17 PROCEDURE — 83605 ASSAY OF LACTIC ACID: CPT

## 2023-09-17 PROCEDURE — 99223 1ST HOSP IP/OBS HIGH 75: CPT | Performed by: HOSPITALIST

## 2023-09-17 PROCEDURE — 96365 THER/PROPH/DIAG IV INF INIT: CPT

## 2023-09-17 PROCEDURE — 93010 ELECTROCARDIOGRAM REPORT: CPT | Performed by: INTERNAL MEDICINE

## 2023-09-17 PROCEDURE — 84295 ASSAY OF SERUM SODIUM: CPT

## 2023-09-17 PROCEDURE — 2700000000 HC OXYGEN THERAPY PER DAY

## 2023-09-17 PROCEDURE — 36600 WITHDRAWAL OF ARTERIAL BLOOD: CPT

## 2023-09-17 PROCEDURE — 6360000002 HC RX W HCPCS

## 2023-09-17 PROCEDURE — 5A09357 ASSISTANCE WITH RESPIRATORY VENTILATION, LESS THAN 24 CONSECUTIVE HOURS, CONTINUOUS POSITIVE AIRWAY PRESSURE: ICD-10-PCS | Performed by: INTERNAL MEDICINE

## 2023-09-17 PROCEDURE — 6360000002 HC RX W HCPCS: Performed by: HOSPITALIST

## 2023-09-17 PROCEDURE — 80053 COMPREHEN METABOLIC PANEL: CPT

## 2023-09-17 PROCEDURE — 96375 TX/PRO/DX INJ NEW DRUG ADDON: CPT

## 2023-09-17 PROCEDURE — 94761 N-INVAS EAR/PLS OXIMETRY MLT: CPT

## 2023-09-17 PROCEDURE — 93005 ELECTROCARDIOGRAM TRACING: CPT | Performed by: STUDENT IN AN ORGANIZED HEALTH CARE EDUCATION/TRAINING PROGRAM

## 2023-09-17 PROCEDURE — 6370000000 HC RX 637 (ALT 250 FOR IP): Performed by: HOSPITALIST

## 2023-09-17 PROCEDURE — 71045 X-RAY EXAM CHEST 1 VIEW: CPT

## 2023-09-17 PROCEDURE — 84132 ASSAY OF SERUM POTASSIUM: CPT

## 2023-09-17 PROCEDURE — 94660 CPAP INITIATION&MGMT: CPT

## 2023-09-17 PROCEDURE — 85014 HEMATOCRIT: CPT

## 2023-09-17 PROCEDURE — 85025 COMPLETE CBC W/AUTO DIFF WBC: CPT

## 2023-09-17 PROCEDURE — 2580000003 HC RX 258: Performed by: HOSPITALIST

## 2023-09-17 PROCEDURE — 2580000003 HC RX 258

## 2023-09-17 PROCEDURE — 94762 N-INVAS EAR/PLS OXIMTRY CONT: CPT

## 2023-09-17 PROCEDURE — 6360000002 HC RX W HCPCS: Performed by: STUDENT IN AN ORGANIZED HEALTH CARE EDUCATION/TRAINING PROGRAM

## 2023-09-17 PROCEDURE — 82803 BLOOD GASES ANY COMBINATION: CPT

## 2023-09-17 PROCEDURE — 2140000001 HC CVICU INTERMEDIATE R&B

## 2023-09-17 PROCEDURE — 83880 ASSAY OF NATRIURETIC PEPTIDE: CPT

## 2023-09-17 PROCEDURE — 99285 EMERGENCY DEPT VISIT HI MDM: CPT

## 2023-09-17 PROCEDURE — 84484 ASSAY OF TROPONIN QUANT: CPT

## 2023-09-17 PROCEDURE — 6370000000 HC RX 637 (ALT 250 FOR IP): Performed by: STUDENT IN AN ORGANIZED HEALTH CARE EDUCATION/TRAINING PROGRAM

## 2023-09-17 PROCEDURE — 82962 GLUCOSE BLOOD TEST: CPT

## 2023-09-17 PROCEDURE — 82330 ASSAY OF CALCIUM: CPT

## 2023-09-17 RX ORDER — DEXTROSE MONOHYDRATE 100 MG/ML
INJECTION, SOLUTION INTRAVENOUS CONTINUOUS PRN
Status: DISCONTINUED | OUTPATIENT
Start: 2023-09-17 | End: 2023-09-21 | Stop reason: HOSPADM

## 2023-09-17 RX ORDER — IPRATROPIUM BROMIDE AND ALBUTEROL SULFATE 2.5; .5 MG/3ML; MG/3ML
1 SOLUTION RESPIRATORY (INHALATION)
Status: COMPLETED | OUTPATIENT
Start: 2023-09-17 | End: 2023-09-17

## 2023-09-17 RX ORDER — INSULIN LISPRO 100 [IU]/ML
0-8 INJECTION, SOLUTION INTRAVENOUS; SUBCUTANEOUS
Status: DISCONTINUED | OUTPATIENT
Start: 2023-09-17 | End: 2023-09-21 | Stop reason: HOSPADM

## 2023-09-17 RX ORDER — SODIUM CHLORIDE 0.9 % (FLUSH) 0.9 %
5-40 SYRINGE (ML) INJECTION PRN
Status: DISCONTINUED | OUTPATIENT
Start: 2023-09-17 | End: 2023-09-21 | Stop reason: HOSPADM

## 2023-09-17 RX ORDER — ATORVASTATIN CALCIUM 20 MG/1
20 TABLET, FILM COATED ORAL NIGHTLY
Status: DISCONTINUED | OUTPATIENT
Start: 2023-09-17 | End: 2023-09-21 | Stop reason: HOSPADM

## 2023-09-17 RX ORDER — MAGNESIUM SULFATE IN WATER 40 MG/ML
2000 INJECTION, SOLUTION INTRAVENOUS
Status: COMPLETED | OUTPATIENT
Start: 2023-09-17 | End: 2023-09-17

## 2023-09-17 RX ORDER — ENOXAPARIN SODIUM 100 MG/ML
40 INJECTION SUBCUTANEOUS DAILY
Status: DISCONTINUED | OUTPATIENT
Start: 2023-09-17 | End: 2023-09-18

## 2023-09-17 RX ORDER — ACETAMINOPHEN 325 MG/1
650 TABLET ORAL EVERY 6 HOURS PRN
Status: DISCONTINUED | OUTPATIENT
Start: 2023-09-17 | End: 2023-09-21 | Stop reason: HOSPADM

## 2023-09-17 RX ORDER — IPRATROPIUM BROMIDE AND ALBUTEROL SULFATE 2.5; .5 MG/3ML; MG/3ML
1 SOLUTION RESPIRATORY (INHALATION) EVERY 4 HOURS PRN
Status: DISCONTINUED | OUTPATIENT
Start: 2023-09-17 | End: 2023-09-18

## 2023-09-17 RX ORDER — SODIUM CHLORIDE 9 MG/ML
INJECTION, SOLUTION INTRAVENOUS PRN
Status: DISCONTINUED | OUTPATIENT
Start: 2023-09-17 | End: 2023-09-21 | Stop reason: HOSPADM

## 2023-09-17 RX ORDER — POLYETHYLENE GLYCOL 3350 17 G/17G
17 POWDER, FOR SOLUTION ORAL DAILY PRN
Status: DISCONTINUED | OUTPATIENT
Start: 2023-09-17 | End: 2023-09-21 | Stop reason: HOSPADM

## 2023-09-17 RX ORDER — ONDANSETRON 4 MG/1
4 TABLET, ORALLY DISINTEGRATING ORAL EVERY 8 HOURS PRN
Status: DISCONTINUED | OUTPATIENT
Start: 2023-09-17 | End: 2023-09-21 | Stop reason: HOSPADM

## 2023-09-17 RX ORDER — LANOLIN ALCOHOL/MO/W.PET/CERES
1000 CREAM (GRAM) TOPICAL DAILY
Status: DISCONTINUED | OUTPATIENT
Start: 2023-09-18 | End: 2023-09-21 | Stop reason: HOSPADM

## 2023-09-17 RX ORDER — LOSARTAN POTASSIUM 25 MG/1
25 TABLET ORAL DAILY
Status: DISCONTINUED | OUTPATIENT
Start: 2023-09-18 | End: 2023-09-21 | Stop reason: HOSPADM

## 2023-09-17 RX ORDER — GABAPENTIN 300 MG/1
300 CAPSULE ORAL NIGHTLY
Status: DISCONTINUED | OUTPATIENT
Start: 2023-09-17 | End: 2023-09-21 | Stop reason: HOSPADM

## 2023-09-17 RX ORDER — FOLIC ACID 1 MG/1
1 TABLET ORAL DAILY
Status: DISCONTINUED | OUTPATIENT
Start: 2023-09-18 | End: 2023-09-21 | Stop reason: HOSPADM

## 2023-09-17 RX ORDER — ACETAMINOPHEN 650 MG/1
650 SUPPOSITORY RECTAL EVERY 6 HOURS PRN
Status: DISCONTINUED | OUTPATIENT
Start: 2023-09-17 | End: 2023-09-21 | Stop reason: HOSPADM

## 2023-09-17 RX ORDER — FUROSEMIDE 10 MG/ML
40 INJECTION INTRAMUSCULAR; INTRAVENOUS 2 TIMES DAILY
Status: DISCONTINUED | OUTPATIENT
Start: 2023-09-17 | End: 2023-09-19

## 2023-09-17 RX ORDER — ASPIRIN 81 MG/1
81 TABLET ORAL DAILY
Status: DISCONTINUED | OUTPATIENT
Start: 2023-09-18 | End: 2023-09-21 | Stop reason: HOSPADM

## 2023-09-17 RX ORDER — SODIUM CHLORIDE 0.9 % (FLUSH) 0.9 %
5-40 SYRINGE (ML) INJECTION EVERY 12 HOURS SCHEDULED
Status: DISCONTINUED | OUTPATIENT
Start: 2023-09-17 | End: 2023-09-21 | Stop reason: HOSPADM

## 2023-09-17 RX ORDER — ONDANSETRON 2 MG/ML
4 INJECTION INTRAMUSCULAR; INTRAVENOUS EVERY 6 HOURS PRN
Status: DISCONTINUED | OUTPATIENT
Start: 2023-09-17 | End: 2023-09-21 | Stop reason: HOSPADM

## 2023-09-17 RX ORDER — INSULIN LISPRO 100 [IU]/ML
0-4 INJECTION, SOLUTION INTRAVENOUS; SUBCUTANEOUS NIGHTLY
Status: DISCONTINUED | OUTPATIENT
Start: 2023-09-17 | End: 2023-09-21 | Stop reason: HOSPADM

## 2023-09-17 RX ADMIN — METHYLPREDNISOLONE SODIUM SUCCINATE 40 MG: 40 INJECTION, POWDER, FOR SOLUTION INTRAMUSCULAR; INTRAVENOUS at 11:37

## 2023-09-17 RX ADMIN — SODIUM CHLORIDE, PRESERVATIVE FREE 10 ML: 5 INJECTION INTRAVENOUS at 20:46

## 2023-09-17 RX ADMIN — MAGNESIUM SULFATE HEPTAHYDRATE 2000 MG: 40 INJECTION, SOLUTION INTRAVENOUS at 11:37

## 2023-09-17 RX ADMIN — FUROSEMIDE 40 MG: 10 INJECTION, SOLUTION INTRAMUSCULAR; INTRAVENOUS at 18:28

## 2023-09-17 RX ADMIN — ATORVASTATIN CALCIUM 20 MG: 20 TABLET, FILM COATED ORAL at 20:46

## 2023-09-17 RX ADMIN — ENOXAPARIN SODIUM 40 MG: 100 INJECTION SUBCUTANEOUS at 18:27

## 2023-09-17 RX ADMIN — IPRATROPIUM BROMIDE AND ALBUTEROL SULFATE 1 DOSE: 2.5; .5 SOLUTION RESPIRATORY (INHALATION) at 11:37

## 2023-09-17 RX ADMIN — METOPROLOL TARTRATE 12.5 MG: 25 TABLET, FILM COATED ORAL at 20:46

## 2023-09-17 RX ADMIN — INSULIN LISPRO 4 UNITS: 100 INJECTION, SOLUTION INTRAVENOUS; SUBCUTANEOUS at 18:25

## 2023-09-17 RX ADMIN — GABAPENTIN 300 MG: 300 CAPSULE ORAL at 20:46

## 2023-09-17 RX ADMIN — METHYLPREDNISOLONE SODIUM SUCCINATE 40 MG: 40 INJECTION, POWDER, FOR SOLUTION INTRAMUSCULAR; INTRAVENOUS at 20:46

## 2023-09-17 RX ADMIN — CEFEPIME 2000 MG: 2 INJECTION, POWDER, FOR SOLUTION INTRAVENOUS at 15:35

## 2023-09-17 NOTE — ED NOTES
Pt noted with  assignment of - attempted to call report, nurse unavailable.       Ailyn Norris RN  09/17/23 [id number]

## 2023-09-17 NOTE — ED PROVIDER NOTES
[facility] CV STEPDOWN  EMERGENCY DEPARTMENT ENCOUNTER      Pt Name: Misti Martinez  MRN: [id number]  [address] [facility] 1935  Date of evaluation: 9/17/2023  Provider: Hayden East MD    [address]       Chief Complaint   Patient presents with    Shortness of Breath     HISTORY OF PRESENT ILLNESS   (Location/Symptom, Timing/Onset, Context/Setting, Quality, Duration, Modifying Factors, Severity)  Note limiting factors. Ms. Biju Sen is an 29-year-old female with pertinent past medical history of severe COPD, CHF, and HTN who presents to the emergency department for evaluation of shortness of breath. Patient states that she is on 5 L of nasal cannula oxygen at baseline for her COPD. She notes that she has been getting progressively more short of breath for the last several days. She was recently seen at another emergency department where she was diagnosed with a COPD exacerbation. She took her albuterol at home today but was still feeling short of breath so she called for EMS. She denies any chest pain, increased lower extremity swelling, back pain, nausea, vomiting, changes in bowel habits. She does note that she is supposed to be taking 20 mg of Lasix twice daily but has elected to take 40 mg once in the morning. She has been peeing more frequently than normal but denies any dysuria or flank pain . Nursing Notes were reviewed. REVIEW OF SYSTEMS    (2-9 systems for level 4, 10 or more for level 5)     Review of Systems  Pertinent ROS described in HPI and MDM. Except as noted above the remainder of the review of systems was reviewed and negative.        PAST MEDICAL HISTORY     Past Medical History:   Diagnosis Date    Arthritis     Chronic lung disease     Chronic obstructive pulmonary disease ([address])     Edema due to congestive heart failure ([address]) 11/1/2022    Heart failure ([address])     Hypertension          SURGICAL HISTORY       Past Surgical History:   Procedure Laterality Date drop to eye 2 times daily Both Eyes      folic acid (FOLVITE) 1 MG tablet Take 1 tablet by mouth daily      furosemide (LASIX) 40 MG tablet Take 1 tablet by mouth daily      losartan (COZAAR) 50 MG tablet Take 0.5 tablets by mouth daily      metoprolol tartrate (LOPRESSOR) 25 MG tablet Take 0.5 tablets by mouth 2 times daily      NIFEdipine (ADALAT CC) 30 MG extended release tablet Take 1 tablet by mouth daily      sodium chloride (OCEAN) 0.65 % nasal spray 1 spray by Nasal route as needed       !! - Potential duplicate medications found. Please discuss with provider. ALLERGIES     Patient has no known allergies. FAMILY HISTORY       Family History   Problem Relation Age of Onset    Heart Attack Father     Hypertension Father     Hypertension Mother     Heart Disease Mother           SOCIAL HISTORY       Social History     Socioeconomic History    Marital status:    Tobacco Use    Smoking status: Former     Packs/day: 1.5     Types: Cigarettes     Quit date: 1987     Years since quittin.0    Smokeless tobacco: Never   Vaping Use    Vaping Use: Never used   Substance and Sexual Activity    Alcohol use: No     Alcohol/week: 0.0 standard drinks of alcohol    Drug use: No    Sexual activity: Not Currently     Social Determinants of Health     Transportation Needs: No Transportation Needs (3/23/2023)    OASIS : Transportation     Lack of Transportation (Medical): No     Lack of Transportation (Non-Medical):  No     Patient Unable or Declines to Respond: No   Social Connections: Feeling Socially Integrated (3/23/2023)    OASIS : Social Isolation     Frequency of experiencing loneliness or isolation: Never       SCREENINGS         Helena Coma Scale  Eye Opening: Spontaneous  Best Verbal Response: Oriented  Best Motor Response: Obeys commands  Natasha Coma Scale Score: 15                     WA Assessment  BP: (!) 127/41  Pulse: 92                 PHYSICAL EXAM    (up to 7 for level

## 2023-09-17 NOTE — ED TRIAGE NOTES
Pt arrived from home for SOB due to COPD. Pt was getting breathing treatment when placed on stretcher, O2 sats 66%, placed on non rebreather, paged resp. Pt is A Ox 4, 18 ga PIV started to [employer], labs drawn. Pt placed on monitor. Resp at bedside, placed on BIPAP. EKG completed.

## 2023-09-17 NOTE — PROGRESS NOTES
Verbal report received from MONIKA Renner on Pt coming from ED to [facility]. Report included ED summary. Pt arrived on unit , cardiac monitoring ongoing. Pt desaturates with little activity. Oxygen saturation is 88-90% on 15L salter . Pt served dinner. RT aware. 1800: due medications administered. Pt denies any pain. 1900: Bedside and Verbal shift change report given to Samantha Hamilton (oncoming nurse) by Natalia Yan (offgoing nurse). Report included the following information Nurse Handoff Report, Index, Intake/Output, MAR, Recent Results, Cardiac Rhythm sinus rhythm, and Neuro Assessment.

## 2023-09-17 NOTE — H&P
Performed by: Caroline Vegaer     POC Sodium 137 136 - 145 mmol/L    POC Potassium 3.7 3.5 - 5.1 mmol/L    POC Glucose 98 65 - 100 mg/dL    POC Creatinine 0.61 0.6 - 1.3 mg/dL    POC Lactic Acid 0.82 0.40 - 2.00 mmol/L    POC Chloride 105 98 - 107 mmol/L    Anion Gap, POC Cannot be calculated 10 - 20      eGFR, POC >60 >60 ml/min/1.73m2   POC CG4:BLOOD GAS,LACTATE    Collection Time: 09/17/23  3:31 PM   Result Value Ref Range    DEVICE NASAL CANNULA      FIO2 14 %    pH, Arterial, POC 7.46 (H) 7.35 - 7.45      pCO2, Arterial, POC 31.8 (L) 35.0 - 45.0 MMHG    pO2, Arterial, POC 70 (L) 80 - 100 MMHG    HCO3, Mixed 22.7 22 - 26 MMOL/L    SO2c, Arterial, POC 94.9 92 - 97 %    BASE DEFICIT (POC) 0.3 mmol/L    POC Michael's Test NOT APPLICABLE      Site LEFT BRACHIAL      Specimen type: ARTERIAL      Performed by: Caroline Woodard     POC Lactic Acid 0.58 0.40 - 2.00 mmol/L       Procedures/imaging: see electronic medical records for all procedures/Xrays and details which were not copied into this note but were reviewed prior to creation of Plan        Assessment/Plan     1. Acute respiratory failure with hypoxia, multifactorial.  Repeat ABG with improvement. In the past, pt did not tolerate high flow. She is on 15L via NC. Solumedrol, BD, iv lasix. Pulmology consult called. 2. Hypertension, on home meds metoprolol and Cozaar with holding parameters. 3. Severe pulmonary hypertension, likely group 2 and 3. In 2018 declined right heart cath. Continue IV Lasix. 4. COPD w/ AE. Primary pulmonologist Dr. Kumar Russ. Solumedrol, BD.   5. steroid induced hyperglycemia. ADA diet. Ssi.   6.  Acute on chronic HFpEF. Echo 3/8/23 EF 55 - 60%. Increased wall thickness. Normal wall motion. Abnormal diastolic function. Continue IV Lasix. Strict I/O. Daily weights. Cardiology consult called. 7. Underweight . Height and weight to chart requested. Consult nutrition. 8. Former tobacco, 45 pack years.  Quit 1988.  9. Coronary artery disease and atherosclerosis. On asa, beta blocker statin. 10. Dyslipidemia on statin. 11. Hospitalized 12/2020 with COVID-19 infection. Subsequently in 2021 with COPD exacerbation. Hospitalized May 2022 with UTI. 12. Dvt prophylaxis  13. DNR/DNI, limited interventions, all medications to be given, no feeding tube. Kvng Olsen [phone] updated at bedside, extensive discussion held, all questions answered to the best my ability. Time spent 80 minutes.            Luciano Mason MD  September 17, 2023

## 2023-09-17 NOTE — ED NOTES
PT now on NC, sitting up in stretcher, Improved VS. Will continue to monitor.       April Barraza RN  09/17/23 [id number]

## 2023-09-17 NOTE — ED NOTES
Pt reports improved respirations. NAD. Paged resp to see if we can change Oxygen source.       Sergio Flores RN  09/17/23 0700

## 2023-09-18 PROBLEM — E43 SEVERE PROTEIN-CALORIE MALNUTRITION (HCC): Status: ACTIVE | Noted: 2023-09-18

## 2023-09-18 LAB
ANION GAP SERPL CALC-SCNC: 6 MMOL/L (ref 3–18)
BASOPHILS # BLD: 0 K/UL (ref 0–0.1)
BASOPHILS NFR BLD: 0 % (ref 0–2)
BUN SERPL-MCNC: 23 MG/DL (ref 7–18)
BUN/CREAT SERPL: 22 (ref 12–20)
CALCIUM SERPL-MCNC: 8.5 MG/DL (ref 8.5–10.1)
CHLORIDE SERPL-SCNC: 104 MMOL/L (ref 100–111)
CO2 SERPL-SCNC: 29 MMOL/L (ref 21–32)
CREAT SERPL-MCNC: 1.04 MG/DL (ref 0.6–1.3)
DIFFERENTIAL METHOD BLD: ABNORMAL
EOSINOPHIL # BLD: 0 K/UL (ref 0–0.4)
EOSINOPHIL NFR BLD: 0 % (ref 0–5)
ERYTHROCYTE [DISTWIDTH] IN BLOOD BY AUTOMATED COUNT: 19.7 % (ref 11.6–14.5)
GLUCOSE BLD STRIP.AUTO-MCNC: 134 MG/DL (ref 70–110)
GLUCOSE BLD STRIP.AUTO-MCNC: 139 MG/DL (ref 70–110)
GLUCOSE BLD STRIP.AUTO-MCNC: 147 MG/DL (ref 70–110)
GLUCOSE BLD STRIP.AUTO-MCNC: 272 MG/DL (ref 70–110)
GLUCOSE SERPL-MCNC: 168 MG/DL (ref 74–99)
HCT VFR BLD AUTO: 45.6 % (ref 35–45)
HGB BLD-MCNC: 14.2 G/DL (ref 12–16)
IMM GRANULOCYTES # BLD AUTO: 0 K/UL (ref 0–0.04)
IMM GRANULOCYTES NFR BLD AUTO: 1 % (ref 0–0.5)
LYMPHOCYTES # BLD: 0.4 K/UL (ref 0.9–3.6)
LYMPHOCYTES NFR BLD: 10 % (ref 21–52)
MAGNESIUM SERPL-MCNC: 3 MG/DL (ref 1.6–2.6)
MCH RBC QN AUTO: 26.1 PG (ref 24–34)
MCHC RBC AUTO-ENTMCNC: 31.1 G/DL (ref 31–37)
MCV RBC AUTO: 83.8 FL (ref 78–100)
MONOCYTES # BLD: 0.2 K/UL (ref 0.05–1.2)
MONOCYTES NFR BLD: 5 % (ref 3–10)
NEUTS SEG # BLD: 3.1 K/UL (ref 1.8–8)
NEUTS SEG NFR BLD: 84 % (ref 40–73)
NRBC # BLD: 0 K/UL (ref 0–0.01)
NRBC BLD-RTO: 0 PER 100 WBC
PHOSPHATE SERPL-MCNC: 4.9 MG/DL (ref 2.5–4.9)
PLATELET # BLD AUTO: 173 K/UL (ref 135–420)
PMV BLD AUTO: 9.7 FL (ref 9.2–11.8)
POTASSIUM SERPL-SCNC: 4.3 MMOL/L (ref 3.5–5.5)
PROCALCITONIN SERPL-MCNC: <0.05 NG/ML
RBC # BLD AUTO: 5.44 M/UL (ref 4.2–5.3)
SODIUM SERPL-SCNC: 139 MMOL/L (ref 136–145)
WBC # BLD AUTO: 3.7 K/UL (ref 4.6–13.2)

## 2023-09-18 PROCEDURE — 99222 1ST HOSP IP/OBS MODERATE 55: CPT | Performed by: INTERNAL MEDICINE

## 2023-09-18 PROCEDURE — 97530 THERAPEUTIC ACTIVITIES: CPT

## 2023-09-18 PROCEDURE — 99232 SBSQ HOSP IP/OBS MODERATE 35: CPT | Performed by: STUDENT IN AN ORGANIZED HEALTH CARE EDUCATION/TRAINING PROGRAM

## 2023-09-18 PROCEDURE — 2580000003 HC RX 258: Performed by: STUDENT IN AN ORGANIZED HEALTH CARE EDUCATION/TRAINING PROGRAM

## 2023-09-18 PROCEDURE — 6370000000 HC RX 637 (ALT 250 FOR IP): Performed by: INTERNAL MEDICINE

## 2023-09-18 PROCEDURE — 97535 SELF CARE MNGMENT TRAINING: CPT

## 2023-09-18 PROCEDURE — 80048 BASIC METABOLIC PNL TOTAL CA: CPT

## 2023-09-18 PROCEDURE — 85025 COMPLETE CBC W/AUTO DIFF WBC: CPT

## 2023-09-18 PROCEDURE — 6360000002 HC RX W HCPCS: Performed by: STUDENT IN AN ORGANIZED HEALTH CARE EDUCATION/TRAINING PROGRAM

## 2023-09-18 PROCEDURE — 92526 ORAL FUNCTION THERAPY: CPT

## 2023-09-18 PROCEDURE — 6360000002 HC RX W HCPCS: Performed by: INTERNAL MEDICINE

## 2023-09-18 PROCEDURE — 84100 ASSAY OF PHOSPHORUS: CPT

## 2023-09-18 PROCEDURE — 6370000000 HC RX 637 (ALT 250 FOR IP): Performed by: STUDENT IN AN ORGANIZED HEALTH CARE EDUCATION/TRAINING PROGRAM

## 2023-09-18 PROCEDURE — 92610 EVALUATE SWALLOWING FUNCTION: CPT

## 2023-09-18 PROCEDURE — 94640 AIRWAY INHALATION TREATMENT: CPT

## 2023-09-18 PROCEDURE — 97165 OT EVAL LOW COMPLEX 30 MIN: CPT

## 2023-09-18 PROCEDURE — 6360000002 HC RX W HCPCS: Performed by: HOSPITALIST

## 2023-09-18 PROCEDURE — 94660 CPAP INITIATION&MGMT: CPT

## 2023-09-18 PROCEDURE — 2580000003 HC RX 258: Performed by: HOSPITALIST

## 2023-09-18 PROCEDURE — 84145 PROCALCITONIN (PCT): CPT

## 2023-09-18 PROCEDURE — 82962 GLUCOSE BLOOD TEST: CPT

## 2023-09-18 PROCEDURE — 83735 ASSAY OF MAGNESIUM: CPT

## 2023-09-18 PROCEDURE — 2700000000 HC OXYGEN THERAPY PER DAY

## 2023-09-18 PROCEDURE — 6370000000 HC RX 637 (ALT 250 FOR IP): Performed by: HOSPITALIST

## 2023-09-18 PROCEDURE — 2140000001 HC CVICU INTERMEDIATE R&B

## 2023-09-18 PROCEDURE — 36415 COLL VENOUS BLD VENIPUNCTURE: CPT

## 2023-09-18 PROCEDURE — 97162 PT EVAL MOD COMPLEX 30 MIN: CPT

## 2023-09-18 PROCEDURE — 6370000000 HC RX 637 (ALT 250 FOR IP): Performed by: PHYSICIAN ASSISTANT

## 2023-09-18 PROCEDURE — 94761 N-INVAS EAR/PLS OXIMETRY MLT: CPT

## 2023-09-18 RX ORDER — BUDESONIDE 0.5 MG/2ML
1 INHALANT ORAL
Status: DISCONTINUED | OUTPATIENT
Start: 2023-09-18 | End: 2023-09-18

## 2023-09-18 RX ORDER — GAUZE BANDAGE 2" X 2"
100 BANDAGE TOPICAL DAILY
Status: DISCONTINUED | OUTPATIENT
Start: 2023-09-18 | End: 2023-09-21 | Stop reason: HOSPADM

## 2023-09-18 RX ORDER — LANOLIN ALCOHOL/MO/W.PET/CERES
100 CREAM (GRAM) TOPICAL DAILY
Status: DISCONTINUED | OUTPATIENT
Start: 2023-09-18 | End: 2023-09-18

## 2023-09-18 RX ORDER — PREDNISONE 20 MG/1
40 TABLET ORAL DAILY
Status: DISCONTINUED | OUTPATIENT
Start: 2023-09-18 | End: 2023-09-21 | Stop reason: HOSPADM

## 2023-09-18 RX ORDER — BUDESONIDE 1 MG/2ML
1 INHALANT ORAL 2 TIMES DAILY
Status: DISCONTINUED | OUTPATIENT
Start: 2023-09-18 | End: 2023-09-21 | Stop reason: HOSPADM

## 2023-09-18 RX ORDER — IPRATROPIUM BROMIDE AND ALBUTEROL SULFATE 2.5; .5 MG/3ML; MG/3ML
1 SOLUTION RESPIRATORY (INHALATION) EVERY 6 HOURS
Status: DISCONTINUED | OUTPATIENT
Start: 2023-09-18 | End: 2023-09-21 | Stop reason: HOSPADM

## 2023-09-18 RX ORDER — HEPARIN SODIUM 5000 [USP'U]/ML
5000 INJECTION, SOLUTION INTRAVENOUS; SUBCUTANEOUS 2 TIMES DAILY
Status: DISCONTINUED | OUTPATIENT
Start: 2023-09-19 | End: 2023-09-21 | Stop reason: HOSPADM

## 2023-09-18 RX ORDER — ARFORMOTEROL TARTRATE 15 UG/2ML
15 SOLUTION RESPIRATORY (INHALATION)
Status: DISCONTINUED | OUTPATIENT
Start: 2023-09-18 | End: 2023-09-21 | Stop reason: HOSPADM

## 2023-09-18 RX ORDER — NIFEDIPINE 30 MG/1
30 TABLET, EXTENDED RELEASE ORAL NIGHTLY
Status: DISCONTINUED | OUTPATIENT
Start: 2023-09-18 | End: 2023-09-21 | Stop reason: HOSPADM

## 2023-09-18 RX ORDER — ALPRAZOLAM 0.5 MG/1
0.25 TABLET ORAL 2 TIMES DAILY PRN
Status: DISCONTINUED | OUTPATIENT
Start: 2023-09-18 | End: 2023-09-21 | Stop reason: HOSPADM

## 2023-09-18 RX ORDER — MULTIVITAMIN WITH IRON
1 TABLET ORAL DAILY
Status: DISCONTINUED | OUTPATIENT
Start: 2023-09-18 | End: 2023-09-21 | Stop reason: HOSPADM

## 2023-09-18 RX ADMIN — FUROSEMIDE 40 MG: 10 INJECTION, SOLUTION INTRAMUSCULAR; INTRAVENOUS at 09:08

## 2023-09-18 RX ADMIN — ASPIRIN 81 MG: 81 TABLET, COATED ORAL at 09:10

## 2023-09-18 RX ADMIN — CEFEPIME 1000 MG: 1 INJECTION, POWDER, FOR SOLUTION INTRAMUSCULAR; INTRAVENOUS at 10:30

## 2023-09-18 RX ADMIN — FOLIC ACID 1 MG: 1 TABLET ORAL at 09:10

## 2023-09-18 RX ADMIN — PREDNISONE 40 MG: 20 TABLET ORAL at 16:30

## 2023-09-18 RX ADMIN — METHYLPREDNISOLONE SODIUM SUCCINATE 40 MG: 40 INJECTION, POWDER, FOR SOLUTION INTRAMUSCULAR; INTRAVENOUS at 09:10

## 2023-09-18 RX ADMIN — ATORVASTATIN CALCIUM 20 MG: 20 TABLET, FILM COATED ORAL at 21:06

## 2023-09-18 RX ADMIN — METOPROLOL TARTRATE 12.5 MG: 25 TABLET, FILM COATED ORAL at 21:06

## 2023-09-18 RX ADMIN — NIFEDIPINE 30 MG: 30 TABLET, FILM COATED, EXTENDED RELEASE ORAL at 21:08

## 2023-09-18 RX ADMIN — Medication 100 MG: at 18:05

## 2023-09-18 RX ADMIN — GABAPENTIN 300 MG: 300 CAPSULE ORAL at 21:06

## 2023-09-18 RX ADMIN — BUDESONIDE 1 MG: 1 SUSPENSION RESPIRATORY (INHALATION) at 20:45

## 2023-09-18 RX ADMIN — ARFORMOTEROL TARTRATE 15 MCG: 15 SOLUTION RESPIRATORY (INHALATION) at 20:45

## 2023-09-18 RX ADMIN — THERA TABS 1 TABLET: TAB at 16:30

## 2023-09-18 RX ADMIN — METOPROLOL TARTRATE 12.5 MG: 25 TABLET, FILM COATED ORAL at 09:10

## 2023-09-18 RX ADMIN — ENOXAPARIN SODIUM 40 MG: 100 INJECTION SUBCUTANEOUS at 09:08

## 2023-09-18 RX ADMIN — IPRATROPIUM BROMIDE AND ALBUTEROL SULFATE 1 DOSE: 2.5; .5 SOLUTION RESPIRATORY (INHALATION) at 20:44

## 2023-09-18 RX ADMIN — SODIUM CHLORIDE, PRESERVATIVE FREE 10 ML: 5 INJECTION INTRAVENOUS at 09:27

## 2023-09-18 RX ADMIN — CYANOCOBALAMIN TAB 1000 MCG 1000 MCG: 1000 TAB at 09:10

## 2023-09-18 RX ADMIN — FUROSEMIDE 40 MG: 10 INJECTION, SOLUTION INTRAMUSCULAR; INTRAVENOUS at 18:04

## 2023-09-18 RX ADMIN — ALPRAZOLAM 0.25 MG: 0.5 TABLET ORAL at 22:05

## 2023-09-18 RX ADMIN — LOSARTAN POTASSIUM 25 MG: 25 TABLET, FILM COATED ORAL at 09:20

## 2023-09-18 NOTE — CONSULTS
Comprehensive Nutrition Assessment    Type and Reason for Visit:  Initial, Consult    Nutrition Recommendations/Plan:   Plan to add oral nutrition supplement to optimize nutrition intake opportunity: Magic Cup (each provides 290 kcal, 9g protein) TID  Continue current diet- encourage PO intake. Plan to add MVI and thiamine supplements. Monitor PO intake/tolerance of meals and supplements, weight, labs, and POC while admitted. Malnutrition Assessment:  Malnutrition Status:  Severe malnutrition (09/18/23 1420)    Context:  Acute Illness     Findings of the 6 clinical characteristics of malnutrition:  Energy Intake:  Mild decrease in energy intake (Comment)  Weight Loss:  No significant weight loss     Body Fat Loss: Moderate body fat loss Triceps, Fat Overlying Ribs, Buccal region   Muscle Mass Loss: Moderate muscle mass loss Clavicles (pectoralis & deltoids), Temples (temporalis)  Fluid Accumulation:  No significant fluid accumulation     Strength:  Not Performed    Nutrition History and Allergies: PMHx: chronic respiratory failure on home oxygen, COPD, CHF, severe pulmonary HTN, former tobacco abuse. Wt hx: c wt-  115 lb; 5/24/23- 102 lb (+12.7%); 3/17/23- 106 lb; 9/15/22- 111 lb (+3.6%). no significant wt change documented, per EMR review. NKFA. Nutrition Assessment:    Pt came in c/o SOB. Admitted for management of acute on chronic respiratory failure with hypoxia and acute on chronic HFpEF. Consult noted for general nutrition management with \"underweight\" in comments. Visited pt- obtained bed scale measurement of 52.3 kg- pt underweight for age. Question accuracy of bed scale wt r/t significant increase from other weights listed in history. NFPE conducted- significant wasting, findings noted above. Discussed supplements- pt does not like Ensure, willing to try magic cup. Pt reported having Boost shakes at home and looking for a supplement she enjoys.  Discussed making smoothies/milkshakes with added protein powder (or using Boost supplements) to create her own if she does not want to keep buying different supplements she does not know if she likes. Also discussed ways to increase caloric content of usual meals. Pt noted to avoid salt at home, reads labels and does not cook with salt. Reported eating three meals per day, though noted decrease in portion sizes over time. Noted further decrease in the days leading up to admission r/t SOB. Per goals of care, pt is DNR/DNI with limited interventions and NO feeding tubes. Per chart, pt with one entry for meal intake shows 26-50%. Nutrition Related Findings:    Last BM: not documented. Edema: none. Labs: Na 139 WNL, K 4.3 WNL, BUN 23 H, GFR 53 L, Mg 3 H; POC glucose  x 24-hrs. Pertinent meds: Lipitor, folic acid, lasix, Humalog, vit B-12. Wound Type: None      Current Nutrition Intake & Therapies:    Average Meal Intake: 26-50%  Average Supplements Intake: None Ordered  ADULT DIET; Regular    Anthropometric Measures:  Height: 5' 1\" (154.9 cm)  Ideal Body Weight (IBW): 105 lbs (48 kg)    Admission Body Weight: 100 lb (45.4 kg)  Current Body Weight: 115 lb 4.8 oz (52.3 kg) (RD obtained), 109.8 % IBW.  Weight Source: Bed Scale  Current BMI (kg/m2): 21.8  Usual Body Weight: 102 lb (46.3 kg) (5/24/23)  % Weight Change (Calculated): 13  Weight Adjustment For: No Adjustment  BMI Categories: Underweight (BMI less than 22) age over 72    Estimated Daily Nutrient Needs:  Energy Requirements Based On: Formula (MSJ x 1.2-1.5)  Weight Used for Energy Requirements: Current  Energy (kcal/day): 1087 - 1359  Weight Used for Protein Requirements: Current (1.2-1.5)  Protein (g/day): 63 - 78  Method Used for Fluid Requirements: 1 ml/kcal  Fluid (ml/day): 1087 - 5018    Nutrition Diagnosis:   Severe malnutrition, In context of acute illness or injury related to inadequate protein-energy intake as evidenced by Criteria as identified in malnutrition

## 2023-09-18 NOTE — PROGRESS NOTES
Pharmacist Review and Automatic Dose Adjustment of Prophylactic Enoxaparin    *Review reason for admission/hospital problem list*    The reviewing pharmacist has made an adjustment to the ordered enoxaparin dose or converted to UFH per the approved [address] [facility],[address] protocol and table as identified below. Wendy Yanez is a 82 y.o. female. Recent Labs     09/17/23  1131 09/17/23  1155 09/18/23  0501   CREATININE 0.69 0.61 1.04       Estimated Creatinine Clearance: 28 mL/min (based on SCr of 1.04 mg/dL).     Height:   Ht Readings from Last 1 Encounters:   09/17/23 5' 1\" (1.549 m)     Weight:  Wt Readings from Last 1 Encounters:   09/17/23 100 lb 1.4 oz (45.4 kg)               Plan: Based upon the patient's weight and renal function, the ordered enoxaparin dose of 40mg daily has been changed/converted to heparin 5,000 units BID to start 9/19/23      Thank you,  Jeff Shah, [facility]

## 2023-09-18 NOTE — PLAN OF CARE
Problem: SLP Adult - Impaired Swallowing  Goal: By Discharge: Advance to least restrictive diet without signs or symptoms of aspiration for planned discharge setting. See evaluation for individualized goals. Outcome: Progressing  Note: 1. Tolerate PO trials with 0 s/s overt distress in 4/5 trials  2. Utilize compensatory swallow strategies/maneuvers (decrease bite/sip, size/rate, alt. liq/sol) with min cues in 4/5 trials  3. Perform oral-motor/laryngeal exercises to increase oropharyngeal swallow function with min cues  4. Complete an objective swallow study (i.e., MBSS) to assess swallow integrity, r/o aspiration, and determine of safest LRD, min A as indicated/ordered by MD Galen Smallwood EVALUATION/TREATMENT    Patient: Helen Manriquez (82 y.o. female)  Date: 9/18/2023  Primary Diagnosis: Acute on chronic respiratory failure with hypoxia (HCC) [J96.21]       Precautions: Aspiration  PLOF: As per H&P    ASSESSMENT:  Based on the objective data described below, the patient presents with mild oral phase dysphagia c/b prolonged mastication time 2* missing bottom dentures and decreased lingual strength. Pt currently at 15L/min O2. 5L/min at baseline. 2* COPD. During PO trials, pt's O2 saturation were 96-98%. Pt tolerating meds with water without overt s/sx of aspiration/penetration. Pt with mild oral residue during PO trials of pureed and mod oral residue during PO trials of regular solids cleared with liquid wash x1. Pt without overt s/sx of aspiration/penetration during PO trials of thin liquids + straw, pureed, and regular solids. Pt reported previous instances of globus sensation and esophageal difficulties, however no observed esophageal involvement during this bedside swallow evaluation. Pt safe to initiate regular solids/thin liquids with aspiration precautions. ST is rec for diet tolerance, aspiration precautions, and pt/caregiver educ.      TREATMENT:  Skilled therapy

## 2023-09-18 NOTE — PROGRESS NOTES
Advance Care Planning     Advance Care Planning Inpatient Note  Spiritual Care Department    Today's Date: 9/18/2023  Unit: [facility] CV STEPDOWN    Received request from admission screening. Upon review of chart and communication with care team, patient's decision making abilities are not in question. . Patient was/were present in the room during visit. Health Care Decision Makers:     No healthcare decision makers have been documented.   Click here to complete [address] including selection of the Healthcare Decision Maker Relationship (ie \"Primary\")  Summary:  Documented Next of Kin, per patient report      Advance Care Planning Documents (Patient Wishes):  POLST Document     Assessment:     09/18/23 1538   Encounter Summary   Encounter Overview/Reason  Initial Encounter   Service Provided For: Patient   Referral/Consult From:  [address] Family members   Last Encounter  09/18/23  (IV-SA-KP)   Complexity of Encounter High   Begin Time 1530   End Time  1539   Total Time Calculated 9 min   Spiritual/Emotional needs   Type Spiritual Support   Assessment/Intervention/Outcome   Assessment Calm;Coping   Intervention Active listening   Outcome Encouraged;Coping   Plan and Referrals   Plan/Referrals Continue to visit, (comment)         Interventions:  Reviewed but did not complete ACP document    Care Preferences Communicated:   No    Outcomes/Plan:  ACP Discussion: Completed    Electronically signed by Agueda Neil [facility] on 9/18/2023 at 3:39 PM

## 2023-09-18 NOTE — PROGRESS NOTES
SLP ALL NOTES  Pt was seen at bedside for swallowing evaluation. Pt with mild oral dysphagia and suspected WFL pharyngeal swallow. Diet recs include regular solids/thin liquids. Full report to follow. Thank you for this referral,  Vicky Ng.  Jen Marie M.A., [employer]  Speech Language Pathologist

## 2023-09-18 NOTE — CONSULTS
[facility] - Consult Note    Cardiology consultation request from Dr. Josselyn May for evaluation and management/treatment of a/c HFpEF    Date of  Admission: 9/17/2023 11:23 AM   Primary Care Physician:  DAREN Ryan - NP    Attending Cardiologist: Dr. Dupree Late:     -Acute on chronic hypoxic respiratory failure, CXR 9/17/2023 with emphysema and chronic interstitial lung disease, no acute abnormalities, no pleural effusions or focal infiltrates  -Chronic HFpEF  -Echo 08/2022: LVEF 55% with hypokinetic apical septal wall, normal LV size and wall thickness, abnormal LV diastolic function, moderately dilated LA, dilated RA  -COPD, on home O2 at 4-5LNC   -Severe Pulmonary hypertension, PASP 85 mmHg by Echo 09/2022  -Hx COVID 12/2020  -HTN, BP stable  -DNR status    Primary cardiologist is Dr. Carrasquillo Lav:     -Continue IV Lasix, will decrease frequency to daily, with strict I/O's and close monitoring of renal indices.  -Will resume Nifedipine, which pt takes at night.  -Continue Losartan, Lopressor. BP stable. -Pulmonology consulted, appreciate assistance. History of Present Illness: This is a 82 y.o. female admitted for Acute on chronic respiratory failure with hypoxia ([address]) [J96.21]. Patient complains of: shortness of breath      Brandi Potts is a 82 y.o. female who presented to the hospital with progressively worsening shortness of breath since Thursday 9/14. She was evaluated at [facility] ER on 9/15/2023, when she was diagnosed as COPD exacerbation and discharged. She has been taking Lasix with excellent diuresis. She denies associated chest pain or leg swelling. Cardiology has been consulted due to concern for HFpEF exacerbation.     Cardiac risk factors: advanced age (older than 54 for men, 72 for women) and hypertension      Review of Symptoms:  Except as stated above include:  Constitutional:  negative  Respiratory:  As per HPI  Cardiovascular: 03/07/23    TRANSTHORACIC ECHOCARDIOGRAM (TTE) COMPLETE (CONTRAST/BUBBLE/3D PRN) 03/08/2023 11:06 AM, 03/08/2023 12:00 AM (Final)    Interpretation Summary    Technical qualifiers: Color flow Doppler was performed and pulse wave and/or continuous wave Doppler was performed. Left Ventricle: Normal left ventricular systolic function with a visually estimated EF of 55 - 60%. Left ventricle size is normal. Increased wall thickness. Mild septal thickening. Mild posterior thickening. Normal wall motion. Abnormal diastolic function. Right Ventricle: Normal systolic function. TAPSE is normal. TAPSE is 2.2 cm. Pulmonary Arteries: Severe pulmonary hypertension present. The estimated RVSP is 94 mmHg. Signed by: Bailee Pond MD on 3/8/2023 11:06 AM, Signed by: Unknown Provider Result on 3/8/2023 12:00 AM    Xray Result (most recent):  XR CHEST PORTABLE 09/17/2023    Narrative  CHEST PORTABLE    CPT CODE: 86162    COMPARISON: 9/15/2023    INDICATIONS: Shortness of breath    FINDINGS: The heart is enlarged. The aorta is calcified. Emphysema and chronic  interstitial lung disease is present. No focal infiltrates. No pleural  effusions. No significant osseous abnormalities. Impression  Emphysema and chronic interstitial lung disease. No acute abnormalities. No  significant change since 9/15/2023.       Signed By: Ana María Marquez PA-C     September 18, 2023

## 2023-09-18 NOTE — PLAN OF CARE
total assistance Diagnosis Date    Arthritis     Chronic lung disease     Chronic obstructive pulmonary disease ([address])     Edema due to congestive heart failure ([address]) 11/1/2022    Heart failure ([address])     Hypertension      Past Surgical History:   Procedure Laterality Date    ORTHOPEDIC SURGERY      spinal sx 5/6    OTHER SURGICAL HISTORY      Carotid artery    VASCULAR SURGERY      L CEA 10/2014       Home Situation:  Social/Functional History  Lives With: Alone  Type of Home: House  Home Access: Stairs to enter with rails  Entrance Stairs - Number of Steps: 3-4  Home Equipment: Naida Burr  Ambulation Assistance: Independent  Transfer Assistance: Independent  Critical Behavior:  Orientation  Orientation Level: Oriented to place;Oriented to situation;Oriented to person       Strength:    Strength: Within functional limits    Tone & Sensation:   Tone: Normal  Sensation: Intact    Range Of Motion:  AROM: Within functional limits       Functional Mobility:  Bed Mobility:     Bed Mobility Training  Bed Mobility Training: Yes  Supine to Sit: Supervision  Sit to Supine: Supervision  Scooting: Supervision  Transfers:     Transfer Training  Transfer Training: Yes  Sit to Stand: Stand-by assistance  Stand to Sit: Stand-by assistance  Stand Pivot Transfers: Contact-guard assistance    Balance:     Balance  Sitting: Intact  Standing: Impaired  Standing - Static: Fair ((+))  Standing - Dynamic: Fair      Ambulation/Gait Training:    Gait  Overall Level of Assistance: Contact-guard assistance  Step Length: Right shortened;Left shortened  Gait Abnormalities: Decreased step clearance  Distance (ft): 10 Feet  Assistive Device: Other (comment) (HHA)         Pain:  Pain level pre-treatment: 0/10   Pain level post-treatment: 0/10   Pain Intervention(s): Medication (see MAR); Rest, Ice, Repositioning  Response to intervention: Nurse notified     Activity Tolerance:   Activity Tolerance: Patient limited by endurance; Patient limited by fatigue  Please refer to the flowsheet for vital signs taken during this treatment. After treatment:   []         Patient left in no apparent distress sitting up in chair  [x]         Patient left in no apparent distress in bed  [x]         Call bell left within reach  [x]         Nursing notified  []         Caregiver present  [x]         Bed alarm activated  []         SCDs applied    COMMUNICATION/EDUCATION:   Patient Education  Education Given To: Patient  Education Provided: Role of Therapy;Plan of Care;Transfer Training;Equipment; Fall Prevention Strategies  Education Method: Teach Back;Verbal;Demonstration  Barriers to Learning: None  Education Outcome: Verbalized understanding;Continued education needed    Thank you for this referral.  Miri Rob, PT  Minutes: 22      Eval Complexity: Decision Making: Medium Complexity

## 2023-09-18 NOTE — CONSULTS
[facility]. Pulmonary, Critical Care, and Sleep Medicine    Initial Patient Consult    Name: Chelsy Jacobo MRN: [id number]   [facility] 1935 [facility]   Date: 2023        IMPRESSION:   Acute on chronic hypoxic respiratory failure  - 2/2 copd exacerbation  - currently 15L, weaned to 10L  - on home O2 4-5L  COPD exacerbation  - uses Breztri, albuterol nebs  - follows with Dr. Macho Stewart  Hx of [id number] PNA  - 2020, now vaccinated  SeverePulmonary HTN  - likely group 3  COPD related Cachexia   - noted continued weight loss     Patient Active Problem List   Diagnosis    CAP (community acquired pneumonia)    Acute on chronic respiratory failure with hypoxia ([address])    Suspected COVID-19 virus infection    CABALLERO (dyspnea on exertion)    Hyperlipidemia    Chronic diastolic congestive heart failure ([address])    Hypertension    Carotid stenosis    COVID-19    Debility    Encounter for palliative care    Goals of care, counseling/discussion    Chronic obstructive pulmonary disease ([address])    Acute bronchitis with chronic obstructive pulmonary disease (COPD) ([address])    Pulmonary HTN ([address])    Acute respiratory failure with hypoxia ([address])    Pyelonephritis    Sepsis ([address])    Chronic obstructive pulmonary disease with acute exacerbation ([address])    E coli bacteremia    Congestive heart failure (HCC)    PAD (peripheral artery disease) ([facility])    Hypoxemia    Acute on chronic heart failure with preserved ejection fraction (HFpEF) (HCC)    Restless legs syndrome (RLS)      RECOMMENDATIONS:   Weaned to 10L in the room, sats >88%  CPAP prn  Bronchial hygiene protocol  Added brovana, pulmicort, duonebs scheduled  Changed to PO prednisone  Antibiotics- per primary, no need from my perspective. Will order procal  Assess home Oxygen needs at discharge  OT, PT, OOB and ambulate  Will Follow    DNR    F/u with Dr. Constantine Meade     Subjective:      This patient has been seen and evaluated at the request of auscultation mostly clear, decreased BS, faint wheezing. Chest wall:  No tenderness or deformity. NO CREPITUS   Heart:  Regular rate and rhythm, S1, S2 normal, no murmur, click, rub or gallop. Abdomen:   Soft, non-tender. Bowel sounds normal. No masses,  No organomegaly. No paradox   Extremities: normal, atraumatic, no cyanosis or edema.    Neurologic: Grossly nonfocal          Data review:   Labs:  Recent Results (from the past 24 hour(s))   POCT Blood Gas & Electrolytes    Collection Time: 09/17/23 11:55 AM   Result Value Ref Range    pH, Arterial, POC 7.47 (H) 7.35 - 7.45      pCO2, Arterial, POC 33.2 (L) 35.0 - 45.0 MMHG    pO2, Arterial, POC 45 (LL) 80 - 100 MMHG    POC Ionized Calcium 1.15 1.12 - 1.32 mmol/L    Base Excess 0.9 mmol/L    HCO3, Mixed 24.0 22 - 26 MMOL/L    POC TCO2 24 19 - 24 MMOL/L    POC O2 SAT 84 %    Source ARTERIAL      Site RIGHT BRACHIAL      Michael Test NOT APPLICABLE      DEVICE BAGGING      FIO2 Arterial 30 %    Respiratory Rate 26      POC PEEP/CPA 6      POC Pressure Support 10      IPAP/PIP 12      Performed by: Edelmira Woodard     POC Sodium 137 136 - 145 mmol/L    POC Potassium 3.7 3.5 - 5.1 mmol/L    POC Glucose 98 65 - 100 mg/dL    POC Creatinine 0.61 0.6 - 1.3 mg/dL    POC Lactic Acid 0.82 0.40 - 2.00 mmol/L    POC Chloride 105 98 - 107 mmol/L    Anion Gap, POC Cannot be calculated 10 - 20      eGFR, POC >60 >60 ml/min/1.73m2   POC CG4:BLOOD GAS,LACTATE    Collection Time: 09/17/23  3:31 PM   Result Value Ref Range    DEVICE NASAL CANNULA      FIO2 14 %    pH, Arterial, POC 7.46 (H) 7.35 - 7.45      pCO2, Arterial, POC 31.8 (L) 35.0 - 45.0 MMHG    pO2, Arterial, POC 70 (L) 80 - 100 MMHG    HCO3, Mixed 22.7 22 - 26 MMOL/L    SO2c, Arterial, POC 94.9 92 - 97 %    BASE DEFICIT (POC) 0.3 mmol/L    POC Michael's Test NOT APPLICABLE      Site LEFT BRACHIAL      Specimen type: ARTERIAL      Performed by: Edelmira Woodard     POC Lactic Acid 0.58 0.40 - 2.00 mmol/L

## 2023-09-18 NOTE — PLAN OF CARE
Problem: Occupational Therapy - Adult  Goal: By Discharge: Performs self-care activities at highest level of function for planned discharge setting. See evaluation for individualized goals. Description: Occupational Therapy Goals:  Initiated 9/18/2023 to be met within 7-10 days. 1.  Patient will perform grooming with supervision/set-up while standing at the sink for > 2 min with Good balance. 2.  Patient will perform bathing with supervision/set-up. 3.  Patient will perform lower body dressing with supervision/set-up. 4.  Patient will perform toilet transfers with supervision/set-up. 5.  Patient will perform all aspects of toileting with supervision/set-up. 6.  Patient will participate in upper extremity therapeutic exercise/activities with supervision/set-up for 8 minutes to improve endurance and UB strength needed for ADLs    7. Patient will utilize energy conservation techniques during functional activities with verbal cues. PLOF: Pt lives alone, reports being Mod Ind for ADLs and functional mobility, using rollator occasionally. Outcome: Progressing  OCCUPATIONAL THERAPY EVALUATION    Patient: Humza Woodward (55 y.o. female)  Date: 9/18/2023  Primary Diagnosis: Acute on chronic respiratory failure with hypoxia ([address]) [J96.21]       Precautions: Fall Risk    ASSESSMENT :    Pt cleared to participate in OT evaluation by RN. Upon entering room, pt received in bed, alert, and agreeable to OT eval/treatment. Pt required Supervision for bed mobility and for seated ADLs at EOB. Pt maneuvered to std with HHA and performed simulated toilet txfr with HHA and Min VCs for pacing. Pt's O2 sats on NC decrease to 86-82% with activity, pt is visibly SOB, reports this is normal for her.  Pt educated on mult energy conservation techniques to utilize in home environment and while at the hospital, including pacing and deep breathing to prevent SOB and fatigue, and increase activity tolerance and safety w/ADLs and functional mobility, pt verbalized understanding, required Min VCs to follow throughout tasks. Pt educated on and issued reacher for item retrieval for ADLs to improve body mechanics and prevent SOB. Pt returned back to bed at the end of the session, positioned for comfort. DEFICITS/IMPAIRMENTS:  Performance deficits / Impairments: Decreased functional mobility ; Decreased ADL status; Decreased strength;Decreased balance;Decreased endurance    Patient will benefit from skilled intervention to address the above impairments. Patient's rehabilitation potential/Prognosis: Good. Factors which may influence rehabilitation potential include:   [x]             None noted  []             Mental ability/status  []             Medical condition  []             Home/family situation and support systems  []             Safety awareness  []             Pain tolerance/management  []             Other:      PLAN :  Recommendations and Planned Interventions:   [x]               Self Care Training                  [x]      Therapeutic Activities  [x]               Functional Mobility Training   []      Cognitive Retraining  [x]               Therapeutic Exercises           [x]      Endurance Activities  [x]               Balance Training                    []      Neuromuscular Re-Education  []               Visual/Perceptual Training     [x]      Home Safety Training  [x]               Patient Education                   [x]      Family Training/Education  []               Other (comment):    Frequency/Duration: Patient will be followed by occupational therapy to address goals, 1-2 times per day/3-5 days per week to address goals. Further Equipment Recommendations for Discharge: bedside commode     [facility]: AM-PAC Inpatient Daily Activity Raw Score: 20    Current research shows that an AM-PAC score of 18 or greater is associated with a discharge to the patient's home setting.   Based on an AM-PAC score and their current ADL

## 2023-09-18 NOTE — PROGRESS NOTES
Patient requests to resume Xanax. She is admitted for respiratory failure. Explained to nursing staff to hold off benzo due to risk of respiratory side effect.    Can have hydroxyzine prn if anxiety is a concern       Jarrell Graham Serrano

## 2023-09-19 LAB
ANION GAP SERPL CALC-SCNC: 6 MMOL/L (ref 3–18)
BASOPHILS # BLD: 0 K/UL (ref 0–0.1)
BASOPHILS NFR BLD: 0 % (ref 0–2)
BUN SERPL-MCNC: 31 MG/DL (ref 7–18)
BUN/CREAT SERPL: 28 (ref 12–20)
CALCIUM SERPL-MCNC: 8.3 MG/DL (ref 8.5–10.1)
CHLORIDE SERPL-SCNC: 104 MMOL/L (ref 100–111)
CO2 SERPL-SCNC: 29 MMOL/L (ref 21–32)
CREAT SERPL-MCNC: 1.09 MG/DL (ref 0.6–1.3)
DIFFERENTIAL METHOD BLD: ABNORMAL
EOSINOPHIL # BLD: 0 K/UL (ref 0–0.4)
EOSINOPHIL NFR BLD: 0 % (ref 0–5)
ERYTHROCYTE [DISTWIDTH] IN BLOOD BY AUTOMATED COUNT: 19.1 % (ref 11.6–14.5)
GLUCOSE BLD STRIP.AUTO-MCNC: 106 MG/DL (ref 70–110)
GLUCOSE BLD STRIP.AUTO-MCNC: 187 MG/DL (ref 70–110)
GLUCOSE BLD STRIP.AUTO-MCNC: 190 MG/DL (ref 70–110)
GLUCOSE BLD STRIP.AUTO-MCNC: 213 MG/DL (ref 70–110)
GLUCOSE SERPL-MCNC: 153 MG/DL (ref 74–99)
HCT VFR BLD AUTO: 41.6 % (ref 35–45)
HGB BLD-MCNC: 12.9 G/DL (ref 12–16)
IMM GRANULOCYTES # BLD AUTO: 0 K/UL (ref 0–0.04)
IMM GRANULOCYTES NFR BLD AUTO: 0 % (ref 0–0.5)
LYMPHOCYTES # BLD: 0.7 K/UL (ref 0.9–3.6)
LYMPHOCYTES NFR BLD: 6 % (ref 21–52)
MCH RBC QN AUTO: 25.9 PG (ref 24–34)
MCHC RBC AUTO-ENTMCNC: 31 G/DL (ref 31–37)
MCV RBC AUTO: 83.5 FL (ref 78–100)
MONOCYTES # BLD: 1.1 K/UL (ref 0.05–1.2)
MONOCYTES NFR BLD: 10 % (ref 3–10)
NEUTS SEG # BLD: 9.3 K/UL (ref 1.8–8)
NEUTS SEG NFR BLD: 84 % (ref 40–73)
NRBC # BLD: 0 K/UL (ref 0–0.01)
NRBC BLD-RTO: 0 PER 100 WBC
PLATELET # BLD AUTO: 190 K/UL (ref 135–420)
PMV BLD AUTO: 9.9 FL (ref 9.2–11.8)
POTASSIUM SERPL-SCNC: 3.8 MMOL/L (ref 3.5–5.5)
RBC # BLD AUTO: 4.98 M/UL (ref 4.2–5.3)
SODIUM SERPL-SCNC: 139 MMOL/L (ref 136–145)
WBC # BLD AUTO: 11.1 K/UL (ref 4.6–13.2)

## 2023-09-19 PROCEDURE — 6370000000 HC RX 637 (ALT 250 FOR IP): Performed by: INTERNAL MEDICINE

## 2023-09-19 PROCEDURE — 85025 COMPLETE CBC W/AUTO DIFF WBC: CPT

## 2023-09-19 PROCEDURE — 6360000002 HC RX W HCPCS: Performed by: STUDENT IN AN ORGANIZED HEALTH CARE EDUCATION/TRAINING PROGRAM

## 2023-09-19 PROCEDURE — 6370000000 HC RX 637 (ALT 250 FOR IP): Performed by: HOSPITALIST

## 2023-09-19 PROCEDURE — 2580000003 HC RX 258: Performed by: HOSPITALIST

## 2023-09-19 PROCEDURE — 94640 AIRWAY INHALATION TREATMENT: CPT

## 2023-09-19 PROCEDURE — 36415 COLL VENOUS BLD VENIPUNCTURE: CPT

## 2023-09-19 PROCEDURE — 6370000000 HC RX 637 (ALT 250 FOR IP): Performed by: STUDENT IN AN ORGANIZED HEALTH CARE EDUCATION/TRAINING PROGRAM

## 2023-09-19 PROCEDURE — 6360000002 HC RX W HCPCS: Performed by: INTERNAL MEDICINE

## 2023-09-19 PROCEDURE — 82962 GLUCOSE BLOOD TEST: CPT

## 2023-09-19 PROCEDURE — 2700000000 HC OXYGEN THERAPY PER DAY

## 2023-09-19 PROCEDURE — 99232 SBSQ HOSP IP/OBS MODERATE 35: CPT | Performed by: INTERNAL MEDICINE

## 2023-09-19 PROCEDURE — 99232 SBSQ HOSP IP/OBS MODERATE 35: CPT | Performed by: STUDENT IN AN ORGANIZED HEALTH CARE EDUCATION/TRAINING PROGRAM

## 2023-09-19 PROCEDURE — 2140000001 HC CVICU INTERMEDIATE R&B

## 2023-09-19 PROCEDURE — 80048 BASIC METABOLIC PNL TOTAL CA: CPT

## 2023-09-19 PROCEDURE — 6360000002 HC RX W HCPCS: Performed by: HOSPITALIST

## 2023-09-19 PROCEDURE — 6370000000 HC RX 637 (ALT 250 FOR IP): Performed by: PHYSICIAN ASSISTANT

## 2023-09-19 RX ORDER — FUROSEMIDE 40 MG/1
40 TABLET ORAL DAILY
Status: DISCONTINUED | OUTPATIENT
Start: 2023-09-20 | End: 2023-09-21 | Stop reason: HOSPADM

## 2023-09-19 RX ADMIN — INSULIN LISPRO 2 UNITS: 100 INJECTION, SOLUTION INTRAVENOUS; SUBCUTANEOUS at 11:54

## 2023-09-19 RX ADMIN — LOSARTAN POTASSIUM 25 MG: 25 TABLET, FILM COATED ORAL at 08:45

## 2023-09-19 RX ADMIN — PREDNISONE 40 MG: 20 TABLET ORAL at 08:44

## 2023-09-19 RX ADMIN — ALPRAZOLAM 0.25 MG: 0.5 TABLET ORAL at 22:03

## 2023-09-19 RX ADMIN — CYANOCOBALAMIN TAB 1000 MCG 1000 MCG: 1000 TAB at 08:44

## 2023-09-19 RX ADMIN — ACETAMINOPHEN 325MG 650 MG: 325 TABLET ORAL at 22:03

## 2023-09-19 RX ADMIN — SODIUM CHLORIDE, PRESERVATIVE FREE 10 ML: 5 INJECTION INTRAVENOUS at 08:46

## 2023-09-19 RX ADMIN — NIFEDIPINE 30 MG: 30 TABLET, FILM COATED, EXTENDED RELEASE ORAL at 21:18

## 2023-09-19 RX ADMIN — BUDESONIDE 1 MG: 1 SUSPENSION RESPIRATORY (INHALATION) at 08:04

## 2023-09-19 RX ADMIN — GABAPENTIN 300 MG: 300 CAPSULE ORAL at 21:19

## 2023-09-19 RX ADMIN — FOLIC ACID 1 MG: 1 TABLET ORAL at 08:44

## 2023-09-19 RX ADMIN — IPRATROPIUM BROMIDE AND ALBUTEROL SULFATE 1 DOSE: 2.5; .5 SOLUTION RESPIRATORY (INHALATION) at 19:20

## 2023-09-19 RX ADMIN — Medication 100 MG: at 08:44

## 2023-09-19 RX ADMIN — HEPARIN SODIUM 5000 UNITS: 5000 INJECTION INTRAVENOUS; SUBCUTANEOUS at 21:19

## 2023-09-19 RX ADMIN — METOPROLOL TARTRATE 12.5 MG: 25 TABLET, FILM COATED ORAL at 21:18

## 2023-09-19 RX ADMIN — ASPIRIN 81 MG: 81 TABLET, COATED ORAL at 08:44

## 2023-09-19 RX ADMIN — IPRATROPIUM BROMIDE AND ALBUTEROL SULFATE 1 DOSE: 2.5; .5 SOLUTION RESPIRATORY (INHALATION) at 02:30

## 2023-09-19 RX ADMIN — ARFORMOTEROL TARTRATE 15 MCG: 15 SOLUTION RESPIRATORY (INHALATION) at 19:20

## 2023-09-19 RX ADMIN — HEPARIN SODIUM 5000 UNITS: 5000 INJECTION INTRAVENOUS; SUBCUTANEOUS at 08:41

## 2023-09-19 RX ADMIN — THERA TABS 1 TABLET: TAB at 08:44

## 2023-09-19 RX ADMIN — ATORVASTATIN CALCIUM 20 MG: 20 TABLET, FILM COATED ORAL at 21:19

## 2023-09-19 RX ADMIN — BUDESONIDE 1 MG: 1 SUSPENSION RESPIRATORY (INHALATION) at 19:20

## 2023-09-19 RX ADMIN — IPRATROPIUM BROMIDE AND ALBUTEROL SULFATE 1 DOSE: 2.5; .5 SOLUTION RESPIRATORY (INHALATION) at 14:36

## 2023-09-19 RX ADMIN — METOPROLOL TARTRATE 12.5 MG: 25 TABLET, FILM COATED ORAL at 08:43

## 2023-09-19 RX ADMIN — FUROSEMIDE 40 MG: 10 INJECTION, SOLUTION INTRAMUSCULAR; INTRAVENOUS at 08:42

## 2023-09-19 RX ADMIN — ARFORMOTEROL TARTRATE 15 MCG: 15 SOLUTION RESPIRATORY (INHALATION) at 08:04

## 2023-09-19 RX ADMIN — IPRATROPIUM BROMIDE AND ALBUTEROL SULFATE 1 DOSE: 2.5; .5 SOLUTION RESPIRATORY (INHALATION) at 08:04

## 2023-09-19 ASSESSMENT — PAIN DESCRIPTION - LOCATION: LOCATION: BACK

## 2023-09-19 ASSESSMENT — PAIN SCALES - GENERAL
PAINLEVEL_OUTOF10: 0
PAINLEVEL_OUTOF10: 6

## 2023-09-19 ASSESSMENT — PAIN SCALES - WONG BAKER: WONGBAKER_NUMERICALRESPONSE: 0

## 2023-09-19 ASSESSMENT — PAIN DESCRIPTION - ORIENTATION: ORIENTATION: LOWER;MID

## 2023-09-19 ASSESSMENT — PAIN DESCRIPTION - DESCRIPTORS: DESCRIPTORS: ACHING

## 2023-09-19 NOTE — CARE COORDINATION
Received order for home health services. CM discussed home health agencies with patient. Patients FOC is [facility]. CM sent referral via referral entry to [facility]. Await acceptance. TIP Villavicencio RN   Case Mgmt

## 2023-09-19 NOTE — CARE COORDINATION
Received DME order:  [facility]     CM spoke with patient at bedside regarding a bedside commode. Patient stated that she doesn't want one. States her bathroom is very close to her bed. TIP Villavicencio RN   Case Mgmt

## 2023-09-19 NOTE — PROGRESS NOTES
NIV    NIV at bedside . Patient states she as not used since ER . Not sure used in ER . Patient hypoxic and seem to be correted with HFNC. Patient states she uses 5 lpm at home. Plan : D/C NIV / wean O2 to 5 lpm     0830    - To 6 lpm by N/C    - SPO2 up at down / High 97%    Patient very aware of O2 use and need on home O2 since 2016 . SPO2 drop with sight activity and right back up     1430 Patient SPO2 dropping to 88 at time with patient talking or active . Patient seem aware of SPO2 and O2 use . Will leave at 5 lpm her home setting per patient and ability of home delivery system.

## 2023-09-19 NOTE — PROGRESS NOTES
[employer] [facility]. Pulmonary, Critical Care, and Sleep Medicine    Pulmonary follow up note    Name: Romelia Johnston MRN: [id number]   : 1935 [facility]   Date: 2023        IMPRESSION:   Acute on chronic hypoxic respiratory failure  - 2/2 copd exacerbation  - currently 15L, weaned to 10L  - on home O2 4-5L  COPD exacerbation  - uses Breztri, albuterol nebs  - follows with Dr. Denver   Hx of [id number] PNA  - 2020, now vaccinated  SeverePulmonary HTN  - likely group 3  COPD related Cachexia   - noted continued weight loss     Patient Active Problem List   Diagnosis    CAP (community acquired pneumonia)    Acute on chronic respiratory failure with hypoxia ([address])    Suspected COVID-19 virus infection    CABALLERO (dyspnea on exertion)    Hyperlipidemia    Chronic diastolic congestive heart failure ([address])    Hypertension    Carotid stenosis    COVID-19    Debility    Encounter for palliative care    Goals of care, counseling/discussion    Chronic obstructive pulmonary disease ([address])    Acute bronchitis with chronic obstructive pulmonary disease (COPD) ([address])    Pulmonary HTN ([address])    Acute respiratory failure with hypoxia ([address])    Pyelonephritis    Sepsis ([address])    Chronic obstructive pulmonary disease with acute exacerbation ([address])    E coli bacteremia    Congestive heart failure (HCC)    PAD (peripheral artery disease) (HCC)    Hypoxemia    Acute on chronic heart failure with preserved ejection fraction (HFpEF) (HCC)    Restless legs syndrome (RLS)    Severe protein-calorie malnutrition (HCC)      RECOMMENDATIONS:   Weaned to 5L NC today  CPAP prn  Bronchial hygiene protocol  Continue brovana, pulmicort, duonebs scheduled  ON PO prednisone  Antibiotics- per primary, no need from my perspective. Procal negative  Assess home Oxygen needs at discharge  Recommend walking with PT  Will Follow    DNR    F/u with Dr. Magi Cintron:      This patient has been seen and anasarca. Osteopenia. T7 bone island. Impression  1. No evidence for pulmonary emboli. 2.  Likely heart failure with biatrial cardiac chamber enlargement, new small  bilateral pleural effusions and interstitial edema. 3.  Severe emphysema. 4.  Main pulmonary artery enlargement as may be seen in pulmonary arterial  hypertension. 5.  Unchanged left thyroid nodule. There is likely little clinical benefit of  follow-up given patient's age. 6.  Moderate atherosclerosis with nodular noncalcified regions of mural plaque  ascending aorta which increases risk of embolic events. 7.  Mild anasarca. High complexity decision making was performed during the evaluation of this patient at high risk for decompensation with multiple organ involvement     Above mentioned total time spent on reviewing the case/medical record/data/notes/EMR/patient examination/documentation/coordinating care with nurse/consultants, exclusive of procedures with complex decision making performed and > 50% time spent in face to face evaluation.      Niki Castellano MD

## 2023-09-19 NOTE — PROGRESS NOTES
0700:  Report received, care assumed. 0730:  Assessment completed. AAOx4. Denies CP, pressure, SOB or discomforts. States feels better today. NSR. VSS. 10L/min [place] HFNC intact with pulse ox sats 95%. Fine rales present posterior bases. Full fall precautions in effect. Call bell, phones at side. Monitor. 0800:  Breakfast served. 0815:  Resp Therapy at side for scheduled treatments; RT decreased oxygen to 6L/min NC. Monitor. 0900:  Pulse ox sats 97%. Agreeable to getting OOB to recliner. RN assisted to recliner, tolerated well. States feels good OOB. Monitor. 1900: Pt wishes to remain OOB in recliner at this time. Stable shift. Denies pain or discomforts. Oxygen remains at 5L/min NC with stable pulse ox sats. Shift change report given to MONIKA Shin with rounds.

## 2023-09-19 NOTE — PROGRESS NOTES
1930: Bedside shift change report given to MONIKA Shin (oncoming nurse) by Nick Chaparro (offgoing nurse). Report included the following information SBAR, Kardex, Intake/Output, MAR, Recent Results, and Cardiac Rhythm NSR . Wound Prevention Checklist    Patient: Kevin Bailey (82 y.o. female)  Date: 2023  Diagnosis: Acute on chronic respiratory failure with hypoxia (HCC) [J96.21] Acute on chronic respiratory failure with hypoxia (HCC)    Precautions:         []  Heel prevention boots placed on patient    []  Patient turned q2h during shift    []  Lift team ordered    []  Patient on [place] bed/Specialty bed    []  Each Wound is documented during shift (Stage, Color, drainage, odor, measurements, and dressings)    [x]  Dual skin check done with Kiki Rodriguez RN     4501\": Shift assessment done. V/S obtained. Pt resting quietly in bed. A/Ox4. No c/o pain or SOB at this time. 99% SpO2 on 10L HFNC [place]. Patient oriented to room and call light. Patient aware to use call light to communicate any pain or needs. 2106: Scheduled med given. B; No insulin coverage needed per protocol. 2205: PRN Xanax 0.25 mg given per pt request.     2300: Pt asleep; easily awaken. No distress noted at this time. 0021: No changes from previous assessment. Pt asleep; easily awaken. No distress noted at this time. Call light within reach. 0200: Pt asleep; easily awaken. No distress noted at this time. 0400: Pt asleep; easily awaken. No distress noted at this time. 1591: Assisted pt to bedside commode. No BM noted. Urine output noted. 3736: Bedside shift change report given to Nick Chaparro (oncoming nurse) by Kimani Johnson (offgoing nurse). Report included the following information SBAR, Kardex, Intake/Output, MAR, Recent Results, and Cardiac Rhythm NSR/Sinus Alejandro Carson

## 2023-09-19 NOTE — PROGRESS NOTES
09/18/23  0501 09/19/23  0502     --   --  139 139   K 3.9  --   --  4.3 3.8     --   --  104 104   CO2 27  --   --  29 29   BUN 18  --   --  23* 31*   CREATININE 1.02   < > 0.61 1.04 1.09   GLUCOSE 113*  --   --  168* 153*   PHOS  --   --   --  4.9  --    MG 2.4  --   --  3.0*  --     < > = values in this interval not displayed. PT/INR:No results for input(s): \"PROTIME\", \"INR\" in the last 72 hours. APTT:No results for input(s): \"APTT\" in the last 72 hours. LIVER PROFILE:  Recent Labs     09/17/23  1125   AST 19   ALT 34   BILITOT 0.8   ALKPHOS 58       Imaging:  XR CHEST PORTABLE    Result Date: 9/17/2023  Emphysema and chronic interstitial lung disease. No acute abnormalities. No significant change since 9/15/2023. XR CHEST PORTABLE    Result Date: 9/16/2023  Evidence of chronic interstitial thickening. No definite acute findings. Mild superimposed streaky infiltrate difficult to completely exclude.       Current Medications:  Current Facility-Administered Medications: NIFEdipine (PROCARDIA XL) extended release tablet 30 mg, 30 mg, Oral, Nightly  heparin (porcine) injection 5,000 Units, 5,000 Units, SubCUTAneous, BID  arformoterol tartrate (BROVANA) nebulizer solution 15 mcg, 15 mcg, Nebulization, BID RT  predniSONE (DELTASONE) tablet 40 mg, 40 mg, Oral, Daily  ipratropium 0.5 mg-albuterol 2.5 mg (DUONEB) nebulizer solution 1 Dose, 1 Dose, Inhalation, Q6H  ALPRAZolam (XANAX) tablet 0.25 mg, 0.25 mg, Oral, BID PRN  multivitamin 1 tablet, 1 tablet, Oral, Daily  thiamine mononitrate tablet 100 mg, 100 mg, Oral, Daily  budesonide (PULMICORT) nebulizer suspension 1 mg, 1 mg, Nebulization, BID  aspirin EC tablet 81 mg, 81 mg, Oral, Daily  atorvastatin (LIPITOR) tablet 20 mg, 20 mg, Oral, Nightly  folic acid (FOLVITE) tablet 1 mg, 1 mg, Oral, Daily  gabapentin (NEURONTIN) capsule 300 mg, 300 mg, Oral, Nightly  losartan (COZAAR) tablet 25 mg, 25 mg, Oral, Daily  metoprolol tartrate (LOPRESSOR) tablet 12.5 mg, 12.5 mg, Oral, BID  vitamin B-12 (CYANOCOBALAMIN) tablet 1,000 mcg, 1,000 mcg, Oral, Daily  sodium chloride flush 0.9 % injection 5-40 mL, 5-40 mL, IntraVENous, 2 times per day  sodium chloride flush 0.9 % injection 5-40 mL, 5-40 mL, IntraVENous, PRN  0.9 % sodium chloride infusion, , IntraVENous, PRN  ondansetron (ZOFRAN-ODT) disintegrating tablet 4 mg, 4 mg, Oral, Q8H PRN **OR** ondansetron (ZOFRAN) injection 4 mg, 4 mg, IntraVENous, Q6H PRN  polyethylene glycol (GLYCOLAX) packet 17 g, 17 g, Oral, Daily PRN  acetaminophen (TYLENOL) tablet 650 mg, 650 mg, Oral, Q6H PRN **OR** acetaminophen (TYLENOL) suppository 650 mg, 650 mg, Rectal, Q6H PRN  furosemide (LASIX) injection 40 mg, 40 mg, IntraVENous, BID  insulin lispro (HUMALOG) injection vial 0-8 Units, 0-8 Units, SubCUTAneous, TID WC  insulin lispro (HUMALOG) injection vial 0-4 Units, 0-4 Units, SubCUTAneous, Nightly  glucose chewable tablet 16 g, 4 tablet, Oral, PRN  dextrose bolus 10% 125 mL, 125 mL, IntraVENous, PRN **OR** dextrose bolus 10% 250 mL, 250 mL, IntraVENous, PRN  glucagon injection 1 mg, 1 mg, SubCUTAneous, PRN  dextrose 10 % infusion, , IntraVENous, Continuous PRN      Assessment//Plan           Hospital Problems             Last Modified POA    * (Principal) Acute on chronic respiratory failure with hypoxia ([address]) 9/17/2023 Yes    Severe protein-calorie malnutrition ([address]) 9/18/2023 Yes     Assessment:    Condition: In stable condition. Improving. (    # Acute respiratory failure with hypoxia, multifactorial. Due to COPD exacerbation vs pulmonary hypertension vs HFpEF. Improving. On 5L oxygen. Repeat ABG with improvement. S/p Solumedrol  # Hypertension, on home meds metoprolol, Cozaar, and nifedepine with holding parameters. # Severe pulmonary hypertension, likely group 2 and 3. In 2018 declined right heart cath. # COPD w/ AE. Primary pulmonologist Dr. Prerna Bone. Solumedrol and bronchodilators.    # Steroid induced hyperglycemia and

## 2023-09-19 NOTE — CARE COORDINATION
Case Management Assessment  Initial Evaluation    Date/Time of Evaluation: 9/19/2023 1:37 PM  Assessment Completed by: Morris Basurto    If patient is discharged prior to next notation, then this note serves as note for discharge by case management. Patient Name: Shannon Shafer                   YOB: 1935  Diagnosis: Acute on chronic respiratory failure with hypoxia [facility]) [J96.21]                   Date / Time: 9/17/2023 11:23 AM    Patient Admission Status: Inpatient   Readmission Risk (Low < 19, Mod (19-27), High > 27): Readmission Risk Score: 18.8    Current PCP: DAREN Grant NP  PCP verified by CM? (P) Yes (Sherwin CAMARENA)    Chart Reviewed: Yes      History Provided by: (P) Patient  Patient Orientation: (P) Alert and Oriented, Person, Place, Situation, Self    Patient Cognition: (P) Alert    Hospitalization in the last 30 days (Readmission):  No    If yes, Readmission Assessment in CM Navigator will be completed.     Advance Directives:      Code Status: DNR   Patient's Primary Decision Maker is:        Discharge Planning:    Patient lives with: (P) Alone Type of Home: (P) House  Primary Care Giver: (P) Self  Patient Support Systems include: (P) Children, Family Members, Friends/Neighbors   Current Financial resources: (P) Medicare, Other (Comment) ( for Life Medicare)  Current community resources: (P) None  Current services prior to admission: (P) Durable Medical Equipment, Oxygen Therapy (4 liters oxygen via nasal cannula 24/7; provider is [facility])            Current DME: (P) Cane, Oxygen Therapy (Comment), Shower Chair, Walker            Type of Home Care services:  (P) OT, PT    ADLS  Prior functional level: (P) Independent in ADLs/IADLs  Current functional level: (P) Independent in ADLs/IADLs    PT AM-PAC: 20 /24  OT AM-PAC: 20 /24    Family can provide assistance at DC: (P) No  Would you like Case Management to discuss the discharge plan with any other family

## 2023-09-20 LAB
ANION GAP SERPL CALC-SCNC: 4 MMOL/L (ref 3–18)
BASOPHILS # BLD: 0 K/UL (ref 0–0.1)
BASOPHILS NFR BLD: 0 % (ref 0–2)
BUN SERPL-MCNC: 34 MG/DL (ref 7–18)
BUN/CREAT SERPL: 36 (ref 12–20)
CALCIUM SERPL-MCNC: 8.3 MG/DL (ref 8.5–10.1)
CHLORIDE SERPL-SCNC: 103 MMOL/L (ref 100–111)
CO2 SERPL-SCNC: 30 MMOL/L (ref 21–32)
CREAT SERPL-MCNC: 0.95 MG/DL (ref 0.6–1.3)
DIFFERENTIAL METHOD BLD: ABNORMAL
EOSINOPHIL # BLD: 0 K/UL (ref 0–0.4)
EOSINOPHIL NFR BLD: 0 % (ref 0–5)
ERYTHROCYTE [DISTWIDTH] IN BLOOD BY AUTOMATED COUNT: 18.7 % (ref 11.6–14.5)
GLUCOSE BLD STRIP.AUTO-MCNC: 127 MG/DL (ref 70–110)
GLUCOSE BLD STRIP.AUTO-MCNC: 156 MG/DL (ref 70–110)
GLUCOSE BLD STRIP.AUTO-MCNC: 175 MG/DL (ref 70–110)
GLUCOSE BLD STRIP.AUTO-MCNC: 191 MG/DL (ref 70–110)
GLUCOSE SERPL-MCNC: 183 MG/DL (ref 74–99)
HCT VFR BLD AUTO: 37.9 % (ref 35–45)
HGB BLD-MCNC: 11.9 G/DL (ref 12–16)
IMM GRANULOCYTES # BLD AUTO: 0.1 K/UL (ref 0–0.04)
IMM GRANULOCYTES NFR BLD AUTO: 1 % (ref 0–0.5)
LYMPHOCYTES # BLD: 1.1 K/UL (ref 0.9–3.6)
LYMPHOCYTES NFR BLD: 10 % (ref 21–52)
MCH RBC QN AUTO: 26.2 PG (ref 24–34)
MCHC RBC AUTO-ENTMCNC: 31.4 G/DL (ref 31–37)
MCV RBC AUTO: 83.5 FL (ref 78–100)
MONOCYTES # BLD: 1.3 K/UL (ref 0.05–1.2)
MONOCYTES NFR BLD: 11 % (ref 3–10)
NEUTS SEG # BLD: 9.4 K/UL (ref 1.8–8)
NEUTS SEG NFR BLD: 79 % (ref 40–73)
NRBC # BLD: 0 K/UL (ref 0–0.01)
NRBC BLD-RTO: 0 PER 100 WBC
PLATELET # BLD AUTO: 170 K/UL (ref 135–420)
PMV BLD AUTO: 10 FL (ref 9.2–11.8)
POTASSIUM SERPL-SCNC: 3.5 MMOL/L (ref 3.5–5.5)
RBC # BLD AUTO: 4.54 M/UL (ref 4.2–5.3)
SODIUM SERPL-SCNC: 137 MMOL/L (ref 136–145)
WBC # BLD AUTO: 11.9 K/UL (ref 4.6–13.2)

## 2023-09-20 PROCEDURE — 6370000000 HC RX 637 (ALT 250 FOR IP): Performed by: PHYSICIAN ASSISTANT

## 2023-09-20 PROCEDURE — 6370000000 HC RX 637 (ALT 250 FOR IP): Performed by: INTERNAL MEDICINE

## 2023-09-20 PROCEDURE — 99232 SBSQ HOSP IP/OBS MODERATE 35: CPT | Performed by: INTERNAL MEDICINE

## 2023-09-20 PROCEDURE — 97116 GAIT TRAINING THERAPY: CPT

## 2023-09-20 PROCEDURE — 6370000000 HC RX 637 (ALT 250 FOR IP): Performed by: STUDENT IN AN ORGANIZED HEALTH CARE EDUCATION/TRAINING PROGRAM

## 2023-09-20 PROCEDURE — 6360000002 HC RX W HCPCS: Performed by: STUDENT IN AN ORGANIZED HEALTH CARE EDUCATION/TRAINING PROGRAM

## 2023-09-20 PROCEDURE — 2140000001 HC CVICU INTERMEDIATE R&B

## 2023-09-20 PROCEDURE — 2580000003 HC RX 258: Performed by: HOSPITALIST

## 2023-09-20 PROCEDURE — 80048 BASIC METABOLIC PNL TOTAL CA: CPT

## 2023-09-20 PROCEDURE — 94640 AIRWAY INHALATION TREATMENT: CPT

## 2023-09-20 PROCEDURE — 97535 SELF CARE MNGMENT TRAINING: CPT

## 2023-09-20 PROCEDURE — 97530 THERAPEUTIC ACTIVITIES: CPT

## 2023-09-20 PROCEDURE — 99232 SBSQ HOSP IP/OBS MODERATE 35: CPT | Performed by: STUDENT IN AN ORGANIZED HEALTH CARE EDUCATION/TRAINING PROGRAM

## 2023-09-20 PROCEDURE — 94761 N-INVAS EAR/PLS OXIMETRY MLT: CPT

## 2023-09-20 PROCEDURE — 36415 COLL VENOUS BLD VENIPUNCTURE: CPT

## 2023-09-20 PROCEDURE — 6370000000 HC RX 637 (ALT 250 FOR IP): Performed by: HOSPITALIST

## 2023-09-20 PROCEDURE — 6360000002 HC RX W HCPCS: Performed by: INTERNAL MEDICINE

## 2023-09-20 PROCEDURE — 85025 COMPLETE CBC W/AUTO DIFF WBC: CPT

## 2023-09-20 PROCEDURE — 2700000000 HC OXYGEN THERAPY PER DAY

## 2023-09-20 PROCEDURE — 82962 GLUCOSE BLOOD TEST: CPT

## 2023-09-20 RX ADMIN — PREDNISONE 40 MG: 20 TABLET ORAL at 08:36

## 2023-09-20 RX ADMIN — NIFEDIPINE 30 MG: 30 TABLET, FILM COATED, EXTENDED RELEASE ORAL at 21:14

## 2023-09-20 RX ADMIN — IPRATROPIUM BROMIDE AND ALBUTEROL SULFATE 1 DOSE: 2.5; .5 SOLUTION RESPIRATORY (INHALATION) at 14:14

## 2023-09-20 RX ADMIN — Medication 100 MG: at 08:36

## 2023-09-20 RX ADMIN — HEPARIN SODIUM 5000 UNITS: 5000 INJECTION INTRAVENOUS; SUBCUTANEOUS at 08:35

## 2023-09-20 RX ADMIN — ALPRAZOLAM 0.25 MG: 0.5 TABLET ORAL at 22:15

## 2023-09-20 RX ADMIN — SODIUM CHLORIDE, PRESERVATIVE FREE 10 ML: 5 INJECTION INTRAVENOUS at 21:21

## 2023-09-20 RX ADMIN — METOPROLOL TARTRATE 12.5 MG: 25 TABLET, FILM COATED ORAL at 08:36

## 2023-09-20 RX ADMIN — GABAPENTIN 300 MG: 300 CAPSULE ORAL at 21:14

## 2023-09-20 RX ADMIN — ACETAMINOPHEN 325MG 650 MG: 325 TABLET ORAL at 22:15

## 2023-09-20 RX ADMIN — IPRATROPIUM BROMIDE AND ALBUTEROL SULFATE 1 DOSE: 2.5; .5 SOLUTION RESPIRATORY (INHALATION) at 20:50

## 2023-09-20 RX ADMIN — IPRATROPIUM BROMIDE AND ALBUTEROL SULFATE 1 DOSE: 2.5; .5 SOLUTION RESPIRATORY (INHALATION) at 08:09

## 2023-09-20 RX ADMIN — IPRATROPIUM BROMIDE AND ALBUTEROL SULFATE 1 DOSE: 2.5; .5 SOLUTION RESPIRATORY (INHALATION) at 03:08

## 2023-09-20 RX ADMIN — ARFORMOTEROL TARTRATE 15 MCG: 15 SOLUTION RESPIRATORY (INHALATION) at 20:50

## 2023-09-20 RX ADMIN — ASPIRIN 81 MG: 81 TABLET, COATED ORAL at 08:36

## 2023-09-20 RX ADMIN — SODIUM CHLORIDE, PRESERVATIVE FREE 10 ML: 5 INJECTION INTRAVENOUS at 08:37

## 2023-09-20 RX ADMIN — ATORVASTATIN CALCIUM 20 MG: 20 TABLET, FILM COATED ORAL at 21:14

## 2023-09-20 RX ADMIN — BUDESONIDE 1 MG: 1 SUSPENSION RESPIRATORY (INHALATION) at 20:50

## 2023-09-20 RX ADMIN — THERA TABS 1 TABLET: TAB at 08:36

## 2023-09-20 RX ADMIN — FOLIC ACID 1 MG: 1 TABLET ORAL at 08:36

## 2023-09-20 RX ADMIN — CYANOCOBALAMIN TAB 1000 MCG 1000 MCG: 1000 TAB at 08:36

## 2023-09-20 RX ADMIN — METOPROLOL TARTRATE 12.5 MG: 25 TABLET, FILM COATED ORAL at 21:14

## 2023-09-20 RX ADMIN — LOSARTAN POTASSIUM 25 MG: 25 TABLET, FILM COATED ORAL at 08:36

## 2023-09-20 RX ADMIN — FUROSEMIDE 40 MG: 40 TABLET ORAL at 08:36

## 2023-09-20 RX ADMIN — HEPARIN SODIUM 5000 UNITS: 5000 INJECTION INTRAVENOUS; SUBCUTANEOUS at 21:14

## 2023-09-20 RX ADMIN — BUDESONIDE 1 MG: 1 SUSPENSION RESPIRATORY (INHALATION) at 08:09

## 2023-09-20 RX ADMIN — ARFORMOTEROL TARTRATE 15 MCG: 15 SOLUTION RESPIRATORY (INHALATION) at 08:09

## 2023-09-20 ASSESSMENT — PAIN SCALES - GENERAL
PAINLEVEL_OUTOF10: 0
PAINLEVEL_OUTOF10: 4

## 2023-09-20 ASSESSMENT — PAIN DESCRIPTION - LOCATION: LOCATION: BACK

## 2023-09-20 ASSESSMENT — PAIN DESCRIPTION - DESCRIPTORS: DESCRIPTORS: ACHING

## 2023-09-20 NOTE — PROGRESS NOTES
Physician Progress Note      Alfred Barry  CSN #:                  [id number]  :                       1935  ADMIT DATE:       2023 11:23 AM  DISCH DATE:  RESPONDING  PROVIDER #:        Rubi Schwarz DO          QUERY TEXT:    Patient admitted with respiratory failure. Noted to have dietician assessment   with malnutrition diagnosis on . If possible, please document in progress   notes and discharge summary if you are evaluating and /or treating any of the   following: The medical record reflects the following:  Risk Factors: 13 y.o. female with past medical hx of chronic respiratory   failure on home oxygen, COPD, severe pulmonary hypertension, diastolic   dysfunction, former tobacco, who began to feel short of breath this past   Thursday  Clinical Indicators:  Malnutrition Status:  Severe malnutrition   (23 1420)  Context:  Acute Illness  Findings of the 6 clinical characteristics of malnutrition:  Energy Intake:  Mild decrease in energy intake (Comment)  Body Fat Loss: Moderate body fat loss Triceps, Fat Overlying Ribs, Buccal   region  Muscle Mass Loss: Moderate muscle mass loss Clavicles  Treatment: RD following    Thank you,  Janay Goel RN, ANGELES [email or web address]  >  Options provided:  -- Protein calorie malnutrition severe  -- Other - I will add my own diagnosis  -- Disagree - Not applicable / Not valid  -- Disagree - Clinically unable to determine / Unknown  -- Refer to Clinical Documentation Reviewer    PROVIDER RESPONSE TEXT:    This patient has severe protein calorie malnutrition.     Query created by: Janay Goel on 2023 8:13 AM      Electronically signed by:  Cyn Matamoros DO 2023 8:25 AM

## 2023-09-20 NOTE — PLAN OF CARE
Problem: Occupational Therapy - Adult  Goal: By Discharge: Performs self-care activities at highest level of function for planned discharge setting. See evaluation for individualized goals. Description: Occupational Therapy Goals:  Initiated 9/18/2023 to be met within 7-10 days. 1.  Patient will perform grooming with supervision/set-up while standing at the sink for > 2 min with Good balance. 2.  Patient will perform bathing with supervision/set-up. 3.  Patient will perform lower body dressing with supervision/set-up. 4.  Patient will perform toilet transfers with supervision/set-up. 5.  Patient will perform all aspects of toileting with supervision/set-up. 6.  Patient will participate in upper extremity therapeutic exercise/activities with supervision/set-up for 8 minutes to improve endurance and UB strength needed for ADLs    7. Patient will utilize energy conservation techniques during functional activities with verbal cues. PLOF: Pt lives alone, reports being Mod Ind for ADLs and functional mobility, using rollator occasionally. Outcome: Progressing   OCCUPATIONAL THERAPY TREATMENT    Patient: Fani Zuñiga (7 y.o. female)  Date: 9/20/2023  Diagnosis: Acute on chronic respiratory failure with hypoxia (HCC) [J96.21] Acute on chronic respiratory failure with hypoxia [facility])      Precautions: Fall Risk, General Precautions,  ,  ,  ,  ,  ,  ,      Chart, occupational therapy assessment, plan of care, and goals were reviewed. ASSESSMENT:  Pt presented sitting EOB upon entry, on 4L O2NC, and agreeable to work with OT. Pt able to yomi her socks SBA with leg cross technique. STS transfer Supervision without AD. Pt maneuvered in room SBA without AD, simulating BR mobility. Pt noted furniture walking and educated on benefits for RW for increased safety and support. Pt requesting to use BSC. She performed toileting routine SBA with RW.  Pt agreeable to sit up in reclining chair at end of session and assistance  Toileting: Stand by assistance      Pain:  Pain level pre-treatment: 0/10   Pain level post-treatment: 0/10      Activity Tolerance:    Activity Tolerance: Patient tolerated treatment well  Please refer to the flowsheet for vital signs taken during this treatment. After treatment:   [x]  Patient left in no apparent distress sitting up in chair  []  Patient left in no apparent distress in bed  [x]  Call bell left within reach  [x]  Nursing notified  []  Caregiver present  []  Bed alarm activated    COMMUNICATION/EDUCATION:   Patient Education  Education Given To: Patient  Education Provided: Role of Therapy;Transfer Training;Plan of Care;Energy Conservation; ADL Adaptive Strategies; Fall Prevention Strategies  Education Method: Teach Back;Verbal;Demonstration  Barriers to Learning: None  Education Outcome: Continued education needed;Verbalized understanding      Thank you for this referral.  JAEL Sears  Minutes: 23

## 2023-09-20 NOTE — PLAN OF CARE
Problem: Physical Therapy - Adult  Goal: By Discharge: Performs mobility at highest level of function for planned discharge setting. See evaluation for individualized goals. Description: Physical Therapy Goals:  Initiated 9/18/2023 to be met within 7-10 days. 1.  Patient will move from supine to sit and sit to supine  in bed with modified independence. 2.  Patient will transfer from bed to chair and chair to bed with modified independence using the least restrictive device. 3.  Patient will perform sit to stand with modified independence. 4.  Patient will ambulate with modified independence for 100 feet with the least restrictive device. 5.  Patient will ascend/descend 3 stairs with  handrail(s) with modified independence. PLOF: Mod I with mobility. Has cane and rollator, and will use AD to walk to her mailbox. Lives alone in single story home. Outcome: Progressing     PHYSICAL THERAPY TREATMENT    Patient: Ramona Lomas (32 y.o. female)  Date: 9/20/2023  Diagnosis: Acute on chronic respiratory failure with hypoxia (HCC) [J96.21] Acute on chronic respiratory failure with hypoxia (HCC)      Precautions: Fall Risk, General Precautions      ASSESSMENT:  Pt received in bed in NAD and agreeable. On 4L via NC. Mod ind with supine > sit. Supervision for standing and gait within room. Pt noted to be reaching for support on bed while walking by, recommend RW for increased support. Pt positioned for comfort in recliner with all needs met. Will continue to follow, pt close to baseline. Progression toward goals:   [x]      Improving appropriately and progressing toward goals  []      Improving slowly and progressing toward goals  []      Not making progress toward goals and plan of care will be adjusted     PLAN:  Patient continues to benefit from skilled intervention to address the above impairments. Continue treatment per established plan of care.     Further Equipment Recommendations for reach  [x] Nursing notified  [] Caregiver present  [] Bed alarm activated  [] SCDs applied      COMMUNICATION/EDUCATION:   Patient Education  Education Given To: Patient  Education Provided: Role of Therapy;Plan of Care;Transfer Training;Equipment; Fall Prevention Strategies  Education Method: Teach Back;Verbal;Demonstration  Barriers to Learning: None  Education Outcome: Verbalized understanding;Continued education needed      [employer], PT  Minutes: 23

## 2023-09-20 NOTE — PROGRESS NOTES
: Bedside shift change report given to MONIKA Shin (oncoming nurse) by Soila Luque (offgoing nurse). Report included the following information SBAR, Kardex, Intake/Output, MAR, Recent Results, and Cardiac Rhythm NSR . Wound Prevention Checklist    Patient: Humza Woodward (82 y.o. female)  Date: 2023  Diagnosis: Acute on chronic respiratory failure with hypoxia (HCC) [J96.21] Acute on chronic respiratory failure with hypoxia (HCC)    Precautions:         []  Heel prevention boots placed on patient    []  Patient turned q2h during shift    []  Lift team ordered    []  Patient on [place] bed/Specialty bed    []  Each Wound is documented during shift (Stage, Color, drainage, odor, measurements, and dressings)    [x]  Dual skin check done with Arlen Rosenberg RN     1464: Shift assessment done. V/S obtained. Pt resting quietly in the chair. A/Ox4. No c/o pain or SOB at this time. 97% SpO2 on 5LNC. Patient oriented to room and call light. Patient aware to use call light to communicate any pain or needs. : Scheduled med given. B; No insulin coverage needed per protocol. Assisted pt to the bedside commode; No BM noted. Pt returned back to bed after. Transfer tolerated well with no SOB noted. : PRN xanax given per pt request. PRN tylenol also given for c/o back pain. 0000: No changes from previous assessment. Pt asleep; easily awaken. No distress noted at this time. Call light within reach. 0200: Pt asleep; easily awaken. No distress noted at this time. 2236: No changes from previous assessment. Pt asleep; easily awaken. No distress noted at this time. BP: 107/32; Rechecked: 107/39. Pt asymptomatic; no c/o dizziness; pt states \"I'm okay, I'm just sleeping. \" Will notify MD. Call light within reach. 0430: MD notified regarding pt's low BP. No orders received. MD states \"monitor pt for now. \"    0630: Pt asleep; easily awaken. No distress noted at this time.

## 2023-09-21 ENCOUNTER — HOME HEALTH ADMISSION (OUTPATIENT)
Age: 88
End: 2023-09-21
Payer: MEDICARE

## 2023-09-21 VITALS
TEMPERATURE: 97.9 F | OXYGEN SATURATION: 95 % | WEIGHT: 101.65 LBS | HEIGHT: 61 IN | SYSTOLIC BLOOD PRESSURE: 101 MMHG | HEART RATE: 89 BPM | RESPIRATION RATE: 20 BRPM | BODY MASS INDEX: 19.19 KG/M2 | DIASTOLIC BLOOD PRESSURE: 84 MMHG

## 2023-09-21 LAB
ANION GAP SERPL CALC-SCNC: 3 MMOL/L (ref 3–18)
BASOPHILS # BLD: 0 K/UL (ref 0–0.1)
BASOPHILS NFR BLD: 0 % (ref 0–2)
BUN SERPL-MCNC: 28 MG/DL (ref 7–18)
BUN/CREAT SERPL: 38 (ref 12–20)
CALCIUM SERPL-MCNC: 8.5 MG/DL (ref 8.5–10.1)
CHLORIDE SERPL-SCNC: 106 MMOL/L (ref 100–111)
CO2 SERPL-SCNC: 30 MMOL/L (ref 21–32)
CREAT SERPL-MCNC: 0.73 MG/DL (ref 0.6–1.3)
DIFFERENTIAL METHOD BLD: ABNORMAL
EOSINOPHIL # BLD: 0 K/UL (ref 0–0.4)
EOSINOPHIL NFR BLD: 0 % (ref 0–5)
ERYTHROCYTE [DISTWIDTH] IN BLOOD BY AUTOMATED COUNT: 18.7 % (ref 11.6–14.5)
GLUCOSE BLD STRIP.AUTO-MCNC: 138 MG/DL (ref 70–110)
GLUCOSE BLD STRIP.AUTO-MCNC: 85 MG/DL (ref 70–110)
GLUCOSE SERPL-MCNC: 106 MG/DL (ref 74–99)
HCT VFR BLD AUTO: 38.7 % (ref 35–45)
HGB BLD-MCNC: 11.9 G/DL (ref 12–16)
IMM GRANULOCYTES # BLD AUTO: 0.1 K/UL (ref 0–0.04)
IMM GRANULOCYTES NFR BLD AUTO: 0 % (ref 0–0.5)
LYMPHOCYTES # BLD: 1.3 K/UL (ref 0.9–3.6)
LYMPHOCYTES NFR BLD: 11 % (ref 21–52)
MCH RBC QN AUTO: 25.9 PG (ref 24–34)
MCHC RBC AUTO-ENTMCNC: 30.7 G/DL (ref 31–37)
MCV RBC AUTO: 84.3 FL (ref 78–100)
MONOCYTES # BLD: 1.4 K/UL (ref 0.05–1.2)
MONOCYTES NFR BLD: 12 % (ref 3–10)
NEUTS SEG # BLD: 8.7 K/UL (ref 1.8–8)
NEUTS SEG NFR BLD: 76 % (ref 40–73)
NRBC # BLD: 0 K/UL (ref 0–0.01)
NRBC BLD-RTO: 0 PER 100 WBC
PLATELET # BLD AUTO: 179 K/UL (ref 135–420)
PMV BLD AUTO: 10.2 FL (ref 9.2–11.8)
POTASSIUM SERPL-SCNC: 4 MMOL/L (ref 3.5–5.5)
RBC # BLD AUTO: 4.59 M/UL (ref 4.2–5.3)
SODIUM SERPL-SCNC: 139 MMOL/L (ref 136–145)
WBC # BLD AUTO: 11.5 K/UL (ref 4.6–13.2)

## 2023-09-21 PROCEDURE — 94640 AIRWAY INHALATION TREATMENT: CPT

## 2023-09-21 PROCEDURE — 6370000000 HC RX 637 (ALT 250 FOR IP): Performed by: PHYSICIAN ASSISTANT

## 2023-09-21 PROCEDURE — 2700000000 HC OXYGEN THERAPY PER DAY

## 2023-09-21 PROCEDURE — 6360000002 HC RX W HCPCS: Performed by: STUDENT IN AN ORGANIZED HEALTH CARE EDUCATION/TRAINING PROGRAM

## 2023-09-21 PROCEDURE — 36415 COLL VENOUS BLD VENIPUNCTURE: CPT

## 2023-09-21 PROCEDURE — 6370000000 HC RX 637 (ALT 250 FOR IP): Performed by: STUDENT IN AN ORGANIZED HEALTH CARE EDUCATION/TRAINING PROGRAM

## 2023-09-21 PROCEDURE — 85025 COMPLETE CBC W/AUTO DIFF WBC: CPT

## 2023-09-21 PROCEDURE — 6370000000 HC RX 637 (ALT 250 FOR IP): Performed by: HOSPITALIST

## 2023-09-21 PROCEDURE — 97535 SELF CARE MNGMENT TRAINING: CPT

## 2023-09-21 PROCEDURE — 6370000000 HC RX 637 (ALT 250 FOR IP): Performed by: INTERNAL MEDICINE

## 2023-09-21 PROCEDURE — 2580000003 HC RX 258: Performed by: HOSPITALIST

## 2023-09-21 PROCEDURE — 80048 BASIC METABOLIC PNL TOTAL CA: CPT

## 2023-09-21 PROCEDURE — 94761 N-INVAS EAR/PLS OXIMETRY MLT: CPT

## 2023-09-21 PROCEDURE — 82962 GLUCOSE BLOOD TEST: CPT

## 2023-09-21 PROCEDURE — 6360000002 HC RX W HCPCS: Performed by: INTERNAL MEDICINE

## 2023-09-21 RX ORDER — PREDNISONE 10 MG/1
TABLET ORAL
Qty: 26 TABLET | Refills: 0 | Status: SHIPPED | OUTPATIENT
Start: 2023-09-22 | End: 2023-10-01

## 2023-09-21 RX ORDER — ALPRAZOLAM 0.25 MG/1
0.25 TABLET ORAL 3 TIMES DAILY PRN
Qty: 90 TABLET | Refills: 0
Start: 2023-09-21 | End: 2023-10-21

## 2023-09-21 RX ORDER — LOSARTAN POTASSIUM 25 MG/1
25 TABLET ORAL DAILY
Qty: 30 TABLET | Refills: 0 | Status: SHIPPED | OUTPATIENT
Start: 2023-09-21

## 2023-09-21 RX ADMIN — PREDNISONE 40 MG: 20 TABLET ORAL at 08:53

## 2023-09-21 RX ADMIN — IPRATROPIUM BROMIDE AND ALBUTEROL SULFATE 1 DOSE: 2.5; .5 SOLUTION RESPIRATORY (INHALATION) at 09:01

## 2023-09-21 RX ADMIN — HEPARIN SODIUM 5000 UNITS: 5000 INJECTION INTRAVENOUS; SUBCUTANEOUS at 08:52

## 2023-09-21 RX ADMIN — ASPIRIN 81 MG: 81 TABLET, COATED ORAL at 08:53

## 2023-09-21 RX ADMIN — FOLIC ACID 1 MG: 1 TABLET ORAL at 08:54

## 2023-09-21 RX ADMIN — ARFORMOTEROL TARTRATE 15 MCG: 15 SOLUTION RESPIRATORY (INHALATION) at 09:01

## 2023-09-21 RX ADMIN — METOPROLOL TARTRATE 12.5 MG: 25 TABLET, FILM COATED ORAL at 08:54

## 2023-09-21 RX ADMIN — CYANOCOBALAMIN TAB 1000 MCG 1000 MCG: 1000 TAB at 08:53

## 2023-09-21 RX ADMIN — LOSARTAN POTASSIUM 25 MG: 25 TABLET, FILM COATED ORAL at 08:53

## 2023-09-21 RX ADMIN — IPRATROPIUM BROMIDE AND ALBUTEROL SULFATE 1 DOSE: 2.5; .5 SOLUTION RESPIRATORY (INHALATION) at 01:44

## 2023-09-21 RX ADMIN — THERA TABS 1 TABLET: TAB at 08:53

## 2023-09-21 RX ADMIN — Medication 100 MG: at 08:54

## 2023-09-21 RX ADMIN — BUDESONIDE 1 MG: 1 SUSPENSION RESPIRATORY (INHALATION) at 09:01

## 2023-09-21 RX ADMIN — SODIUM CHLORIDE, PRESERVATIVE FREE 10 ML: 5 INJECTION INTRAVENOUS at 08:57

## 2023-09-21 RX ADMIN — FUROSEMIDE 40 MG: 40 TABLET ORAL at 08:53

## 2023-09-21 ASSESSMENT — PAIN SCALES - GENERAL
PAINLEVEL_OUTOF10: 0
PAINLEVEL_OUTOF10: 0

## 2023-09-21 NOTE — CARE COORDINATION
09/21/23 1221   IMM Letter   IMM Letter given to Patient/Family/Significant other/Guardian/POA/by: Patient   IMM Letter date given: 09/21/23   IMM Letter time given: 80     SW met with patient at bedside to discuss/review IMM. Patient stated an understanding and provided verbal consent for IMM. Original IMM placed on patient's chart. Patient declined a copy of IMM.           ILEANA Pearce  Case Management

## 2023-09-21 NOTE — PROGRESS NOTES
0800: Shift report received, care assumed. Therapist has assisted to [facility] for BM then to recliner. Assessment completed. Breakfast served. Pt verbalizes she expects to be discharged home today, is anxious to go home. Full fall precautions in effect. Call bell and phones at side. Monitor. 1310: Discharge Instructions reviewed at length with patient, she verbalizes understanding all info. Discharged via wheelchair with [place] oxygen to private vehicle with home oxygen, sister is driving. Patient has all personal belongings including clothing, cell phone and . E-sign pad not working on computer; copy discharge instructions signed by RN and patient, placed in hard chart.

## 2023-09-21 NOTE — CARE COORDINATION
CASE MANAGEMENT DISCHARGE NOTE    Patient will discharge today Home with [facility]. SLY confirmed with Ethel Villalobos at [facility] that patient has been accepted and is medically stable for discharge. Patient's   has been notified of discharge. Patient/family are in agreement with discharge disposition. Patient will be transported via Family. Patient arranged transport at discharge. DISCHARGE DISPOSITION INFORMATION:    DISPOSITION NAME/TYPE - Home with [facility]      Bedside RN notified of discharge disposition. SLY reviewed/confirmed with Kevyn at [facility] that patient's care and needs can be met appropriately while under their care.           ILEANA Pearce  Case Management

## 2023-09-21 NOTE — HOME CARE
Received home health referral for [facility] for (SN, PT). Discharge order noted for today. Unable to speak with patient prior to discharge. Made several calls to all listed phone numbers on face sheet. With no answers. Unable to verify demographics. Report received from  best number to contact patient will be home number of [phone]. Left voice mail message for return call.   Referral has been noted and arranged  -   and sent to central intake and scheduling.      ---   Kim Lawrence, [address]

## 2023-09-21 NOTE — PROGRESS NOTES
191: Bedside shift change report given to MONIKA Shin (oncoming nurse) by Leslie Anaya (offgoing nurse). Report included the following information SBAR, Kardex, Intake/Output, MAR, Recent Results, and Cardiac Rhythm NSR . Wound Prevention Checklist    Patient: Vira Burger (82 y.o. female)  Date: 2023  Diagnosis: Acute on chronic respiratory failure with hypoxia (HCC) [J96.21] Acute on chronic respiratory failure with hypoxia (HCC)    Precautions:         []  Heel prevention boots placed on patient    []  Patient turned q2h during shift    []  Lift team ordered    []  Patient on [place] bed/Specialty bed    []  Each Wound is documented during shift (Stage, Color, drainage, odor, measurements, and dressings)    [x]  Dual skin check done with Mary Schulte RN     0174: Shift assessment done. V/S obtained. Pt resting quietly in bed. A/Ox4. No c/o pain or SOB at this time. 99% SpO2 on 5LNC. Assisted pt to the bedside commode; No BM noted. Patient oriented to room and call light. Patient aware to use call light to communicate any pain or needs. : Scheduled med given. B; No insulin coverage needed per protocol. Assisted pt to the bedside commode; No BM noted. 2215: PRN Xanax given per pt request. PRN tylenol also given per pt request for c/o back pain. 2330: No changes from previous assessment. Pt asleep; easily awaken. No distress noted at this time. Call light within reach. 0200: Pt asleep; easily awaken. No distress noted at this time. 0345: No changes from previous assessment. Pt asleep; easily awaken. No distress noted at this time. Call light within reach. 0600: Pt asleep; easily awaken. No distress noted at this time. 0715: Bedside shift change report given to [address] [place] [address] (oncoming nurse) by Dru Najera (offgoing nurse). Report included the following information SBAR, Kardex, Intake/Output, MAR, Recent Results, and Cardiac Rhythm NSR .

## 2023-09-21 NOTE — PLAN OF CARE
daily activities. Patient mod (I) scooting and supervision for functional mobility/ transfers in preparation for ADLs using rolling walker in room. Patient stand by assist for toileting task and for grooming standing. Patient tolerating approx. 4 minutes of standing activity this session. Patient left seated in recliner with needs met, independent for self-feeding and RN present for administration of morning medications. Progression toward goals:  [x]          Improving appropriately and progressing toward goals  []          Improving slowly and progressing toward goals  []          Not making progress toward goals and plan of care will be adjusted     PLAN:  Patient continues to benefit from skilled intervention to address the above impairments. Continue treatment per established plan of care. Further Equipment Recommendations for Discharge: shower chair and rolling walker    AMPAC: AM-PAC Inpatient Daily Activity Raw Score: 20    Current research shows that an AM-PAC score of 18 or greater is associated with a discharge to the patient's home setting. Based on an AM-PAC score and their current ADL deficits; it is recommended that the patient have 2-3 sessions per week of Occupational Therapy at d/c to increase the patient's independence. This AMPAC score should be considered in conjunction with interdisciplinary team recommendations to determine the most appropriate discharge setting. Patient's social support, diagnosis, medical stability, and prior level of function should also be taken into consideration.      SUBJECTIVE:   Patient stated, \"I might be going home today\"    OBJECTIVE DATA SUMMARY:   Cognitive/Behavioral Status:  Orientation  Overall Orientation Status: Within Normal Limits  Orientation Level: Oriented X4  Cognition  Overall Cognitive Status: WFL    Functional Mobility and Transfers for ADLs:   Bed Mobility:  Bed Mobility Training  Bed Mobility Training: Yes  Scooting: Modified independent     Transfers:  Transfer Training  Transfer Training: Yes  Sit to Stand: Supervision  Stand to Sit: Supervision  Bed to Chair: Supervision  Toilet Transfer: Supervision    Balance:  Balance  Sitting: Intact  Standing: With support  Standing - Static: Good  Standing - Dynamic: Fair (F+)    ADL Intervention:  Feeding: Independent  Grooming: Modified independent  (standing)  LE Dressing: Stand by assistance (management of pajama pants during toileting task)  Toileting: Stand by assistance    Pain:  Pain level pre-treatment: 0/10   Pain level post-treatment: 0/10  Pain Intervention(s): Rest, Ice, Repositioning   Response to intervention: Nurse notified    Activity Tolerance:    Activity Tolerance: Patient tolerated treatment well  Please refer to the flowsheet for vital signs taken during this treatment. After treatment:   [x]  Patient left in no apparent distress sitting up in chair  []  Patient left in no apparent distress in bed  [x]  Call bell left within reach  [x]  Nursing notified  []  Caregiver present  []  Bed alarm activated    COMMUNICATION/EDUCATION:   Patient Education  Education Given To: Patient  Education Provided: Role of Therapy;Transfer Training;Plan of Care;Energy Conservation; ADL Adaptive Strategies; Fall Prevention Strategies; Equipment  Education Method: Teach Back;Verbal;Demonstration  Barriers to Learning: None  Education Outcome: Verbalized understanding      Thank you for this referral.  Rudolph Palafox OTR/L  Minutes: 23

## 2023-09-23 ENCOUNTER — HOME CARE VISIT (OUTPATIENT)
Age: 88
End: 2023-09-23

## 2023-09-23 PROCEDURE — G0299 HHS/HOSPICE OF RN EA 15 MIN: HCPCS

## 2023-09-23 PROCEDURE — 0221000100 HH NO PAY CLAIM PROCEDURE

## 2023-09-25 ENCOUNTER — HOME CARE VISIT (OUTPATIENT)
Age: 88
End: 2023-09-25

## 2023-09-25 VITALS
HEART RATE: 84 BPM | RESPIRATION RATE: 18 BRPM | OXYGEN SATURATION: 92 % | SYSTOLIC BLOOD PRESSURE: 128 MMHG | DIASTOLIC BLOOD PRESSURE: 72 MMHG | TEMPERATURE: 98.5 F

## 2023-09-25 VITALS
DIASTOLIC BLOOD PRESSURE: 62 MMHG | HEART RATE: 80 BPM | RESPIRATION RATE: 18 BRPM | SYSTOLIC BLOOD PRESSURE: 112 MMHG | TEMPERATURE: 97.3 F | OXYGEN SATURATION: 92 %

## 2023-09-25 PROCEDURE — G0151 HHCP-SERV OF PT,EA 15 MIN: HCPCS

## 2023-09-25 ASSESSMENT — ENCOUNTER SYMPTOMS
DYSPNEA ACTIVITY LEVEL: AT REST
HEMOPTYSIS: 0
DYSPNEA ACTIVITY LEVEL: WHILE SPEAKING
PAIN LOCATION - PAIN QUALITY: ACHE

## 2023-09-25 NOTE — HOME HEALTH
Skilled services/Home bound verification:  PATIENT AND CAREGIVER UNDERSTAND HOMEBOUND STATUS. Skilled Reason for admission/summary of clinical condition:   Acute on Chronic Respiratory Failure with Hypoxia  This patient is homebound for the following reasons Requires considerable and taxing effort to leave the home , Requires the assistance of 1 or more persons to leave the home  and Only leaves the home for medical reasons or [employer] services and are infrequent and of short duration for other reasons . Caregiver: is son Sana Rodriguez, available some days and all evenings, he .Assists with ADLs, Medication management, Transportation to appointments, Meal prep and assists with ambulation. .    Medications reconciled and all medications are available in the home this visit. The following education was provided regarding medications: All medications should be given the same time daily. Medications  are effective, somewhat effective at this time. High risk medication teaching regarding  antiplatelets, ASA  I discussed the signs of gaurav and occult bleeding, prevention of bleeding when taking these medications. and when to call MD   Patient also on Xanax and  I discussed the risk of addiction and increase in respiratory depression with this medication. Home health supplies by type and quantity ordered/delivered this visit include:n/a    Patient education provided this visit to include: Patient is a fall risk, I recommend supervision at all times, keeping walk ways/halls clear of clutter. We went over what CHF is and how better to manage it and be aware of symptoms before it get worse. Patient should weigh daily to be alert for water weight gain. Observe for s/s of edema increased SOB or cough. Went over the different positions she can get into to increase oxygenation ie: Tripod position. Also discussed importance of conserving energy to decrease SOB.  Discussed the need to sleep on a wedge

## 2023-09-26 ENCOUNTER — HOME CARE VISIT (OUTPATIENT)
Age: 88
End: 2023-09-26

## 2023-09-26 VITALS
RESPIRATION RATE: 20 BRPM | HEART RATE: 76 BPM | DIASTOLIC BLOOD PRESSURE: 44 MMHG | TEMPERATURE: 97.8 F | SYSTOLIC BLOOD PRESSURE: 102 MMHG

## 2023-09-26 PROCEDURE — G0300 HHS/HOSPICE OF LPN EA 15 MIN: HCPCS

## 2023-09-26 ASSESSMENT — ENCOUNTER SYMPTOMS: STOOL DESCRIPTION: SOFT FORMED

## 2023-09-26 NOTE — HOME HEALTH
Skilled reason for visit: COPD, CHF, Hypertension,Infection Prevention, Skin Impairment, and Medication Education     Caregiver involvement: Patient's son is her primary caregiver and assist patient with scheduling her appointments. Patient  schedules her transportation to and from her appointments. Patients sister assist with picking up her medications and transporting her to her appointments as well. Patient bathes herself and is able to prepare her own small meals and have family that will assist with meals as well. Medications reviewed and all medications ARE available in the home this visit. The following education was provided regarding medications: Prednisone- Caregiver was educated on this medication and the purpose of it. Patient/Caregiver verbalized understanding    MD notified of any discrepancies/look a-like medications/medication interactions: NA    Medications are EFFECTIVE at this time. Home health supplies by type and quantity ordered/delivered this visit include: NA    Patient education provided this visit: See interventions Tab. Sharps education provided: NA    Patient level of understanding of education provided: Patient verbalized understanding to all education in interventions tab. Skilled Care Performed this visit: Vital Assessment, Disease, Medication, Safety and Infection Prevention, Education and Management    Patient response to procedure performed:   Patient tolerated vital assessment well and no facial grimaces or complaints of pain or discomfort. Agency Progress toward goals: Agency staff is progressing well with patient as patient verbalizes being able to better manage her disease processes with the education received from home care staff. Agency Progress toward goals: Agency staff is progressing well with patient as patient verbalizes being able to better manage her disease processes with the education received from home care staff.     Patient's Progress

## 2023-09-27 ENCOUNTER — HOME CARE VISIT (OUTPATIENT)
Age: 88
End: 2023-09-27

## 2023-09-27 PROCEDURE — G0157 HHC PT ASSISTANT EA 15: HCPCS

## 2023-09-28 ENCOUNTER — HOME CARE VISIT (OUTPATIENT)
Age: 88
End: 2023-09-28

## 2023-09-29 ENCOUNTER — HOME CARE VISIT (OUTPATIENT)
Age: 88
End: 2023-09-29

## 2023-09-29 NOTE — HOME HEALTH
This nurse contacted patient yesterday evening to schedule a visit for today and patient stated she has  a MD appointment with her PCP today and visit has been cancelled with this nurse.

## 2023-10-01 VITALS
HEART RATE: 74 BPM | TEMPERATURE: 98.2 F | SYSTOLIC BLOOD PRESSURE: 130 MMHG | OXYGEN SATURATION: 92 % | RESPIRATION RATE: 17 BRPM | DIASTOLIC BLOOD PRESSURE: 55 MMHG

## 2023-10-01 ASSESSMENT — ENCOUNTER SYMPTOMS: PAIN LOCATION - PAIN QUALITY: ACHE

## 2023-10-02 ENCOUNTER — HOME CARE VISIT (OUTPATIENT)
Age: 88
End: 2023-10-02
Payer: MEDICARE

## 2023-10-02 VITALS
SYSTOLIC BLOOD PRESSURE: 108 MMHG | DIASTOLIC BLOOD PRESSURE: 52 MMHG | RESPIRATION RATE: 16 BRPM | OXYGEN SATURATION: 91 % | TEMPERATURE: 97.6 F | HEART RATE: 72 BPM

## 2023-10-02 PROCEDURE — G0300 HHS/HOSPICE OF LPN EA 15 MIN: HCPCS

## 2023-10-02 PROCEDURE — G0157 HHC PT ASSISTANT EA 15: HCPCS

## 2023-10-02 ASSESSMENT — ENCOUNTER SYMPTOMS: STOOL DESCRIPTION: SOFT FORMED

## 2023-10-02 NOTE — HOME HEALTH
Skilled reason for visit: COPD, CHF, Hypertension,Infection Prevention, Skin Impairment, and Medication Education     Caregiver involvement: Patient's son is her primary caregiver and assist patient with scheduling her appointments. Patient  schedules her transportation to and from her appointments. Patients sister assist with picking up her medications and transporting her to her appointments as well. Patient bathes herself and is able to prepare her own small meals and have family that will assist with meals as well. Medications reviewed and all medications ARE available in the home this visit. The following education was provided regarding medications: Nifedipine- Caregiver was educated on this medication and the purpose of it. Patient/Caregiver verbalized understanding    MD notified of any discrepancies/look a-like medications/medication interactions: NA    Medications are EFFECTIVE at this time. Home health supplies by type and quantity ordered/delivered this visit include: NA    Patient education provided this visit: See interventions Tab. Sharps education provided: NA    Patient level of understanding of education provided: Patient verbalized understanding to all education in interventions tab. Skilled Care Performed this visit: Vital Assessment, Disease, Medication, Safety and Infection Prevention, Education and Management    Patient response to procedure performed:   Patient tolerated vital assessment well and no facial grimaces or complaints of pain or discomfort. Agency Progress toward goals: Agency staff is progressing well with patient as patient verbalizes being able to better manage her disease processes with the education received from home care staff. Agency Progress toward goals: Agency staff is progressing well with patient as patient verbalizes being able to better manage her disease processes with the education received from home care staff.     Patient's Progress

## 2023-10-03 ASSESSMENT — ENCOUNTER SYMPTOMS: PAIN LOCATION - PAIN QUALITY: ACHE

## 2023-10-04 ENCOUNTER — HOME CARE VISIT (OUTPATIENT)
Age: 88
End: 2023-10-04
Payer: MEDICARE

## 2023-10-04 PROCEDURE — G0157 HHC PT ASSISTANT EA 15: HCPCS

## 2023-10-05 ENCOUNTER — HOME CARE VISIT (OUTPATIENT)
Age: 88
End: 2023-10-05
Payer: MEDICARE

## 2023-10-05 VITALS
HEART RATE: 79 BPM | RESPIRATION RATE: 20 BRPM | DIASTOLIC BLOOD PRESSURE: 44 MMHG | OXYGEN SATURATION: 91 % | SYSTOLIC BLOOD PRESSURE: 104 MMHG | TEMPERATURE: 97.6 F

## 2023-10-05 PROCEDURE — G0300 HHS/HOSPICE OF LPN EA 15 MIN: HCPCS

## 2023-10-05 ASSESSMENT — ENCOUNTER SYMPTOMS
STOOL DESCRIPTION: SOFT FORMED
PAIN LOCATION - PAIN QUALITY: ACHE

## 2023-10-05 NOTE — HOME HEALTH
Skilled reason for visit: COPD, CHF, Hypertension,Infection Prevention, Skin Impairment, and Medication Education     Caregiver involvement: Patient's son is her primary caregiver and assist patient with scheduling her appointments. Patient  schedules her transportation to and from her appointments. Patients sister assist with picking up her medications and transporting her to her appointments as well. Patient bathes herself and is able to prepare her own small meals and have family that will assist with meals as well. Medications reviewed and all medications ARE available in the home this visit. The following education was provided regarding medications: Losartan- Caregiver was educated on this medication and the purpose of it. Patient/Caregiver verbalized understanding    MD notified of any discrepancies/look a-like medications/medication interactions: NA    Medications are EFFECTIVE at this time. Home health supplies by type and quantity ordered/delivered this visit include: NA    Patient education provided this visit: See interventions Tab. Sharps education provided: NA    Patient level of understanding of education provided: Patient verbalized understanding to all education in interventions tab. Skilled Care Performed this visit: Vital Assessment, Disease, Medication, Safety and Infection Prevention, Education and Management    Patient response to procedure performed:   Patient tolerated vital assessment well and no facial grimaces or complaints of pain or discomfort. Agency Progress toward goals: Agency staff is progressing well with patient as patient verbalizes being able to better manage her disease processes with the education received from home care staff. Agency Progress toward goals: Agency staff is progressing well with patient as patient verbalizes being able to better manage her disease processes with the education received from home care staff.     Patient's Progress towards

## 2023-10-08 VITALS — OXYGEN SATURATION: 92 % | OXYGEN SATURATION: 88 %

## 2023-10-09 ENCOUNTER — HOME CARE VISIT (OUTPATIENT)
Age: 88
End: 2023-10-09
Payer: MEDICARE

## 2023-10-09 VITALS
SYSTOLIC BLOOD PRESSURE: 124 MMHG | TEMPERATURE: 97.6 F | DIASTOLIC BLOOD PRESSURE: 60 MMHG | RESPIRATION RATE: 16 BRPM | HEART RATE: 77 BPM | OXYGEN SATURATION: 90 %

## 2023-10-09 PROCEDURE — G0157 HHC PT ASSISTANT EA 15: HCPCS

## 2023-10-09 PROCEDURE — G0300 HHS/HOSPICE OF LPN EA 15 MIN: HCPCS

## 2023-10-09 ASSESSMENT — ENCOUNTER SYMPTOMS: STOOL DESCRIPTION: SOFT FORMED

## 2023-10-09 NOTE — HOME HEALTH
Skilled reason for visit: COPD, CHF, Hypertension,Infection Prevention, Skin Impairment, and Medication Education     Caregiver involvement: Patient's son is her primary caregiver and assist patient with scheduling her appointments. Patient  schedules her transportation to and from her appointments. Patients sister assist with picking up her medications and transporting her to her appointments as well. Patient bathes herself and is able to prepare her own small meals and have family that will assist with meals as well. Medications reviewed and all medications ARE available in the home this visit. The following education was provided regarding medications: Xanax- Caregiver was educated on this medication and the purpose of it. Patient/Caregiver verbalized understanding    MD notified of any discrepancies/look a-like medications/medication interactions: NA    Medications are EFFECTIVE at this time. Home health supplies by type and quantity ordered/delivered this visit include: NA    Patient education provided this visit: See interventions Tab. Sharps education provided: NA    Patient level of understanding of education provided: Patient verbalized understanding to all education in interventions tab. Skilled Care Performed this visit: Vital Assessment, Disease, Medication, Safety and Infection Prevention, Education and Management    Patient response to procedure performed:   Patient tolerated vital assessment well and no facial grimaces or complaints of pain or discomfort. Agency Progress toward goals: Agency staff is progressing well with patient as patient verbalizes being able to better manage her disease processes with the education received from home care staff. Agency Progress toward goals: Agency staff is progressing well with patient as patient verbalizes being able to better manage her disease processes with the education received from home care staff.     Patient's Progress towards

## 2023-10-11 ENCOUNTER — HOME CARE VISIT (OUTPATIENT)
Age: 88
End: 2023-10-11
Payer: MEDICARE

## 2023-10-11 PROCEDURE — G0157 HHC PT ASSISTANT EA 15: HCPCS

## 2023-10-16 ENCOUNTER — HOME CARE VISIT (OUTPATIENT)
Age: 88
End: 2023-10-16
Payer: MEDICARE

## 2023-10-16 PROCEDURE — G0157 HHC PT ASSISTANT EA 15: HCPCS

## 2023-10-18 VITALS
RESPIRATION RATE: 17 BRPM | SYSTOLIC BLOOD PRESSURE: 118 MMHG | TEMPERATURE: 98.2 F | HEART RATE: 73 BPM | OXYGEN SATURATION: 92 % | DIASTOLIC BLOOD PRESSURE: 58 MMHG

## 2023-10-18 ASSESSMENT — ENCOUNTER SYMPTOMS: PAIN LOCATION - PAIN QUALITY: ACHE

## 2023-10-19 ENCOUNTER — HOME CARE VISIT (OUTPATIENT)
Age: 88
End: 2023-10-19
Payer: MEDICARE

## 2023-10-19 PROCEDURE — G0151 HHCP-SERV OF PT,EA 15 MIN: HCPCS

## 2023-10-20 VITALS
HEART RATE: 78 BPM | OXYGEN SATURATION: 92 % | SYSTOLIC BLOOD PRESSURE: 112 MMHG | TEMPERATURE: 98.3 F | RESPIRATION RATE: 17 BRPM | DIASTOLIC BLOOD PRESSURE: 58 MMHG

## 2023-10-20 ASSESSMENT — ENCOUNTER SYMPTOMS: PAIN LOCATION - PAIN QUALITY: ACHING

## 2023-10-20 NOTE — HOME HEALTH
SUBJECTIVE: Pt states she is doing well. States she continues to have good and bad days but she has been that way for a long time. She states she feels her BP is much better managed now and feels like she has everything under control and no longer needs home care services  CAREGIVER INVOLVEMENT/ASSISTANCE NEEDED FOR: Pts son asisst with groceries, household chores and transportation to medical appointments  [address] ORDERED/DELIVERED THIS VISIT INCLUDE: none  OBJECTIVE:  See interventions. MEDICATIONS: Medications reconciled and all medications are available in the home this visit. The following education was provided regarding medications, medication interactions, and look a like medications: Continue medication as prescribed by MD. Medications are effective at this time. PATIENT RESPONSE TO TREATMENT:  Pt with increased SOB during assessment but was able to demonstrate proper breathing techaniques to improve her recovery  PATIENT LEVEL OF UNDERSTANDING OF EDUCATION PROVIDED: Pt has verbalized understanding of importance of compliance with HEP to maintain current functional status. Pt verbalized understanding of discharge from all home care services per her request as she is back at her baseline. ASSESSMENT OF PROGRESS TOWARD GOALS: Pt has met all goals for PT. Pt is ambulating indpeendently throughout her home and only using her SC if she is having a bad day. Pt continues to use her rollator when ambulating outside for safety. Pt has demonstrated understanding of energy conservation techniques and proper breathing during exertion and recovery periods. Pt is Tommy with transfers. Pt is independent with HEP. LM at [facility] NP office regarding DC with all back contact info for any questions  THE FOLLOWING DISCHARGE PLANNING WAS DISCUSSED WITH THE PATIENT/CAREGIVER: DC to family under MD supervision goals met for PT and pt feels she no longer needs any SN interventions.

## 2023-10-23 ENCOUNTER — HOSPITAL ENCOUNTER (OUTPATIENT)
Facility: HOSPITAL | Age: 88
Discharge: HOME | End: 2023-10-26
Payer: MEDICARE

## 2023-10-23 DIAGNOSIS — D72.828 ACQUIRED NEUTROPHILIA: ICD-10-CM

## 2023-10-23 PROCEDURE — 71046 X-RAY EXAM CHEST 2 VIEWS: CPT

## 2023-10-25 ASSESSMENT — ENCOUNTER SYMPTOMS: DYSPNEA ACTIVITY LEVEL: AFTER AMBULATING 10 - 20 FT

## 2023-11-22 ASSESSMENT — ENCOUNTER SYMPTOMS
WHEEZING: 1
SHORTNESS OF BREATH: 1
COUGH: 1
BACK PAIN: 1

## 2024-01-05 ENCOUNTER — APPOINTMENT (OUTPATIENT)
Facility: HOSPITAL | Age: 89
DRG: 871 | End: 2024-01-05
Payer: MEDICARE

## 2024-01-05 ENCOUNTER — APPOINTMENT (OUTPATIENT)
Facility: HOSPITAL | Age: 89
DRG: 871 | End: 2024-01-05
Attending: STUDENT IN AN ORGANIZED HEALTH CARE EDUCATION/TRAINING PROGRAM
Payer: MEDICARE

## 2024-01-05 ENCOUNTER — HOSPITAL ENCOUNTER (INPATIENT)
Facility: HOSPITAL | Age: 89
LOS: 11 days | Discharge: NURSING, REHAB OR LONG-TERM CARE | DRG: 871 | End: 2024-01-16
Attending: STUDENT IN AN ORGANIZED HEALTH CARE EDUCATION/TRAINING PROGRAM | Admitting: STUDENT IN AN ORGANIZED HEALTH CARE EDUCATION/TRAINING PROGRAM
Payer: MEDICARE

## 2024-01-05 DIAGNOSIS — L03.119 CELLULITIS AND ABSCESS OF LEG: Primary | ICD-10-CM

## 2024-01-05 DIAGNOSIS — E87.20 LACTIC ACIDOSIS: ICD-10-CM

## 2024-01-05 DIAGNOSIS — R60.9 PERIPHERAL EDEMA: ICD-10-CM

## 2024-01-05 DIAGNOSIS — I73.9 PAD (PERIPHERAL ARTERY DISEASE) (HCC): ICD-10-CM

## 2024-01-05 DIAGNOSIS — J96.21 ACUTE ON CHRONIC RESPIRATORY FAILURE WITH HYPOXIA (HCC): ICD-10-CM

## 2024-01-05 DIAGNOSIS — I27.20 PULMONARY HYPERTENSION (HCC): ICD-10-CM

## 2024-01-05 DIAGNOSIS — L02.419 CELLULITIS AND ABSCESS OF LEG: Primary | ICD-10-CM

## 2024-01-05 DIAGNOSIS — L03.116 CELLULITIS OF LEFT LOWER EXTREMITY: ICD-10-CM

## 2024-01-05 DIAGNOSIS — I50.33 ACUTE ON CHRONIC DIASTOLIC CHF (CONGESTIVE HEART FAILURE) (HCC): ICD-10-CM

## 2024-01-05 PROBLEM — A41.9 SEVERE SEPSIS (HCC): Status: ACTIVE | Noted: 2022-05-06

## 2024-01-05 PROBLEM — L03.90 CELLULITIS: Status: RESOLVED | Noted: 2024-01-05 | Resolved: 2024-01-05

## 2024-01-05 PROBLEM — R65.20 SEVERE SEPSIS (HCC): Status: ACTIVE | Noted: 2022-05-06

## 2024-01-05 PROBLEM — L03.90 CELLULITIS: Status: ACTIVE | Noted: 2024-01-05

## 2024-01-05 LAB
ANION GAP SERPL CALC-SCNC: 8 MMOL/L (ref 3–18)
APTT PPP: 28.2 SEC (ref 23–36.4)
APTT PPP: >180 SEC (ref 23–36.4)
BASOPHILS # BLD: 0 K/UL (ref 0–0.1)
BASOPHILS NFR BLD: 0 % (ref 0–2)
BUN SERPL-MCNC: 36 MG/DL (ref 7–18)
BUN/CREAT SERPL: 31 (ref 12–20)
CALCIUM SERPL-MCNC: 8.7 MG/DL (ref 8.5–10.1)
CHLORIDE SERPL-SCNC: 102 MMOL/L (ref 100–111)
CO2 SERPL-SCNC: 28 MMOL/L (ref 21–32)
CREAT SERPL-MCNC: 1.18 MG/DL (ref 0.6–1.3)
CRP SERPL-MCNC: 3.6 MG/DL (ref 0–0.3)
DIFFERENTIAL METHOD BLD: ABNORMAL
EKG ATRIAL RATE: 92 BPM
EKG DIAGNOSIS: NORMAL
EKG P AXIS: 64 DEGREES
EKG P-R INTERVAL: 124 MS
EKG Q-T INTERVAL: 354 MS
EKG QRS DURATION: 86 MS
EKG QTC CALCULATION (BAZETT): 437 MS
EKG R AXIS: 78 DEGREES
EKG T AXIS: 82 DEGREES
EKG VENTRICULAR RATE: 92 BPM
EOSINOPHIL # BLD: 0 K/UL (ref 0–0.4)
EOSINOPHIL NFR BLD: 0 % (ref 0–5)
ERYTHROCYTE [DISTWIDTH] IN BLOOD BY AUTOMATED COUNT: 14.5 % (ref 11.6–14.5)
ERYTHROCYTE [SEDIMENTATION RATE] IN BLOOD: 1 MM/HR (ref 0–30)
FLUAV AG NPH QL IA: NEGATIVE
FLUBV AG NOSE QL IA: NEGATIVE
GLUCOSE SERPL-MCNC: 135 MG/DL (ref 74–99)
HCT VFR BLD AUTO: 41.7 % (ref 35–45)
HGB BLD-MCNC: 13.1 G/DL (ref 12–16)
IMM GRANULOCYTES # BLD AUTO: 0 K/UL
IMM GRANULOCYTES NFR BLD AUTO: 0 %
LACTATE BLD-SCNC: 1.99 MMOL/L (ref 0.4–2)
LACTATE BLD-SCNC: 5.09 MMOL/L (ref 0.4–2)
LYMPHOCYTES # BLD: 0.5 K/UL (ref 0.9–3.6)
LYMPHOCYTES NFR BLD: 1 % (ref 21–52)
MCH RBC QN AUTO: 25.9 PG (ref 24–34)
MCHC RBC AUTO-ENTMCNC: 31.4 G/DL (ref 31–37)
MCV RBC AUTO: 82.4 FL (ref 78–100)
MONOCYTES # BLD: 2.4 K/UL (ref 0.05–1.2)
MONOCYTES NFR BLD: 5 % (ref 3–10)
NEUTS SEG # BLD: 45.6 K/UL (ref 1.8–8)
NEUTS SEG NFR BLD: 94 % (ref 40–73)
NRBC # BLD: 0 K/UL (ref 0–0.01)
NRBC BLD-RTO: 0 PER 100 WBC
NT PRO BNP: 8471 PG/ML (ref 0–1800)
PLATELET # BLD AUTO: 331 K/UL (ref 135–420)
PLATELET COMMENT: ABNORMAL
PMV BLD AUTO: 9.9 FL (ref 9.2–11.8)
POTASSIUM SERPL-SCNC: 4.3 MMOL/L (ref 3.5–5.5)
PROCALCITONIN SERPL-MCNC: 0.11 NG/ML
RBC # BLD AUTO: 5.06 M/UL (ref 4.2–5.3)
RBC MORPH BLD: ABNORMAL
SARS-COV-2 RDRP RESP QL NAA+PROBE: NOT DETECTED
SODIUM SERPL-SCNC: 138 MMOL/L (ref 136–145)
SOURCE: NORMAL
TROPONIN I SERPL HS-MCNC: 44 NG/L (ref 0–54)
WBC # BLD AUTO: 48.5 K/UL (ref 4.6–13.2)

## 2024-01-05 PROCEDURE — 87804 INFLUENZA ASSAY W/OPTIC: CPT

## 2024-01-05 PROCEDURE — 99285 EMERGENCY DEPT VISIT HI MDM: CPT

## 2024-01-05 PROCEDURE — 6360000002 HC RX W HCPCS: Performed by: INTERNAL MEDICINE

## 2024-01-05 PROCEDURE — 6360000002 HC RX W HCPCS: Performed by: STUDENT IN AN ORGANIZED HEALTH CARE EDUCATION/TRAINING PROGRAM

## 2024-01-05 PROCEDURE — 73590 X-RAY EXAM OF LOWER LEG: CPT

## 2024-01-05 PROCEDURE — 84484 ASSAY OF TROPONIN QUANT: CPT

## 2024-01-05 PROCEDURE — 1100000003 HC PRIVATE W/ TELEMETRY

## 2024-01-05 PROCEDURE — 85025 COMPLETE CBC W/AUTO DIFF WBC: CPT

## 2024-01-05 PROCEDURE — 87040 BLOOD CULTURE FOR BACTERIA: CPT

## 2024-01-05 PROCEDURE — 87635 SARS-COV-2 COVID-19 AMP PRB: CPT

## 2024-01-05 PROCEDURE — 80048 BASIC METABOLIC PNL TOTAL CA: CPT

## 2024-01-05 PROCEDURE — 84145 PROCALCITONIN (PCT): CPT

## 2024-01-05 PROCEDURE — 2580000003 HC RX 258: Performed by: INTERNAL MEDICINE

## 2024-01-05 PROCEDURE — 2580000003 HC RX 258: Performed by: STUDENT IN AN ORGANIZED HEALTH CARE EDUCATION/TRAINING PROGRAM

## 2024-01-05 PROCEDURE — 83880 ASSAY OF NATRIURETIC PEPTIDE: CPT

## 2024-01-05 PROCEDURE — 86140 C-REACTIVE PROTEIN: CPT

## 2024-01-05 PROCEDURE — 96375 TX/PRO/DX INJ NEW DRUG ADDON: CPT

## 2024-01-05 PROCEDURE — 83605 ASSAY OF LACTIC ACID: CPT

## 2024-01-05 PROCEDURE — 93971 EXTREMITY STUDY: CPT

## 2024-01-05 PROCEDURE — 85652 RBC SED RATE AUTOMATED: CPT

## 2024-01-05 PROCEDURE — 96365 THER/PROPH/DIAG IV INF INIT: CPT

## 2024-01-05 PROCEDURE — 96368 THER/DIAG CONCURRENT INF: CPT

## 2024-01-05 PROCEDURE — 85730 THROMBOPLASTIN TIME PARTIAL: CPT

## 2024-01-05 PROCEDURE — 71045 X-RAY EXAM CHEST 1 VIEW: CPT

## 2024-01-05 PROCEDURE — 93010 ELECTROCARDIOGRAM REPORT: CPT | Performed by: INTERNAL MEDICINE

## 2024-01-05 PROCEDURE — 6370000000 HC RX 637 (ALT 250 FOR IP): Performed by: EMERGENCY MEDICINE

## 2024-01-05 PROCEDURE — 93005 ELECTROCARDIOGRAM TRACING: CPT | Performed by: STUDENT IN AN ORGANIZED HEALTH CARE EDUCATION/TRAINING PROGRAM

## 2024-01-05 RX ORDER — HEPARIN SODIUM 1000 [USP'U]/ML
40 INJECTION, SOLUTION INTRAVENOUS; SUBCUTANEOUS PRN
Status: DISCONTINUED | OUTPATIENT
Start: 2024-01-05 | End: 2024-01-06

## 2024-01-05 RX ORDER — 0.9 % SODIUM CHLORIDE 0.9 %
500 INTRAVENOUS SOLUTION INTRAVENOUS ONCE
Status: COMPLETED | OUTPATIENT
Start: 2024-01-05 | End: 2024-01-05

## 2024-01-05 RX ORDER — HEPARIN SODIUM 10000 [USP'U]/100ML
5-30 INJECTION, SOLUTION INTRAVENOUS CONTINUOUS
Status: DISCONTINUED | OUTPATIENT
Start: 2024-01-05 | End: 2024-01-06

## 2024-01-05 RX ORDER — IPRATROPIUM BROMIDE AND ALBUTEROL SULFATE 2.5; .5 MG/3ML; MG/3ML
1 SOLUTION RESPIRATORY (INHALATION)
Status: COMPLETED | OUTPATIENT
Start: 2024-01-05 | End: 2024-01-05

## 2024-01-05 RX ORDER — HEPARIN SODIUM 1000 [USP'U]/ML
80 INJECTION, SOLUTION INTRAVENOUS; SUBCUTANEOUS PRN
Status: DISCONTINUED | OUTPATIENT
Start: 2024-01-05 | End: 2024-01-06

## 2024-01-05 RX ORDER — HEPARIN SODIUM 1000 [USP'U]/ML
80 INJECTION, SOLUTION INTRAVENOUS; SUBCUTANEOUS ONCE
Status: COMPLETED | OUTPATIENT
Start: 2024-01-05 | End: 2024-01-05

## 2024-01-05 RX ADMIN — HEPARIN SODIUM 18 UNITS/KG/HR: 10000 INJECTION, SOLUTION INTRAVENOUS at 14:21

## 2024-01-05 RX ADMIN — PIPERACILLIN AND TAZOBACTAM 4500 MG: 4; .5 INJECTION, POWDER, FOR SOLUTION INTRAVENOUS at 13:57

## 2024-01-05 RX ADMIN — HEPARIN SODIUM 3560 UNITS: 1000 INJECTION INTRAVENOUS; SUBCUTANEOUS at 14:11

## 2024-01-05 RX ADMIN — SODIUM CHLORIDE 500 ML: 9 INJECTION, SOLUTION INTRAVENOUS at 14:10

## 2024-01-05 RX ADMIN — PIPERACILLIN AND TAZOBACTAM 3375 MG: 3; .375 INJECTION, POWDER, FOR SOLUTION INTRAVENOUS; PARENTERAL at 22:47

## 2024-01-05 RX ADMIN — CLINDAMYCIN PHOSPHATE 600 MG: 150 INJECTION, SOLUTION INTRAVENOUS at 13:20

## 2024-01-05 RX ADMIN — IPRATROPIUM BROMIDE AND ALBUTEROL SULFATE 1 DOSE: .5; 3 SOLUTION RESPIRATORY (INHALATION) at 21:06

## 2024-01-05 RX ADMIN — VANCOMYCIN HYDROCHLORIDE 1000 MG: 1 INJECTION, POWDER, LYOPHILIZED, FOR SOLUTION INTRAVENOUS at 13:06

## 2024-01-05 ASSESSMENT — PAIN - FUNCTIONAL ASSESSMENT: PAIN_FUNCTIONAL_ASSESSMENT: NONE - DENIES PAIN

## 2024-01-05 NOTE — ED PROVIDER NOTES
[facility] EMERGENCY DEPT  EMERGENCY DEPARTMENT ENCOUNTER       Pt Name: Rosa Alcala  MRN: [id number]  Birthdate 1935  Date of evaluation: 1/5/2024  Provider: MASHA ROBERTS MD   PCP: Lola Gan APRN - NP  Note Started: 5:10 PM 1/5/24     CHIEF COMPLAINT       Chief Complaint   Patient presents with    Leg Swelling    Leg Pain        HISTORY OF PRESENT ILLNESS: 1 or more elements      History From: Patient  None     Rosa Alcala is a 88 y.o. female who presents for LLE pain, swelling and redness. Per patient she had a few cuts/sores on her leg last night but when she awoke this morning her entire lower leg was more swollen than normal and covered in red rash, also a new leaking sore. She is having pain in that leg that is not normal for her. In addition to that, patient feels increased shortness of breath from her baseline. She denies cough, congestion, chest pain. She has stable edema in her R leg. She feels chills/rigors and fatigue and is complaining of malaise but has not recorded a fever at home.    In triage patient had a SpO2 in the 70s with a good waveform. She reports that she wear 5L of O2 at home for her pHTN and COPD so was placed on her home dose of O2. Her states O2 goals from her Cardiologist/Pulmonologist are 85-90%,     Nursing Notes were all reviewed and agreed with or any disagreements were addressed in the HPI.     REVIEW OF SYSTEMS      Review of Systems     Positives and Pertinent negatives as per HPI.    PAST HISTORY     Past Medical History:  Past Medical History:   Diagnosis Date    Arthritis     Chronic lung disease     Chronic obstructive pulmonary disease (HCC)     Edema due to congestive heart failure (HCC) 11/1/2022    Heart failure (HCC)     Hypertension          Past Surgical History:  Past Surgical History:   Procedure Laterality Date    ORTHOPEDIC SURGERY      spinal sx 5/6    OTHER SURGICAL HISTORY      Carotid artery    VASCULAR SURGERY      L CEA 10/2014

## 2024-01-05 NOTE — ED TRIAGE NOTES
W/C to room 6 with c/o LLE redness, edema and swelling since waking this am. States on 5LPM via NC at home and started taking Medrol Dose pack a few days ago for COPD. Denies CP or SOB at this time but Sats 70% upon triage.

## 2024-01-05 NOTE — ED NOTES
PTT elevated. Heparin gtt turned off per Dr Weaver. Pt a/o x4. {Leasant and conversant.  No needs at this time. Continues on oxygen at 6L/min per nasal cannula. Aware awaiting admission placement.

## 2024-01-05 NOTE — PROGRESS NOTES
Zosyn 4.5gm ivpb x1 was substituted for zosyn 3.375gm ivpb x1 per P&T approved substitution protocol  RAGHAV EVANS RP

## 2024-01-06 ENCOUNTER — APPOINTMENT (OUTPATIENT)
Facility: HOSPITAL | Age: 89
DRG: 871 | End: 2024-01-06
Attending: INTERNAL MEDICINE
Payer: MEDICARE

## 2024-01-06 LAB
APTT PPP: 36.3 SEC (ref 23–36.4)
APTT PPP: 91.1 SEC (ref 23–36.4)
ECHO BSA: 1.38 M2
ECHO BSA: 1.43 M2
ECHO EST RA PRESSURE: 15 MMHG
ECHO LA DIAMETER INDEX: 2.22 CM/M2
ECHO LA DIAMETER: 3.2 CM
ECHO LV FRACTIONAL SHORTENING: 29 % (ref 28–44)
ECHO LV INTERNAL DIMENSION DIASTOLE INDEX: 2.36 CM/M2
ECHO LV INTERNAL DIMENSION DIASTOLIC: 3.4 CM (ref 3.9–5.3)
ECHO LV INTERNAL DIMENSION SYSTOLIC INDEX: 1.67 CM/M2
ECHO LV INTERNAL DIMENSION SYSTOLIC: 2.4 CM
ECHO LV IVSD: 0.8 CM (ref 0.6–0.9)
ECHO LV MASS 2D: 106.3 G (ref 67–162)
ECHO LV MASS INDEX 2D: 73.8 G/M2 (ref 43–95)
ECHO LV POSTERIOR WALL DIASTOLIC: 1.3 CM (ref 0.6–0.9)
ECHO LV RELATIVE WALL THICKNESS RATIO: 0.76
ECHO PULMONARY ARTERY SYSTOLIC PRESSURE (PASP): 116 MMHG
ECHO RIGHT VENTRICULAR SYSTOLIC PRESSURE (RVSP): 120 MMHG
ECHO RV FREE WALL PEAK S': 13 CM/S
ECHO RV TAPSE: 1.5 CM (ref 1.7–?)
ECHO TV REGURGITANT MAX VELOCITY: 5.12 M/S
ECHO TV REGURGITANT PEAK GRADIENT: 105 MMHG

## 2024-01-06 PROCEDURE — 93971 EXTREMITY STUDY: CPT | Performed by: INTERNAL MEDICINE

## 2024-01-06 PROCEDURE — 93325 DOPPLER ECHO COLOR FLOW MAPG: CPT | Performed by: INTERNAL MEDICINE

## 2024-01-06 PROCEDURE — 6360000002 HC RX W HCPCS: Performed by: INTERNAL MEDICINE

## 2024-01-06 PROCEDURE — 6370000000 HC RX 637 (ALT 250 FOR IP): Performed by: INTERNAL MEDICINE

## 2024-01-06 PROCEDURE — 2580000003 HC RX 258: Performed by: INTERNAL MEDICINE

## 2024-01-06 PROCEDURE — 94640 AIRWAY INHALATION TREATMENT: CPT

## 2024-01-06 PROCEDURE — 85730 THROMBOPLASTIN TIME PARTIAL: CPT

## 2024-01-06 PROCEDURE — 6360000002 HC RX W HCPCS: Performed by: STUDENT IN AN ORGANIZED HEALTH CARE EDUCATION/TRAINING PROGRAM

## 2024-01-06 PROCEDURE — 2700000000 HC OXYGEN THERAPY PER DAY

## 2024-01-06 PROCEDURE — 93308 TTE F-UP OR LMTD: CPT

## 2024-01-06 PROCEDURE — 36415 COLL VENOUS BLD VENIPUNCTURE: CPT

## 2024-01-06 PROCEDURE — 1100000003 HC PRIVATE W/ TELEMETRY

## 2024-01-06 PROCEDURE — 99223 1ST HOSP IP/OBS HIGH 75: CPT | Performed by: INTERNAL MEDICINE

## 2024-01-06 PROCEDURE — 93308 TTE F-UP OR LMTD: CPT | Performed by: INTERNAL MEDICINE

## 2024-01-06 PROCEDURE — 94761 N-INVAS EAR/PLS OXIMETRY MLT: CPT

## 2024-01-06 PROCEDURE — 99222 1ST HOSP IP/OBS MODERATE 55: CPT | Performed by: COLON & RECTAL SURGERY

## 2024-01-06 PROCEDURE — 2580000003 HC RX 258: Performed by: STUDENT IN AN ORGANIZED HEALTH CARE EDUCATION/TRAINING PROGRAM

## 2024-01-06 PROCEDURE — 93321 DOPPLER ECHO F-UP/LMTD STD: CPT | Performed by: INTERNAL MEDICINE

## 2024-01-06 RX ORDER — FUROSEMIDE 10 MG/ML
40 INJECTION INTRAMUSCULAR; INTRAVENOUS 2 TIMES DAILY
Status: DISCONTINUED | OUTPATIENT
Start: 2024-01-06 | End: 2024-01-07

## 2024-01-06 RX ORDER — ECHINACEA PURPUREA EXTRACT 125 MG
1 TABLET ORAL PRN
Status: DISCONTINUED | OUTPATIENT
Start: 2024-01-06 | End: 2024-01-16 | Stop reason: HOSPADM

## 2024-01-06 RX ORDER — LACTOBACILLUS RHAMNOSUS GG 10B CELL
1 CAPSULE ORAL
Status: DISCONTINUED | OUTPATIENT
Start: 2024-01-07 | End: 2024-01-10

## 2024-01-06 RX ORDER — FUROSEMIDE 40 MG/1
40 TABLET ORAL DAILY
Status: DISCONTINUED | OUTPATIENT
Start: 2024-01-06 | End: 2024-01-06

## 2024-01-06 RX ORDER — LANOLIN ALCOHOL/MO/W.PET/CERES
1000 CREAM (GRAM) TOPICAL DAILY
Status: DISCONTINUED | OUTPATIENT
Start: 2024-01-06 | End: 2024-01-16 | Stop reason: HOSPADM

## 2024-01-06 RX ORDER — ALPRAZOLAM 0.5 MG/1
0.5 TABLET ORAL NIGHTLY PRN
Status: DISCONTINUED | OUTPATIENT
Start: 2024-01-06 | End: 2024-01-16 | Stop reason: HOSPADM

## 2024-01-06 RX ORDER — FUROSEMIDE 10 MG/ML
40 INJECTION INTRAMUSCULAR; INTRAVENOUS DAILY
Status: DISCONTINUED | OUTPATIENT
Start: 2024-01-06 | End: 2024-01-06

## 2024-01-06 RX ORDER — IPRATROPIUM BROMIDE AND ALBUTEROL SULFATE 2.5; .5 MG/3ML; MG/3ML
1 SOLUTION RESPIRATORY (INHALATION)
Status: DISCONTINUED | OUTPATIENT
Start: 2024-01-06 | End: 2024-01-09

## 2024-01-06 RX ORDER — FOLIC ACID 1 MG/1
1 TABLET ORAL DAILY
Status: DISCONTINUED | OUTPATIENT
Start: 2024-01-06 | End: 2024-01-16 | Stop reason: HOSPADM

## 2024-01-06 RX ORDER — M-VIT,TX,IRON,MINS/CALC/FOLIC 27MG-0.4MG
1 TABLET ORAL DAILY
Status: DISCONTINUED | OUTPATIENT
Start: 2024-01-06 | End: 2024-01-16 | Stop reason: HOSPADM

## 2024-01-06 RX ORDER — POTASSIUM CHLORIDE 20 MEQ/1
20 TABLET, EXTENDED RELEASE ORAL DAILY
Status: COMPLETED | OUTPATIENT
Start: 2024-01-06 | End: 2024-01-11

## 2024-01-06 RX ORDER — ACETAMINOPHEN 500 MG
500 TABLET ORAL EVERY 6 HOURS PRN
Status: DISCONTINUED | OUTPATIENT
Start: 2024-01-06 | End: 2024-01-16 | Stop reason: HOSPADM

## 2024-01-06 RX ORDER — HEPARIN SODIUM 5000 [USP'U]/ML
5000 INJECTION, SOLUTION INTRAVENOUS; SUBCUTANEOUS 2 TIMES DAILY
Status: DISCONTINUED | OUTPATIENT
Start: 2024-01-06 | End: 2024-01-16 | Stop reason: HOSPADM

## 2024-01-06 RX ORDER — NIFEDIPINE 30 MG/1
30 TABLET, EXTENDED RELEASE ORAL DAILY
Status: DISCONTINUED | OUTPATIENT
Start: 2024-01-06 | End: 2024-01-07

## 2024-01-06 RX ORDER — ARFORMOTEROL TARTRATE 15 UG/2ML
15 SOLUTION RESPIRATORY (INHALATION)
Status: DISCONTINUED | OUTPATIENT
Start: 2024-01-06 | End: 2024-01-06

## 2024-01-06 RX ORDER — ACETAMINOPHEN 500 MG
1000 TABLET ORAL
Status: DISCONTINUED | OUTPATIENT
Start: 2024-01-06 | End: 2024-01-06

## 2024-01-06 RX ORDER — CLINDAMYCIN PHOSPHATE 600 MG/50ML
600 INJECTION, SOLUTION INTRAVENOUS EVERY 8 HOURS
Status: DISCONTINUED | OUTPATIENT
Start: 2024-01-06 | End: 2024-01-09

## 2024-01-06 RX ORDER — BUDESONIDE 0.5 MG/2ML
0.5 INHALANT ORAL
Status: DISCONTINUED | OUTPATIENT
Start: 2024-01-06 | End: 2024-01-16 | Stop reason: HOSPADM

## 2024-01-06 RX ORDER — GABAPENTIN 100 MG/1
200 CAPSULE ORAL NIGHTLY
Status: DISCONTINUED | OUTPATIENT
Start: 2024-01-06 | End: 2024-01-16 | Stop reason: HOSPADM

## 2024-01-06 RX ORDER — ATORVASTATIN CALCIUM 20 MG/1
20 TABLET, FILM COATED ORAL DAILY
Status: DISCONTINUED | OUTPATIENT
Start: 2024-01-06 | End: 2024-01-16 | Stop reason: HOSPADM

## 2024-01-06 RX ADMIN — IPRATROPIUM BROMIDE 0.5 MG: 0.5 SOLUTION RESPIRATORY (INHALATION) at 07:40

## 2024-01-06 RX ADMIN — CLINDAMYCIN PHOSPHATE 600 MG: 150 INJECTION, SOLUTION INTRAMUSCULAR; INTRAVENOUS at 13:05

## 2024-01-06 RX ADMIN — IPRATROPIUM BROMIDE AND ALBUTEROL SULFATE 1 DOSE: .5; 3 SOLUTION RESPIRATORY (INHALATION) at 15:00

## 2024-01-06 RX ADMIN — IPRATROPIUM BROMIDE 0.5 MG: 0.5 SOLUTION RESPIRATORY (INHALATION) at 13:01

## 2024-01-06 RX ADMIN — Medication 1 TABLET: at 09:59

## 2024-01-06 RX ADMIN — POTASSIUM CHLORIDE 20 MEQ: 1500 TABLET, EXTENDED RELEASE ORAL at 11:37

## 2024-01-06 RX ADMIN — CEFEPIME 1000 MG: 1 INJECTION, POWDER, FOR SOLUTION INTRAMUSCULAR; INTRAVENOUS at 11:38

## 2024-01-06 RX ADMIN — NIFEDIPINE 30 MG: 30 TABLET, EXTENDED RELEASE ORAL at 09:59

## 2024-01-06 RX ADMIN — ARFORMOTEROL TARTRATE 15 MCG: 15 SOLUTION RESPIRATORY (INHALATION) at 07:40

## 2024-01-06 RX ADMIN — FOLIC ACID 1 MG: 1 TABLET ORAL at 09:59

## 2024-01-06 RX ADMIN — CLINDAMYCIN PHOSPHATE 600 MG: 150 INJECTION, SOLUTION INTRAMUSCULAR; INTRAVENOUS at 19:54

## 2024-01-06 RX ADMIN — HEPARIN SODIUM 5000 UNITS: 5000 INJECTION INTRAVENOUS; SUBCUTANEOUS at 21:08

## 2024-01-06 RX ADMIN — POLYVINYL ALCOHOL, POVIDONE 1 DROP: 14; 6 SOLUTION/ DROPS OPHTHALMIC at 04:06

## 2024-01-06 RX ADMIN — HEPARIN SODIUM 5000 UNITS: 5000 INJECTION INTRAVENOUS; SUBCUTANEOUS at 18:06

## 2024-01-06 RX ADMIN — ATORVASTATIN CALCIUM 20 MG: 20 TABLET, FILM COATED ORAL at 09:59

## 2024-01-06 RX ADMIN — PIPERACILLIN AND TAZOBACTAM 3375 MG: 3; .375 INJECTION, POWDER, FOR SOLUTION INTRAVENOUS; PARENTERAL at 06:25

## 2024-01-06 RX ADMIN — POLYVINYL ALCOHOL, POVIDONE 1 DROP: 14; 6 SOLUTION/ DROPS OPHTHALMIC at 21:22

## 2024-01-06 RX ADMIN — BUDESONIDE INHALATION 500 MCG: 0.5 SUSPENSION RESPIRATORY (INHALATION) at 20:39

## 2024-01-06 RX ADMIN — BUDESONIDE INHALATION 500 MCG: 0.5 SUSPENSION RESPIRATORY (INHALATION) at 07:40

## 2024-01-06 RX ADMIN — GABAPENTIN 200 MG: 100 CAPSULE ORAL at 21:09

## 2024-01-06 RX ADMIN — ACETAMINOPHEN 500 MG: 500 TABLET ORAL at 02:25

## 2024-01-06 RX ADMIN — IPRATROPIUM BROMIDE AND ALBUTEROL SULFATE 1 DOSE: .5; 3 SOLUTION RESPIRATORY (INHALATION) at 20:39

## 2024-01-06 RX ADMIN — VANCOMYCIN HYDROCHLORIDE 500 MG: 500 INJECTION, POWDER, LYOPHILIZED, FOR SOLUTION INTRAVENOUS at 10:14

## 2024-01-06 RX ADMIN — METOPROLOL TARTRATE 12.5 MG: 25 TABLET, FILM COATED ORAL at 21:08

## 2024-01-06 RX ADMIN — METOPROLOL TARTRATE 12.5 MG: 25 TABLET, FILM COATED ORAL at 04:04

## 2024-01-06 RX ADMIN — ALPRAZOLAM 0.5 MG: 0.5 TABLET ORAL at 22:22

## 2024-01-06 RX ADMIN — CYANOCOBALAMIN TAB 1000 MCG 1000 MCG: 1000 TAB at 09:59

## 2024-01-06 RX ADMIN — FUROSEMIDE 40 MG: 10 INJECTION, SOLUTION INTRAMUSCULAR; INTRAVENOUS at 09:59

## 2024-01-06 RX ADMIN — FUROSEMIDE 40 MG: 10 INJECTION, SOLUTION INTRAMUSCULAR; INTRAVENOUS at 17:54

## 2024-01-06 RX ADMIN — ACETAMINOPHEN 500 MG: 500 TABLET ORAL at 22:26

## 2024-01-06 ASSESSMENT — PAIN SCALES - GENERAL
PAINLEVEL_OUTOF10: 0
PAINLEVEL_OUTOF10: 0
PAINLEVEL_OUTOF10: 2
PAINLEVEL_OUTOF10: 0

## 2024-01-06 NOTE — PROGRESS NOTES
HPI: Rosa Alcala is a 88 y.o. female presenting with chief complain of lower extremity cellulitis.  Patient has chronic edema.  She is felt some worsening pain in her left lower extremity.  She states this began approximately 2 days ago and is moderate.  She was seen in the emergency department we were consulted regarding potential need for debridement and concerns for necrotizing fasciitis.    Past Medical History:   Diagnosis Date    Arthritis     Chronic diastolic CHF (congestive heart failure) (HCC)     Chronic obstructive pulmonary disease (HCC)     Hypertension        Past Surgical History:   Procedure Laterality Date    ORTHOPEDIC SURGERY      spinal sx     OTHER SURGICAL HISTORY      Carotid artery    VASCULAR SURGERY      L CEA 10/2014       Family History   Problem Relation Age of Onset    Heart Attack Father     Hypertension Father     Hypertension Mother     Heart Disease Mother        Social History     Socioeconomic History    Marital status:      Spouse name: None    Number of children: None    Years of education: None    Highest education level: None   Tobacco Use    Smoking status: Former     Current packs/day: 0.00     Types: Cigarettes     Quit date: 1987     Years since quittin.3    Smokeless tobacco: Never   Vaping Use    Vaping Use: Never used   Substance and Sexual Activity    Alcohol use: No     Alcohol/week: 0.0 standard drinks of alcohol    Drug use: No    Sexual activity: Not Currently     Social Determinants of Health     Food Insecurity: No Food Insecurity (2024)    Hunger Vital Sign     Worried About Running Out of Food in the Last Year: Never true     Ran Out of Food in the Last Year: Never true   Transportation Needs: No Transportation Needs (2024)    PRAPARE - Transportation     Lack of Transportation (Medical): No     Lack of Transportation (Non-Medical): No   Social Connections: Feeling Socially Integrated (10/19/2023)    OASIS : Social  Isolation     Frequency of experiencing loneliness or isolation: Never   Housing Stability: Low Risk  (1/6/2024)    Housing Stability Vital Sign     Unable to Pay for Housing in the Last Year: No     Number of Places Lived in the Last Year: 1     Unstable Housing in the Last Year: No       Current Outpatient Medications   Medication Instructions    acetaminophen (TYLENOL) 500 mg, Oral, EVERY 6 HOURS PRN    albuterol (PROVENTIL) 2.5 mg, Nebulization, EVERY 4 HOURS PRN    albuterol sulfate HFA (PROVENTIL HFA) 108 (90 Base) MCG/ACT inhaler 2 puffs, Inhalation, EVERY 6 HOURS PRN    albuterol sulfate HFA (PROVENTIL;VENTOLIN;PROAIR) 108 (90 Base) MCG/ACT inhaler 2 puffs, Inhalation, EVERY 4 HOURS PRN    aspirin 81 mg, Oral, DAILY    atorvastatin (LIPITOR) 20 mg, Oral, DAILY    Budeson-Glycopyrrol-Formoterol (BREZTRI AEROSPHERE) 160-9-4.8 MCG/ACT AERO 1 inhalation , Inhalation, DAILY    carboxymethylcellulose (REFRESH PLUS) 0.5 % SOLN ophthalmic solution 1 drop, Ophthalmic, 2 TIMES DAILY, Both Eyes    folic acid (FOLVITE) 1 mg, Oral, DAILY    furosemide (LASIX) 40 mg, Oral, DAILY    gabapentin (NEURONTIN) 200 mg, Oral, NIGHTLY    losartan (COZAAR) 25 mg, Oral, DAILY    metoprolol tartrate (LOPRESSOR) 12.5 mg, Oral, 2 TIMES DAILY    Multiple Vitamins-Minerals (MULTIVITAMIN ADULTS 50+ PO) 1 tablet, Oral, DAILY    NIFEdipine (ADALAT CC) 30 mg, Oral, DAILY    Oxygen Tubing MISC 4 L, Inhalation, CONTINUOUS    OXYGEN E clarify this medication. Outside name: Oxygen    sodium chloride (OCEAN) 0.65 % nasal spray 1 drop, Nasal, PRN    vitamin B-12 (CYANOCOBALAMIN) 1,000 mcg, Oral, DAILY        No Known Allergies    ROS: negative    Vitals:    01/06/24 0745   BP: (!) 150/72   Pulse: 79   Resp: 22   Temp: 98.1 °F (36.7 °C)   SpO2: (!) 89%       Physical Exam  Left lower extremity with erythema, appears to be slightly improved based on the markings placed in the emergency room, no crepitus, no exudate    Lab Results   Component Value

## 2024-01-06 NOTE — PROGRESS NOTES
conducted an initial consultation and Spiritual Assessment for Rosa Alcala, who is a 88 y.o.,female. Patient’s Primary Language is: English.   According to the patient’s EMR Adventism Affiliation is: Bahai.     The reason the Patient came to the hospital is:   Patient Active Problem List    Diagnosis Date Noted    Hypoxemia 03/07/2023    Acute on chronic heart failure with preserved ejection fraction (HFpEF) ([facility]) 03/07/2023    Restless legs syndrome (RLS) 03/07/2023    Cellulitis of left lower extremity 01/05/2024    Severe protein-calorie malnutrition ([facility]) 09/18/2023    PAD (peripheral artery disease) ([facility]) 11/01/2022    E coli bacteremia 05/08/2022    Severe sepsis ([facility]) 05/06/2022    Pyelonephritis 05/05/2022    Acute bronchitis with chronic obstructive pulmonary disease (COPD) ([facility]) 10/24/2021    Acute respiratory failure with hypoxia ([facility]) 10/24/2021    COVID-19 04/15/2021    Acute on chronic respiratory failure with hypoxia ([facility])     Debility     Encounter for palliative care     Goals of care, counseling/discussion     Chronic obstructive pulmonary disease with acute exacerbation ([facility]) 02/22/2021    CAP (community acquired pneumonia) 12/10/2020    Suspected COVID-19 virus infection 12/10/2020    Hyperlipidemia 07/01/2016    Acute on chronic diastolic CHF (congestive heart failure) ([facility]) 07/01/2016    Hypertension 07/01/2016    Chronic obstructive pulmonary disease ([facility]) 07/01/2016    Pulmonary HTN ([facility]) 07/01/2016    CABALLERO (dyspnea on exertion) 06/02/2016    Carotid stenosis 10/30/2014        The  provided the following Interventions:  Initiated a relationship of care and support with patient in bed  454 where she was admitted with cellulitis of the left lower extremity.  Offered prayer on patients behalf.   Chart reviewed.    The following outcomes were achieved:  Patient shared limited information about both their medical narrative.  Patient processed feeling about current

## 2024-01-06 NOTE — H&P
History and Physical    Patient: Rosa Alcala MRN: [id number]  SSN: [id number]    YOB: 1935  Age: 88 y.o.  Sex: female      Subjective:      Rosa Alcala is a 88 y.o. female who presents to [facility] ER with complaint of Lower Left Leg Swelling and Pain.  Patient reports pain in her left leg starting on 1/04/2024 with a 10/10 intensity pain that was worse with walking or contact.  Patient reports that this pain now only occurs with movement or physical touch, but is still an 8/10 intensity with a \"burning\" character.  Patient reports that she started exhibiting increased shortness of breath today, but notes that she just finished a tapering dose of steroids ostensibly for a COPD Exacerbation.  Patient denies wheezing and increased cough.  Patient reports that she has had leg pain for 20-30 years with a sensation like she is \"walking on clouds\" and pain reportedly in her posterior ankle joints; however, Patient reports that Gabapentin does not improve the pain.  Patient denies fevers, chills, nausea, vomiting, diarrhea, dysuria, and cough.  Notably, [facility] Clinician suspected that Patient has Necrotizing Fasciitis in [facility] and consulted General Surgical services before such suspicions waned.    In [facility], Patient is noted to have Temperature 99.5°F, Heart Rate 96 bpm, Respiration Rate 25 bpm, Blood Pressure 113/48 mm Hg to 179/92 mm Hg, SpO2 70% on 5 L/min O2 via NC, WBC 48.5, Hgb 13.1 g/dL, Neut% 94%, Neut# 45.6, Lactic Acid 5.09 mmol/L to 1.99 mmol/L, Na+ 138 mmol/L, K+ 4.3 *slightly hemolyzed* mmol/L, BUN 36 mg/dL, Creatinine 1.18 mg/dL (Baseline Creatinine ~0.85-1.15 mg/dL?), eGFR 45, Glucose 135 mg/dL, Troponin 44 ng/L, Pro-BNP 8,471 pg/mL, Procalcitonin 0.11 pg/mL, CRP 3.6 mg/dL, Automated Sed Rate 1 mm/hr, Influenza A/B (-), and SARS-CoV-2 (-).  CXR has been read by Radiologist to show \"Sequela of minimal congestion. Interstitial lung disease. Follow-up with plain imaging of the  chest.\"  Plain Radiographs of  Left Tibia/Fibula has been read by Radiologist to show \"No acute fracture-dislocation.\"  Venous Duplex of Left Lower Extremity read is PENDING.  Patient received IV Clindamycin 600 mg, IV Heparin 3,560 units and gtt, IV Piperacillin-Tazobactam (a.k.a. Zosyn) 4,500 mg and 3,375 mg, IV  mL, IV Vancomycin 1,000 mg, VIAL-MATE Adaptor, PO Acetaminophen 500 mg, and Ipratropium-Albuterol (a.k.a. Duoneb) 2.5 mg x1 dose in [facility] ER.    Patient is admitted to [facility] Telemetry (?) Unit for management of Severe Sepsis 2°/2 Left Lower Extremity Cellulitis with concern for possible Left Lower Extremity Deep Vein Thrombosis (DVT).    Past Medical History:   Diagnosis Date    Arthritis     Chronic diastolic CHF (congestive heart failure) (HCC)     Chronic obstructive pulmonary disease (HCC)     Hypertension      Past Surgical History:   Procedure Laterality Date    ORTHOPEDIC SURGERY      spinal sx     OTHER SURGICAL HISTORY      Carotid artery    VASCULAR SURGERY      L CEA 10/2014      Family History   Problem Relation Age of Onset    Heart Attack Father     Hypertension Father     Hypertension Mother     Heart Disease Mother      Social History     Tobacco Use    Smoking status: Former     Current packs/day: 0.00     Types: Cigarettes     Quit date: 1987     Years since quittin.3    Smokeless tobacco: Never   Substance Use Topics    Alcohol use: No     Alcohol/week: 0.0 standard drinks of alcohol      Prior to Admission medications    Medication Sig Start Date End Date Taking? Authorizing Provider   losartan (COZAAR) 25 MG tablet Take 1 tablet by mouth daily  Patient not taking: Reported on 10/5/2023 9/21/23   Mp Schreiber MD   albuterol sulfate HFA (PROVENTIL HFA) 108 (90 Base) MCG/ACT inhaler Inhale 2 puffs into the lungs every 6 hours as needed for Wheezing 23   Noah Ying MD   vitamin B-12 (CYANOCOBALAMIN) 1000 MCG tablet Take 1 tablet by mouth daily

## 2024-01-06 NOTE — PROGRESS NOTES
Patient was admitted early this morning with sepsis left leg cellulitis CHF exacerbation respiratory failure.    With a white count of 48,000 chest x-ray with CHF.    Plan noted I will change antibiotic from Zosyn to cefepime and add clinda for strep toxin continue Vanco check MRSA nasal screen.    After dose of IV Lasix 40 IV twice daily add p.o. potassium.  Await echo continue oxygen.

## 2024-01-06 NOTE — PROGRESS NOTES
Pharmacy Note - Therapeutic Interchange    Ipratropium 0.02% (0.5mg/2.5 ml) nebulization solution + Brovana (arformoterol) 15 mcg/2 mL nebulization solution was therapeutically interchanged for Glycopyyrolate/formoterol/Budesonide (Breztri) Inhaler per the P&T Committee approved Therapeutic Interchanges Policy. Approved dose conversion listed in table below.    Medication Ordered Preferred Medication Dispensed   Glycopyyrolate/formoterol) Budesonide(Breztri) Ipratropium scheduled q8h   +  Brovana (arformoterol) 15 mcg/2 mL1 neb BID  +  Pulmicort Respules® (budesonide)   0.5mg/2ml BID       JUANITA PHILIP RPH, Pharmacist  1/6/2024 2:19 AM

## 2024-01-06 NOTE — ED NOTES
TRANSFER - OUT REPORT:    Verbal report given to Fauzia Davison on Rosa Alcala  being transferred to [facility] 454 for routine progression of patient care       Report consisted of patient's Situation, Background, Assessment and   Recommendations(SBAR).     Information from the following report(s) ED Encounter Summary, Adult Overview, and MAR was reviewed with the receiving nurse.    [place] Fall Assessment:                           Lines:   Peripheral IV 01/05/24 Right Antecubital (Active)       Peripheral IV 01/05/24 Left Antecubital (Active)        Opportunity for questions and clarification was provided.      Patient transported with:  Monitor and O2 @ 5lpm

## 2024-01-06 NOTE — PROGRESS NOTES
[facility]   Pharmacy Pharmacokinetic Monitoring Service - Vancomycin    Indication: SSTI  Goal AUC/ROCÍO: 400-600 mg*hr/L  Day of Therapy: 2  Additional Antimicrobials: pip-tazo    Pertinent Laboratory Values:   Wt Readings from Last 1 Encounters:   01/06/24 47.6 kg (105 lb)     Temp Readings from Last 1 Encounters:   01/06/24 98 °F (36.7 °C) (Oral)     Estimated Creatinine Clearance: 26 mL/min (based on SCr of 1.18 mg/dL).    Recent Labs     01/05/24  1202   CREATININE 1.18   BUN 36*   WBC 48.5*     Pertinent Cultures:  Date Source Results   1/5 blood NGTD   MRSA Nasal Swab: pending    Assessment:  Date Current Dose Level (mg/L) Timing of Level (h) AUC/ROCÍO   1/5 1,000 mg x1 - - -   1/6 500 mg q24h - - -   Note: Serum concentrations collected for AUC dosing may appear elevated if collected in close proximity to the dose administered, this is not necessarily an indication of toxicity    Plan:  Continue current dose of 500 mg q24h  Ordered a level for 1/7 with AM labs  Pharmacy will continue to monitor patient and adjust therapy as indicated    Thank you for the consult,  Cordell Briones RPH  1/6/2024

## 2024-01-06 NOTE — PROGRESS NOTES
[facility]   Pharmacy Pharmacokinetic Monitoring Service - Vancomycin     Rosa Alcala is a 88 y.o. female starting on vancomycin therapy for Skin and Soft Tissue Infection. Pharmacy consulted for monitoring and adjustment.    Target Concentration: Goal AUC/ROCÍO 400-600 mg*hr/L    Additional Antimicrobials: Piperacillin/Tazobactam    Pertinent Laboratory Values:   Temp: 99.5 °F (37.5 °C), Weight - Scale: 44.5 kg (98 lb)  Recent Labs     01/05/24  1202   CREATININE 1.18   BUN 36*   WBC 48.5*   PROCAL 0.11     Estimated Creatinine Clearance: 24 mL/min (based on SCr of 1.18 mg/dL).    Pertinent Cultures:  Culture Date Source Results   01/05 Blood Pending   MRSA Nasal Swab: N/A. Non-respiratory infection    Plan:  Dosing recommendations based on Bayesian software  Start vancomycin 500 mg IV q24h on 01/06 at 1000  Anticipated AUC of 455 and trough concentration of 13.9 at steady state  Renal labs as indicated   Vancomycin concentration ordered for  01/07 @ 0400  Pharmacy will continue to monitor patient and adjust therapy as indicated    Thank you for the consult,  JUANITA PHILIP [facility]  1/5/2024

## 2024-01-06 NOTE — ACP (ADVANCE CARE PLANNING)
Advance Care Planning     Advance Care Planning Inpatient Note  [facility] Department    Today's Date: 1/6/2024  Unit: [facility]    Received request from .  Upon review of chart and communication with care team, patient's decision making abilities are not in question.. Patient was/were present in the room during visit.    Goals of ACP Conversation:  Discuss advance care planning documents    Health Care Decision Makers:     No healthcare decision makers have been documented.  Click here to complete HealthCare Decision Makers including selection of the Healthcare Decision Maker Relationship (ie \"Primary\")  Summary:  Verified Documents      Advance Care Planning Documents (Patient Wishes):  POLST Document     Assessment:  Patient seen in bed 454 where she was admitted some 18 hours ago with cellulitis of her left lower extremity. She also has droplet precautions so she is under contact isolation.  There is a POST form present on file.    Interventions:  Patient DECLINED ACP conversation    Care Preferences Communicated:   No    Outcomes/Plan:  Existing advance directive reviewed with patient; no changes to patient's previously recorded wishes.    Electronically signed by Levi Montes Jr., [facility] on 1/6/2024 at 12:10 PM

## 2024-01-06 NOTE — PLAN OF CARE
Problem: Pain  Goal: Verbalizes/displays adequate comfort level or baseline comfort level  Outcome: Progressing  Flowsheets (Taken 1/6/2024 1350)  Verbalizes/displays adequate comfort level or baseline comfort level:   Encourage patient to monitor pain and request assistance   Assess pain using appropriate pain scale   Administer analgesics based on type and severity of pain and evaluate response     Problem: Chronic Conditions and Co-morbidities  Goal: Patient's chronic conditions and co-morbidity symptoms are monitored and maintained or improved  Outcome: Progressing  Flowsheets  Taken 1/6/2024 1000 by Fortunato Wooten RN  Care Plan - Patient's Chronic Conditions and Co-Morbidity Symptoms are Monitored and Maintained or Improved:   Monitor and assess patient's chronic conditions and comorbid symptoms for stability, deterioration, or improvement   Collaborate with multidisciplinary team to address chronic and comorbid conditions and prevent exacerbation or deterioration  Taken 1/6/2024 0320 by Christa Charlton RN  Care Plan - Patient's Chronic Conditions and Co-Morbidity Symptoms are Monitored and Maintained or Improved:   Collaborate with multidisciplinary team to address chronic and comorbid conditions and prevent exacerbation or deterioration   Monitor and assess patient's chronic conditions and comorbid symptoms for stability, deterioration, or improvement   Update acute care plan with appropriate goals if chronic or comorbid symptoms are exacerbated and prevent overall improvement and discharge     Problem: Safety - Adult  Goal: Free from fall injury  Outcome: Progressing  Flowsheets (Taken 1/6/2024 1350)  Free From Fall Injury:   Based on caregiver fall risk screen, instruct family/caregiver to ask for assistance with transferring infant if caregiver noted to have fall risk factors   Instruct family/caregiver on patient safety  Note: No falls at this time     Problem: Skin/Tissue Integrity  Goal: Absence

## 2024-01-06 NOTE — PROGRESS NOTES
INTERIM UPDATE - 2104 EST on 12/05/2024    [facility] Nursing Staff calls requesting management of Home Medications for Patients still at [facility] ER.    Plan:  Requested that Nursing Staff speak with Patient and update Patient's Home Medication List so that agents can be continued by this clinician as appropriate.  Will check back in on updated Home Medication Lists and continue medications appropriate for clinical situation.        INTERIM UPDATE - 2219 EST on 1/05/2024    Ordered IV Vancomycin and IV Piperacillin-Tazobactam (a.k.a. Zosyn) .  Also continued appropriate medications from Patient's updated Home Medication List.  Antihypertensives have hold-parameters due to Patient's Severe Sepsis.    Notably, Patient should have been ordered IV Piperacillin-Tazobactam (a.k.a. Zosyn) q8hrs and the second dose may be somewhat delayed.

## 2024-01-06 NOTE — PROGRESS NOTES
Pharmacy Note    Cefepime 1 gm Q12h ordered for treatment of SSTI. Per [facility] Policy, Cefepime will be changed to 1 gram IV x 1 dose now followed by 1 gm Q24hr.     Estimated Creatinine Clearance: Estimated Creatinine Clearance: 26 mL/min (based on SCr of 1.18 mg/dL).  Dialysis Status, RAIN, CKD: -    BMI:  Body mass index is 19.84 kg/m².    Rationale for Adjustment:  [facility] B-Lactam extended infusion policy    Pharmacy will continue to monitor and adjust dose as necessary.      Please call with any questions.    Thank you,  ISAÍAS ZARAGOZA, [facility]

## 2024-01-06 NOTE — ED NOTES
Pt requesting to eat. Verbal order for food given from Dr. Weaver. Pt resting comfortably in bed in NAD. No new noted areas of redness or swelling noted to left lower leg.

## 2024-01-06 NOTE — ED NOTES
Pt up to bedside commode became short of breath and sats dropped to the 60s. Pt states she \"feels fine and is not worried. It happens all of the time\". Pts O2 sat returned to baseline of 88% one returned to bed. MD notified of all.

## 2024-01-07 ENCOUNTER — APPOINTMENT (OUTPATIENT)
Facility: HOSPITAL | Age: 89
DRG: 871 | End: 2024-01-07
Payer: MEDICARE

## 2024-01-07 PROBLEM — I50.810 RVF (RIGHT VENTRICULAR FAILURE) (HCC): Status: ACTIVE | Noted: 2024-01-07

## 2024-01-07 PROBLEM — J44.1 COPD EXACERBATION (HCC): Status: ACTIVE | Noted: 2021-02-22

## 2024-01-07 PROBLEM — L02.419 CELLULITIS AND ABSCESS OF LEG: Status: ACTIVE | Noted: 2024-01-07

## 2024-01-07 PROBLEM — L03.119 CELLULITIS AND ABSCESS OF LEG: Status: ACTIVE | Noted: 2024-01-07

## 2024-01-07 LAB
ANION GAP SERPL CALC-SCNC: 7 MMOL/L (ref 3–18)
BACTERIA SPEC CULT: NORMAL
BACTERIA SPEC CULT: NORMAL
BASOPHILS # BLD: 0 K/UL (ref 0–0.1)
BASOPHILS NFR BLD: 0 % (ref 0–2)
BUN SERPL-MCNC: 28 MG/DL (ref 7–18)
BUN/CREAT SERPL: 29 (ref 12–20)
CALCIUM SERPL-MCNC: 8.1 MG/DL (ref 8.5–10.1)
CHLORIDE SERPL-SCNC: 106 MMOL/L (ref 100–111)
CO2 SERPL-SCNC: 30 MMOL/L (ref 21–32)
CREAT SERPL-MCNC: 0.98 MG/DL (ref 0.6–1.3)
D DIMER PPP FEU-MCNC: 0.74 UG/ML(FEU)
DIFFERENTIAL METHOD BLD: ABNORMAL
EOSINOPHIL # BLD: 0 K/UL (ref 0–0.4)
EOSINOPHIL NFR BLD: 0 % (ref 0–5)
ERYTHROCYTE [DISTWIDTH] IN BLOOD BY AUTOMATED COUNT: 14.6 % (ref 11.6–14.5)
GLUCOSE SERPL-MCNC: 72 MG/DL (ref 74–99)
HCT VFR BLD AUTO: 40.9 % (ref 35–45)
HGB BLD-MCNC: 12.6 G/DL (ref 12–16)
IMM GRANULOCYTES # BLD AUTO: 0 K/UL (ref 0–0.04)
IMM GRANULOCYTES NFR BLD AUTO: 0 % (ref 0–0.5)
LYMPHOCYTES # BLD: 1.6 K/UL (ref 0.9–3.6)
LYMPHOCYTES NFR BLD: 6 % (ref 21–52)
MCH RBC QN AUTO: 25.8 PG (ref 24–34)
MCHC RBC AUTO-ENTMCNC: 30.8 G/DL (ref 31–37)
MCV RBC AUTO: 83.6 FL (ref 78–100)
MONOCYTES # BLD: 2.1 K/UL (ref 0.05–1.2)
MONOCYTES NFR BLD: 8 % (ref 3–10)
NEUTS SEG # BLD: 22.2 K/UL (ref 1.8–8)
NEUTS SEG NFR BLD: 86 % (ref 40–73)
NRBC # BLD: 0 K/UL (ref 0–0.01)
NRBC BLD-RTO: 0 PER 100 WBC
PLATELET # BLD AUTO: 297 K/UL (ref 135–420)
PLATELET COMMENT: ABNORMAL
PMV BLD AUTO: 9.9 FL (ref 9.2–11.8)
POTASSIUM SERPL-SCNC: 3.2 MMOL/L (ref 3.5–5.5)
PROCALCITONIN SERPL-MCNC: 0.44 NG/ML
RBC # BLD AUTO: 4.89 M/UL (ref 4.2–5.3)
RBC MORPH BLD: ABNORMAL
SERVICE CMNT-IMP: NORMAL
SODIUM SERPL-SCNC: 143 MMOL/L (ref 136–145)
VANCOMYCIN SERPL-MCNC: 7.2 UG/ML (ref 5–40)
WBC # BLD AUTO: 25.9 K/UL (ref 4.6–13.2)

## 2024-01-07 PROCEDURE — 85025 COMPLETE CBC W/AUTO DIFF WBC: CPT

## 2024-01-07 PROCEDURE — 36415 COLL VENOUS BLD VENIPUNCTURE: CPT

## 2024-01-07 PROCEDURE — 2580000003 HC RX 258: Performed by: INTERNAL MEDICINE

## 2024-01-07 PROCEDURE — 85379 FIBRIN DEGRADATION QUANT: CPT

## 2024-01-07 PROCEDURE — 6370000000 HC RX 637 (ALT 250 FOR IP): Performed by: INTERNAL MEDICINE

## 2024-01-07 PROCEDURE — 84145 PROCALCITONIN (PCT): CPT

## 2024-01-07 PROCEDURE — 99222 1ST HOSP IP/OBS MODERATE 55: CPT | Performed by: INTERNAL MEDICINE

## 2024-01-07 PROCEDURE — 6360000002 HC RX W HCPCS: Performed by: STUDENT IN AN ORGANIZED HEALTH CARE EDUCATION/TRAINING PROGRAM

## 2024-01-07 PROCEDURE — 1100000003 HC PRIVATE W/ TELEMETRY

## 2024-01-07 PROCEDURE — 80202 ASSAY OF VANCOMYCIN: CPT

## 2024-01-07 PROCEDURE — 80048 BASIC METABOLIC PNL TOTAL CA: CPT

## 2024-01-07 PROCEDURE — 71275 CT ANGIOGRAPHY CHEST: CPT

## 2024-01-07 PROCEDURE — 94761 N-INVAS EAR/PLS OXIMETRY MLT: CPT

## 2024-01-07 PROCEDURE — 2580000003 HC RX 258: Performed by: STUDENT IN AN ORGANIZED HEALTH CARE EDUCATION/TRAINING PROGRAM

## 2024-01-07 PROCEDURE — 99233 SBSQ HOSP IP/OBS HIGH 50: CPT | Performed by: INTERNAL MEDICINE

## 2024-01-07 PROCEDURE — 6360000002 HC RX W HCPCS: Performed by: INTERNAL MEDICINE

## 2024-01-07 PROCEDURE — 6360000004 HC RX CONTRAST MEDICATION: Performed by: INTERNAL MEDICINE

## 2024-01-07 PROCEDURE — 2700000000 HC OXYGEN THERAPY PER DAY

## 2024-01-07 PROCEDURE — 94640 AIRWAY INHALATION TREATMENT: CPT

## 2024-01-07 PROCEDURE — 87449 NOS EACH ORGANISM AG IA: CPT

## 2024-01-07 RX ORDER — FUROSEMIDE 10 MG/ML
20 INJECTION INTRAMUSCULAR; INTRAVENOUS DAILY
Status: DISCONTINUED | OUTPATIENT
Start: 2024-01-08 | End: 2024-01-08

## 2024-01-07 RX ORDER — FUROSEMIDE 10 MG/ML
40 INJECTION INTRAMUSCULAR; INTRAVENOUS DAILY
Status: DISCONTINUED | OUTPATIENT
Start: 2024-01-08 | End: 2024-01-07

## 2024-01-07 RX ADMIN — WATER 40 MG: 1 INJECTION INTRAMUSCULAR; INTRAVENOUS; SUBCUTANEOUS at 15:34

## 2024-01-07 RX ADMIN — BUDESONIDE INHALATION 500 MCG: 0.5 SUSPENSION RESPIRATORY (INHALATION) at 08:11

## 2024-01-07 RX ADMIN — ALPRAZOLAM 0.5 MG: 0.5 TABLET ORAL at 21:52

## 2024-01-07 RX ADMIN — IPRATROPIUM BROMIDE AND ALBUTEROL SULFATE 1 DOSE: .5; 3 SOLUTION RESPIRATORY (INHALATION) at 08:11

## 2024-01-07 RX ADMIN — IOPAMIDOL 80 ML: 755 INJECTION, SOLUTION INTRAVENOUS at 20:47

## 2024-01-07 RX ADMIN — IPRATROPIUM BROMIDE AND ALBUTEROL SULFATE 1 DOSE: .5; 3 SOLUTION RESPIRATORY (INHALATION) at 12:20

## 2024-01-07 RX ADMIN — ACETAMINOPHEN 500 MG: 500 TABLET ORAL at 15:36

## 2024-01-07 RX ADMIN — GABAPENTIN 200 MG: 100 CAPSULE ORAL at 21:30

## 2024-01-07 RX ADMIN — CLINDAMYCIN PHOSPHATE 600 MG: 150 INJECTION, SOLUTION INTRAMUSCULAR; INTRAVENOUS at 14:00

## 2024-01-07 RX ADMIN — POLYVINYL ALCOHOL, POVIDONE 1 DROP: 14; 6 SOLUTION/ DROPS OPHTHALMIC at 21:33

## 2024-01-07 RX ADMIN — VANCOMYCIN HYDROCHLORIDE 500 MG: 500 INJECTION, POWDER, LYOPHILIZED, FOR SOLUTION INTRAVENOUS at 08:41

## 2024-01-07 RX ADMIN — CEFEPIME 2000 MG: 2 INJECTION, POWDER, FOR SOLUTION INTRAVENOUS at 11:09

## 2024-01-07 RX ADMIN — Medication 1 CAPSULE: at 08:12

## 2024-01-07 RX ADMIN — METOPROLOL TARTRATE 12.5 MG: 25 TABLET, FILM COATED ORAL at 08:11

## 2024-01-07 RX ADMIN — IPRATROPIUM BROMIDE AND ALBUTEROL SULFATE 1 DOSE: .5; 3 SOLUTION RESPIRATORY (INHALATION) at 21:30

## 2024-01-07 RX ADMIN — ACETAMINOPHEN 500 MG: 500 TABLET ORAL at 21:53

## 2024-01-07 RX ADMIN — POTASSIUM CHLORIDE 20 MEQ: 1500 TABLET, EXTENDED RELEASE ORAL at 08:13

## 2024-01-07 RX ADMIN — FOLIC ACID 1 MG: 1 TABLET ORAL at 08:30

## 2024-01-07 RX ADMIN — HEPARIN SODIUM 5000 UNITS: 5000 INJECTION INTRAVENOUS; SUBCUTANEOUS at 21:30

## 2024-01-07 RX ADMIN — HEPARIN SODIUM 5000 UNITS: 5000 INJECTION INTRAVENOUS; SUBCUTANEOUS at 08:24

## 2024-01-07 RX ADMIN — Medication 1 TABLET: at 08:12

## 2024-01-07 RX ADMIN — CYANOCOBALAMIN TAB 1000 MCG 1000 MCG: 1000 TAB at 08:12

## 2024-01-07 RX ADMIN — BUDESONIDE INHALATION 500 MCG: 0.5 SUSPENSION RESPIRATORY (INHALATION) at 21:30

## 2024-01-07 RX ADMIN — CLINDAMYCIN PHOSPHATE 600 MG: 150 INJECTION, SOLUTION INTRAMUSCULAR; INTRAVENOUS at 03:40

## 2024-01-07 RX ADMIN — ATORVASTATIN CALCIUM 20 MG: 20 TABLET, FILM COATED ORAL at 08:12

## 2024-01-07 RX ADMIN — POLYVINYL ALCOHOL, POVIDONE 1 DROP: 14; 6 SOLUTION/ DROPS OPHTHALMIC at 08:29

## 2024-01-07 RX ADMIN — IPRATROPIUM BROMIDE AND ALBUTEROL SULFATE 1 DOSE: .5; 3 SOLUTION RESPIRATORY (INHALATION) at 14:53

## 2024-01-07 ASSESSMENT — PAIN DESCRIPTION - ORIENTATION: ORIENTATION: LEFT;RIGHT

## 2024-01-07 ASSESSMENT — PAIN SCALES - GENERAL
PAINLEVEL_OUTOF10: 0
PAINLEVEL_OUTOF10: 5
PAINLEVEL_OUTOF10: 0
PAINLEVEL_OUTOF10: 5
PAINLEVEL_OUTOF10: 0
PAINLEVEL_OUTOF10: 8
PAINLEVEL_OUTOF10: 3
PAINLEVEL_OUTOF10: 0

## 2024-01-07 ASSESSMENT — PAIN DESCRIPTION - LOCATION
LOCATION: LEG
LOCATION: HEAD

## 2024-01-07 NOTE — PROGRESS NOTES
Hospitalist Progress Note    NAME:  Rosa Alcala   :   1935   MRN:   [id number]     Date/Time:  2024  Subjective:   Chief Complaint:    Pt has less sob today still with hypoxia; less leg pain; on high flow oxygen;  Wbc came down to 25;    Echo noted with RV failure and severe Pul htn;           Review of Systems:  Y  N       Y  N  [x]   []    Fever/chills                                               []   [x]    Chest Pain  [x]   []    Cough                                                       []   [x]    Diarrhea   [x]   []    Sputum                                                     []   []    Constipation  [x]   []    SOB/CABALLERO                                                []   []    Nausea/Vomit  []   [x]    Abd Pain                                                    []   []    Tolerating PT  []   [x]    Dysuria                                                      []   []    Tolerating Diet     []  Unable to obtain  ROS due to  []  mental status change  []  sedated   []  intubated     Past Med History and Social history reviewed. No changes.   [x]  Current Medication list and allergies reviewed*    Objective:   Vitals:  /65   Pulse 75   Temp 97.6 °F (36.4 °C) (Oral)   Resp 18   Ht 1.549 m (5' 1\")   Wt 47.6 kg (105 lb)   SpO2 (!) 87%   BMI 19.84 kg/m²   Temp (24hrs), Av.9 °F (36.6 °C), Min:97.5 °F (36.4 °C), Max:98.6 °F (37 °C)      Last 24hr Input/Output:    Intake/Output Summary (Last 24 hours) at 2024 1129  Last data filed at 2024 0634  Gross per 24 hour   Intake 590 ml   Output 2425 ml   Net -1835 ml        PHYSICAL EXAM:  Gen:  []  WD [x]  WN  [x]  cachectic  []  thin  []  obese  []  disheveled             []   Critically ill or  []  Chronically ill appearing    HEENT:   []   red/pink conjunctivae [x]  pale conjunctivae                  PERRL  []  yes  []  no        hearing intact to voice []  yes  []  No               [x]  moist or  []  dry  Mucosa  NECK:   supple

## 2024-01-07 NOTE — PROGRESS NOTES
[facility]   Pharmacy Pharmacokinetic Monitoring Service - Vancomycin    Indication: SSTI  Goal AUC/ROCÍO: 400-600 mg*hr/L  Day of Therapy: 3  Additional Antimicrobials: pip-tazo    Pertinent Laboratory Values:   Wt Readings from Last 1 Encounters:   01/06/24 47.6 kg (105 lb)     Temp Readings from Last 1 Encounters:   01/07/24 97.6 °F (36.4 °C) (Oral)     Estimated Creatinine Clearance: 31 mL/min (based on SCr of 0.98 mg/dL).    Recent Labs     01/05/24  1202 01/07/24  0333   CREATININE 1.18 0.98   BUN 36* 28*   WBC 48.5* 25.9*     Pertinent Cultures:  Date Source Results   1/5 blood NGTD   MRSA Nasal Swab: pending    Assessment:  Date Current Dose Level (mg/L) Timing of Level (h) AUC/ROCÍO   1/5 1,000 mg x1 - - -   1/6 500 mg q24h - - -   1/7 500 mg q24h  750 mg q18h 7.2 17 340  581   Note: Serum concentrations collected for AUC dosing may appear elevated if collected in close proximity to the dose administered, this is not necessarily an indication of toxicity    Plan:  Increase dose from 500 mg q24h to 750 mg q18h  No level ordered at this time  Pharmacy will continue to monitor patient and adjust therapy as indicated    Thank you for the consult,  Cordell Briones RPH  1/7/2024

## 2024-01-07 NOTE — CONSULTS
[facility] - Consult Note    Cardiology consultation request from hospitalist for evaluation and management/treatment of severe pulmonary hypertension    Date of  Admission: 1/5/2024 11:33 AM   Primary Care Physician:  Lola Gan APRN - NP         Assessment:     -Severe pulmonary hypertension.  This appears to be a chronic process, PA pressures greater than 80 mmHg dating back now for 2-3 yrs if not longer.  Echocardiogram this admission consistent with cor pulmonale.  PASP greater than 100 mmHg with severe RV dilatation and RV dysfunction.  I suspect that her leg swelling is related to her RV dysfunction.  Surprisingly she looks much better than I would expect.  -Acute on chronic hypoxic respiratory failure, patient appears to be chronically hypoxemic and requires at least 5 L at home but is currently on 10 L nasal cannula.  --Severe end-stage COPD, on home O2 at 5 LNC.  Pulmonary: [facility] ([employer])  -- Admitted with left lower extremity cellulitis.  Improving on IV antibiotics.  There was no DVT  -HTN, BP stable  -DNR status     Primary cardiologist is Dr. Collette Candelaria       Plan:     Will decrease IV furosemide to only once daily as her leg swelling has improved  Would be cautious with overdiuresis as patients can get severely hypotensive with chronic RV dysfunction  No plans for invasive workup this admission  Will defer to her outpatient pulmonologist whether or not she is a candidate for a pulmonary vasodilator  No need for metoprolol or losartan from a cardiac standpoint  Reasonable to continue with nifedipine palliative care consultation would not be unreasonable       History of Present Illness:     This is a 88 y.o. female admitted for Lactic acidosis [E87.20]  Cellulitis [L03.90]  Peripheral edema [R60.9]  Cellulitis and abscess of leg [L03.119, L02.419]  Pulmonary hypertension (HCC) [I27.20]  Acute on chronic respiratory failure with hypoxia (HCC) [J96.21].    Patient presented to the  tib-fib.    Technique: 2 views of left tib-fib. No prior studies for comparison.    FINDINGS:    No acute traumatic fracture or dislocation of the left tibia or fibula. Bony  mineralization is within normal limits. Joint spaces are maintained. Soft  tissues are unremarkable.    Impression  1. No acute fracture-dislocation.      Signed By: Demetrius Greenwood MD     January 7, 2024

## 2024-01-08 PROBLEM — I50.32 CHRONIC HEART FAILURE WITH PRESERVED EJECTION FRACTION (HCC): Status: ACTIVE | Noted: 2023-03-07

## 2024-01-08 PROBLEM — R79.89 ELEVATED TROPONIN: Status: ACTIVE | Noted: 2024-01-08

## 2024-01-08 LAB
ANION GAP SERPL CALC-SCNC: 6 MMOL/L (ref 3–18)
BASOPHILS # BLD: 0 K/UL (ref 0–0.1)
BASOPHILS NFR BLD: 0 % (ref 0–2)
BUN SERPL-MCNC: 30 MG/DL (ref 7–18)
BUN/CREAT SERPL: 32 (ref 12–20)
CALCIUM SERPL-MCNC: 7.9 MG/DL (ref 8.5–10.1)
CHLORIDE SERPL-SCNC: 107 MMOL/L (ref 100–111)
CO2 SERPL-SCNC: 27 MMOL/L (ref 21–32)
CREAT SERPL-MCNC: 0.94 MG/DL (ref 0.6–1.3)
DIFFERENTIAL METHOD BLD: ABNORMAL
EOSINOPHIL # BLD: 0 K/UL (ref 0–0.4)
EOSINOPHIL NFR BLD: 0 % (ref 0–5)
ERYTHROCYTE [DISTWIDTH] IN BLOOD BY AUTOMATED COUNT: 14.8 % (ref 11.6–14.5)
GLUCOSE SERPL-MCNC: 279 MG/DL (ref 74–99)
HCT VFR BLD AUTO: 39.1 % (ref 35–45)
HGB BLD-MCNC: 11.9 G/DL (ref 12–16)
IMM GRANULOCYTES # BLD AUTO: 0.1 K/UL (ref 0–0.04)
IMM GRANULOCYTES NFR BLD AUTO: 1 % (ref 0–0.5)
L PNEUMO AG UR QL IA: NEGATIVE
LYMPHOCYTES # BLD: 0.2 K/UL (ref 0.9–3.6)
LYMPHOCYTES NFR BLD: 1 % (ref 21–52)
MCH RBC QN AUTO: 25.5 PG (ref 24–34)
MCHC RBC AUTO-ENTMCNC: 30.4 G/DL (ref 31–37)
MCV RBC AUTO: 83.9 FL (ref 78–100)
MONOCYTES # BLD: 0.5 K/UL (ref 0.05–1.2)
MONOCYTES NFR BLD: 4 % (ref 3–10)
NEUTS SEG # BLD: 11.7 K/UL (ref 1.8–8)
NEUTS SEG NFR BLD: 94 % (ref 40–73)
NRBC # BLD: 0 K/UL (ref 0–0.01)
NRBC BLD-RTO: 0 PER 100 WBC
PLATELET # BLD AUTO: 277 K/UL (ref 135–420)
PMV BLD AUTO: 10.2 FL (ref 9.2–11.8)
POTASSIUM SERPL-SCNC: 3.8 MMOL/L (ref 3.5–5.5)
RBC # BLD AUTO: 4.66 M/UL (ref 4.2–5.3)
S PNEUM AG UR QL: NEGATIVE
SODIUM SERPL-SCNC: 140 MMOL/L (ref 136–145)
WBC # BLD AUTO: 12.4 K/UL (ref 4.6–13.2)

## 2024-01-08 PROCEDURE — 6370000000 HC RX 637 (ALT 250 FOR IP): Performed by: INTERNAL MEDICINE

## 2024-01-08 PROCEDURE — 85025 COMPLETE CBC W/AUTO DIFF WBC: CPT

## 2024-01-08 PROCEDURE — 1100000003 HC PRIVATE W/ TELEMETRY

## 2024-01-08 PROCEDURE — 94761 N-INVAS EAR/PLS OXIMETRY MLT: CPT

## 2024-01-08 PROCEDURE — 36415 COLL VENOUS BLD VENIPUNCTURE: CPT

## 2024-01-08 PROCEDURE — 80048 BASIC METABOLIC PNL TOTAL CA: CPT

## 2024-01-08 PROCEDURE — 94640 AIRWAY INHALATION TREATMENT: CPT

## 2024-01-08 PROCEDURE — 87449 NOS EACH ORGANISM AG IA: CPT

## 2024-01-08 PROCEDURE — 2700000000 HC OXYGEN THERAPY PER DAY

## 2024-01-08 PROCEDURE — 97162 PT EVAL MOD COMPLEX 30 MIN: CPT

## 2024-01-08 PROCEDURE — 99232 SBSQ HOSP IP/OBS MODERATE 35: CPT | Performed by: INTERNAL MEDICINE

## 2024-01-08 PROCEDURE — 6360000002 HC RX W HCPCS: Performed by: INTERNAL MEDICINE

## 2024-01-08 PROCEDURE — 97166 OT EVAL MOD COMPLEX 45 MIN: CPT

## 2024-01-08 PROCEDURE — 97530 THERAPEUTIC ACTIVITIES: CPT

## 2024-01-08 PROCEDURE — 6360000002 HC RX W HCPCS: Performed by: STUDENT IN AN ORGANIZED HEALTH CARE EDUCATION/TRAINING PROGRAM

## 2024-01-08 PROCEDURE — 2580000003 HC RX 258: Performed by: INTERNAL MEDICINE

## 2024-01-08 PROCEDURE — 99223 1ST HOSP IP/OBS HIGH 75: CPT | Performed by: INTERNAL MEDICINE

## 2024-01-08 PROCEDURE — 99233 SBSQ HOSP IP/OBS HIGH 50: CPT | Performed by: INTERNAL MEDICINE

## 2024-01-08 RX ORDER — FUROSEMIDE 10 MG/ML
40 INJECTION INTRAMUSCULAR; INTRAVENOUS DAILY
Status: DISCONTINUED | OUTPATIENT
Start: 2024-01-09 | End: 2024-01-16 | Stop reason: HOSPADM

## 2024-01-08 RX ADMIN — Medication 1 CAPSULE: at 08:28

## 2024-01-08 RX ADMIN — ALPRAZOLAM 0.5 MG: 0.5 TABLET ORAL at 22:26

## 2024-01-08 RX ADMIN — VANCOMYCIN HYDROCHLORIDE 750 MG: 750 INJECTION, POWDER, LYOPHILIZED, FOR SOLUTION INTRAVENOUS at 13:15

## 2024-01-08 RX ADMIN — WATER 40 MG: 1 INJECTION INTRAMUSCULAR; INTRAVENOUS; SUBCUTANEOUS at 08:29

## 2024-01-08 RX ADMIN — ATORVASTATIN CALCIUM 20 MG: 20 TABLET, FILM COATED ORAL at 08:28

## 2024-01-08 RX ADMIN — POLYVINYL ALCOHOL, POVIDONE 1 DROP: 14; 6 SOLUTION/ DROPS OPHTHALMIC at 08:36

## 2024-01-08 RX ADMIN — CLINDAMYCIN PHOSPHATE 600 MG: 150 INJECTION, SOLUTION INTRAMUSCULAR; INTRAVENOUS at 00:39

## 2024-01-08 RX ADMIN — IPRATROPIUM BROMIDE AND ALBUTEROL SULFATE 1 DOSE: .5; 3 SOLUTION RESPIRATORY (INHALATION) at 08:29

## 2024-01-08 RX ADMIN — POTASSIUM CHLORIDE 20 MEQ: 1500 TABLET, EXTENDED RELEASE ORAL at 08:29

## 2024-01-08 RX ADMIN — CYANOCOBALAMIN TAB 1000 MCG 1000 MCG: 1000 TAB at 08:28

## 2024-01-08 RX ADMIN — IPRATROPIUM BROMIDE AND ALBUTEROL SULFATE 1 DOSE: .5; 3 SOLUTION RESPIRATORY (INHALATION) at 12:04

## 2024-01-08 RX ADMIN — IPRATROPIUM BROMIDE AND ALBUTEROL SULFATE 1 DOSE: .5; 3 SOLUTION RESPIRATORY (INHALATION) at 15:43

## 2024-01-08 RX ADMIN — CLINDAMYCIN PHOSPHATE 600 MG: 150 INJECTION, SOLUTION INTRAMUSCULAR; INTRAVENOUS at 10:29

## 2024-01-08 RX ADMIN — Medication 1 TABLET: at 08:28

## 2024-01-08 RX ADMIN — FOLIC ACID 1 MG: 1 TABLET ORAL at 08:29

## 2024-01-08 RX ADMIN — BUDESONIDE INHALATION 500 MCG: 0.5 SUSPENSION RESPIRATORY (INHALATION) at 08:29

## 2024-01-08 RX ADMIN — HEPARIN SODIUM 5000 UNITS: 5000 INJECTION INTRAVENOUS; SUBCUTANEOUS at 08:29

## 2024-01-08 RX ADMIN — CEFEPIME 2000 MG: 2 INJECTION, POWDER, FOR SOLUTION INTRAVENOUS at 11:25

## 2024-01-08 RX ADMIN — ACETAMINOPHEN 500 MG: 500 TABLET ORAL at 22:26

## 2024-01-08 RX ADMIN — FUROSEMIDE 20 MG: 10 INJECTION, SOLUTION INTRAMUSCULAR; INTRAVENOUS at 08:28

## 2024-01-08 RX ADMIN — HEPARIN SODIUM 5000 UNITS: 5000 INJECTION INTRAVENOUS; SUBCUTANEOUS at 22:27

## 2024-01-08 RX ADMIN — GABAPENTIN 200 MG: 100 CAPSULE ORAL at 22:25

## 2024-01-08 RX ADMIN — CLINDAMYCIN PHOSPHATE 600 MG: 150 INJECTION, SOLUTION INTRAMUSCULAR; INTRAVENOUS at 16:18

## 2024-01-08 ASSESSMENT — PAIN SCALES - GENERAL
PAINLEVEL_OUTOF10: 6
PAINLEVEL_OUTOF10: 0

## 2024-01-08 ASSESSMENT — PAIN DESCRIPTION - DESCRIPTORS: DESCRIPTORS: ACHING;SHARP

## 2024-01-08 ASSESSMENT — PAIN DESCRIPTION - ONSET: ONSET: PROGRESSIVE

## 2024-01-08 ASSESSMENT — PAIN DESCRIPTION - PAIN TYPE: TYPE: ACUTE PAIN

## 2024-01-08 ASSESSMENT — PAIN - FUNCTIONAL ASSESSMENT: PAIN_FUNCTIONAL_ASSESSMENT: PREVENTS OR INTERFERES SOME ACTIVE ACTIVITIES AND ADLS

## 2024-01-08 ASSESSMENT — PAIN DESCRIPTION - LOCATION: LOCATION: LEG;GENERALIZED

## 2024-01-08 ASSESSMENT — PAIN DESCRIPTION - ORIENTATION: ORIENTATION: RIGHT;LEFT

## 2024-01-08 NOTE — PROGRESS NOTES
[facility]   Pharmacy Pharmacokinetic Monitoring Service - Vancomycin    Indication: SSTI  Goal AUC/ROCÍO: 400-600 mg*hr/L  Day of Therapy: 4  Additional Antimicrobials: cefepime    Pertinent Laboratory Values:   Wt Readings from Last 1 Encounters:   01/06/24 47.6 kg (105 lb)     Temp Readings from Last 1 Encounters:   01/08/24 97.8 °F (36.6 °C) (Oral)     Estimated Creatinine Clearance: 32 mL/min (based on SCr of 0.94 mg/dL).    Recent Labs     01/07/24  0333 01/08/24  0220   CREATININE 0.98 0.94   BUN 28* 30*   WBC 25.9* 12.4     Pertinent Cultures:  Date Source Results   1/5 blood NGTD   MRSA Nasal Swab: negative on 1/6    Assessment:  Date Current Dose Level (mg/L) Timing of Level (h) AUC/ROCÍO   1/5 1,000 mg x1 - - -   1/6 500 mg q24h - - -   1/7 500 mg q24h  750 mg q18h 7.2 17 340  581   1/8 750 mg q18h - - -   Note: Serum concentrations collected for AUC dosing may appear elevated if collected in close proximity to the dose administered, this is not necessarily an indication of toxicity    Plan:  Continue current dose of 750 mg q18h  No level ordered at this time  Pharmacy will continue to monitor patient and adjust therapy as indicated    Thank you for the consult,  Cordell Briones [facility]  1/8/2024

## 2024-01-08 NOTE — PROGRESS NOTES
Comprehensive Nutrition Assessment    Type and Reason for Visit:  Initial, Positive Nutrition Screen    Nutrition Recommendations/Plan:   Continue current diet as tolerated.  Order Magic Cup (each provides 290 kcal, 9g protein) BID.  Continue multivitamin daily.    Continue to monitor tolerance of PO, compliance of oral supplements, weight, labs, and plan of care during admission.         Malnutrition Assessment:  Malnutrition Status:  Moderate malnutrition (01/08/24 1040)    Context:  Chronic Illness     Findings of the 6 clinical characteristics of malnutrition:  Energy Intake:  No significant decrease in energy intake  Weight Loss:  No significant weight loss     Body Fat Loss:  Mild body fat loss (moderate) Triceps, Buccal region   Muscle Mass Loss:  Mild muscle mass loss (moderate) Temples (temporalis), Clavicles (pectoralis & deltoids), Hand (interosseous)  Fluid Accumulation:  Unable to assess     Strength:  Not Performed    Nutrition History and Allergies:   PMHx: CHF, HTN, COPD. Wt hx: 106 lb (4/28/23), 101 lb (9/21/23), 98 lb (1/5/24), 105 lb (1/6/24). Pt reports weighing ~134 lb prior to COVID and has gradually been losing wt, has recently been fluctuating around  lb. Wt loss of 25% x >1 year per pt report. States good intake/appetite PTA, no decrease in appetite. NFPE conducted. NKFA.    Nutrition Assessment:    Admitted for lower leg swelling and pain; sepsis 2/2 lower extremity cellulitis. Positive nutrition screen noted, MST. Visited pt, states eating sausage and some pancakes this AM. Dislikes Ensure, but has tried Magic Cup in the past, likes and is requesting in-house. Will add oral supplements to increase calorie/protein intake opportunity.    Nutrition Related Findings:    Last BM (including prior to admit): 01/07/24  Edema: Right lower extremity, Left lower extremity Pertinent Meds: cefepime, lipitor, folic acid, lasix, gabapentin, lactobacillus, solumedrol, KCl, MVI/min, vanco, b12

## 2024-01-08 NOTE — PLAN OF CARE
Problem: Pain  Goal: Verbalizes/displays adequate comfort level or baseline comfort level  Outcome: Progressing     Problem: Chronic Conditions and Co-morbidities  Goal: Patient's chronic conditions and co-morbidity symptoms are monitored and maintained or improved  Outcome: Progressing  Flowsheets (Taken 1/8/2024 4562)  Care Plan - Patient's Chronic Conditions and Co-Morbidity Symptoms are Monitored and Maintained or Improved: Monitor and assess patient's chronic conditions and comorbid symptoms for stability, deterioration, or improvement     Problem: Safety - Adult  Goal: Free from fall injury  Outcome: Progressing     Problem: Skin/Tissue Integrity  Goal: Absence of new skin breakdown  Description: 1.  Monitor for areas of redness and/or skin breakdown  2.  Assess vascular access sites hourly  3.  Every 4-6 hours minimum:  Change oxygen saturation probe site  4.  Every 4-6 hours:  If on nasal continuous positive airway pressure, respiratory therapy assess nares and determine need for appliance change or resting period.  Outcome: Progressing     Problem: Nutrition Deficit:  Goal: Optimize nutritional status  Outcome: Progressing     Problem: Discharge Planning  Goal: Discharge to home or other facility with appropriate resources  Outcome: Progressing

## 2024-01-08 NOTE — PROGRESS NOTES
PT orders received and chart was reviewed.  Attempted to see patient for evaluation however she is declining due to recently taking diuretic and constantly urinating.  Will re-attempt later in the day.  Zahida Mcdermott, PT

## 2024-01-08 NOTE — PLAN OF CARE
Problem: Physical Therapy - Adult  Goal: By Discharge: Performs mobility at highest level of function for planned discharge setting.  See evaluation for individualized goals.  Description: Physical Therapy Goals:  Initiated 1/8/2024 to be met within 7-10 days.    1.  Patient will move from supine to sit and sit to supine  in bed with modified independence.    2.  Patient will transfer from bed to chair and chair to bed with supervision/set-up using the least restrictive device.  3.  Patient will perform sit to stand with supervision/set-up.  4.  Patient will ambulate with supervision/set-up for 50 feet with the least restrictive device.   5.  Patient will ascend/descend 3 stairs with 1 handrail(s) with minimal assistance/contact guard assist.    PLOF: pt lives alone, several steps in home, reports ambulating without AD      Outcome: Progressing   PHYSICAL THERAPY EVALUATION    Patient: Rosa Alcala (88 y.o. female)  Date: 1/8/2024  Primary Diagnosis: Lactic acidosis [E87.20]  Cellulitis [L03.90]  Peripheral edema [R60.9]  Cellulitis and abscess of leg [L03.119, L02.419]  Pulmonary hypertension (HCC) [I27.20]  Acute on chronic respiratory failure with hypoxia (HCC) [J96.21]       Precautions: Fall Risk    ASSESSMENT :  Pt in bed and agreeable to PT evaluation.  On arrival patients O2 sat was 82% at rest on 10L of O2.  Pt requested to attempt sitting up and getting to the recliner as she thought it would make her feel better.  Pt transferred to sitting edge of bed with supervision and use of the rails.  Sitting edge of bed patient's O2 sat initially decreased to 76% however patient was sitting calmly and asked for time to see if the O2 would go up.  Eventually patients O2 sat increased to 88%.  Pt states that her O2 drops like that at home and she just has to rest.  Pt stood with min A and ambulated 2 feet with min A and hand held assist to the recliner.  Patient's O2 again decreased into the 70s however slowly  independence.     This AMPAC score should be considered in conjunction with interdisciplinary team recommendations to determine the most appropriate discharge setting. Patient's social support, diagnosis, medical stability, and prior level of function should also be taken into consideration.     SUBJECTIVE:   Patient stated “I need to get my strength back.”    OBJECTIVE DATA SUMMARY:     Past Medical History:   Diagnosis Date    Arthritis     Chronic diastolic CHF (congestive heart failure) ([facility]) 2022    Chronic obstructive pulmonary disease ([facility])     Hypertension      Past Surgical History:   Procedure Laterality Date    ORTHOPEDIC SURGERY      spinal sx 5/6    OTHER SURGICAL HISTORY      Carotid artery    VASCULAR SURGERY      L CEA 10/2014       Home Situation:  Social/Functional History  Lives With: Alone  Type of Home: House  Home Layout: One level  Home Access: Stairs to enter with rails  Entrance Stairs - Number of Steps: 4  Bathroom Shower/Tub: Walk-in shower  Bathroom Toilet: Standard  Bathroom Equipment: Built-in shower seat  Bathroom Accessibility: Accessible  Home Equipment: Cane, Rollator, Oxygen (Inogen Portable concentrator up to 5 L)  Receives Help From: Family  ADL Assistance: Independent  Homemaking Assistance: Independent  Ambulation Assistance: Independent  Transfer Assistance: Independent  Active : No  Patient's  Info: family transports  Occupation: Retired  Type of Occupation: [employer]  Critical Behavior:   cooperative       Strength:    Strength: Generally decreased, functional    Tone & Sensation:   Tone: Normal  Sensation: Intact    Range Of Motion:  AROM: Within functional limits  PROM: Within functional limits    Functional Mobility:  Bed Mobility:  Bed Mobility Training  Bed Mobility Training: Yes  Rolling: Modified independent  Supine to Sit: Supervision  Sit to Supine: Supervision  Transfers:  Transfer Training  Transfer Training: Yes  Sit to Stand:

## 2024-01-08 NOTE — PLAN OF CARE
Problem: Occupational Therapy - Adult  Goal: By Discharge: Performs self-care activities at highest level of function for planned discharge setting.  See evaluation for individualized goals.  Description: Occupational Therapy Goals:  Initiated 1/8/2024 to be met within 7-10 days.    1.  Patient will perform grooming with modified independence.   2.  Patient will perform bathing with modified independence using adaptive equipment..  3.  Patient will perform upper body dressing and lower body dressing  with modified independence using adaptive equipment.  4.  Patient will perform bedside commode transfers with modified independence using RW.  5.  Patient will perform all aspects of toileting with modified independence.  6.  Patient will participate in upper extremity therapeutic exercise/activities with modified independence for 8-10 minutes to increase strength/endurance for ADLs.    7.  Patient will utilize energy conservation techniques during functional activities with min verbal cues.    PLOF: Mod I with ADLs and functional mobility primarily furniture walking      Outcome: Progressing   OCCUPATIONAL THERAPY EVALUATION    Patient: Rosa Alcala (88 y.o. female)  Date: 1/8/2024  Primary Diagnosis: Lactic acidosis [E87.20]  Cellulitis [L03.90]  Peripheral edema [R60.9]  Cellulitis and abscess of leg [L03.119, L02.419]  Pulmonary hypertension (HCC) [I27.20]  Acute on chronic respiratory failure with hypoxia (HCC) [J96.21]       Precautions: Fall Risk, General Precautions,  ,  ,  ,  ,  ,  ,      ASSESSMENT :  Pt sitting up in recliner, no c/o pain. Pt able to doff slipper socks using cross leg technique, unable to don and requested assist to don d/t SOB. Pt declined STS d/t breathing difficulty and stated that she was told not to stand up-nursing notified. Pt sitting up in recliner with BLEs elevated at end of tx session, call bell within reach & pt verbalized understanding to utilize for assist e.g. functional  appropriate discharge setting. Patient's social support, diagnosis, medical stability, and prior level of function should also be taken into consideration.     SUBJECTIVE:   Patient stated, “I can't stand up, they told me not to stand up because of my breathing.” - nursing notified    OBJECTIVE DATA SUMMARY:     Past Medical History:   Diagnosis Date    Arthritis     Chronic diastolic CHF (congestive heart failure) ([facility]) 2022    Chronic obstructive pulmonary disease (HCC)     Hypertension      Past Surgical History:   Procedure Laterality Date    ORTHOPEDIC SURGERY      spinal sx 5/6    OTHER SURGICAL HISTORY      Carotid artery    VASCULAR SURGERY      L CEA 10/2014       Home Situation:   Social/Functional History  Lives With: Alone  Type of Home: House  Home Layout: One level  Home Access: Stairs to enter with rails  Entrance Stairs - Number of Steps: 4  Bathroom Shower/Tub: Walk-in shower  Bathroom Toilet: Standard  Bathroom Equipment: Shower chair  Bathroom Accessibility: Accessible  Home Equipment: Cane, Rollator, Oxygen  Receives Help From: Family  ADL Assistance: Independent  Homemaking Assistance: Independent  Ambulation Assistance: Independent  Transfer Assistance: Independent  Active : No  Patient's  Info: family transports  Occupation: Retired  Type of Occupation: [employer]  []  Right hand dominant   []  Left hand dominant    Cognitive/Behavioral Status:  Orientation  Overall Orientation Status: Within Normal Limits  Orientation Level: Oriented X4  Cognition  Overall Cognitive Status: WNL    Skin: appears intact  Edema: none noted    Vision/Perceptual:    Vision  Vision: Within Functional Limits        Coordination: BUE  Coordination: Within functional limits        Balance:     Balance  Sitting: Intact  Standing: Impaired  Standing - Static: Fair  Standing - Dynamic:  (fair-)    Strength: BUE  Strength: Generally decreased, functional    Tone & Sensation: BUE  Tone:

## 2024-01-08 NOTE — CONSULTS
Infectious Disease Consultation Note        Reason: Sepsis, severe left leg cellulitis    Current abx Prior abx   Cefepime,clindamycin, vancomycin since 1/6/24      Lines:       Assessment :    88-year-old female with a past medical history of hypertension, CHF, COPD on 4 L of home O2 presented to [facility] ED on 1/5/2024 with severe left leg pain.     Hospitalization 12/2020 for acute on chronic hypoxic respiratory failure secondary to confirmed COVID-19 pneumonia   Status post remdesivir since 12/11/2020-12/15  Status post convalescent plasma 12/11/2020     Hospitalization at [facility] May 2022 for partially treated sepsis- POA due to e.coli BSI (positive blood cx 5/5, negative blood cx 5/7) right kidney pyelonephritis, cystitis     Clinical presentation consistent with sepsis-present on admission due to severe left leg cellulitis    Exact microbial etiology of left leg infection not entirely clear.  However the rapid onset and clinical presentation is likely suggestive of gram-positive bacteria such as Streptococcus/Staphylococcus  I agree with adding clindamycin to cover for toxin producing gram-positive's looking at the severity of illness.  Currently no definitive clinical/radiographic evidence to suggest necrotizing infection     chronic hypoxic respiratory failure-on 4 L oxygen at home.  Pulmonary follow-up appreciated     Recommendations:     1.  Discontinue cefepime.  Continue vancomycin, clindamycin for now  2.  Follow-up pulmonary, cardiology recommendations  3.  Monitor left leg erythema and de-escalate antibiotics accordingly  4.  Monitor CBC, temperature, clinically      Thank you for consultation request. Above plan was discussed in details with patient,  and dr Allen. Please call me if any further questions or concerns. Will continue to participate in the care of this patient.    HPI:    88 y.o. female with past medical history significant for COPD, CHF, hypertension, arthritis presented to [facility] on 1/5/2024 with  significant left leg pain.      Patient reports pain in her left leg starting on 1/04/2024 with a 10/10 intensity pain that was worse with walking or contact.   Notably, [facility] ER Clinician suspected that Patient has Necrotizing Fasciitis in [facility] ER and consulted General Surgical services and it was ruled out.In [facility] ER, Patient is noted to have Temperature 99.5°F, Heart Rate 96 bpm, Respiration Rate 25 bpm, Blood Pressure 113/48 mm Hg to 179/92 mm Hg, SpO2 70% on 5 L/min O2 via NC, WBC 48.5, Hgb 13.1 g/dL, Neut% 94%, Neut# 45.6, Lactic Acid 5.09 mmol/L to 1.99 mmol/L, Na+ 138 mmol/L, K+ 4.3 *slightly hemolyzed* mmol/L, BUN 36 mg/dL, Creatinine 1.18 mg/dL (Baseline Creatinine ~0.85-1.15 mg/dL?), eGFR 45, Glucose 135 mg/dL, Troponin 44 ng/L, Pro-BNP 8,471 pg/mL, Procalcitonin 0.11 pg/mL, CRP 3.6 mg/dL, Automated Sed Rate 1 mm/hr, Influenza A/B (-), and SARS-CoV-2 (-).  CXR has been read by Radiologist to show \"Sequela of minimal congestion. Interstitial lung disease. Follow-up with plain imaging of the chest.\"  Plain Radiographs of  Left Tibia/Fibula has been read by Radiologist to show \"No acute fracture-dislocation.\"  Venous Duplex of Left Lower Extremity read is PENDING.  Patient received IV Clindamycin 600 mg, IV Heparin 3,560 units and gtt, IV Piperacillin-Tazobactam (a.k.a. Zosyn) 4,500 mg and 3,375 mg, IV  mL, IV Vancomycin 1,000 mg, VIAL-MATE Adaptor, PO Acetaminophen 500 mg, and Ipratropium-Albuterol (a.k.a. Duoneb) 2.5 mg x1 dose in [facility] ER.  Antibiotics later switched to cefepime, clindamycin, vancomycin.  Patient noted to have WBC count 48 K on 1/5 which improved to 25 K yesterday.  I have been consulted for further recommendations.    Patient states that pain in her left leg is 4 x 10 in severity compared to 10 x 10 on admission.  She denies any recent trauma to the left leg.  No evidence of DVT noted on venous duplex 1/5/2024.  No prior history of MRSA colonization or infection        Past Medical

## 2024-01-08 NOTE — PROGRESS NOTES
[facility] - Progress Note    Admit Date: 1/5/2024  Attending Cardiologist: Dr. Bailey    Assessment:     -LLE cellulitis, on IV antibiotics  -Acute on chronic hypoxic respiratory failure, requires at least 5 L at home but is currently on 10 L nasal cannula.  -Severe end-stage COPD, on home O2 at 5 LNC.  Pulmonary: [facility] ([employer])  -Severe pulmonary hypertension.  This appears to be a chronic process, PA pressures greater than 80 mmHg dating back now for 2-3 yrs if not longer.  Echo this admission with PASP greater than 100 mmHg with severe RV dilatation and RV dysfunction.  -LVEF 60-65% by Echo 1/6/2024  -HTN, BP stable  -DNR status     Primary cardiologist is Dr. Collette Candelaria    Plan:     -Consider switch back to PO diuretics.  Pt reports edema has resolved since admission.  -Defer treatment of pulmonary hypertension to pulmonology; consider pulmonology consultation due to severe COPD with increased O2 requirement.  -D/c PO Lopressor due to severe COPD.  -Antibiotics for cellulitis per primary team.    Subjective:     No new complaints.     Objective:      Patient Vitals for the past 8 hrs:   Temp Pulse Resp BP SpO2   01/08/24 0840 97.8 °F (36.6 °C) 88 18 (!) 151/55 (!) 89 %   01/08/24 0400 97.2 °F (36.2 °C) 80 18 (!) 141/67 93 %         Patient Vitals for the past 96 hrs:   Weight   01/06/24 1043 47.6 kg (105 lb)   01/06/24 0316 48 kg (105 lb 13.1 oz)   01/05/24 1139 44.5 kg (98 lb)                Current Facility-Administered Medications   Medication Dose Route Frequency    ceFEPIme (MAXIPIME) 2,000 mg in sodium chloride 0.9 % 100 mL IVPB (mini-bag)  2,000 mg IntraVENous Q24H    vancomycin (VANCOCIN) 750 mg in sodium chloride 0.9 % 250 mL IVPB (vial-mate)  750 mg IntraVENous Q18H    furosemide (LASIX) injection 20 mg  20 mg IntraVENous Daily    methylPREDNISolone sodium succ (SOLU-MEDROL) 40 mg in sterile water 1 mL injection  40 mg IntraVENous Daily    acetaminophen (TYLENOL) tablet 500 mg  500 mg

## 2024-01-08 NOTE — CARE COORDINATION
Case Management Assessment  Initial Evaluation    Date/Time of Evaluation: 1/8/2024 11:16 AM  Assessment Completed by: VINICIUS AHMADI RN    If patient is discharged prior to next notation, then this note serves as note for discharge by case management.    Patient Name: Rosa Alcala                   YOB: 1935  Diagnosis: Lactic acidosis [E87.20]  Cellulitis [L03.90]  Peripheral edema [R60.9]  Cellulitis and abscess of leg [L03.119, L02.419]  Pulmonary hypertension (HCC) [I27.20]  Acute on chronic respiratory failure with hypoxia (HCC) [J96.21]                   Date / Time: 1/5/2024 11:33 AM    Patient Admission Status: Inpatient   Readmission Risk (Low < 19, Mod (19-27), High > 27): Readmission Risk Score: 16.1    Current PCP: Lola Gan APRN - NP  PCP verified by CM? Yes    Chart Reviewed: Yes      History Provided by: Patient  Patient Orientation: Alert and Oriented    Patient Cognition: Alert    Hospitalization in the last 30 days (Readmission):  No    If yes, Readmission Assessment in CM Navigator will be completed.    Advance Directives:      Code Status: DNR   Patient's Primary Decision Maker is: Legal Next of Kin      Discharge Planning:    Patient lives with: Alone Type of Home: House  Primary Care Giver: Self  Patient Support Systems include: Children, Family Members   Current Financial resources: Medicare, Other (Comment)  Current community resources:    Current services prior to admission: Oxygen Therapy            Current DME:              Type of Home Care services:  None    ADLS  Prior functional level: Independent in ADLs/IADLs, Assistance with the following:, Housework  Current functional level: Assistance with the following:, Bathing, Dressing, Toileting    PT AM-PAC:   /24  OT AM-PAC:   /24    Family can provide assistance at DC: Yes  Would you like Case Management to discuss the discharge plan with any other family members/significant others, and if so, who?    Plans to

## 2024-01-08 NOTE — CONSULTS
[facility].  Pulmonary, Critical Care, and Sleep Medicine    Initial Patient Referral report    Name: Rosa Alcala MRN: [id number]   : 1935 Hospital: [facility]   Date: 2024        IMPRESSION:   Severe pulmonary hypertension likely combination group 3 and 2.  Patient was unable to do PFTs and therefore cannot quantify extent of functional impairment from pulmonary standpoint however CT imaging have reported significant emphysema which likely is etiology for group 3 pulmonary hypertension.  In addition patient has history of chronic diastolic heart failure.  Over the past 5 years dating back to 2017 patient has had progressive increase in measured PA pressures on echocardiogram.  There was plan for right heart cath in 2018 but patient initially postponed it and then declined.  Since then being monitored and treated on supplemental oxygen but no vasodilators.  Chronic hypoxic respiratory failure secondary to COPD further worsened by COVID-19 in 2020 and RSV in 2021.  Patient is on long-term supplemental oxygen at 4 L at home in addition to bronchodilators.  In hospital oxygen needs have increased to 10 L salter  COPD-presumed severe on triple therapy withBreztri and nebulized as needed bronchodilators.-[facility]  Acute on chronic cor pulmonale secondary to above  Acute cellulitis-presenting hospital problem  History of exposure to coal dust-father was   History of tobacco use-quit       RECOMMENDATIONS:   Oxygen-continue current supplementation and wean to lower flow rate which can be continued as outpatient  Continue diuresis  Although  can consider vasodilator therapy-patient will need a right heart cath with vasodilator challenge.  She has declined in the past  Continue bronchodilators-in hospital Brovana Pulmicort and Olivia and closer to discharge switch to home therapy with Breztri  Steroids-not  venous insufficiency:        Not examined  Superficial venous insufficiency: Not examined      INTERPRETATION/FINDINGS  Duplex images were obtained using 2-D gray scale, color flow, and  spectral Doppler analysis.  Left leg :  1. Deep veins visualized include the common femoral, femoral,  popliteal, posterior tibial and peroneal veins.  2. No evidence of deep venous thrombosis detected in the veins  visualized.  3. No evidence of deep vein thrombosis in the contralateral common  femoral vein.  4. Superficial veins visualized include the great saphenous vein.  5. No evidence of superficial thrombosis detected.  6. Normal multiphasic flow in the posterior tibial artery.    ADDITIONAL COMMENTS    I have personally reviewed the data relevant to the interpretation of  this  study.    TECHNOLOGIST: KAYODE Issa, RVS  Signed: 05/15/2018 01:24 PM    PHYSICIAN: Demetrius Orozco MD  Signed: 05/16/2018 04:15 PM         High complexity decision making was performed during the evaluation of this patient at high risk for decompensation with multiple organ involvement     Above mentioned total time spent on reviewing the case/medical record/data/notes/EMR/patient examination/documentation/coordinating care with nurse/consultants, exclusive of procedures with complex decision making performed and > 50% time spent in face to face evaluation.     Brittani Candelaria MD    Please note that this dictation was completed with [employer], the computer voice recognition software.  Quite often unanticipated grammatical, syntax, homophones, and other interpretive errors are inadvertently transcribed by the computer software.  Please disregard these errors.  Please excuse any errors that have escaped final proofreading.

## 2024-01-08 NOTE — PROGRESS NOTES
Hospitalist Progress Note    NAME:  Rosa Alcala   :   1935   MRN:   [id number]     Date/Time:  2024  Subjective:   Chief Complaint:      Pt has less sob today still with hypoxia; less leg pain; on high flow oxygen;  Wbc came down to 12;    Echo noted with RV failure and severe Pul htn;           Review of Systems:  Y  N       Y  N  [x]   []    Fever/chills                                               []   [x]    Chest Pain  [x]   []    Cough                                                       []   [x]    Diarrhea   [x]   []    Sputum                                                     []   []    Constipation  [x]   []    SOB/CABALLERO                                                []   []    Nausea/Vomit  []   [x]    Abd Pain                                                    []   []    Tolerating PT  []   [x]    Dysuria                                                      []   []    Tolerating Diet     []  Unable to obtain  ROS due to  []  mental status change  []  sedated   []  intubated     Past Med History and Social history reviewed. No changes.   [x]  Current Medication list and allergies reviewed*    Objective:   Vitals:  BP (!) 151/55   Pulse 88   Temp 97.8 °F (36.6 °C) (Oral)   Resp 18   Ht 1.549 m (5' 1\")   Wt 47.6 kg (105 lb)   SpO2 (!) 89%   BMI 19.84 kg/m²   Temp (24hrs), Av °F (36.7 °C), Min:97.2 °F (36.2 °C), Max:99.3 °F (37.4 °C)      Last 24hr Input/Output:    Intake/Output Summary (Last 24 hours) at 2024 1110  Last data filed at 2024 0948  Gross per 24 hour   Intake 1120 ml   Output 200 ml   Net 920 ml        PHYSICAL EXAM:  Gen:  []  WD [x]  WN  [x]  cachectic  []  thin  []  obese  []  disheveled             []   Critically ill or  []  Chronically ill appearing    HEENT:   []   red/pink conjunctivae [x]  pale conjunctivae                  PERRL  []  yes  []  no        hearing intact to voice []  yes  []  No               [x]  moist or  []  dry  Mucosa  NECK:

## 2024-01-09 LAB
ANION GAP SERPL CALC-SCNC: 6 MMOL/L (ref 3–18)
BASOPHILS # BLD: 0 K/UL (ref 0–0.1)
BASOPHILS NFR BLD: 0 % (ref 0–2)
BUN SERPL-MCNC: 25 MG/DL (ref 7–18)
BUN/CREAT SERPL: 29 (ref 12–20)
CALCIUM SERPL-MCNC: 8.7 MG/DL (ref 8.5–10.1)
CHLORIDE SERPL-SCNC: 106 MMOL/L (ref 100–111)
CO2 SERPL-SCNC: 28 MMOL/L (ref 21–32)
CREAT SERPL-MCNC: 0.87 MG/DL (ref 0.6–1.3)
DIFFERENTIAL METHOD BLD: ABNORMAL
EOSINOPHIL # BLD: 0 K/UL (ref 0–0.4)
EOSINOPHIL NFR BLD: 0 % (ref 0–5)
ERYTHROCYTE [DISTWIDTH] IN BLOOD BY AUTOMATED COUNT: 14.7 % (ref 11.6–14.5)
GLUCOSE SERPL-MCNC: 114 MG/DL (ref 74–99)
HCT VFR BLD AUTO: 40.6 % (ref 35–45)
HGB BLD-MCNC: 12.4 G/DL (ref 12–16)
IMM GRANULOCYTES # BLD AUTO: 0 K/UL (ref 0–0.04)
IMM GRANULOCYTES NFR BLD AUTO: 0 % (ref 0–0.5)
LYMPHOCYTES # BLD: 1.1 K/UL (ref 0.9–3.6)
LYMPHOCYTES NFR BLD: 6 % (ref 21–52)
MCH RBC QN AUTO: 25.9 PG (ref 24–34)
MCHC RBC AUTO-ENTMCNC: 30.5 G/DL (ref 31–37)
MCV RBC AUTO: 84.8 FL (ref 78–100)
MONOCYTES # BLD: 0.2 K/UL (ref 0.05–1.2)
MONOCYTES NFR BLD: 1 % (ref 3–10)
NEUTS BAND NFR BLD MANUAL: 1 %
NEUTS SEG # BLD: 17.4 K/UL (ref 1.8–8)
NEUTS SEG NFR BLD: 92 % (ref 40–73)
NRBC # BLD: 0 K/UL (ref 0–0.01)
NRBC BLD-RTO: 0 PER 100 WBC
PLATELET # BLD AUTO: 296 K/UL (ref 135–420)
PLATELET COMMENT: ABNORMAL
PMV BLD AUTO: 10.1 FL (ref 9.2–11.8)
POTASSIUM SERPL-SCNC: 4.7 MMOL/L (ref 3.5–5.5)
RBC # BLD AUTO: 4.79 M/UL (ref 4.2–5.3)
RBC MORPH BLD: ABNORMAL
SODIUM SERPL-SCNC: 140 MMOL/L (ref 136–145)
WBC # BLD AUTO: 18.7 K/UL (ref 4.6–13.2)

## 2024-01-09 PROCEDURE — 97530 THERAPEUTIC ACTIVITIES: CPT

## 2024-01-09 PROCEDURE — 6360000002 HC RX W HCPCS: Performed by: INTERNAL MEDICINE

## 2024-01-09 PROCEDURE — 97535 SELF CARE MNGMENT TRAINING: CPT

## 2024-01-09 PROCEDURE — 6370000000 HC RX 637 (ALT 250 FOR IP): Performed by: INTERNAL MEDICINE

## 2024-01-09 PROCEDURE — 85025 COMPLETE CBC W/AUTO DIFF WBC: CPT

## 2024-01-09 PROCEDURE — 6360000002 HC RX W HCPCS: Performed by: STUDENT IN AN ORGANIZED HEALTH CARE EDUCATION/TRAINING PROGRAM

## 2024-01-09 PROCEDURE — 36415 COLL VENOUS BLD VENIPUNCTURE: CPT

## 2024-01-09 PROCEDURE — 2700000000 HC OXYGEN THERAPY PER DAY

## 2024-01-09 PROCEDURE — 2580000003 HC RX 258: Performed by: INTERNAL MEDICINE

## 2024-01-09 PROCEDURE — 80048 BASIC METABOLIC PNL TOTAL CA: CPT

## 2024-01-09 PROCEDURE — APPSS15 APP SPLIT SHARED TIME 0-15 MINUTES

## 2024-01-09 PROCEDURE — 1100000003 HC PRIVATE W/ TELEMETRY

## 2024-01-09 PROCEDURE — 99232 SBSQ HOSP IP/OBS MODERATE 35: CPT | Performed by: INTERNAL MEDICINE

## 2024-01-09 PROCEDURE — 94761 N-INVAS EAR/PLS OXIMETRY MLT: CPT

## 2024-01-09 PROCEDURE — 94640 AIRWAY INHALATION TREATMENT: CPT

## 2024-01-09 RX ORDER — HYDRALAZINE HYDROCHLORIDE 20 MG/ML
5 INJECTION INTRAMUSCULAR; INTRAVENOUS EVERY 6 HOURS PRN
Status: DISCONTINUED | OUTPATIENT
Start: 2024-01-09 | End: 2024-01-16 | Stop reason: HOSPADM

## 2024-01-09 RX ORDER — IPRATROPIUM BROMIDE AND ALBUTEROL SULFATE 2.5; .5 MG/3ML; MG/3ML
1 SOLUTION RESPIRATORY (INHALATION)
Status: DISCONTINUED | OUTPATIENT
Start: 2024-01-09 | End: 2024-01-12

## 2024-01-09 RX ADMIN — BUDESONIDE INHALATION 500 MCG: 0.5 SUSPENSION RESPIRATORY (INHALATION) at 22:31

## 2024-01-09 RX ADMIN — HEPARIN SODIUM 5000 UNITS: 5000 INJECTION INTRAVENOUS; SUBCUTANEOUS at 08:16

## 2024-01-09 RX ADMIN — BUDESONIDE INHALATION 500 MCG: 0.5 SUSPENSION RESPIRATORY (INHALATION) at 08:33

## 2024-01-09 RX ADMIN — ACETAMINOPHEN 500 MG: 500 TABLET ORAL at 22:08

## 2024-01-09 RX ADMIN — CYANOCOBALAMIN TAB 1000 MCG 1000 MCG: 1000 TAB at 07:55

## 2024-01-09 RX ADMIN — ATORVASTATIN CALCIUM 20 MG: 20 TABLET, FILM COATED ORAL at 07:55

## 2024-01-09 RX ADMIN — GABAPENTIN 200 MG: 100 CAPSULE ORAL at 21:58

## 2024-01-09 RX ADMIN — POTASSIUM CHLORIDE 20 MEQ: 1500 TABLET, EXTENDED RELEASE ORAL at 07:55

## 2024-01-09 RX ADMIN — ACETAMINOPHEN 500 MG: 500 TABLET ORAL at 07:00

## 2024-01-09 RX ADMIN — CLINDAMYCIN PHOSPHATE 600 MG: 150 INJECTION, SOLUTION INTRAMUSCULAR; INTRAVENOUS at 00:04

## 2024-01-09 RX ADMIN — VANCOMYCIN HYDROCHLORIDE 750 MG: 750 INJECTION, POWDER, LYOPHILIZED, FOR SOLUTION INTRAVENOUS at 06:22

## 2024-01-09 RX ADMIN — IPRATROPIUM BROMIDE AND ALBUTEROL SULFATE 1 DOSE: .5; 3 SOLUTION RESPIRATORY (INHALATION) at 22:31

## 2024-01-09 RX ADMIN — FOLIC ACID 1 MG: 1 TABLET ORAL at 07:55

## 2024-01-09 RX ADMIN — ALPRAZOLAM 0.5 MG: 0.5 TABLET ORAL at 22:08

## 2024-01-09 RX ADMIN — IPRATROPIUM BROMIDE AND ALBUTEROL SULFATE 1 DOSE: .5; 3 SOLUTION RESPIRATORY (INHALATION) at 16:13

## 2024-01-09 RX ADMIN — POLYVINYL ALCOHOL, POVIDONE 1 DROP: 14; 6 SOLUTION/ DROPS OPHTHALMIC at 07:57

## 2024-01-09 RX ADMIN — CLINDAMYCIN PHOSPHATE 600 MG: 150 INJECTION, SOLUTION INTRAMUSCULAR; INTRAVENOUS at 08:26

## 2024-01-09 RX ADMIN — POLYVINYL ALCOHOL, POVIDONE 1 DROP: 14; 6 SOLUTION/ DROPS OPHTHALMIC at 21:58

## 2024-01-09 RX ADMIN — Medication 1 CAPSULE: at 07:55

## 2024-01-09 RX ADMIN — IPRATROPIUM BROMIDE AND ALBUTEROL SULFATE 1 DOSE: .5; 3 SOLUTION RESPIRATORY (INHALATION) at 08:33

## 2024-01-09 RX ADMIN — ACETAMINOPHEN 500 MG: 500 TABLET ORAL at 16:18

## 2024-01-09 RX ADMIN — FUROSEMIDE 40 MG: 10 INJECTION, SOLUTION INTRAMUSCULAR; INTRAVENOUS at 08:03

## 2024-01-09 RX ADMIN — SALINE NASAL SPRAY 1 SPRAY: 1.5 SOLUTION NASAL at 16:29

## 2024-01-09 RX ADMIN — HEPARIN SODIUM 5000 UNITS: 5000 INJECTION INTRAVENOUS; SUBCUTANEOUS at 21:57

## 2024-01-09 RX ADMIN — CLINDAMYCIN PHOSPHATE 600 MG: 150 INJECTION, SOLUTION INTRAMUSCULAR; INTRAVENOUS at 16:15

## 2024-01-09 RX ADMIN — Medication 1 TABLET: at 07:55

## 2024-01-09 ASSESSMENT — PAIN SCALES - GENERAL
PAINLEVEL_OUTOF10: 6
PAINLEVEL_OUTOF10: 0
PAINLEVEL_OUTOF10: 4
PAINLEVEL_OUTOF10: 4
PAINLEVEL_OUTOF10: 0
PAINLEVEL_OUTOF10: 3
PAINLEVEL_OUTOF10: 0

## 2024-01-09 ASSESSMENT — PAIN DESCRIPTION - PAIN TYPE: TYPE: ACUTE PAIN

## 2024-01-09 ASSESSMENT — PAIN DESCRIPTION - ORIENTATION
ORIENTATION: LEFT
ORIENTATION: RIGHT;LEFT
ORIENTATION: RIGHT;LEFT

## 2024-01-09 ASSESSMENT — PAIN DESCRIPTION - DESCRIPTORS
DESCRIPTORS: ACHING
DESCRIPTORS: BURNING
DESCRIPTORS: BURNING

## 2024-01-09 ASSESSMENT — PAIN SCALES - WONG BAKER
WONGBAKER_NUMERICALRESPONSE: 0

## 2024-01-09 ASSESSMENT — PAIN DESCRIPTION - LOCATION
LOCATION: LEG

## 2024-01-09 ASSESSMENT — PAIN - FUNCTIONAL ASSESSMENT
PAIN_FUNCTIONAL_ASSESSMENT: ACTIVITIES ARE NOT PREVENTED
PAIN_FUNCTIONAL_ASSESSMENT: ACTIVITIES ARE NOT PREVENTED
PAIN_FUNCTIONAL_ASSESSMENT: PREVENTS OR INTERFERES SOME ACTIVE ACTIVITIES AND ADLS

## 2024-01-09 ASSESSMENT — PAIN DESCRIPTION - FREQUENCY: FREQUENCY: INTERMITTENT

## 2024-01-09 ASSESSMENT — PAIN DESCRIPTION - ONSET: ONSET: GRADUAL

## 2024-01-09 NOTE — PLAN OF CARE
Problem: Occupational Therapy - Adult  Goal: By Discharge: Performs self-care activities at highest level of function for planned discharge setting.  See evaluation for individualized goals.  Description: Occupational Therapy Goals:  Initiated 1/8/2024 to be met within 7-10 days.    1.  Patient will perform grooming with modified independence.   2.  Patient will perform bathing with modified independence using adaptive equipment..  3.  Patient will perform upper body dressing and lower body dressing  with modified independence using adaptive equipment.  4.  Patient will perform bedside commode transfers with modified independence using RW.  5.  Patient will perform all aspects of toileting with modified independence.  6.  Patient will participate in upper extremity therapeutic exercise/activities with modified independence for 8-10 minutes to increase strength/endurance for ADLs.    7.  Patient will utilize energy conservation techniques during functional activities with min verbal cues.    PLOF: Mod I with ADLs and functional mobility primarily furniture walking      Outcome: Progressing   OCCUPATIONAL THERAPY TREATMENT    Patient: Rosa Alcala (88 y.o. female)  Date: 1/9/2024  Diagnosis: Lactic acidosis [E87.20]  Cellulitis [L03.90]  Peripheral edema [R60.9]  Cellulitis and abscess of leg [L03.119, L02.419]  Pulmonary hypertension (HCC) [I27.20]  Acute on chronic respiratory failure with hypoxia (HCC) [J96.21] Cellulitis of left lower extremity      Precautions: Fall Risk, General Precautions,  ,  ,  ,  ,  ,  ,      Chart, occupational therapy assessment, plan of care, and goals were reviewed.  ASSESSMENT:  Pt presented supine in bed upon entry, on 10L O2NC, and motivated for participation. Pt came to EOB Supervision in prep for ADL retraining. Once sitting, pt demo G balance and performed ADL tasks ~ 12 mins without support, see below for levels of assist. STS transfer CGA with RW. Pt maneuvered in room ~  20 ft CGA with RW requiring few standing rest break 2/2 fatigue and slight SOB. Pt returned to reclining chair at end of session and instructed to perform PLB technique to prevent SOB and for optimal oxygenation. She was left with BLE's elevated and all needs within reach. RN made aware.  Progression toward goals:  []          Improving appropriately and progressing toward goals  [x]          Improving slowly and progressing toward goals  []          Not making progress toward goals and plan of care will be adjusted     PLAN:  Patient continues to benefit from skilled intervention to address the above impairments.  Continue treatment per established plan of care.    Further Equipment Recommendations for Discharge: RW and [facility]    AMPAC: AM-PAC Inpatient Daily Activity Raw Score: 17      Current research shows that an AM-PAC score of 17 or less is not associated with a discharge to the patient's home setting. Based on an AM-PAC score and their current ADL deficits; it is recommended that the patient have 3-5 sessions per week of Occupational Therapy at d/c to increase the patient's independence.      This AMPAC score should be considered in conjunction with interdisciplinary team recommendations to determine the most appropriate discharge setting. Patient's social support, diagnosis, medical stability, and prior level of function should also be taken into consideration.     SUBJECTIVE:   Patient stated, \"I want to get up.\"    OBJECTIVE DATA SUMMARY:     Functional Mobility and Transfers for ADLs:   Bed Mobility:  Bed Mobility Training  Bed Mobility Training: Yes  Rolling: Modified independent  Supine to Sit: Supervision  Sit to Supine: Supervision   Transfers:  Transfer Training  Transfer Training: Yes  Sit to Stand: Contact-guard assistance  Stand to Sit: Contact-guard assistance    Balance:  Balance  Sitting: Intact  Standing: Impaired  Standing - Static: Fair (+)  Standing - Dynamic: Fair    ADL Intervention:

## 2024-01-09 NOTE — PROGRESS NOTES
[facility]   Pharmacy Pharmacokinetic Monitoring Service - Vancomycin    Indication: SSTI  Goal AUC/ROCÍO: 400-600 mg*hr/L  Day of Therapy: 5  Additional Antimicrobials: cefepime    Pertinent Laboratory Values:   Wt Readings from Last 1 Encounters:   01/06/24 47.6 kg (105 lb)     Temp Readings from Last 1 Encounters:   01/09/24 97.3 °F (36.3 °C) (Oral)     Estimated Creatinine Clearance: 35 mL/min (based on SCr of 0.87 mg/dL).    Recent Labs     01/08/24  0220 01/09/24  0048   CREATININE 0.94 0.87   BUN 30* 25*   WBC 12.4 18.7*     Pertinent Cultures:  Date Source Results   1/5 blood NGTD   MRSA Nasal Swab: negative on 1/6    Assessment:  Date Current Dose Level (mg/L) Timing of Level (h) AUC/ROCÍO   1/5 1,000 mg x1 - - -   1/6 500 mg q24h - - -   1/7 500 mg q24h  750 mg q18h 7.2 17 340  581   1/8 750 mg q18h - - -   1/9 750 mg q18h - - -   Note: Serum concentrations collected for AUC dosing may appear elevated if collected in close proximity to the dose administered, this is not necessarily an indication of toxicity    Plan:  Continue current dose of 750 mg q18h  No level ordered at this time  Pharmacy will continue to monitor patient and adjust therapy as indicated    Thank you for the consult,  Cordell Briones RPH  1/9/2024

## 2024-01-09 NOTE — PROGRESS NOTES
Infectious Disease progress Note        Reason: Sepsis, severe left leg cellulitis    Current abx Prior abx   clindamycin, vancomycin since 1/6/24 Cefepime 1/6-1/8   1  Lines:       Assessment :    88-year-old female with a past medical history of hypertension, CHF, COPD on 4 L of home O2 presented to [facility] ED on 1/5/2024 with severe left leg pain.     Hospitalization 12/2020 for acute on chronic hypoxic respiratory failure secondary to confirmed COVID-19 pneumonia   Status post remdesivir since 12/11/2020-12/15  Status post convalescent plasma 12/11/2020     Hospitalization at [facility] May 2022 for partially treated sepsis- POA due to e.coli BSI (positive blood cx 5/5, negative blood cx 5/7) right kidney pyelonephritis, cystitis     Clinical presentation consistent with sepsis-present on admission due to severe left leg cellulitis    Exact microbial etiology of left leg infection not entirely clear.  However the rapid onset and clinical presentation is likely suggestive of gram-positive bacteria such as Streptococcus/Staphylococcus  I agree with adding clindamycin to cover for toxin producing gram-positive's looking at the severity of illness.  Currently no definitive clinical/radiographic evidence to suggest necrotizing infection     chronic hypoxic respiratory failure-on 4 L oxygen at home.  Pulmonary follow-up appreciated    Worsening leukocytosis noted today despite improvement in LLE cellulitis-? Leukemoid reaction to worsening respiratory status/hypoxia     Recommendations:     1.  Discontinue clindamycin.  Continue vancomycin for now  2.  Follow-up pulmonary, cardiology recommendations  3.  Monitor left leg erythema   4.  Monitor CBC, temperature, procalcitonin, clinically      Above plan was discussed in details with patient,  and primary team. Please call me if any further questions or concerns. Will continue to participate in the care of this patient.    HPI:        Patient states that pain in her left leg is

## 2024-01-09 NOTE — PROGRESS NOTES
[facility]  Pulmonary, Critical Care, and Sleep Medicine    Name: Rosa Alcala MRN: [id number]   : 1935 Hospital: [facility]   Date: 2024        Pulmonary Medicine: Daily Progress Note    Admission Date:   2024  LOS: 4  MAR reviewed and pertinent medications noted or modified as needed    IMPRESSION:   Severe pulmonary hypertension likely combination group 3 and 2.  Patient was unable to do PFTs and therefore cannot quantify extent of functional impairment from pulmonary standpoint however CT imaging have reported significant emphysema which likely is etiology for group 3 pulmonary hypertension.  In addition patient has history of chronic diastolic heart failure.  Over the past 5 years dating back to  patient has had progressive increase in measured PA pressures on echocardiogram.  There was plan for right heart cath in 2018 but patient initially postponed it and then declined.  Since then being monitored and treated on supplemental oxygen but no vasodilators.  Chronic hypoxic respiratory failure secondary to COPD further worsened by COVID-19 in 2020 and RSV in 2021.  Patient is on long-term supplemental oxygen at 4 L at home in addition to bronchodilators.  In hospital oxygen needs have increased to 10 L salter  COPD-presumed severe on triple therapy withBreztri and nebulized as needed bronchodilators.-Follows with [facility] pulmonary.Dr. White  Acute on chronic cor pulmonale secondary to above  Acute cellulitis-presenting hospital problem  Duplex ultrasound of left lower extremity negative for DVT  History of exposure to coal dust-father was   History of tobacco use-quit        Patient Active Problem List   Diagnosis    CAP (community acquired pneumonia)    Acute on chronic respiratory failure with hypoxia (HCC)    Suspected COVID-19 virus infection    CABALLERO (dyspnea on exertion)    Hyperlipidemia    Acute on chronic  Moderate upper lobe predominant centrilobular pulmonary emphysema.                 Care Time:  The services I provided to this patient were to treat and/or prevent clinically significant deterioration that could result in the failure of one or more body systems and/or organ systems due to respiratory distress, hypoxia, cardiac dysrhythmia.     Services included the following:  -reviewing nursing notes and old charts  -vital sign assessments   -direct patient care  -medication orders and management  -interpreting and reviewing diagnostic studies/labs  -re-evaluations  -documentation time        Aggregate care time was 15  minutes, which includes only time during which I was engaged in work directly related to the patient's care as described above.    During this entire length of time I was immediately available to the patient. The reason for providing this level of medical care for this critically ill patient was due a critical illness that impaired one or more vital organ systems such that there was a high probability of imminent or life threatening deterioration in the patients condition. This care involved high complexity decision making to assess, manipulate, and support vital system functions, to treat this degreee vital organ system failure and to prevent further life threatening deterioration of the patient’s condition      Complex decision making was made in the evaluation and management plans during this consultation.  More than 50% of time was spent in counseling and coordination of care including review of data and discussion with other team members.        Eddie Zavala, PhD/PA-CHELSIE [facility]  Pulmonary, Critical Care, and Sleep Medicine                   Please note that this dictation was completed with [employer], the [employer] voice recognition software.  Quite often unanticipated grammatical, syntax, homophones, and other interpretive errors are inadvertently transcribed by the

## 2024-01-09 NOTE — PLAN OF CARE
oral intake, labs, and treatment plans   Recommend appropriate diets, oral nutritional supplements, and vitamin/mineral supplements     Problem: Occupational Therapy - Adult  Goal: By Discharge: Performs self-care activities at highest level of function for planned discharge setting.  See evaluation for individualized goals.  Description: Occupational Therapy Goals:  Initiated 1/8/2024 to be met within 7-10 days.    1.  Patient will perform grooming with modified independence.   2.  Patient will perform bathing with modified independence using adaptive equipment..  3.  Patient will perform upper body dressing and lower body dressing  with modified independence using adaptive equipment.  4.  Patient will perform bedside commode transfers with modified independence using RW.  5.  Patient will perform all aspects of toileting with modified independence.  6.  Patient will participate in upper extremity therapeutic exercise/activities with modified independence for 8-10 minutes to increase strength/endurance for ADLs.    7.  Patient will utilize energy conservation techniques during functional activities with min verbal cues.    PLOF: Mod I with ADLs and functional mobility primarily furniture walking      1/9/2024 1343 by Suzanna Nicole OTA  Outcome: Progressing     Problem: Discharge Planning  Goal: Discharge to home or other facility with appropriate resources  Outcome: Progressing  Flowsheets (Taken 1/9/2024 1428)  Discharge to home or other facility with appropriate resources: Identify barriers to discharge with patient and caregiver

## 2024-01-10 ENCOUNTER — APPOINTMENT (OUTPATIENT)
Facility: HOSPITAL | Age: 89
DRG: 871 | End: 2024-01-10
Payer: MEDICARE

## 2024-01-10 LAB
BASOPHILS # BLD: 0 K/UL (ref 0–0.1)
BASOPHILS NFR BLD: 0 % (ref 0–2)
DIFFERENTIAL METHOD BLD: ABNORMAL
EOSINOPHIL # BLD: 0.2 K/UL (ref 0–0.4)
EOSINOPHIL NFR BLD: 1 % (ref 0–5)
ERYTHROCYTE [DISTWIDTH] IN BLOOD BY AUTOMATED COUNT: 15 % (ref 11.6–14.5)
HCT VFR BLD AUTO: 41.8 % (ref 35–45)
HGB BLD-MCNC: 12.8 G/DL (ref 12–16)
IMM GRANULOCYTES # BLD AUTO: 0 K/UL (ref 0–0.04)
IMM GRANULOCYTES NFR BLD AUTO: 0 % (ref 0–0.5)
LYMPHOCYTES # BLD: 2.2 K/UL (ref 0.9–3.6)
LYMPHOCYTES NFR BLD: 12 % (ref 21–52)
MCH RBC QN AUTO: 25.8 PG (ref 24–34)
MCHC RBC AUTO-ENTMCNC: 30.6 G/DL (ref 31–37)
MCV RBC AUTO: 84.3 FL (ref 78–100)
MONOCYTES # BLD: 1.3 K/UL (ref 0.05–1.2)
MONOCYTES NFR BLD: 7 % (ref 3–10)
NEUTS SEG # BLD: 15 K/UL (ref 1.8–8)
NEUTS SEG NFR BLD: 80 % (ref 40–73)
NRBC # BLD: 0 K/UL (ref 0–0.01)
NRBC BLD-RTO: 0 PER 100 WBC
PLATELET # BLD AUTO: 312 K/UL (ref 135–420)
PLATELET COMMENT: ABNORMAL
PMV BLD AUTO: 10.6 FL (ref 9.2–11.8)
PROCALCITONIN SERPL-MCNC: 0.07 NG/ML
RBC # BLD AUTO: 4.96 M/UL (ref 4.2–5.3)
RBC MORPH BLD: ABNORMAL
WBC # BLD AUTO: 18.7 K/UL (ref 4.6–13.2)

## 2024-01-10 PROCEDURE — 6360000002 HC RX W HCPCS: Performed by: INTERNAL MEDICINE

## 2024-01-10 PROCEDURE — 36415 COLL VENOUS BLD VENIPUNCTURE: CPT

## 2024-01-10 PROCEDURE — 84145 PROCALCITONIN (PCT): CPT

## 2024-01-10 PROCEDURE — APPSS15 APP SPLIT SHARED TIME 0-15 MINUTES

## 2024-01-10 PROCEDURE — 99233 SBSQ HOSP IP/OBS HIGH 50: CPT | Performed by: INTERNAL MEDICINE

## 2024-01-10 PROCEDURE — 6360000002 HC RX W HCPCS: Performed by: STUDENT IN AN ORGANIZED HEALTH CARE EDUCATION/TRAINING PROGRAM

## 2024-01-10 PROCEDURE — 97530 THERAPEUTIC ACTIVITIES: CPT

## 2024-01-10 PROCEDURE — 2580000003 HC RX 258: Performed by: INTERNAL MEDICINE

## 2024-01-10 PROCEDURE — 6370000000 HC RX 637 (ALT 250 FOR IP): Performed by: INTERNAL MEDICINE

## 2024-01-10 PROCEDURE — 94761 N-INVAS EAR/PLS OXIMETRY MLT: CPT

## 2024-01-10 PROCEDURE — 97110 THERAPEUTIC EXERCISES: CPT

## 2024-01-10 PROCEDURE — 85025 COMPLETE CBC W/AUTO DIFF WBC: CPT

## 2024-01-10 PROCEDURE — 1100000003 HC PRIVATE W/ TELEMETRY

## 2024-01-10 PROCEDURE — 2700000000 HC OXYGEN THERAPY PER DAY

## 2024-01-10 PROCEDURE — 97535 SELF CARE MNGMENT TRAINING: CPT

## 2024-01-10 PROCEDURE — 99232 SBSQ HOSP IP/OBS MODERATE 35: CPT | Performed by: INTERNAL MEDICINE

## 2024-01-10 PROCEDURE — 94640 AIRWAY INHALATION TREATMENT: CPT

## 2024-01-10 PROCEDURE — 71045 X-RAY EXAM CHEST 1 VIEW: CPT

## 2024-01-10 RX ORDER — IPRATROPIUM BROMIDE AND ALBUTEROL SULFATE 2.5; .5 MG/3ML; MG/3ML
1 SOLUTION RESPIRATORY (INHALATION)
Status: DISCONTINUED | OUTPATIENT
Start: 2024-01-10 | End: 2024-01-10

## 2024-01-10 RX ORDER — IPRATROPIUM BROMIDE AND ALBUTEROL SULFATE 2.5; .5 MG/3ML; MG/3ML
1 SOLUTION RESPIRATORY (INHALATION) EVERY 4 HOURS PRN
Status: DISCONTINUED | OUTPATIENT
Start: 2024-01-10 | End: 2024-01-12

## 2024-01-10 RX ADMIN — IPRATROPIUM BROMIDE AND ALBUTEROL SULFATE 1 DOSE: .5; 3 SOLUTION RESPIRATORY (INHALATION) at 09:09

## 2024-01-10 RX ADMIN — HEPARIN SODIUM 5000 UNITS: 5000 INJECTION INTRAVENOUS; SUBCUTANEOUS at 08:24

## 2024-01-10 RX ADMIN — POLYVINYL ALCOHOL, POVIDONE 1 DROP: 14; 6 SOLUTION/ DROPS OPHTHALMIC at 21:25

## 2024-01-10 RX ADMIN — Medication 1 CAPSULE: at 08:20

## 2024-01-10 RX ADMIN — ALPRAZOLAM 0.5 MG: 0.5 TABLET ORAL at 21:32

## 2024-01-10 RX ADMIN — ACETAMINOPHEN 500 MG: 500 TABLET ORAL at 08:38

## 2024-01-10 RX ADMIN — CYANOCOBALAMIN TAB 1000 MCG 1000 MCG: 1000 TAB at 08:20

## 2024-01-10 RX ADMIN — IPRATROPIUM BROMIDE AND ALBUTEROL SULFATE 1 DOSE: .5; 3 SOLUTION RESPIRATORY (INHALATION) at 14:11

## 2024-01-10 RX ADMIN — FOLIC ACID 1 MG: 1 TABLET ORAL at 08:20

## 2024-01-10 RX ADMIN — Medication 1 TABLET: at 08:20

## 2024-01-10 RX ADMIN — NITROGLYCERIN 0.5 INCH: 20 OINTMENT TOPICAL at 21:34

## 2024-01-10 RX ADMIN — VANCOMYCIN HYDROCHLORIDE 750 MG: 750 INJECTION, POWDER, LYOPHILIZED, FOR SOLUTION INTRAVENOUS at 03:18

## 2024-01-10 RX ADMIN — HEPARIN SODIUM 5000 UNITS: 5000 INJECTION INTRAVENOUS; SUBCUTANEOUS at 21:26

## 2024-01-10 RX ADMIN — GABAPENTIN 200 MG: 100 CAPSULE ORAL at 21:24

## 2024-01-10 RX ADMIN — IPRATROPIUM BROMIDE AND ALBUTEROL SULFATE 1 DOSE: .5; 3 SOLUTION RESPIRATORY (INHALATION) at 21:33

## 2024-01-10 RX ADMIN — POTASSIUM CHLORIDE 20 MEQ: 1500 TABLET, EXTENDED RELEASE ORAL at 08:20

## 2024-01-10 RX ADMIN — BUDESONIDE INHALATION 500 MCG: 0.5 SUSPENSION RESPIRATORY (INHALATION) at 09:09

## 2024-01-10 RX ADMIN — FUROSEMIDE 40 MG: 10 INJECTION, SOLUTION INTRAMUSCULAR; INTRAVENOUS at 08:24

## 2024-01-10 RX ADMIN — VANCOMYCIN HYDROCHLORIDE 750 MG: 750 INJECTION, POWDER, LYOPHILIZED, FOR SOLUTION INTRAVENOUS at 17:30

## 2024-01-10 RX ADMIN — NITROGLYCERIN 0.5 INCH: 20 OINTMENT TOPICAL at 08:29

## 2024-01-10 RX ADMIN — ACETAMINOPHEN 500 MG: 500 TABLET ORAL at 15:39

## 2024-01-10 RX ADMIN — ACETAMINOPHEN 500 MG: 500 TABLET ORAL at 21:32

## 2024-01-10 RX ADMIN — ATORVASTATIN CALCIUM 20 MG: 20 TABLET, FILM COATED ORAL at 08:20

## 2024-01-10 ASSESSMENT — PAIN SCALES - GENERAL
PAINLEVEL_OUTOF10: 3
PAINLEVEL_OUTOF10: 0
PAINLEVEL_OUTOF10: 0
PAINLEVEL_OUTOF10: 2
PAINLEVEL_OUTOF10: 0
PAINLEVEL_OUTOF10: 0
PAINLEVEL_OUTOF10: 7
PAINLEVEL_OUTOF10: 0
PAINLEVEL_OUTOF10: 0
PAINLEVEL_OUTOF10: 6
PAINLEVEL_OUTOF10: 0
PAINLEVEL_OUTOF10: 5
PAINLEVEL_OUTOF10: 0

## 2024-01-10 ASSESSMENT — PAIN DESCRIPTION - DESCRIPTORS
DESCRIPTORS: ACHING

## 2024-01-10 ASSESSMENT — PAIN DESCRIPTION - FREQUENCY
FREQUENCY: CONTINUOUS

## 2024-01-10 ASSESSMENT — PAIN - FUNCTIONAL ASSESSMENT
PAIN_FUNCTIONAL_ASSESSMENT: ACTIVITIES ARE NOT PREVENTED
PAIN_FUNCTIONAL_ASSESSMENT: ACTIVITIES ARE NOT PREVENTED
PAIN_FUNCTIONAL_ASSESSMENT: PREVENTS OR INTERFERES SOME ACTIVE ACTIVITIES AND ADLS
PAIN_FUNCTIONAL_ASSESSMENT: ACTIVITIES ARE NOT PREVENTED
PAIN_FUNCTIONAL_ASSESSMENT: ACTIVITIES ARE NOT PREVENTED

## 2024-01-10 ASSESSMENT — PAIN DESCRIPTION - ORIENTATION
ORIENTATION: LEFT

## 2024-01-10 ASSESSMENT — PAIN DESCRIPTION - LOCATION
LOCATION: LEG

## 2024-01-10 ASSESSMENT — PAIN DESCRIPTION - PAIN TYPE
TYPE: CHRONIC PAIN

## 2024-01-10 ASSESSMENT — PAIN DESCRIPTION - ONSET
ONSET: ON-GOING

## 2024-01-10 ASSESSMENT — PAIN SCALES - WONG BAKER
WONGBAKER_NUMERICALRESPONSE: 0
WONGBAKER_NUMERICALRESPONSE: 0

## 2024-01-10 NOTE — PLAN OF CARE
Problem: Occupational Therapy - Adult  Goal: By Discharge: Performs self-care activities at highest level of function for planned discharge setting.  See evaluation for individualized goals.  Description: Occupational Therapy Goals:  Initiated 1/8/2024 to be met within 7-10 days.    1.  Patient will perform grooming with modified independence.   2.  Patient will perform bathing with modified independence using adaptive equipment..  3.  Patient will perform upper body dressing and lower body dressing  with modified independence using adaptive equipment.  4.  Patient will perform bedside commode transfers with modified independence using RW.  5.  Patient will perform all aspects of toileting with modified independence.  6.  Patient will participate in upper extremity therapeutic exercise/activities with modified independence for 8-10 minutes to increase strength/endurance for ADLs.    7.  Patient will utilize energy conservation techniques during functional activities with min verbal cues.    PLOF: Mod I with ADLs and functional mobility primarily furniture walking      Outcome: Progressing   OCCUPATIONAL THERAPY TREATMENT    Patient: Rosa Alcala (88 y.o. female)  Date: 1/10/2024  Diagnosis: Lactic acidosis [E87.20]  Cellulitis [L03.90]  Peripheral edema [R60.9]  Cellulitis and abscess of leg [L03.119, L02.419]  Pulmonary hypertension (HCC) [I27.20]  Acute on chronic respiratory failure with hypoxia (HCC) [J96.21] Cellulitis of left lower extremity      Precautions: Fall Risk, General Precautions,  ,  ,  ,  ,  ,  ,      Chart, occupational therapy assessment, plan of care, and goals were reviewed.  ASSESSMENT:  Pt presented sitting upright in reclining chair upon entry, on 10L O2NC, and agreeable to work with OT. Pt educated on mult energy conservation techniques including pacing and deep breathing to prevent SOB and fatigue, and increase activity tolerance and safety w/ADLs and functional mobility, pt  verbalized understanding. She was found to be soiled from urine. STS transfer CGA with RW. Pt required CGA for félix area care 2/2 decreased dyn standing balance and tolerance. Pt maneuvered in room ~ 20 ft CGA with RW requiring several standing rest break 2/2 fatigue. She returned back to reclining chair CGA. SPO2 assessed at 85% on 10L . She was instructed to perform PLB technique and recovered back to 90% after ~ 3 mins. She was positioned for comfort and left with all needs within reach. RN made aware.   Progression toward goals:  []          Improving appropriately and progressing toward goals  [x]          Improving slowly and progressing toward goals  []          Not making progress toward goals and plan of care will be adjusted     PLAN:  Patient continues to benefit from skilled intervention to address the above impairments.  Continue treatment per established plan of care.    Further Equipment Recommendations for Discharge: RW, BSC, Shower chair    AMPAC: AM-PAC Inpatient Daily Activity Raw Score: 18    Current research shows that an AM-PAC score of 18 or greater is associated with a discharge to the patient's home setting. Based on an AM-PAC score and their current ADL deficits; it is recommended that the patient have 2-3 sessions per week of Occupational Therapy at d/c to increase the patient's independence.        This AMPAC score should be considered in conjunction with interdisciplinary team recommendations to determine the most appropriate discharge setting. Patient's social support, diagnosis, medical stability, and prior level of function should also be taken into consideration.     SUBJECTIVE:   Patient stated, \"I just need to keep moving.\"    OBJECTIVE DATA SUMMARY:   Cognitive/Behavioral Status:  Orientation  Overall Orientation Status: Within Normal Limits  Orientation Level: Oriented X4  Cognition  Overall Cognitive Status: WNL    Functional Mobility and Transfers for

## 2024-01-10 NOTE — PROGRESS NOTES
palpitation        skin turgor []  good []  poor []  decreased    NEUR:   []  cranial nerves intact       []  L []  R weakness    [x]   follows commands     []   aphasic    [x]  alert  []  lethargic  []  stuporous  []  sedated             Alert and Oriented  []  time  []  place   []  person  PSYCH:   insight []  poor [x]  good     mood []  depressed []  anxious []  agitated                   []  Telemetry Reviewed     []  NSR []  PAC/PVCs   []  Afib  []  Paced   []  NSVT   []  Jin []  NGT  []  Intubated on vent    Lab Data Reviewed:  No components found for: \"GLPOC\"  Recent Labs     01/07/24  0333 01/08/24  0220 01/09/24  0048    140 140   K 3.2* 3.8 4.7    107 106   CO2 30 27 28   BUN 28* 30* 25*   WBC 25.9* 12.4 18.7*   HGB 12.6 11.9* 12.4   HCT 40.9 39.1 40.6    277 296        []    I have personally reviewed the   []  xray  []  CT scan    Assessment/Plan:        Risk of deterioration:  []  Low    [x]  Moderate  []  High       -Acute on Chronic Hypoxic Respiratory Failure 2°/2 Acute on Chronic Diastolic Congestive Heart Failure/ Pul HTN/ severe COPD;  - remains on high flow oxygen;    Pt is on 4 L HOT; now on High flow; get Pul eval;    Echo noted; severe pulmonary hypertension RV failure cor pulmonale;      Image quality is fair.    Limited echo ordered to reassess function and PAP    Left Ventricle: Normal left ventricular systolic function with a visually estimated EF of 60 - 65%. Left ventricle size is normal. Mildly increased wall thickness. Normal wall motion.    Right Ventricle: Right ventricle is severely dilated. Severely reduced systolic function.  Findings consistent with cor pulmonale.    Pulmonary Arteries: Severe pulmonary hypertension present. The estimated PASP is 116 mmHg.    Tricuspid Valve: Moderate to severe regurgitation.    Right Atrium: Right atrium is mildly dilated.    CTA chest with no PE;  IMPRESSION:  1.  No acute pulmonary embolism.  2.  Dilated pulmonary trunk

## 2024-01-10 NOTE — PROGRESS NOTES
Infectious Disease progress Note        Reason: Sepsis, severe left leg cellulitis    Current abx Prior abx   vancomycin since 1/6/24 Cefepime 1/6-1/8  Clindamycin 1/6-1/9   1  Lines:       Assessment :    88-year-old female with a past medical history of hypertension, CHF, COPD on 4 L of home O2 presented to [facility] ED on 1/5/2024 with severe left leg pain.     Hospitalization 12/2020 for acute on chronic hypoxic respiratory failure secondary to confirmed COVID-19 pneumonia   Status post remdesivir since 12/11/2020-12/15  Status post convalescent plasma 12/11/2020     Hospitalization at [facility] May 2022 for partially treated sepsis- POA due to e.coli BSI (positive blood cx 5/5, negative blood cx 5/7) right kidney pyelonephritis, cystitis     Clinical presentation consistent with sepsis-present on admission due to severe left leg cellulitis    Exact microbial etiology of left leg infection not entirely clear.  However the rapid onset and clinical presentation is likely suggestive of gram-positive bacteria such as Streptococcus/Staphylococcus  I agree with adding clindamycin to cover for toxin producing gram-positive's looking at the severity of illness.  Currently no definitive clinical/radiographic evidence to suggest necrotizing infection     chronic hypoxic respiratory failure-on 4 L oxygen at home.  Pulmonary follow-up appreciated    Gradual clinical improvement.  No definitive clinical/radiographic evidence of worsening respiratory status.  Gradually improving left lower extremity erythema.  Persistent leukocytosis-monitor for partially treated infection  Currently improving left leg erythema, improving procalcitonin noted  Rule out drug induced leukocytosis- ? Probiotics ? Inhaled steroids     Recommendations:     1.  Continue vancomycin for now  2.  Follow-up pulmonary, cardiology recommendations  3.  Monitor left leg erythema   4.  Will hold inhaled budesonide, lactobacillus to check for drug-induced

## 2024-01-10 NOTE — PROGRESS NOTES
[facility]  Pulmonary, Critical Care, and Sleep Medicine    Name: Rosa Alcala MRN: [id number]   : 1935 Hospital: [facility]   Date: 1/10/2024        Pulmonary Medicine: Daily Progress Note    Admission Date:   2024  LOS: 5  MAR reviewed and pertinent medications noted or modified as needed    IMPRESSION:   Severe pulmonary hypertension likely combination group 3 and 2.  Patient was unable to do PFTs and therefore cannot quantify extent of functional impairment from pulmonary standpoint however CT imaging have reported significant emphysema which likely is etiology for group 3 pulmonary hypertension.  In addition patient has history of chronic diastolic heart failure.  Over the past 5 years dating back to  patient has had progressive increase in measured PA pressures on echocardiogram.  There was plan for right heart cath in 2018 but patient initially postponed it and then declined.  Since then being monitored and treated on supplemental oxygen but no vasodilators.  Chronic hypoxic respiratory failure secondary to COPD further worsened by COVID-19 in 2020 and RSV in 2021.  Patient is on long-term supplemental oxygen at 4 L at home in addition to bronchodilators.  In hospital oxygen needs have increased to 10 L salter  COPD-presumed severe on triple therapy withBreztri and nebulized as needed bronchodilators.-Follows with [facility] pulmonary.Dr. White  Acute on chronic cor pulmonale secondary to above  Acute cellulitis-presenting hospital problem  Duplex ultrasound of left lower extremity negative for DVT  History of exposure to coal dust - father was   History of tobacco use-quit        Patient Active Problem List   Diagnosis    CAP (community acquired pneumonia)    Acute on chronic respiratory failure with hypoxia (HCC)    Suspected COVID-19 virus infection    CABALLERO (dyspnea on exertion)    Hyperlipidemia    Acute on chronic

## 2024-01-10 NOTE — PROGRESS NOTES
[facility]   Pharmacy Pharmacokinetic Monitoring Service - Vancomycin    Indication: SSTI  Goal AUC/ROCÍO: 400-600 mg*hr/L  Day of Therapy: 6  Additional Antimicrobials: cefepime    Pertinent Laboratory Values:   Wt Readings from Last 1 Encounters:   01/06/24 47.6 kg (105 lb)     Temp Readings from Last 1 Encounters:   01/10/24 98 °F (36.7 °C) (Oral)     Estimated Creatinine Clearance: 35 mL/min (based on SCr of 0.87 mg/dL).    Recent Labs     01/08/24  0220 01/09/24  0048 01/10/24  0117   CREATININE 0.94 0.87  --    BUN 30* 25*  --    WBC 12.4 18.7* 18.7*     Pertinent Cultures:  Date Source Results   1/5 blood NGTD   MRSA Nasal Swab: negative on 1/6    Assessment:  Date Current Dose Level (mg/L) Timing of Level (h) AUC/ROCÍO   1/5 1,000 mg x1 - - -   1/6 500 mg q24h - - -   1/7 500 mg q24h  750 mg q18h 7.2 17 340  581   1/8 750 mg q18h - - -   1/9 750 mg q18h - - -   Note: Serum concentrations collected for AUC dosing may appear elevated if collected in close proximity to the dose administered, this is not necessarily an indication of toxicity    Plan:  Continue current dose of 750 mg q18h  Vancomycin level ordered 1/11 with AM labs  Pharmacy will continue to monitor patient and adjust therapy as indicated    Thank you for the consult,  OBDULIO TA RPH  1/10/2024

## 2024-01-10 NOTE — PLAN OF CARE
Problem: Physical Therapy - Adult  Goal: By Discharge: Performs mobility at highest level of function for planned discharge setting.  See evaluation for individualized goals.  Description: Physical Therapy Goals:  Initiated 1/8/2024 to be met within 7-10 days.    1.  Patient will move from supine to sit and sit to supine  in bed with modified independence.    2.  Patient will transfer from bed to chair and chair to bed with supervision/set-up using the least restrictive device.  3.  Patient will perform sit to stand with supervision/set-up.  4.  Patient will ambulate with supervision/set-up for 50 feet with the least restrictive device.   5.  Patient will ascend/descend 3 stairs with 1 handrail(s) with minimal assistance/contact guard assist.    PLOF: pt lives alone, several steps in home, reports ambulating without AD      Outcome: Progressing     PHYSICAL THERAPY TREATMENT    Patient: Rosa Alcala (88 y.o. female)  Date: 1/10/2024  Diagnosis: Lactic acidosis [E87.20]  Cellulitis [L03.90]  Peripheral edema [R60.9]  Cellulitis and abscess of leg [L03.119, L02.419]  Pulmonary hypertension (HCC) [I27.20]  Acute on chronic respiratory failure with hypoxia (HCC) [J96.21] Cellulitis of left lower extremity      Precautions: Fall Risk, General Precautions,  ASSESSMENT:  Pt received in bed, 10L via NC and agreeable to PT session. Able to come to EOB from supine to sit with supervision. Increase cueing required to scoot for feet to be flat on the floor. Denies ant lightheadedness or dizziness at EOB. Completes LAQ, seated marches, ankle pumps, and sit to stands without incident and no increase in pain. Demosntrates good ROM and fair strength. In standing demonstrates deficits in standing balance with support needed from RW. Stand pivot to chair with CGA. Legs elevated and pillow under legs to unload heels. Will continue to benefit from skilled acute care PT during admission. All needs and call bell within reach,  notified nurse.     Progression toward goals:   [x]      Improving appropriately and progressing toward goals  []      Improving slowly and progressing toward goals  []      Not making progress toward goals and plan of care will be adjusted     PLAN:  Patient continues to benefit from skilled intervention to address the above impairments.  Continue treatment per established plan of care.    Further Equipment Recommendations for Discharge: rolling walker    AMPAC: AM-PAC Inpatient Mobility Raw Score : 20        Current research shows that an AM-PAC score of 18 (14 without stairs) or greater is associated with a discharge to the patient's home setting. Based on an AM-PAC score and their current functional mobility deficits, it is recommended that the patient have 2-3 sessions per week of Physical Therapy at d/c to increase the patient's independence.      This AMPAC score should be considered in conjunction with interdisciplinary team recommendations to determine the most appropriate discharge setting. Patient's social support, diagnosis, medical stability, and prior level of function should also be taken into consideration.     SUBJECTIVE:   Patient stated, \"I want to get up.\"    OBJECTIVE DATA SUMMARY:   Critical Behavior:  Orientation  Overall Orientation Status: Within Normal Limits  Orientation Level: Oriented X4  Cognition  Overall Cognitive Status: WNL    Functional Mobility Training:  Bed Mobility:  Bed Mobility Training  Bed Mobility Training: Yes  Rolling: Modified independent  Supine to Sit: Supervision  Sit to Supine: Supervision  Scooting: Modified independent  Transfers:  Transfer Training  Transfer Training: Yes  Sit to Stand: Contact-guard assistance  Stand to Sit: Contact-guard assistance  Stand Pivot Transfers: Contact-guard assistance  Bed to Chair: Contact-guard assistance  Balance:  Balance  Sitting: Intact  Standing: Impaired  Standing - Static: Good  Standing - Dynamic: Fair

## 2024-01-11 LAB
ALBUMIN SERPL-MCNC: 2.5 G/DL (ref 3.4–5)
ALBUMIN/GLOB SERPL: 1 (ref 0.8–1.7)
ALP SERPL-CCNC: 47 U/L (ref 45–117)
ALT SERPL-CCNC: 37 U/L (ref 13–56)
ANION GAP SERPL CALC-SCNC: 5 MMOL/L (ref 3–18)
AST SERPL-CCNC: 18 U/L (ref 10–38)
BACTERIA SPEC CULT: NORMAL
BACTERIA SPEC CULT: NORMAL
BASOPHILS # BLD: 0 K/UL (ref 0–0.1)
BASOPHILS NFR BLD: 0 % (ref 0–2)
BILIRUB SERPL-MCNC: 0.5 MG/DL (ref 0.2–1)
BUN SERPL-MCNC: 20 MG/DL (ref 7–18)
BUN/CREAT SERPL: 28 (ref 12–20)
CALCIUM SERPL-MCNC: 8.3 MG/DL (ref 8.5–10.1)
CHLORIDE SERPL-SCNC: 107 MMOL/L (ref 100–111)
CO2 SERPL-SCNC: 29 MMOL/L (ref 21–32)
CREAT SERPL-MCNC: 0.72 MG/DL (ref 0.6–1.3)
DIFFERENTIAL METHOD BLD: ABNORMAL
EOSINOPHIL # BLD: 0.2 K/UL (ref 0–0.4)
EOSINOPHIL NFR BLD: 1 % (ref 0–5)
ERYTHROCYTE [DISTWIDTH] IN BLOOD BY AUTOMATED COUNT: 14.8 % (ref 11.6–14.5)
GLOBULIN SER CALC-MCNC: 2.4 G/DL (ref 2–4)
GLUCOSE SERPL-MCNC: 90 MG/DL (ref 74–99)
HCT VFR BLD AUTO: 39.1 % (ref 35–45)
HGB BLD-MCNC: 12 G/DL (ref 12–16)
IMM GRANULOCYTES # BLD AUTO: 0.2 K/UL (ref 0–0.04)
IMM GRANULOCYTES NFR BLD AUTO: 1 % (ref 0–0.5)
LYMPHOCYTES # BLD: 2 K/UL (ref 0.9–3.6)
LYMPHOCYTES NFR BLD: 11 % (ref 21–52)
MCH RBC QN AUTO: 25.9 PG (ref 24–34)
MCHC RBC AUTO-ENTMCNC: 30.7 G/DL (ref 31–37)
MCV RBC AUTO: 84.3 FL (ref 78–100)
MONOCYTES # BLD: 2.2 K/UL (ref 0.05–1.2)
MONOCYTES NFR BLD: 12 % (ref 3–10)
NEUTS SEG # BLD: 13.3 K/UL (ref 1.8–8)
NEUTS SEG NFR BLD: 74 % (ref 40–73)
NRBC # BLD: 0 K/UL (ref 0–0.01)
NRBC BLD-RTO: 0 PER 100 WBC
PLATELET # BLD AUTO: 295 K/UL (ref 135–420)
PMV BLD AUTO: 10.9 FL (ref 9.2–11.8)
POTASSIUM SERPL-SCNC: 4.8 MMOL/L (ref 3.5–5.5)
PROCALCITONIN SERPL-MCNC: 0.07 NG/ML
PROT SERPL-MCNC: 4.9 G/DL (ref 6.4–8.2)
RBC # BLD AUTO: 4.64 M/UL (ref 4.2–5.3)
SERVICE CMNT-IMP: NORMAL
SERVICE CMNT-IMP: NORMAL
SODIUM SERPL-SCNC: 141 MMOL/L (ref 136–145)
WBC # BLD AUTO: 17.9 K/UL (ref 4.6–13.2)

## 2024-01-11 PROCEDURE — 99232 SBSQ HOSP IP/OBS MODERATE 35: CPT | Performed by: INTERNAL MEDICINE

## 2024-01-11 PROCEDURE — 85025 COMPLETE CBC W/AUTO DIFF WBC: CPT

## 2024-01-11 PROCEDURE — 6370000000 HC RX 637 (ALT 250 FOR IP): Performed by: INTERNAL MEDICINE

## 2024-01-11 PROCEDURE — 1100000003 HC PRIVATE W/ TELEMETRY

## 2024-01-11 PROCEDURE — 97530 THERAPEUTIC ACTIVITIES: CPT

## 2024-01-11 PROCEDURE — 97116 GAIT TRAINING THERAPY: CPT

## 2024-01-11 PROCEDURE — 84145 PROCALCITONIN (PCT): CPT

## 2024-01-11 PROCEDURE — 36415 COLL VENOUS BLD VENIPUNCTURE: CPT

## 2024-01-11 PROCEDURE — 6360000002 HC RX W HCPCS: Performed by: INTERNAL MEDICINE

## 2024-01-11 PROCEDURE — APPSS15 APP SPLIT SHARED TIME 0-15 MINUTES

## 2024-01-11 PROCEDURE — 97535 SELF CARE MNGMENT TRAINING: CPT

## 2024-01-11 PROCEDURE — 94640 AIRWAY INHALATION TREATMENT: CPT

## 2024-01-11 PROCEDURE — 80053 COMPREHEN METABOLIC PANEL: CPT

## 2024-01-11 RX ORDER — LINEZOLID 600 MG/1
600 TABLET, FILM COATED ORAL EVERY 12 HOURS SCHEDULED
Status: DISCONTINUED | OUTPATIENT
Start: 2024-01-11 | End: 2024-01-12

## 2024-01-11 RX ORDER — LEVOFLOXACIN 5 MG/ML
500 INJECTION, SOLUTION INTRAVENOUS EVERY 24 HOURS
Status: DISCONTINUED | OUTPATIENT
Start: 2024-01-11 | End: 2024-01-16 | Stop reason: HOSPADM

## 2024-01-11 RX ADMIN — GABAPENTIN 200 MG: 100 CAPSULE ORAL at 21:26

## 2024-01-11 RX ADMIN — ATORVASTATIN CALCIUM 20 MG: 20 TABLET, FILM COATED ORAL at 10:53

## 2024-01-11 RX ADMIN — LINEZOLID 600 MG: 600 TABLET, FILM COATED ORAL at 21:17

## 2024-01-11 RX ADMIN — CYANOCOBALAMIN TAB 1000 MCG 1000 MCG: 1000 TAB at 10:53

## 2024-01-11 RX ADMIN — ACETAMINOPHEN 500 MG: 500 TABLET ORAL at 21:27

## 2024-01-11 RX ADMIN — ALPRAZOLAM 0.5 MG: 0.5 TABLET ORAL at 21:26

## 2024-01-11 RX ADMIN — POTASSIUM CHLORIDE 20 MEQ: 1500 TABLET, EXTENDED RELEASE ORAL at 10:53

## 2024-01-11 RX ADMIN — HEPARIN SODIUM 5000 UNITS: 5000 INJECTION INTRAVENOUS; SUBCUTANEOUS at 10:54

## 2024-01-11 RX ADMIN — Medication 1 TABLET: at 10:53

## 2024-01-11 RX ADMIN — LINEZOLID 600 MG: 600 TABLET, FILM COATED ORAL at 11:11

## 2024-01-11 RX ADMIN — FUROSEMIDE 40 MG: 10 INJECTION, SOLUTION INTRAMUSCULAR; INTRAVENOUS at 10:53

## 2024-01-11 RX ADMIN — ACETAMINOPHEN 500 MG: 500 TABLET ORAL at 13:12

## 2024-01-11 RX ADMIN — IPRATROPIUM BROMIDE AND ALBUTEROL SULFATE 1 DOSE: .5; 3 SOLUTION RESPIRATORY (INHALATION) at 13:12

## 2024-01-11 RX ADMIN — POLYVINYL ALCOHOL, POVIDONE 1 DROP: 14; 6 SOLUTION/ DROPS OPHTHALMIC at 13:20

## 2024-01-11 RX ADMIN — HEPARIN SODIUM 5000 UNITS: 5000 INJECTION INTRAVENOUS; SUBCUTANEOUS at 21:28

## 2024-01-11 RX ADMIN — IPRATROPIUM BROMIDE AND ALBUTEROL SULFATE 1 DOSE: .5; 3 SOLUTION RESPIRATORY (INHALATION) at 20:33

## 2024-01-11 RX ADMIN — POLYVINYL ALCOHOL, POVIDONE 1 DROP: 14; 6 SOLUTION/ DROPS OPHTHALMIC at 21:27

## 2024-01-11 RX ADMIN — FOLIC ACID 1 MG: 1 TABLET ORAL at 10:54

## 2024-01-11 RX ADMIN — NITROGLYCERIN 0.5 INCH: 20 OINTMENT TOPICAL at 11:11

## 2024-01-11 RX ADMIN — LEVOFLOXACIN 500 MG: 500 INJECTION, SOLUTION INTRAVENOUS at 10:53

## 2024-01-11 ASSESSMENT — PAIN DESCRIPTION - FREQUENCY
FREQUENCY: INTERMITTENT
FREQUENCY: CONTINUOUS
FREQUENCY: INTERMITTENT

## 2024-01-11 ASSESSMENT — PAIN DESCRIPTION - LOCATION
LOCATION: LEG
LOCATION: GENERALIZED
LOCATION: LEG
LOCATION: LEG

## 2024-01-11 ASSESSMENT — PAIN SCALES - GENERAL
PAINLEVEL_OUTOF10: 0
PAINLEVEL_OUTOF10: 6
PAINLEVEL_OUTOF10: 0
PAINLEVEL_OUTOF10: 0
PAINLEVEL_OUTOF10: 6
PAINLEVEL_OUTOF10: 0
PAINLEVEL_OUTOF10: 3
PAINLEVEL_OUTOF10: 0
PAINLEVEL_OUTOF10: 0
PAINLEVEL_OUTOF10: 6
PAINLEVEL_OUTOF10: 0
PAINLEVEL_OUTOF10: 0

## 2024-01-11 ASSESSMENT — PAIN - FUNCTIONAL ASSESSMENT
PAIN_FUNCTIONAL_ASSESSMENT: ACTIVITIES ARE NOT PREVENTED
PAIN_FUNCTIONAL_ASSESSMENT: ACTIVITIES ARE NOT PREVENTED
PAIN_FUNCTIONAL_ASSESSMENT: PREVENTS OR INTERFERES SOME ACTIVE ACTIVITIES AND ADLS
PAIN_FUNCTIONAL_ASSESSMENT: ACTIVITIES ARE NOT PREVENTED

## 2024-01-11 ASSESSMENT — PAIN SCALES - WONG BAKER
WONGBAKER_NUMERICALRESPONSE: 0
WONGBAKER_NUMERICALRESPONSE: 0
WONGBAKER_NUMERICALRESPONSE: 2
WONGBAKER_NUMERICALRESPONSE: 0
WONGBAKER_NUMERICALRESPONSE: 0

## 2024-01-11 ASSESSMENT — PAIN DESCRIPTION - ORIENTATION
ORIENTATION: LEFT
ORIENTATION: LEFT
ORIENTATION: OTHER (COMMENT)
ORIENTATION: LEFT

## 2024-01-11 ASSESSMENT — PAIN DESCRIPTION - ONSET
ONSET: ON-GOING

## 2024-01-11 ASSESSMENT — PAIN DESCRIPTION - DESCRIPTORS
DESCRIPTORS: ACHING

## 2024-01-11 ASSESSMENT — PAIN DESCRIPTION - PAIN TYPE
TYPE: ACUTE PAIN

## 2024-01-11 ASSESSMENT — PAIN DESCRIPTION - DIRECTION
RADIATING_TOWARDS: NO

## 2024-01-11 NOTE — PROGRESS NOTES
Comprehensive Nutrition Assessment    Type and Reason for Visit:  Initial, Positive Nutrition Screen    Nutrition Recommendations/Plan:   Modify Current Diet- Plan to add Easy to Chew.   Continue Oral Nutrition Supplement.  Pt requesting Coffee with all meals, will update.  Continue to monitor tolerance of PO, compliance of oral supplements, weight, labs, and plan of care during admission.         Malnutrition Assessment:  Malnutrition Status:  Moderate malnutrition (01/08/24 1040)    Context:  Chronic Illness     Findings of the 6 clinical characteristics of malnutrition:  Energy Intake:  No significant decrease in energy intake  Weight Loss:  No significant weight loss     Body Fat Loss:  Mild body fat loss (moderate) Triceps, Buccal region   Muscle Mass Loss:  Mild muscle mass loss (moderate) Temples (temporalis), Clavicles (pectoralis & deltoids), Hand (interosseous)  Fluid Accumulation:  Unable to assess     Strength:  Not Performed    Nutrition Assessment:    Pt admitted for lower leg swelling and pain; sepsis 2/2 lower extremity cellulitis. Follow-up. Pt seen sitting in recliner. Pt reports fair intake, consuming ~50% of meals, likes oral nutrition supplements. Observed pt consuming lunch at time of visit with 1-25% consumed. Majority of documented meals consumed at 26-50%. If pt consumes 25% of meals and 100% oral nutrition supplements nutrition needs will be met. Note, pt with partial dentures, requesting softer diet, agreeable to Easy to Chew. Will modify diet. Pt requesting Coffee with all meals, will update. Continue oral nutrition supplements. Will continue to monitor intake.    Nutrition Related Findings:    Last BM (including prior to admit): 01/10/24  Edema: No data recorded. Pertinent Meds: Lipitor, folic acid, lasix, heparin, MVI-minerals, Vit B-12. Pertinent Labs: BUN 20 (H). Wound Type: Deep Tissue Injury       Current Nutrition Intake & Therapies:    Average Meal Intake: 26-50%,

## 2024-01-11 NOTE — PROGRESS NOTES
Hospitalist Progress Note    NAME:  Rosa Alcala   :   1935   MRN:   [id number]     Date/Time:  1/10/2024  Subjective:   Pt states she feels a little better. Had no complaints.    Objective:   Vitals:  BP (!) 167/74   Pulse 83   Temp 98.4 °F (36.9 °C) (Oral)   Resp 20   Ht 1.549 m (5' 1\")   Wt 47.6 kg (105 lb)   SpO2 93%   BMI 19.84 kg/m²   Temp (24hrs), Av.3 °F (36.8 °C), Min:97.8 °F (36.6 °C), Max:98.7 °F (37.1 °C)      Last 24hr Input/Output:    Intake/Output Summary (Last 24 hours) at 1/10/2024 2220  Last data filed at 1/10/2024 1808  Gross per 24 hour   Intake 1675 ml   Output 2450 ml   Net -775 ml        PHYSICAL EXAM:  Gen:  []  WD [x]  WN  [x]  cachectic  []  thin  []  obese  []  disheveled             []   Critically ill or  []  Chronically ill appearing    HEENT:   []   red/pink conjunctivae [x]  pale conjunctivae                  PERRL  []  yes  []  no        hearing intact to voice []  yes  []  No               [x]  moist or  []  dry  Mucosa  NECK:   supple [x]  yes  []  no      masses []  yes  []  No               thyroid    []  non tender []  tender    RESP:   use of accessory muscles [x]  yes []  no                BS    []  clear  [x]  wheezing [x]  rhonchi []  crackles   CARD:  murmur  []  yes [x]  no   []  thrill  []  carotid bruits                 LE edema []  yes  [x]  no    [x]  JVD present    ABD:    tenderness []  yes [x]  no   Liver/spleen enlargement []   yes []   no                distended []   yes [x]  no    bowel sound []  normal []  hypo or  []  hyperactive    MSKL:   cyanosis/clubbing []  yes [x]  no         []   Spastic []  Flaccid []  Cog wheeling    SKIN:   Rashes []  yes []  no     Ulcers []  present ___left leg with less swelling and redness;               [x]  normal []  tight to palpitation        skin turgor []  good []  poor []  decreased    NEUR:   []  cranial nerves intact       []  L []  R weakness    [x]   follows commands     []   aphasic    [x]

## 2024-01-11 NOTE — PLAN OF CARE
Problem: Pain  Goal: Verbalizes/displays adequate comfort level or baseline comfort level  Outcome: Progressing     Problem: Chronic Conditions and Co-morbidities  Goal: Patient's chronic conditions and co-morbidity symptoms are monitored and maintained or improved  Outcome: Progressing     Problem: Safety - Adult  Goal: Free from fall injury  Outcome: Progressing     Problem: Skin/Tissue Integrity  Goal: Absence of new skin breakdown  Description: 1.  Monitor for areas of redness and/or skin breakdown  2.  Assess vascular access sites hourly  3.  Every 4-6 hours minimum:  Change oxygen saturation probe site  4.  Every 4-6 hours:  If on nasal continuous positive airway pressure, respiratory therapy assess nares and determine need for appliance change or resting period.  Outcome: Progressing     Problem: Nutrition Deficit:  Goal: Optimize nutritional status  Outcome: Progressing     Problem: Discharge Planning  Goal: Discharge to home or other facility with appropriate resources  Outcome: Progressing

## 2024-01-11 NOTE — PLAN OF CARE
Problem: Occupational Therapy - Adult  Goal: By Discharge: Performs self-care activities at highest level of function for planned discharge setting.  See evaluation for individualized goals.  Description: Occupational Therapy Goals:  Initiated 1/8/2024 to be met within 7-10 days.    1.  Patient will perform grooming with modified independence.   2.  Patient will perform bathing with modified independence using adaptive equipment..  3.  Patient will perform upper body dressing and lower body dressing  with modified independence using adaptive equipment.  4.  Patient will perform bedside commode transfers with modified independence using RW.  5.  Patient will perform all aspects of toileting with modified independence.  6.  Patient will participate in upper extremity therapeutic exercise/activities with modified independence for 8-10 minutes to increase strength/endurance for ADLs.    7.  Patient will utilize energy conservation techniques during functional activities with min verbal cues.    PLOF: Mod I with ADLs and functional mobility primarily furniture walking      Outcome: Progressing   OCCUPATIONAL THERAPY TREATMENT    Patient: Rosa Alcala (88 y.o. female)  Date: 1/11/2024  Diagnosis: Lactic acidosis [E87.20]  Cellulitis [L03.90]  Peripheral edema [R60.9]  Cellulitis and abscess of leg [L03.119, L02.419]  Pulmonary hypertension (HCC) [I27.20]  Acute on chronic respiratory failure with hypoxia (HCC) [J96.21] Cellulitis of left lower extremity      Precautions: Fall Risk, General Precautions,  ,  ,  ,  ,  ,  ,      Chart, occupational therapy assessment, plan of care, and goals were reviewed.  ASSESSMENT:  Pt presented sitting upright in reclining chair upon entry, on 8L O2NC, and agreeable to work with OT. STS transfer SBA with RW. She able to yomi her jacket w/ Supervision while in stance, no LOB noted. Pt maneuvered around room ~ 40 ft x 3 SBA with RW to improve functional activity tolerance needed for  self cares. Pt requiring several standing rest break due to fatigue. Pt returned to reclining chair for seated rest break. She stood and performed grooming task SBA/CGA ~ 1 min with slight LOB and able to self correct w/ RW. Pt returned to chair and left with all needs within reach. RN  made aware.  Progression toward goals:  []          Improving appropriately and progressing toward goals  [x]          Improving slowly and progressing toward goals  []          Not making progress toward goals and plan of care will be adjusted     PLAN:  Patient continues to benefit from skilled intervention to address the above impairments.  Continue treatment per established plan of care.    Further Equipment Recommendations for Discharge: RW and shower chair    AMPAC: AM-PAC Inpatient Daily Activity Raw Score: 18    Current research shows that an AM-PAC score of 18 or greater is associated with a discharge to the patient's home setting. Based on an AM-PAC score and their current ADL deficits; it is recommended that the patient have 2-3 sessions per week of Occupational Therapy at d/c to increase the patient's independence.        This AMPAC score should be considered in conjunction with interdisciplinary team recommendations to determine the most appropriate discharge setting. Patient's social support, diagnosis, medical stability, and prior level of function should also be taken into consideration.     SUBJECTIVE:   Patient stated, \"My leg isn't better.\"    OBJECTIVE DATA SUMMARY:   Cognitive/Behavioral Status:  Orientation  Overall Orientation Status: Within Normal Limits  Orientation Level: Oriented X4  Cognition  Overall Cognitive Status: WNL    Functional Mobility and Transfers for ADLs:   Bed Mobility:  Bed Mobility Training  Bed Mobility Training: No  Sit to Supine: Supervision   Transfers:  Transfer Training  Transfer Training: Yes  Sit to Stand: Stand-by assistance  Stand to Sit: Stand-by assistance  Stand Pivot

## 2024-01-11 NOTE — PROGRESS NOTES
Patient family [facility] [phone] is requesting a phone call from the pulmonologist for an updated regarding plan of care..

## 2024-01-11 NOTE — PLAN OF CARE
Problem: Physical Therapy - Adult  Goal: By Discharge: Performs mobility at highest level of function for planned discharge setting.  See evaluation for individualized goals.  Description: Physical Therapy Goals:  Initiated 1/8/2024 to be met within 7-10 days.    1.  Patient will move from supine to sit and sit to supine  in bed with modified independence.    2.  Patient will transfer from bed to chair and chair to bed with supervision/set-up using the least restrictive device.  3.  Patient will perform sit to stand with supervision/set-up.  4.  Patient will ambulate with supervision/set-up for 50 feet with the least restrictive device.   5.  Patient will ascend/descend 3 stairs with 1 handrail(s) with minimal assistance/contact guard assist.    PLOF: pt lives alone, several steps in home, reports ambulating without AD      Outcome: Progressing    PHYSICAL THERAPY TREATMENT    Patient: Rosa Alcala (88 y.o. female)  Date: 1/11/2024  Diagnosis: Lactic acidosis [E87.20]  Cellulitis [L03.90]  Peripheral edema [R60.9]  Cellulitis and abscess of leg [L03.119, L02.419]  Pulmonary hypertension (HCC) [I27.20]  Acute on chronic respiratory failure with hypoxia (HCC) [J96.21] Cellulitis of left lower extremity      Precautions: Fall Risk, General Precautions,  ,  ,  ,  ,  ,  ,      ASSESSMENT: Pt resting in recliner upon entering room for tx session, agreeable to PT treat.  Pt on 10 L O2 NC, no SOB noted throughout tx session.  Sit to stand transfer with RW and SBA, ambulated around bed in room x 4 for a total of ~ 50' with SBA, no LOB noted.  Pt ambulates with decreased antoinette, no increase in work of breathing noted.  Pt returned to recliner for a seated rest break then performed 2x10 of LAQ and seated marching with increased difficulty on L LE compared to R due to pain in distal LE.  Pt remained sitting in recliner post tx session, tray table by her side, call light within reach and all needs met.       Progression

## 2024-01-11 NOTE — CARE COORDINATION
Patient plan of care discussed in interdisciplinary rounds. Patient still requiring high flow oxygen, not medically ready for discharge. PT/OT recommend home health at discharge. Dispo plan remains home with home health.    Lia Lee RN [facility]  Case Management

## 2024-01-11 NOTE — PROGRESS NOTES
Infectious Disease progress Note        Reason: Sepsis, severe left leg cellulitis    Current abx Prior abx   vancomycin since 1/6/24 Cefepime 1/6-1/8  Clindamycin 1/6-1/9   1  Lines:       Assessment :    88-year-old female with a past medical history of hypertension, CHF, COPD on 4 L of home O2 presented to [facility] ED on 1/5/2024 with severe left leg pain.     Hospitalization 12/2020 for acute on chronic hypoxic respiratory failure secondary to confirmed COVID-19 pneumonia   Status post remdesivir since 12/11/2020-12/15  Status post convalescent plasma 12/11/2020     Hospitalization at [facility] May 2022 for partially treated sepsis- POA due to e.coli BSI (positive blood cx 5/5, negative blood cx 5/7) right kidney pyelonephritis, cystitis     Clinical presentation consistent with sepsis-present on admission due to severe left leg cellulitis    Exact microbial etiology of left leg infection not entirely clear.  However the rapid onset and clinical presentation is likely suggestive of gram-positive bacteria such as Streptococcus/Staphylococcus  I agree with adding clindamycin to cover for toxin producing gram-positive's looking at the severity of illness.  Currently no definitive clinical/radiographic evidence to suggest necrotizing infection     chronic hypoxic respiratory failure-on 4 L oxygen at home.  Pulmonary follow-up appreciated    Gradual clinical improvement.  No definitive clinical/radiographic evidence of worsening respiratory status.  Gradually improving left lower extremity erythema.  Persistent leukocytosis-? partially treated infection  Gradually improving left leg erythema, improving procalcitonin noted  No significant improvement in leukocytosis after discontinuation of inhaled steroids     Recommendations:     1.  Discontinue vancomycin.  Start p.o. linezolid, levofloxacin IV  2.  Monitor left leg erythema, CBC   3. follow-up pulmonary, cardiology recommendations  4.  Will hold inhaled budesonide,  management decisions.     This test has been authorized by the FDA under an Emergency Use Authorization (EUA) for use by authorized laboratories.   Fact sheet for Healthcare Providers:  https://www.fda.gov/media/902499/download  Fact sheet for Patients: https://www.fda.gov/media/579218/download     Methodology: Isothermal Nucleic Acid Amplification         Rapid influenza A/B antigens [[id number]] Collected: 01/05/24 1225    Order Status: Completed Specimen: Nasal Washing Updated: 01/05/24 1303     Influenza A Ag Negative        Comment: A negative result does not exclude influenza virus infection, seasonal or H1N1 due to suboptimal sensitivity. If influenza is circulating in your community, a diagnosis of influenza should be considered based on a patients clinical presentation and empiric antiviral treatment should be considered, if indicated.        Influenza B Ag Negative       Blood Culture 2 [[id number]] Collected: 01/05/24 1221    Order Status: Completed Specimen: Blood Updated: 01/11/24 0643     Special Requests NO SPECIAL REQUESTS        Culture NO GROWTH 6 DAYS       Blood Culture 1 [[id number]] Collected: 01/05/24 1202    Order Status: Completed Specimen: Blood Updated: 01/11/24 0643     Special Requests NO SPECIAL REQUESTS        Culture NO GROWTH 6 DAYS                   RADIOLOGY:    All available imaging studies/reports in [facility] care for this admission were reviewed    High complexity decision making was performed during the evaluation of this patient at high risk for decompensation with multiple organ involvement     Above mentioned total time spent on reviewing the case/medical record/data/notes/EMR/patient examination/documentation/coordinating care with nurse/consultants, exclusive of procedures with complex decision making performed and > 50% time spent in face to face evaluation.       Disclaimer: Sections of this note are dictated utilizing voice recognition software, which may have resulted

## 2024-01-11 NOTE — PROGRESS NOTES
[facility]  Pulmonary, Critical Care, and Sleep Medicine    Name: Rosa Alcala MRN: [id number]   : 1935 Hospital: [facility]   Date: 2024        Pulmonary Medicine: Daily Progress Note    Admission Date:   2024  LOS: 6  MAR reviewed and pertinent medications noted or modified as needed    IMPRESSION:   Severe pulmonary hypertension likely combination group 3 and 2.  Patient was unable to do PFTs and therefore cannot quantify extent of functional impairment from pulmonary standpoint however CT imaging have reported significant emphysema which likely is etiology for group 3 pulmonary hypertension.  In addition patient has history of chronic diastolic heart failure.  Over the past 5 years dating back to  patient has had progressive increase in measured PA pressures on echocardiogram.  There was plan for right heart cath in 2018 but patient initially postponed it and then declined.  Since then being monitored and treated on supplemental oxygen but no vasodilators.  Chronic hypoxic respiratory failure secondary to COPD further worsened by COVID-19 in 2020 and RSV in 2021.  Patient is on long-term supplemental oxygen at 4 L at home in addition to bronchodilators.  In hospital oxygen needs have increased to 10 L salter  COPD-presumed severe on triple therapy withBreztri and nebulized as needed bronchodilators.-[place] [facility] [place]  Acute on chronic cor pulmonale secondary to above  Acute cellulitis-presenting hospital problem  Duplex ultrasound of left lower extremity negative for DVT  History of exposure to coal dust - father was   History of tobacco use-quit        Patient Active Problem List   Diagnosis    CAP (community acquired pneumonia)    Acute on chronic respiratory failure with hypoxia (HCC)    Suspected COVID-19 virus infection    CABALLERO (dyspnea on exertion)    Hyperlipidemia    Acute on chronic  0909    heparin (porcine) injection 5,000 Units  5,000 Units SubCUTAneous BID Beau Allen MD   5,000 Units at 01/11/24 1054    ALPRAZolam (XANAX) tablet 0.5 mg  0.5 mg Oral Nightly PRN Beau Allen MD   0.5 mg at 01/10/24 7745                 Imaging:  I have personally reviewed the patient’s radiographs and have reviewed the reports:    XR CHEST PORTABLE  Narrative: HISTORY: Hypoxia.    Exam: Chest.    Technique: Single view chest.     Compared: 1/5/2024    FINDINGS: Hyperinflation of the lungs, unchanged. Subtle bilateral diffuse  interstitial prominence with associated minimal perihilar and bibasilar  opacities. Increased right hilar markings. No pneumothorax pneumonia or pleural  effusions. Unchanged mild cardiomegaly. Trace bilateral pleural effusions. Bony  thorax and soft tissues are unremarkable.    Tubes and catheters/devices: None.  Impression:  IMPRESSION:    1.  Prominent of the bilateral interstitial markings may represent chronic  interstitial changes versus early edema.  2.  More conspicuous reticular opacities at the right dominik could represent  superimposed infection or central vascular congestion.  3.  Trace bilateral pleural effusions.                 Care Time:  The services I provided to this patient were to treat and/or prevent clinically significant deterioration that could result in the failure of one or more body systems and/or organ systems due to respiratory distress, hypoxia, cardiac dysrhythmia.     Services included the following:  -reviewing nursing notes and old charts  -vital sign assessments   -direct patient care  -medication orders and management  -interpreting and reviewing diagnostic studies/labs  -re-evaluations  -documentation time        Aggregate care time was 15  minutes, which includes only time during which I was engaged in work directly related to the patient's care as described above.    During this entire length of time I was immediately available to

## 2024-01-11 NOTE — CARE COORDINATION
01/11/24 1134   IMM Letter   IMM Letter given to Patient/Family/Significant other/Guardian/POA/by: patient   IMM Letter date given: 01/11/24   IMM Letter time given: 1134       Medicare patient had recieved, reviewed and signed 2nd IMM letter informing them of their right to appeal the discharge. Signed copy has been placed in patient bedside chart.    Roxy CHAVEZN, RN  Case Management

## 2024-01-12 LAB
ALBUMIN SERPL-MCNC: 2.4 G/DL (ref 3.4–5)
ALBUMIN/GLOB SERPL: 0.8 (ref 0.8–1.7)
ALP SERPL-CCNC: 51 U/L (ref 45–117)
ALT SERPL-CCNC: 34 U/L (ref 13–56)
ANION GAP SERPL CALC-SCNC: 6 MMOL/L (ref 3–18)
AST SERPL-CCNC: 15 U/L (ref 10–38)
BASOPHILS # BLD: 0 K/UL (ref 0–0.1)
BASOPHILS NFR BLD: 0 % (ref 0–2)
BILIRUB SERPL-MCNC: 0.8 MG/DL (ref 0.2–1)
BUN SERPL-MCNC: 17 MG/DL (ref 7–18)
BUN/CREAT SERPL: 20 (ref 12–20)
CALCIUM SERPL-MCNC: 8.7 MG/DL (ref 8.5–10.1)
CHLORIDE SERPL-SCNC: 105 MMOL/L (ref 100–111)
CO2 SERPL-SCNC: 29 MMOL/L (ref 21–32)
CREAT SERPL-MCNC: 0.85 MG/DL (ref 0.6–1.3)
DIFFERENTIAL METHOD BLD: ABNORMAL
EOSINOPHIL # BLD: 0.2 K/UL (ref 0–0.4)
EOSINOPHIL NFR BLD: 1 % (ref 0–5)
ERYTHROCYTE [DISTWIDTH] IN BLOOD BY AUTOMATED COUNT: 14.7 % (ref 11.6–14.5)
GLOBULIN SER CALC-MCNC: 3 G/DL (ref 2–4)
GLUCOSE SERPL-MCNC: 130 MG/DL (ref 74–99)
HCT VFR BLD AUTO: 41.4 % (ref 35–45)
HGB BLD-MCNC: 12.4 G/DL (ref 12–16)
IMM GRANULOCYTES # BLD AUTO: 0.2 K/UL (ref 0–0.04)
IMM GRANULOCYTES NFR BLD AUTO: 1 % (ref 0–0.5)
LYMPHOCYTES # BLD: 1.7 K/UL (ref 0.9–3.6)
LYMPHOCYTES NFR BLD: 11 % (ref 21–52)
MCH RBC QN AUTO: 25.3 PG (ref 24–34)
MCHC RBC AUTO-ENTMCNC: 30 G/DL (ref 31–37)
MCV RBC AUTO: 84.5 FL (ref 78–100)
MONOCYTES # BLD: 2 K/UL (ref 0.05–1.2)
MONOCYTES NFR BLD: 12 % (ref 3–10)
NEUTS SEG # BLD: 12.1 K/UL (ref 1.8–8)
NEUTS SEG NFR BLD: 75 % (ref 40–73)
NRBC # BLD: 0 K/UL (ref 0–0.01)
NRBC BLD-RTO: 0 PER 100 WBC
PLATELET # BLD AUTO: 275 K/UL (ref 135–420)
PMV BLD AUTO: 10.2 FL (ref 9.2–11.8)
POTASSIUM SERPL-SCNC: 4.7 MMOL/L (ref 3.5–5.5)
PROCALCITONIN SERPL-MCNC: <0.05 NG/ML
PROT SERPL-MCNC: 5.4 G/DL (ref 6.4–8.2)
RBC # BLD AUTO: 4.9 M/UL (ref 4.2–5.3)
SODIUM SERPL-SCNC: 140 MMOL/L (ref 136–145)
WBC # BLD AUTO: 16.3 K/UL (ref 4.6–13.2)

## 2024-01-12 PROCEDURE — 6360000002 HC RX W HCPCS: Performed by: STUDENT IN AN ORGANIZED HEALTH CARE EDUCATION/TRAINING PROGRAM

## 2024-01-12 PROCEDURE — 6370000000 HC RX 637 (ALT 250 FOR IP): Performed by: INTERNAL MEDICINE

## 2024-01-12 PROCEDURE — 85025 COMPLETE CBC W/AUTO DIFF WBC: CPT

## 2024-01-12 PROCEDURE — 1100000003 HC PRIVATE W/ TELEMETRY

## 2024-01-12 PROCEDURE — 6360000002 HC RX W HCPCS: Performed by: INTERNAL MEDICINE

## 2024-01-12 PROCEDURE — 97535 SELF CARE MNGMENT TRAINING: CPT

## 2024-01-12 PROCEDURE — 94761 N-INVAS EAR/PLS OXIMETRY MLT: CPT

## 2024-01-12 PROCEDURE — 84145 PROCALCITONIN (PCT): CPT

## 2024-01-12 PROCEDURE — 80053 COMPREHEN METABOLIC PANEL: CPT

## 2024-01-12 PROCEDURE — 99232 SBSQ HOSP IP/OBS MODERATE 35: CPT | Performed by: INTERNAL MEDICINE

## 2024-01-12 PROCEDURE — 6370000000 HC RX 637 (ALT 250 FOR IP): Performed by: HOSPITALIST

## 2024-01-12 PROCEDURE — 99232 SBSQ HOSP IP/OBS MODERATE 35: CPT | Performed by: HOSPITALIST

## 2024-01-12 PROCEDURE — 97530 THERAPEUTIC ACTIVITIES: CPT

## 2024-01-12 PROCEDURE — 94640 AIRWAY INHALATION TREATMENT: CPT

## 2024-01-12 PROCEDURE — 2700000000 HC OXYGEN THERAPY PER DAY

## 2024-01-12 PROCEDURE — 36415 COLL VENOUS BLD VENIPUNCTURE: CPT

## 2024-01-12 RX ORDER — LINEZOLID 2 MG/ML
600 INJECTION, SOLUTION INTRAVENOUS EVERY 12 HOURS
Status: DISCONTINUED | OUTPATIENT
Start: 2024-01-12 | End: 2024-01-16 | Stop reason: HOSPADM

## 2024-01-12 RX ORDER — OXYCODONE HYDROCHLORIDE AND ACETAMINOPHEN 5; 325 MG/1; MG/1
1 TABLET ORAL EVERY 8 HOURS PRN
Status: DISCONTINUED | OUTPATIENT
Start: 2024-01-12 | End: 2024-01-13

## 2024-01-12 RX ORDER — ARFORMOTEROL TARTRATE 15 UG/2ML
15 SOLUTION RESPIRATORY (INHALATION)
Status: DISCONTINUED | OUTPATIENT
Start: 2024-01-12 | End: 2024-01-16 | Stop reason: HOSPADM

## 2024-01-12 RX ORDER — IPRATROPIUM BROMIDE AND ALBUTEROL SULFATE 2.5; .5 MG/3ML; MG/3ML
1 SOLUTION RESPIRATORY (INHALATION) EVERY 4 HOURS PRN
Status: DISCONTINUED | OUTPATIENT
Start: 2024-01-12 | End: 2024-01-13

## 2024-01-12 RX ADMIN — LINEZOLID 600 MG: 600 INJECTION, SOLUTION INTRAVENOUS at 22:36

## 2024-01-12 RX ADMIN — IPRATROPIUM BROMIDE AND ALBUTEROL SULFATE 1 DOSE: .5; 3 SOLUTION RESPIRATORY (INHALATION) at 08:08

## 2024-01-12 RX ADMIN — LINEZOLID 600 MG: 600 INJECTION, SOLUTION INTRAVENOUS at 12:45

## 2024-01-12 RX ADMIN — FOLIC ACID 1 MG: 1 TABLET ORAL at 10:51

## 2024-01-12 RX ADMIN — ATORVASTATIN CALCIUM 20 MG: 20 TABLET, FILM COATED ORAL at 10:51

## 2024-01-12 RX ADMIN — HEPARIN SODIUM 5000 UNITS: 5000 INJECTION INTRAVENOUS; SUBCUTANEOUS at 21:33

## 2024-01-12 RX ADMIN — ALPRAZOLAM 0.5 MG: 0.5 TABLET ORAL at 21:37

## 2024-01-12 RX ADMIN — OXYCODONE AND ACETAMINOPHEN 1 TABLET: 5; 325 TABLET ORAL at 18:44

## 2024-01-12 RX ADMIN — Medication 1 TABLET: at 10:51

## 2024-01-12 RX ADMIN — ACETAMINOPHEN 500 MG: 500 TABLET ORAL at 21:37

## 2024-01-12 RX ADMIN — POLYVINYL ALCOHOL, POVIDONE 1 DROP: 14; 6 SOLUTION/ DROPS OPHTHALMIC at 11:02

## 2024-01-12 RX ADMIN — LEVOFLOXACIN 500 MG: 500 INJECTION, SOLUTION INTRAVENOUS at 11:06

## 2024-01-12 RX ADMIN — HEPARIN SODIUM 5000 UNITS: 5000 INJECTION INTRAVENOUS; SUBCUTANEOUS at 10:51

## 2024-01-12 RX ADMIN — BUDESONIDE INHALATION 500 MCG: 0.5 SUSPENSION RESPIRATORY (INHALATION) at 19:55

## 2024-01-12 RX ADMIN — GABAPENTIN 200 MG: 100 CAPSULE ORAL at 21:32

## 2024-01-12 RX ADMIN — FUROSEMIDE 40 MG: 10 INJECTION, SOLUTION INTRAMUSCULAR; INTRAVENOUS at 10:51

## 2024-01-12 RX ADMIN — CYANOCOBALAMIN TAB 1000 MCG 1000 MCG: 1000 TAB at 10:51

## 2024-01-12 RX ADMIN — ARFORMOTEROL TARTRATE 15 MCG: 15 SOLUTION RESPIRATORY (INHALATION) at 19:55

## 2024-01-12 RX ADMIN — SALINE NASAL SPRAY 1 SPRAY: 1.5 SOLUTION NASAL at 11:02

## 2024-01-12 RX ADMIN — ACETAMINOPHEN 500 MG: 500 TABLET ORAL at 10:50

## 2024-01-12 ASSESSMENT — PAIN - FUNCTIONAL ASSESSMENT
PAIN_FUNCTIONAL_ASSESSMENT: ACTIVITIES ARE NOT PREVENTED
PAIN_FUNCTIONAL_ASSESSMENT: ACTIVITIES ARE NOT PREVENTED

## 2024-01-12 ASSESSMENT — PAIN DESCRIPTION - LOCATION
LOCATION: LEG

## 2024-01-12 ASSESSMENT — PAIN SCALES - WONG BAKER
WONGBAKER_NUMERICALRESPONSE: 0

## 2024-01-12 ASSESSMENT — PAIN DESCRIPTION - ORIENTATION
ORIENTATION: LEFT

## 2024-01-12 ASSESSMENT — PAIN SCALES - GENERAL
PAINLEVEL_OUTOF10: 10
PAINLEVEL_OUTOF10: 0
PAINLEVEL_OUTOF10: 6
PAINLEVEL_OUTOF10: 7
PAINLEVEL_OUTOF10: 0
PAINLEVEL_OUTOF10: 6
PAINLEVEL_OUTOF10: 0

## 2024-01-12 ASSESSMENT — PAIN DESCRIPTION - DESCRIPTORS
DESCRIPTORS: BURNING
DESCRIPTORS: BURNING
DESCRIPTORS: ACHING;BURNING

## 2024-01-12 NOTE — CARE COORDINATION
Per rounds patient to discharge Monday.   MSW met with patient to follow up on discharge plan still declining SNF. Agreeable to HH.    Has transport home

## 2024-01-12 NOTE — PROGRESS NOTES
[facility]  Pulmonary, Critical Care, and Sleep Medicine    Name: Rosa Alcala MRN: [id number]   : 1935 Hospital: [facility]   Date: 2024        Pulmonary Medicine: Daily Progress Note    Admission Date:   2024  LOS: 7  MAR reviewed and pertinent medications noted or modified as needed    IMPRESSION:   Severe pulmonary hypertension likely combination group 3 and 2.  Patient was unable to do PFTs and therefore cannot quantify extent of functional impairment from pulmonary standpoint however CT imaging have reported significant emphysema which likely is etiology for group 3 pulmonary hypertension.  In addition patient has history of chronic diastolic heart failure.  Over the past 5 years dating back to  patient has had progressive increase in measured PA pressures on echocardiogram.  There was plan for right heart cath in 2018 but patient initially postponed it and then declined.  Since then being monitored and treated on supplemental oxygen but no vasodilators.  Chronic hypoxic respiratory failure secondary to COPD further worsened by COVID-19 in 2020 and RSV in 2021.  Patient is on long-term supplemental oxygen at 4 L at home in addition to bronchodilators.  In hospital oxygen needs have increased to 10 L salter-now improving  COPD-presumed severe on triple therapy withBreztri and nebulized as needed bronchodilators.-[facility]  Acute on chronic cor pulmonale secondary to above  Acute cellulitis-presenting hospital problem  Duplex ultrasound of left lower extremity negative for DVT  History of exposure to coal dust - father was   History of tobacco use-quit        Patient Active Problem List   Diagnosis    CAP (community acquired pneumonia)    Acute on chronic respiratory failure with hypoxia (HCC)    Suspected COVID-19 virus infection    CABALLERO (dyspnea on exertion)    Hyperlipidemia    Acute

## 2024-01-12 NOTE — PROGRESS NOTES
Hospitalist Progress Note    NAME:  Rosa Alcala   :   1935   MRN:   [id number]     Date/Time:  2024  Subjective:   Pt states she has some increased pain in right leg. Breathing is about the same. States her 02 was decreased this am.     Objective:   Vitals:  BP (!) 147/62   Pulse 90   Temp 98 °F (36.7 °C) (Oral)   Resp 20   Ht 1.549 m (5' 1\")   Wt 47.6 kg (105 lb)   SpO2 90%   BMI 19.84 kg/m²   Temp (24hrs), Av °F (36.7 °C), Min:97.6 °F (36.4 °C), Max:98.4 °F (36.9 °C)      Last 24hr Input/Output:    Intake/Output Summary (Last 24 hours) at 20240  Last data filed at 2024 2130  Gross per 24 hour   Intake 1200 ml   Output 2500 ml   Net -1300 ml        PHYSICAL EXAM:  Gen:  []  WD [x]  WN  [x]  cachectic  []  thin  []  obese  []  disheveled             []   Critically ill or  []  Chronically ill appearing    HEENT:   []   red/pink conjunctivae [x]  pale conjunctivae                  PERRL  []  yes  []  no        hearing intact to voice []  yes  []  No               [x]  moist or  []  dry  Mucosa  NECK:   supple [x]  yes  []  no      masses []  yes  []  No               thyroid    []  non tender []  tender    RESP:   use of accessory muscles [x]  yes []  no                BS    []  clear  [x]  wheezing [x]  rhonchi []  crackles   CARD:  murmur  []  yes [x]  no   []  thrill  []  carotid bruits                 LE edema []  yes  [x]  no    [x]  JVD present    ABD:    tenderness []  yes [x]  no   Liver/spleen enlargement []   yes []   no                distended []   yes [x]  no    bowel sound []  normal []  hypo or  []  hyperactive    MSKL:   cyanosis/clubbing []  yes [x]  no         []   Spastic []  Flaccid []  Cog wheeling    SKIN:   Rashes []  yes []  no     Ulcers []  present ___left leg with less swelling and redness;               [x]  normal []  tight to palpitation        skin turgor []  good []  poor []  decreased    NEUR:   []  cranial nerves intact       []  L []  R  inch Topical Q6H PRN    hydrALAZINE (APRESOLINE) injection 5 mg  5 mg IntraVENous Q6H PRN    ipratropium 0.5 mg-albuterol 2.5 mg (DUONEB) nebulizer solution 1 Dose  1 Dose Inhalation TID RT    furosemide (LASIX) injection 40 mg  40 mg IntraVENous Daily    acetaminophen (TYLENOL) tablet 500 mg  500 mg Oral Q6H PRN    atorvastatin (LIPITOR) tablet 20 mg  20 mg Oral Daily    Polyvinyl Alcohol-Povidone PF (REFRESH) 1.4-0.6 % ophthalmic solution 1 drop  1 drop Ophthalmic BID    folic acid (FOLVITE) tablet 1 mg  1 mg Oral Daily    gabapentin (NEURONTIN) capsule 200 mg  200 mg Oral Nightly    therapeutic multivitamin-minerals 1 tablet  1 tablet Oral Daily    sodium chloride (OCEAN) 0.65 % nasal spray 1 spray  1 drop Nasal PRN    vitamin B-12 (CYANOCOBALAMIN) tablet 1,000 mcg  1,000 mcg Oral Daily    [Held by provider] budesonide (PULMICORT) nebulizer suspension 500 mcg  0.5 mg Nebulization BID RT    heparin (porcine) injection 5,000 Units  5,000 Units SubCUTAneous BID    ALPRAZolam (XANAX) tablet 0.5 mg  0.5 mg Oral Nightly PRN

## 2024-01-12 NOTE — PLAN OF CARE
Problem: Occupational Therapy - Adult  Goal: By Discharge: Performs self-care activities at highest level of function for planned discharge setting.  See evaluation for individualized goals.  Description: Occupational Therapy Goals:  Initiated 1/8/2024 to be met within 7-10 days.    1.  Patient will perform grooming with modified independence.   2.  Patient will perform bathing with modified independence using adaptive equipment..  3.  Patient will perform upper body dressing and lower body dressing  with modified independence using adaptive equipment.  4.  Patient will perform bedside commode transfers with modified independence using RW.  5.  Patient will perform all aspects of toileting with modified independence.  6.  Patient will participate in upper extremity therapeutic exercise/activities with modified independence for 8-10 minutes to increase strength/endurance for ADLs.    7.  Patient will utilize energy conservation techniques during functional activities with min verbal cues.    PLOF: Mod I with ADLs and functional mobility primarily furniture walking      Outcome: Progressing   OCCUPATIONAL THERAPY TREATMENT    Patient: Rosa lAcala (88 y.o. female)  Date: 1/12/2024  Diagnosis: Lactic acidosis [E87.20]  Cellulitis [L03.90]  Peripheral edema [R60.9]  Cellulitis and abscess of leg [L03.119, L02.419]  Pulmonary hypertension (HCC) [I27.20]  Acute on chronic respiratory failure with hypoxia (HCC) [J96.21] Cellulitis of left lower extremity      Precautions: Fall Risk, General Precautions,  ,  ,  ,  ,  ,  ,      Chart, occupational therapy assessment, plan of care, and goals were reviewed.  ASSESSMENT:  Pt presented supine in bed upon entry, on 8L O2NC, and requesting to use BSC. She transitioned to EOB Supervision in prep for toileting ADL. STS transfer SBA with RW and maneuvered to BSC ~ 5 ft SBA. Pt Supervision while seated on BSC then given SBA for félix area hygiene for clothing mgmt. Pt requesting

## 2024-01-12 NOTE — PROGRESS NOTES
Hospitalist Progress Note    NAME:  Rosa Alcala   :   1935   MRN:   [id number]     Date/Time:  2024  Subjective:     Left leg feels worse today, but improved since admission. . She denies chest pain, cough, shortness of breath, constipation. Her sister will stay w/ her at home for a few days. Her son lives close by and she will ask him to stay over when she needs.  She requests pain medication for left lower extremity, something stronger than Tylenol.    This am, titrated from 8L to 5L, sat 90%.     She is on home oxygen.       Assessment/Plan:      1. Acute on Chronic Hypoxic Respiratory Failure 2°/2 Acute on Chronic Diastolic Congestive Heart Failure/ Pul HTN/ severe COPD;  - continue to titrate as tolerated. On home O2.   2. Echo 24 severe pulmonary hypertension. Mod - severe TR. Normal wall motion. EF 60 - 65%.  3. CTA chest with no PE;  4. Acute on chronic pulmonary hypertension, on iv lasix  5. sepsis poa (leukocytosis, tachypnea hypoxia), source most likely Cellulitis, slow improvement.  ID holding inhaled budesonide and lactobacillus as part of eval of leukocytosis.  On IV linezolid, Levaquin per ID.  6. Severe COPD with exacerbation improving, pulmonology follows.  7.  RV failure and severe Pul HTN.  8. Hypertension Continue home Nifedipine.  9.  DNR.  I discussed the case w/ Dr. CARL Candelaria.       ___________________________________________________    Total time spent with patient: 36    minutes    Care Plan discussed with:    [x]  Patient   []  Family    [x]  Care Manager  [x]  Nursing   [x]  Consultant/Specialist :      [x]    >50% of visit spent in counseling and coordination of care   (Discussed []  CODE status,  [x]  Care Plan, [x]  D/C Planning)    Prophylaxis:  []  Lovenox  []  Coumadin  [x]  Hep SQ  []  SCD’s  []  H2B/PPI    Disposition:  []  Home w/ Family   [x]  HH PT,OT,RN   []  SNF/LTC   []  SAH/Rehab  ___________________________________________________    Hospitalist: Mildred

## 2024-01-12 NOTE — PLAN OF CARE
Problem: Pain  Goal: Verbalizes/displays adequate comfort level or baseline comfort level  1/12/2024 0021 by Fabian Santos RN  Outcome: Progressing  1/11/2024 1819 by Yvette aCputo RN  Outcome: Progressing  Flowsheets  Taken 1/11/2024 1515  Verbalizes/displays adequate comfort level or baseline comfort level: Assess pain using appropriate pain scale  Taken 1/11/2024 1321  Verbalizes/displays adequate comfort level or baseline comfort level:   Encourage patient to monitor pain and request assistance   Assess pain using appropriate pain scale  Taken 1/11/2024 1300  Verbalizes/displays adequate comfort level or baseline comfort level: Encourage patient to monitor pain and request assistance  1/11/2024 1049 by Yvette Caputo RN  Outcome: Progressing  Flowsheets (Taken 1/11/2024 1030)  Verbalizes/displays adequate comfort level or baseline comfort level: Encourage patient to monitor pain and request assistance     Problem: Chronic Conditions and Co-morbidities  Goal: Patient's chronic conditions and co-morbidity symptoms are monitored and maintained or improved  1/12/2024 0021 by Fabian Santos RN  Outcome: Progressing  1/11/2024 1819 by Yvette Caputo RN  Outcome: Progressing  Flowsheets (Taken 1/11/2024 1321)  Care Plan - Patient's Chronic Conditions and Co-Morbidity Symptoms are Monitored and Maintained or Improved: Monitor and assess patient's chronic conditions and comorbid symptoms for stability, deterioration, or improvement  1/11/2024 1049 by Yvette Caputo RN  Outcome: Progressing  Flowsheets (Taken 1/11/2024 1045)  Care Plan - Patient's Chronic Conditions and Co-Morbidity Symptoms are Monitored and Maintained or Improved: Monitor and assess patient's chronic conditions and comorbid symptoms for stability, deterioration, or improvement     Problem: Safety - Adult  Goal: Free from fall injury  1/12/2024 0021 by Fabian Santos RN  Outcome: Progressing  1/11/2024 1819 by Yvette Caputo RN  Outcome:

## 2024-01-12 NOTE — PROGRESS NOTES
Infectious Disease progress Note        Reason: Sepsis, severe left leg cellulitis    Current abx Prior abx   vancomycin since 1/6/24-1/11  Levofloxacin, linezolid since 1/11/2024 Cefepime 1/6-1/8  Clindamycin 1/6-1/9   1  Lines:       Assessment :    88-year-old female with a past medical history of hypertension, CHF, COPD on 4 L of home O2 presented to [facility] ED on 1/5/2024 with severe left leg pain.     Hospitalization 12/2020 for acute on chronic hypoxic respiratory failure secondary to confirmed COVID-19 pneumonia   Status post remdesivir since 12/11/2020-12/15  Status post convalescent plasma 12/11/2020     Hospitalization at [facility] May 2022 for partially treated sepsis- POA due to e.coli BSI (positive blood cx 5/5, negative blood cx 5/7) right kidney pyelonephritis, cystitis     Clinical presentation consistent with sepsis-present on admission due to severe left leg cellulitis    Exact microbial etiology of left leg infection not entirely clear.  However the rapid onset and clinical presentation is likely suggestive of gram-positive bacteria such as Streptococcus/Staphylococcus  I agree with adding clindamycin to cover for toxin producing gram-positive's looking at the severity of illness.  Currently no definitive clinical/radiographic evidence to suggest necrotizing infection     chronic hypoxic respiratory failure-on 4 L oxygen at home.  Pulmonary follow-up appreciated    Gradual clinical improvement.  No definitive clinical/radiographic evidence of worsening respiratory status.  Gradually improving left lower extremity erythema.  Persistent leukocytosis-? partially treated infection  Gradually improving left leg erythema, improving procalcitonin noted  Improved left leg pain on today's exam     Recommendations:     1.  Recommend IV linezolid, levofloxacin IV  2.  Monitor left leg erythema, CBC   3. follow-up pulmonary, cardiology recommendations  4.  Will hold inhaled budesonide, lactobacillus to check for

## 2024-01-13 LAB
ANION GAP SERPL CALC-SCNC: 5 MMOL/L (ref 3–18)
BASOPHILS # BLD: 0 K/UL (ref 0–0.1)
BASOPHILS NFR BLD: 0 % (ref 0–2)
BUN SERPL-MCNC: 18 MG/DL (ref 7–18)
BUN/CREAT SERPL: 23 (ref 12–20)
CALCIUM SERPL-MCNC: 8.7 MG/DL (ref 8.5–10.1)
CHLORIDE SERPL-SCNC: 102 MMOL/L (ref 100–111)
CO2 SERPL-SCNC: 30 MMOL/L (ref 21–32)
CREAT SERPL-MCNC: 0.79 MG/DL (ref 0.6–1.3)
DIFFERENTIAL METHOD BLD: ABNORMAL
EOSINOPHIL # BLD: 0.1 K/UL (ref 0–0.4)
EOSINOPHIL NFR BLD: 1 % (ref 0–5)
ERYTHROCYTE [DISTWIDTH] IN BLOOD BY AUTOMATED COUNT: 14.9 % (ref 11.6–14.5)
GLUCOSE SERPL-MCNC: 117 MG/DL (ref 74–99)
HCT VFR BLD AUTO: 40.1 % (ref 35–45)
HGB BLD-MCNC: 12.3 G/DL (ref 12–16)
IMM GRANULOCYTES # BLD AUTO: 0 K/UL
IMM GRANULOCYTES NFR BLD AUTO: 0 %
LYMPHOCYTES # BLD: 1.5 K/UL (ref 0.9–3.6)
LYMPHOCYTES NFR BLD: 10 % (ref 21–52)
MAGNESIUM SERPL-MCNC: 2.3 MG/DL (ref 1.6–2.6)
MCH RBC QN AUTO: 25.7 PG (ref 24–34)
MCHC RBC AUTO-ENTMCNC: 30.7 G/DL (ref 31–37)
MCV RBC AUTO: 83.9 FL (ref 78–100)
MONOCYTES # BLD: 0.4 K/UL (ref 0.05–1.2)
MONOCYTES NFR BLD: 3 % (ref 3–10)
NEUTS SEG # BLD: 12.8 K/UL (ref 1.8–8)
NEUTS SEG NFR BLD: 86 % (ref 40–73)
NRBC # BLD: 0 K/UL (ref 0–0.01)
NRBC BLD-RTO: 0 PER 100 WBC
PHOSPHATE SERPL-MCNC: 4.1 MG/DL (ref 2.5–4.9)
PLATELET # BLD AUTO: 256 K/UL (ref 135–420)
PLATELET COMMENT: ABNORMAL
PMV BLD AUTO: 10.8 FL (ref 9.2–11.8)
POTASSIUM SERPL-SCNC: 4 MMOL/L (ref 3.5–5.5)
RBC # BLD AUTO: 4.78 M/UL (ref 4.2–5.3)
RBC MORPH BLD: ABNORMAL
SODIUM SERPL-SCNC: 137 MMOL/L (ref 136–145)
WBC # BLD AUTO: 14.8 K/UL (ref 4.6–13.2)

## 2024-01-13 PROCEDURE — 85025 COMPLETE CBC W/AUTO DIFF WBC: CPT

## 2024-01-13 PROCEDURE — 6360000002 HC RX W HCPCS: Performed by: STUDENT IN AN ORGANIZED HEALTH CARE EDUCATION/TRAINING PROGRAM

## 2024-01-13 PROCEDURE — 99232 SBSQ HOSP IP/OBS MODERATE 35: CPT | Performed by: INTERNAL MEDICINE

## 2024-01-13 PROCEDURE — 80048 BASIC METABOLIC PNL TOTAL CA: CPT

## 2024-01-13 PROCEDURE — 6370000000 HC RX 637 (ALT 250 FOR IP): Performed by: INTERNAL MEDICINE

## 2024-01-13 PROCEDURE — 36415 COLL VENOUS BLD VENIPUNCTURE: CPT

## 2024-01-13 PROCEDURE — 99232 SBSQ HOSP IP/OBS MODERATE 35: CPT | Performed by: HOSPITALIST

## 2024-01-13 PROCEDURE — 94640 AIRWAY INHALATION TREATMENT: CPT

## 2024-01-13 PROCEDURE — 6360000002 HC RX W HCPCS: Performed by: INTERNAL MEDICINE

## 2024-01-13 PROCEDURE — 84100 ASSAY OF PHOSPHORUS: CPT

## 2024-01-13 PROCEDURE — 97530 THERAPEUTIC ACTIVITIES: CPT

## 2024-01-13 PROCEDURE — 83735 ASSAY OF MAGNESIUM: CPT

## 2024-01-13 PROCEDURE — 2700000000 HC OXYGEN THERAPY PER DAY

## 2024-01-13 PROCEDURE — 6370000000 HC RX 637 (ALT 250 FOR IP): Performed by: HOSPITALIST

## 2024-01-13 PROCEDURE — 94761 N-INVAS EAR/PLS OXIMETRY MLT: CPT

## 2024-01-13 PROCEDURE — 1100000003 HC PRIVATE W/ TELEMETRY

## 2024-01-13 PROCEDURE — 6360000002 HC RX W HCPCS: Performed by: HOSPITALIST

## 2024-01-13 RX ORDER — ONDANSETRON 2 MG/ML
4 INJECTION INTRAMUSCULAR; INTRAVENOUS EVERY 8 HOURS PRN
Status: DISCONTINUED | OUTPATIENT
Start: 2024-01-13 | End: 2024-01-16

## 2024-01-13 RX ORDER — IPRATROPIUM BROMIDE AND ALBUTEROL SULFATE 2.5; .5 MG/3ML; MG/3ML
1 SOLUTION RESPIRATORY (INHALATION)
Status: DISCONTINUED | OUTPATIENT
Start: 2024-01-13 | End: 2024-01-16 | Stop reason: HOSPADM

## 2024-01-13 RX ORDER — TRAMADOL HYDROCHLORIDE 50 MG/1
25 TABLET ORAL EVERY 8 HOURS PRN
Status: DISCONTINUED | OUTPATIENT
Start: 2024-01-13 | End: 2024-01-16 | Stop reason: HOSPADM

## 2024-01-13 RX ADMIN — FUROSEMIDE 40 MG: 10 INJECTION, SOLUTION INTRAMUSCULAR; INTRAVENOUS at 07:56

## 2024-01-13 RX ADMIN — FOLIC ACID 1 MG: 1 TABLET ORAL at 07:55

## 2024-01-13 RX ADMIN — GABAPENTIN 200 MG: 100 CAPSULE ORAL at 20:35

## 2024-01-13 RX ADMIN — ACETAMINOPHEN 500 MG: 500 TABLET ORAL at 20:48

## 2024-01-13 RX ADMIN — LINEZOLID 600 MG: 600 INJECTION, SOLUTION INTRAVENOUS at 23:28

## 2024-01-13 RX ADMIN — POLYVINYL ALCOHOL, POVIDONE 1 DROP: 14; 6 SOLUTION/ DROPS OPHTHALMIC at 08:00

## 2024-01-13 RX ADMIN — CYANOCOBALAMIN TAB 1000 MCG 1000 MCG: 1000 TAB at 07:55

## 2024-01-13 RX ADMIN — IPRATROPIUM BROMIDE AND ALBUTEROL SULFATE 1 DOSE: .5; 3 SOLUTION RESPIRATORY (INHALATION) at 19:39

## 2024-01-13 RX ADMIN — LINEZOLID 600 MG: 600 INJECTION, SOLUTION INTRAVENOUS at 11:10

## 2024-01-13 RX ADMIN — LEVOFLOXACIN 500 MG: 500 INJECTION, SOLUTION INTRAVENOUS at 11:07

## 2024-01-13 RX ADMIN — BUDESONIDE INHALATION 500 MCG: 0.5 SUSPENSION RESPIRATORY (INHALATION) at 19:39

## 2024-01-13 RX ADMIN — BUDESONIDE INHALATION 500 MCG: 0.5 SUSPENSION RESPIRATORY (INHALATION) at 08:45

## 2024-01-13 RX ADMIN — ARFORMOTEROL TARTRATE 15 MCG: 15 SOLUTION RESPIRATORY (INHALATION) at 19:39

## 2024-01-13 RX ADMIN — ATORVASTATIN CALCIUM 20 MG: 20 TABLET, FILM COATED ORAL at 07:55

## 2024-01-13 RX ADMIN — HEPARIN SODIUM 5000 UNITS: 5000 INJECTION INTRAVENOUS; SUBCUTANEOUS at 07:56

## 2024-01-13 RX ADMIN — ONDANSETRON 4 MG: 2 INJECTION INTRAMUSCULAR; INTRAVENOUS at 13:44

## 2024-01-13 RX ADMIN — ALPRAZOLAM 0.5 MG: 0.5 TABLET ORAL at 20:47

## 2024-01-13 RX ADMIN — OXYCODONE AND ACETAMINOPHEN 1 TABLET: 5; 325 TABLET ORAL at 10:03

## 2024-01-13 RX ADMIN — Medication 1 TABLET: at 07:55

## 2024-01-13 RX ADMIN — ARFORMOTEROL TARTRATE 15 MCG: 15 SOLUTION RESPIRATORY (INHALATION) at 08:46

## 2024-01-13 RX ADMIN — HEPARIN SODIUM 5000 UNITS: 5000 INJECTION INTRAVENOUS; SUBCUTANEOUS at 20:38

## 2024-01-13 ASSESSMENT — PAIN DESCRIPTION - ONSET
ONSET: ON-GOING

## 2024-01-13 ASSESSMENT — PAIN DESCRIPTION - PAIN TYPE
TYPE: CHRONIC PAIN
TYPE: ACUTE PAIN

## 2024-01-13 ASSESSMENT — PAIN DESCRIPTION - DESCRIPTORS
DESCRIPTORS: ACHING
DESCRIPTORS: ACHING
DESCRIPTORS: BURNING

## 2024-01-13 ASSESSMENT — PAIN DESCRIPTION - LOCATION
LOCATION: LEG

## 2024-01-13 ASSESSMENT — PAIN SCALES - WONG BAKER
WONGBAKER_NUMERICALRESPONSE: 0

## 2024-01-13 ASSESSMENT — PAIN DESCRIPTION - FREQUENCY
FREQUENCY: CONTINUOUS

## 2024-01-13 ASSESSMENT — PAIN SCALES - GENERAL
PAINLEVEL_OUTOF10: 0
PAINLEVEL_OUTOF10: 8
PAINLEVEL_OUTOF10: 2
PAINLEVEL_OUTOF10: 6
PAINLEVEL_OUTOF10: 0

## 2024-01-13 ASSESSMENT — PAIN DESCRIPTION - ORIENTATION
ORIENTATION: LEFT
ORIENTATION: RIGHT;LEFT
ORIENTATION: LEFT

## 2024-01-13 NOTE — PLAN OF CARE
Problem: Physical Therapy - Adult  Goal: By Discharge: Performs mobility at highest level of function for planned discharge setting.  See evaluation for individualized goals.  Description: Physical Therapy Goals:  Initiated 1/8/2024 to be met within 7-10 days.    1.  Patient will move from supine to sit and sit to supine  in bed with modified independence.    2.  Patient will transfer from bed to chair and chair to bed with supervision/set-up using the least restrictive device.  3.  Patient will perform sit to stand with supervision/set-up.  4.  Patient will ambulate with supervision/set-up for 50 feet with the least restrictive device.   5.  Patient will ascend/descend 3 stairs with 1 handrail(s) with minimal assistance/contact guard assist.    PLOF: pt lives alone, several steps in home, reports ambulating without AD      Outcome: Progressing   PHYSICAL THERAPY TREATMENT    Patient: Rosa Alcala (88 y.o. female)  Date: 1/13/2024  Diagnosis: Lactic acidosis [E87.20]  Cellulitis [L03.90]  Peripheral edema [R60.9]  Cellulitis and abscess of leg [L03.119, L02.419]  Pulmonary hypertension (HCC) [I27.20]  Acute on chronic respiratory failure with hypoxia (HCC) [J96.21] Cellulitis of left lower extremity      Precautions: Fall Risk, General Precautions,      ASSESSMENT:  Pt resting in bed upon entering room, slightly reluctant initially to participate in PT however, wanted help with repositioning to agreed.  Supine to sit with supervision, once sitting pt noted she felt slightly off but was ok to stand with RW while bed was adjusted.  Pt was SBA for sit to stand with RW and stood for ~ 3 min before noting she needed so sit back down due to feeling nauseous again, pt then began to dry heave.  RN was notified and came in to provide pt with anti nausea medication, pt stayed sitting EOB with supervision during duration of this.  Once given meds pt returned to supine position with supervision and repositioned herself

## 2024-01-13 NOTE — PLAN OF CARE
Problem: Safety - Adult  Goal: Free from fall injury  Outcome: Progressing     Problem: Pain  Goal: Verbalizes/displays adequate comfort level or baseline comfort level  Recent Flowsheet Documentation  Taken 1/12/2024 1215 by Tuan Garcia RN  Verbalizes/displays adequate comfort level or baseline comfort level:   Encourage patient to monitor pain and request assistance   Assess pain using appropriate pain scale   Administer analgesics based on type and severity of pain and evaluate response     Problem: Chronic Conditions and Co-morbidities  Goal: Patient's chronic conditions and co-morbidity symptoms are monitored and maintained or improved  Outcome: Progressing

## 2024-01-13 NOTE — CARE COORDINATION
Spoke with pt regarding discharge disposition. She states she has changed her mind and would like to go to a SNF at discharge for rehab. Clinicals sent via [facility] to 23 SNF for review for possible acceptance.    Lia Lee RN CDCES  Case Management

## 2024-01-13 NOTE — PROGRESS NOTES
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  Patient was unable to do PFTs and therefore cannot quantify extent of functional impairment from pulmonary standpoint however CT imaging have reported significant emphysema which likely is etiology for group 3 pulmonary hypertension.  In addition patient has history of chronic diastolic heart failure.  Over the past 5 years dating back to  patient has had progressive increase in measured PA pressures on echocardiogram.  There was plan for right heart cath in 2018 but patient initially postponed it and then declined.  Since then being monitored and treated on supplemental oxygen but no vasodilators.  Chronic hypoxic respiratory failure secondary to COPD further worsened by COVID-19 in 2020 and RSV in 2021.  Patient is on long-term supplemental oxygen at 4 L at home in addition to bronchodilators.  In hospital oxygen needs have increased to 10 L salter-now improving  COPD-presumed severe on triple therapy withBreztri and nebulized as needed bronchodilators.-Follows with [facility] pulmonary.Dr. White  Acute on chronic cor pulmonale secondary to above  Acute cellulitis-presenting hospital problem  Duplex ultrasound of left lower extremity negative for DVT  History of exposure to coal dust - father was   History of tobacco use-quit      Patient Active Problem List   Diagnosis    CAP (community acquired pneumonia)    Acute on chronic respiratory failure with hypoxia (HCC)    Suspected COVID-19 virus infection    CABALLERO (dyspnea on exertion)    Hyperlipidemia    Acute on chronic diastolic CHF (congestive heart failure) (HCC)    Hypertension    Carotid stenosis    COVID-19    Debility    Encounter for palliative care

## 2024-01-13 NOTE — PROGRESS NOTES
Hospitalist Progress Note    NAME:  Rosa Alcala   :   1935   MRN:   [id number]     Date/Time:  2024  Subjective:     Nauseated this am.     S/e at bedside, she states she feels better overall. Leg better, still red but better. Has a couple of ulcers on toes, does not have podiatrist in community.   Feels pain med contributed to nausea.     Assessment/Plan:      1. Acute on Chronic Hypoxic Respiratory Failure 2°/2 Acute on Chronic Diastolic Congestive Heart Failure/ Pul HTN/ severe COPD;  - continue to titrate as tolerated. On home O2.   2. Echo 24 severe pulmonary hypertension. Mod - severe TR. Normal wall motion. EF 60 - 65%.  3. CTA chest with no PE;  4. Acute on chronic pulmonary hypertension, on iv lasix  5. sepsis poa (leukocytosis, tachypnea hypoxia), source most likely Cellulitis, slow improvement.  ID holding inhaled budesonide and lactobacillus as part of eval of leukocytosis.  On IV linezolid, Levaquin per ID.  6. Severe COPD with exacerbation improving, pulmonology follows.  7.  RV failure and severe Pul HTN.  8. Hypertension Continue home Nifedipine.  9.  Toe ulcers, do not appear infected. Podiatry consult.  10. DNR.  Family at bedside updated with conversation.    ___________________________________________________    Total time spent with patient: 36    minutes    Care Plan discussed with:    [x]  Patient   [x]  Family    []  Care Manager  [x]  Nursing     []  Consultant/Specialist :      []    >50% of visit spent in counseling and coordination of care   (Discussed []  CODE status,  [x]  Care Plan, [x]  D/C Planning)    Prophylaxis:  []  Lovenox  []  Coumadin  [x]  Hep SQ  []  SCD’s  []  H2B/PPI    Disposition:  []  Home w/ Family   [x]   PT,OT,RN   []  SNF/LTC   []  SAH/Rehab  ___________________________________________________    Hospitalist: Mildred Gonzalez MD         Objective:   Vitals:  /64   Pulse 85   Temp 97.7 °F (36.5 °C) (Oral)   Resp 19   Ht 1.549 m (5'

## 2024-01-14 PROCEDURE — 6360000002 HC RX W HCPCS: Performed by: INTERNAL MEDICINE

## 2024-01-14 PROCEDURE — 6360000002 HC RX W HCPCS: Performed by: STUDENT IN AN ORGANIZED HEALTH CARE EDUCATION/TRAINING PROGRAM

## 2024-01-14 PROCEDURE — 6370000000 HC RX 637 (ALT 250 FOR IP): Performed by: INTERNAL MEDICINE

## 2024-01-14 PROCEDURE — 2700000000 HC OXYGEN THERAPY PER DAY

## 2024-01-14 PROCEDURE — 99232 SBSQ HOSP IP/OBS MODERATE 35: CPT | Performed by: INTERNAL MEDICINE

## 2024-01-14 PROCEDURE — 6370000000 HC RX 637 (ALT 250 FOR IP): Performed by: HOSPITALIST

## 2024-01-14 PROCEDURE — 1100000003 HC PRIVATE W/ TELEMETRY

## 2024-01-14 PROCEDURE — 94761 N-INVAS EAR/PLS OXIMETRY MLT: CPT

## 2024-01-14 PROCEDURE — 99232 SBSQ HOSP IP/OBS MODERATE 35: CPT | Performed by: HOSPITALIST

## 2024-01-14 PROCEDURE — 6360000002 HC RX W HCPCS: Performed by: HOSPITALIST

## 2024-01-14 PROCEDURE — 94640 AIRWAY INHALATION TREATMENT: CPT

## 2024-01-14 RX ORDER — LACTOBACILLUS RHAMNOSUS GG 10B CELL
1 CAPSULE ORAL 2 TIMES DAILY WITH MEALS
Status: DISCONTINUED | OUTPATIENT
Start: 2024-01-14 | End: 2024-01-16 | Stop reason: HOSPADM

## 2024-01-14 RX ADMIN — IPRATROPIUM BROMIDE AND ALBUTEROL SULFATE 1 DOSE: .5; 3 SOLUTION RESPIRATORY (INHALATION) at 11:11

## 2024-01-14 RX ADMIN — ALPRAZOLAM 0.5 MG: 0.5 TABLET ORAL at 22:58

## 2024-01-14 RX ADMIN — TRAMADOL HYDROCHLORIDE 25 MG: 50 TABLET ORAL at 08:37

## 2024-01-14 RX ADMIN — Medication 1 CAPSULE: at 16:47

## 2024-01-14 RX ADMIN — LEVOFLOXACIN 500 MG: 500 INJECTION, SOLUTION INTRAVENOUS at 10:30

## 2024-01-14 RX ADMIN — ATORVASTATIN CALCIUM 20 MG: 20 TABLET, FILM COATED ORAL at 08:37

## 2024-01-14 RX ADMIN — IPRATROPIUM BROMIDE AND ALBUTEROL SULFATE 1 DOSE: .5; 3 SOLUTION RESPIRATORY (INHALATION) at 20:55

## 2024-01-14 RX ADMIN — ONDANSETRON 4 MG: 2 INJECTION INTRAMUSCULAR; INTRAVENOUS at 10:09

## 2024-01-14 RX ADMIN — FOLIC ACID 1 MG: 1 TABLET ORAL at 08:37

## 2024-01-14 RX ADMIN — HEPARIN SODIUM 5000 UNITS: 5000 INJECTION INTRAVENOUS; SUBCUTANEOUS at 08:38

## 2024-01-14 RX ADMIN — ARFORMOTEROL TARTRATE 15 MCG: 15 SOLUTION RESPIRATORY (INHALATION) at 08:17

## 2024-01-14 RX ADMIN — IPRATROPIUM BROMIDE AND ALBUTEROL SULFATE 1 DOSE: .5; 3 SOLUTION RESPIRATORY (INHALATION) at 08:17

## 2024-01-14 RX ADMIN — TRAMADOL HYDROCHLORIDE 25 MG: 50 TABLET ORAL at 16:46

## 2024-01-14 RX ADMIN — BUDESONIDE INHALATION 500 MCG: 0.5 SUSPENSION RESPIRATORY (INHALATION) at 20:55

## 2024-01-14 RX ADMIN — LINEZOLID 600 MG: 600 INJECTION, SOLUTION INTRAVENOUS at 23:30

## 2024-01-14 RX ADMIN — ONDANSETRON 4 MG: 2 INJECTION INTRAMUSCULAR; INTRAVENOUS at 16:47

## 2024-01-14 RX ADMIN — GABAPENTIN 200 MG: 100 CAPSULE ORAL at 20:56

## 2024-01-14 RX ADMIN — POLYVINYL ALCOHOL, POVIDONE 1 DROP: 14; 6 SOLUTION/ DROPS OPHTHALMIC at 10:11

## 2024-01-14 RX ADMIN — Medication 1 TABLET: at 08:37

## 2024-01-14 RX ADMIN — BUDESONIDE INHALATION 500 MCG: 0.5 SUSPENSION RESPIRATORY (INHALATION) at 08:17

## 2024-01-14 RX ADMIN — HEPARIN SODIUM 5000 UNITS: 5000 INJECTION INTRAVENOUS; SUBCUTANEOUS at 20:56

## 2024-01-14 RX ADMIN — ARFORMOTEROL TARTRATE 15 MCG: 15 SOLUTION RESPIRATORY (INHALATION) at 20:56

## 2024-01-14 RX ADMIN — IPRATROPIUM BROMIDE AND ALBUTEROL SULFATE 1 DOSE: .5; 3 SOLUTION RESPIRATORY (INHALATION) at 15:49

## 2024-01-14 RX ADMIN — LINEZOLID 600 MG: 600 INJECTION, SOLUTION INTRAVENOUS at 10:33

## 2024-01-14 RX ADMIN — CYANOCOBALAMIN TAB 1000 MCG 1000 MCG: 1000 TAB at 08:37

## 2024-01-14 ASSESSMENT — PAIN DESCRIPTION - LOCATION
LOCATION: LEG

## 2024-01-14 ASSESSMENT — PAIN DESCRIPTION - FREQUENCY
FREQUENCY: CONTINUOUS

## 2024-01-14 ASSESSMENT — PAIN DESCRIPTION - DESCRIPTORS
DESCRIPTORS: ACHING;BURNING
DESCRIPTORS: ACHING
DESCRIPTORS: ACHING;BURNING

## 2024-01-14 ASSESSMENT — PAIN DESCRIPTION - ORIENTATION
ORIENTATION: LEFT

## 2024-01-14 ASSESSMENT — PAIN - FUNCTIONAL ASSESSMENT
PAIN_FUNCTIONAL_ASSESSMENT: ACTIVITIES ARE NOT PREVENTED

## 2024-01-14 ASSESSMENT — PAIN DESCRIPTION - PAIN TYPE
TYPE: CHRONIC PAIN

## 2024-01-14 ASSESSMENT — PAIN SCALES - GENERAL
PAINLEVEL_OUTOF10: 0
PAINLEVEL_OUTOF10: 0
PAINLEVEL_OUTOF10: 7
PAINLEVEL_OUTOF10: 7
PAINLEVEL_OUTOF10: 0

## 2024-01-14 ASSESSMENT — PAIN SCALES - WONG BAKER
WONGBAKER_NUMERICALRESPONSE: 0
WONGBAKER_NUMERICALRESPONSE: 0

## 2024-01-14 ASSESSMENT — PAIN DESCRIPTION - ONSET
ONSET: ON-GOING

## 2024-01-14 NOTE — PROGRESS NOTES
Hospitalist Progress Note    NAME:  Rosa Alcala   :   1935   MRN:   [id number]     Date/Time:  2024  Subjective:     Wbc improved to 14.8.     she has changed her mind and would like to go to a SNF at discharge for rehab.     Has loose stool.  No chest pain, cough, shortness of breath.  Appetite good.  Leg still hurts, but is improving.  No family at bedside.        Assessment/Plan:      1. Acute on Chronic Hypoxic Respiratory Failure 2°/2 Acute on Chronic Diastolic Congestive Heart Failure/ Pul HTN/ severe COPD;  - continue to titrate as tolerated. On home O2.   2. Echo 24 severe pulmonary hypertension. Mod - severe TR. Normal wall motion. EF 60 - 65%.  3. CTA chest with no PE;  4. Acute on chronic pulmonary hypertension, on iv lasix.  Fluid restrictions added.  5. sepsis poa (leukocytosis, tachypnea hypoxia), source most likely Cellulitis, slow improvement.  ID holding inhaled budesonide and lactobacillus as part of eval of leukocytosis.  On IV linezolid, Levaquin per ID.  6. Severe COPD with exacerbation improving, pulmonology follows.  7.  RV failure and severe Pul HTN.  8. Hypertension Continue home Nifedipine.  9.  Toe ulcers, do not appear infected. Podiatry consult.  10. Loose stool. Bio K   11. DNR.  SNF when ready.     Total time spent with patient: 34    minutes    Care Plan discussed with:    [x]  Patient   []  Family    []  Care Manager  [x]  Nursing     []  Consultant/Specialist :      []    >50% of visit spent in counseling and coordination of care   (Discussed []  CODE status,  [x]  Care Plan, [x]  D/C Planning)    Prophylaxis:  []  Lovenox  []  Coumadin  [x]  Hep SQ  []  SCD’s  []  H2B/PPI    Disposition:  []  Home w/ Family   []  HH PT,OT,RN   []  SNF/LTC     [x]  SA rehab  ___________________________________________________    Hospitalist: Mildred Gonzalez MD         Objective:   Vitals:  BP (!) 103/56   Pulse 86   Temp 97.8 °F (36.6 °C) (Oral)   Resp 18   Ht 1.549 m (5'

## 2024-01-14 NOTE — PROGRESS NOTES
care    Goals of care, counseling/discussion    Chronic obstructive pulmonary disease (HCC)    Acute bronchitis with chronic obstructive pulmonary disease (COPD) (HCC)    Pulmonary hypertension (HCC)    Acute respiratory failure with hypoxia (HCC)    Pyelonephritis    Sepsis without acute organ dysfunction (HCC)    COPD exacerbation (HCC)    E coli bacteremia    PAD (peripheral artery disease) (HCC)    Hypoxemia    Chronic heart failure with preserved ejection fraction (HCC)    Restless legs syndrome (RLS)    Severe protein-calorie malnutrition (HCC)    Cellulitis of left lower extremity    Cellulitis and abscess of leg    RVF (right ventricular failure) (HCC)    Elevated troponin      PLAN:   PULM:  Oxygen - Wean supplementary oxygen, air for home dose of 6LPM .    Continue diuresis, aim for net negative fluid balance, 40mg lasix daily  Although can consider vasodilator therapy-patient will need a right heart cath with vasodilator challenge.  She has declined in the past.  Continue scheduled bronchodilators - in hospital Brovana and Pulmicort Q12hrs + [id number].  Closer to discharge switch to home therapy with Breztri  Encouraged incentive spirometry and get out of bed and ambulate  Steroids-not indicated.   Antibiotics-per ID for cellulitis. S/p vanco, now on linezolid/levofloxacin WBC trending down  Aspiration precautions  Assess home Oxygen needs at discharge  OT, PT, OOB and ambulate  Healthy weight  DVT, PUD prophylaxis    Patient will resume her home inhalers at discharge and follow-up with primary pulmonologist  Will defer recommendations for continued antimicrobial therapy to ID specialist  CODE STATUS: DNR     Subjective/Interval History:     Patient seen and examined at bedside this afternoon.  Apparently supplemental oxygen increased to 7 L/min overnight; unclear why.  Seen on 6 L nasal cannula today. Notes that her breathing overall is improved.  Complains of continued cough.  States the increase in

## 2024-01-15 ENCOUNTER — APPOINTMENT (OUTPATIENT)
Facility: HOSPITAL | Age: 89
DRG: 871 | End: 2024-01-15
Payer: MEDICARE

## 2024-01-15 LAB
ANION GAP SERPL CALC-SCNC: 2 MMOL/L (ref 3–18)
BASOPHILS # BLD: 0 K/UL (ref 0–0.1)
BASOPHILS NFR BLD: 0 % (ref 0–2)
BUN SERPL-MCNC: 13 MG/DL (ref 7–18)
BUN/CREAT SERPL: 13 (ref 12–20)
CALCIUM SERPL-MCNC: 8.8 MG/DL (ref 8.5–10.1)
CHLORIDE SERPL-SCNC: 102 MMOL/L (ref 100–111)
CO2 SERPL-SCNC: 31 MMOL/L (ref 21–32)
CREAT SERPL-MCNC: 0.98 MG/DL (ref 0.6–1.3)
DIFFERENTIAL METHOD BLD: ABNORMAL
EOSINOPHIL # BLD: 0.1 K/UL (ref 0–0.4)
EOSINOPHIL NFR BLD: 1 % (ref 0–5)
ERYTHROCYTE [DISTWIDTH] IN BLOOD BY AUTOMATED COUNT: 15.2 % (ref 11.6–14.5)
GLUCOSE SERPL-MCNC: 136 MG/DL (ref 74–99)
HCT VFR BLD AUTO: 38.8 % (ref 35–45)
HGB BLD-MCNC: 11.6 G/DL (ref 12–16)
IMM GRANULOCYTES # BLD AUTO: 0.1 K/UL (ref 0–0.04)
IMM GRANULOCYTES NFR BLD AUTO: 1 % (ref 0–0.5)
LYMPHOCYTES # BLD: 1.5 K/UL (ref 0.9–3.6)
LYMPHOCYTES NFR BLD: 14 % (ref 21–52)
MCH RBC QN AUTO: 25.3 PG (ref 24–34)
MCHC RBC AUTO-ENTMCNC: 29.9 G/DL (ref 31–37)
MCV RBC AUTO: 84.7 FL (ref 78–100)
MONOCYTES # BLD: 1.2 K/UL (ref 0.05–1.2)
MONOCYTES NFR BLD: 11 % (ref 3–10)
NEUTS SEG # BLD: 8.1 K/UL (ref 1.8–8)
NEUTS SEG NFR BLD: 73 % (ref 40–73)
NRBC # BLD: 0 K/UL (ref 0–0.01)
NRBC BLD-RTO: 0 PER 100 WBC
PLATELET # BLD AUTO: 206 K/UL (ref 135–420)
PMV BLD AUTO: 9.9 FL (ref 9.2–11.8)
POTASSIUM SERPL-SCNC: 4.6 MMOL/L (ref 3.5–5.5)
RBC # BLD AUTO: 4.58 M/UL (ref 4.2–5.3)
SODIUM SERPL-SCNC: 135 MMOL/L (ref 136–145)
WBC # BLD AUTO: 11 K/UL (ref 4.6–13.2)

## 2024-01-15 PROCEDURE — 6370000000 HC RX 637 (ALT 250 FOR IP): Performed by: INTERNAL MEDICINE

## 2024-01-15 PROCEDURE — 85025 COMPLETE CBC W/AUTO DIFF WBC: CPT

## 2024-01-15 PROCEDURE — 94761 N-INVAS EAR/PLS OXIMETRY MLT: CPT

## 2024-01-15 PROCEDURE — APPSS15 APP SPLIT SHARED TIME 0-15 MINUTES

## 2024-01-15 PROCEDURE — 99232 SBSQ HOSP IP/OBS MODERATE 35: CPT | Performed by: INTERNAL MEDICINE

## 2024-01-15 PROCEDURE — 6360000002 HC RX W HCPCS: Performed by: INTERNAL MEDICINE

## 2024-01-15 PROCEDURE — 80048 BASIC METABOLIC PNL TOTAL CA: CPT

## 2024-01-15 PROCEDURE — 36415 COLL VENOUS BLD VENIPUNCTURE: CPT

## 2024-01-15 PROCEDURE — 6360000004 HC RX CONTRAST MEDICATION: Performed by: HOSPITALIST

## 2024-01-15 PROCEDURE — 6370000000 HC RX 637 (ALT 250 FOR IP): Performed by: HOSPITALIST

## 2024-01-15 PROCEDURE — 97530 THERAPEUTIC ACTIVITIES: CPT

## 2024-01-15 PROCEDURE — 73701 CT LOWER EXTREMITY W/DYE: CPT

## 2024-01-15 PROCEDURE — 6360000002 HC RX W HCPCS: Performed by: STUDENT IN AN ORGANIZED HEALTH CARE EDUCATION/TRAINING PROGRAM

## 2024-01-15 PROCEDURE — 71045 X-RAY EXAM CHEST 1 VIEW: CPT

## 2024-01-15 PROCEDURE — 94640 AIRWAY INHALATION TREATMENT: CPT

## 2024-01-15 PROCEDURE — 2700000000 HC OXYGEN THERAPY PER DAY

## 2024-01-15 PROCEDURE — 6360000002 HC RX W HCPCS: Performed by: HOSPITALIST

## 2024-01-15 PROCEDURE — 1100000003 HC PRIVATE W/ TELEMETRY

## 2024-01-15 PROCEDURE — 99232 SBSQ HOSP IP/OBS MODERATE 35: CPT | Performed by: HOSPITALIST

## 2024-01-15 PROCEDURE — 97168 OT RE-EVAL EST PLAN CARE: CPT

## 2024-01-15 RX ORDER — FUROSEMIDE 10 MG/ML
20 INJECTION INTRAMUSCULAR; INTRAVENOUS ONCE
Status: COMPLETED | OUTPATIENT
Start: 2024-01-15 | End: 2024-01-15

## 2024-01-15 RX ADMIN — ALPRAZOLAM 0.5 MG: 0.5 TABLET ORAL at 22:20

## 2024-01-15 RX ADMIN — FOLIC ACID 1 MG: 1 TABLET ORAL at 10:30

## 2024-01-15 RX ADMIN — Medication 1 TABLET: at 10:31

## 2024-01-15 RX ADMIN — LINEZOLID 600 MG: 600 INJECTION, SOLUTION INTRAVENOUS at 11:53

## 2024-01-15 RX ADMIN — ONDANSETRON 4 MG: 2 INJECTION INTRAMUSCULAR; INTRAVENOUS at 11:54

## 2024-01-15 RX ADMIN — POLYVINYL ALCOHOL, POVIDONE 1 DROP: 14; 6 SOLUTION/ DROPS OPHTHALMIC at 20:46

## 2024-01-15 RX ADMIN — BUDESONIDE INHALATION 500 MCG: 0.5 SUSPENSION RESPIRATORY (INHALATION) at 08:13

## 2024-01-15 RX ADMIN — ATORVASTATIN CALCIUM 20 MG: 20 TABLET, FILM COATED ORAL at 10:30

## 2024-01-15 RX ADMIN — IPRATROPIUM BROMIDE AND ALBUTEROL SULFATE 1 DOSE: .5; 3 SOLUTION RESPIRATORY (INHALATION) at 08:13

## 2024-01-15 RX ADMIN — HEPARIN SODIUM 5000 UNITS: 5000 INJECTION INTRAVENOUS; SUBCUTANEOUS at 20:39

## 2024-01-15 RX ADMIN — TRAMADOL HYDROCHLORIDE 25 MG: 50 TABLET ORAL at 11:53

## 2024-01-15 RX ADMIN — Medication 1 CAPSULE: at 18:06

## 2024-01-15 RX ADMIN — IPRATROPIUM BROMIDE AND ALBUTEROL SULFATE 1 DOSE: .5; 3 SOLUTION RESPIRATORY (INHALATION) at 11:11

## 2024-01-15 RX ADMIN — SALINE NASAL SPRAY 1 SPRAY: 1.5 SOLUTION NASAL at 10:34

## 2024-01-15 RX ADMIN — IOPAMIDOL 100 ML: 612 INJECTION, SOLUTION INTRAVENOUS at 16:05

## 2024-01-15 RX ADMIN — BUDESONIDE INHALATION 500 MCG: 0.5 SUSPENSION RESPIRATORY (INHALATION) at 20:28

## 2024-01-15 RX ADMIN — IPRATROPIUM BROMIDE AND ALBUTEROL SULFATE 1 DOSE: .5; 3 SOLUTION RESPIRATORY (INHALATION) at 15:01

## 2024-01-15 RX ADMIN — ARFORMOTEROL TARTRATE 15 MCG: 15 SOLUTION RESPIRATORY (INHALATION) at 08:13

## 2024-01-15 RX ADMIN — ARFORMOTEROL TARTRATE 15 MCG: 15 SOLUTION RESPIRATORY (INHALATION) at 20:28

## 2024-01-15 RX ADMIN — LEVOFLOXACIN 500 MG: 500 INJECTION, SOLUTION INTRAVENOUS at 10:30

## 2024-01-15 RX ADMIN — HEPARIN SODIUM 5000 UNITS: 5000 INJECTION INTRAVENOUS; SUBCUTANEOUS at 10:31

## 2024-01-15 RX ADMIN — GABAPENTIN 200 MG: 100 CAPSULE ORAL at 20:39

## 2024-01-15 RX ADMIN — FUROSEMIDE 20 MG: 10 INJECTION, SOLUTION INTRAMUSCULAR; INTRAVENOUS at 20:39

## 2024-01-15 RX ADMIN — IPRATROPIUM BROMIDE AND ALBUTEROL SULFATE 1 DOSE: .5; 3 SOLUTION RESPIRATORY (INHALATION) at 20:28

## 2024-01-15 RX ADMIN — CYANOCOBALAMIN TAB 1000 MCG 1000 MCG: 1000 TAB at 10:30

## 2024-01-15 RX ADMIN — POLYVINYL ALCOHOL, POVIDONE 1 DROP: 14; 6 SOLUTION/ DROPS OPHTHALMIC at 10:31

## 2024-01-15 RX ADMIN — LINEZOLID 600 MG: 600 INJECTION, SOLUTION INTRAVENOUS at 23:00

## 2024-01-15 RX ADMIN — ACETAMINOPHEN 500 MG: 500 TABLET ORAL at 22:20

## 2024-01-15 RX ADMIN — Medication 1 CAPSULE: at 10:30

## 2024-01-15 ASSESSMENT — PAIN SCALES - GENERAL
PAINLEVEL_OUTOF10: 0
PAINLEVEL_OUTOF10: 5
PAINLEVEL_OUTOF10: 6

## 2024-01-15 ASSESSMENT — PAIN DESCRIPTION - LOCATION
LOCATION: LEG
LOCATION: LEG

## 2024-01-15 ASSESSMENT — PAIN SCALES - WONG BAKER
WONGBAKER_NUMERICALRESPONSE: 0
WONGBAKER_NUMERICALRESPONSE: 0

## 2024-01-15 ASSESSMENT — PAIN DESCRIPTION - ORIENTATION
ORIENTATION: LEFT
ORIENTATION: LEFT

## 2024-01-15 ASSESSMENT — PAIN DESCRIPTION - DESCRIPTORS
DESCRIPTORS: BURNING;SHARP
DESCRIPTORS: BURNING

## 2024-01-15 NOTE — PROGRESS NOTES
[facility]  Pulmonary, Critical Care, and Sleep Medicine    Name: Rosa Alcala MRN: [id number]   : 1935 Hospital: [facility]   Date: 2024        IMPRESSION:   Severe pulmonary hypertension likely combination group 3 and 2.  Patient was unable to do PFTs and therefore cannot quantify extent of functional impairment from pulmonary standpoint however CT imaging have reported significant emphysema which likely is etiology for group 3 pulmonary hypertension.  In addition patient has history of chronic diastolic heart failure.  Over the past 5 years dating back to  patient has had progressive increase in measured PA pressures on echocardiogram.  There was plan for right heart cath in 2018 but patient initially postponed it and then declined.  Since then being monitored and treated on supplemental oxygen but no vasodilators.  Chronic hypoxic respiratory failure secondary to COPD further worsened by COVID-19 in 2020 and RSV in 2021.  Patient is on long-term supplemental oxygen at 4 L at home in addition to bronchodilators.  In hospital oxygen needs have increased to 10 L salter-now improving  COPD-presumed severe on triple therapy withBreztri and nebulized as needed bronchodilators.-Follows with [facility] pulmonary.Dr. White  Acute on chronic cor pulmonale secondary to above  Acute cellulitis-presenting hospital problem  Duplex ultrasound of left lower extremity negative for DVT  History of exposure to coal dust - father was   History of tobacco use-quit   Hyponatremia, likely secondary to diuresis  Atelectasis     Patient Active Problem List   Diagnosis    CAP (community acquired pneumonia)    Acute on chronic respiratory failure with hypoxia (HCC)    Suspected COVID-19 virus infection    CABALLERO (dyspnea on exertion)    Hyperlipidemia    Acute on chronic diastolic CHF (congestive heart failure) (HCC)    Hypertension    Carotid stenosis

## 2024-01-15 NOTE — PROGRESS NOTES
01/15/24 0814   Oxygen Therapy/Pulse Ox   O2 Therapy Oxygen humidified   O2 Device High flow nasal cannula   O2 Flow Rate (L/min) 10 L/min   Pulse 88   Respirations 18   SpO2 93 %       Oxygen increased to 10 lpm due to Spo2 86% on 7 lpm. Oxygen Spo2 93% on 10 lpm.

## 2024-01-15 NOTE — PROGRESS NOTES
Infectious Disease progress Note        Reason: Sepsis, severe left leg cellulitis    Current abx Prior abx   vancomycin since 1/6/24-1/11  Levofloxacin, linezolid since 1/11/2024 Cefepime 1/6-1/8  Clindamycin 1/6-1/9   1  Lines:       Assessment :    88-year-old female with a past medical history of hypertension, CHF, COPD on 4 L of home O2 presented to [facility] ED on 1/5/2024 with severe left leg pain.     Hospitalization 12/2020 for acute on chronic hypoxic respiratory failure secondary to confirmed COVID-19 pneumonia   Status post remdesivir since 12/11/2020-12/15  Status post convalescent plasma 12/11/2020     Hospitalization at [facility] May 2022 for partially treated sepsis- POA due to e.coli BSI (positive blood cx 5/5, negative blood cx 5/7) right kidney pyelonephritis, cystitis     Clinical presentation consistent with sepsis-present on admission due to severe left leg cellulitis    Exact microbial etiology of left leg infection not entirely clear.  However the rapid onset and clinical presentation is likely suggestive of gram-positive bacteria such as Streptococcus/Staphylococcus  I agree with adding clindamycin to cover for toxin producing gram-positive's looking at the severity of illness.  Currently no definitive clinical/radiographic evidence to suggest necrotizing infection     chronic hypoxic respiratory failure-on 4 L oxygen at home.  Pulmonary follow-up appreciated      Persistent leukocytosis 1/11-? partially treated infection- improved leukocytosis after switch to levofloxacin/linezolid  Gradually improving left leg erythema, improving procalcitonin noted  Improved left leg pain on today's exam    Increased fiO2 overnight- ? Fluid overload ?mucus plugging     Recommendations:     1.  Recommend IV linezolid, levofloxacin IV  2.  Monitor left leg erythema, CBC   3. wean oxygen as tolerated.  ollow-up pulmonary, cardiology recommendations  4.  Obtain chest x-ray  5.  Monitor CBC, temperature, procalcitonin,  decisions.     This test has been authorized by the FDA under an Emergency Use Authorization (EUA) for use by authorized laboratories.   Fact sheet for Healthcare Providers:  https://www.fda.gov/media/949799/download  Fact sheet for Patients: https://www.fda.gov/media/414466/download     Methodology: Isothermal Nucleic Acid Amplification         Rapid influenza A/B antigens [[id number]] Collected: 01/05/24 1225    Order Status: Completed Specimen: Nasal Washing Updated: 01/05/24 1303     Influenza A Ag Negative        Comment: A negative result does not exclude influenza virus infection, seasonal or H1N1 due to suboptimal sensitivity. If influenza is circulating in your community, a diagnosis of influenza should be considered based on a patients clinical presentation and empiric antiviral treatment should be considered, if indicated.        Influenza B Ag Negative       Blood Culture 2 [[id number]] Collected: 01/05/24 1221    Order Status: Completed Specimen: Blood Updated: 01/11/24 0643     Special Requests NO SPECIAL REQUESTS        Culture NO GROWTH 6 DAYS       Blood Culture 1 [[id number]] Collected: 01/05/24 1202    Order Status: Completed Specimen: Blood Updated: 01/11/24 0643     Special Requests NO SPECIAL REQUESTS        Culture NO GROWTH 6 DAYS                   RADIOLOGY:    All available imaging studies/reports in [facility] for this admission were reviewed     High complexity decision making was performed during the evaluation of this patient at high risk for decompensation with multiple organ involvement     Above mentioned total time spent on reviewing the case/medical record/data/notes/EMR/patient examination/documentation/coordinating care with nurse/consultants, exclusive of procedures with complex decision making performed and > 50% time spent in face to face evaluation.         Disclaimer: Sections of this note are dictated utilizing voice recognition software, which may have resulted in some

## 2024-01-15 NOTE — PLAN OF CARE
Problem: Occupational Therapy - Adult  Goal: By Discharge: Performs self-care activities at highest level of function for planned discharge setting.  See evaluation for individualized goals.  Description: Occupational Therapy Goals:  Initiated 1/8/2024 to be met within 7-10 days.  Re-eval completed 1/15/24. Pt is making progress on established goals. Goals continue to be appropriate at this time.     1.  Patient will perform grooming with modified independence.   2.  Patient will perform bathing with modified independence using adaptive equipment..  3.  Patient will perform upper body dressing and lower body dressing  with modified independence using adaptive equipment.  4.  Patient will perform bedside commode transfers with modified independence using RW.  5.  Patient will perform all aspects of toileting with modified independence.  6.  Patient will participate in upper extremity therapeutic exercise/activities with modified independence for 8-10 minutes to increase strength/endurance for ADLs.    7.  Patient will utilize energy conservation techniques during functional activities with min verbal cues.    PLOF: Mod I with ADLs and functional mobility primarily furniture walking      Outcome: Progressing     OCCUPATIONAL THERAPY RE-EVALUATION    Patient: Rosa Alcala (88 y.o. female)  Date: 1/15/2024  Primary Diagnosis: Lactic acidosis [E87.20]  Cellulitis [L03.90]  Peripheral edema [R60.9]  Cellulitis and abscess of leg [L03.119, L02.419]  Pulmonary hypertension (HCC) [I27.20]  Acute on chronic respiratory failure with hypoxia (HCC) [J96.21]       Precautions: Fall Risk, General Precautions    ASSESSMENT :  Pt cleared for OT re-eval and pt agreeable participate. Checked blood pressure prior to moving and read 110/52, pt reports that low BP is the norm for her. Respiratory came into session and lowered O2 to 4L. Continuously monitored O2 throughout session. Completed 4 standing trials to simulate grooming  Oriented X4  Cognition  Overall Cognitive Status: WNL    Skin: intact  Edema: none    Vision/Perceptual:    Vision  Vision: Within Functional Limits         Coordination: BUE  Coordination: Within functional limits    Balance:  Balance  Sitting: Intact  Standing: Impaired  Standing - Static: Good  Standing - Dynamic: Fair    Strength: BUE  Strength: Generally decreased, functional    Tone & Sensation: BUE  Tone: Normal    Range of Motion: BUE  AROM: Within functional limits    Functional Mobility and Transfers for ADLs:  Bed Mobility:  Bed Mobility Training  Bed Mobility Training: Yes  Rolling: Modified independent  Supine to Sit: Supervision  Sit to Supine: Supervision  Scooting: Supervision  Transfers:  Transfer Training  Transfer Training: Yes  Sit to Stand: Stand-by assistance  Stand to Sit: Stand-by assistance  Stand Pivot Transfers: Stand-by assistance  Bed to Chair: Stand-by assistance  Toilet Transfer: Stand-by assistance    ADL Assessment:   Feeding: Modified independent   Grooming: Setup  UE Bathing: Minimal assistance  LE Bathing: Moderate assistance  UE Dressing: Supervision  LE Dressing: Minimal assistance  Toileting: Stand by assistance  Functional Mobility: Contact guard assistance    Pain:  Pain level pre-treatment: 0/10   Pain level post-treatment: 0/10   Pain Intervention(s): Rest, Ice, Repositioning   Response to intervention: Nurse notified    Activity Tolerance:   Activity Tolerance: Patient limited by fatigue, Treatment limited secondary to medical complications (free text)  Please refer to the flowsheet for vital signs taken during this treatment.    After treatment:   [x] Patient left in no apparent distress sitting up in chair  [] Patient left in no apparent distress in bed  [x] Call bell left within reach  [x] Nursing notified  [x] Caregiver present  [] Bed alarm activated    COMMUNICATION/EDUCATION:   Patient Education  Education Given To: Patient  Education Provided: Role of Therapy;Plan

## 2024-01-15 NOTE — PLAN OF CARE
Problem: Pain  Goal: Verbalizes/displays adequate comfort level or baseline comfort level  Outcome: Progressing  Flowsheets (Taken 1/12/2024 1215 by Tuan Garcia, RN)  Verbalizes/displays adequate comfort level or baseline comfort level:   Encourage patient to monitor pain and request assistance   Assess pain using appropriate pain scale   Administer analgesics based on type and severity of pain and evaluate response  Note: PRN Tramadol given for pain management     Problem: Chronic Conditions and Co-morbidities  Goal: Patient's chronic conditions and co-morbidity symptoms are monitored and maintained or improved  Outcome: Progressing  Flowsheets (Taken 1/14/2024 2024 by Brynn Hernandez, RN)  Care Plan - Patient's Chronic Conditions and Co-Morbidity Symptoms are Monitored and Maintained or Improved: Monitor and assess patient's chronic conditions and comorbid symptoms for stability, deterioration, or improvement     Problem: Safety - Adult  Goal: Free from fall injury  Outcome: Progressing  Flowsheets (Taken 1/6/2024 1350)  Free From Fall Injury:   Based on caregiver fall risk screen, instruct family/caregiver to ask for assistance with transferring infant if caregiver noted to have fall risk factors   Instruct family/caregiver on patient safety     Problem: Skin/Tissue Integrity  Goal: Absence of new skin breakdown  Description: 1.  Monitor for areas of redness and/or skin breakdown  2.  Assess vascular access sites hourly  3.  Every 4-6 hours minimum:  Change oxygen saturation probe site  4.  Every 4-6 hours:  If on nasal continuous positive airway pressure, respiratory therapy assess nares and determine need for appliance change or resting period.  Outcome: Progressing  Note: No new skin breakdown noted      Problem: Nutrition Deficit:  Goal: Optimize nutritional status  Outcome: Progressing  Flowsheets (Taken 1/11/2024 1228 by Pearl Torres RD)  Nutrient intake appropriate for improving, restoring, or  maintaining nutritional needs: Monitor oral intake, labs, and treatment plans     Problem: Occupational Therapy - Adult  Goal: By Discharge: Performs self-care activities at highest level of function for planned discharge setting.  See evaluation for individualized goals.  Description: Occupational Therapy Goals:  Initiated 1/8/2024 to be met within 7-10 days.  Re-eval completed 1/15/24. Pt is making progress on established goals. Goals continue to be appropriate at this time.     1.  Patient will perform grooming with modified independence.   2.  Patient will perform bathing with modified independence using adaptive equipment..  3.  Patient will perform upper body dressing and lower body dressing  with modified independence using adaptive equipment.  4.  Patient will perform bedside commode transfers with modified independence using RW.  5.  Patient will perform all aspects of toileting with modified independence.  6.  Patient will participate in upper extremity therapeutic exercise/activities with modified independence for 8-10 minutes to increase strength/endurance for ADLs.    7.  Patient will utilize energy conservation techniques during functional activities with min verbal cues.    PLOF: Mod I with ADLs and functional mobility primarily furniture walking      1/15/2024 1526 by Waleska Snyder, OT  Outcome: Progressing     Problem: Discharge Planning  Goal: Discharge to home or other facility with appropriate resources  Outcome: Progressing  Flowsheets (Taken 1/14/2024 2024 by Brynn Hernandez, RN)  Discharge to home or other facility with appropriate resources: Identify barriers to discharge with patient and caregiver

## 2024-01-16 VITALS
TEMPERATURE: 97.4 F | OXYGEN SATURATION: 95 % | SYSTOLIC BLOOD PRESSURE: 118 MMHG | BODY MASS INDEX: 19.83 KG/M2 | RESPIRATION RATE: 20 BRPM | WEIGHT: 105 LBS | HEIGHT: 61 IN | HEART RATE: 93 BPM | DIASTOLIC BLOOD PRESSURE: 71 MMHG

## 2024-01-16 PROBLEM — R09.02 HYPOXIA: Status: ACTIVE | Noted: 2024-01-16

## 2024-01-16 LAB
ANION GAP SERPL CALC-SCNC: 6 MMOL/L (ref 3–18)
BASOPHILS # BLD: 0 K/UL (ref 0–0.1)
BASOPHILS NFR BLD: 0 % (ref 0–2)
BUN SERPL-MCNC: 13 MG/DL (ref 7–18)
BUN/CREAT SERPL: 13 (ref 12–20)
CALCIUM SERPL-MCNC: 8.7 MG/DL (ref 8.5–10.1)
CHLORIDE SERPL-SCNC: 100 MMOL/L (ref 100–111)
CO2 SERPL-SCNC: 27 MMOL/L (ref 21–32)
CREAT SERPL-MCNC: 0.98 MG/DL (ref 0.6–1.3)
DIFFERENTIAL METHOD BLD: ABNORMAL
EOSINOPHIL # BLD: 0.1 K/UL (ref 0–0.4)
EOSINOPHIL NFR BLD: 1 % (ref 0–5)
ERYTHROCYTE [DISTWIDTH] IN BLOOD BY AUTOMATED COUNT: 15.4 % (ref 11.6–14.5)
GLUCOSE SERPL-MCNC: 98 MG/DL (ref 74–99)
HCT VFR BLD AUTO: 36.5 % (ref 35–45)
HGB BLD-MCNC: 11.2 G/DL (ref 12–16)
IMM GRANULOCYTES # BLD AUTO: 0.2 K/UL (ref 0–0.04)
IMM GRANULOCYTES NFR BLD AUTO: 1 % (ref 0–0.5)
LYMPHOCYTES # BLD: 1.5 K/UL (ref 0.9–3.6)
LYMPHOCYTES NFR BLD: 9 % (ref 21–52)
MCH RBC QN AUTO: 25.6 PG (ref 24–34)
MCHC RBC AUTO-ENTMCNC: 30.7 G/DL (ref 31–37)
MCV RBC AUTO: 83.3 FL (ref 78–100)
MONOCYTES # BLD: 1.7 K/UL (ref 0.05–1.2)
MONOCYTES NFR BLD: 10 % (ref 3–10)
NEUTS SEG # BLD: 13 K/UL (ref 1.8–8)
NEUTS SEG NFR BLD: 79 % (ref 40–73)
NRBC # BLD: 0 K/UL (ref 0–0.01)
NRBC BLD-RTO: 0 PER 100 WBC
PLATELET # BLD AUTO: 201 K/UL (ref 135–420)
PMV BLD AUTO: 10.6 FL (ref 9.2–11.8)
POTASSIUM SERPL-SCNC: 4.3 MMOL/L (ref 3.5–5.5)
PROCALCITONIN SERPL-MCNC: <0.05 NG/ML
RBC # BLD AUTO: 4.38 M/UL (ref 4.2–5.3)
SODIUM SERPL-SCNC: 133 MMOL/L (ref 136–145)
WBC # BLD AUTO: 16.4 K/UL (ref 4.6–13.2)

## 2024-01-16 PROCEDURE — 85025 COMPLETE CBC W/AUTO DIFF WBC: CPT

## 2024-01-16 PROCEDURE — 94761 N-INVAS EAR/PLS OXIMETRY MLT: CPT

## 2024-01-16 PROCEDURE — 36415 COLL VENOUS BLD VENIPUNCTURE: CPT

## 2024-01-16 PROCEDURE — 6370000000 HC RX 637 (ALT 250 FOR IP): Performed by: INTERNAL MEDICINE

## 2024-01-16 PROCEDURE — 84145 PROCALCITONIN (PCT): CPT

## 2024-01-16 PROCEDURE — 97110 THERAPEUTIC EXERCISES: CPT

## 2024-01-16 PROCEDURE — 94640 AIRWAY INHALATION TREATMENT: CPT

## 2024-01-16 PROCEDURE — 2700000000 HC OXYGEN THERAPY PER DAY

## 2024-01-16 PROCEDURE — 97530 THERAPEUTIC ACTIVITIES: CPT

## 2024-01-16 PROCEDURE — 6360000002 HC RX W HCPCS: Performed by: STUDENT IN AN ORGANIZED HEALTH CARE EDUCATION/TRAINING PROGRAM

## 2024-01-16 PROCEDURE — 97535 SELF CARE MNGMENT TRAINING: CPT

## 2024-01-16 PROCEDURE — 6360000002 HC RX W HCPCS: Performed by: HOSPITALIST

## 2024-01-16 PROCEDURE — 6370000000 HC RX 637 (ALT 250 FOR IP): Performed by: HOSPITALIST

## 2024-01-16 PROCEDURE — 6360000002 HC RX W HCPCS: Performed by: INTERNAL MEDICINE

## 2024-01-16 PROCEDURE — 99239 HOSP IP/OBS DSCHRG MGMT >30: CPT | Performed by: HOSPITALIST

## 2024-01-16 PROCEDURE — 80048 BASIC METABOLIC PNL TOTAL CA: CPT

## 2024-01-16 PROCEDURE — 97164 PT RE-EVAL EST PLAN CARE: CPT

## 2024-01-16 RX ORDER — ALBUTEROL SULFATE 2.5 MG/3ML
2.5 SOLUTION RESPIRATORY (INHALATION) EVERY 6 HOURS PRN
Qty: 80 EACH | Refills: 3 | Status: ON HOLD | OUTPATIENT
Start: 2024-01-16

## 2024-01-16 RX ORDER — LEVOFLOXACIN 500 MG/1
500 TABLET, FILM COATED ORAL DAILY
Qty: 6 TABLET | Refills: 0 | Status: ON HOLD | OUTPATIENT
Start: 2024-01-16 | End: 2024-01-22

## 2024-01-16 RX ORDER — ONDANSETRON 4 MG/1
4 TABLET, ORALLY DISINTEGRATING ORAL EVERY 8 HOURS PRN
Status: DISCONTINUED | OUTPATIENT
Start: 2024-01-16 | End: 2024-01-16 | Stop reason: HOSPADM

## 2024-01-16 RX ORDER — GABAPENTIN 100 MG/1
200 CAPSULE ORAL NIGHTLY
Qty: 60 CAPSULE | Refills: 0 | Status: ON HOLD | OUTPATIENT
Start: 2024-01-16 | End: 2024-02-15

## 2024-01-16 RX ORDER — LINEZOLID 600 MG/1
600 TABLET, FILM COATED ORAL 2 TIMES DAILY
Qty: 12 TABLET | Refills: 0 | Status: ON HOLD | OUTPATIENT
Start: 2024-01-16 | End: 2024-01-22

## 2024-01-16 RX ORDER — ONDANSETRON 2 MG/ML
4 INJECTION INTRAMUSCULAR; INTRAVENOUS EVERY 8 HOURS PRN
Status: DISCONTINUED | OUTPATIENT
Start: 2024-01-16 | End: 2024-01-16 | Stop reason: HOSPADM

## 2024-01-16 RX ORDER — CHOLESTYRAMINE 4 G/9G
1 POWDER, FOR SUSPENSION ORAL 2 TIMES DAILY
Qty: 20 PACKET | Refills: 0 | Status: ON HOLD | DISCHARGE
Start: 2024-01-16 | End: 2024-01-26

## 2024-01-16 RX ORDER — LACTOBACILLUS RHAMNOSUS GG 10B CELL
1 CAPSULE ORAL 2 TIMES DAILY WITH MEALS
Qty: 20 CAPSULE | Refills: 0 | Status: ON HOLD | OUTPATIENT
Start: 2024-01-16 | End: 2024-01-26

## 2024-01-16 RX ADMIN — ARFORMOTEROL TARTRATE 15 MCG: 15 SOLUTION RESPIRATORY (INHALATION) at 19:37

## 2024-01-16 RX ADMIN — CYANOCOBALAMIN TAB 1000 MCG 1000 MCG: 1000 TAB at 09:29

## 2024-01-16 RX ADMIN — FOLIC ACID 1 MG: 1 TABLET ORAL at 09:28

## 2024-01-16 RX ADMIN — ONDANSETRON 4 MG: 2 INJECTION INTRAMUSCULAR; INTRAVENOUS at 09:41

## 2024-01-16 RX ADMIN — BUDESONIDE INHALATION 500 MCG: 0.5 SUSPENSION RESPIRATORY (INHALATION) at 09:54

## 2024-01-16 RX ADMIN — TRAMADOL HYDROCHLORIDE 25 MG: 50 TABLET ORAL at 19:46

## 2024-01-16 RX ADMIN — BUDESONIDE INHALATION 500 MCG: 0.5 SUSPENSION RESPIRATORY (INHALATION) at 19:38

## 2024-01-16 RX ADMIN — IPRATROPIUM BROMIDE AND ALBUTEROL SULFATE 1 DOSE: .5; 3 SOLUTION RESPIRATORY (INHALATION) at 09:54

## 2024-01-16 RX ADMIN — ATORVASTATIN CALCIUM 20 MG: 20 TABLET, FILM COATED ORAL at 09:27

## 2024-01-16 RX ADMIN — TRAMADOL HYDROCHLORIDE 25 MG: 50 TABLET ORAL at 09:41

## 2024-01-16 RX ADMIN — HEPARIN SODIUM 5000 UNITS: 5000 INJECTION INTRAVENOUS; SUBCUTANEOUS at 09:28

## 2024-01-16 RX ADMIN — Medication 1 CAPSULE: at 09:27

## 2024-01-16 RX ADMIN — ARFORMOTEROL TARTRATE 15 MCG: 15 SOLUTION RESPIRATORY (INHALATION) at 09:54

## 2024-01-16 RX ADMIN — FUROSEMIDE 40 MG: 10 INJECTION, SOLUTION INTRAMUSCULAR; INTRAVENOUS at 09:28

## 2024-01-16 RX ADMIN — IPRATROPIUM BROMIDE AND ALBUTEROL SULFATE 1 DOSE: .5; 3 SOLUTION RESPIRATORY (INHALATION) at 19:38

## 2024-01-16 RX ADMIN — Medication 1 TABLET: at 09:27

## 2024-01-16 ASSESSMENT — PAIN DESCRIPTION - ORIENTATION
ORIENTATION: LEFT

## 2024-01-16 ASSESSMENT — PAIN DESCRIPTION - PAIN TYPE
TYPE: CHRONIC PAIN
TYPE: CHRONIC PAIN

## 2024-01-16 ASSESSMENT — PAIN SCALES - GENERAL
PAINLEVEL_OUTOF10: 7
PAINLEVEL_OUTOF10: 7
PAINLEVEL_OUTOF10: 0
PAINLEVEL_OUTOF10: 6

## 2024-01-16 ASSESSMENT — PAIN DESCRIPTION - DESCRIPTORS
DESCRIPTORS: ACHING
DESCRIPTORS: ACHING
DESCRIPTORS: BURNING;SHARP

## 2024-01-16 ASSESSMENT — PAIN DESCRIPTION - LOCATION
LOCATION: LEG

## 2024-01-16 ASSESSMENT — PAIN - FUNCTIONAL ASSESSMENT
PAIN_FUNCTIONAL_ASSESSMENT: ACTIVITIES ARE NOT PREVENTED
PAIN_FUNCTIONAL_ASSESSMENT: PREVENTS OR INTERFERES SOME ACTIVE ACTIVITIES AND ADLS
PAIN_FUNCTIONAL_ASSESSMENT: ACTIVITIES ARE NOT PREVENTED

## 2024-01-16 NOTE — PLAN OF CARE
Problem: Pain  Goal: Verbalizes/displays adequate comfort level or baseline comfort level  Outcome: Progressing     Problem: Chronic Conditions and Co-morbidities  Goal: Patient's chronic conditions and co-morbidity symptoms are monitored and maintained or improved  Outcome: Progressing  Flowsheets (Taken 1/15/2024 2048)  Care Plan - Patient's Chronic Conditions and Co-Morbidity Symptoms are Monitored and Maintained or Improved:   Monitor and assess patient's chronic conditions and comorbid symptoms for stability, deterioration, or improvement   Update acute care plan with appropriate goals if chronic or comorbid symptoms are exacerbated and prevent overall improvement and discharge   Collaborate with multidisciplinary team to address chronic and comorbid conditions and prevent exacerbation or deterioration     Problem: Safety - Adult  Goal: Free from fall injury  Outcome: Progressing     Problem: Skin/Tissue Integrity  Goal: Absence of new skin breakdown  Description: 1.  Monitor for areas of redness and/or skin breakdown  2.  Assess vascular access sites hourly  3.  Every 4-6 hours minimum:  Change oxygen saturation probe site  4.  Every 4-6 hours:  If on nasal continuous positive airway pressure, respiratory therapy assess nares and determine need for appliance change or resting period.  Outcome: Progressing     Problem: Nutrition Deficit:  Goal: Optimize nutritional status  Outcome: Progressing     Problem: Discharge Planning  Goal: Discharge to home or other facility with appropriate resources  Outcome: Progressing  Flowsheets (Taken 1/15/2024 2048)  Discharge to home or other facility with appropriate resources: Identify barriers to discharge with patient and caregiver

## 2024-01-16 NOTE — DISCHARGE SUMMARY
longer evident in Lateral leads  Confirmed by MD Timo, Demetrius ([id number]) on 1/5/2024 2:27:35 PM       01/05/24    ECHO (TTE) LIMITED (PRN CONTRAST/BUBBLE/STRAIN/3D) 01/06/2024  3:25 PM (Final)    Interpretation Summary    Image quality is fair.    Limited echo ordered to reassess function and PAP    Left Ventricle: Normal left ventricular systolic function with a visually estimated EF of 60 - 65%. Left ventricle size is normal. Mildly increased wall thickness. Normal wall motion.    Right Ventricle: Right ventricle is severely dilated. Severely reduced systolic function.  Findings consistent with cor pulmonale.    Pulmonary Arteries: Severe pulmonary hypertension present. The estimated PASP is 116 mmHg.    Tricuspid Valve: Moderate to severe regurgitation.    Right Atrium: Right atrium is mildly dilated.    Signed by: Demetrius Greenwood MD on 1/6/2024  3:25 PM    01/05/24    VAS DUP LOWER EXTREMITY VENOUS LEFT 01/06/2024  8:10 AM (Final)    Interpretation Summary    No evidence of deep vein or superficial vein thrombosis in the left lower extremity. Vessels demonstrate normal compressibility, color filling, and phasic and spontaneous flow.    Left posterior tibial artery Doppler waveform is biphasic.    For comparison purposes, the right common femoral vein was briefly interrogated. The vein demonstrates normal color filling and compressibility. Doppler flow was phasic and spontaneous.    Signed by: Solis Bailey MD on 1/6/2024  8:10 AM    CTA CHEST W WO CONTRAST   COMPARISON: CTA chest 7/7/2022.   1.  No acute pulmonary embolism.  2.  Dilated pulmonary trunk (3.4 cm), RIGHT heart strain, dilated RIGHT atrium,  contrast reflux into hepatic veins-consistent with RIGHT heart failure.  3.  Moderate upper lobe predominant centrilobular pulmonary emphysema.      Discharge Medications:     Current Discharge Medication List        START taking these medications    Details   lactobacillus (CULTURELLE) capsule Take 1  albuterol sulfate HFA (PROVENTIL;VENTOLIN;PROAIR) 108 (90 Base) MCG/ACT inhaler Comments:   Reason for Stopping:         metoprolol tartrate (LOPRESSOR) 25 MG tablet Comments:   Reason for Stopping:         NIFEdipine (ADALAT CC) 30 MG extended release tablet Comments:   Reason for Stopping:         sodium chloride (OCEAN) 0.65 % nasal spray Comments:   Reason for Stopping:               Activity: activity as tolerated. Pt / ot eval and treat. Fall precautions.     Diet: cardiac diet FR 1200 mL. Easy to chew.     Wound Care: none needed.     Follow-up:   SNF doctor upon arrival.    Time spent 46 minutes.

## 2024-01-16 NOTE — PROGRESS NOTES
Hospitalist Progress Note    NAME:  Rosa Alcala   :   1935   MRN:   [id number]     Date/Time:  1/15/2024  Subjective:     Wbc improved to 11.0.    Patient seen and examined at bedside, she was still has area of tenderness to lateral aspect of leg.  Overall she feels improved.    Back on high flow oxygen.  Discussed with patient and nursing regarding fluid restrictions.    Assessment/Plan:      1. Acute on Chronic Hypoxic Respiratory Failure 2°/2 Acute on Chronic Diastolic Congestive Heart Failure/ Pul HTN/ severe COPD;  - continue to titrate as tolerated. On home O2.   2. Echo 24 severe pulmonary hypertension. Mod - severe TR. Normal wall motion. EF 60 - 65%.  3. CTA chest with no PE;  4. Acute on chronic pulmonary hypertension, on iv lasix.  Fluid restrictions added.  5. sepsis poa (leukocytosis, tachypnea hypoxia), source most likely Cellulitis, slow improvement.  ID holding inhaled budesonide and lactobacillus as part of eval of leukocytosis.  On IV linezolid, Levaquin per ID.  CT lower extremity no abscess.  6. Severe COPD with exacerbation improving, pulmonology follows.  7.  RV failure and severe Pul HTN.  8. Hypertension Continue home Nifedipine.  9.  Toe ulcers, do not appear infected. Podiatry consult.  10. Loose stool. Bio K   11. DNR.  SNF when ready.  I discussed the case with Dr. Adler, and Dr. AYAKA Candelaria.    Total time spent with patient: 34    minutes    Care Plan discussed with:    [x]  Patient   []  Family    [x]  Care Manager on IDT [x]  Nursing     []  Consultant/Specialist :      []    >50% of visit spent in counseling and coordination of care   (Discussed []  CODE status,  [x]  Care Plan, [x]  D/C Planning)    Prophylaxis:  []  Lovenox  []  Coumadin  [x]  Hep SQ  []  SCD’s  []  H2B/PPI    Disposition:  []  Home w/ Family   []  HH PT,OT,RN   []  SNF/LTC     [x]  SA rehab  ___________________________________________________    Hospitalist: Mildred Gonzalez MD       Objective:

## 2024-01-16 NOTE — PLAN OF CARE
toilet. Pt Supervision while seated then given SBA once in stance for félix area hygiene for clothing mgmt. Once finished, she stood at sink ~ 2 mins and performed hand hygiene SBA with 1 UE support on sink. Pt maneuvered in room an additional 40 ft SBA with RW to improve overall functional activity tolerance for ADL carryover. Pt continues to benefit from vc's to perform PLB technique to prevent SOB and for optimal oxygenation. Pt denies any dizziness or lightheadedness throughout session. She returned back to reclining chair and was positioned for comfort. She was left with all needs within reach and RN made aware.  Progression toward goals:  []          Improving appropriately and progressing toward goals  [x]          Improving slowly and progressing toward goals  []          Not making progress toward goals and plan of care will be adjusted     PLAN:  Patient continues to benefit from skilled intervention to address the above impairments.  Continue treatment per established plan of care.    Further Equipment Recommendations for Discharge: Shower chair    AMPAC: AM-PAC Inpatient Daily Activity Raw Score: 18    Current research shows that an AM-PAC score of 18 or greater is associated with a discharge to the patient's home setting. Based on an AM-PAC score and their current ADL deficits; it is recommended that the patient have 2-3 sessions per week of Occupational Therapy at d/c to increase the patient's independence.      This AMPA score should be considered in conjunction with interdisciplinary team recommendations to determine the most appropriate discharge setting. Patient's social support, diagnosis, medical stability, and prior level of function should also be taken into consideration.     SUBJECTIVE:   Patient stated, \"My leg still hurts.\"    OBJECTIVE DATA SUMMARY:   Cognitive/Behavioral Status:  Orientation  Overall Orientation Status: Within Normal Limits  Orientation Level: Oriented  X4  Cognition  Overall Cognitive Status: WNL    Functional Mobility and Transfers for ADLs:   Bed Mobility:  Bed Mobility Training  Bed Mobility Training: No  Rolling: Modified independent  Supine to Sit: Supervision  Sit to Supine: Supervision  Scooting: Supervision   Transfers:  Transfer Training  Transfer Training: Yes  Sit to Stand: Stand-by assistance  Stand to Sit: Stand-by assistance  Stand Pivot Transfers: Stand-by assistance  Bed to Chair: Stand-by assistance  Toilet Transfer: Stand-by assistance    Balance:  Balance  Sitting: Intact  Standing: Impaired  Standing - Static: Good  Standing - Dynamic: Fair    ADL Intervention:     Grooming: Stand by assistance     Toileting: Stand by assistance  Functional Mobility: Stand by assistance;Contact guard assistance      Pain:  Pain level pre-treatment: 3/10 L LE  Pain level post-treatment: 3/10  Pain Intervention(s): Rest, Ice, Repositioning   Response to intervention: Nurse notified    Activity Tolerance:    Activity Tolerance: Patient limited by fatigue;Patient limited by endurance;Patient limited by pain  Please refer to the flowsheet for vital signs taken during this treatment.  After treatment:   [x]  Patient left in no apparent distress sitting up in chair  []  Patient left in no apparent distress in bed  [x]  Call bell left within reach  [x]  Nursing notified  []  Caregiver present  []  Bed alarm activated    COMMUNICATION/EDUCATION:   Patient Education  Education Given To: Patient  Education Provided: Role of Therapy;Plan of Care;ADL Adaptive Strategies;Transfer Training;Fall Prevention Strategies;Energy Conservation  Education Method: Teach Back;Verbal;Demonstration  Barriers to Learning: None      Thank you for this referral.  JAEL Haile  Minutes: 24

## 2024-01-16 NOTE — CARE COORDINATION
Patient wanting to go to rehab at discharge. SNF list given and pts FOC is [facility]. Clinicals uploaded to [facility] in [employer] Link for review.    Spoke with admissions at [facility] and they are able to accept pt today.    Spoke with rep from [facility] transport and pickup time is scheduled for 5:45 pm. Patient, nurse and Dr. Gonzalez updated.    Lia Lee RN, BSN, [facility]  Case Management

## 2024-01-16 NOTE — DISCHARGE INSTRUCTIONS
DISCHARGE SUMMARY from Nurse    PATIENT INSTRUCTIONS:    After general anesthesia or intravenous sedation, for 24 hours or while taking prescription Narcotics:  Limit your activities  Do not drive and operate hazardous machinery  Do not make important personal or business decisions  Do  not drink alcoholic beverages  If you have not urinated within 8 hours after discharge, please contact your surgeon on call.    Report the following to your surgeon:  Excessive pain, swelling, redness or odor of or around the surgical area  Temperature over 100.5  Nausea and vomiting lasting longer than 4 hours or if unable to take medications  Any signs of decreased circulation or nerve impairment to extremity: change in color, persistent  numbness, tingling, coldness or increase pain  Any questions    What to do at Home:  Recommended activity: activity as tolerated, assistance as needed    If you experience any of the following symptoms Refer to Discharge Summary, please follow up with Lola Gan NP.    *  Please give a list of your current medications to your Primary Care Provider.    *  Please update this list whenever your medications are discontinued, doses are      changed, or new medications (including over-the-counter products) are added.    *  Please carry medication information at all times in case of emergency situations.    These are general instructions for a healthy lifestyle:    No smoking/ No tobacco products/ Avoid exposure to second hand smoke  Surgeon General's Warning:  Quitting smoking now greatly reduces serious risk to your health.    Obesity, smoking, and sedentary lifestyle greatly increases your risk for illness    A healthy diet, regular physical exercise & weight monitoring are important for maintaining a healthy lifestyle    You may be retaining fluid if you have a history of heart failure or if you experience any of the following symptoms:  Weight gain of 3 pounds or more overnight or 5 pounds in a  GUARDIAN:[id number]} verbalized understanding.  Discharge medications reviewed with the {Alfredo meds reviewed with:[id number]} and appropriate educational materials and side effects teaching were provided.  ___________________________________________________________________________________________________________________________________

## 2024-01-16 NOTE — PLAN OF CARE
Problem: Physical Therapy - Adult  Goal: By Discharge: Performs mobility at highest level of function for planned discharge setting.  See evaluation for individualized goals.  Description: Physical Therapy Goals:  Initiated 1/8/2024 to be met within 7-10 days. Re-evaluation performed on 1/16/2024    1.  Patient will move from supine to sit and sit to supine  in bed with modified independence.    2.  Patient will transfer from bed to chair and chair to bed with supervision/set-up using the least restrictive device.  3.  Patient will perform sit to stand with supervision/set-up.  4.  Patient will ambulate with supervision/set-up for 50 feet with the least restrictive device.   5.  Patient will ascend/descend 3 stairs with 1 handrail(s) with minimal assistance/contact guard assist.    PLOF: pt lives alone, several steps in home, reports ambulating without AD      Outcome: Progressing   PHYSICAL THERAPY RE-EVALUATION    Patient: Rosa Alcala (88 y.o. female)  Date: 1/16/2024  Primary Diagnosis: Lactic acidosis [E87.20]  Cellulitis [L03.90]  Peripheral edema [R60.9]  Cellulitis and abscess of leg [L03.119, L02.419]  Pulmonary hypertension (HCC) [I27.20]  Acute on chronic respiratory failure with hypoxia (HCC) [J96.21]       Precautions: Fall Risk, General Precautions,      ASSESSMENT :  Pt resting comfortably in bed upon entering room for tx session, agreeable to treat.  Pt on 6L of supplemental O2 with NC.  Supine to sit transfer with Supervision, no reports of dizziness at EOB.  Pt noted she needed to use the [facility] real quick before walking, STS with RW and SBA with stand pivot transfer to [facility].  Pt able to perform félix care independently.  Pt ambulated ~ 40 feet within room with RW and SBA, no LOB, decreased antoinette.  Pt stayed sitting up in chair, tray table by her side, all needs met and call button within reach.      DEFICITS/IMPAIRMENTS:    , Body Structures, Functions, Activity Limitations Requiring Skilled  function should also be taken into consideration.     SUBJECTIVE:   Patient stated “She is ready to get up and move.”    OBJECTIVE DATA SUMMARY:   Hospital course since last seen and reason for re-evaluation: Pt progressing well through established goals, would continue to benefit from skilled acute care   Physical therapy services to address remaining functional mobility deficits and progress towards return to PLOF and improve safety to decrease fall risk.   Past Medical History:   Diagnosis Date    Arthritis     Chronic diastolic CHF (congestive heart failure) (HCC) 2022    Chronic obstructive pulmonary disease (HCC)     Hypertension      Past Surgical History:   Procedure Laterality Date    ORTHOPEDIC SURGERY      spinal sx 5/6    OTHER SURGICAL HISTORY      Carotid artery    VASCULAR SURGERY      L CEA 10/2014       Home Situation:  Social/Functional History  Lives With: Alone  Type of Home: House  Home Layout: One level  Home Access: Stairs to enter with rails  Entrance Stairs - Number of Steps: 4  Bathroom Shower/Tub: Walk-in shower  Bathroom Toilet: Standard  Bathroom Equipment: Shower chair  Bathroom Accessibility: Accessible  Home Equipment: Cane, Rollator, Oxygen  Receives Help From: Family  ADL Assistance: Independent  Homemaking Assistance: Independent  Ambulation Assistance: Independent  Transfer Assistance: Independent  Active : No  Patient's  Info: family transports  Occupation: Retired  Type of Occupation: [employer]  Critical Behavior:  Orientation  Overall Orientation Status: Within Normal Limits  Orientation Level: Oriented X4  Cognition  Overall Cognitive Status: WNL    Strength:    Strength: Generally decreased, functional    Tone & Sensation:   Tone: Normal  Sensation: Intact    Range Of Motion:  AROM: Generally decreased, functional  PROM: Generally decreased, functional    Functional Mobility:  Bed Mobility:     Bed Mobility Training  Bed Mobility Training:

## 2024-01-16 NOTE — CARE COORDINATION
Discharge order noted for today.  Patient has been accepted to [facility]. Confirmed with Matthew that bed is available for today. Met with pt and she is agreeable to the transition plan today.  Transport to facility has been arranged through [facility] at 1900. Patients discharge summary has been forwarded to facility via [employer] and placed in pt's envelope for transport to facility. Bedside nurse has been notified of transition plan. Discharge information has been updated on AVS. Please call report to [phone].    Lia Lee RN, BSN, [facility]  Case Management

## 2024-01-16 NOTE — CARE COORDINATION
Transition of Care Plan to SNF/Rehab    SNF/Rehab Transition:  Patient has been accepted to [facility] and meets criteria for admission.   Patient will transported by [facility] and expected to leave at 1900.    Communication to Patient/Family:  Met with patient and she is agreeable to the transition plan.    Communication to SNF/Rehab:  Bedside RN, has been notified to update the transition plan to the facility and call report (phone number [phone]).  Discharge information has been updated on the AVS.       Nursing Please include all hard scripts for controlled substances, med rec and dc summary, and AVS in packet.     Reviewed and confirmed with facility, [facility], can manage the patient care needs for the following:     Mike with (X) only those applicable:    Medication:  [x]  Medications will be available at the facility  []  IV Antibiotics   []  Controlled Substance - hard copy to be sent with patient   []  Weekly Labs   Documents:  [] Hard RX  [] MAR  [] Kardex  [] AVS  []Transfer Summary  []Discharge   Equipment:  []  CPAP/BiPAP  []  Wound Vacuum  []  Jin or Urinary Device  []  PICC/Central Line  []  Nebulizer  []  Ventilator   Treatment:  []Isolation (for MRSA, VRE, etc.)  []Surgical Drain Management  []Tracheostomy Care  []Dressing Changes  []Dialysis with transportation and chair time   []PEG Care  []Oxygen  []Daily Weights for Heart Failure   Dietary:  []Any diet limitations  []Tube Feedings   []Total Parenteral Management (TPN)   Eligible for Medicaid Long Term Services and Supports  Yes:  [] Eligible for medical assistance or will become eligible within 180 days and UAI completed.   [] Provider/Patient and/or support system has requested screening.  [] UAI copy provided to patient or responsible party  [] UAI unavailable at discharge will send once processed to SNF provider.  [] UAI unavailable at discharged mailed to patient  No:   [] Private pay and is not

## 2024-01-16 NOTE — CARE COORDINATION
Received call from [facility] stating that they had an emergency and that transport  time is now at 1900. Updated nursing staff and pt.    Lia Lee RN, BSN, [facility]  Case Management

## 2024-01-16 NOTE — PROGRESS NOTES
Infectious Disease progress Note        Reason: Sepsis, severe left leg cellulitis    Current abx Prior abx   vancomycin since 1/6/24-1/11  Levofloxacin, linezolid since 1/11/2024 Cefepime 1/6-1/8  Clindamycin 1/6-1/9   1  Lines:       Assessment :    88-year-old female with a past medical history of hypertension, CHF, COPD on 4 L of home O2 presented to [facility] ED on 1/5/2024 with severe left leg pain.     Hospitalization 12/2020 for acute on chronic hypoxic respiratory failure secondary to confirmed COVID-19 pneumonia   Status post remdesivir since 12/11/2020-12/15  Status post convalescent plasma 12/11/2020     Hospitalization at [facility] May 2022 for partially treated sepsis- POA due to e.coli BSI (positive blood cx 5/5, negative blood cx 5/7) right kidney pyelonephritis, cystitis     Clinical presentation consistent with sepsis-present on admission due to severe left leg cellulitis    Exact microbial etiology of left leg infection not entirely clear.  However the rapid onset and clinical presentation is likely suggestive of gram-positive bacteria such as Streptococcus/Staphylococcus  I agree with adding clindamycin to cover for toxin producing gram-positive's looking at the severity of illness.  Currently no definitive clinical/radiographic evidence to suggest necrotizing infection     chronic hypoxic respiratory failure-on 4 L oxygen at home.  Pulmonary follow-up appreciated      Persistent leukocytosis 1/11-? partially treated infection- improved leukocytosis after switch to levofloxacin/linezolid  Gradually improving left leg erythema, improving procalcitonin noted  Improved left leg pain on today's exam    Increased fiO2 1/15- ? Fluid overload ?mucus plugging  Improved oxygenation noted today  Resolved left leg warmth on today's exam.  Darkish discoloration left leg.  Worsening leukocytosis despite clinical improvement could be drug-induced-?  Inhaled budesonide related  Evidence of atelectasis noted on chest x-ray

## 2024-01-17 PROBLEM — J96.01 ACUTE HYPOXIC RESPIRATORY FAILURE (HCC): Status: ACTIVE | Noted: 2024-01-17

## 2024-01-18 NOTE — PROGRESS NOTES
Physician Progress Note      PATIENT:               MIKHAIL OCHOA  [facility] #:                  [id number]  :                       1935  ADMIT DATE:       2024 11:33 AM  DISCH DATE:        2024 8:30 PM  RESPONDING  PROVIDER #:        Mildred Gonzalez MD          QUERY TEXT:    Patient admitted with sepsis. Noted to have dietician assessment with moderate   malnutrition diagnosis in 2024 progress note. If possible, please   document in progress notes and discharge summary if you are evaluating and /or   treating any of the following:    The medical record reflects the following:  Risk Factors: CHF, sepsis  Clinical Indicators: 2024, PN, Pearl Torres, RD:  \"Malnutrition   Assessment:  Malnutrition Status:  Moderate malnutrition (24 1040)  Context:  Chronic Illness  Findings of the 6 clinical characteristics of malnutrition:  Energy Intake:  No significant decrease in energy intake  Weight Loss:  No significant weight loss  Body Fat Loss:  Mild body fat loss (moderate) Triceps, Buccal region  Muscle Mass Loss:  Mild muscle mass loss (moderate) Temples (temporalis),   Clavicles (pectoralis & deltoids), Hand (interosseous)  Fluid Accumulation:  Unable to assess\"  BMI:  19.84 kg/m2  Treatment: modify diet to easy to chew, continue oral nutritional supplements    ASPEN Criteria:    https://aspenjournals.onlinelibrary.oliveros.com/doi/full/10.1177/974221364741599  5    Thank you,  JACKI Oliveros RN, CDS  [email or web address]  Options provided:  -- Protein calorie malnutrition moderate  -- Other - I will add my own diagnosis  -- Disagree - Not applicable / Not valid  -- Disagree - Clinically unable to determine / Unknown  -- Refer to Clinical Documentation Reviewer    PROVIDER RESPONSE TEXT:    This patient has moderate protein calorie malnutrition.    Query created by: Geeta Oliveros on 2024 7:02 PM      Electronically signed by:  Mildred Gonzalez MD 2024 7:55 PM

## 2024-01-21 PROBLEM — B96.20 E COLI BACTEREMIA: Status: RESOLVED | Noted: 2022-05-08 | Resolved: 2024-01-21

## 2024-01-21 PROBLEM — Z66 DNR (DO NOT RESUSCITATE): Chronic | Status: ACTIVE | Noted: 2024-01-21

## 2024-01-21 PROBLEM — R78.81 E COLI BACTEREMIA: Status: RESOLVED | Noted: 2022-05-08 | Resolved: 2024-01-21

## 2024-01-21 PROBLEM — R79.89 ELEVATED TROPONIN: Status: RESOLVED | Noted: 2024-01-08 | Resolved: 2024-01-21

## 2024-01-21 PROBLEM — R09.02 HYPOXIA: Chronic | Status: ACTIVE | Noted: 2024-01-16
